# Patient Record
Sex: MALE | Race: WHITE | NOT HISPANIC OR LATINO | Employment: FULL TIME | URBAN - METROPOLITAN AREA
[De-identification: names, ages, dates, MRNs, and addresses within clinical notes are randomized per-mention and may not be internally consistent; named-entity substitution may affect disease eponyms.]

---

## 2017-01-06 ENCOUNTER — TRANSCRIBE ORDERS (OUTPATIENT)
Dept: ADMINISTRATIVE | Facility: HOSPITAL | Age: 57
End: 2017-01-06

## 2017-01-06 DIAGNOSIS — B18.2 CHRONIC HEPATITIS C WITH HEPATIC COMA (HCC): Primary | ICD-10-CM

## 2017-01-06 DIAGNOSIS — K74.69 FLORID CIRRHOSIS (HCC): ICD-10-CM

## 2017-01-25 ENCOUNTER — ALLSCRIPTS OFFICE VISIT (OUTPATIENT)
Dept: OTHER | Facility: OTHER | Age: 57
End: 2017-01-25

## 2017-01-25 ENCOUNTER — GENERIC CONVERSION - ENCOUNTER (OUTPATIENT)
Dept: OTHER | Facility: OTHER | Age: 57
End: 2017-01-25

## 2017-04-25 DIAGNOSIS — Z12.5 ENCOUNTER FOR SCREENING FOR MALIGNANT NEOPLASM OF PROSTATE: ICD-10-CM

## 2017-04-27 LAB — PROSTATE SPECIFIC ANTIGEN TOTAL (HISTORICAL): 3.6 NG/ML

## 2017-06-02 ENCOUNTER — HOSPITAL ENCOUNTER (OUTPATIENT)
Dept: RADIOLOGY | Facility: MEDICAL CENTER | Age: 57
Discharge: HOME | End: 2017-06-02
Payer: COMMERCIAL

## 2017-06-02 DIAGNOSIS — K74.69 FLORID CIRRHOSIS (HCC): ICD-10-CM

## 2017-06-02 DIAGNOSIS — B18.2 CHRONIC HEPATITIS C WITH HEPATIC COMA (HCC): ICD-10-CM

## 2017-06-02 PROCEDURE — 76705 ECHO EXAM OF ABDOMEN: CPT

## 2017-06-09 ENCOUNTER — GENERIC CONVERSION - ENCOUNTER (OUTPATIENT)
Dept: OTHER | Facility: OTHER | Age: 57
End: 2017-06-09

## 2017-07-03 ENCOUNTER — OFFICE VISIT (OUTPATIENT)
Dept: URGENT CARE | Facility: MEDICAL CENTER | Age: 57
End: 2017-07-03
Payer: COMMERCIAL

## 2017-07-03 DIAGNOSIS — R30.0 DYSURIA: ICD-10-CM

## 2017-07-03 DIAGNOSIS — R31.9 HEMATURIA: ICD-10-CM

## 2017-07-03 PROCEDURE — S9083 URGENT CARE CENTER GLOBAL: HCPCS

## 2017-07-03 PROCEDURE — G0382 LEV 3 HOSP TYPE B ED VISIT: HCPCS

## 2017-07-04 ENCOUNTER — APPOINTMENT (OUTPATIENT)
Dept: LAB | Facility: HOSPITAL | Age: 57
End: 2017-07-04
Payer: COMMERCIAL

## 2017-07-04 DIAGNOSIS — R30.0 DYSURIA: ICD-10-CM

## 2017-07-04 LAB
BACTERIA UR QL AUTO: ABNORMAL /HPF
BILIRUB UR QL STRIP: NEGATIVE
CLARITY UR: CLEAR
COLOR UR: YELLOW
GLUCOSE UR STRIP-MCNC: ABNORMAL MG/DL
HGB UR QL STRIP.AUTO: ABNORMAL
KETONES UR STRIP-MCNC: NEGATIVE MG/DL
LEUKOCYTE ESTERASE UR QL STRIP: NEGATIVE
NITRITE UR QL STRIP: NEGATIVE
NON-SQ EPI CELLS URNS QL MICRO: ABNORMAL /HPF
PH UR STRIP.AUTO: 5.5 [PH] (ref 4.5–8)
PROT UR STRIP-MCNC: NEGATIVE MG/DL
RBC #/AREA URNS AUTO: ABNORMAL /HPF
SP GR UR STRIP.AUTO: 1.04 (ref 1–1.03)
UROBILINOGEN UR QL STRIP.AUTO: 0.2 E.U./DL
WBC #/AREA URNS AUTO: ABNORMAL /HPF

## 2017-07-04 PROCEDURE — 81001 URINALYSIS AUTO W/SCOPE: CPT

## 2017-07-06 ENCOUNTER — ALLSCRIPTS OFFICE VISIT (OUTPATIENT)
Dept: OTHER | Facility: OTHER | Age: 57
End: 2017-07-06

## 2017-07-06 ENCOUNTER — APPOINTMENT (OUTPATIENT)
Dept: LAB | Facility: HOSPITAL | Age: 57
End: 2017-07-06
Payer: COMMERCIAL

## 2017-07-06 ENCOUNTER — TRANSCRIBE ORDERS (OUTPATIENT)
Dept: ADMINISTRATIVE | Facility: HOSPITAL | Age: 57
End: 2017-07-06

## 2017-07-06 DIAGNOSIS — R31.9 HEMATURIA: ICD-10-CM

## 2017-07-06 DIAGNOSIS — R31.9 HEMATURIA: Primary | ICD-10-CM

## 2017-07-06 DIAGNOSIS — R30.0 DYSURIA: ICD-10-CM

## 2017-07-06 LAB
CLARITY UR: NORMAL
COLOR UR: YELLOW
GLUCOSE (HISTORICAL): 2000
HGB UR QL STRIP.AUTO: NORMAL
KETONES UR STRIP-MCNC: NORMAL MG/DL
LEUKOCYTE ESTERASE UR QL STRIP: NORMAL
NITRITE UR QL STRIP: NORMAL
PH UR STRIP.AUTO: 5 [PH]
PROT UR STRIP-MCNC: NORMAL MG/DL
SP GR UR STRIP.AUTO: 1.01

## 2017-07-06 PROCEDURE — 87086 URINE CULTURE/COLONY COUNT: CPT

## 2017-07-07 LAB — BACTERIA UR CULT: NORMAL

## 2017-07-13 LAB
BUN SERPL-MCNC: 17 MG/DL (ref 7–25)
BUN/CREA RATIO (HISTORICAL): ABNORMAL (CALC) (ref 6–22)
CALCIUM SERPL-MCNC: 9.3 MG/DL (ref 8.6–10.3)
CHLORIDE SERPL-SCNC: 104 MMOL/L (ref 98–110)
CO2 SERPL-SCNC: 27 MMOL/L (ref 20–31)
CREAT SERPL-MCNC: 1.08 MG/DL (ref 0.7–1.33)
EGFR AFRICAN AMERICAN (HISTORICAL): 87 ML/MIN/1.73M2
EGFR-AMERICAN CALC (HISTORICAL): 75 ML/MIN/1.73M2
GLUCOSE (HISTORICAL): 233 MG/DL (ref 65–99)
POTASSIUM SERPL-SCNC: 4.4 MMOL/L (ref 3.5–5.3)
SODIUM SERPL-SCNC: 139 MMOL/L (ref 135–146)

## 2017-07-15 ENCOUNTER — HOSPITAL ENCOUNTER (OUTPATIENT)
Dept: CT IMAGING | Facility: HOSPITAL | Age: 57
Discharge: HOME | End: 2017-07-15
Payer: COMMERCIAL

## 2017-07-15 DIAGNOSIS — R31.9 HEMATURIA: ICD-10-CM

## 2017-07-15 PROCEDURE — 74178 CT ABD&PLV WO CNTR FLWD CNTR: CPT

## 2017-07-15 RX ADMIN — IOHEXOL 100 ML: 350 INJECTION, SOLUTION INTRAVENOUS at 11:34

## 2017-08-07 ENCOUNTER — ALLSCRIPTS OFFICE VISIT (OUTPATIENT)
Dept: OTHER | Facility: OTHER | Age: 57
End: 2017-08-07

## 2017-08-07 LAB
CLARITY UR: NORMAL
COLOR UR: YELLOW
GLUCOSE (HISTORICAL): 2
HGB UR QL STRIP.AUTO: NORMAL
KETONES UR STRIP-MCNC: NORMAL MG/DL
LEUKOCYTE ESTERASE UR QL STRIP: NORMAL
NITRITE UR QL STRIP: NORMAL
PH UR STRIP.AUTO: 5 [PH]
PROT UR STRIP-MCNC: NORMAL MG/DL
SP GR UR STRIP.AUTO: 1.02

## 2017-08-21 ENCOUNTER — GENERIC CONVERSION - ENCOUNTER (OUTPATIENT)
Dept: OTHER | Facility: OTHER | Age: 57
End: 2017-08-21

## 2017-08-21 LAB
LEFT EYE DIABETIC RETINOPATHY: NORMAL
RIGHT EYE DIABETIC RETINOPATHY: NORMAL

## 2017-09-02 ENCOUNTER — OFFICE VISIT (OUTPATIENT)
Dept: LAB | Facility: CLINIC | Age: 57
End: 2017-09-02
Payer: COMMERCIAL

## 2017-09-02 ENCOUNTER — TRANSCRIBE ORDERS (OUTPATIENT)
Dept: LAB | Facility: CLINIC | Age: 57
End: 2017-09-02

## 2017-09-02 ENCOUNTER — APPOINTMENT (OUTPATIENT)
Dept: LAB | Facility: CLINIC | Age: 57
End: 2017-09-02
Payer: COMMERCIAL

## 2017-09-02 DIAGNOSIS — N13.8 ENLARGED PROSTATE WITH URINARY OBSTRUCTION: Primary | ICD-10-CM

## 2017-09-02 DIAGNOSIS — N40.1 ENLARGED PROSTATE WITH URINARY OBSTRUCTION: ICD-10-CM

## 2017-09-02 DIAGNOSIS — N13.8 ENLARGED PROSTATE WITH URINARY OBSTRUCTION: ICD-10-CM

## 2017-09-02 DIAGNOSIS — N40.1 ENLARGED PROSTATE WITH URINARY OBSTRUCTION: Primary | ICD-10-CM

## 2017-09-02 LAB
ABO GROUP BLD: NORMAL
ANION GAP SERPL CALCULATED.3IONS-SCNC: 8 MMOL/L (ref 4–13)
APTT PPP: 28 SECONDS (ref 23–35)
BASOPHILS # BLD AUTO: 0.03 THOUSANDS/ΜL (ref 0–0.1)
BASOPHILS NFR BLD AUTO: 1 % (ref 0–1)
BLD GP AB SCN SERPL QL: NEGATIVE
BUN SERPL-MCNC: 16 MG/DL (ref 5–25)
CALCIUM SERPL-MCNC: 8.9 MG/DL (ref 8.3–10.1)
CHLORIDE SERPL-SCNC: 103 MMOL/L (ref 100–108)
CO2 SERPL-SCNC: 28 MMOL/L (ref 21–32)
CREAT SERPL-MCNC: 1.01 MG/DL (ref 0.6–1.3)
EOSINOPHIL # BLD AUTO: 0.09 THOUSAND/ΜL (ref 0–0.61)
EOSINOPHIL NFR BLD AUTO: 2 % (ref 0–6)
ERYTHROCYTE [DISTWIDTH] IN BLOOD BY AUTOMATED COUNT: 12.9 % (ref 11.6–15.1)
GFR SERPL CREATININE-BSD FRML MDRD: 82 ML/MIN/1.73SQ M
GLUCOSE P FAST SERPL-MCNC: 226 MG/DL (ref 65–99)
HCT VFR BLD AUTO: 48.2 % (ref 36.5–49.3)
HGB BLD-MCNC: 16.4 G/DL (ref 12–17)
INR PPP: 0.93 (ref 0.86–1.16)
LYMPHOCYTES # BLD AUTO: 1.33 THOUSANDS/ΜL (ref 0.6–4.47)
LYMPHOCYTES NFR BLD AUTO: 29 % (ref 14–44)
MCH RBC QN AUTO: 31.1 PG (ref 26.8–34.3)
MCHC RBC AUTO-ENTMCNC: 34 G/DL (ref 31.4–37.4)
MCV RBC AUTO: 92 FL (ref 82–98)
MONOCYTES # BLD AUTO: 0.34 THOUSAND/ΜL (ref 0.17–1.22)
MONOCYTES NFR BLD AUTO: 7 % (ref 4–12)
NEUTROPHILS # BLD AUTO: 2.87 THOUSANDS/ΜL (ref 1.85–7.62)
NEUTS SEG NFR BLD AUTO: 61 % (ref 43–75)
PLATELET # BLD AUTO: 187 THOUSANDS/UL (ref 149–390)
PMV BLD AUTO: 9.8 FL (ref 8.9–12.7)
POTASSIUM SERPL-SCNC: 4.5 MMOL/L (ref 3.5–5.3)
PROTHROMBIN TIME: 12.7 SECONDS (ref 12.1–14.4)
RBC # BLD AUTO: 5.27 MILLION/UL (ref 3.88–5.62)
RH BLD: POSITIVE
SODIUM SERPL-SCNC: 139 MMOL/L (ref 136–145)
SPECIMEN EXPIRATION DATE: NORMAL
WBC # BLD AUTO: 4.66 THOUSAND/UL (ref 4.31–10.16)

## 2017-09-02 PROCEDURE — 80048 BASIC METABOLIC PNL TOTAL CA: CPT

## 2017-09-02 PROCEDURE — 85610 PROTHROMBIN TIME: CPT

## 2017-09-02 PROCEDURE — 85025 COMPLETE CBC W/AUTO DIFF WBC: CPT

## 2017-09-02 PROCEDURE — 86850 RBC ANTIBODY SCREEN: CPT

## 2017-09-02 PROCEDURE — 36415 COLL VENOUS BLD VENIPUNCTURE: CPT

## 2017-09-02 PROCEDURE — 86901 BLOOD TYPING SEROLOGIC RH(D): CPT

## 2017-09-02 PROCEDURE — 86900 BLOOD TYPING SEROLOGIC ABO: CPT

## 2017-09-02 PROCEDURE — 85730 THROMBOPLASTIN TIME PARTIAL: CPT

## 2017-09-02 PROCEDURE — 93005 ELECTROCARDIOGRAM TRACING: CPT

## 2017-09-03 LAB
ATRIAL RATE: 72 BPM
P AXIS: 57 DEGREES
PR INTERVAL: 168 MS
QRS AXIS: 33 DEGREES
QRSD INTERVAL: 82 MS
QT INTERVAL: 406 MS
QTC INTERVAL: 444 MS
T WAVE AXIS: 54 DEGREES
VENTRICULAR RATE: 72 BPM

## 2017-09-07 ENCOUNTER — ALLSCRIPTS OFFICE VISIT (OUTPATIENT)
Dept: OTHER | Facility: OTHER | Age: 57
End: 2017-09-07

## 2017-09-13 ENCOUNTER — ANESTHESIA EVENT (OUTPATIENT)
Dept: PERIOP | Facility: HOSPITAL | Age: 57
End: 2017-09-13
Payer: COMMERCIAL

## 2017-09-13 ENCOUNTER — ANESTHESIA (OUTPATIENT)
Dept: PERIOP | Facility: HOSPITAL | Age: 57
End: 2017-09-13
Payer: COMMERCIAL

## 2017-09-13 ENCOUNTER — HOSPITAL ENCOUNTER (OUTPATIENT)
Facility: HOSPITAL | Age: 57
Setting detail: OUTPATIENT SURGERY
Discharge: HOME | End: 2017-09-14
Attending: UROLOGY | Admitting: UROLOGY
Payer: COMMERCIAL

## 2017-09-13 DIAGNOSIS — N40.1 ENLARGED PROSTATE WITH LOWER URINARY TRACT SYMPTOMS (LUTS): ICD-10-CM

## 2017-09-13 LAB
ANION GAP SERPL CALCULATED.3IONS-SCNC: 9 MMOL/L (ref 4–13)
BUN SERPL-MCNC: 17 MG/DL (ref 5–25)
CALCIUM SERPL-MCNC: 8.5 MG/DL (ref 8.3–10.1)
CHLORIDE SERPL-SCNC: 106 MMOL/L (ref 100–108)
CO2 SERPL-SCNC: 24 MMOL/L (ref 21–32)
CREAT SERPL-MCNC: 0.94 MG/DL (ref 0.6–1.3)
ERYTHROCYTE [DISTWIDTH] IN BLOOD BY AUTOMATED COUNT: 12.9 % (ref 11.6–15.1)
GFR SERPL CREATININE-BSD FRML MDRD: 89 ML/MIN/1.73SQ M
GLUCOSE P FAST SERPL-MCNC: 213 MG/DL (ref 65–99)
GLUCOSE SERPL-MCNC: 200 MG/DL (ref 65–140)
GLUCOSE SERPL-MCNC: 203 MG/DL (ref 65–140)
GLUCOSE SERPL-MCNC: 213 MG/DL (ref 65–140)
GLUCOSE SERPL-MCNC: 367 MG/DL (ref 65–140)
HCT VFR BLD AUTO: 47.1 % (ref 36.5–49.3)
HGB BLD-MCNC: 15.8 G/DL (ref 12–17)
MCH RBC QN AUTO: 30.9 PG (ref 26.8–34.3)
MCHC RBC AUTO-ENTMCNC: 33.5 G/DL (ref 31.4–37.4)
MCV RBC AUTO: 92 FL (ref 82–98)
PLATELET # BLD AUTO: 154 THOUSANDS/UL (ref 149–390)
PMV BLD AUTO: 9.6 FL (ref 8.9–12.7)
POTASSIUM SERPL-SCNC: 4.6 MMOL/L (ref 3.5–5.3)
RBC # BLD AUTO: 5.12 MILLION/UL (ref 3.88–5.62)
SODIUM SERPL-SCNC: 139 MMOL/L (ref 136–145)
WBC # BLD AUTO: 5.62 THOUSAND/UL (ref 4.31–10.16)

## 2017-09-13 PROCEDURE — 88344 IMHCHEM/IMCYTCHM EA MLT ANTB: CPT | Performed by: UROLOGY

## 2017-09-13 PROCEDURE — 88305 TISSUE EXAM BY PATHOLOGIST: CPT | Performed by: UROLOGY

## 2017-09-13 PROCEDURE — 85027 COMPLETE CBC AUTOMATED: CPT | Performed by: UROLOGY

## 2017-09-13 PROCEDURE — 86920 COMPATIBILITY TEST SPIN: CPT

## 2017-09-13 PROCEDURE — 82948 REAGENT STRIP/BLOOD GLUCOSE: CPT

## 2017-09-13 PROCEDURE — 80048 BASIC METABOLIC PNL TOTAL CA: CPT | Performed by: UROLOGY

## 2017-09-13 RX ORDER — SODIUM CHLORIDE, SODIUM LACTATE, POTASSIUM CHLORIDE, CALCIUM CHLORIDE 600; 310; 30; 20 MG/100ML; MG/100ML; MG/100ML; MG/100ML
100 INJECTION, SOLUTION INTRAVENOUS CONTINUOUS
Status: DISCONTINUED | OUTPATIENT
Start: 2017-09-13 | End: 2017-09-14 | Stop reason: HOSPADM

## 2017-09-13 RX ORDER — LIDOCAINE HYDROCHLORIDE 10 MG/ML
INJECTION, SOLUTION INFILTRATION; PERINEURAL AS NEEDED
Status: DISCONTINUED | OUTPATIENT
Start: 2017-09-13 | End: 2017-09-13 | Stop reason: SURG

## 2017-09-13 RX ORDER — ONDANSETRON 2 MG/ML
INJECTION INTRAMUSCULAR; INTRAVENOUS AS NEEDED
Status: DISCONTINUED | OUTPATIENT
Start: 2017-09-13 | End: 2017-09-13 | Stop reason: SURG

## 2017-09-13 RX ORDER — HYDROCODONE BITARTRATE AND ACETAMINOPHEN 5; 325 MG/1; MG/1
1 TABLET ORAL EVERY 6 HOURS PRN
Status: DISCONTINUED | OUTPATIENT
Start: 2017-09-13 | End: 2017-09-14 | Stop reason: HOSPADM

## 2017-09-13 RX ORDER — TAMSULOSIN HYDROCHLORIDE 0.4 MG/1
0.4 CAPSULE ORAL
Status: DISCONTINUED | OUTPATIENT
Start: 2017-09-14 | End: 2017-09-14 | Stop reason: HOSPADM

## 2017-09-13 RX ORDER — MORPHINE SULFATE 2 MG/ML
2 INJECTION, SOLUTION INTRAMUSCULAR; INTRAVENOUS
Status: DISCONTINUED | OUTPATIENT
Start: 2017-09-13 | End: 2017-09-14 | Stop reason: HOSPADM

## 2017-09-13 RX ORDER — ONDANSETRON 2 MG/ML
4 INJECTION INTRAMUSCULAR; INTRAVENOUS EVERY 6 HOURS PRN
Status: DISCONTINUED | OUTPATIENT
Start: 2017-09-13 | End: 2017-09-14 | Stop reason: HOSPADM

## 2017-09-13 RX ORDER — DOCUSATE SODIUM 100 MG/1
100 CAPSULE, LIQUID FILLED ORAL 2 TIMES DAILY
Qty: 60 CAPSULE | Refills: 0 | Status: SHIPPED | OUTPATIENT
Start: 2017-09-13 | End: 2018-05-07 | Stop reason: ALTCHOICE

## 2017-09-13 RX ORDER — FINASTERIDE 5 MG/1
5 TABLET, FILM COATED ORAL DAILY
Status: DISCONTINUED | OUTPATIENT
Start: 2017-09-13 | End: 2017-09-14 | Stop reason: HOSPADM

## 2017-09-13 RX ORDER — HYDROCODONE BITARTRATE AND ACETAMINOPHEN 5; 325 MG/1; MG/1
1 TABLET ORAL EVERY 4 HOURS PRN
Qty: 10 TABLET | Refills: 0 | Status: SHIPPED | OUTPATIENT
Start: 2017-09-13 | End: 2017-09-18

## 2017-09-13 RX ORDER — FENTANYL CITRATE 50 UG/ML
INJECTION, SOLUTION INTRAMUSCULAR; INTRAVENOUS AS NEEDED
Status: DISCONTINUED | OUTPATIENT
Start: 2017-09-13 | End: 2017-09-13 | Stop reason: SURG

## 2017-09-13 RX ORDER — FENTANYL CITRATE/PF 50 MCG/ML
25 SYRINGE (ML) INJECTION AS NEEDED
Status: CANCELLED | OUTPATIENT
Start: 2017-09-13

## 2017-09-13 RX ORDER — ATROPA BELLADONNA AND OPIUM 16.2; 6 MG/1; MG/1
1 SUPPOSITORY RECTAL EVERY 6 HOURS PRN
Status: DISCONTINUED | OUTPATIENT
Start: 2017-09-13 | End: 2017-09-14 | Stop reason: HOSPADM

## 2017-09-13 RX ORDER — ATROPA BELLADONNA AND OPIUM 16.2; 6 MG/1; MG/1
SUPPOSITORY RECTAL AS NEEDED
Status: DISCONTINUED | OUTPATIENT
Start: 2017-09-13 | End: 2017-09-13 | Stop reason: HOSPADM

## 2017-09-13 RX ORDER — MIDAZOLAM HYDROCHLORIDE 1 MG/ML
INJECTION INTRAMUSCULAR; INTRAVENOUS AS NEEDED
Status: DISCONTINUED | OUTPATIENT
Start: 2017-09-13 | End: 2017-09-13 | Stop reason: SURG

## 2017-09-13 RX ORDER — ONDANSETRON 2 MG/ML
4 INJECTION INTRAMUSCULAR; INTRAVENOUS ONCE AS NEEDED
Status: DISCONTINUED | OUTPATIENT
Start: 2017-09-13 | End: 2017-09-13 | Stop reason: HOSPADM

## 2017-09-13 RX ORDER — PANTOPRAZOLE SODIUM 40 MG/1
40 TABLET, DELAYED RELEASE ORAL
Status: DISCONTINUED | OUTPATIENT
Start: 2017-09-14 | End: 2017-09-14 | Stop reason: HOSPADM

## 2017-09-13 RX ORDER — ONDANSETRON 2 MG/ML
4 INJECTION INTRAMUSCULAR; INTRAVENOUS ONCE AS NEEDED
Status: CANCELLED | OUTPATIENT
Start: 2017-09-13

## 2017-09-13 RX ORDER — DOCUSATE SODIUM 100 MG/1
100 CAPSULE, LIQUID FILLED ORAL 2 TIMES DAILY
Status: DISCONTINUED | OUTPATIENT
Start: 2017-09-13 | End: 2017-09-14 | Stop reason: HOSPADM

## 2017-09-13 RX ORDER — PROPOFOL 10 MG/ML
INJECTION, EMULSION INTRAVENOUS AS NEEDED
Status: DISCONTINUED | OUTPATIENT
Start: 2017-09-13 | End: 2017-09-13 | Stop reason: SURG

## 2017-09-13 RX ORDER — CALCIUM CARBONATE 200(500)MG
1000 TABLET,CHEWABLE ORAL DAILY PRN
Status: DISCONTINUED | OUTPATIENT
Start: 2017-09-13 | End: 2017-09-14 | Stop reason: HOSPADM

## 2017-09-13 RX ORDER — SODIUM CHLORIDE, SODIUM LACTATE, POTASSIUM CHLORIDE, CALCIUM CHLORIDE 600; 310; 30; 20 MG/100ML; MG/100ML; MG/100ML; MG/100ML
INJECTION, SOLUTION INTRAVENOUS CONTINUOUS PRN
Status: DISCONTINUED | OUTPATIENT
Start: 2017-09-13 | End: 2017-09-13 | Stop reason: SURG

## 2017-09-13 RX ORDER — GLYCINE 1.5 G/100ML
SOLUTION IRRIGATION AS NEEDED
Status: DISCONTINUED | OUTPATIENT
Start: 2017-09-13 | End: 2017-09-13 | Stop reason: HOSPADM

## 2017-09-13 RX ORDER — FENTANYL CITRATE/PF 50 MCG/ML
50 SYRINGE (ML) INJECTION
Status: DISCONTINUED | OUTPATIENT
Start: 2017-09-13 | End: 2017-09-13 | Stop reason: HOSPADM

## 2017-09-13 RX ORDER — BACITRACIN, NEOMYCIN, POLYMYXIN B 400; 3.5; 5 [USP'U]/G; MG/G; [USP'U]/G
1 OINTMENT TOPICAL 2 TIMES DAILY
Status: DISCONTINUED | OUTPATIENT
Start: 2017-09-13 | End: 2017-09-14 | Stop reason: HOSPADM

## 2017-09-13 RX ORDER — METOCLOPRAMIDE HYDROCHLORIDE 5 MG/ML
10 INJECTION INTRAMUSCULAR; INTRAVENOUS ONCE AS NEEDED
Status: DISCONTINUED | OUTPATIENT
Start: 2017-09-13 | End: 2017-09-13 | Stop reason: HOSPADM

## 2017-09-13 RX ADMIN — FENTANYL CITRATE 50 MCG: 50 INJECTION INTRAMUSCULAR; INTRAVENOUS at 10:14

## 2017-09-13 RX ADMIN — HYDROCODONE BITARTRATE AND ACETAMINOPHEN 1 TABLET: 5; 325 TABLET ORAL at 13:13

## 2017-09-13 RX ADMIN — DOCUSATE SODIUM 100 MG: 100 CAPSULE, LIQUID FILLED ORAL at 13:14

## 2017-09-13 RX ADMIN — FENTANYL CITRATE 25 MCG: 50 INJECTION INTRAMUSCULAR; INTRAVENOUS at 11:37

## 2017-09-13 RX ADMIN — FENTANYL CITRATE 50 MCG: 50 INJECTION INTRAMUSCULAR; INTRAVENOUS at 12:09

## 2017-09-13 RX ADMIN — SODIUM CHLORIDE, SODIUM LACTATE, POTASSIUM CHLORIDE, AND CALCIUM CHLORIDE 100 ML/HR: .6; .31; .03; .02 INJECTION, SOLUTION INTRAVENOUS at 22:13

## 2017-09-13 RX ADMIN — FINASTERIDE 5 MG: 5 TABLET, FILM COATED ORAL at 13:14

## 2017-09-13 RX ADMIN — HYDROCODONE BITARTRATE AND ACETAMINOPHEN 1 TABLET: 5; 325 TABLET ORAL at 20:02

## 2017-09-13 RX ADMIN — SODIUM CHLORIDE, SODIUM LACTATE, POTASSIUM CHLORIDE, AND CALCIUM CHLORIDE: .6; .31; .03; .02 INJECTION, SOLUTION INTRAVENOUS at 10:04

## 2017-09-13 RX ADMIN — CEFAZOLIN SODIUM 2000 MG: 2 SOLUTION INTRAVENOUS at 10:04

## 2017-09-13 RX ADMIN — MIDAZOLAM HYDROCHLORIDE 2 MG: 1 INJECTION, SOLUTION INTRAMUSCULAR; INTRAVENOUS at 10:05

## 2017-09-13 RX ADMIN — SODIUM CHLORIDE, SODIUM LACTATE, POTASSIUM CHLORIDE, AND CALCIUM CHLORIDE: .6; .31; .03; .02 INJECTION, SOLUTION INTRAVENOUS at 11:26

## 2017-09-13 RX ADMIN — SODIUM CHLORIDE, SODIUM LACTATE, POTASSIUM CHLORIDE, AND CALCIUM CHLORIDE 100 ML/HR: .6; .31; .03; .02 INJECTION, SOLUTION INTRAVENOUS at 13:19

## 2017-09-13 RX ADMIN — PROPOFOL 200 MG: 10 INJECTION, EMULSION INTRAVENOUS at 10:10

## 2017-09-13 RX ADMIN — ONDANSETRON 4 MG: 2 INJECTION INTRAMUSCULAR; INTRAVENOUS at 10:14

## 2017-09-13 RX ADMIN — DEXAMETHASONE SODIUM PHOSPHATE 10 MG: 10 INJECTION INTRAMUSCULAR; INTRAVENOUS at 10:14

## 2017-09-13 RX ADMIN — LIDOCAINE HYDROCHLORIDE 50 MG: 10 INJECTION, SOLUTION INFILTRATION; PERINEURAL at 10:10

## 2017-09-13 RX ADMIN — BACITRACIN, NEOMYCIN, POLYMYXIN B 1 SMALL APPLICATION: 400; 3.5; 5 OINTMENT TOPICAL at 17:02

## 2017-09-13 RX ADMIN — FENTANYL CITRATE 25 MCG: 50 INJECTION INTRAMUSCULAR; INTRAVENOUS at 11:11

## 2017-09-13 RX ADMIN — BACITRACIN, NEOMYCIN, POLYMYXIN B 1 SMALL APPLICATION: 400; 3.5; 5 OINTMENT TOPICAL at 13:14

## 2017-09-14 VITALS
WEIGHT: 259 LBS | SYSTOLIC BLOOD PRESSURE: 125 MMHG | TEMPERATURE: 97.7 F | HEIGHT: 75 IN | HEART RATE: 79 BPM | RESPIRATION RATE: 20 BRPM | OXYGEN SATURATION: 96 % | DIASTOLIC BLOOD PRESSURE: 72 MMHG | BODY MASS INDEX: 32.2 KG/M2

## 2017-09-14 LAB
ANION GAP SERPL CALCULATED.3IONS-SCNC: 8 MMOL/L (ref 4–13)
BUN SERPL-MCNC: 18 MG/DL (ref 5–25)
CALCIUM SERPL-MCNC: 8.9 MG/DL (ref 8.3–10.1)
CHLORIDE SERPL-SCNC: 104 MMOL/L (ref 100–108)
CO2 SERPL-SCNC: 27 MMOL/L (ref 21–32)
CREAT SERPL-MCNC: 1.05 MG/DL (ref 0.6–1.3)
ERYTHROCYTE [DISTWIDTH] IN BLOOD BY AUTOMATED COUNT: 12.6 % (ref 11.6–15.1)
GFR SERPL CREATININE-BSD FRML MDRD: 78 ML/MIN/1.73SQ M
GLUCOSE SERPL-MCNC: 213 MG/DL (ref 65–140)
GLUCOSE SERPL-MCNC: 325 MG/DL (ref 65–140)
GLUCOSE SERPL-MCNC: 340 MG/DL (ref 65–140)
GLUCOSE SERPL-MCNC: 352 MG/DL (ref 65–140)
GLUCOSE SERPL-MCNC: 390 MG/DL (ref 65–140)
HCT VFR BLD AUTO: 43.3 % (ref 36.5–49.3)
HGB BLD-MCNC: 14.5 G/DL (ref 12–17)
MCH RBC QN AUTO: 30.8 PG (ref 26.8–34.3)
MCHC RBC AUTO-ENTMCNC: 33.5 G/DL (ref 31.4–37.4)
MCV RBC AUTO: 92 FL (ref 82–98)
PLATELET # BLD AUTO: 160 THOUSANDS/UL (ref 149–390)
PMV BLD AUTO: 9.9 FL (ref 8.9–12.7)
POTASSIUM SERPL-SCNC: 4.2 MMOL/L (ref 3.5–5.3)
RBC # BLD AUTO: 4.71 MILLION/UL (ref 3.88–5.62)
SODIUM SERPL-SCNC: 139 MMOL/L (ref 136–145)
WBC # BLD AUTO: 8.71 THOUSAND/UL (ref 4.31–10.16)

## 2017-09-14 PROCEDURE — 80048 BASIC METABOLIC PNL TOTAL CA: CPT | Performed by: UROLOGY

## 2017-09-14 PROCEDURE — 82948 REAGENT STRIP/BLOOD GLUCOSE: CPT

## 2017-09-14 PROCEDURE — 85027 COMPLETE CBC AUTOMATED: CPT | Performed by: UROLOGY

## 2017-09-14 RX ADMIN — INSULIN LISPRO 8 UNITS: 100 INJECTION, SOLUTION INTRAVENOUS; SUBCUTANEOUS at 06:43

## 2017-09-14 RX ADMIN — INSULIN LISPRO 10 UNITS: 100 INJECTION, SOLUTION INTRAVENOUS; SUBCUTANEOUS at 00:41

## 2017-09-14 RX ADMIN — SODIUM CHLORIDE, SODIUM LACTATE, POTASSIUM CHLORIDE, AND CALCIUM CHLORIDE 100 ML/HR: .6; .31; .03; .02 INJECTION, SOLUTION INTRAVENOUS at 09:04

## 2017-09-14 RX ADMIN — FINASTERIDE 5 MG: 5 TABLET, FILM COATED ORAL at 08:32

## 2017-09-14 RX ADMIN — ATROPA BELLADONNA AND OPIUM 1 SUPPOSITORY: 16.2; 6 SUPPOSITORY RECTAL at 12:48

## 2017-09-14 RX ADMIN — TAMSULOSIN HYDROCHLORIDE 0.4 MG: 0.4 CAPSULE ORAL at 05:38

## 2017-09-14 RX ADMIN — DOCUSATE SODIUM 100 MG: 100 CAPSULE, LIQUID FILLED ORAL at 08:32

## 2017-09-14 RX ADMIN — HYDROCODONE BITARTRATE AND ACETAMINOPHEN 1 TABLET: 5; 325 TABLET ORAL at 08:32

## 2017-09-14 RX ADMIN — PANTOPRAZOLE SODIUM 40 MG: 40 TABLET, DELAYED RELEASE ORAL at 05:38

## 2017-09-14 RX ADMIN — HYDROCODONE BITARTRATE AND ACETAMINOPHEN 1 TABLET: 5; 325 TABLET ORAL at 02:21

## 2017-09-15 LAB
ABO GROUP BLD BPU: NORMAL
ABO GROUP BLD BPU: NORMAL
BPU ID: NORMAL
BPU ID: NORMAL
CROSSMATCH: NORMAL
CROSSMATCH: NORMAL
UNIT DISPENSE STATUS: NORMAL
UNIT DISPENSE STATUS: NORMAL
UNIT PRODUCT CODE: NORMAL
UNIT PRODUCT CODE: NORMAL
UNIT RH: NORMAL
UNIT RH: NORMAL

## 2017-09-18 ENCOUNTER — ALLSCRIPTS OFFICE VISIT (OUTPATIENT)
Dept: OTHER | Facility: OTHER | Age: 57
End: 2017-09-18

## 2017-09-22 DIAGNOSIS — D69.6 THROMBOCYTOPENIA (HCC): ICD-10-CM

## 2017-09-22 DIAGNOSIS — E11.9 TYPE 2 DIABETES MELLITUS WITHOUT COMPLICATIONS (HCC): ICD-10-CM

## 2017-09-22 DIAGNOSIS — E78.5 HYPERLIPIDEMIA: ICD-10-CM

## 2017-10-18 ENCOUNTER — ALLSCRIPTS OFFICE VISIT (OUTPATIENT)
Dept: OTHER | Facility: OTHER | Age: 57
End: 2017-10-18

## 2017-10-18 LAB
BILIRUB UR QL STRIP: NORMAL
CLARITY UR: NORMAL
COLOR UR: NORMAL
GLUCOSE (HISTORICAL): NORMAL
HGB UR QL STRIP.AUTO: NORMAL
KETONES UR STRIP-MCNC: NORMAL MG/DL
LEUKOCYTE ESTERASE UR QL STRIP: NORMAL
NITRITE UR QL STRIP: NORMAL
PH UR STRIP.AUTO: 5 [PH]
PROT UR STRIP-MCNC: NORMAL MG/DL
SP GR UR STRIP.AUTO: 1.02
UROBILINOGEN UR QL STRIP.AUTO: NORMAL

## 2017-10-19 NOTE — PROGRESS NOTES
Assessment  1  Benign prostatic hyperplasia (BPH) with straining on urination (600 01,788 65)   (N40 1,R39 16)   2  Hematuria (599 70) (R31 9)   3  Dysuria (788 1) (R30 0)    Plan   Benign prostatic hyperplasia (BPH) with straining on urination    · Measure Post Void Residual - POC; Status:Temporary Deferral;    Perform: In Office; Due:26Apr2018; Last Updated By:Bee Fitzpatrick; 10/18/2017 10:58:53 AM;Ordered; For:Benign prostatic hyperplasia (BPH) with straining on urination; Ordered By:Gracie Zarate;  Benign prostatic hyperplasia (BPH) with straining on urination, Dysuria, Hematuria    · Urine Dip Non-Automated- POC; Status:Complete - Retrospective By Protocol  Authorization;   Done: 26AVE1738 10:33AM   Performed: In Office; Due:18Oct2018; Last Updated Holly Davila ([address] [place] [address]); 10/18/2017 10:34:15 AM;Ordered; For:Benign prostatic hyperplasia (BPH) with straining on urination, Dysuria, Hematuria; Ordered By:Sami Zarate; Follow-up visit in 6 months Evaluation and Treatment  Follow-up  Status: Hold For - Scheduling  Requested for: 95FMH3125  Ordered; For: Benign prostatic hyperplasia (BPH) with straining on urination;  Ordered By: Delta Sanchez  Performed:   Due: 02BRU2392  Measure Post Void Residual - POC; Status:Active - Perform Order; Requested GBS:18KDC2759;   Perform: In Office; Due:18Oct2018; Ordered; For:Benign prostatic hyperplasia (BPH) with straining on urination; Ordered By:Sami Zarate; Discussion/Summary  Discussion Summary:   BPH, resolved hematuria s/p TURP (9/13/2017)  is a 60 y/o male being managed by Dr Yesi King  Overall he is doing well with no major urinary complaints  He was encouraged to avoid bladder irritants and increase his water consumption  We also discussed that his urine has a large amount of glucose in it from the use of Jardiance and elevated serum glucose levels  He was encouraged to have better glucose control  A bladder ultrasound was obtained and his PVR was 12ml   There is no evidence of infection on his urine dip  He will return in 6 months with a PVR  Instructed to call with any worsening symptoms  We also discussed that when symptoms improve he can discontinue the use of Flomax  All questions answered  Chief Complaint  Chief Complaint Free Text Note Form: pt here for uroflow/ PVR; BPH; hematuria; dysuria      History of Present Illness  HPI: 62 y/o male with BPH and gross hematuria s/p TURP (9/13/2017) presents today for follow up  His hematuria resolved about a week ago  He denies any dysuria  He states his urine has been cloudy  He has had two episode of severe urgency incontinence  He has nocturia 1 to 4 times a night  He has a fair stream  He feels overall his urinary pattern has improved from surgery  He denies any other complaints  Review of Systems  Complete-Male Urology:   Constitutional: No fever or chills, feels well, no tiredness, no recent weight gain or weight loss  Respiratory: No complaints of shortness of breath, no wheezing, no cough, no SOB on exertion, no orthopnea or PND  Cardiovascular: No complaints of slow heart rate, no fast heart rate, no chest pain, no palpitations, no leg claudication, no lower extremity  Gastrointestinal: No complaints of abdominal pain, no constipation, no nausea or vomiting, no diarrhea or bloody stools  Genitourinary: Empty sensation,-- feelings of urinary urgency-- and-- stream quality fair, but-- no dysuria,-- no urinary hesitancy,-- no hematuria-- and-- no incontinence--    The patient presents with complaints of nocturia (1-4 times )  Musculoskeletal: No complaints of arthralgia, no myalgias, no joint swelling or stiffness, no limb pain or swelling  Integumentary: No complaints of skin rash or skin lesions, no itching, no skin wound, no dry skin  Hematologic/Lymphatic: No complaints of swollen glands, no swollen glands in the neck, does not bleed easily, no easy bruising     Neurological: No compliants of headache, no confusion, no convulsions, no numbness or tingling, no dizziness or fainting, no limb weakness, no difficulty walking  ROS Reviewed:   ROS reviewed  Active Problems  1  Acute frontal sinusitis (461 1) (J01 10)   2  Ankle injury (959 7) (S99 919A)   3  Benign prostatic hyperplasia (BPH) with straining on urination (600 01,788 65)   (N40 1,R39 16)   4  Cough (786 2) (R05)   5  Dysuria (788 1) (R30 0)   6  Essential hypertriglyceridemia (272 1) (E78 1)   7  Fatigue (780 79) (R53 83)   8  Hematuria (599 70) (R31 9)   9  Hepatitis-C (070 70) (B19 20)   10  Hyperlipidemia (272 4) (E78 5)   11  Immunization due (V05 9) (Z23)   12  Lymphadenopathy (785 6) (R59 1)   13  Mediastinal adenopathy (785 6) (R59 0)   14  Mediastinal adenopathy (785 6) (R59 0)   15  Nonspecific abnormal finding (796 9) (R68 89)   16  Prostate cancer screening (V76 44) (Z12 5)   17  Screening for colorectal cancer (V76 51) (Z12 11,Z12 12)   18  Thrombocytopenia (287 5) (D69 6)   19  Type 2 diabetes mellitus (250 00) (E11 9)    Past Medical History  1  Acute upper respiratory infection (465 9) (J06 9)   2  History of erectile dysfunction (V13 89) (Z87 438)   3  History of hematuria (V13 09) (Z98 827)  Active Problems And Past Medical History Reviewed: The active problems and past medical history were reviewed and updated today  Surgical History  1  History of Biopsy Of Liver   2  History of Colonoscopy (Fiberoptic) Screening   3  History of Dental Surgery   4  History of Diagnostic Cystoscopy   5  History of Shoulder Surgery  Surgical History Reviewed: The surgical history was reviewed and updated today  Family History  Father    1  Family history of Stroke Syndrome (V17 1)  Maternal Grandmother    2  Family history of Cancer  Family History Reviewed: The family history was reviewed and updated today         Social History   · Denied: History of Alcohol Use (History)   · Caffeine Use   · History of Current Every Day Smoker (305 1)   · Daily Coffee Consumption (___ Cups/Day)   · Denied: History of Daily Cola Consumption (___ Cans/Day)   · Denied: History of Daily Tea Consumption (___ Cups/Day)   · Denied: History of Drug Use   · Former smoker (I34 92) (K55 962)   ·    · Non-smoker (V49 89) (Z78 9)  Social History Reviewed: The social history was reviewed and updated today  The social history was reviewed and is unchanged  Current Meds   1  Famotidine TABS; Therapy: (Recorded:99Pvc4339) to Recorded   2  Jardiance 10 MG Oral Tablet; take 1 tablet by mouth every day; Therapy: 24MOD6221 to (CQJKBJDW:80LDR5346)  Requested for: 75Ant2092; Last   Rx:35Okv4595 Ordered   3  Ketorolac Tromethamine 60 MG/2ML Injection Solution; Give 60 mg IM now; To Be Done:   57ZKD6976; Status: HOLD FOR - Administration Ordered   4  Tamsulosin HCl - 0 4 MG Oral Capsule; take 1 capsule daily; Therapy: 18HMZ8680 to (Kevin Almeida)  Requested for: 09QKO6510; Last   Rx:37Ktb3041 Ordered  Medication List Reviewed: The medication list was reviewed and updated today  Allergies  1  No Known Drug Allergies    Vitals  Vital Signs    Recorded: 30DOV2565 10:12AM   Heart Rate 91   Systolic 570   Diastolic 78   Height 6 ft 3 in   Weight 261 lb    BMI Calculated 32 62   BSA Calculated 2 46     Physical Exam    Constitutional   General appearance: No acute distress, well appearing and well nourished  Pulmonary   Respiratory effort: No increased work of breathing or signs of respiratory distress  Cardiovascular   Palpation of heart: Normal PMI, no thrills  Examination of extremities for edema and/or varicosities: Normal     Abdomen   Abdomen: Non-tender, no masses  Musculoskeletal   Gait and station: Normal     Skin   Skin and subcutaneous tissue: Normal without rashes or lesions         Results/Data  Urine Dip Non-Automated- POC 08POU8042 10:33AM Verpia Ax     Test Name Result Flag Reference   [employer]     Clarity Transparent     Leukocytes -     Nitrite -     Blood moderate     Bilirubin -     Urobilinogen -     Protein -     Ph 5 0     Specific Gravity 1 020     Ketone -     Glucose 2,000       (1) TISSUE EXAM 92Uvr0390 10:01AM Kay Solomon     Test Name Result Flag Reference   LAB AP CASE REPORT (Report)     Surgical Pathology Report             Case: [id number]                   Authorizing Provider: Erika Osbron MD    Collected:      09/13/2017 1001        Ordering Location:   [facility]    Received:      09/13/2017 [address] [place] [address] Operating Room                            Pathologist:      Krystal Campa MD                             Specimen:  Prostate, Prostate Chips   LAB AP FINAL DIAGNOSIS (Report)     A  Prostate, chips, transurethral resection:  - Benign prostate tissue with features of benign hyperplasia (BPH),   negative for malignancy  - Multiplex immunohistochemical stain performed with appropriate controls   on selected tissue blocks highlights intact basal layer cells staining for   p63 and high molecular weight keratin with no significant luminal racemase   expression, supporting the diagnosis  Electronically signed by Krystal Campa MD on 9/19/2017 at 9:23 AM   LAB AP SURGICAL ADDITIONAL INFORMATION (Report)     All controls performed with the immunohistochemical stains reported above   reacted appropriately  These tests were developed and their performance   characteristics determined by [facility] or   [address] [place] [address]  They may not be cleared or approved by the [place]  Food and Drug Administration  The FDA has determined that such clearance   or approval is not necessary  These tests are used for clinical purposes  They should not be regarded as investigational or for research  This   laboratory has been approved by [place] , designated as a high-complexity   laboratory and is qualified to perform these tests    Interpretation performed at [place] [address]   [place] [address]   LAB AP GROSS DESCRIPTION (Report)     A  The specimen is received in formalin, labeled with the patient's name   and hospital number, and is designated prostate chips  The specimen   consists of multiple tan to tan-pink, irregular shaped, rubbery tissue   fragments which measure in aggregate 9 9 x 8 9 x 2 3 cm, and collectively   weigh 31 g  Note: The estimated total formalin fixation time based upon information   provided by the submitting clinician and the standard processing schedule   is under 72 hours  MAS     Procedure    Procedure: Bladder Ultrasound Post Void Residual    Test indication: dysuria-- and-- BPH  The procedure's were discussed with the patient  Equipment And Procedure: The patient voided 268 ml    U/S Findings: US PVR 12 ml  Future Appointments    Date/Time Provider Specialty Site   04/19/2018 09:30 AM Lisbet Aguilar [facility] Urology [facility] FOR UROLOGY [facility]   01/16/2018 03:30 PM MONA Diallo   [address] [place] [address]     Signatures   Electronically signed by : Lisbet Duque ; Oct 18 2017 12:08PM EST                       (Author)    Electronically signed by : MONA Bailon ; Oct 18 2017  4:51PM EST

## 2017-12-08 ENCOUNTER — HOSPITAL ENCOUNTER (OUTPATIENT)
Dept: RADIOLOGY | Facility: MEDICAL CENTER | Age: 57
Discharge: HOME | End: 2017-12-08
Payer: COMMERCIAL

## 2017-12-08 ENCOUNTER — GENERIC CONVERSION - ENCOUNTER (OUTPATIENT)
Dept: OTHER | Facility: OTHER | Age: 57
End: 2017-12-08

## 2017-12-08 DIAGNOSIS — K74.69 FLORID CIRRHOSIS (HCC): ICD-10-CM

## 2017-12-08 DIAGNOSIS — B18.2 CHRONIC HEPATITIS C WITH HEPATIC COMA (HCC): ICD-10-CM

## 2017-12-08 PROCEDURE — 76705 ECHO EXAM OF ABDOMEN: CPT

## 2017-12-11 ENCOUNTER — GENERIC CONVERSION - ENCOUNTER (OUTPATIENT)
Dept: FAMILY MEDICINE CLINIC | Facility: CLINIC | Age: 57
End: 2017-12-11

## 2018-01-10 NOTE — PROGRESS NOTES
History of Present Illness  Care Coordination Encounter Information:   Type of Encounter: Telephonic   Contact: Initial Contact    Spoke to Patient   Spoke to patient he knows it's been awhile since he has seen pcp  He feels he is doing fine  Had broken his ankle last year and has been doctoring for that  Did not pen to write down new phone number for pcp  He will call to make an appointment he said for January 2018  had a blood test done recently blood sugar was 170  He has an eye appointment coming up  Active Problems    1  Acute frontal sinusitis (461 1) (J01 10)   2  Ankle injury (959 7) (S99 919A)   3  Benign prostatic hyperplasia (BPH) with straining on urination (600 01,788 65)   (N40 1,R39 16)   4  Cough (786 2) (R05)   5  Essential hypertriglyceridemia (272 1) (E78 1)   6  Fatigue (780 79) (R53 83)   7  Hematuria (599 70) (R31 9)   8  Hepatitis-C (070 70) (B19 20)   9  Hyperlipidemia (272 4) (E78 5)   10  Immunization due (V05 9) (Z23)   11  Lymphadenopathy (785 6) (R59 1)   12  Mediastinal adenopathy (785 6) (R59 0)   13  Mediastinal adenopathy (785 6) (R59 0)   14  Nonspecific abnormal finding (796 9) (R68 89)   15  Prostate cancer screening (V76 44) (Z12 5)   16  Screening for colorectal cancer (V76 51) (Z12 11,Z12 12)   17  Thrombocytopenia (287 5) (D69 6)   18  Type 2 diabetes mellitus (250 00) (E11 9)    Past Medical History    1  Acute upper respiratory infection (465 9) (J06 9)   2  History of erectile dysfunction (V13 89) (I33 065)    Surgical History    1  History of Biopsy Of Liver   2  History of Colonoscopy (Fiberoptic) Screening   3  History of Dental Surgery   4  History of Diagnostic Cystoscopy   5  History of Shoulder Surgery    Family History  Father    1  Family history of Stroke Syndrome (V17 1)  Maternal Grandmother    2   Family history of Cancer    Social History    · Denied: History of Alcohol Use (History)   · Caffeine Use   · History of Current Every Day Smoker (305 1)   · Daily Coffee Consumption (___ Cups/Day)   · Denied: History of Daily Cola Consumption (___ Cans/Day)   · Denied: History of Daily Tea Consumption (___ Cups/Day)   · Denied: History of Drug Use   · Former smoker (C11 58) (I90 639)   ·    · Non-smoker (V49 89) (Z78 9)    Current Meds    1  Ketorolac Tromethamine 60 MG/2ML Injection Solution; Give 60 mg IM now; To Be Done:   55YOF9323; Status: HOLD FOR - Administration Ordered    2  Jardiance 10 MG Oral Tablet; take 1 tablet by mouth every day; Therapy: 80ZBN7321 to ()  Requested for: 85PZR9300; Last   Rx:04Jun2017 Ordered    Allergies    1  No Known Drug Allergies    Health Management   COLONOSCOPY; every 8 years; Last 13HYV3687; Next Due: 89TZD1748; Active    End of Encounter Meds    1  Ketorolac Tromethamine 60 MG/2ML Injection Solution; Give 60 mg IM now; To Be Done:   94ELG9526; Status: HOLD FOR - Administration Ordered    2  Jardiance 10 MG Oral Tablet; take 1 tablet by mouth every day; Therapy: 15GPX4186 to ()  Requested for: 24OYK2495; Last   Rx:04Jun2017 Ordered    Future Appointments    Date/Time Provider Specialty Site   11/06/2017 10:30 AM MONA Saldana  Urology [address]     Patient Care Team    Care Team Member Role Specialty Office Number   Evonne Morel MD Specialist Gastroenterology Adult [phone]   Niyah Ventura MD Specialist Orthopedic Surgery [phone]   Ruddy Ramos MD Specialist Hematology Oncology [phone]   Malena Holman MD Specialist Ophthalmology [phone]   Margarita Hicks MD Specialist Urology [phone]   Venkatesh WITT    Family Medicine [phone]     Signatures   Electronically signed by : Ken Bryant RN; Jun 9 2017  1:21PM EST                       (Author)

## 2018-01-11 NOTE — PROGRESS NOTES
Chief Complaint  Patient presents for espinosa removal S/P TURP 9/13/17      Active Problems    1  Acute frontal sinusitis (461 1) (J01 10)   2  Ankle injury (959 7) (S99 919A)   3  Benign prostatic hyperplasia (BPH) with straining on urination (600 01,788 65)   (N40 1,R39 16)   4  Cough (786 2) (R05)   5  Dysuria (788 1) (R30 0)   6  Essential hypertriglyceridemia (272 1) (E78 1)   7  Fatigue (780 79) (R53 83)   8  Hematuria (599 70) (R31 9)   9  Hepatitis-C (070 70) (B19 20)   10  Hyperlipidemia (272 4) (E78 5)   11  Immunization due (V05 9) (Z23)   12  Lymphadenopathy (785 6) (R59 1)   13  Mediastinal adenopathy (785 6) (R59 0)   14  Mediastinal adenopathy (785 6) (R59 0)   15  Nonspecific abnormal finding (796 9) (R68 89)   16  Prostate cancer screening (V76 44) (Z12 5)   17  Screening for colorectal cancer (V76 51) (Z12 11,Z12 12)   18  Thrombocytopenia (287 5) (D69 6)   19  Type 2 diabetes mellitus (250 00) (E11 9)    Current Meds   1  Famotidine TABS; Therapy: (Recorded:78Yyw5660) to Recorded   2  Jardiance 10 MG Oral Tablet; take 1 tablet by mouth every day; Therapy: 09ZUP9158 to (Campbell Day)  Requested for: 52UQL6309; Last   Rx:04Jun2017 Ordered   3  Ketorolac Tromethamine 60 MG/2ML Injection Solution; Give 60 mg IM now; To Be Done:   22JGR1874; Status: HOLD FOR - Administration Ordered   4  Tamsulosin HCl - 0 4 MG Oral Capsule (Flomax); take 1 capsule daily; Therapy: 08XXK6734 to (Complete:01Jul2018)  Requested for: 28XUA3839; Last   Rx:06Jul2017 Ordered    Allergies    1  No Known Drug Allergies    Vitals  Signs    Heart Rate: 78  Systolic: 457  Diastolic: 64  Height: 6 ft 3 in  Weight: 259 lb   BMI Calculated: 32 37  BSA Calculated: 2 45    Procedure    Procedure: Espinosa Cath Removal   Patient presents with clear yellow urine in espinosa bag  24 Fr espinosa catheter removed, intact  Patient tolerated procedure well  Post Op TURP instructions given to patient   Patient verbalized understanding of instructions  Assessment    1  Benign prostatic hyperplasia (BPH) with straining on urination (600 01,788 65)   (N40 1,R39 16)    Plan  Benign prostatic hyperplasia (BPH) with straining on urination    · Follow-up visit in 1 month Evaluation and Treatment  Follow-up Uroflow/PVR  Status:  Hold For - Scheduling,Retrospective By Protocol Authorization  Requested for:  28XGT5705   Ordered; For: Benign prostatic hyperplasia (BPH) with straining on urination; Ordered ByIsaac Romero Performed:  Due: 29PHI6083    Discussion/Summary    Patient states he is out of Vicodin, would like new script  Discussed with ROSA MARIA Vila, who states patient should hydrate well and avoid bladder irritants  Patient verbalized understanding  Patient knows to call office with any problems  Future Appointments    Date/Time Provider Specialty Site   11/06/2017 10:30 AM MONA Page   Urology [facility] FOR UROLOGY [facility]     Signatures   Electronically signed by : Rondal Kanner, RN; Sep 18 2017  8:31AM EST                       (Author)    Electronically signed by : MONA Stuart ; Sep 18 2017  1:41PM EST

## 2018-01-12 VITALS
WEIGHT: 259.25 LBS | HEART RATE: 66 BPM | BODY MASS INDEX: 32.23 KG/M2 | SYSTOLIC BLOOD PRESSURE: 130 MMHG | HEIGHT: 75 IN | DIASTOLIC BLOOD PRESSURE: 76 MMHG

## 2018-01-12 NOTE — RESULT NOTES
Verified Results  CT CHEST W CONTRAST 31Qzs7217 10:56AM Rosanna Campa Order Number: [id number]    - Patient Instructions: To schedule this appointment, please contact Central Scheduling at [phone]  Test Name Result Flag Reference   CT CHEST W CONTRAST (Report)     CT CHEST WITH IV CONTRAST     INDICATION: Mediastinal/hilar lymphadenopathy  COMPARISON: CT chest dated January 1, 2016  TECHNIQUE: CT examination of the chest was performed  85 mL of Omnipaque 350 was injected intravenously  Axial, sagittal and coronal reformatted images were submitted for interpretation  Coronal thick section MIP (maximal intensity projection) images    were also created  This examination, like all CT scans performed in the [facility], was performed utilizing techniques to minimize radiation dose exposure, including the use of iterative reconstruction and automated exposure control  FINDINGS:     LUNGS: There is a stable 3 mm pleural-based right middle lobe lung nodule  (series 603, image 29)  PLEURA: Unremarkable  HEART/GREAT VESSELS: Unremarkable for patient's age  MEDIASTINUM AND KRIS: Again noted are multiple mildly enlarged mediastinal lymph nodes, including: A subcarinal carinal lymph node measuring 11 mm in short axis  A 2nd right subcarinal lymph node measuring approximately 12 mm in short axis and 27 mm in   long axis which appears mildly decreased in size from the prior exam  The previously described right hilar lymph node on the prior study appears mildly decreased in size measuring 1 3 cm  Scattered nonenlarged precarinal and aortopulmonary window and    paratracheal lymph nodes are stable  There is a stable right anterior diaphragmatic lymph node measuring 12 mm  A previously described anterior right diaphragmatic lymph node just anterior to the right ventricle has decreased in size and now measures 8   mm   A few scattered nonenlarged para-aortic lymph nodes are stable  CHEST WALL AND LOWER NECK: Unremarkable  VISUALIZED STRUCTURES IN THE UPPER ABDOMEN: There is a small sliding hiatal hernia  Spleen is mildly enlarged measuring 14 1 cm in length  OSSEOUS STRUCTURES: No acute fracture  No destructive osseous lesion  IMPRESSION:     Mild mediastinal and right hilar lymphadenopathy as described above  These multiple lymph nodes have either mildly decreased in size or are stable when compared to a CT scan of the chest dated January 1, 2016  Again, the findings are nonspecific with    differential considerations including sarcoidosis, reactive lymphadenopathy, lymphoproliferative disease, among other etiologies  A repeat CT scan of the chest in 6 months is recommended for further evaluation  Stable mild splenomegaly         Workstation performed: [id number]     Signed by:   Sulema Brian MD   8/29/16

## 2018-01-13 VITALS
SYSTOLIC BLOOD PRESSURE: 142 MMHG | HEIGHT: 75 IN | DIASTOLIC BLOOD PRESSURE: 64 MMHG | HEART RATE: 62 BPM | BODY MASS INDEX: 32.2 KG/M2 | WEIGHT: 259 LBS

## 2018-01-13 NOTE — RESULT NOTES
Verified Results  (1) LIPID PANEL, FASTING 21Jan2016 07:08AM BrandtEllie     Test Name Result Flag Reference   CHOLESTEROL, TOTAL 225 mg/dL H 125-200   HDL CHOLESTEROL 40 mg/dL  > OR = 40   TRIGLICERIDES 289 mg/dL  <150   LDL-CHOLESTEROL 156 mg/dL (calc) H <130   Desirable range <100 mg/dL for patients with CHD or  diabetes and <70 mg/dL for diabetic patients with  known heart disease  CHOL/HDLC RATIO 5 6 (calc) H < OR = 5 0   NON HDL CHOLESTEROL 185 mg/dL (calc) H    Target for non-HDL cholesterol is 30 mg/dL higher than   LDL cholesterol target  (Q) MICROALBUMIN, RANDOM URINE (W/CREATININE) [id number] 07:08AM Brandt Ellie Eliot     Test Name Result Flag Reference   CREATININE, RANDOM URINE 137 mg/dL     MICROALBUMIN 2 2 mg/dL     Reference Range  Not established   MICROALBUMIN/CREATININE$RATIO, RANDOM URINE 16 mcg/mg creat  <30   The ADA defines abnormalities in albumin  excretion as follows:     Category         Result (mcg/mg creatinine)     Normal                    <30  Microalbuminuria            Clinical albuminuria   > OR = 300     The ADA recommends that at least two of three  specimens collected within a 3-6 month period be  abnormal before considering a patient to be  within a diagnostic category  (1) COMPREHENSIVE METABOLIC PANEL [id number] 21:19XD Brandt Ellie Mccabe     Test Name Result Flag Reference   GLUCOSE 226 mg/dL H 65-99   Fasting reference interval   UREA NITROGEN (BUN) 19 mg/dL  7-25   CREATININE 1 02 mg/dL  0 70-1 33   For patients >52years of age, the reference limit  for Creatinine is approximately 13% higher for people  identified as -American  eGFR NON-AFR   AMERICAN 82 mL/min/1 73m2  > OR = 60   eGFR AFRICAN AMERICAN 95 mL/min/1 73m2  > OR = 60   BUN/CREATININE RATIO   8-50   NOT APPLICABLE (calc)   SODIUM 140 mmol/L  135-146   POTASSIUM 4 0 mmol/L  3 5-5 3   CHLORIDE 104 mmol/L     CARBON DIOXIDE 24 mmol/L  19-30   CALCIUM 9 4 mg/dL  8 6-10 3   PROTEIN, TOTAL 7 8 g/dL  6 1-8 1   ALBUMIN 4 6 g/dL  3 6-5 1   GLOBULIN 3 2 g/dL (calc)  1 9-3 7   ALBUMIN/GLOBULIN RATIO 1 4 (calc)  1 0-2 5   BILIRUBIN, TOTAL 0 7 mg/dL  0 2-1 2   ALKALINE PHOSPHATASE 74 U/L     AST 15 U/L  10-35   ALT 17 U/L  9-46     (1) CBC/PLT/DIFF [id number] 07:08AM Júnior Carlton     Test Name Result Flag Reference   WHITE BLOOD CELL COUNT 5 1 Thousand/uL  3 8-10 8   RED BLOOD CELL COUNT 5 42 Million/uL  4 20-5 80   HEMOGLOBIN 16 8 g/dL  13 2-17 1   HEMATOCRIT 50 4 % H 38 5-50 0   MCV 93 1 fL  80 0-100 0   MCH 31 0 pg  27 0-33 0   MCHC 33 4 g/dL  32 0-36 0   RDW 13 6 %  11 0-15 0   PLATELET COUNT 742 Thousand/uL  140-400   MPV 8 4 fL  7 5-11 5   ABSOLUTE NEUTROPHILS 2887 cells/uL  9640-6831   ABSOLUTE LYMPHOCYTES 1617 cells/uL  850-3900   ABSOLUTE MONOCYTES 372 cells/uL  200-950   ABSOLUTE EOSINOPHILS 204 cells/uL     ABSOLUTE BASOPHILS 20 cells/uL  0-200   NEUTROPHILS 56 6 %     LYMPHOCYTES 31 7 %     MONOCYTES 7 3 %     EOSINOPHILS 4 0 %     BASOPHILS 0 4 %       (Q) TSH, 3RD GENERATION 21Jan2016 07:08AM Brandt Arabella Skaggs     Test Name Result Flag Reference   TSH 6 15 mIU/L H 0 40-4 50     (Q) HEMOGLOBIN A1c 21Jan2016 07:08AM Júnior Carlton     Test Name Result Flag Reference   HEMOGLOBIN A1c 7 4 % of total Hgb H <5 7   According to ADA guidelines, hemoglobin A1c <7 0%  represents optimal control in non-pregnant diabetic  patients  Different metrics may apply to specific  patient populations  Standards of Medical Care in  [phone]  Diabetes Care  2013;36:s11-s66     For the purpose of screening for the presence of  diabetes  <5 7%       Consistent with the absence of diabetes  5 7-6 4%    Consistent with increased risk for diabetes              (prediabetes)  >or=6 5%    Consistent with diabetes     This assay result is consistent with diabetes  mellitus       Currently, no consensus exists for use of hemoglobin  A1c for diagnosis of diabetes for children

## 2018-01-13 NOTE — RESULT NOTES
Verified Results  * CT CHEST WO CONTRAST [id number] 04:04PM Ami Carlton     Test Name Result Flag Reference   CT CHEST WO CONTRAST (Report)     CT CHEST WITHOUT IV CONTRAST     INDICATION: Follow-up lymphadenopathy     COMPARISON: None  TECHNIQUE: CT examination of the chest was performed without intravenous contrast  Axial, sagittal and coronal reformatted images were submitted for interpretation  Coronal thick section MIP (maximal intensity projection) images were also created  This examination, like all CT scans performed in the [facility], was performed utilizing techniques to minimize radiation dose exposure, including the use of iterative reconstruction and automated exposure control  FINDINGS:     LUNGS: There is a 4 mm pleural-based nodule in the right middle lobe on series 3, image 41, previously 4 mm  There is a stable 3 mm left upper lobe nodule on series 601, image 102  PLEURA: Unremarkable  HEART/GREAT VESSELS: Unremarkable for patient's age  MEDIASTINUM AND KRIS: Mediastinal lymphadenopathy has improved  A right paratracheal lymph node measures 1 1 x 1 3 cm, previously 1 4 x 1 8 cm  An adjacent right paratracheal lymph node measures 1 1 x 1 2 cm, previously 0 9 x 1 2 cm  More superiorly    a right paratracheal lymph node measures 1 1 x 1 4 cm, previously 1 0 x 1 3 cm  A subcarinal lymph node measures 1 5 x 2 9 cm, previously 1 7 x 2 8 cm  There is an epicardial lymph node anteriorly measuring 0 9 x 1 0 cm, previously 0 9 x 1 2 cm  A    right-sided paraesophageal lymph node measures 0 9 x 1 2 cm, previously 0 7 x 1 1 cm  A left-sided paraesophageal lymph node measures 0 7 x 1 0 cm, previously 0 9 x 1 1 cm  CHEST WALL AND LOWER NECK: Unremarkable  VISUALIZED STRUCTURES IN THE UPPER ABDOMEN: The spleen measures 14 3 cm, previously 14 8 cm  There is a small hiatal hernia  OSSEOUS STRUCTURES: No acute fracture   No destructive osseous lesion  IMPRESSION:     1  Extensive mediastinal lymphadenopathy again seen  Most lymph nodes are mildly decreased in size though several remain essentially unchanged compared to the prior study  Additional follow-up CT in 6-12 months is recommended  2  Stable mild splenomegaly  3  Stable small lung nodules as described above  These can also be reassessed on follow-up       ##[id number]##[id number]       Workstation performed: [id number]     Signed by:   Akosua Red MD   12/16/16

## 2018-01-14 VITALS
HEART RATE: 91 BPM | HEIGHT: 75 IN | WEIGHT: 261 LBS | DIASTOLIC BLOOD PRESSURE: 78 MMHG | BODY MASS INDEX: 32.45 KG/M2 | SYSTOLIC BLOOD PRESSURE: 124 MMHG

## 2018-01-14 VITALS
HEIGHT: 75 IN | BODY MASS INDEX: 32.2 KG/M2 | SYSTOLIC BLOOD PRESSURE: 132 MMHG | DIASTOLIC BLOOD PRESSURE: 64 MMHG | WEIGHT: 259 LBS | HEART RATE: 78 BPM

## 2018-01-14 VITALS
HEIGHT: 75 IN | WEIGHT: 261 LBS | DIASTOLIC BLOOD PRESSURE: 78 MMHG | BODY MASS INDEX: 32.45 KG/M2 | SYSTOLIC BLOOD PRESSURE: 116 MMHG | HEART RATE: 80 BPM

## 2018-01-22 VITALS
SYSTOLIC BLOOD PRESSURE: 128 MMHG | HEART RATE: 84 BPM | BODY MASS INDEX: 33.07 KG/M2 | WEIGHT: 266 LBS | HEIGHT: 75 IN | DIASTOLIC BLOOD PRESSURE: 80 MMHG

## 2018-01-24 NOTE — PROGRESS NOTES
Assessment    1  Benign prostatic hyperplasia (BPH) with straining on urination (600 01,788 65)   (N40 1,R39 16)   2  Prostate cancer screening (V76 44) (Z12 5)    Plan  Benign prostatic hyperplasia (BPH) with straining on urination    · Cystourethroscopy - POC; Status:Active - Perform Order; Requested [id number];    Perform: In Office; [phone]; Ordered; For:Benign prostatic hyperplasia (BPH) with straining on urination; Ordered By:Nichol Zarate;   · Measure Post Void Residual - POC; Status:Active - Perform Order; Requested  [id number];    Perform: In Office; [phone]; Ordered; For:Benign prostatic hyperplasia (BPH) with straining on urination; Ordered By:Nichol Zarate;   · Follow-up visit in 3 months Evaluation and Treatment  Follow-up  Status: Temporary  Deferral   Ordered; For: Benign prostatic hyperplasia (BPH) with straining on urination; Ordered By: Keenan Steven Performed:  Due: 71STQ9351; Last Updated By: Dejuan Sandoval; 1/25/2017 1:28:09 PM  Prostate cancer screening    · (1) PSA (SCREEN) (Dx V76 44 Screen for Prostate Cancer); Status:Active; Requested  for:35Lej1674;    Perform:[facility]; Due:34Knb0044; Ordered; For:Prostate cancer screening; Ordered By:Nichol Zarate; Discussion/Summary  Discussion Summary:   BPH with obstruction, prostate cancer screening    Vale Almanzar is a 60 y/o male being managed by Dr Pete Carrasco  The patient continues to remain symptomatic  A bladder ultrasound was obtained today and his PVR was 182ml  A cystoscopy confirmed enlarged prostate with kissing lobes 5/2016  The patient has not seen any improvement of symptoms with Flomax or Finasteride  He wishes to discontinue the use of Finasteride as he does not like the side effects of retrograde ejaculation  We discussed surgical management versus observation  He wishes to return in 3 months to further discuss  We also reviewed that he may be a candidate for a urolift   He will have a repeat cystoscopy and PVR at his next visit as it will be almost a year since his last cystoscopy  All questions answered  Chief Complaint  Chief Complaint Free Text Note Form: BPH       History of Present Illness  HPI: 62 y/o male with BPH presents today for 6 month follow up  He was previously evaluated and prescribed Finasteride as he did not find Flomax effective  He underwent cystoscopy in May 2016 and was found to have a large prostate with kissing lobes  TURP was recommended but he wished to further consider  He continues to have nocturia up to 7 times a night, fair stream, and hesitancy  He denies any other complaints  He denies any improvement with the use of Finasteride  He is otherwise doing well with no other changes in his overall health  Review of Systems  Complete-Male Urology:   Constitutional: No fever or chills, feels well, no tiredness, no recent weight gain or weight loss  Respiratory: No complaints of shortness of breath, no wheezing, no cough, no SOB on exertion, no orthopnea or PND  Cardiovascular: No complaints of slow heart rate, no fast heart rate, no chest pain, no palpitations, no leg claudication, no lower extremity  Gastrointestinal: No complaints of abdominal pain, no constipation, no nausea or vomiting, no diarrhea or bloody stools  Genitourinary: urinary hesitancy, stream quality poor, urinary stream starts and stops, but no dysuria, no hematuria, no empty sensation, no incontinence and no feelings of urinary urgency    The patient presents with complaints of nocturia (every hour)  Musculoskeletal: No complaints of arthralgia, no myalgias, no joint swelling or stiffness, no limb pain or swelling  Integumentary: No complaints of skin rash or skin lesions, no itching, no skin wound, no dry skin  Hematologic/Lymphatic: No complaints of swollen glands, no swollen glands in the neck, does not bleed easily, no easy bruising     Neurological: No compliants of headache, no confusion, no convulsions, no numbness or tingling, no dizziness or fainting, no limb weakness, no difficulty walking  ROS Reviewed:   ROS reviewed  Active Problems    1  Acute frontal sinusitis (461 1) (J01 10)   2  Ankle injury (959 7) (S99 919A)   3  Benign prostatic hyperplasia (BPH) with straining on urination (600 01,788 65)   (N40 1,R39 16)   4  Cough (786 2) (R05)   5  Essential hypertriglyceridemia (272 1) (E78 1)   6  Fatigue (780 79) (R53 83)   7  Hematuria (599 70) (R31 9)   8  Hepatitis-C (070 70) (B19 20)   9  Hyperlipidemia (272 4) (E78 5)   10  Immunization due (V05 9) (Z23)   11  Lymphadenopathy (785 6) (R59 1)   12  Mediastinal adenopathy (785 6) (R59 0)   13  Mediastinal adenopathy (785 6) (R59 0)   14  Nonspecific abnormal finding (796 9) (R68 89)   15  Screening for colorectal cancer (V76 51) (Z12 11,Z12 12)   16  Thrombocytopenia (287 5) (D69 6)   17  Type 2 diabetes mellitus (250 00) (E11 9)    Past Medical History    1  Acute upper respiratory infection (465 9) (J06 9)   2  History of erectile dysfunction (V13 89) (C75 272)  Active Problems And Past Medical History Reviewed: The active problems and past medical history were reviewed and updated today  Surgical History    1  History of Biopsy Of Liver   2  History of Colonoscopy (Fiberoptic) Screening   3  History of Dental Surgery   4  History of Diagnostic Cystoscopy   5  History of Shoulder Surgery  Surgical History Reviewed: The surgical history was reviewed and updated today  Family History  Father    1  Family history of Stroke Syndrome (V17 1)  Maternal Grandmother    2  Family history of Cancer  Family History Reviewed: The family history was reviewed and updated today         Social History    · Denied: History of Alcohol Use (History)   · Caffeine Use   · History of Current Every Day Smoker (305 1)   · Daily Coffee Consumption (___ Cups/Day)   · Denied: History of Daily Cola Consumption (___ Cans/Day)   · Denied: History of Daily Tea Consumption (___ Cups/Day)   · Denied: History of Drug Use   · Former smoker (J89 41) (H13 352)   ·    · Non-smoker (V49 89) (Z78 9)  Social History Reviewed: The social history was reviewed and updated today  The social history was reviewed and is unchanged  Current Meds   1  Jardiance 10 MG Oral Tablet; take 1 tablet by mouth every day; Therapy: 89JWQ8964 to (018 9253)  Requested for: 65UDF8322; Last   Rx:[id number] Ordered   2  Ketorolac Tromethamine 60 MG/2ML Injection Solution; Give 60 mg IM now; To Be Done:   20PBM8367; Status: HOLD FOR - Administration Ordered  Medication List Reviewed: The medication list was reviewed and updated today  Allergies    1  No Known Drug Allergies    Vitals  Vital Signs    Recorded: 29EQZ2950 01:05PM   Heart Rate 84   Systolic 634   Diastolic 80   Height 6 ft 3 in   Weight 266 lb    BMI Calculated 33 25   BSA Calculated 2 48     Physical Exam    Constitutional   General appearance: No acute distress, well appearing and well nourished  Pulmonary   Respiratory effort: No increased work of breathing or signs of respiratory distress  Cardiovascular   Palpation of heart: Normal PMI, no thrills  Examination of extremities for edema and/or varicosities: Normal     Abdomen   Abdomen: Non-tender, no masses  Musculoskeletal   Gait and station: Normal     Skin   Skin and subcutaneous tissue: Normal without rashes or lesions  Procedure    Procedure: Bladder Ultrasound Post Void Residual    Test indication: Benign Prostatic Hyperplasia  Equipment And Procedure: The patient voided  U/S Findings: pvr= 182ml  Future Appointments    Date/Time Provider Specialty Site   04/26/2017 11:30 AM MONA Garcia   Urology [address]     Signatures   Electronically signed by : Con Go O[place]ass, [address] [place] [address]; Jan 25 2017  4:21PM EST                       (Author)    Electronically signed by : MONA Mann ; Jan 26 2017  5:35AM EST

## 2018-01-25 ENCOUNTER — OFFICE VISIT (OUTPATIENT)
Dept: FAMILY MEDICINE CLINIC | Facility: CLINIC | Age: 58
End: 2018-01-25
Payer: COMMERCIAL

## 2018-01-25 VITALS
WEIGHT: 266.8 LBS | SYSTOLIC BLOOD PRESSURE: 110 MMHG | DIASTOLIC BLOOD PRESSURE: 78 MMHG | BODY MASS INDEX: 33.17 KG/M2 | TEMPERATURE: 97.4 F | HEART RATE: 70 BPM | HEIGHT: 75 IN

## 2018-01-25 DIAGNOSIS — B18.2 CHRONIC HEPATITIS C WITHOUT HEPATIC COMA (HCC): ICD-10-CM

## 2018-01-25 DIAGNOSIS — N40.1 BENIGN PROSTATIC HYPERPLASIA WITH URINARY FREQUENCY: ICD-10-CM

## 2018-01-25 DIAGNOSIS — R35.0 BENIGN PROSTATIC HYPERPLASIA WITH URINARY FREQUENCY: ICD-10-CM

## 2018-01-25 DIAGNOSIS — E11.9 TYPE 2 DIABETES MELLITUS WITHOUT COMPLICATION, WITHOUT LONG-TERM CURRENT USE OF INSULIN (HCC): Primary | ICD-10-CM

## 2018-01-25 DIAGNOSIS — G89.29 CHRONIC PAIN OF LEFT KNEE: ICD-10-CM

## 2018-01-25 DIAGNOSIS — M25.562 CHRONIC PAIN OF LEFT KNEE: ICD-10-CM

## 2018-01-25 PROCEDURE — 99214 OFFICE O/P EST MOD 30 MIN: CPT | Performed by: FAMILY MEDICINE

## 2018-01-25 RX ORDER — FAMOTIDINE 40 MG/1
40 TABLET, FILM COATED ORAL DAILY
COMMUNITY
Start: 2018-01-23 | End: 2019-11-11 | Stop reason: SDUPTHER

## 2018-01-25 NOTE — ASSESSMENT & PLAN NOTE
Overdue for labs  Urged dietary compliance was compliance with follow-up  Also urged increased exercise  Check labs as above    Recheck 6 months-earlier if needed

## 2018-01-25 NOTE — PROGRESS NOTES
Assessment/Plan:    Chronic hepatitis C without hepatic coma (HCC)  Resolved/viral load undetected  Continue follow-up with GI    Type 2 diabetes mellitus without complication, without long-term current use of insulin ([address] )  Overdue for labs  Urged dietary compliance was compliance with follow-up  Also urged increased exercise  Check labs as above  Recheck 6 months-earlier if needed    Benign prostatic hyperplasia with urinary frequency  Continue follow-up with Urology       Diagnoses and all orders for this visit:    Type 2 diabetes mellitus without complication, without long-term current use of insulin (HCC)  -     Comprehensive metabolic panel; Future  -     Diabetic foot exam  -     Hemoglobin A1c; Future  -     Lipid panel; Future  -     Microalbumin / creatinine urine ratio; Future  -     TSH, 3rd generation; Future  -     Comprehensive metabolic panel  -     Hemoglobin A1c  -     Lipid panel  -     TSH, 3rd generation    Chronic hepatitis C without hepatic coma (HCC)    Benign prostatic hyperplasia with urinary frequency    Chronic pain of left knee    Other orders  -     famotidine (PEPCID) 40 MG tablet;   -     Empagliflozin (JARDIANCE) 10 MG TABS; Take by mouth      follow-up as above  Pt to f/u 4-5 days if not improving, earlier if worsening  Pt to call for problems or concerns in the interim    Subjective:      Patient ID: Steph Brandt is a 61 y o  male  60 yo male here for f/u multiple med issues  - pt recently broke his   - pt has been seen by Uro for BPH  Pt underwent TURP in Sept after an episode of hematuria  Pt states that "I can't hold it anymore"  - Bgs have been high at home  Typically 180s nonfasting  Not exercising as hard as he had been in the past  Cheats in diet "sometimes"  Pt is overdue for labs  Up to date with ophth  - up to date with GI re: Hep C  Pt was told recently that he "was cured" - finished Harvoni treatment  - pt notes decreased libido since his TURP   Pt denies depressed mood or anhedonia  - no EtOH  Smokes a cigar at least once a day (not inhaling)  The following portions of the patient's history were reviewed and updated as appropriate: allergies, current medications, past family history, past medical history, past social history, past surgical history and problem list     Review of Systems   Constitutional: Negative  HENT: Negative  Eyes: Negative  Respiratory: Negative  Cardiovascular: Negative  Gastrointestinal: Negative  Endocrine: Negative  Genitourinary: Positive for difficulty urinating, enuresis, hematuria and urgency  Negative for dysuria and flank pain  Musculoskeletal: Negative for arthralgias, back pain, gait problem, joint swelling, neck pain and neck stiffness  Skin: Negative  Negative for color change and pallor  Allergic/Immunologic: Negative  Negative for environmental allergies, food allergies and immunocompromised state  Neurological: Negative  Negative for dizziness, seizures, weakness, numbness and headaches  Hematological: Negative  Psychiatric/Behavioral: Negative  Negative for dysphoric mood  Objective:     Physical Exam   Constitutional: He appears well-developed and well-nourished  HENT:   Head: Normocephalic and atraumatic  Right Ear: External ear normal    Left Ear: External ear normal    Nose: Nose normal    Mouth/Throat: Oropharynx is clear and moist    Eyes: Conjunctivae and EOM are normal  Pupils are equal, round, and reactive to light  Neck: Normal range of motion  Neck supple  No JVD present  No tracheal deviation present  No thyromegaly present  Cardiovascular: Normal rate, regular rhythm, normal heart sounds and intact distal pulses  Pulses are no weak pulses  No murmur heard  Pulses:       Dorsalis pedis pulses are 2+ on the right side, and 2+ on the left side  Pulmonary/Chest: Effort normal and breath sounds normal    Abdominal: Soft   Bowel sounds are normal    Musculoskeletal:        Left knee: He exhibits normal range of motion, no swelling, no effusion, no laceration and no MCL laxity  Tenderness found  Medial joint line tenderness noted  No lateral joint line tenderness noted  Feet:   Right Foot:   Skin Integrity: Negative for ulcer, skin breakdown, erythema, warmth, callus or dry skin  Left Foot:   Skin Integrity: Negative for ulcer, skin breakdown, erythema, warmth, callus or dry skin  Neurological: He is alert  Skin: Skin is warm  Patient's shoes and socks removed  Right Foot/Ankle   Right Foot Inspection  Skin Exam: skin normal and skin intact no dry skin, no warmth, no callus, no erythema, no maceration, no abnormal color, no pre-ulcer, no ulcer and no callus                          Toe Exam: ROM and strength within normal limits  Sensory     Proprioception: intact   Monofilament testing: intact  Vascular  Capillary refills: < 3 seconds  The right DP pulse is 2+  Left Foot/Ankle  Left Foot Inspection  Skin Exam: skin normal and skin intactno dry skin, no warmth, no erythema, no maceration, normal color, no pre-ulcer, no ulcer and no callus                         Toe Exam: ROM and strength within normal limits                   Sensory     Proprioception: intact  Monofilament: intact  Vascular  Capillary refills: < 3 seconds  The left DP pulse is 2+  Assign Risk Category:  No deformity present; Loss of protective sensation;  No weak pulses       Risk: 0

## 2018-02-20 DIAGNOSIS — E11.9 TYPE 2 DIABETES MELLITUS WITHOUT COMPLICATION, WITHOUT LONG-TERM CURRENT USE OF INSULIN (HCC): Primary | ICD-10-CM

## 2018-04-10 ENCOUNTER — TELEPHONE (OUTPATIENT)
Dept: UROLOGY | Facility: AMBULATORY SURGERY CENTER | Age: 58
End: 2018-04-10

## 2018-04-10 ENCOUNTER — LAB (OUTPATIENT)
Dept: LAB | Facility: CLINIC | Age: 58
End: 2018-04-10
Payer: COMMERCIAL

## 2018-04-10 DIAGNOSIS — E11.8 TYPE 2 DIABETES MELLITUS WITH COMPLICATION, UNSPECIFIED WHETHER LONG TERM INSULIN USE: Primary | ICD-10-CM

## 2018-04-10 DIAGNOSIS — N40.0 BENIGN PROSTATIC HYPERPLASIA, UNSPECIFIED WHETHER LOWER URINARY TRACT SYMPTOMS PRESENT: Primary | ICD-10-CM

## 2018-04-10 LAB
ALBUMIN SERPL BCP-MCNC: 4.1 G/DL (ref 3.5–5)
ALP SERPL-CCNC: 90 U/L (ref 46–116)
ALT SERPL W P-5'-P-CCNC: 23 U/L (ref 12–78)
ANION GAP SERPL CALCULATED.3IONS-SCNC: 8 MMOL/L (ref 4–13)
AST SERPL W P-5'-P-CCNC: 10 U/L (ref 5–45)
BILIRUB SERPL-MCNC: 0.74 MG/DL (ref 0.2–1)
BUN SERPL-MCNC: 20 MG/DL (ref 5–25)
CALCIUM SERPL-MCNC: 8.4 MG/DL
CHLORIDE SERPL-SCNC: 103 MMOL/L (ref 100–108)
CHOLEST SERPL-MCNC: 196 MG/DL (ref 50–200)
CO2 SERPL-SCNC: 24 MMOL/L (ref 21–32)
CREAT SERPL-MCNC: 1.04 MG/DL (ref 0.6–1.3)
EST. AVERAGE GLUCOSE BLD GHB EST-MCNC: 220 MG/DL
GFR SERPL CREATININE-BSD FRML MDRD: 78 ML/MIN/1.73SQ M
GLUCOSE P FAST SERPL-MCNC: 283 MG/DL (ref 65–99)
HBA1C MFR BLD: 9.3 % (ref 4.2–6.3)
HDLC SERPL-MCNC: 28 MG/DL (ref 40–60)
NONHDLC SERPL-MCNC: 168 MG/DL
POTASSIUM SERPL-SCNC: 4.1 MMOL/L (ref 3.5–5.3)
PROT SERPL-MCNC: 7.9 G/DL (ref 6.4–8.2)
SODIUM SERPL-SCNC: 135 MMOL/L (ref 136–145)
TRIGL SERPL-MCNC: 463 MG/DL
TSH SERPL DL<=0.05 MIU/L-ACNC: 10.3 UIU/ML (ref 0.36–3.74)

## 2018-04-10 PROCEDURE — 80061 LIPID PANEL: CPT

## 2018-04-10 PROCEDURE — 84443 ASSAY THYROID STIM HORMONE: CPT

## 2018-04-10 PROCEDURE — 80053 COMPREHEN METABOLIC PANEL: CPT

## 2018-04-10 PROCEDURE — 83036 HEMOGLOBIN GLYCOSYLATED A1C: CPT

## 2018-04-10 PROCEDURE — 36415 COLL VENOUS BLD VENIPUNCTURE: CPT

## 2018-04-10 NOTE — TELEPHONE ENCOUNTER
PSA order placed in chart  Spoke with patient, he will go to [place] [id number] to have PSA done  Patient rescheduled for next week 4/18/18 at 9:15 with Clay Palacios in [facility] office  Patient knows to have PSA done prior to appointment

## 2018-04-10 NOTE — TELEPHONE ENCOUNTER
PT CALLED AND STATED THAT HE WENT TO A [facility] LAB THIS MORNING AND THERE WAS NOTHING IN THERE FOR HIM TO HAVE A  PSA  HE WILL RESCHEDULE HIS APPT WHEN HE GETS AN ORDER FOR HIS PSA  PLEASE ADVISE PT

## 2018-04-11 ENCOUNTER — APPOINTMENT (OUTPATIENT)
Dept: LAB | Facility: CLINIC | Age: 58
End: 2018-04-11
Payer: COMMERCIAL

## 2018-04-11 DIAGNOSIS — E03.9 HYPOTHYROIDISM, UNSPECIFIED TYPE: Primary | ICD-10-CM

## 2018-04-11 DIAGNOSIS — N40.0 BENIGN PROSTATIC HYPERPLASIA, UNSPECIFIED WHETHER LOWER URINARY TRACT SYMPTOMS PRESENT: ICD-10-CM

## 2018-04-11 DIAGNOSIS — E11.9 TYPE 2 DIABETES MELLITUS WITHOUT COMPLICATION, WITHOUT LONG-TERM CURRENT USE OF INSULIN (HCC): ICD-10-CM

## 2018-04-11 LAB — PSA SERPL-MCNC: 1.3 NG/ML (ref 0–4)

## 2018-04-11 PROCEDURE — 84153 ASSAY OF PSA TOTAL: CPT

## 2018-04-11 RX ORDER — LEVOTHYROXINE SODIUM 0.05 MG/1
50 TABLET ORAL DAILY
Qty: 30 TABLET | Refills: 5 | Status: SHIPPED | OUTPATIENT
Start: 2018-04-11 | End: 2018-09-24 | Stop reason: SDUPTHER

## 2018-04-17 NOTE — PROGRESS NOTES
4/18/2018    Compa Able  1960  [id number]        Assessment  BPH, resolved hematuria s/p TURP (9/13/2017)  ED  Prostate cancer screening    Discussion  Caroline Chandler is a 60 y o  male being managed by Dr Poe   PSA is 1 3, previously 3 6  He is comfortable with current urinary pattern  We discussed ED treatment and he was prescribed Sildenafil 20mg  Use and side effects reviewed  He will return in 1 year for follow up with a PSA and PVR  He was instructed to call with any issues  All questions answered  History of Present Illness  60 y o  male with a history of BPH, resolved hematuria s/p TURP (9/13/2017) presents today for 6 month follow up  He feels that his stream is stronger  He continues to have urinary urgency  He denies any incontinence  He also has nocturia 1 to 5 times a night  He contributes his nocturia and urinary urgency secondary to use of Jardiance  He notes his PCP is considering trying an alternative diabetes medication  He is also being evaluated for hypothyroidism  He is currently sick with a cold  Review of Systems  Review of Systems   Constitutional: Negative  HENT: Negative  Respiratory: Negative  Cardiovascular: Negative  Gastrointestinal: Negative  Genitourinary:        As per HPI   Musculoskeletal: Negative  Skin: Negative  Neurological: Negative  Hematological: Negative  Urinary Incontinence Screening    Flowsheet Row Most Recent Value   Urinary Incontinence   Urinary Incontinence? No   Incomplete emptying? No   Urinary frequency? No   Urinary urgency? Yes   Urinary hesitancy? No   Dysuria (painful difficult urination)? No   Nocturia (waking up to use the bathroom)? Yes [6 X's]   Straining (having to push to go)? No   Weak stream?  No   Intermittent stream?  No   Post void dribbling?   Yes          Past Medical History  Past Medical History:   Diagnosis Date    BPH (benign prostatic hyperplasia)     Diabetes mellitus ([facility] [address] )     Erectile dysfunction     GERD (gastroesophageal reflux disease)     Hematuria     Liver disease     hepatitis C       Past Surgical History  Past Surgical History:   Procedure Laterality Date    COLONOSCOPY      last assessed: 08/28/2012; fiberoptic screening     CYSTOSCOPY      onset: 05/06/2016; Diagnostic     DENTAL SURGERY      LIVER BIOPSY      MD TRANSURETHRAL ELEC-SURG PROSTATECTOM N/A 9/13/2017    Procedure: CYSTOSCOPY; TUR PROSTATE;  Surgeon: Gerson Flowers MD;  [facility];  Service: Urology    SHOULDER ARTHROSCOPY Right     bicep tendon repair, rotator cuff repair and acromuim shave         Past Family History  Family History   Problem Relation Age of Onset    Stroke Father     Cancer Maternal Grandmother        Past Social history  Social History     Social History    Marital status: /Civil Union     Spouse name: N/A    Number of children: N/A    Years of education: N/A     Occupational History    manager      Social History Main Topics    Smoking status: Current Some Day Smoker     Packs/day: 0 00    Smokeless tobacco: Never Used      Comment: cigars occ      Alcohol use No      Comment: quit 12 years ago    Drug use: No    Sexual activity: Not on file     Other Topics Concern    Not on file     Social History Narrative    Caffeine use    Daily coffee consumption     Denied daily cola consumption     Denied daily tea consumption     Former smoker (as per [employer])     Current everyday smoker (as per [employer])     Non-smoker (as per [employer])            Current Medications  Current Outpatient Prescriptions   Medication Sig Dispense Refill    Empagliflozin (JARDIANCE) 10 MG TABS Take 1 tablet (10 mg total) by mouth daily in the early morning 30 tablet 3    famotidine (PEPCID) 40 MG tablet       levothyroxine 50 mcg tablet Take 1 tablet (50 mcg total) by mouth daily 30 tablet 5    docusate sodium (COLACE) 100 mg capsule Take 1 capsule by mouth 2 (two) times a day for 30 days 60 capsule 0    omeprazole (PriLOSEC) 20 mg delayed release capsule Take 20 mg by mouth daily in the early morning        sildenafil (REVATIO) 20 mg tablet Take 1 to 5 tablets as needed 90 tablet 3     No current facility-administered medications for this visit  Allergies  No Known Allergies    Past Medical History, Social History, Family History, medications and allergies were reviewed  Vitals  Vitals:    04/18/18 0927   BP: 130/90   Pulse: 80   Weight: 119 kg (262 lb)   Height: 6' 3" (1 905 m)       Physical Exam  Skin: warm, dry, intact  Pulmonary: Non-labored breathing  Abdomen: Soft, non-tender, non-distended  Musculoskeletal: AROM with no joint deformity or tenderness    Neurology: Alert and oriented      Results  Lab Results   Component Value Date    PSA 1 3 04/11/2018     Lab Results   Component Value Date    GLUCOSE 340 (H) 09/14/2017    CALCIUM 8 4 04/10/2018     (L) 04/10/2018    K 4 1 04/10/2018    CO2 24 04/10/2018     04/10/2018    BUN 20 04/10/2018    CREATININE 1 04 04/10/2018     Lab Results   Component Value Date    WBC 8 71 09/14/2017    HGB 14 5 09/14/2017    HCT 43 3 09/14/2017    MCV 92 09/14/2017     09/14/2017

## 2018-04-18 ENCOUNTER — OFFICE VISIT (OUTPATIENT)
Dept: UROLOGY | Facility: AMBULATORY SURGERY CENTER | Age: 58
End: 2018-04-18
Payer: COMMERCIAL

## 2018-04-18 VITALS
DIASTOLIC BLOOD PRESSURE: 90 MMHG | BODY MASS INDEX: 32.58 KG/M2 | SYSTOLIC BLOOD PRESSURE: 130 MMHG | HEART RATE: 80 BPM | WEIGHT: 262 LBS | HEIGHT: 75 IN

## 2018-04-18 DIAGNOSIS — Z12.5 SCREENING FOR PROSTATE CANCER: ICD-10-CM

## 2018-04-18 DIAGNOSIS — N52.9 ERECTILE DYSFUNCTION, UNSPECIFIED ERECTILE DYSFUNCTION TYPE: Primary | ICD-10-CM

## 2018-04-18 DIAGNOSIS — N40.1 BENIGN PROSTATIC HYPERPLASIA WITH URINARY FREQUENCY: ICD-10-CM

## 2018-04-18 DIAGNOSIS — R35.0 BENIGN PROSTATIC HYPERPLASIA WITH URINARY FREQUENCY: ICD-10-CM

## 2018-04-18 PROCEDURE — 99213 OFFICE O/P EST LOW 20 MIN: CPT | Performed by: NURSE PRACTITIONER

## 2018-04-18 RX ORDER — SILDENAFIL CITRATE 20 MG/1
TABLET ORAL
Qty: 90 TABLET | Refills: 3 | Status: SHIPPED | OUTPATIENT
Start: 2018-04-18 | End: 2018-08-06 | Stop reason: ALTCHOICE

## 2018-05-07 ENCOUNTER — OFFICE VISIT (OUTPATIENT)
Dept: FAMILY MEDICINE CLINIC | Facility: CLINIC | Age: 58
End: 2018-05-07
Payer: COMMERCIAL

## 2018-05-07 VITALS
HEIGHT: 75 IN | WEIGHT: 265.4 LBS | SYSTOLIC BLOOD PRESSURE: 132 MMHG | TEMPERATURE: 96.9 F | BODY MASS INDEX: 33 KG/M2 | DIASTOLIC BLOOD PRESSURE: 74 MMHG | HEART RATE: 96 BPM

## 2018-05-07 DIAGNOSIS — F41.9 ANXIETY: ICD-10-CM

## 2018-05-07 DIAGNOSIS — M25.562 ACUTE PAIN OF LEFT KNEE: Primary | ICD-10-CM

## 2018-05-07 PROCEDURE — 99213 OFFICE O/P EST LOW 20 MIN: CPT | Performed by: NURSE PRACTITIONER

## 2018-05-07 RX ORDER — METHOCARBAMOL 500 MG/1
500 TABLET, FILM COATED ORAL 4 TIMES DAILY
Qty: 20 TABLET | Refills: 0 | Status: SHIPPED | OUTPATIENT
Start: 2018-05-07 | End: 2018-08-06 | Stop reason: ALTCHOICE

## 2018-05-07 RX ORDER — ALPRAZOLAM 0.25 MG/1
0.25 TABLET ORAL
Qty: 2 TABLET | Refills: 0 | Status: SHIPPED | OUTPATIENT
Start: 2018-05-07 | End: 2018-08-06 | Stop reason: ALTCHOICE

## 2018-05-07 RX ORDER — PREDNISONE 20 MG/1
TABLET ORAL
Qty: 26 TABLET | Refills: 0 | Status: SHIPPED | OUTPATIENT
Start: 2018-05-07 | End: 2018-08-06 | Stop reason: ALTCHOICE

## 2018-05-07 NOTE — PROGRESS NOTES
Patient ID: Lulu Aponte is a 60 y o  male  HPI: 60 y o male presenting with left medial knee pain that as been intermittent for the last few months  Patient reports that the pain as been interfering with him during squats with weights and/or kettle bells  Patient denies hearing a pop or click but his leg as buckled once  Patient as not done anything to treat symptoms except for normal ibuprofen dosing  SUBJECTIVE    Family History   Problem Relation Age of Onset    Stroke Father     Cancer Maternal Grandmother      Social History     Social History    Marital status: /Civil Union     Spouse name: N/A    Number of children: N/A    Years of education: N/A     Occupational History    manager      Social History Main Topics    Smoking status: Current Some Day Smoker     Packs/day: 0 00    Smokeless tobacco: Never Used      Comment: cigars occ      Alcohol use No      Comment: quit 12 years ago    Drug use: No    Sexual activity: Not on file     Other Topics Concern    Not on file     Social History Narrative    Caffeine use    Daily coffee consumption     Denied daily cola consumption     Denied daily tea consumption     Former smoker (as per [employer])     Current everyday smoker (as per [employer])     Non-smoker (as per [employer])          Past Medical History:   Diagnosis Date    BPH (benign prostatic hyperplasia)     [facility] mellitus ([facility] [address] [facility]     Erectile dysfunction     GERD (gastroesophageal reflux disease)     Hematuria     Liver disease     hepatitis C     Past Surgical History:   Procedure Laterality Date    COLONOSCOPY      last assessed: 08/28/2012; fiberoptic screening     CYSTOSCOPY      onset: 05/06/2016; Diagnostic     DENTAL SURGERY      LIVER BIOPSY      NE TRANSURETHRAL ELEC-SURG PROSTATECTOM N/A 9/13/2017    Procedure: CYSTOSCOPY; TUR PROSTATE;  Surgeon: Danette Edmonds MD;  [facility];  Service: Urology    SHOULDER ARTHROSCOPY Right     bicep tendon repair, rotator cuff repair and acromuim shave      No Known Allergies    Current Outpatient Prescriptions:     Empagliflozin (JARDIANCE) 10 MG TABS, Take 1 tablet (10 mg total) by mouth daily in the early morning, Disp: 30 tablet, Rfl: 3    famotidine (PEPCID) 40 MG tablet, , Disp: , Rfl:     levothyroxine 50 mcg tablet, Take 1 tablet (50 mcg total) by mouth daily, Disp: 30 tablet, Rfl: 5    sildenafil (REVATIO) 20 mg tablet, Take 1 to 5 tablets as needed, Disp: 90 tablet, Rfl: 3    Review of Systems    Consitutional:  Denies chills, fatigue, fever, malaise and sleep disturbance   Pulmonary:  Denies cough, shortness of breath, dyspnea on exertion    Cardiovascular:  Denies chest pain/pressure    Denies swelling of lower extermities  Musculoskeletal:  Positive gait disturbance and left knee joint pain  Denies swelling or stiffness    Integumentary:  Denies ecchymosis, petechiae ,rash or lesions   Neurological:  Denies headaches, dizziness, confusion, loss of consciousness or behavioral changes  Psychological:  Denies anxiety, depression or sleep disturbances      OBJECTIVE    /74   Pulse 96   Temp (!) 96 9 °F (36 1 °C)   Ht 6' 3" (1 905 m)   Wt 120 kg (265 lb 6 4 oz)   BMI 33 17 kg/m²     Constitutional:  Well appearing and in no acute distress  Pulmonary:  clear to auscultation bilaterally and no crackles, no wheezes, chest expansion normal  Cardiovascular:  S1S2, regular rate and rhythm  Gastrointestinal:  abdomen is soft without significant tenderness  Lymphatic:  no lymphadenopathy   Musculoskeletal:  Negative anterior and posterior drawer test; no effusion palpated; Negative abduction and adduction Stress test; and negative Javon and Lachman tests;    Tenderness elicited with palpation along the medial collateral ligament   Skin:  skin color, texture and turgor are normal; no bruising, rashes or lesions noted  Neurologic:  Alert and oriented x 4 and Affect and mood normal      Assessment/Plan:  Diagnoses and all orders for this visit:    Acute pain of left knee  -     predniSONE 20 mg tablet; 3 tabs twice a day x2 days, 2 tabs twice a day x2 days, 1 tab twice a day x2 days 1 tab daily x 2days  -     methocarbamol (ROBAXIN) 500 mg tablet; Take 1 tablet (500 mg total) by mouth 4 (four) times a day for 5 days    Anxiety  -     ALPRAZolam (XANAX) 0 25 mg tablet; Take 1 tablet (0 25 mg total) by mouth daily at bedtime as needed for anxiety      #1 Acute pain of left knee  Discussed with patient the physical exam did not produce any signs of laxity in the joint  Patient does not recall having an xray of his knees to assess for osteoarthritis, so steroid injection potentially unnecessary at this time  Reviewed with patient plan to treat with a 8 day course of oral prednisone 10 mg and methocarbamol as needed for increase muscle spasms  Discussed with patient use of conservative measures: rest, ice and elevation  Patient instructed not to use NSAIDs during steroid course and to keep watch on blood glucose levels due to steroids may increase  #2 Anxiety  Discussed with patient is upcoming air travel to [facility] in the middle of June, 2018 and his flight anxiety  Reviewed with patient plan to prescribe an anti-anxiety medication to take before each flight segment    Patient instructed to call if not feeling better in 72 hours or if symptoms worsen              Patient instructed to call in 72 hours if not feeling better or if symptoms worsen

## 2018-06-05 ENCOUNTER — TRANSCRIBE ORDERS (OUTPATIENT)
Dept: ADMINISTRATIVE | Facility: HOSPITAL | Age: 58
End: 2018-06-05

## 2018-06-05 DIAGNOSIS — K74.60 CIRRHOSIS OF LIVER WITHOUT ASCITES, UNSPECIFIED HEPATIC CIRRHOSIS TYPE (HCC): ICD-10-CM

## 2018-06-05 DIAGNOSIS — K74.69 FLORID CIRRHOSIS (HCC): Primary | ICD-10-CM

## 2018-06-05 DIAGNOSIS — B19.20 HEPATITIS C VIRUS INFECTION WITHOUT HEPATIC COMA, UNSPECIFIED CHRONICITY: ICD-10-CM

## 2018-06-14 ENCOUNTER — HOSPITAL ENCOUNTER (OUTPATIENT)
Dept: RADIOLOGY | Age: 58
Discharge: HOME | End: 2018-06-14
Payer: COMMERCIAL

## 2018-06-14 ENCOUNTER — APPOINTMENT (OUTPATIENT)
Dept: LAB | Facility: CLINIC | Age: 58
End: 2018-06-14
Payer: COMMERCIAL

## 2018-06-14 DIAGNOSIS — E11.9 TYPE 2 DIABETES MELLITUS WITHOUT COMPLICATION, WITHOUT LONG-TERM CURRENT USE OF INSULIN (HCC): ICD-10-CM

## 2018-06-14 DIAGNOSIS — K74.69 FLORID CIRRHOSIS (HCC): ICD-10-CM

## 2018-06-14 DIAGNOSIS — K74.60 CIRRHOSIS OF LIVER WITHOUT ASCITES, UNSPECIFIED HEPATIC CIRRHOSIS TYPE (HCC): ICD-10-CM

## 2018-06-14 DIAGNOSIS — B19.20 HEPATITIS C VIRUS INFECTION WITHOUT HEPATIC COMA, UNSPECIFIED CHRONICITY: ICD-10-CM

## 2018-06-14 LAB
AFP-TM SERPL-MCNC: 3.9 NG/ML (ref 0.5–8)
ALBUMIN SERPL BCP-MCNC: 4 G/DL (ref 3.5–5)
ALP SERPL-CCNC: 91 U/L (ref 46–116)
ALT SERPL W P-5'-P-CCNC: 34 U/L (ref 12–78)
AMMONIA PLAS-SCNC: <10 UMOL/L (ref 11–35)
ANION GAP SERPL CALCULATED.3IONS-SCNC: 10 MMOL/L (ref 4–13)
AST SERPL W P-5'-P-CCNC: 13 U/L (ref 5–45)
BASOPHILS # BLD AUTO: 0.04 THOUSANDS/ΜL (ref 0–0.1)
BASOPHILS NFR BLD AUTO: 1 % (ref 0–1)
BILIRUB SERPL-MCNC: 0.6 MG/DL (ref 0.2–1)
BUN SERPL-MCNC: 22 MG/DL (ref 5–25)
CALCIUM ALBUM COR SERPL-MCNC: 10.4 MG/DL (ref 8.3–10.1)
CALCIUM SERPL-MCNC: 10.4 MG/DL (ref 8.3–10.1)
CHLORIDE SERPL-SCNC: 102 MMOL/L (ref 100–108)
CO2 SERPL-SCNC: 26 MMOL/L (ref 21–32)
CREAT SERPL-MCNC: 0.93 MG/DL (ref 0.6–1.3)
EOSINOPHIL # BLD AUTO: 0.1 THOUSAND/ΜL (ref 0–0.61)
EOSINOPHIL NFR BLD AUTO: 2 % (ref 0–6)
ERYTHROCYTE [DISTWIDTH] IN BLOOD BY AUTOMATED COUNT: 13 % (ref 11.6–15.1)
GFR SERPL CREATININE-BSD FRML MDRD: 90 ML/MIN/1.73SQ M
GLUCOSE P FAST SERPL-MCNC: 306 MG/DL (ref 65–99)
HCT VFR BLD AUTO: 48.6 % (ref 36.5–49.3)
HGB BLD-MCNC: 16.6 G/DL (ref 12–17)
LYMPHOCYTES # BLD AUTO: 1.53 THOUSANDS/ΜL (ref 0.6–4.47)
LYMPHOCYTES NFR BLD AUTO: 27 % (ref 14–44)
MCH RBC QN AUTO: 31.2 PG (ref 26.8–34.3)
MCHC RBC AUTO-ENTMCNC: 34.2 G/DL (ref 31.4–37.4)
MCV RBC AUTO: 91 FL (ref 82–98)
MONOCYTES # BLD AUTO: 0.42 THOUSAND/ΜL (ref 0.17–1.22)
MONOCYTES NFR BLD AUTO: 8 % (ref 4–12)
NEUTROPHILS # BLD AUTO: 3.49 THOUSANDS/ΜL (ref 1.85–7.62)
NEUTS SEG NFR BLD AUTO: 63 % (ref 43–75)
PLATELET # BLD AUTO: 182 THOUSANDS/UL (ref 149–390)
PMV BLD AUTO: 9.5 FL (ref 8.9–12.7)
POTASSIUM SERPL-SCNC: 4.1 MMOL/L (ref 3.5–5.3)
PROT SERPL-MCNC: 7.7 G/DL (ref 6.4–8.2)
RBC # BLD AUTO: 5.32 MILLION/UL (ref 3.88–5.62)
SODIUM SERPL-SCNC: 138 MMOL/L (ref 136–145)
WBC # BLD AUTO: 5.58 THOUSAND/UL (ref 4.31–10.16)

## 2018-06-14 PROCEDURE — 82105 ALPHA-FETOPROTEIN SERUM: CPT

## 2018-06-14 PROCEDURE — 80053 COMPREHEN METABOLIC PANEL: CPT

## 2018-06-14 PROCEDURE — 36415 COLL VENOUS BLD VENIPUNCTURE: CPT

## 2018-06-14 PROCEDURE — 76705 ECHO EXAM OF ABDOMEN: CPT

## 2018-06-14 PROCEDURE — 82140 ASSAY OF AMMONIA: CPT

## 2018-06-14 PROCEDURE — 85025 COMPLETE CBC W/AUTO DIFF WBC: CPT

## 2018-06-14 RX ORDER — EMPAGLIFLOZIN 10 MG/1
10 TABLET, FILM COATED ORAL
Qty: 30 TABLET | Refills: 3 | Status: SHIPPED | OUTPATIENT
Start: 2018-06-14 | End: 2018-08-23 | Stop reason: ALTCHOICE

## 2018-08-06 ENCOUNTER — OFFICE VISIT (OUTPATIENT)
Dept: FAMILY MEDICINE CLINIC | Facility: CLINIC | Age: 58
End: 2018-08-06
Payer: COMMERCIAL

## 2018-08-06 VITALS
BODY MASS INDEX: 31.88 KG/M2 | DIASTOLIC BLOOD PRESSURE: 82 MMHG | HEART RATE: 88 BPM | HEIGHT: 75 IN | TEMPERATURE: 95.2 F | SYSTOLIC BLOOD PRESSURE: 102 MMHG | WEIGHT: 256.4 LBS

## 2018-08-06 DIAGNOSIS — E11.9 TYPE 2 DIABETES MELLITUS WITHOUT COMPLICATION, WITHOUT LONG-TERM CURRENT USE OF INSULIN (HCC): Primary | ICD-10-CM

## 2018-08-06 DIAGNOSIS — G89.29 CHRONIC PAIN OF LEFT KNEE: ICD-10-CM

## 2018-08-06 DIAGNOSIS — B18.2 CHRONIC HEPATITIS C WITHOUT HEPATIC COMA (HCC): ICD-10-CM

## 2018-08-06 DIAGNOSIS — E03.9 HYPOTHYROIDISM, UNSPECIFIED TYPE: ICD-10-CM

## 2018-08-06 DIAGNOSIS — M25.562 CHRONIC PAIN OF LEFT KNEE: ICD-10-CM

## 2018-08-06 PROCEDURE — 99214 OFFICE O/P EST MOD 30 MIN: CPT | Performed by: FAMILY MEDICINE

## 2018-08-06 NOTE — PROGRESS NOTES
Assessment/Plan:    Chronic hepatitis C without hepatic coma (HCC)  No evidence of persistent viral load after treatment with Harvoni  Cont f/u with GI  Cont to monitor LFTs, AFP and U/S  Recheck 6m    Hypothyroid  Recheck TSH    Type 2 diabetes mellitus without complication, without long-term current use of insulin (HCC)  Lab Results   Component Value Date    HGBA1C 9 3 (H) 04/10/2018       Poor control due to poor compliance  Recheck labs  Adjust treatment if still elevated  Recheck 3m       Diagnoses and all orders for this visit:    Type 2 diabetes mellitus without complication, without long-term current use of insulin (HCC)  -     Basic metabolic panel; Future  -     HEMOGLOBIN A1C W/ EAG ESTIMATION; Future    Chronic hepatitis C without hepatic coma (HCC)    Hypothyroidism, unspecified type  -     TSH, 3rd generation; Future    Chronic pain of left knee  Comments:  Check Xray  further recommendations based on results  Consider trial of glucosamine in the interim  Orders:  -     XR knee 3 vw left non injury; Future          Subjective:      Patient ID: Shaun Jerez is a 60 y o  male  f/u multiple med issues  - pt states that he is trying to work in diet and has been trying to avoid cheating  Does not check home BGs  Last A1C in April was 9 3  No change in vision - due for check up in 3m    - due for repeat colonoscopy and EGD in 3 weeks  Up to date with GI  No evidence of recurrent Hep C    - has some recurrent L knee pain  Worse with overuse, better if he rests  Occ swelling  No hx of trauma  - no other concerns        The following portions of the patient's history were reviewed and updated as appropriate:   He  has a past medical history of BPH (benign prostatic hyperplasia); Diabetes mellitus ([address] ); Erectile dysfunction; GERD (gastroesophageal reflux disease); Hematuria; and Liver disease    He   Patient Active Problem List    Diagnosis Date Noted    Hypothyroid 08/06/2018    Erectile dysfunction 04/18/2018    Screening for prostate cancer 04/18/2018    Type 2 diabetes mellitus without complication, without long-term current use of insulin ([facility] ) 01/25/2018    [facility] 01/25/2018    Benign prostatic hyperplasia with urinary frequency 01/25/2018     He  has a past surgical history that includes Shoulder arthroscopy (Right); pr transurethral elec-surg prostatectom (N/A, 9/13/2017); Liver biopsy; Colonoscopy; Dental surgery; and Cystoscopy  He  reports that he has been smoking  He has been smoking about 0 00 packs per day  He has never used smokeless tobacco  He reports that he does not drink alcohol or use drugs  Current Outpatient Prescriptions   Medication Sig Dispense Refill    famotidine (PEPCID) 40 MG tablet       JARDIANCE 10 MG TABS TAKE 1 TABLET (10 MG TOTAL) BY MOUTH DAILY IN THE EARLY MORNING 30 tablet 3    levothyroxine 50 mcg tablet Take 1 tablet (50 mcg total) by mouth daily 30 tablet 5     No current facility-administered medications for this visit  He is allergic to no active allergies       Review of Systems   Constitutional: Negative  HENT: Negative  Eyes: Negative  Respiratory: Negative  Cardiovascular: Negative  Gastrointestinal: Negative  Endocrine: Negative  Genitourinary: Negative  Musculoskeletal: Positive for arthralgias  Negative for back pain, gait problem and joint swelling  Skin: Negative  Allergic/Immunologic: Negative  Neurological: Negative  Hematological: Negative  Psychiatric/Behavioral: Negative  Objective:      /82   Pulse 88   Temp (!) 95 2 °F (35 1 °C)   Ht 6' 3" (1 905 m)   Wt 116 kg (256 lb 6 4 oz)   BMI 32 05 kg/m²          Physical Exam   Constitutional: He is oriented to person, place, and time  He appears well-developed and well-nourished  HENT:   Head: Normocephalic and atraumatic     Right Ear: External ear normal    Left Ear: External ear normal    Nose: Nose normal    Mouth/Throat: Oropharynx is clear and moist    Eyes: Conjunctivae and EOM are normal  Pupils are equal, round, and reactive to light  Neck: Normal range of motion  Neck supple  No thyromegaly present  Cardiovascular: Normal rate, regular rhythm, normal heart sounds and intact distal pulses  No murmur heard  Pulmonary/Chest: Effort normal and breath sounds normal    Abdominal: Soft  Bowel sounds are normal  He exhibits no distension and no mass  There is no tenderness  Musculoskeletal: Normal range of motion  He exhibits no edema or tenderness  Left knee: He exhibits normal range of motion, no swelling, no effusion, no LCL laxity and no MCL laxity  No medial joint line, no lateral joint line, no MCL, no LCL and no patellar tendon tenderness noted  Lymphadenopathy:     He has no cervical adenopathy  Neurological: He is alert and oriented to person, place, and time  No cranial nerve deficit  Coordination normal    Skin: Skin is warm and dry  Psychiatric: He has a normal mood and affect   His behavior is normal  Judgment and thought content normal

## 2018-08-07 ENCOUNTER — APPOINTMENT (OUTPATIENT)
Dept: RADIOLOGY | Age: 58
End: 2018-08-07
Payer: COMMERCIAL

## 2018-08-07 DIAGNOSIS — G89.29 CHRONIC PAIN OF LEFT KNEE: ICD-10-CM

## 2018-08-07 DIAGNOSIS — M25.562 CHRONIC PAIN OF LEFT KNEE: ICD-10-CM

## 2018-08-07 PROCEDURE — 73562 X-RAY EXAM OF KNEE 3: CPT

## 2018-08-07 NOTE — ASSESSMENT & PLAN NOTE
Lab Results   Component Value Date    HGBA1C 9 3 (H) 04/10/2018       Poor control due to poor compliance  Recheck labs  Adjust treatment if still elevated   Recheck 3m

## 2018-08-07 NOTE — ASSESSMENT & PLAN NOTE
No evidence of persistent viral load after treatment with Harvoni  Cont f/u with GI  Cont to monitor LFTs, AFP and U/S   Recheck 6m

## 2018-08-16 ENCOUNTER — APPOINTMENT (OUTPATIENT)
Dept: LAB | Facility: CLINIC | Age: 58
End: 2018-08-16
Payer: COMMERCIAL

## 2018-08-16 DIAGNOSIS — E11.9 TYPE 2 DIABETES MELLITUS WITHOUT COMPLICATION, WITHOUT LONG-TERM CURRENT USE OF INSULIN (HCC): ICD-10-CM

## 2018-08-16 DIAGNOSIS — E03.9 HYPOTHYROIDISM, UNSPECIFIED TYPE: ICD-10-CM

## 2018-08-16 LAB
ANION GAP SERPL CALCULATED.3IONS-SCNC: 9 MMOL/L (ref 4–13)
BUN SERPL-MCNC: 17 MG/DL (ref 5–25)
CALCIUM SERPL-MCNC: 9.3 MG/DL (ref 8.3–10.1)
CHLORIDE SERPL-SCNC: 103 MMOL/L (ref 100–108)
CO2 SERPL-SCNC: 23 MMOL/L (ref 21–32)
CREAT SERPL-MCNC: 0.99 MG/DL (ref 0.6–1.3)
EST. AVERAGE GLUCOSE BLD GHB EST-MCNC: 223 MG/DL
GFR SERPL CREATININE-BSD FRML MDRD: 83 ML/MIN/1.73SQ M
GLUCOSE P FAST SERPL-MCNC: 195 MG/DL (ref 65–99)
HBA1C MFR BLD: 9.4 % (ref 4.2–6.3)
POTASSIUM SERPL-SCNC: 4.2 MMOL/L (ref 3.5–5.3)
SODIUM SERPL-SCNC: 135 MMOL/L (ref 136–145)
TSH SERPL DL<=0.05 MIU/L-ACNC: 3.2 UIU/ML (ref 0.36–3.74)

## 2018-08-16 PROCEDURE — 80048 BASIC METABOLIC PNL TOTAL CA: CPT

## 2018-08-16 PROCEDURE — 36415 COLL VENOUS BLD VENIPUNCTURE: CPT

## 2018-08-16 PROCEDURE — 83036 HEMOGLOBIN GLYCOSYLATED A1C: CPT

## 2018-08-16 PROCEDURE — 84443 ASSAY THYROID STIM HORMONE: CPT

## 2018-08-23 ENCOUNTER — OFFICE VISIT (OUTPATIENT)
Dept: FAMILY MEDICINE CLINIC | Facility: CLINIC | Age: 58
End: 2018-08-23
Payer: COMMERCIAL

## 2018-08-23 VITALS
BODY MASS INDEX: 32 KG/M2 | HEIGHT: 75 IN | HEART RATE: 100 BPM | WEIGHT: 257.4 LBS | SYSTOLIC BLOOD PRESSURE: 112 MMHG | TEMPERATURE: 97.8 F | DIASTOLIC BLOOD PRESSURE: 78 MMHG

## 2018-08-23 DIAGNOSIS — E66.9 OBESITY (BMI 30-39.9): ICD-10-CM

## 2018-08-23 DIAGNOSIS — E11.9 TYPE 2 DIABETES MELLITUS WITHOUT COMPLICATION, WITHOUT LONG-TERM CURRENT USE OF INSULIN (HCC): Primary | ICD-10-CM

## 2018-08-23 PROCEDURE — 99213 OFFICE O/P EST LOW 20 MIN: CPT | Performed by: FAMILY MEDICINE

## 2018-08-23 RX ORDER — BLOOD SUGAR DIAGNOSTIC
1 STRIP MISCELLANEOUS DAILY
Qty: 50 EACH | Refills: 5 | Status: SHIPPED | OUTPATIENT
Start: 2018-08-23 | End: 2019-02-11 | Stop reason: SDUPTHER

## 2018-08-23 NOTE — PROGRESS NOTES
Assessment/Plan:    No problem-specific Assessment & Plan notes found for this encounter  Diagnoses and all orders for this visit:    Type 2 diabetes mellitus without complication, without long-term current use of insulin (HCC)  -     liraglutide (VICTOZA) injection; 0 6mg sc qd x 1 week then 1 2mg qd  -     Insulin Pen Needle (PEN NEEDLES) 32G X 5 MM MISC; 1 Units/day by Does not apply route daily One needle / day  -     metFORMIN (GLUCOPHAGE) 500 mg tablet; Take 1 tablet (500 mg total) by mouth 2 (two) times a day with meals          Subjective:      Patient ID: Giovana Boss is a 60 y o  male  Diabetes   He has type 2 diabetes mellitus  No MedicAlert identification noted  The initial diagnosis of diabetes was made 10 years ago  Hypoglycemia symptoms include headaches and hunger  Pertinent negatives for hypoglycemia include no confusion, dizziness, mood changes, nervousness/anxiousness, pallor, seizures, sleepiness, speech difficulty, sweats or tremors  Associated symptoms include fatigue, polydipsia, polyphagia and polyuria  Pertinent negatives for diabetes include no blurred vision, no chest pain, no foot paresthesias, no foot ulcerations, no visual change, no weakness and no weight loss  Pertinent negatives for hypoglycemia complications include no blackouts, no hospitalization, no nocturnal hypoglycemia, no required assistance and no required glucagon injection  Symptoms are worsening  Pertinent negatives for diabetic complications include no CVA, heart disease, impotence, nephropathy, peripheral neuropathy, PVD or retinopathy  Risk factors for coronary artery disease include obesity, stress and tobacco exposure  When asked about current treatments, none were reported  He is compliant with treatment some of the time  His weight is stable  He is following a generally healthy diet  Meal planning includes carbohydrate counting  He has not had a previous visit with a dietitian   He participates in exercise daily  There is no compliance with monitoring of blood glucose  His home blood glucose trend is increasing steadily  His breakfast blood glucose range is generally 180-200 mg/dl  His lunch blood glucose is taken between 10-11 am  His lunch blood glucose range is generally 180-200 mg/dl  His dinner blood glucose is taken between 7-8 pm  His dinner blood glucose range is generally 180-200 mg/dl  His highest blood glucose is >200 mg/dl  His overall blood glucose range is 180-200 mg/dl  He does not see a podiatrist Eye exam is current  The following portions of the patient's history were reviewed and updated as appropriate:   He  has a past medical history of BPH (benign prostatic hyperplasia); Diabetes mellitus ([facility] [address] ); Erectile dysfunction; GERD (gastroesophageal reflux disease); Hematuria; and Liver disease  He   Patient Active Problem List    Diagnosis Date Noted    Hypothyroid 08/06/2018    Screening for prostate cancer 04/18/2018    Type 2 diabetes mellitus without complication, without long-term [facility] use of [facility] 01/25/2018    Benign prostatic hyperplasia with urinary frequency 01/25/2018     He  has a past surgical history that includes Shoulder arthroscopy (Right); pr transurethral elec-surg prostatectom (N/A, 9/13/2017); Liver biopsy; Colonoscopy; Dental surgery; and Cystoscopy  He  reports that he has been smoking  He has been smoking about 0 00 packs per day  He has never used smokeless tobacco  He reports that he does not drink alcohol or use drugs    Current Outpatient Prescriptions   Medication Sig Dispense Refill    famotidine (PEPCID) 40 MG tablet       levothyroxine 50 mcg tablet Take 1 tablet (50 mcg total) by mouth daily 30 tablet 5    Insulin Pen Needle (PEN NEEDLES) 32G X 5 MM MISC 1 Units/day by Does not apply route daily One needle / day 50 each 05    liraglutide (VICTOZA) injection 0 6mg sc qd x 1 week then 1 2mg qd 2 pen 5    metFORMIN (GLUCOPHAGE) 500 mg tablet Take 1 tablet (500 mg total) by mouth 2 (two) times a day with meals 60 tablet 5     No current facility-administered medications for this visit  He is allergic to no active allergies       Review of Systems   Constitutional: Positive for fatigue  Negative for weight loss  Eyes: Negative for blurred vision  Cardiovascular: Negative for chest pain  Endocrine: Positive for polydipsia, polyphagia and polyuria  Genitourinary: Negative for impotence  Skin: Negative for pallor  Neurological: Positive for headaches  Negative for dizziness, tremors, seizures, speech difficulty and weakness  Psychiatric/Behavioral: Negative for confusion  The patient is not nervous/anxious  Objective:      /78   Pulse 100   Temp 97 8 °F (36 6 °C)   Ht 6' 3" (1 905 m)   Wt 117 kg (257 lb 6 4 oz)   BMI 32 17 kg/m²          Physical Exam   Constitutional: He is oriented to person, place, and time  He appears well-developed and well-nourished  HENT:   Head: Normocephalic and atraumatic  Mouth/Throat: Oropharynx is clear and moist    Eyes: Conjunctivae and EOM are normal  Pupils are equal, round, and reactive to light  Neurological: He is alert and oriented to person, place, and time  Psychiatric: He has a normal mood and affect

## 2018-08-24 NOTE — ASSESSMENT & PLAN NOTE
Lab Results   Component Value Date    HGBA1C 9 4 (H) 08/16/2018     Poor control  I reviewed thoroughly with pt  Has only been on Jardiance, which he does not like due to increased urination  After discussing treatment options, pt will stop jardiance and start metformin 500mg bid and Victoza 0 6mg sc qd x 1 week then increase to 1 2mg qd  Pt would like to go to weight loss management - referred  Pt will rec; that pt check BGs at home   Recheck 1m

## 2018-08-28 ENCOUNTER — TELEPHONE (OUTPATIENT)
Dept: FAMILY MEDICINE CLINIC | Facility: CLINIC | Age: 58
End: 2018-08-28

## 2018-08-28 DIAGNOSIS — E11.9 TYPE 2 DIABETES MELLITUS WITHOUT COMPLICATION, WITHOUT LONG-TERM CURRENT USE OF INSULIN (HCC): Primary | ICD-10-CM

## 2018-08-28 RX ORDER — LANCETS 21 GAUGE
EACH MISCELLANEOUS 3 TIMES DAILY
Qty: 100 EACH | Refills: 5 | Status: SHIPPED | OUTPATIENT
Start: 2018-08-28 | End: 2019-05-29

## 2018-08-28 RX ORDER — BLOOD-GLUCOSE METER
EACH MISCELLANEOUS 3 TIMES DAILY
Qty: 1 KIT | Refills: 0 | Status: SHIPPED | OUTPATIENT
Start: 2018-08-28 | End: 2019-05-29

## 2018-08-28 NOTE — TELEPHONE ENCOUNTER
Which monitor should he get     one touch ultra 2  One touch mini  One touch verio    Which ever you want him to get he will also need the needles and strips,  Pl adv

## 2018-08-28 NOTE — TELEPHONE ENCOUNTER
I don't have a favorite - any of them are OK   Let me know which one he wants and we will call in the kit, strips and lancets

## 2018-09-24 DIAGNOSIS — E03.9 HYPOTHYROIDISM, UNSPECIFIED TYPE: ICD-10-CM

## 2018-09-24 RX ORDER — LEVOTHYROXINE SODIUM 0.05 MG/1
50 TABLET ORAL DAILY
Qty: 30 TABLET | Refills: 5 | Status: SHIPPED | OUTPATIENT
Start: 2018-09-24 | End: 2018-11-14 | Stop reason: SDUPTHER

## 2018-09-26 ENCOUNTER — OFFICE VISIT (OUTPATIENT)
Dept: FAMILY MEDICINE CLINIC | Facility: CLINIC | Age: 58
End: 2018-09-26
Payer: COMMERCIAL

## 2018-09-26 VITALS
TEMPERATURE: 96.6 F | HEIGHT: 75 IN | WEIGHT: 246.6 LBS | BODY MASS INDEX: 30.66 KG/M2 | SYSTOLIC BLOOD PRESSURE: 110 MMHG | DIASTOLIC BLOOD PRESSURE: 80 MMHG | HEART RATE: 90 BPM

## 2018-09-26 DIAGNOSIS — E11.9 TYPE 2 DIABETES MELLITUS WITHOUT COMPLICATION, WITHOUT LONG-TERM CURRENT USE OF INSULIN (HCC): Primary | ICD-10-CM

## 2018-09-26 PROCEDURE — 3008F BODY MASS INDEX DOCD: CPT | Performed by: FAMILY MEDICINE

## 2018-09-26 PROCEDURE — 99213 OFFICE O/P EST LOW 20 MIN: CPT | Performed by: FAMILY MEDICINE

## 2018-09-26 NOTE — PROGRESS NOTES
Assessment/Plan:    Type 2 diabetes mellitus without complication, without long-term current use of insulin (HCC)  Lab Results   Component Value Date    HGBA1C 9 4 (H) 08/16/2018       Blood sugars in weight much better since starting Victoza  Continue present treatment  Recheck labs in November  Follow-up in December  Diagnoses and all orders for this visit:    Type 2 diabetes mellitus without complication, without long-term current use of insulin (HCC)  -     Comprehensive metabolic panel; Future  -     Hemoglobin A1C; Future          Subjective:      Patient ID: Lulu Aponte is a 60 y o  male  F/u DMII  - pt doing well on Victoza  Appetite has decreased and wt down 9-10lbs  Home BGs around 120-140 since starting med  No abd pain or other side effects noted  - pt still with some shoulder issues  No other problems or concerns        The following portions of the patient's history were reviewed and updated as appropriate:   He  has a past medical history of BPH (benign prostatic hyperplasia); Diabetes mellitus ([address] ); Erectile dysfunction; GERD (gastroesophageal reflux disease); Hematuria; and Liver disease  He   Patient Active Problem List    Diagnosis Date Noted    Hypothyroid 08/06/2018    Screening for prostate cancer 04/18/2018    Type 2 diabetes mellitus without complication, without long-term current use of insulin ([address] ) 01/25/2018    Benign prostatic hyperplasia with urinary frequency 01/25/2018     He  has a past surgical history that includes Shoulder arthroscopy (Right); pr transurethral elec-surg prostatectom (N/A, 9/13/2017); Liver biopsy; Colonoscopy; Dental surgery; and Cystoscopy  He  reports that he has been smoking  He has been smoking about 0 00 packs per day  He has never used smokeless tobacco  He reports that he does not drink alcohol or use drugs    Current Outpatient Prescriptions   Medication Sig Dispense Refill    Blood Glucose Monitoring Suppl (Wellington Munoz) w/Device KIT by Does not apply route 3 (three) times a day 1 kit 0    famotidine (PEPCID) 40 MG tablet       glucose blood (ONETOUCH VERIO) test strip Use as instructed 100 each 5    Insulin Pen Needle (PEN NEEDLES) 32G X 5 MM MISC 1 Units/day by Does not apply route daily One needle / day 50 each 05    Lancets Misc  (ONE TOUCH SURESOFT) MISC by Does not apply route 3 (three) times a day 100 each 5    levothyroxine 50 mcg tablet TAKE 1 TABLET (50 MCG TOTAL) BY MOUTH DAILY 30 tablet 5    liraglutide (VICTOZA) injection 0 6mg sc qd x 1 week then 1 2mg qd 2 pen 5    metFORMIN (GLUCOPHAGE) 500 mg tablet Take 1 tablet (500 mg total) by mouth 2 (two) times a day with meals 60 tablet 5    ONE TOUCH LANCETS MISC by Does not apply route 3 (three) times a day 100 each 5     No current facility-administered medications for this visit  He is allergic to no active allergies       Review of Systems   Constitutional: Negative  HENT: Negative  Eyes: Negative  Respiratory: Negative  Cardiovascular: Negative  Gastrointestinal: Negative  Genitourinary: Negative  Musculoskeletal: Negative  Neurological: Negative  Objective:      /80   Pulse 90   Temp (!) 96 6 °F (35 9 °C)   Ht 6' 3" (1 905 m)   Wt 112 kg (246 lb 9 6 oz)   BMI 30 82 kg/m²          Physical Exam   Constitutional: He is oriented to person, place, and time  He appears well-developed and well-nourished  HENT:   Head: Normocephalic and atraumatic  Right Ear: External ear normal    Left Ear: External ear normal    Nose: Nose normal    Mouth/Throat: Oropharynx is clear and moist    Eyes: Pupils are equal, round, and reactive to light  Conjunctivae and EOM are normal    Neck: Normal range of motion  Neck supple  Cardiovascular: Normal rate, regular rhythm and intact distal pulses  Pulmonary/Chest: Effort normal and breath sounds normal    Abdominal: Bowel sounds are normal  He exhibits no distension and no mass  There is no tenderness  Musculoskeletal: Normal range of motion  He exhibits no edema  Lymphadenopathy:     He has no cervical adenopathy  Neurological: He is alert and oriented to person, place, and time  No cranial nerve deficit

## 2018-09-28 RX ORDER — EMPAGLIFLOZIN 10 MG/1
TABLET, FILM COATED ORAL
COMMUNITY
Start: 2018-09-09 | End: 2019-05-29

## 2018-09-29 NOTE — ASSESSMENT & PLAN NOTE
Lab Results   Component Value Date    HGBA1C 9 4 (H) 08/16/2018       Blood sugars in weight much better since starting Victoza  Continue present treatment  Recheck labs in November  Follow-up in December

## 2018-11-14 DIAGNOSIS — E03.9 HYPOTHYROIDISM, UNSPECIFIED TYPE: ICD-10-CM

## 2018-11-14 RX ORDER — LEVOTHYROXINE SODIUM 0.05 MG/1
50 TABLET ORAL DAILY
Qty: 90 TABLET | Refills: 1 | Status: SHIPPED | OUTPATIENT
Start: 2018-11-14 | End: 2019-04-29 | Stop reason: SDUPTHER

## 2018-11-21 ENCOUNTER — APPOINTMENT (OUTPATIENT)
Dept: LAB | Facility: CLINIC | Age: 58
End: 2018-11-21
Payer: COMMERCIAL

## 2018-11-21 ENCOUNTER — TRANSCRIBE ORDERS (OUTPATIENT)
Dept: LAB | Facility: CLINIC | Age: 58
End: 2018-11-21

## 2018-11-21 DIAGNOSIS — K74.69 FLORID CIRRHOSIS (HCC): ICD-10-CM

## 2018-11-21 DIAGNOSIS — B18.2 CHRONIC HEPATITIS C WITH HEPATIC COMA (HCC): ICD-10-CM

## 2018-11-21 DIAGNOSIS — K74.60 HEPATIC CIRRHOSIS, UNSPECIFIED HEPATIC CIRRHOSIS TYPE, UNSPECIFIED WHETHER ASCITES PRESENT (HCC): Primary | ICD-10-CM

## 2018-11-21 DIAGNOSIS — K74.60 HEPATIC CIRRHOSIS, UNSPECIFIED HEPATIC CIRRHOSIS TYPE, UNSPECIFIED WHETHER ASCITES PRESENT (HCC): ICD-10-CM

## 2018-11-21 DIAGNOSIS — E11.9 TYPE 2 DIABETES MELLITUS WITHOUT COMPLICATION, WITHOUT LONG-TERM CURRENT USE OF INSULIN (HCC): ICD-10-CM

## 2018-11-21 LAB
AFP-TM SERPL-MCNC: 3 NG/ML (ref 0.5–8)
ALBUMIN SERPL BCP-MCNC: 4 G/DL (ref 3.5–5)
ALP SERPL-CCNC: 69 U/L (ref 46–116)
ALT SERPL W P-5'-P-CCNC: 32 U/L (ref 12–78)
AMMONIA PLAS-SCNC: <10 UMOL/L (ref 11–35)
ANION GAP SERPL CALCULATED.3IONS-SCNC: 10 MMOL/L (ref 4–13)
AST SERPL W P-5'-P-CCNC: 18 U/L (ref 5–45)
BASOPHILS # BLD AUTO: 0.04 THOUSANDS/ΜL (ref 0–0.1)
BASOPHILS NFR BLD AUTO: 1 % (ref 0–1)
BILIRUB SERPL-MCNC: 0.5 MG/DL (ref 0.2–1)
BUN SERPL-MCNC: 27 MG/DL (ref 5–25)
CALCIUM SERPL-MCNC: 9.2 MG/DL (ref 8.3–10.1)
CHLORIDE SERPL-SCNC: 104 MMOL/L (ref 100–108)
CO2 SERPL-SCNC: 27 MMOL/L (ref 21–32)
CREAT SERPL-MCNC: 1.06 MG/DL (ref 0.6–1.3)
EOSINOPHIL # BLD AUTO: 0.13 THOUSAND/ΜL (ref 0–0.61)
EOSINOPHIL NFR BLD AUTO: 2 % (ref 0–6)
ERYTHROCYTE [DISTWIDTH] IN BLOOD BY AUTOMATED COUNT: 12.7 % (ref 11.6–15.1)
EST. AVERAGE GLUCOSE BLD GHB EST-MCNC: 163 MG/DL
GFR SERPL CREATININE-BSD FRML MDRD: 76 ML/MIN/1.73SQ M
GLUCOSE P FAST SERPL-MCNC: 168 MG/DL (ref 65–99)
HBA1C MFR BLD: 7.3 % (ref 4.2–6.3)
HCT VFR BLD AUTO: 46.9 % (ref 36.5–49.3)
HGB BLD-MCNC: 15.9 G/DL (ref 12–17)
IMM GRANULOCYTES # BLD AUTO: 0.02 THOUSAND/UL (ref 0–0.2)
IMM GRANULOCYTES NFR BLD AUTO: 0 % (ref 0–2)
LYMPHOCYTES # BLD AUTO: 1.75 THOUSANDS/ΜL (ref 0.6–4.47)
LYMPHOCYTES NFR BLD AUTO: 32 % (ref 14–44)
MCH RBC QN AUTO: 31.3 PG (ref 26.8–34.3)
MCHC RBC AUTO-ENTMCNC: 33.9 G/DL (ref 31.4–37.4)
MCV RBC AUTO: 92 FL (ref 82–98)
MONOCYTES # BLD AUTO: 0.42 THOUSAND/ΜL (ref 0.17–1.22)
MONOCYTES NFR BLD AUTO: 8 % (ref 4–12)
NEUTROPHILS # BLD AUTO: 3.05 THOUSANDS/ΜL (ref 1.85–7.62)
NEUTS SEG NFR BLD AUTO: 57 % (ref 43–75)
NRBC BLD AUTO-RTO: 0 /100 WBCS
PLATELET # BLD AUTO: 180 THOUSANDS/UL (ref 149–390)
PMV BLD AUTO: 9.2 FL (ref 8.9–12.7)
POTASSIUM SERPL-SCNC: 4.5 MMOL/L (ref 3.5–5.3)
PROT SERPL-MCNC: 7.6 G/DL (ref 6.4–8.2)
RBC # BLD AUTO: 5.08 MILLION/UL (ref 3.88–5.62)
SODIUM SERPL-SCNC: 141 MMOL/L (ref 136–145)
WBC # BLD AUTO: 5.41 THOUSAND/UL (ref 4.31–10.16)

## 2018-11-21 PROCEDURE — 80053 COMPREHEN METABOLIC PANEL: CPT

## 2018-11-21 PROCEDURE — 85025 COMPLETE CBC W/AUTO DIFF WBC: CPT

## 2018-11-21 PROCEDURE — 82140 ASSAY OF AMMONIA: CPT

## 2018-11-21 PROCEDURE — 36415 COLL VENOUS BLD VENIPUNCTURE: CPT

## 2018-11-21 PROCEDURE — 82105 ALPHA-FETOPROTEIN SERUM: CPT

## 2018-11-21 PROCEDURE — 83036 HEMOGLOBIN GLYCOSYLATED A1C: CPT

## 2018-11-23 ENCOUNTER — TRANSCRIBE ORDERS (OUTPATIENT)
Dept: ADMINISTRATIVE | Facility: HOSPITAL | Age: 58
End: 2018-11-23

## 2018-11-23 DIAGNOSIS — K74.69 FLORID CIRRHOSIS (HCC): Primary | ICD-10-CM

## 2018-12-03 ENCOUNTER — HOSPITAL ENCOUNTER (OUTPATIENT)
Dept: RADIOLOGY | Age: 58
Discharge: HOME | End: 2018-12-03
Payer: COMMERCIAL

## 2018-12-03 DIAGNOSIS — K74.69 FLORID CIRRHOSIS (HCC): ICD-10-CM

## 2018-12-03 PROCEDURE — 76705 ECHO EXAM OF ABDOMEN: CPT

## 2018-12-04 DIAGNOSIS — E11.9 TYPE 2 DIABETES MELLITUS WITHOUT COMPLICATION, WITHOUT LONG-TERM CURRENT USE OF INSULIN (HCC): ICD-10-CM

## 2018-12-12 DIAGNOSIS — E11.9 TYPE 2 DIABETES MELLITUS WITHOUT COMPLICATION, WITHOUT LONG-TERM CURRENT USE OF INSULIN (HCC): ICD-10-CM

## 2018-12-13 PROBLEM — K74.60 UNSPECIFIED CIRRHOSIS OF LIVER (HCC): Status: ACTIVE | Noted: 2018-12-13

## 2018-12-13 PROBLEM — B18.2 CHRONIC HEPATITIS C WITHOUT HEPATIC COMA (HCC): Status: ACTIVE | Noted: 2018-12-13

## 2018-12-17 ENCOUNTER — OFFICE VISIT (OUTPATIENT)
Dept: FAMILY MEDICINE CLINIC | Facility: CLINIC | Age: 58
End: 2018-12-17
Payer: COMMERCIAL

## 2018-12-17 VITALS
WEIGHT: 259 LBS | TEMPERATURE: 97.5 F | DIASTOLIC BLOOD PRESSURE: 84 MMHG | HEART RATE: 92 BPM | BODY MASS INDEX: 32.2 KG/M2 | SYSTOLIC BLOOD PRESSURE: 110 MMHG | HEIGHT: 75 IN

## 2018-12-17 DIAGNOSIS — M25.562 LEFT MEDIAL KNEE PAIN: ICD-10-CM

## 2018-12-17 DIAGNOSIS — E03.9 HYPOTHYROIDISM, UNSPECIFIED TYPE: ICD-10-CM

## 2018-12-17 DIAGNOSIS — E11.9 TYPE 2 DIABETES MELLITUS WITHOUT COMPLICATION, WITHOUT LONG-TERM CURRENT USE OF INSULIN (HCC): Primary | ICD-10-CM

## 2018-12-17 DIAGNOSIS — R51.9 CHRONIC NONINTRACTABLE HEADACHE, UNSPECIFIED HEADACHE TYPE: ICD-10-CM

## 2018-12-17 DIAGNOSIS — G89.29 CHRONIC NONINTRACTABLE HEADACHE, UNSPECIFIED HEADACHE TYPE: ICD-10-CM

## 2018-12-17 PROCEDURE — 3008F BODY MASS INDEX DOCD: CPT | Performed by: FAMILY MEDICINE

## 2018-12-17 PROCEDURE — 99214 OFFICE O/P EST MOD 30 MIN: CPT | Performed by: FAMILY MEDICINE

## 2018-12-17 NOTE — PROGRESS NOTES
Assessment/Plan:    Hypothyroid  Appears to be stable clinically  Check TSH  Adjust meds if TSH is not at goal  Recheck 6m      Type 2 diabetes mellitus without complication, without long-term current use of insulin (HCC)  Lab Results   Component Value Date    HGBA1C 7 3 (H) 11/21/2018   Improved but still not at goal  Cont present meds  Urged diet compliance  Reheck labs after 2/21  F/u 3m      Chronic nonintractable headache  Exam unremarkable  I discussed with pt  He does not wish to pursue any work up at present  Pt understands that new onset of HA at his age can be significant and that we cannot r/o significant underlying disease  Pt to f/u 3m - earlier if worse  Diagnoses and all orders for this visit:    Type 2 diabetes mellitus without complication, without long-term current use of insulin (HCC)  -     Comprehensive metabolic panel; Future  -     Lipid panel; Future  -     CBC and differential; Future  -     Microalbumin / creatinine urine ratio; Future  -     Hemoglobin A1C; Future    Hypothyroidism, unspecified type    Chronic nonintractable headache, unspecified headache type    Left medial knee pain  Comments:  Discussed with pt  Xray neg - cannot r/o meniscal injury  pt refuses further w/u for now (e g  MRI)  pt will watch for now  Recheck 3m - earlier if worse          Subjective:      Patient ID: Charleen Ramos is a 60 y o  male  f/u multiple med issues  - pt doing well on Victoza  Had lost approx 10 lbs but has regained some  BGs averaging 160-165  No abd pain or other side effects noted  - no CP, palp, lightheadedness or other CV symptoms with or without exertion  - no GI issues noted  Up to date with GI re; f/u of Hep C  - pt has been experiencing frequent R side HA (frontal sinus/retro-orbital radiating around head)  4x/week  Pain tends to be 5-6/10 and is throbbing  Can improve with coffee and ASA, but not always  Dinks 3 "big cups" iced coffee a day  - still with L knee pain  Intermittent  Worse with getting out of chair, going up/down stairs  No swelling        The following portions of the patient's history were reviewed and updated as appropriate:   He  has a past medical history of BPH (benign prostatic hyperplasia); Diabetes mellitus ([facility] ); Erectile dysfunction; GERD (gastroesophageal reflux disease); Hematuria; and Liver disease  He   Patient Active Problem List    Diagnosis Date Noted    Chronic nonintractable headache 12/17/2018    Chronic hepatitis C without hepatic coma ([place] [address] ) 12/13/2018    Unspecified cirrhosis of liver ([place] [address] ) 12/13/2018    Hypothyroid 08/06/2018    Screening for prostate cancer 04/18/2018    Type 2 diabetes mellitus without complication, without long-term current use of insulin ([place] [address] ) 01/25/2018    Benign prostatic hyperplasia with urinary frequency 01/25/2018     He  has a past surgical history that includes Shoulder arthroscopy (Right); pr transurethral elec-surg prostatectom (N/A, 9/13/2017); Liver biopsy; Colonoscopy; Dental surgery; and Cystoscopy  He  reports that he has been smoking  He has been smoking about 0 00 packs per day  He has never used smokeless tobacco  He reports that he does not drink alcohol or use drugs    Current Outpatient Prescriptions   Medication Sig Dispense Refill    Blood Glucose Monitoring Suppl (Richie Horta) w/Device KIT by Does not apply route 3 (three) times a day 1 kit 0    famotidine (PEPCID) 40 MG tablet       glucose blood (ONETOUCH VERIO) test strip Test three times daily 100 each 6    Insulin Pen Needle (PEN NEEDLES) 32G X 5 MM MISC 1 Units/day by Does not apply route daily One needle / day 50 each 05    Lancets Misc  (ONE TOUCH SURESOFT) MISC by Does not apply route 3 (three) times a day 100 each 5    levothyroxine 50 mcg tablet Take 1 tablet (50 mcg total) by mouth daily 90 tablet 1    liraglutide (VICTOZA) injection 0 6mg sc qd x 1 week then 1 2mg qd 2 pen 5    metFORMIN (GLUCOPHAGE) 500 mg tablet Take 1 tablet (500 mg total) by mouth 2 (two) times a day with meals 180 tablet 1    ONE TOUCH LANCETS MISC by Does not apply route 3 (three) times a day 100 each 5    JARDIANCE 10 MG TABS        No current facility-administered medications for this visit  He is allergic to no active allergies       Review of Systems   Constitutional: Negative  HENT: Negative  Eyes: Negative  Respiratory: Negative  Cardiovascular: Negative  Gastrointestinal: Negative  Endocrine: Negative  Genitourinary: Negative  Musculoskeletal: Positive for arthralgias (L knee)  Negative for back pain and gait problem  Skin: Negative  Allergic/Immunologic: Negative  Neurological: Positive for headaches  Hematological: Negative  Psychiatric/Behavioral: Negative  Objective:      /84   Pulse 92   Temp 97 5 °F (36 4 °C)   Ht 6' 3" (1 905 m)   Wt 117 kg (259 lb)   BMI 32 37 kg/m²          Physical Exam   Constitutional: He is oriented to person, place, and time  He appears well-developed and well-nourished  HENT:   Head: Normocephalic and atraumatic  Right Ear: External ear normal    Left Ear: External ear normal    Nose: Nose normal    Mouth/Throat: Oropharynx is clear and moist    Eyes: Pupils are equal, round, and reactive to light  Conjunctivae and EOM are normal    Neck: Normal range of motion  Neck supple  Cardiovascular: Normal rate, regular rhythm, normal heart sounds and intact distal pulses  Pulmonary/Chest: Effort normal and breath sounds normal    Abdominal: Bowel sounds are normal  He exhibits no distension and no mass  There is no tenderness  Musculoskeletal: Normal range of motion  He exhibits tenderness (along the medial tibial plateau/joint line  No effusion  Colateral and cruciate ligaments intact  )  He exhibits no edema  Lymphadenopathy:     He has no cervical adenopathy  Neurological: He is alert and oriented to person, place, and time   No cranial nerve deficit  He exhibits normal muscle tone  Coordination normal    Skin: Skin is warm  Psychiatric: He has a normal mood and affect

## 2018-12-18 NOTE — ASSESSMENT & PLAN NOTE
Lab Results   Component Value Date    HGBA1C 7 3 (H) 11/21/2018   Improved but still not at goal  Cont present meds  Urged diet compliance  Reheck labs after 2/21   F/u 3m

## 2018-12-18 NOTE — ASSESSMENT & PLAN NOTE
Exam unremarkable  I discussed with pt  He does not wish to pursue any work up at present  Pt understands that new onset of HA at his age can be significant and that we cannot r/o significant underlying disease  Pt to f/u 3m - earlier if worse

## 2019-01-28 ENCOUNTER — OFFICE VISIT (OUTPATIENT)
Dept: FAMILY MEDICINE CLINIC | Facility: CLINIC | Age: 59
End: 2019-01-28
Payer: COMMERCIAL

## 2019-01-28 VITALS
DIASTOLIC BLOOD PRESSURE: 74 MMHG | HEART RATE: 80 BPM | SYSTOLIC BLOOD PRESSURE: 112 MMHG | TEMPERATURE: 96.4 F | WEIGHT: 261.2 LBS | BODY MASS INDEX: 32.48 KG/M2 | HEIGHT: 75 IN

## 2019-01-28 DIAGNOSIS — G89.29 CHRONIC NONINTRACTABLE HEADACHE, UNSPECIFIED HEADACHE TYPE: Primary | ICD-10-CM

## 2019-01-28 DIAGNOSIS — M54.12 CERVICAL NEURALGIA: ICD-10-CM

## 2019-01-28 DIAGNOSIS — R51.9 CHRONIC NONINTRACTABLE HEADACHE, UNSPECIFIED HEADACHE TYPE: Primary | ICD-10-CM

## 2019-01-28 PROCEDURE — 3008F BODY MASS INDEX DOCD: CPT | Performed by: FAMILY MEDICINE

## 2019-01-28 PROCEDURE — 99214 OFFICE O/P EST MOD 30 MIN: CPT | Performed by: FAMILY MEDICINE

## 2019-01-28 RX ORDER — GABAPENTIN 300 MG/1
300 CAPSULE ORAL 3 TIMES DAILY
Qty: 90 CAPSULE | Refills: 1 | Status: SHIPPED | OUTPATIENT
Start: 2019-01-28 | End: 2019-02-25 | Stop reason: SDUPTHER

## 2019-01-28 NOTE — PROGRESS NOTES
Assessment/Plan:    Chronic nonintractable headache  Now with cervical neuralgic pain? Headaches persist  Will check MRI with contrast  Start gabapentin as directed  Recheck 2 weeks    Cervical neuralgia  R C4 dermatome  No rash or sign of HZV  REC: check MRI of brain as above  Start gabapentin 300mg qHS  Increase to bid if tolerating after 4-5 days, then tid if tolerating  Recheck 2 weeks - earlier if worse       Diagnoses and all orders for this visit:    Chronic nonintractable headache, unspecified headache type  -     MRI brain w wo contrast; Future    Cervical neuralgia  -     gabapentin (NEURONTIN) 300 mg capsule; Take 1 capsule (300 mg total) by mouth 3 (three) times a day          Subjective:      Patient ID: Evy Candelaria is a 60 y o  male  Here for several issues  - 61 yo male with 5-6m hx of R retro-orbital HA that radiates to R occiput  HA could occur at any time and could awake him from sleep  Pain was sharp and 6-7/10 in severity  Improved a little with Advil  Nothing seemed to make them worse  It was not associated with neck pain, rash, change in vision/speech, or asymmetric weakness/numbness  - Two weeks ago, the pain became more of a discomfort  Pt then developed a persistent severe burning sensation in the right neck radiaiting around neck and upper shoulderm, and across  to the right upper chest (?  C4/C5 dermatome)  No rash noted  Patient describes it as a severe sunburn  Then 1 week ago, patient developed right trapezius area pain, "as if somebody hip knee with a bat"  No swelling or bruising noted  Patient is reluctant to move right shoulder but does not note worsening pain with movement of the shoulder  No radiation to the arm or hand  No shoulder arm or hand weakness  Pt denies CP, SOB or other Cardio-Resp symptoms  No other neurologic symptoms      Headache    Associated symptoms include neck pain  Pertinent negatives include no back pain, dizziness, numbness or weakness  The following portions of the patient's history were reviewed and updated as appropriate:   He  has a past medical history of BPH (benign prostatic hyperplasia); Diabetes mellitus ([facility] ); Erectile dysfunction; GERD (gastroesophageal reflux disease); Hematuria; and Liver disease  He   Patient Active Problem List    Diagnosis Date Noted    Cervical neuralgia 01/28/2019    Chronic nonintractable headache 12/17/2018    Chronic hepatitis C without hepatic coma ([facility] ) 12/13/2018    Unspecified cirrhosis of liver ([place] [address] ) 12/13/2018    Hypothyroid 08/06/2018    Screening for prostate cancer 04/18/2018    Type 2 diabetes mellitus without complication, without long-term current use of insulin ([place] [address] ) 01/25/2018    Benign prostatic hyperplasia with urinary frequency 01/25/2018     He  has a past surgical history that includes Shoulder arthroscopy (Right); pr transurethral elec-surg prostatectom (N/A, 9/13/2017); Liver biopsy; Colonoscopy; Dental surgery; and Cystoscopy  He  reports that he has been smoking  He has been smoking about 0 00 packs per day  He has never used smokeless tobacco  He reports that he does not drink alcohol or use drugs    Current Outpatient Prescriptions   Medication Sig Dispense Refill    Blood Glucose Monitoring Suppl (Narinder Tellose) w/Device KIT by Does not apply route 3 (three) times a day 1 kit 0    famotidine (PEPCID) 40 MG tablet       glucose blood (ONETOUCH VERIO) test strip Test three times daily 100 each 6    Insulin Pen Needle (PEN NEEDLES) 32G X 5 MM MISC 1 Units/day by Does not apply route daily One needle / day 50 each 05    Lancets Misc  (ONE TOUCH SURESOFT) MISC by Does not apply route 3 (three) times a day 100 each 5    levothyroxine 50 mcg tablet Take 1 tablet (50 mcg total) by mouth daily 90 tablet 1    liraglutide (VICTOZA) injection 0 6mg sc qd x 1 week then 1 2mg qd 2 pen 5    metFORMIN (GLUCOPHAGE) 500 mg tablet Take 1 tablet (500 mg total) by mouth 2 (two) times a day with meals 180 tablet 1    ONE TOUCH LANCETS MISC by Does not apply route 3 (three) times a day 100 each 5    gabapentin (NEURONTIN) 300 mg capsule Take 1 capsule (300 mg total) by mouth 3 (three) times a day 90 capsule 1    JARDIANCE 10 MG TABS        No current facility-administered medications for this visit  He is allergic to no active allergies       Review of Systems   Constitutional: Negative  HENT: Negative  Eyes: Negative  Respiratory: Negative  Cardiovascular: Negative  Gastrointestinal: Negative  Genitourinary: Negative  Musculoskeletal: Positive for arthralgias and neck pain  Negative for back pain, joint swelling, myalgias and neck stiffness  Skin: Negative  Neurological: Positive for headaches  Negative for dizziness, facial asymmetry, weakness, light-headedness and numbness  R neck and upper chest neuralgia   Hematological: Negative  Objective:      /74   Pulse 80   Temp (!) 96 4 °F (35 8 °C)   Ht 6' 3" (1 905 m)   Wt 118 kg (261 lb 3 2 oz)   BMI 32 65 kg/m²          Physical Exam   Constitutional: He is oriented to person, place, and time  He appears well-developed and well-nourished  Mildly uncomfortable appearing male in NAD   HENT:   Head: Normocephalic and atraumatic  Right Ear: External ear normal    Left Ear: External ear normal    Nose: Nose normal    Eyes: Pupils are equal, round, and reactive to light  Conjunctivae and EOM are normal    No papilledema noted   Neck: Normal range of motion  Discomfort on rotation of neck due to skin sensitivity   Cardiovascular: Normal rate, regular rhythm, normal heart sounds and intact distal pulses  Pulmonary/Chest: Effort normal and breath sounds normal    Musculoskeletal: Normal range of motion  He exhibits tenderness (tenderness to light touch across R neck, trapezius and upper chest (C4 dermatome?)  )  He exhibits no edema     Lymphadenopathy:     He has no cervical adenopathy  Neurological: He is alert and oriented to person, place, and time  He has normal reflexes  No cranial nerve deficit  He exhibits normal muscle tone  Coordination normal    Skin: Skin is warm  Vitals reviewed

## 2019-01-29 NOTE — ASSESSMENT & PLAN NOTE
R C4 dermatome  No rash or sign of HZV  REC: check MRI of brain as above  Start gabapentin 300mg qHS  Increase to bid if tolerating after 4-5 days, then tid if tolerating   Recheck 2 weeks - earlier if worse

## 2019-01-29 NOTE — ASSESSMENT & PLAN NOTE
Now with cervical neuralgic pain? Headaches persist  Will check MRI with contrast  Start gabapentin as directed   Recheck 2 weeks

## 2019-02-09 ENCOUNTER — TRANSCRIBE ORDERS (OUTPATIENT)
Dept: LAB | Facility: CLINIC | Age: 59
End: 2019-02-09

## 2019-02-09 ENCOUNTER — APPOINTMENT (OUTPATIENT)
Dept: LAB | Facility: CLINIC | Age: 59
End: 2019-02-09
Payer: COMMERCIAL

## 2019-02-09 DIAGNOSIS — E11.9 TYPE 2 DIABETES MELLITUS WITHOUT COMPLICATION, WITHOUT LONG-TERM CURRENT USE OF INSULIN (HCC): ICD-10-CM

## 2019-02-09 LAB
ALBUMIN SERPL BCP-MCNC: 3.8 G/DL (ref 3.5–5)
ALP SERPL-CCNC: 84 U/L (ref 46–116)
ALT SERPL W P-5'-P-CCNC: 48 U/L (ref 12–78)
ANION GAP SERPL CALCULATED.3IONS-SCNC: 10 MMOL/L (ref 4–13)
AST SERPL W P-5'-P-CCNC: 20 U/L (ref 5–45)
BASOPHILS # BLD AUTO: 0.04 THOUSANDS/ΜL (ref 0–0.1)
BASOPHILS NFR BLD AUTO: 1 % (ref 0–1)
BILIRUB SERPL-MCNC: 0.4 MG/DL (ref 0.2–1)
BUN SERPL-MCNC: 22 MG/DL (ref 5–25)
CALCIUM SERPL-MCNC: 8.7 MG/DL (ref 8.3–10.1)
CHLORIDE SERPL-SCNC: 103 MMOL/L (ref 100–108)
CHOLEST SERPL-MCNC: 176 MG/DL (ref 50–200)
CO2 SERPL-SCNC: 28 MMOL/L (ref 21–32)
CREAT SERPL-MCNC: 1 MG/DL (ref 0.6–1.3)
CREAT UR-MCNC: 237 MG/DL
EOSINOPHIL # BLD AUTO: 0.12 THOUSAND/ΜL (ref 0–0.61)
EOSINOPHIL NFR BLD AUTO: 3 % (ref 0–6)
ERYTHROCYTE [DISTWIDTH] IN BLOOD BY AUTOMATED COUNT: 12.3 % (ref 11.6–15.1)
EST. AVERAGE GLUCOSE BLD GHB EST-MCNC: 154 MG/DL
GFR SERPL CREATININE-BSD FRML MDRD: 82 ML/MIN/1.73SQ M
GLUCOSE P FAST SERPL-MCNC: 192 MG/DL (ref 65–99)
HBA1C MFR BLD: 7 % (ref 4.2–6.3)
HCT VFR BLD AUTO: 46.4 % (ref 36.5–49.3)
HDLC SERPL-MCNC: 35 MG/DL (ref 40–60)
HGB BLD-MCNC: 15.6 G/DL (ref 12–17)
IMM GRANULOCYTES # BLD AUTO: 0.02 THOUSAND/UL (ref 0–0.2)
IMM GRANULOCYTES NFR BLD AUTO: 0 % (ref 0–2)
LDLC SERPL CALC-MCNC: 108 MG/DL (ref 0–100)
LYMPHOCYTES # BLD AUTO: 1.61 THOUSANDS/ΜL (ref 0.6–4.47)
LYMPHOCYTES NFR BLD AUTO: 35 % (ref 14–44)
MCH RBC QN AUTO: 31.7 PG (ref 26.8–34.3)
MCHC RBC AUTO-ENTMCNC: 33.6 G/DL (ref 31.4–37.4)
MCV RBC AUTO: 94 FL (ref 82–98)
MICROALBUMIN UR-MCNC: 26.2 MG/L (ref 0–20)
MICROALBUMIN/CREAT 24H UR: 11 MG/G CREATININE (ref 0–30)
MONOCYTES # BLD AUTO: 0.42 THOUSAND/ΜL (ref 0.17–1.22)
MONOCYTES NFR BLD AUTO: 9 % (ref 4–12)
NEUTROPHILS # BLD AUTO: 2.36 THOUSANDS/ΜL (ref 1.85–7.62)
NEUTS SEG NFR BLD AUTO: 52 % (ref 43–75)
NONHDLC SERPL-MCNC: 141 MG/DL
NRBC BLD AUTO-RTO: 0 /100 WBCS
PLATELET # BLD AUTO: 184 THOUSANDS/UL (ref 149–390)
PMV BLD AUTO: 9.4 FL (ref 8.9–12.7)
POTASSIUM SERPL-SCNC: 4.1 MMOL/L (ref 3.5–5.3)
PROT SERPL-MCNC: 7.5 G/DL (ref 6.4–8.2)
RBC # BLD AUTO: 4.92 MILLION/UL (ref 3.88–5.62)
SODIUM SERPL-SCNC: 141 MMOL/L (ref 136–145)
TRIGL SERPL-MCNC: 167 MG/DL
WBC # BLD AUTO: 4.57 THOUSAND/UL (ref 4.31–10.16)

## 2019-02-09 PROCEDURE — 85025 COMPLETE CBC W/AUTO DIFF WBC: CPT

## 2019-02-09 PROCEDURE — 3061F NEG MICROALBUMINURIA REV: CPT | Performed by: FAMILY MEDICINE

## 2019-02-09 PROCEDURE — 80061 LIPID PANEL: CPT

## 2019-02-09 PROCEDURE — 82043 UR ALBUMIN QUANTITATIVE: CPT

## 2019-02-09 PROCEDURE — 82570 ASSAY OF URINE CREATININE: CPT

## 2019-02-09 PROCEDURE — 36415 COLL VENOUS BLD VENIPUNCTURE: CPT

## 2019-02-09 PROCEDURE — 80053 COMPREHEN METABOLIC PANEL: CPT

## 2019-02-09 PROCEDURE — 83036 HEMOGLOBIN GLYCOSYLATED A1C: CPT

## 2019-02-10 DIAGNOSIS — E11.9 TYPE 2 DIABETES MELLITUS WITHOUT COMPLICATION, WITHOUT LONG-TERM CURRENT USE OF INSULIN (HCC): ICD-10-CM

## 2019-02-11 DIAGNOSIS — E11.9 TYPE 2 DIABETES MELLITUS WITHOUT COMPLICATION, WITHOUT LONG-TERM CURRENT USE OF INSULIN (HCC): Primary | ICD-10-CM

## 2019-02-11 RX ORDER — BLOOD SUGAR DIAGNOSTIC
1 STRIP MISCELLANEOUS DAILY
Qty: 50 EACH | Refills: 5 | Status: SHIPPED | OUTPATIENT
Start: 2019-02-11 | End: 2019-05-29

## 2019-02-14 ENCOUNTER — HOSPITAL ENCOUNTER (OUTPATIENT)
Dept: RADIOLOGY | Facility: HOSPITAL | Age: 59
Discharge: HOME | End: 2019-02-14
Payer: COMMERCIAL

## 2019-02-14 ENCOUNTER — OFFICE VISIT (OUTPATIENT)
Dept: URGENT CARE | Age: 59
End: 2019-02-14
Payer: COMMERCIAL

## 2019-02-14 VITALS
OXYGEN SATURATION: 96 % | SYSTOLIC BLOOD PRESSURE: 128 MMHG | TEMPERATURE: 99.4 F | HEART RATE: 117 BPM | WEIGHT: 250 LBS | DIASTOLIC BLOOD PRESSURE: 71 MMHG | RESPIRATION RATE: 18 BRPM | BODY MASS INDEX: 31.08 KG/M2 | HEIGHT: 75 IN

## 2019-02-14 DIAGNOSIS — R68.89 FLU-LIKE SYMPTOMS: Primary | ICD-10-CM

## 2019-02-14 DIAGNOSIS — J02.9 SORE THROAT: ICD-10-CM

## 2019-02-14 DIAGNOSIS — R51.9 CHRONIC NONINTRACTABLE HEADACHE, UNSPECIFIED HEADACHE TYPE: ICD-10-CM

## 2019-02-14 DIAGNOSIS — G89.29 CHRONIC NONINTRACTABLE HEADACHE, UNSPECIFIED HEADACHE TYPE: ICD-10-CM

## 2019-02-14 LAB
FLUAV AG SPEC QL: NOT DETECTED
FLUBV AG SPEC QL: NOT DETECTED
RSV B RNA SPEC QL NAA+PROBE: NOT DETECTED
S PYO AG THROAT QL: NEGATIVE

## 2019-02-14 PROCEDURE — G0382 LEV 3 HOSP TYPE B ED VISIT: HCPCS | Performed by: FAMILY MEDICINE

## 2019-02-14 PROCEDURE — 70553 MRI BRAIN STEM W/O & W/DYE: CPT

## 2019-02-14 PROCEDURE — 87631 RESP VIRUS 3-5 TARGETS: CPT | Performed by: PHYSICIAN ASSISTANT

## 2019-02-14 PROCEDURE — 87430 STREP A AG IA: CPT | Performed by: FAMILY MEDICINE

## 2019-02-14 PROCEDURE — A9585 GADOBUTROL INJECTION: HCPCS | Performed by: FAMILY MEDICINE

## 2019-02-14 PROCEDURE — S9083 URGENT CARE CENTER GLOBAL: HCPCS | Performed by: FAMILY MEDICINE

## 2019-02-14 PROCEDURE — 87070 CULTURE OTHR SPECIMN AEROBIC: CPT | Performed by: PHYSICIAN ASSISTANT

## 2019-02-14 RX ORDER — OSELTAMIVIR PHOSPHATE 75 MG/1
75 CAPSULE ORAL EVERY 12 HOURS SCHEDULED
Qty: 10 CAPSULE | Refills: 0 | Status: SHIPPED | OUTPATIENT
Start: 2019-02-14 | End: 2019-02-19

## 2019-02-14 RX ADMIN — GADOBUTROL 12 ML: 604.72 INJECTION INTRAVENOUS at 10:42

## 2019-02-14 NOTE — PROGRESS NOTES
[address]        NAME: Tracy Christianson is a 60 y o  male  : 1960    MRN: [id number]  DATE: 2019  TIME: 11:54 AM    Assessment and Plan   Flu-like symptoms [R68 89]  1  Flu-like symptoms  Influenza A/B and RSV by PCR    oseltamivir (TAMIFLU) 75 mg capsule   2  Sore throat  POCT rapid strepA    Throat culture     Rapid strep negative, culture pending  Discussed testing for the flu and/or starting Tamiflu at length with the patient  Answered all questions  Patient  requested flu testing and Tamiflu at this time  Patient will do Tamiflu and conservative treatment with Tylenol if needed, lots of fluids  Patient verbalized good understanding to follow up with family doctor or go to the emergency department if any new or worsening symptoms    Patient Instructions     Rapid strep is negative  Discussed influenza testing and treatment with Tamiflu at length, discussed side effects  Answered all questions  Please call tomorrow for influenza testing results  Discontinue Tamiflu if negative  Take antivirals as prescribed  Stay hydrated with lots of water/fluids  Alternate Tylenol and Motrin if needed for fever/pain  Follow-up with PCP in the next few days for reexamination and to ensure resolution of symptoms  Go to the ED if any intractable fevers, unable to stay hydrated, abdominal pain, chest pain, shortness of breath, fatigue/weakness, new or worsening symptoms or other concerning symptoms  Chief Complaint     Chief Complaint   Patient presents with    Cold Like Symptoms     fever,dry cough,  headache, chest and head congestion, sore throat x yesterday  bodyaches          History of Present Illness       62-year-old male presents with flu-like symptoms x 1 day  Patient states he was having generalized body aches/myalgias, fever last night got as high as 103, sore throat, mild intermittent dry cough, mild generalized headache, and feeling tired    States he took some Motrin last night  States this is not the worse headache he has ever had  Denies any vision changes, numbness, tingling, weakness  He did get his flu vaccine this year  He has been eating and drinking normally/staying hydrated  He denies any chest pain, shortness of breath, chest tightness, GI/ symptoms, severe headaches, neck pain or other complaints  Review of Systems   Review of Systems   Constitutional: Positive for fever  Negative for chills and fatigue  HENT: Positive for congestion, rhinorrhea and sore throat  Negative for ear pain and trouble swallowing  Eyes: Negative for photophobia, itching and visual disturbance  Respiratory: Positive for cough  Negative for chest tightness, shortness of breath and wheezing  Cardiovascular: Negative for chest pain and leg swelling  Gastrointestinal: Negative for abdominal pain, diarrhea, nausea and vomiting  Musculoskeletal: Positive for myalgias  Negative for back pain, joint swelling, neck pain and neck stiffness  Skin: Negative for rash  Neurological: Negative for dizziness, seizures, weakness, numbness and headaches  All other systems reviewed and are negative          Current Medications       Current Outpatient Medications:     Blood Glucose Monitoring Suppl (Deion Yañez) w/Device KIT, by Does not apply route 3 (three) times a day, Disp: 1 kit, Rfl: 0    famotidine (PEPCID) 40 MG tablet, , Disp: , Rfl:     gabapentin (NEURONTIN) 300 mg capsule, Take 1 capsule (300 mg total) by mouth 3 (three) times a day, Disp: 90 capsule, Rfl: 1    glucose blood (ONETOUCH VERIO) test strip, Test three times daily, Disp: 100 each, Rfl: 6    Insulin Pen Needle (PEN NEEDLES) 32G X 5 MM MISC, 1 Units/day by Does not apply route daily One needle / day, Disp: 50 each, Rfl: 05    JARDIANCE 10 MG TABS, , Disp: , Rfl:     Lancets Misc  (ONE TOUCH SURESOFT) MISC, by Does not apply route 3 (three) times a day, Disp: 100 each, Rfl: 5   levothyroxine 50 mcg tablet, Take 1 tablet (50 mcg total) by mouth daily, Disp: 90 tablet, Rfl: 1    liraglutide (VICTOZA) injection, 1 2MG DAILY, Disp: 6 mL, Rfl: 5    liraglutide (VICTOZA) injection, Inject 0 2 mL (1 2 mg total) under the skin daily, Disp: 2 pen, Rfl: 4    metFORMIN (GLUCOPHAGE) 500 mg tablet, Take 1 tablet (500 mg total) by mouth 2 (two) times a day with meals, Disp: 180 tablet, Rfl: 1    ONE TOUCH LANCETS MISC, by Does not apply route 3 (three) times a day, Disp: 100 each, Rfl: 5    oseltamivir (TAMIFLU) 75 mg capsule, Take 1 capsule (75 mg total) by mouth every 12 (twelve) hours for 5 days, Disp: 10 capsule, Rfl: 0  No current facility-administered medications for this visit       Current Allergies     Allergies as of 02/14/2019 - Reviewed 02/14/2019   Allergen Reaction Noted    No active allergies              The following portions of the patient's history were reviewed and updated as appropriate: allergies, current medications, past family history, past medical history, past social history, past surgical history and problem list      Past Medical History:   Diagnosis Date    BPH (benign prostatic hyperplasia)     Diabetes mellitus ([facility] [address] )     Erectile dysfunction     GERD (gastroesophageal reflux disease)     Hematuria     Liver disease     hepatitis C       Past Surgical History:   Procedure Laterality Date    COLONOSCOPY      last assessed: 08/28/2012; fiberoptic screening     CYSTOSCOPY      onset: 05/06/2016; Diagnostic     DENTAL SURGERY      LIVER BIOPSY      NC TRANSURETHRAL ELEC-SURG PROSTATECTOM N/A 9/13/2017    Procedure: Juluis Door; TUR PROSTATE;  Surgeon: Dari Carmen MD;  [facility];  Service: Urology    SHOULDER ARTHROSCOPY Right     bicep tendon repair, rotator cuff repair and acromuim shave        Family History   Problem Relation Age of Onset    Stroke Father     Cancer Maternal Grandmother          Medications have been verified  Objective   /71   Pulse (!) 117   Temp 99 4 °F (37 4 °C) (Temporal)   Resp 18   Ht 6' 3" (1 905 m)   Wt 113 kg (250 lb)   SpO2 96%   BMI 31 25 kg/m²        Physical Exam     Physical Exam   Constitutional: He is oriented to person, place, and time  He appears well-developed and well-nourished  No distress  Talkative, nontoxic-appearing, appears slightly tired   HENT:   Head: Normocephalic and atraumatic  Right Ear: Tympanic membrane normal    Left Ear: Tympanic membrane normal    Mouth/Throat: Uvula is midline and mucous membranes are normal  No uvula swelling  No posterior oropharyngeal edema or tonsillar abscesses  Clear nasal dc  Mild PND present with mildly erythematous posterior pharynx  Airway patent, handling secretions  Eyes: Pupils are equal, round, and reactive to light  Conjunctivae and EOM are normal    Neck: Normal range of motion  Neck supple  No meningeal signs   Cardiovascular: Normal rate, regular rhythm and normal heart sounds  Pulmonary/Chest: Effort normal and breath sounds normal  No respiratory distress  He has no wheezes  He exhibits no tenderness  Neurological: He is alert and oriented to person, place, and time  Skin: Skin is warm and dry  No rash noted  Psychiatric: He has a normal mood and affect  His behavior is normal    Nursing note and vitals reviewed

## 2019-02-14 NOTE — LETTER
February 14, 2019     Patient: Silas Dueñas   YOB: 1960   Date of Visit: 2/14/2019       To Whom It May Concern: It is my medical opinion that Silas Dueñas may return to work on 2/19/19 or fever free for 24 hours  If you have any questions or concerns, please don't hesitate to call           Sincerely,        Mike Hernandez PA-C    CC: No Recipients

## 2019-02-14 NOTE — PATIENT INSTRUCTIONS
Rapid strep is negative  Discussed influenza testing and treatment with Tamiflu at length, discussed side effects  Answered all questions  Please call tomorrow for influenza testing results  Discontinue Tamiflu if negative  Take antivirals as prescribed  Stay hydrated with lots of water/fluids  Alternate Tylenol and Motrin if needed for fever/pain  Follow-up with PCP in the next few days for reexamination and to ensure resolution of symptoms  Go to the ED if any intractable fevers, unable to stay hydrated, abdominal pain, chest pain, shortness of breath, fatigue/weakness, new or worsening symptoms or other concerning symptoms

## 2019-02-15 ENCOUNTER — TELEPHONE (OUTPATIENT)
Dept: URGENT CARE | Age: 59
End: 2019-02-15

## 2019-02-15 DIAGNOSIS — R51.9 CHRONIC NONINTRACTABLE HEADACHE, UNSPECIFIED HEADACHE TYPE: Primary | ICD-10-CM

## 2019-02-15 DIAGNOSIS — G89.29 CHRONIC NONINTRACTABLE HEADACHE, UNSPECIFIED HEADACHE TYPE: Primary | ICD-10-CM

## 2019-02-16 LAB — BACTERIA THROAT CULT: NORMAL

## 2019-02-18 ENCOUNTER — TELEPHONE (OUTPATIENT)
Dept: NEUROLOGY | Facility: CLINIC | Age: 59
End: 2019-02-18

## 2019-02-19 ENCOUNTER — TELEPHONE (OUTPATIENT)
Dept: FAMILY MEDICINE CLINIC | Facility: CLINIC | Age: 59
End: 2019-02-19

## 2019-02-19 NOTE — TELEPHONE ENCOUNTER
You started him on gabapentin a month ago     He doesn't want to take it anymore makes him feel funny     Can he just stop taking this?    Please advise

## 2019-02-19 NOTE — TELEPHONE ENCOUNTER
How much is he taking at this point?   If taking 3 a day - decrease to 2 a day for 4-5 d then 1 a day for 4-5 d then stop

## 2019-02-23 ENCOUNTER — APPOINTMENT (EMERGENCY)
Dept: ULTRASOUND IMAGING | Facility: HOSPITAL | Age: 59
End: 2019-02-23
Payer: COMMERCIAL

## 2019-02-23 ENCOUNTER — TELEPHONE (OUTPATIENT)
Dept: OTHER | Facility: OTHER | Age: 59
End: 2019-02-23

## 2019-02-23 ENCOUNTER — HOSPITAL ENCOUNTER (EMERGENCY)
Facility: HOSPITAL | Age: 59
Discharge: HOME | End: 2019-02-23
Attending: EMERGENCY MEDICINE | Admitting: EMERGENCY MEDICINE
Payer: COMMERCIAL

## 2019-02-23 VITALS
SYSTOLIC BLOOD PRESSURE: 122 MMHG | BODY MASS INDEX: 33.84 KG/M2 | WEIGHT: 270.73 LBS | HEART RATE: 79 BPM | OXYGEN SATURATION: 95 % | DIASTOLIC BLOOD PRESSURE: 77 MMHG | TEMPERATURE: 98.5 F | RESPIRATION RATE: 18 BRPM

## 2019-02-23 DIAGNOSIS — M79.604 RIGHT LEG PAIN: Primary | ICD-10-CM

## 2019-02-23 DIAGNOSIS — R20.2 PARESTHESIAS: ICD-10-CM

## 2019-02-23 PROCEDURE — 93971 EXTREMITY STUDY: CPT

## 2019-02-23 PROCEDURE — 99284 EMERGENCY DEPT VISIT MOD MDM: CPT

## 2019-02-23 NOTE — ED PROVIDER NOTES
History  Chief Complaint   Patient presents with    Leg Pain     right leg pain since last pm, sent by family doctor for ultrasound, pain radiates behind right knee, feels hot with burning sensation, had similar episode right ant  chest with radiation to right side of head in december , which has since resolved      HPI   62-year-old male with history of diabetes who presents for evaluation of burning right thigh pain that radiates to his right popliteal fossa  Patient was sent in by his PCP to rule out DVT  Over the last year or so the patient has been experiencing some right-sided headaches and a burning sensation to the skin of the right side of the chest   He has not had any lesions that would be consistent with shingles  He had an MRI of his brain with and without contrast on  that showed small scattered white matter hyperintensities primarily within the frontal lobes that may represent precocious chronic microangiopathic changes or sequela of chronic migraine headaches  Patient has a follow-up appointment with Neurology  He states that his headaches have significantly improved and he is no longer having the burning pain to his chest, but that now he developed the new burning pain in the right inner thigh that started yesterday  He called his PCP who told to come here for further evaluation  Patient had labs a couple of weeks ago which I reviewed that showed no leukocytosis, normal hemoglobin, normal electrolytes  No aggravating or alleviating factors for the symptoms  Pain feels like the pain is "within my skin " Denies pain in the joints, no joint swelling, no difficulty moving the joints  Prior to Admission Medications   Prescriptions Last Dose Informant Patient Reported? Taking?    Blood Glucose Monitoring Suppl (ONETOUCH VERIO) w/Device KIT  Self No No   Sig: by Does not apply route 3 (three) times a day   Insulin Pen Needle (PEN NEEDLES) 32G X 5 MM MISC  Self No No   Si Units/day by Does not apply route daily One needle / day   JARDIANCE 10 MG TABS  Self Yes No   Lancets Misc  (ONE TOUCH SURESOFT) MISC  Self No No   Sig: by Does not apply route 3 (three) times a day   ONE TOUCH LANCETS MISC  Self No No   Sig: by Does not apply route 3 (three) times a day   famotidine (PEPCID) 40 MG tablet  Self Yes No   gabapentin (NEURONTIN) 300 mg capsule  Self No No   Sig: Take 1 capsule (300 mg total) by mouth 3 (three) times a day   glucose blood (ONETOUCH VERIO) test strip  Self No No   Sig: Test three times daily   levothyroxine 50 mcg tablet  Self No No   Sig: Take 1 tablet (50 mcg total) by mouth daily   liraglutide (VICTOZA) injection  Self No No   Si 2MG DAILY   liraglutide (VICTOZA) injection  Self No No   Sig: Inject 0 2 mL (1 2 mg total) under the skin daily   metFORMIN (GLUCOPHAGE) 500 mg tablet  Self No No   Sig: Take 1 tablet (500 mg total) by mouth 2 (two) times a day with meals      Facility-Administered Medications: None       Past Medical History:   Diagnosis Date    BPH (benign prostatic hyperplasia)     Diabetes mellitus ([facility])     Erectile dysfunction     GERD (gastroesophageal reflux disease)     Hematuria     Liver disease     hepatitis C       Past Surgical History:   Procedure Laterality Date    COLONOSCOPY      last assessed: 2012; fiberoptic screening     CYSTOSCOPY      onset: 2016; Diagnostic     DENTAL SURGERY      LIVER BIOPSY      MI TRANSURETHRAL ELEC-SURG PROSTATECTOM N/A 2017    Procedure: CYSTOSCOPY; TUR PROSTATE;  Surgeon: Win Del Rio MD;  [facility];  Service: Urology    SHOULDER ARTHROSCOPY Right     bicep tendon repair, rotator cuff repair and acromuim shave        Family History   Problem Relation Age of Onset    Stroke Father     Cancer Maternal Grandmother      I have reviewed and agree with the history as documented      Social History     Tobacco Use    Smoking status: Former Smoker     Packs/day: 0 00  Smokeless tobacco: Never Used   Substance Use Topics    Alcohol use: No     Comment: quit 12 years ago    Drug use: No        Review of Systems   Constitutional: Negative for chills and fever  HENT: Negative for congestion  Eyes: Negative for visual disturbance  Respiratory: Negative for cough and shortness of breath  Cardiovascular: Negative for chest pain and leg swelling  Gastrointestinal: Negative for abdominal pain, diarrhea, nausea and vomiting  Genitourinary: Negative for dysuria and frequency  Musculoskeletal: Negative for arthralgias, back pain, joint swelling, myalgias, neck pain and neck stiffness  Skin: Negative for rash  Neurological: Negative for dizziness, facial asymmetry, speech difficulty, weakness, numbness and headaches  Burning pain to the R medial thigh   Psychiatric/Behavioral: Negative for agitation, behavioral problems and confusion  Physical Exam  Physical Exam   Constitutional: He is oriented to person, place, and time  He appears well-developed and well-nourished  No distress  HENT:   Head: Normocephalic and atraumatic  Right Ear: External ear normal    Left Ear: External ear normal    Nose: Nose normal    Mouth/Throat: Oropharynx is clear and moist    Eyes: Pupils are equal, round, and reactive to light  Conjunctivae and EOM are normal    Neck: Normal range of motion  Neck supple  Cardiovascular: Normal rate, regular rhythm, normal heart sounds and intact distal pulses  Exam reveals no gallop and no friction rub  No murmur heard  Pulmonary/Chest: Effort normal and breath sounds normal  No respiratory distress  He has no wheezes  He has no rales  Abdominal: Soft  Bowel sounds are normal  He exhibits no distension  There is no tenderness  There is no guarding  Musculoskeletal: Normal range of motion  He exhibits no edema or deformity     Paresthesias to the R medial thigh reproduced with palpation of the R medial thigh and R popliteal fossae, no swelling, erythema, skin changes, or warmth  Compartments soft  No swelling  Intact sensation light touch in the peripheral nerve distributions of the RLE  Neurological: He is alert and oriented to person, place, and time  He exhibits normal muscle tone  Face symmetric, tongue midline, 5/5 strength in the upper and lower extremities with intact sensation to light touch  Normal gait  Skin: Skin is warm and dry  He is not diaphoretic  Vital Signs  ED Triage Vitals   Temperature Pulse Respirations Blood Pressure SpO2   02/23/19 0938 02/23/19 0936 02/23/19 0936 02/23/19 0936 02/23/19 0936   98 5 °F (36 9 °C) 85 18 126/79 95 %      Temp src Heart Rate Source Patient Position - Orthostatic VS BP Location FiO2 (%)   -- -- -- -- --             Pain Score       02/23/19 0936       5           Vitals:    02/23/19 0936 02/23/19 1100   BP: 126/79 122/77   Pulse: 85 79       Visual Acuity  Visual Acuity      Most Recent Value   L Pupil Size (mm)  3   R Pupil Size (mm)  3          ED Medications  Medications - No data to display    Diagnostic Studies  Results Reviewed     None                 VAS lower limb venous duplex study, unilateral/limited    (Results Pending)              Procedures  Procedures       Phone Contacts  ED Phone Contact    ED Course                               MDM  Number of Diagnoses or Management Options  Paresthesias: new and requires workup  Right leg pain: new and requires workup  Diagnosis management comments: Generally well appearing  Afebrile and hemodynamically stable  Patient's neurological exam is unremarkable as above with the exception of an area of paresthesias to the right medial thigh  No actual sensation loss  I reviewed the patient's MRI from 2/14 that showed small scattered white matter hyperdensities primarily in the frontal lobes    I have encouraged the patient and his wife to follow up with Neurology as although this finding more likely represents chronic microangiopathic changes, could also be seen with a demyelinating condition such as MS  I do not see any overlying skin changes to suggest zoster as the cause of the patient's symptoms  Lower extremity Doppler obtained without evidence of DVT  Compartments are soft, no additional neurologic changes  No history of tick bites to suggest lyme disease  Patient had recent labs from 2 weeks ago that were reviewed that showed normal electrolytes  Differential diagnosis at this point includes demyelinating condition, peripheral neuropathy, vitamin deficiency, or other peripheral nerve irritation  Doubt acute life-threatening pathology  Recommend follow up with Neurology and his PCP  Return precautions discussed for additional neurologic deficits  Patient discharged in good condition  Amount and/or Complexity of Data Reviewed  Tests in the radiology section of CPT®: ordered and reviewed  Review and summarize past medical records: yes    Patient Progress  Patient progress: stable         Disposition  Final diagnoses:   Right leg pain   Paresthesias     Time reflects when diagnosis was documented in both MDM as applicable and the Disposition within this note     Time User Action Codes Description Comment    2/23/2019 11:00 AM Richardson Meneses Add [L67 419] Right leg pain     2/23/2019 11:00 AM Richardson Meneses Add [R20 2] Paresthesias       ED Disposition     ED Disposition Condition Date/Time Comment    Discharge Stable Sat Feb 23, 2019 11:00 AM Coral Human discharge to home/self care  Follow-up Information     Follow up With Specialties Details Why Contact Info Additional [address] Emergency Department Emergency Medicine  For any vision changes, weakness, chest pain, or new or concerning symptoms   [address] [address]  [phone] [facility], [address], [place], [place], [address]          Discharge Medication List as of 2/23/2019 11:01 AM      CONTINUE these medications which have NOT CHANGED    Details   Blood Glucose Monitoring Suppl (Everett Duarte) w/Device KIT by Does not apply route 3 (three) times a day, Starting Tue 8/28/2018, Normal      famotidine (PEPCID) 40 MG tablet Starting Tue 1/23/2018, Historical Med      gabapentin (NEURONTIN) 300 mg capsule Take 1 capsule (300 mg total) by mouth 3 (three) times a day, Starting Mon 1/28/2019, Normal      glucose blood (ONETOUCH VERIO) test strip Test three times daily, Normal      Insulin Pen Needle (PEN NEEDLES) 32G X 5 MM MISC 1 Units/day by Does not apply route daily One needle / day, Starting Mon 2/11/2019, Normal      JARDIANCE 10 MG TABS Starting Sun 9/9/2018, Historical Med      Lancets Misc  (ONE TOUCH SURESOFT) MISC by Does not apply route 3 (three) times a day, Starting Tue 8/28/2018, Normal      levothyroxine 50 mcg tablet Take 1 tablet (50 mcg total) by mouth daily, Starting Wed 11/14/2018, Normal      !! liraglutide (VICTOZA) injection 1 2MG DAILY, Normal      !! liraglutide (VICTOZA) injection Inject 0 2 mL (1 2 mg total) under the skin daily, Starting Mon 2/11/2019, Normal      metFORMIN (GLUCOPHAGE) 500 mg tablet Take 1 tablet (500 mg total) by mouth 2 (two) times a day with meals, Starting Wed 12/12/2018, Normal      ONE TOUCH LANCETS MISC by Does not apply route 3 (three) times a day, Starting Tue 8/28/2018, Normal       !! - Potential duplicate medications found  Please discuss with provider  No discharge procedures on file      ED Provider  Electronically Signed by           Jose Mooney MD  02/23/19 2008

## 2019-02-23 NOTE — TELEPHONE ENCOUNTER
Luis A Coy 1960  CONFIDENTIALTY NOTICE: This fax transmission is intended only for the addressee  It contains information that is legally privileged,  confidential or otherwise protected from use or disclosure  If you are not the intended recipient, you are strictly prohibited from reviewing,  disclosing, copying using or disseminating any of this information or taking any action in reliance on or regarding this information  If you have  received this fax in error, please notify us immediately by telephone so that we can arrange for its return to us  Page: 1  2  Call Id: [id number]  Health Call  Standard Call Report  Health Call  Patient Name: Luis A Coy  Gender: Male  : 1960  Age: 60 Y 8 M 34 D  Return Phone  Number: [phone] (Home)  Address: [address] [place] [address]  City/[facility]/Zip: [address]  Practice Name: [address] [place] [address],  [address] Charged:  Physician:  [address] [place] [address] Name:  Relationship To  Patient: Self  Return Phone Number: [phone] (Home)  Presenting Problem: "My  has a really bad burning  sensation on his right inner thigh, but  no rash "  Service Type: Triage  Charged Service 1: Kitty Oro [address]  38  Name and  Number:  Nurse Assessment  Nurse: Albina Michaud RN, Trista Crenshaw Date/Time: 2019 8:06:27 AM  Type of assessment required:  ---General (Adult or Child)  Duration of Current S/S  ---Last night  Location/Radiation  ---Back of right thigh  Temperature (F) and route:  ---Denies  Symptom Specific Meds (Dose/Time):  ---None  Other S/S  ---Constant burning pain on the back of his thigh pain kept him up all night  Pain Scale on scale of 1-10, 10 being the worst:  ---5/10 per wife he has a high tolerance for pain  Symptom progression:  ---worse  Intake and Output  ---Amado Rodríguezen Medal  Last Exam/Treatment:  Luis A Coy 1960  CONFIDENTIALTY NOTICE: This fax transmission is intended only for the addressee   It contains information that is legally privileged,  confidential or otherwise protected from use or disclosure  If you are not the intended recipient, you are strictly prohibited from reviewing,  disclosing, copying using or disseminating any of this information or taking any action in reliance on or regarding this information  If you have  received this fax in error, please notify us immediately by telephone so that we can arrange for its return to us  Page: 2 of 2  Call Id: [id number]  Nurse Assessment  ---02/14/2019 Flu symptoms  Protocols  Protocol Title Nurse Date/Time  Leg Pain GEMA Mcgee Armen Shake 2/23/2019 8:11:07 AM  Question Caller Affirmed  Disp  Time Disposition Final User  2/23/2019 8:12:30 AM See Physician within 4 Hours (or PCP  triage)  GEMA Mcgee Armen Shake  2/23/2019 8:12:37 AM RN Triaged Yes GEMA Mcgee, [facility] Advice Given Per Protocol  SEE PHYSICIAN WITHIN 4 HOURS (or PCP triage): * IF OFFICE WILL BE CLOSED AND NO PCP TRIAGE: You need to be seen  within the next 3 or 4 hours  A nearby Urgent Care Center is often a good source of care  Another choice is to go to the ER  Go sooner if  you become worse  CARE ADVICE given per Leg Pain (Adult) guideline  Will go to Urgent Care    Caller Understands: Yes  Caller Disagree/Comply: Comply  PreDisposition: Unsure

## 2019-02-24 PROCEDURE — 93971 EXTREMITY STUDY: CPT | Performed by: SURGERY

## 2019-02-25 DIAGNOSIS — M54.12 CERVICAL NEURALGIA: ICD-10-CM

## 2019-02-25 RX ORDER — GABAPENTIN 300 MG/1
300 CAPSULE ORAL 3 TIMES DAILY
Qty: 90 CAPSULE | Refills: 1 | Status: SHIPPED | OUTPATIENT
Start: 2019-02-25 | End: 2019-03-22 | Stop reason: SDUPTHER

## 2019-02-25 NOTE — TELEPHONE ENCOUNTER
Pt said he thinks he made a mistake going off the Gabapentin ,he is having burning in his neck and legs ,so he wants  to stay on it and would like it called to [facility] on [address]

## 2019-03-14 ENCOUNTER — OFFICE VISIT (OUTPATIENT)
Dept: FAMILY MEDICINE CLINIC | Facility: CLINIC | Age: 59
End: 2019-03-14
Payer: COMMERCIAL

## 2019-03-14 VITALS
DIASTOLIC BLOOD PRESSURE: 68 MMHG | WEIGHT: 268.8 LBS | TEMPERATURE: 97.9 F | HEART RATE: 80 BPM | HEIGHT: 75 IN | BODY MASS INDEX: 33.42 KG/M2 | SYSTOLIC BLOOD PRESSURE: 120 MMHG

## 2019-03-14 DIAGNOSIS — G89.29 CHRONIC NONINTRACTABLE HEADACHE, UNSPECIFIED HEADACHE TYPE: Primary | ICD-10-CM

## 2019-03-14 DIAGNOSIS — M79.2 NEURALGIA AND NEURITIS, UNSPECIFIED: ICD-10-CM

## 2019-03-14 DIAGNOSIS — R51.9 CHRONIC NONINTRACTABLE HEADACHE, UNSPECIFIED HEADACHE TYPE: Primary | ICD-10-CM

## 2019-03-14 PROCEDURE — 99214 OFFICE O/P EST MOD 30 MIN: CPT | Performed by: FAMILY MEDICINE

## 2019-03-14 RX ORDER — LIDOCAINE 50 MG/G
1 PATCH TOPICAL DAILY
Qty: 30 PATCH | Refills: 0 | Status: SHIPPED | OUTPATIENT
Start: 2019-03-14 | End: 2019-05-29

## 2019-03-14 NOTE — PROGRESS NOTES
Assessment/Plan:    Chronic nonintractable headache  Discussed with pt  Headaches persist  MRI with nonspecific changes  Cont f/u with Neuo  Cont present care  Recheck 3m    Neuralgia and neuritis, unspecified  ?migrating (cervical neuralgia resolved just before leg pain developed)  Check labs  Lidoderm as directed  Recheck 1m - earlier if worse       Diagnoses and all orders for this visit:    Chronic nonintractable headache, unspecified headache type  -     Lyme Antibody Profile with reflex to WB; Future  -     RPR; Future    Neuralgia and neuritis, unspecified  -     Lyme Antibody Profile with reflex to WB; Future  -     RPR; Future  -     lidocaine (LIDODERM) 5 %; Apply 1 patch topically daily Remove & Discard patch within 12 hours or as directed by MD          Subjective:      Patient ID: Gaston Jaramillo is a 60 y o  male  Here for several issues  - pt continues to have R sided HA  Mri showed some nonspecific white matter changes but no other findings  HA still can occur any time and can awake him from sleep  Pain is still sharp and 6-7/10 in severity  Nothing makes them worse  It was not associated with neck pain, rash, change in vision/speech, or asymmetric weakness/numbness  - neck burning sensation resolved but pt developed Uri symptoms around 2/14  One week later, the R leg developed burning pain that awoke pt from sleep  Pain is around the medial R knee and can feel like something is gripping his knee  Hurts worse with walking (while wearing pants secondary to skin sensitivity)  No rashes  Headache    Associated symptoms include neck pain  Pertinent negatives include no back pain, dizziness, numbness or weakness         The following portions of the patient's history were reviewed and updated as appropriate:   He  has a past medical history of BPH (benign prostatic hyperplasia), Diabetes mellitus ([address] ), Erectile dysfunction, GERD (gastroesophageal reflux disease), Hematuria, and Liver disease  He   Patient Active Problem List    Diagnosis Date Noted    Neuralgia and neuritis, unspecified 03/17/2019    Chronic nonintractable headache 12/17/2018    Chronic hepatitis C without hepatic coma ([facility] ) 12/13/2018    Unspecified cirrhosis of liver ([place] [address] ) 12/13/2018    Hypothyroid 08/06/2018    Screening for prostate cancer 04/18/2018    Type 2 diabetes mellitus without complication, without long-term current use of insulin ([place] [address] ) 01/25/2018    Benign prostatic hyperplasia with urinary frequency 01/25/2018     He  has a past surgical history that includes Shoulder arthroscopy (Right); pr transurethral elec-surg prostatectom (N/A, 9/13/2017); Liver biopsy; Colonoscopy; Dental surgery; and Cystoscopy  He  reports that he has quit smoking  He smoked 0 00 packs per day  He has never used smokeless tobacco  He reports that he does not drink alcohol or use drugs    Current Outpatient Medications   Medication Sig Dispense Refill    Blood Glucose Monitoring Suppl ([place]) w/Device KIT by Does not apply route 3 (three) times a day 1 kit 0    famotidine (PEPCID) 40 MG tablet       gabapentin (NEURONTIN) 300 mg capsule Take 1 capsule (300 mg total) by mouth 3 (three) times a day 90 capsule 1    glucose blood (ONETOUCH VERIO) test strip Test three times daily 100 each 6    Insulin Pen Needle (PEN NEEDLES) 32G X 5 MM MISC 1 Units/day by Does not apply route daily One needle / day 50 each 05    JARDIANCE 10 MG TABS       Lancets Misc  (ONE TOUCH SURESOFT) MISC by Does not apply route 3 (three) times a day 100 each 5    levothyroxine 50 mcg tablet Take 1 tablet (50 mcg total) by mouth daily 90 tablet 1    lidocaine (LIDODERM) 5 % Apply 1 patch topically daily Remove & Discard patch within 12 hours or as directed by MD 30 patch 0    liraglutide (VICTOZA) injection 1 2MG DAILY 6 mL 5    liraglutide (VICTOZA) injection Inject 0 2 mL (1 2 mg total) under the skin daily 2 pen 4    metFORMIN (GLUCOPHAGE) 500 mg tablet Take 1 tablet (500 mg total) by mouth 2 (two) times a day with meals 180 tablet 1    ONE TOUCH LANCETS MISC by Does not apply route 3 (three) times a day 100 each 5     No current facility-administered medications for this visit  He is allergic to no active allergies       Review of Systems   Constitutional: Negative  HENT: Negative  Eyes: Negative  Respiratory: Negative  Cardiovascular: Negative  Gastrointestinal: Negative  Endocrine: Negative  Genitourinary: Negative  Musculoskeletal: Positive for arthralgias (L knee) and neck pain  Negative for back pain and gait problem  Skin: Negative  Allergic/Immunologic: Negative  Neurological: Positive for headaches  Negative for dizziness, weakness and numbness  Hematological: Negative  Psychiatric/Behavioral: Negative  Objective:      /68 (BP Location: Left arm, Patient Position: Sitting, Cuff Size: Standard)   Pulse 80   Temp 97 9 °F (36 6 °C)   Ht 6' 3" (1 905 m)   Wt 122 kg (268 lb 12 8 oz)   BMI 33 60 kg/m²          Physical Exam   Constitutional: He is oriented to person, place, and time  He appears well-developed and well-nourished  Mildly uncomfortable appearing male in NAD   HENT:   Head: Normocephalic and atraumatic  Right Ear: External ear normal    Left Ear: External ear normal    Nose: Nose normal    Eyes: Pupils are equal, round, and reactive to light  Conjunctivae and EOM are normal    No papilledema noted   Neck: Normal range of motion  Discomfort on rotation of neck due to skin sensitivity   Cardiovascular: Normal rate, regular rhythm, normal heart sounds and intact distal pulses  Pulmonary/Chest: Effort normal and breath sounds normal    Musculoskeletal: Normal range of motion  He exhibits tenderness (TTP along focal area just superior to the medial knee  No effusion, rash of other findings)  He exhibits no edema     Lymphadenopathy:     He has no cervical adenopathy  Neurological: He is alert and oriented to person, place, and time  He has normal reflexes  No cranial nerve deficit  He exhibits normal muscle tone  Coordination normal    Skin: Skin is warm  Vitals reviewed

## 2019-03-15 ENCOUNTER — APPOINTMENT (OUTPATIENT)
Dept: LAB | Facility: CLINIC | Age: 59
End: 2019-03-15
Payer: COMMERCIAL

## 2019-03-15 DIAGNOSIS — M79.2 NEURALGIA AND NEURITIS, UNSPECIFIED: ICD-10-CM

## 2019-03-15 DIAGNOSIS — Z12.5 SCREENING FOR PROSTATE CANCER: ICD-10-CM

## 2019-03-15 DIAGNOSIS — R51.9 CHRONIC NONINTRACTABLE HEADACHE, UNSPECIFIED HEADACHE TYPE: ICD-10-CM

## 2019-03-15 DIAGNOSIS — G89.29 CHRONIC NONINTRACTABLE HEADACHE, UNSPECIFIED HEADACHE TYPE: ICD-10-CM

## 2019-03-15 LAB — PSA SERPL-MCNC: 1.2 NG/ML (ref 0–4)

## 2019-03-15 PROCEDURE — G0103 PSA SCREENING: HCPCS

## 2019-03-15 PROCEDURE — 36415 COLL VENOUS BLD VENIPUNCTURE: CPT

## 2019-03-15 PROCEDURE — 86618 LYME DISEASE ANTIBODY: CPT

## 2019-03-15 PROCEDURE — 86592 SYPHILIS TEST NON-TREP QUAL: CPT

## 2019-03-16 LAB — RPR SER QL: NORMAL

## 2019-03-17 PROBLEM — M79.2 NEURALGIA AND NEURITIS, UNSPECIFIED: Status: ACTIVE | Noted: 2019-03-17

## 2019-03-17 PROBLEM — M54.12 CERVICAL NEURALGIA: Status: RESOLVED | Noted: 2019-01-28 | Resolved: 2019-03-17

## 2019-03-17 LAB
B BURGDOR IGG SER IA-ACNC: 0.08
B BURGDOR IGM SER IA-ACNC: 0.57

## 2019-03-17 NOTE — ASSESSMENT & PLAN NOTE
?migrating (cervical neuralgia resolved just before leg pain developed)  Check labs  Lidoderm as directed   Recheck 1m - earlier if worse

## 2019-03-17 NOTE — ASSESSMENT & PLAN NOTE
Discussed with pt  Headaches persist  MRI with nonspecific changes  Cont f/u with Neuo  Cont present care   Recheck 3m

## 2019-03-18 ENCOUNTER — TELEPHONE (OUTPATIENT)
Dept: FAMILY MEDICINE CLINIC | Facility: CLINIC | Age: 59
End: 2019-03-18

## 2019-03-18 NOTE — TELEPHONE ENCOUNTER
----- Message from Daniel Hubbard MD sent at 3/18/2019  7:57 AM EDT -----  All tests neg - let pt know

## 2019-03-22 DIAGNOSIS — M54.12 CERVICAL NEURALGIA: ICD-10-CM

## 2019-03-22 RX ORDER — GABAPENTIN 300 MG/1
300 CAPSULE ORAL 3 TIMES DAILY
Qty: 270 CAPSULE | Refills: 1 | Status: SHIPPED | OUTPATIENT
Start: 2019-03-22 | End: 2019-04-12

## 2019-03-26 ENCOUNTER — TELEPHONE (OUTPATIENT)
Dept: FAMILY MEDICINE CLINIC | Facility: OTHER | Age: 59
End: 2019-03-26

## 2019-03-26 DIAGNOSIS — F41.9 ANXIETY: Primary | ICD-10-CM

## 2019-03-26 RX ORDER — ALPRAZOLAM 0.5 MG/1
TABLET ORAL
Qty: 6 TABLET | Refills: 0 | Status: SHIPPED | OUTPATIENT
Start: 2019-03-26 | End: 2019-04-12

## 2019-03-26 NOTE — TELEPHONE ENCOUNTER
[place] on [place],   Can you call in xanax for him like in the past  jose elizondo  Will ck with pharm later or pedro if no cb

## 2019-04-12 ENCOUNTER — CONSULT (OUTPATIENT)
Dept: NEUROLOGY | Facility: CLINIC | Age: 59
End: 2019-04-12
Payer: COMMERCIAL

## 2019-04-12 VITALS
DIASTOLIC BLOOD PRESSURE: 68 MMHG | WEIGHT: 273 LBS | HEIGHT: 75 IN | SYSTOLIC BLOOD PRESSURE: 124 MMHG | BODY MASS INDEX: 33.94 KG/M2 | HEART RATE: 80 BPM

## 2019-04-12 DIAGNOSIS — E55.9 VITAMIN D DEFICIENCY: ICD-10-CM

## 2019-04-12 DIAGNOSIS — R06.83 SNORING: ICD-10-CM

## 2019-04-12 DIAGNOSIS — G47.00 INSOMNIA, UNSPECIFIED TYPE: ICD-10-CM

## 2019-04-12 DIAGNOSIS — R90.82 WHITE MATTER ABNORMALITY ON MRI OF BRAIN: ICD-10-CM

## 2019-04-12 DIAGNOSIS — G44.099 TACS (TRIGEMINAL AUTONOMIC CEPHALGIAS): Primary | ICD-10-CM

## 2019-04-12 DIAGNOSIS — R26.81 GAIT INSTABILITY: ICD-10-CM

## 2019-04-12 DIAGNOSIS — R51.9 CHRONIC DAILY HEADACHE: ICD-10-CM

## 2019-04-12 DIAGNOSIS — H02.401 PTOSIS OF RIGHT EYELID: ICD-10-CM

## 2019-04-12 DIAGNOSIS — M79.2 NEUROPATHIC PAIN: ICD-10-CM

## 2019-04-12 PROCEDURE — 99245 OFF/OP CONSLTJ NEW/EST HI 55: CPT | Performed by: PSYCHIATRY & NEUROLOGY

## 2019-04-12 RX ORDER — CARBAMAZEPINE 100 MG/1
TABLET, EXTENDED RELEASE ORAL
Qty: 120 TABLET | Refills: 1 | Status: SHIPPED | OUTPATIENT
Start: 2019-04-12 | End: 2019-05-29

## 2019-04-12 RX ORDER — INDOMETHACIN 50 MG/1
50 CAPSULE ORAL
Qty: 18 CAPSULE | Refills: 0 | Status: SHIPPED | OUTPATIENT
Start: 2019-04-12 | End: 2019-05-29

## 2019-04-12 RX ORDER — SUCRALFATE 1 G/1
TABLET ORAL
Qty: 18 TABLET | Refills: 0 | Status: SHIPPED | OUTPATIENT
Start: 2019-04-12 | End: 2019-05-29

## 2019-04-17 ENCOUNTER — OFFICE VISIT (OUTPATIENT)
Dept: UROLOGY | Facility: AMBULATORY SURGERY CENTER | Age: 59
End: 2019-04-17
Payer: COMMERCIAL

## 2019-04-17 VITALS
BODY MASS INDEX: 33.69 KG/M2 | HEIGHT: 75 IN | SYSTOLIC BLOOD PRESSURE: 142 MMHG | HEART RATE: 68 BPM | DIASTOLIC BLOOD PRESSURE: 80 MMHG | WEIGHT: 271 LBS

## 2019-04-17 DIAGNOSIS — Z12.5 PROSTATE CANCER SCREENING: ICD-10-CM

## 2019-04-17 DIAGNOSIS — N13.8 BPH WITH OBSTRUCTION/LOWER URINARY TRACT SYMPTOMS: Primary | ICD-10-CM

## 2019-04-17 DIAGNOSIS — N40.1 BPH WITH OBSTRUCTION/LOWER URINARY TRACT SYMPTOMS: Primary | ICD-10-CM

## 2019-04-17 LAB — POST-VOID RESIDUAL VOLUME, ML POC: 0 ML

## 2019-04-17 PROCEDURE — 51798 US URINE CAPACITY MEASURE: CPT | Performed by: NURSE PRACTITIONER

## 2019-04-17 PROCEDURE — 99213 OFFICE O/P EST LOW 20 MIN: CPT | Performed by: NURSE PRACTITIONER

## 2019-04-20 ENCOUNTER — TRANSCRIBE ORDERS (OUTPATIENT)
Dept: LAB | Facility: CLINIC | Age: 59
End: 2019-04-20

## 2019-04-20 ENCOUNTER — APPOINTMENT (OUTPATIENT)
Dept: LAB | Facility: CLINIC | Age: 59
End: 2019-04-20
Payer: COMMERCIAL

## 2019-04-20 DIAGNOSIS — E55.9 VITAMIN D DEFICIENCY: ICD-10-CM

## 2019-04-20 DIAGNOSIS — R51.9 CHRONIC DAILY HEADACHE: ICD-10-CM

## 2019-04-20 LAB
25(OH)D3 SERPL-MCNC: 14.8 NG/ML (ref 30–100)
ALBUMIN SERPL BCP-MCNC: 4.3 G/DL (ref 3.5–5)
ALP SERPL-CCNC: 80 U/L (ref 46–116)
ALT SERPL W P-5'-P-CCNC: 39 U/L (ref 12–78)
ANION GAP SERPL CALCULATED.3IONS-SCNC: 7 MMOL/L (ref 4–13)
AST SERPL W P-5'-P-CCNC: 20 U/L (ref 5–45)
BILIRUB SERPL-MCNC: 1 MG/DL (ref 0.2–1)
BUN SERPL-MCNC: 16 MG/DL (ref 5–25)
CALCIUM SERPL-MCNC: 9.5 MG/DL (ref 8.3–10.1)
CHLORIDE SERPL-SCNC: 101 MMOL/L (ref 100–108)
CO2 SERPL-SCNC: 28 MMOL/L (ref 21–32)
CREAT SERPL-MCNC: 1.13 MG/DL (ref 0.6–1.3)
GFR SERPL CREATININE-BSD FRML MDRD: 70 ML/MIN/1.73SQ M
GLUCOSE P FAST SERPL-MCNC: 172 MG/DL (ref 65–99)
POTASSIUM SERPL-SCNC: 5.2 MMOL/L (ref 3.5–5.3)
PROT SERPL-MCNC: 8.5 G/DL (ref 6.4–8.2)
SODIUM SERPL-SCNC: 136 MMOL/L (ref 136–145)
VIT B12 SERPL-MCNC: 734 PG/ML (ref 100–900)

## 2019-04-20 PROCEDURE — 36415 COLL VENOUS BLD VENIPUNCTURE: CPT

## 2019-04-20 PROCEDURE — 82306 VITAMIN D 25 HYDROXY: CPT

## 2019-04-20 PROCEDURE — 80053 COMPREHEN METABOLIC PANEL: CPT

## 2019-04-20 PROCEDURE — 82607 VITAMIN B-12: CPT

## 2019-04-22 DIAGNOSIS — E55.9 VITAMIN D DEFICIENCY: Primary | ICD-10-CM

## 2019-04-22 RX ORDER — ERGOCALCIFEROL 1.25 MG/1
50000 CAPSULE ORAL WEEKLY
Qty: 6 CAPSULE | Refills: 0 | Status: SHIPPED | OUTPATIENT
Start: 2019-04-22 | End: 2019-07-02

## 2019-04-27 ENCOUNTER — HOSPITAL ENCOUNTER (OUTPATIENT)
Dept: CT IMAGING | Facility: HOSPITAL | Age: 59
Discharge: HOME | End: 2019-04-27
Attending: PSYCHIATRY & NEUROLOGY
Payer: COMMERCIAL

## 2019-04-27 DIAGNOSIS — G44.099 TACS (TRIGEMINAL AUTONOMIC CEPHALGIAS): ICD-10-CM

## 2019-04-27 DIAGNOSIS — R90.82 WHITE MATTER ABNORMALITY ON MRI OF BRAIN: ICD-10-CM

## 2019-04-27 DIAGNOSIS — H02.401 PTOSIS OF RIGHT EYELID: ICD-10-CM

## 2019-04-27 DIAGNOSIS — R51.9 CHRONIC DAILY HEADACHE: ICD-10-CM

## 2019-04-27 PROCEDURE — 70498 CT ANGIOGRAPHY NECK: CPT

## 2019-04-27 PROCEDURE — 70496 CT ANGIOGRAPHY HEAD: CPT

## 2019-04-27 RX ADMIN — IOHEXOL 85 ML: 350 INJECTION, SOLUTION INTRAVENOUS at 16:06

## 2019-04-29 DIAGNOSIS — E03.9 HYPOTHYROIDISM, UNSPECIFIED TYPE: ICD-10-CM

## 2019-04-29 RX ORDER — LEVOTHYROXINE SODIUM 0.05 MG/1
TABLET ORAL
Qty: 90 TABLET | Refills: 1 | Status: SHIPPED | OUTPATIENT
Start: 2019-04-29 | End: 2019-08-18 | Stop reason: SDUPTHER

## 2019-05-03 ENCOUNTER — TELEPHONE (OUTPATIENT)
Dept: NEUROLOGY | Facility: CLINIC | Age: 59
End: 2019-05-03

## 2019-05-07 DIAGNOSIS — G44.099 TACS (TRIGEMINAL AUTONOMIC CEPHALGIAS): ICD-10-CM

## 2019-05-07 DIAGNOSIS — R51.9 CHRONIC DAILY HEADACHE: ICD-10-CM

## 2019-05-07 DIAGNOSIS — M79.2 NEUROPATHIC PAIN: ICD-10-CM

## 2019-05-07 RX ORDER — CARBAMAZEPINE 100 MG/1
TABLET, EXTENDED RELEASE ORAL
Qty: 120 TABLET | Refills: 0 | OUTPATIENT
Start: 2019-05-07

## 2019-05-08 DIAGNOSIS — R59.0 MEDIASTINAL LYMPHADENOPATHY: Primary | ICD-10-CM

## 2019-05-08 DIAGNOSIS — R59.0 MEDIASTINAL ADENOPATHY: Primary | ICD-10-CM

## 2019-05-09 ENCOUNTER — OFFICE VISIT (OUTPATIENT)
Dept: NEUROLOGY | Facility: CLINIC | Age: 59
End: 2019-05-09
Payer: COMMERCIAL

## 2019-05-09 VITALS
DIASTOLIC BLOOD PRESSURE: 70 MMHG | HEIGHT: 75 IN | BODY MASS INDEX: 33.45 KG/M2 | WEIGHT: 269 LBS | HEART RATE: 70 BPM | SYSTOLIC BLOOD PRESSURE: 122 MMHG

## 2019-05-09 DIAGNOSIS — R20.2 NUMBNESS AND TINGLING OF BOTH LEGS: Primary | ICD-10-CM

## 2019-05-09 DIAGNOSIS — M54.5 RIGHT LOW BACK PAIN, UNSPECIFIED CHRONICITY, WITH SCIATICA PRESENCE UNSPECIFIED: ICD-10-CM

## 2019-05-09 DIAGNOSIS — R20.0 NUMBNESS AND TINGLING OF BOTH LEGS: Primary | ICD-10-CM

## 2019-05-09 PROBLEM — M54.50 RIGHT LOW BACK PAIN: Status: ACTIVE | Noted: 2019-05-09

## 2019-05-09 PROCEDURE — 99215 OFFICE O/P EST HI 40 MIN: CPT | Performed by: PSYCHIATRY & NEUROLOGY

## 2019-05-10 ENCOUNTER — APPOINTMENT (OUTPATIENT)
Dept: RADIOLOGY | Age: 59
End: 2019-05-10
Payer: COMMERCIAL

## 2019-05-10 ENCOUNTER — APPOINTMENT (OUTPATIENT)
Dept: LAB | Facility: CLINIC | Age: 59
End: 2019-05-10
Payer: COMMERCIAL

## 2019-05-10 DIAGNOSIS — R20.2 NUMBNESS AND TINGLING OF BOTH LEGS: ICD-10-CM

## 2019-05-10 DIAGNOSIS — M54.5 RIGHT LOW BACK PAIN, UNSPECIFIED CHRONICITY, WITH SCIATICA PRESENCE UNSPECIFIED: ICD-10-CM

## 2019-05-10 DIAGNOSIS — R20.0 NUMBNESS AND TINGLING OF BOTH LEGS: ICD-10-CM

## 2019-05-10 DIAGNOSIS — R20.0 TACTILE ANESTHESIA: Primary | ICD-10-CM

## 2019-05-10 DIAGNOSIS — R20.2 PARESTHESIA: ICD-10-CM

## 2019-05-10 LAB — CK SERPL-CCNC: 45 U/L (ref 39–308)

## 2019-05-10 PROCEDURE — 86255 FLUORESCENT ANTIBODY SCREEN: CPT

## 2019-05-10 PROCEDURE — 72110 X-RAY EXAM L-2 SPINE 4/>VWS: CPT

## 2019-05-10 PROCEDURE — 84165 PROTEIN E-PHORESIS SERUM: CPT

## 2019-05-10 PROCEDURE — 36415 COLL VENOUS BLD VENIPUNCTURE: CPT

## 2019-05-10 PROCEDURE — 82550 ASSAY OF CK (CPK): CPT

## 2019-05-10 PROCEDURE — 84165 PROTEIN E-PHORESIS SERUM: CPT | Performed by: PATHOLOGY

## 2019-05-13 ENCOUNTER — HOSPITAL ENCOUNTER (OUTPATIENT)
Dept: NEUROLOGY | Facility: CLINIC | Age: 59
Discharge: HOME | End: 2019-05-13
Payer: COMMERCIAL

## 2019-05-13 DIAGNOSIS — R20.2 NUMBNESS AND TINGLING OF BOTH LEGS: ICD-10-CM

## 2019-05-13 DIAGNOSIS — M54.5 RIGHT LOW BACK PAIN, UNSPECIFIED CHRONICITY, WITH SCIATICA PRESENCE UNSPECIFIED: ICD-10-CM

## 2019-05-13 DIAGNOSIS — R20.0 NUMBNESS AND TINGLING OF BOTH LEGS: ICD-10-CM

## 2019-05-13 LAB
ALBUMIN SERPL ELPH-MCNC: 4.91 G/DL (ref 3.5–5)
ALBUMIN SERPL ELPH-MCNC: 59.1 % (ref 52–65)
ALPHA1 GLOB SERPL ELPH-MCNC: 0.31 G/DL (ref 0.1–0.4)
ALPHA1 GLOB SERPL ELPH-MCNC: 3.7 % (ref 2.5–5)
ALPHA2 GLOB SERPL ELPH-MCNC: 1.13 G/DL (ref 0.4–1.2)
ALPHA2 GLOB SERPL ELPH-MCNC: 13.6 % (ref 7–13)
BETA GLOB ABNORMAL SERPL ELPH-MCNC: 0.46 G/DL (ref 0.4–0.8)
BETA1 GLOB SERPL ELPH-MCNC: 5.5 % (ref 5–13)
BETA2 GLOB SERPL ELPH-MCNC: 4.5 % (ref 2–8)
BETA2+GAMMA GLOB SERPL ELPH-MCNC: 0.37 G/DL (ref 0.2–0.5)
GAMMA GLOB ABNORMAL SERPL ELPH-MCNC: 1.13 G/DL (ref 0.5–1.6)
GAMMA GLOB SERPL ELPH-MCNC: 13.6 % (ref 12–22)
IGG/ALB SER: 1.44 {RATIO} (ref 1.1–1.8)
PROT PATTERN SERPL ELPH-IMP: ABNORMAL
PROT SERPL-MCNC: 8.3 G/DL (ref 6.4–8.2)

## 2019-05-13 PROCEDURE — 95886 MUSC TEST DONE W/N TEST COMP: CPT | Performed by: PHYSICAL MEDICINE & REHABILITATION

## 2019-05-13 PROCEDURE — 95913 NRV CNDJ TEST 13/> STUDIES: CPT | Performed by: PHYSICAL MEDICINE & REHABILITATION

## 2019-05-15 ENCOUNTER — HOSPITAL ENCOUNTER (OUTPATIENT)
Dept: RADIOLOGY | Facility: MEDICAL CENTER | Age: 59
Discharge: HOME | End: 2019-05-15
Payer: COMMERCIAL

## 2019-05-15 DIAGNOSIS — R59.0 MEDIASTINAL ADENOPATHY: ICD-10-CM

## 2019-05-15 LAB
HU1 AB TITR SER: NORMAL TITER
HU2 AB TITR SER IF: NORMAL TITER

## 2019-05-15 PROCEDURE — 71250 CT THORAX DX C-: CPT

## 2019-05-21 ENCOUNTER — OFFICE VISIT (OUTPATIENT)
Dept: CARDIAC SURGERY | Facility: CLINIC | Age: 59
End: 2019-05-21
Payer: COMMERCIAL

## 2019-05-21 VITALS
WEIGHT: 270 LBS | SYSTOLIC BLOOD PRESSURE: 137 MMHG | BODY MASS INDEX: 33.57 KG/M2 | TEMPERATURE: 97.4 F | HEIGHT: 75 IN | OXYGEN SATURATION: 97 % | HEART RATE: 87 BPM | DIASTOLIC BLOOD PRESSURE: 72 MMHG

## 2019-05-21 DIAGNOSIS — R59.0 MEDIASTINAL LYMPHADENOPATHY: ICD-10-CM

## 2019-05-21 PROCEDURE — 99245 OFF/OP CONSLTJ NEW/EST HI 55: CPT | Performed by: THORACIC SURGERY (CARDIOTHORACIC VASCULAR SURGERY)

## 2019-05-22 ENCOUNTER — TELEPHONE (OUTPATIENT)
Dept: NEUROLOGY | Facility: CLINIC | Age: 59
End: 2019-05-22

## 2019-05-23 ENCOUNTER — ANESTHESIA EVENT (OUTPATIENT)
Dept: PERIOP | Facility: HOSPITAL | Age: 59
End: 2019-05-23
Payer: COMMERCIAL

## 2019-05-25 ENCOUNTER — TRANSCRIBE ORDERS (OUTPATIENT)
Dept: LAB | Facility: CLINIC | Age: 59
End: 2019-05-25

## 2019-05-25 ENCOUNTER — LAB (OUTPATIENT)
Dept: LAB | Facility: CLINIC | Age: 59
End: 2019-05-25
Payer: COMMERCIAL

## 2019-05-25 DIAGNOSIS — R59.0 BILATERAL HILAR ADENOPATHY SYNDROME: ICD-10-CM

## 2019-05-25 DIAGNOSIS — R59.0 MEDIASTINAL LYMPHADENOPATHY: ICD-10-CM

## 2019-05-25 DIAGNOSIS — R59.0 BILATERAL HILAR ADENOPATHY SYNDROME: Primary | ICD-10-CM

## 2019-05-25 LAB
ABO GROUP BLD: NORMAL
ANION GAP SERPL CALCULATED.3IONS-SCNC: 8 MMOL/L (ref 4–13)
APTT PPP: 29 SECONDS (ref 26–38)
BLD GP AB SCN SERPL QL: NEGATIVE
BUN SERPL-MCNC: 14 MG/DL (ref 5–25)
CALCIUM SERPL-MCNC: 9.3 MG/DL (ref 8.3–10.1)
CHLORIDE SERPL-SCNC: 100 MMOL/L (ref 100–108)
CO2 SERPL-SCNC: 27 MMOL/L (ref 21–32)
CREAT SERPL-MCNC: 0.98 MG/DL (ref 0.6–1.3)
ERYTHROCYTE [DISTWIDTH] IN BLOOD BY AUTOMATED COUNT: 12.3 % (ref 11.6–15.1)
GFR SERPL CREATININE-BSD FRML MDRD: 83 ML/MIN/1.73SQ M
GLUCOSE P FAST SERPL-MCNC: 223 MG/DL (ref 65–99)
HCT VFR BLD AUTO: 45.9 % (ref 36.5–49.3)
HGB BLD-MCNC: 16 G/DL (ref 12–17)
INR PPP: 1.04 (ref 0.86–1.17)
MCH RBC QN AUTO: 31.5 PG (ref 26.8–34.3)
MCHC RBC AUTO-ENTMCNC: 34.9 G/DL (ref 31.4–37.4)
MCV RBC AUTO: 90 FL (ref 82–98)
PLATELET # BLD AUTO: 178 THOUSANDS/UL (ref 149–390)
PMV BLD AUTO: 9.2 FL (ref 8.9–12.7)
POTASSIUM SERPL-SCNC: 4.3 MMOL/L (ref 3.5–5.3)
PROTHROMBIN TIME: 13.3 SECONDS (ref 11.8–14.2)
RBC # BLD AUTO: 5.08 MILLION/UL (ref 3.88–5.62)
RH BLD: POSITIVE
SODIUM SERPL-SCNC: 135 MMOL/L (ref 136–145)
SPECIMEN EXPIRATION DATE: NORMAL
WBC # BLD AUTO: 4.65 THOUSAND/UL (ref 4.31–10.16)

## 2019-05-25 PROCEDURE — 86901 BLOOD TYPING SEROLOGIC RH(D): CPT

## 2019-05-25 PROCEDURE — 86850 RBC ANTIBODY SCREEN: CPT

## 2019-05-25 PROCEDURE — 93005 ELECTROCARDIOGRAM TRACING: CPT

## 2019-05-25 PROCEDURE — 85027 COMPLETE CBC AUTOMATED: CPT

## 2019-05-25 PROCEDURE — 80048 BASIC METABOLIC PNL TOTAL CA: CPT

## 2019-05-25 PROCEDURE — 86900 BLOOD TYPING SEROLOGIC ABO: CPT

## 2019-05-25 PROCEDURE — 85610 PROTHROMBIN TIME: CPT

## 2019-05-25 PROCEDURE — 36415 COLL VENOUS BLD VENIPUNCTURE: CPT

## 2019-05-25 PROCEDURE — 85730 THROMBOPLASTIN TIME PARTIAL: CPT

## 2019-05-29 LAB
ATRIAL RATE: 85 BPM
P AXIS: 34 DEGREES
PR INTERVAL: 168 MS
QRS AXIS: 47 DEGREES
QRSD INTERVAL: 72 MS
QT INTERVAL: 364 MS
QTC INTERVAL: 433 MS
T WAVE AXIS: 56 DEGREES
VENTRICULAR RATE: 85 BPM

## 2019-05-29 PROCEDURE — 93010 ELECTROCARDIOGRAM REPORT: CPT | Performed by: INTERNAL MEDICINE

## 2019-05-30 DIAGNOSIS — E11.9 TYPE 2 DIABETES MELLITUS WITHOUT COMPLICATION, WITHOUT LONG-TERM CURRENT USE OF INSULIN (HCC): ICD-10-CM

## 2019-06-06 ENCOUNTER — ANESTHESIA (OUTPATIENT)
Dept: PERIOP | Facility: HOSPITAL | Age: 59
End: 2019-06-06
Payer: COMMERCIAL

## 2019-06-06 ENCOUNTER — HOSPITAL ENCOUNTER (OUTPATIENT)
Facility: HOSPITAL | Age: 59
Setting detail: OUTPATIENT SURGERY
Discharge: HOME | End: 2019-06-06
Attending: THORACIC SURGERY (CARDIOTHORACIC VASCULAR SURGERY) | Admitting: THORACIC SURGERY (CARDIOTHORACIC VASCULAR SURGERY)
Payer: COMMERCIAL

## 2019-06-06 VITALS
BODY MASS INDEX: 32.95 KG/M2 | HEART RATE: 83 BPM | HEIGHT: 75 IN | RESPIRATION RATE: 16 BRPM | OXYGEN SATURATION: 92 % | TEMPERATURE: 98 F | WEIGHT: 265 LBS | DIASTOLIC BLOOD PRESSURE: 76 MMHG | SYSTOLIC BLOOD PRESSURE: 125 MMHG

## 2019-06-06 DIAGNOSIS — R59.0 MEDIASTINAL LYMPHADENOPATHY: ICD-10-CM

## 2019-06-06 LAB
GLUCOSE SERPL-MCNC: 197 MG/DL (ref 65–140)
GLUCOSE SERPL-MCNC: 253 MG/DL (ref 65–140)

## 2019-06-06 PROCEDURE — 88173 CYTOPATH EVAL FNA REPORT: CPT | Performed by: PATHOLOGY

## 2019-06-06 PROCEDURE — 88305 TISSUE EXAM BY PATHOLOGIST: CPT | Performed by: PATHOLOGY

## 2019-06-06 PROCEDURE — 88172 CYTP DX EVAL FNA 1ST EA SITE: CPT | Performed by: PATHOLOGY

## 2019-06-06 PROCEDURE — 31653 BRONCH EBUS SAMPLNG 3/> NODE: CPT | Performed by: THORACIC SURGERY (CARDIOTHORACIC VASCULAR SURGERY)

## 2019-06-06 PROCEDURE — 82948 REAGENT STRIP/BLOOD GLUCOSE: CPT

## 2019-06-06 RX ORDER — ONDANSETRON 2 MG/ML
INJECTION INTRAMUSCULAR; INTRAVENOUS AS NEEDED
Status: DISCONTINUED | OUTPATIENT
Start: 2019-06-06 | End: 2019-06-06 | Stop reason: SURG

## 2019-06-06 RX ORDER — SODIUM CHLORIDE, SODIUM LACTATE, POTASSIUM CHLORIDE, CALCIUM CHLORIDE 600; 310; 30; 20 MG/100ML; MG/100ML; MG/100ML; MG/100ML
100 INJECTION, SOLUTION INTRAVENOUS CONTINUOUS
Status: DISCONTINUED | OUTPATIENT
Start: 2019-06-06 | End: 2019-06-06 | Stop reason: HOSPADM

## 2019-06-06 RX ORDER — DEXAMETHASONE SODIUM PHOSPHATE 4 MG/ML
INJECTION, SOLUTION INTRA-ARTICULAR; INTRALESIONAL; INTRAMUSCULAR; INTRAVENOUS; SOFT TISSUE AS NEEDED
Status: DISCONTINUED | OUTPATIENT
Start: 2019-06-06 | End: 2019-06-06 | Stop reason: SURG

## 2019-06-06 RX ORDER — LIDOCAINE HYDROCHLORIDE 20 MG/ML
INJECTION, SOLUTION INFILTRATION; PERINEURAL AS NEEDED
Status: DISCONTINUED | OUTPATIENT
Start: 2019-06-06 | End: 2019-06-06 | Stop reason: HOSPADM

## 2019-06-06 RX ORDER — CEFAZOLIN SODIUM 2 G/50ML
2000 SOLUTION INTRAVENOUS ONCE
Status: DISCONTINUED | OUTPATIENT
Start: 2019-06-06 | End: 2019-06-06 | Stop reason: HOSPADM

## 2019-06-06 RX ORDER — PROPOFOL 10 MG/ML
INJECTION, EMULSION INTRAVENOUS AS NEEDED
Status: DISCONTINUED | OUTPATIENT
Start: 2019-06-06 | End: 2019-06-06 | Stop reason: SURG

## 2019-06-06 RX ORDER — FENTANYL CITRATE/PF 50 MCG/ML
25 SYRINGE (ML) INJECTION
Status: DISCONTINUED | OUTPATIENT
Start: 2019-06-06 | End: 2019-06-06 | Stop reason: HOSPADM

## 2019-06-06 RX ORDER — PROPOFOL 10 MG/ML
INJECTION, EMULSION INTRAVENOUS CONTINUOUS PRN
Status: DISCONTINUED | OUTPATIENT
Start: 2019-06-06 | End: 2019-06-06 | Stop reason: SURG

## 2019-06-06 RX ORDER — FENTANYL CITRATE 50 UG/ML
INJECTION, SOLUTION INTRAMUSCULAR; INTRAVENOUS AS NEEDED
Status: DISCONTINUED | OUTPATIENT
Start: 2019-06-06 | End: 2019-06-06 | Stop reason: SURG

## 2019-06-06 RX ORDER — SODIUM CHLORIDE, SODIUM LACTATE, POTASSIUM CHLORIDE, CALCIUM CHLORIDE 600; 310; 30; 20 MG/100ML; MG/100ML; MG/100ML; MG/100ML
125 INJECTION, SOLUTION INTRAVENOUS CONTINUOUS
Status: DISCONTINUED | OUTPATIENT
Start: 2019-06-06 | End: 2019-06-06 | Stop reason: HOSPADM

## 2019-06-06 RX ORDER — MIDAZOLAM HYDROCHLORIDE 1 MG/ML
INJECTION INTRAMUSCULAR; INTRAVENOUS AS NEEDED
Status: DISCONTINUED | OUTPATIENT
Start: 2019-06-06 | End: 2019-06-06 | Stop reason: SURG

## 2019-06-06 RX ORDER — CEFAZOLIN SODIUM 1 G/50ML
1000 SOLUTION INTRAVENOUS ONCE
Status: DISCONTINUED | OUTPATIENT
Start: 2019-06-06 | End: 2019-06-06 | Stop reason: HOSPADM

## 2019-06-06 RX ADMIN — ONDANSETRON 4 MG: 2 INJECTION INTRAMUSCULAR; INTRAVENOUS at 07:58

## 2019-06-06 RX ADMIN — MIDAZOLAM 2 MG: 1 INJECTION INTRAMUSCULAR; INTRAVENOUS at 07:52

## 2019-06-06 RX ADMIN — FENTANYL CITRATE 50 MCG: 50 INJECTION, SOLUTION INTRAMUSCULAR; INTRAVENOUS at 07:59

## 2019-06-06 RX ADMIN — PROPOFOL 200 MG: 10 INJECTION, EMULSION INTRAVENOUS at 07:59

## 2019-06-06 RX ADMIN — INSULIN HUMAN 5 UNITS: 100 INJECTION, SOLUTION PARENTERAL at 08:08

## 2019-06-06 RX ADMIN — PROPOFOL 120 MCG/KG/MIN: 10 INJECTION, EMULSION INTRAVENOUS at 08:00

## 2019-06-06 RX ADMIN — LIDOCAINE HYDROCHLORIDE 50 MG: 20 INJECTION, SOLUTION INTRAVENOUS at 07:59

## 2019-06-06 RX ADMIN — DEXAMETHASONE SODIUM PHOSPHATE 5 MG: 4 INJECTION, SOLUTION INTRAMUSCULAR; INTRAVENOUS at 07:59

## 2019-06-06 RX ADMIN — FENTANYL CITRATE 50 MCG: 50 INJECTION, SOLUTION INTRAMUSCULAR; INTRAVENOUS at 08:06

## 2019-06-06 RX ADMIN — SODIUM CHLORIDE, SODIUM LACTATE, POTASSIUM CHLORIDE, AND CALCIUM CHLORIDE: .6; .31; .03; .02 INJECTION, SOLUTION INTRAVENOUS at 07:16

## 2019-06-06 RX ADMIN — PROPOFOL 50 MG: 10 INJECTION, EMULSION INTRAVENOUS at 08:11

## 2019-06-12 ENCOUNTER — TELEPHONE (OUTPATIENT)
Dept: CARDIAC SURGERY | Facility: CLINIC | Age: 59
End: 2019-06-12

## 2019-06-18 ENCOUNTER — OFFICE VISIT (OUTPATIENT)
Dept: CARDIAC SURGERY | Facility: CLINIC | Age: 59
End: 2019-06-18
Payer: COMMERCIAL

## 2019-06-18 VITALS
OXYGEN SATURATION: 95 % | SYSTOLIC BLOOD PRESSURE: 137 MMHG | HEIGHT: 75 IN | HEART RATE: 93 BPM | DIASTOLIC BLOOD PRESSURE: 66 MMHG | BODY MASS INDEX: 32.55 KG/M2 | TEMPERATURE: 96.6 F | WEIGHT: 261.8 LBS

## 2019-06-18 DIAGNOSIS — D86.1 SARCOIDOSIS OF LYMPH NODES: Primary | ICD-10-CM

## 2019-06-18 PROCEDURE — 99213 OFFICE O/P EST LOW 20 MIN: CPT | Performed by: THORACIC SURGERY (CARDIOTHORACIC VASCULAR SURGERY)

## 2019-07-02 ENCOUNTER — OFFICE VISIT (OUTPATIENT)
Dept: NEUROLOGY | Facility: CLINIC | Age: 59
End: 2019-07-02
Payer: COMMERCIAL

## 2019-07-02 VITALS
HEART RATE: 105 BPM | WEIGHT: 272 LBS | SYSTOLIC BLOOD PRESSURE: 140 MMHG | BODY MASS INDEX: 33.82 KG/M2 | HEIGHT: 75 IN | DIASTOLIC BLOOD PRESSURE: 70 MMHG

## 2019-07-02 DIAGNOSIS — G60.8 POLYNEUROPATHY, PERIPHERAL SENSORIMOTOR AXONAL: Primary | ICD-10-CM

## 2019-07-02 DIAGNOSIS — R59.9 GRANULOMATOUS ADENOPATHY: ICD-10-CM

## 2019-07-02 PROCEDURE — 99215 OFFICE O/P EST HI 40 MIN: CPT | Performed by: PSYCHIATRY & NEUROLOGY

## 2019-07-02 RX ORDER — MAG HYDROX/ALUMINUM HYD/SIMETH 400-400-40
1 SUSPENSION, ORAL (FINAL DOSE FORM) ORAL DAILY
Status: ON HOLD | COMMUNITY
End: 2020-02-06 | Stop reason: CLARIF

## 2019-07-02 NOTE — PROGRESS NOTES
[facility]  PATIENT:  Panda Kidd  MRN:  [id number]  :  1960  DATE OF SERVICE:  2019    Assessment/Plan:     Problem List Items Addressed This Visit        Nervous and Auditory    Polyneuropathy, peripheral sensorimotor axonal - Primary    Relevant Orders    Quantiferon TB Gold Plus      Other Visit Diagnoses     Granulomatous adenopathy        Relevant Orders    Quantiferon TB Gold Plus    Ambulatory referral to Rheumatology           Mr Reinier Harrington has presented for follow up on abnormal brain MRI findings and sensory dysfunction in his lower extremities  Patient had improving headaches at this point  Patient had completed CT chest with mild hilar adenopathy progression was noted with biopsy was consistent with granulomatous disease, as patient had numerous histiocytes with multinucleated and epithelioid polymorphous lymphocytes  We discussed multiple infectious and non-infectious /neoplastic medical conditions may present as granulomatous lesions in the lungs, including TB and quantiferon gold test was offered  Patient was advised to follow with Rheumatology team and pulmonologist for further evaluation and completing work up, if clinically indicated  Patient may require ID team involvement if infectious causes of granulomatous process of concern  I offered muscle and nerve biopsy for sarcoidosis, as patient has large fiber neuropathy with axonal and demyelinating processes noted, which potentially can be biopsied  We personally reviewed brain Imaging one more time - no concern for granulomatous process in his brain parenchyma  Patient is to follow on as needed bases  Subjective: sensory dysfunction in lower extremities and joint pain  HPI History of Present Illness    Mr Reinier Harrington is a 59 yo male who was referred to [place] [id number] for evaluation of abnormal brain MRI   Patient has a history of Trigeminal autonomic cephalgia, cervicalgia, chronic daily headaches, hypothyroidism, BPH, type 2 diabetes, erectile dysfunction, florid cirrhosis with chronic hepatitis C, bilateral hilar adenopathy syndrome/mediastinal adenopathy, thrombocytopenia  Prior evaluation was consistent with unremarkable  brain MRI, with no signs of acute or remote ischemic or hemorrhagic changes, no demyelination or neoplasm  CTA head and neck was also unremarkable  EMG/NCS was consistent with large fiber sensory-motor polyneuropathy with mixed axonal and demyelinating features  X-ray L-spine : Degenerative spondylosis L4-5 and L5-S1  Grade 1 anterolisthesis L4-5  CT chest was noted again - progression of mediastinal lymphadenopathy has been noted, with concern for sarcoidosis vs lymphoma  Patient had lymph node biopsy on 6/6/2019 - based of pathology slide, granulomatous disease is not excluded  Blood work - LYNN is normal, negative paraneoplastic     The following portions of the patient's history were reviewed and updated as appropriate:   He  has a past medical history of BPH (benign prostatic hyperplasia), Diabetes mellitus ([address] ), Erectile dysfunction, GERD (gastroesophageal reflux disease), Hematuria, Liver disease, Mediastinal adenopathy, and Vitamin D deficiency    He   Patient Active Problem List    Diagnosis Date Noted    Sarcoidosis of lymph nodes 06/18/2019    Mediastinal lymphadenopathy 05/21/2019    Polyneuropathy, peripheral sensorimotor axonal     Numbness and tingling of both legs 05/09/2019    Right low back pain 05/09/2019    Neuralgia and neuritis, unspecified 03/17/2019    Chronic nonintractable headache 12/17/2018    Chronic hepatitis C without hepatic coma ([address] ) 12/13/2018    Unspecified cirrhosis of liver ([address] ) 12/13/2018    Hypothyroid 08/06/2018    Screening for prostate cancer 04/18/2018    Type 2 diabetes mellitus without complication, without long-term current use of insulin ([address] ) 01/25/2018    Benign prostatic hyperplasia with urinary frequency 01/25/2018     He  has a past surgical history that includes Shoulder arthroscopy (Right); pr transurethral elec-surg prostatectom (N/A, 9/13/2017); Liver biopsy; Colonoscopy; Dental surgery; Cystoscopy; pr bronchoscopy needle bx trachea main stem&/bron (N/A, 6/6/2019); and pr bronchoscopy,diagnostic (N/A, 6/6/2019)  His family history includes Cancer in his maternal grandmother; Stroke in his father; Valvular heart disease in his mother  He  reports that he quit smoking about 9 years ago  His smoking use included cigarettes  He has a 1 00 pack-year smoking history  He has never used smokeless tobacco  He reports that he does not drink alcohol or use drugs  Current Outpatient Medications   Medication Sig Dispense Refill    Cholecalciferol (VITAMIN D3) 5000 units CAPS Take by mouth      famotidine (PEPCID) 40 MG tablet Take 40 mg by mouth daily       levothyroxine 50 mcg tablet TAKE 1 TABLET BY MOUTH EVERY DAY 90 tablet 1    liraglutide (VICTOZA) injection Inject 0 2 mL (1 2 mg total) under the skin daily 2 pen 4    metFORMIN (GLUCOPHAGE) 500 mg tablet TAKE 1 TABLET BY MOUTH TWICE A DAY WITH MEALS 180 tablet 1    NOVOTWIST 32G X 5 MM MISC        No current facility-administered medications for this visit        Current Outpatient Medications on File Prior to Visit   Medication Sig    Cholecalciferol (VITAMIN D3) 5000 units CAPS Take by mouth    famotidine (PEPCID) 40 MG tablet Take 40 mg by mouth daily     levothyroxine 50 mcg tablet TAKE 1 TABLET BY MOUTH EVERY DAY    liraglutide (VICTOZA) injection Inject 0 2 mL (1 2 mg total) under the skin daily    metFORMIN (GLUCOPHAGE) 500 mg tablet TAKE 1 TABLET BY MOUTH TWICE A DAY WITH MEALS    NOVOTWIST 32G X 5 MM MISC     [DISCONTINUED] ergocalciferol (VITAMIN D2) 50,000 units Take 1 capsule (50,000 Units total) by mouth once a week For 6 weeks (Patient not taking: Reported on 6/18/2019)     No current facility-administered medications on file prior to visit  He is allergic to no active allergies            Objective:    Blood pressure 140/70, pulse 105, height 6' 3" (1 905 m), weight 123 kg (272 lb)  Physical Exam/Neurological Exam  CONSTITUTIONAL: NAD, pleasant  NECK: supple, no lymphadenopathy, no thyromegaly, no JVD  CARDIOVASCULAR: RRR, normal S1S2, no murmurs, no rubs  RESP: clear to auscultation bilaterally, no wheezes/rhonchi/rales  ABDOMEN: soft, non tender, non distended  SKIN: no rash or skin lesions  EXTREMITIES: no edema, pulses 2+bilaterally  PSYCH: appropriate mood and affect  NEUROLOGIC COMPREHENSIVE EXAM: Patient is oriented to person, place and time, NAD; appropriate affect  CN II, III, IV, V, VI, VII,VIII,IX,X,XI-XII intact with EOMI, PERRLA, OKN intact, VF grossly intact, fundi poorly visualized secondary to pupillary constriction; symmetric face noted  Motor: 5/5 UE/LE bilateral symmetric; Sensory: intact to light touch and pinprick bilaterally; decreased vibration sensation feet bilaterally; Coordination within normal limits on FTN and BERE testing; DTR: 1/4 through, no Babinski, no clonus  Tandem gait is abnormal  Romberg: negative  ROS:  12 points of review of system was reviewed with the patient and was unremarkable with exception: see HPI  Review of Systems   Constitutional: Negative  HENT: Negative  Eyes: Negative  Respiratory: Negative  Cardiovascular: Negative  Gastrointestinal: Negative  Endocrine: Negative  Genitourinary: Negative  Musculoskeletal: Negative  Skin: Negative  Allergic/Immunologic: Negative  Neurological: Positive for numbness  Hematological: Negative  Psychiatric/Behavioral: Negative

## 2019-07-05 ENCOUNTER — TELEPHONE (OUTPATIENT)
Dept: FAMILY MEDICINE CLINIC | Facility: CLINIC | Age: 59
End: 2019-07-05

## 2019-07-05 ENCOUNTER — APPOINTMENT (OUTPATIENT)
Dept: LAB | Facility: CLINIC | Age: 59
End: 2019-07-05
Payer: COMMERCIAL

## 2019-07-05 DIAGNOSIS — R59.9 GRANULOMATOUS ADENOPATHY: ICD-10-CM

## 2019-07-05 DIAGNOSIS — G60.8 POLYNEUROPATHY, PERIPHERAL SENSORIMOTOR AXONAL: ICD-10-CM

## 2019-07-05 PROCEDURE — 36415 COLL VENOUS BLD VENIPUNCTURE: CPT

## 2019-07-05 PROCEDURE — 86480 TB TEST CELL IMMUN MEASURE: CPT

## 2019-07-08 LAB
GAMMA INTERFERON BACKGROUND BLD IA-ACNC: 0.04 IU/ML
M TB IFN-G BLD-IMP: NEGATIVE
M TB IFN-G CD4+ BCKGRND COR BLD-ACNC: 0 IU/ML
M TB IFN-G CD4+ BCKGRND COR BLD-ACNC: 0 IU/ML
MITOGEN IGNF BCKGRD COR BLD-ACNC: >10 IU/ML

## 2019-07-10 ENCOUNTER — TRANSCRIBE ORDERS (OUTPATIENT)
Dept: LAB | Facility: CLINIC | Age: 59
End: 2019-07-10

## 2019-07-10 ENCOUNTER — OFFICE VISIT (OUTPATIENT)
Dept: RHEUMATOLOGY | Facility: CLINIC | Age: 59
End: 2019-07-10
Payer: COMMERCIAL

## 2019-07-10 ENCOUNTER — APPOINTMENT (OUTPATIENT)
Dept: LAB | Facility: CLINIC | Age: 59
End: 2019-07-10
Payer: COMMERCIAL

## 2019-07-10 VITALS
SYSTOLIC BLOOD PRESSURE: 128 MMHG | DIASTOLIC BLOOD PRESSURE: 83 MMHG | WEIGHT: 263 LBS | HEIGHT: 75 IN | HEART RATE: 89 BPM | BODY MASS INDEX: 32.7 KG/M2

## 2019-07-10 DIAGNOSIS — R06.00 DYSPNEA ON EXERTION: ICD-10-CM

## 2019-07-10 DIAGNOSIS — G60.8 POLYNEUROPATHY, PERIPHERAL SENSORIMOTOR AXONAL: Primary | ICD-10-CM

## 2019-07-10 DIAGNOSIS — Z79.52 CURRENT CHRONIC USE OF SYSTEMIC STEROIDS: ICD-10-CM

## 2019-07-10 DIAGNOSIS — D86.9 SARCOIDOSIS: ICD-10-CM

## 2019-07-10 DIAGNOSIS — G60.8 POLYNEUROPATHY, PERIPHERAL SENSORIMOTOR AXONAL: ICD-10-CM

## 2019-07-10 DIAGNOSIS — D86.1 SARCOIDOSIS OF LYMPH NODES: ICD-10-CM

## 2019-07-10 DIAGNOSIS — G50.8 TRIGEMINAL NEURITIS: ICD-10-CM

## 2019-07-10 DIAGNOSIS — R05.3 PERSISTENT DRY COUGH: ICD-10-CM

## 2019-07-10 DIAGNOSIS — E11.9 TYPE 2 DIABETES MELLITUS WITHOUT COMPLICATION, WITHOUT LONG-TERM CURRENT USE OF INSULIN (HCC): ICD-10-CM

## 2019-07-10 DIAGNOSIS — K74.69 OTHER CIRRHOSIS OF LIVER (HCC): ICD-10-CM

## 2019-07-10 DIAGNOSIS — Z86.19 HISTORY OF HEPATITIS C VIRUS INFECTION: ICD-10-CM

## 2019-07-10 LAB
25(OH)D3 SERPL-MCNC: 28.4 NG/ML (ref 30–100)
ALBUMIN SERPL BCP-MCNC: 4.1 G/DL (ref 3.5–5)
ALP SERPL-CCNC: 85 U/L (ref 46–116)
ALT SERPL W P-5'-P-CCNC: 30 U/L (ref 12–78)
ANION GAP SERPL CALCULATED.3IONS-SCNC: 11 MMOL/L (ref 4–13)
AST SERPL W P-5'-P-CCNC: 17 U/L (ref 5–45)
BACTERIA UR QL AUTO: ABNORMAL /HPF
BASOPHILS # BLD AUTO: 0.07 THOUSANDS/ΜL (ref 0–0.1)
BASOPHILS NFR BLD AUTO: 1 % (ref 0–1)
BILIRUB DIRECT SERPL-MCNC: 0.19 MG/DL (ref 0–0.2)
BILIRUB SERPL-MCNC: 0.7 MG/DL (ref 0.2–1)
BILIRUB UR QL STRIP: NEGATIVE
BUN SERPL-MCNC: 15 MG/DL (ref 5–25)
CA-I BLD-SCNC: 1.11 MMOL/L (ref 1.12–1.32)
CALCIUM SERPL-MCNC: 8.9 MG/DL (ref 8.3–10.1)
CHLORIDE SERPL-SCNC: 98 MMOL/L (ref 100–108)
CK SERPL-CCNC: 113 U/L (ref 39–308)
CLARITY UR: CLEAR
CO2 SERPL-SCNC: 25 MMOL/L (ref 21–32)
COLOR UR: YELLOW
CREAT SERPL-MCNC: 1.23 MG/DL (ref 0.6–1.3)
CREAT UR-MCNC: 99.8 MG/DL
CRP SERPL QL: <3 MG/L
EOSINOPHIL # BLD AUTO: 0.13 THOUSAND/ΜL (ref 0–0.61)
EOSINOPHIL NFR BLD AUTO: 2 % (ref 0–6)
ERYTHROCYTE [DISTWIDTH] IN BLOOD BY AUTOMATED COUNT: 12.4 % (ref 11.6–15.1)
ERYTHROCYTE [SEDIMENTATION RATE] IN BLOOD: 14 MM/HOUR (ref 0–10)
GFR SERPL CREATININE-BSD FRML MDRD: 63 ML/MIN/1.73SQ M
GLUCOSE SERPL-MCNC: 362 MG/DL (ref 65–140)
GLUCOSE UR STRIP-MCNC: ABNORMAL MG/DL
HCT VFR BLD AUTO: 47.6 % (ref 36.5–49.3)
HGB BLD-MCNC: 16.1 G/DL (ref 12–17)
HGB UR QL STRIP.AUTO: NEGATIVE
HIV 1+2 AB+HIV1 P24 AG SERPL QL IA: NORMAL
HIV1 P24 AG SER QL: NORMAL
IMM GRANULOCYTES # BLD AUTO: 0.08 THOUSAND/UL (ref 0–0.2)
IMM GRANULOCYTES NFR BLD AUTO: 1 % (ref 0–2)
KETONES UR STRIP-MCNC: NEGATIVE MG/DL
LEUKOCYTE ESTERASE UR QL STRIP: NEGATIVE
LYMPHOCYTES # BLD AUTO: 2.08 THOUSANDS/ΜL (ref 0.6–4.47)
LYMPHOCYTES NFR BLD AUTO: 32 % (ref 14–44)
MCH RBC QN AUTO: 31.1 PG (ref 26.8–34.3)
MCHC RBC AUTO-ENTMCNC: 33.8 G/DL (ref 31.4–37.4)
MCV RBC AUTO: 92 FL (ref 82–98)
MONOCYTES # BLD AUTO: 0.57 THOUSAND/ΜL (ref 0.17–1.22)
MONOCYTES NFR BLD AUTO: 9 % (ref 4–12)
NEUTROPHILS # BLD AUTO: 3.49 THOUSANDS/ΜL (ref 1.85–7.62)
NEUTS SEG NFR BLD AUTO: 55 % (ref 43–75)
NITRITE UR QL STRIP: NEGATIVE
NON-SQ EPI CELLS URNS QL MICRO: ABNORMAL /HPF
NRBC BLD AUTO-RTO: 0 /100 WBCS
PH UR STRIP.AUTO: 5.5 [PH]
PLATELET # BLD AUTO: 235 THOUSANDS/UL (ref 149–390)
PMV BLD AUTO: 9.6 FL (ref 8.9–12.7)
POTASSIUM SERPL-SCNC: 3.8 MMOL/L (ref 3.5–5.3)
PROT SERPL-MCNC: 8.2 G/DL (ref 6.4–8.2)
PROT UR STRIP-MCNC: NEGATIVE MG/DL
PROT UR-MCNC: <6 MG/DL
PROT/CREAT UR: <0.06 MG/G{CREAT} (ref 0–0.1)
RBC # BLD AUTO: 5.17 MILLION/UL (ref 3.88–5.62)
RBC #/AREA URNS AUTO: ABNORMAL /HPF
SODIUM SERPL-SCNC: 134 MMOL/L (ref 136–145)
SP GR UR STRIP.AUTO: 1.01 (ref 1–1.03)
UROBILINOGEN UR QL STRIP.AUTO: 0.2 E.U./DL
WBC # BLD AUTO: 6.42 THOUSAND/UL (ref 4.31–10.16)
WBC #/AREA URNS AUTO: ABNORMAL /HPF

## 2019-07-10 PROCEDURE — 36415 COLL VENOUS BLD VENIPUNCTURE: CPT | Performed by: INTERNAL MEDICINE

## 2019-07-10 PROCEDURE — 82306 VITAMIN D 25 HYDROXY: CPT | Performed by: INTERNAL MEDICINE

## 2019-07-10 PROCEDURE — 85025 COMPLETE CBC W/AUTO DIFF WBC: CPT | Performed by: INTERNAL MEDICINE

## 2019-07-10 PROCEDURE — 99205 OFFICE O/P NEW HI 60 MIN: CPT | Performed by: INTERNAL MEDICINE

## 2019-07-10 PROCEDURE — 82550 ASSAY OF CK (CPK): CPT | Performed by: INTERNAL MEDICINE

## 2019-07-10 PROCEDURE — 86430 RHEUMATOID FACTOR TEST QUAL: CPT

## 2019-07-10 PROCEDURE — 86704 HEP B CORE ANTIBODY TOTAL: CPT | Performed by: INTERNAL MEDICINE

## 2019-07-10 PROCEDURE — 80076 HEPATIC FUNCTION PANEL: CPT | Performed by: INTERNAL MEDICINE

## 2019-07-10 PROCEDURE — 84156 ASSAY OF PROTEIN URINE: CPT | Performed by: INTERNAL MEDICINE

## 2019-07-10 PROCEDURE — 80048 BASIC METABOLIC PNL TOTAL CA: CPT

## 2019-07-10 PROCEDURE — 85652 RBC SED RATE AUTOMATED: CPT | Performed by: INTERNAL MEDICINE

## 2019-07-10 PROCEDURE — 87340 HEPATITIS B SURFACE AG IA: CPT | Performed by: INTERNAL MEDICINE

## 2019-07-10 PROCEDURE — 87806 HIV AG W/HIV1&2 ANTB W/OPTIC: CPT

## 2019-07-10 PROCEDURE — 87522 HEPATITIS C REVRS TRNSCRPJ: CPT

## 2019-07-10 PROCEDURE — 86706 HEP B SURFACE ANTIBODY: CPT | Performed by: INTERNAL MEDICINE

## 2019-07-10 PROCEDURE — 81001 URINALYSIS AUTO W/SCOPE: CPT | Performed by: INTERNAL MEDICINE

## 2019-07-10 PROCEDURE — 86255 FLUORESCENT ANTIBODY SCREEN: CPT

## 2019-07-10 PROCEDURE — 82570 ASSAY OF URINE CREATININE: CPT | Performed by: INTERNAL MEDICINE

## 2019-07-10 PROCEDURE — 82164 ANGIOTENSIN I ENZYME TEST: CPT

## 2019-07-10 PROCEDURE — 86140 C-REACTIVE PROTEIN: CPT | Performed by: INTERNAL MEDICINE

## 2019-07-10 PROCEDURE — 82330 ASSAY OF CALCIUM: CPT | Performed by: INTERNAL MEDICINE

## 2019-07-10 RX ORDER — PREDNISONE 20 MG/1
60 TABLET ORAL DAILY
Qty: 90 TABLET | Refills: 1 | Status: SHIPPED | OUTPATIENT
Start: 2019-07-10 | End: 2019-08-05 | Stop reason: SDUPTHER

## 2019-07-10 NOTE — LETTER
July 10, 2019     Dillon Marte MD  [address]    Patient: Guido Cervantes   YOB: 1960   Date of Visit: 7/10/2019       Dear Dr Zena Hoyos:    Thank you for referring Guido Cervantes to me for evaluation  Below are my notes for this consultation  If you have questions, please do not hesitate to call me  I look forward to following your patient along with you  Sincerely,    Jacobo Moody DO        CC: MD Jacobo Jalloh DO  7/10/2019 10:34 AM  Sign at close encounter  Assessment and Plan:   Patient is a pleasant 50-year-old male with non insulin-dependent type 2 diabetes, hypothyroidism, history of liver biopsy with evidence of cirrhosis due to hepatitis C status post cure with [facility] Vogel, who presents for rheumatology evaluation due to concern for sarcoidosis  Extensive history, labs, imaging, and other testing was reviewed today and with the patient in the office  He has evidence of mediastinal and hilar adenopathy on CT dating back to 2012, which appears to have been stable over time with stability noted in 2016, and only mild interval progression on most recent CT in May of this year  This ultimately led to lymph node biopsy which pathology noted that granulomatous lesions could not be ruled out but did not definitively note granulomas  There were multiple inflammatory cells and nonspecific findings but notably the specimens were negative for malignancy and so granulomatous adenopathy was the most probable diagnosis  Based off the typical imaging features as well this is presumably sarcoidosis  He did have negative testing for TB and does appear to have obvious risk factors for other infectious causes of granulomatous inflammation such as coccidioidomycosis, plus has had such stability over 7+ years on imaging which makes infection very unlikely   He does appear to be having respiratory symptoms at this time as he has progressive dyspnea on exertion with a dry cough over the past few months and generally feels systemic fatigue  In addition he is having severe right sided headaches in a trigeminal distribution that have been intermittent and are associated with right-sided eye redness, eyelid and facial drooping  He also has evidence of a 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 noted on EMG of his bilateral lower extremities, and is also symptomatic from this  Another etiology for this neuropathy has not been identified and does raise concern for sarcoidosis involving the peripheral nervous system  I also wonder if his right-sided headaches could be related to a trigeminal type neuralgia related to sarcoidosis  We discussed briefly that 1 option would be to biopsy 1 of the peripheral nerves to confirm suspicion of PNS involvement, although I do not think it would necessarily  at this time since he is symptomatic from a pulmonary standpoint that requires treatment and so we would alternatively just observe for improvement in his neurologic symptoms  He was agreeable with this approach  In terms of possible other systemic involvement we discussed that we do need to screen for a few other things  I asked him to get an updated ophthalmological exam as sarcoidosis can cause asymptomatic posterior uveitis which is typically only appreciated on eye exam   He is also having the right sided redness in his eyes with the associated headaches  He was agreeable to get an updated exam within the next 1-2 months  He does have history of benign PVCs and has palpitations along with the progressive dyspnea on exertion and a low voltage EKG noted  We will do an echocardiogram to evaluate for any evidence of a cardiomyopathy that would possibly suggest cardiac sarcoidosis    If he has an abnormal echocardiogram he likely will need a cardiology referral   I did provide him with the pulmonology referral as their assistance would be appreciated with management and disease monitoring recommendations particularly of his lymph node and any lung involvement  I did order a baseline complete pulmonary function study as he has never had one  Since he is symptomatic with respiratory complaints with the progressive dyspnea and dry cough, and also concern of suspicion for at least peripheral nervous system involvement, possible cranial nerve involvement with the headaches and facial drooping, along with investigations for more systemic involvement in his eyes and heart, he was agreeable to start on steroid therapy at this time which I do think is appropriate based off the respiratory symptoms alone  We will start with about 0 5 mg/kg which for him would be 60 mg daily of prednisone  I did discuss with him the many side effects from systemic steroids, particularly for him the hyperglycemia that he will experience and I asked him to follow-up with his family doctor regarding this as he may require insulin at least temporarily while on the high-dose steroids  He was agreeable with this  He will stay on this dose for the next 4 weeks and I will see him back at that time to see how he is doing  We will determine whether not to taper from there depending on his symptoms and additional workup  We will not initiate any DMARD therapy at this time and see how he does with the steroids  If he does require a DMARD for steroid sparing it will be challenging given his liver cirrhosis which precludes the use of both methotrexate and Imuran  In that case we would be considering other options such as CellCept or Remicade  We will discuss that in the future if needed    At that point I will likely need to address other issues related to the chronic systemic steroid use including the possible need for Pneumocystis prophylaxis since he will be on a moderate to high dose steroids over the next few months, along with possible need of osteoporosis prophylaxis  We will address these at his future visit  I also gave him some additional lab work to have done before he sees me again  Plan:  Diagnoses and all orders for this visit:    Polyneuropathy, peripheral sensorimotor axonal    Trigeminal neuritis    Other cirrhosis of liver (HCC)    Sarcoidosis of lymph nodes  -     Angiotensin converting enzyme; Future  -     CBC and differential  -     CK  -     Hepatic function panel  -     Basic metabolic panel; Future  -     Urinalysis with microscopic  -     Protein / creatinine ratio, urine  -     Sedimentation rate, automated  -     C-reactive protein  -     RF Screen w/ Reflex to Titer; Future  -     Hepatitis C RNA, quantitative, PCR; Future  -     Hepatitis B core antibody, total  -     Hepatitis B surface antigen  -     Hepatitis B surface antibody  -     Calcium, ionized  -     Vitamin D 25 hydroxy  -     Complete pulmonary function test; Future  -     Echo complete with contrast if indicated; Future  -     Ambulatory referral to Pulmonology; Future  -     HIV 1/2 Antigen/Antibody,Fourth Generation W/Rfl (Historical)  -     predniSONE 20 mg tablet; Take 3 tablets (60 mg total) by mouth daily    Sarcoidosis  -     Angiotensin converting enzyme; Future  -     CBC and differential  -     CK  -     Hepatic function panel  -     Basic metabolic panel; Future  -     Urinalysis with microscopic  -     Protein / creatinine ratio, urine  -     Sedimentation rate, automated  -     C-reactive protein  -     RF Screen w/ Reflex to Titer; Future  -     Hepatitis C RNA, quantitative, PCR; Future  -     Hepatitis B core antibody, total  -     Hepatitis B surface antigen  -     Hepatitis B surface antibody  -     Calcium, ionized  -     Vitamin D 25 hydroxy  -     Complete pulmonary function test; Future  -     Echo complete with contrast if indicated; Future  -     Ambulatory referral to Pulmonology;  Future  -     HIV 1/2 Antigen/Antibody,Fourth Generation W/Rfl (Historical)  -     predniSONE 20 mg tablet; Take 3 tablets (60 mg total) by mouth daily    Dyspnea on exertion  -     Angiotensin converting enzyme; Future  -     CBC and differential  -     CK  -     Hepatic function panel  -     Basic metabolic panel; Future  -     Urinalysis with microscopic  -     Protein / creatinine ratio, urine  -     Sedimentation rate, automated  -     C-reactive protein  -     RF Screen w/ Reflex to Titer; Future  -     Hepatitis C RNA, quantitative, PCR; Future  -     Hepatitis B core antibody, total  -     Hepatitis B surface antigen  -     Hepatitis B surface antibody  -     Calcium, ionized  -     Vitamin D 25 hydroxy  -     Complete pulmonary function test; Future  -     Echo complete with contrast if indicated; Future  -     Ambulatory referral to Pulmonology; Future  -     HIV 1/2 Antigen/Antibody,Fourth Generation W/Rfl (Historical)  -     predniSONE 20 mg tablet; Take 3 tablets (60 mg total) by mouth daily    Persistent dry cough  -     Angiotensin converting enzyme; Future  -     CBC and differential  -     CK  -     Hepatic function panel  -     Basic metabolic panel; Future  -     Urinalysis with microscopic  -     Protein / creatinine ratio, urine  -     Sedimentation rate, automated  -     C-reactive protein  -     RF Screen w/ Reflex to Titer; Future  -     Hepatitis C RNA, quantitative, PCR; Future  -     Hepatitis B core antibody, total  -     Hepatitis B surface antigen  -     Hepatitis B surface antibody  -     Calcium, ionized  -     Vitamin D 25 hydroxy  -     Complete pulmonary function test; Future  -     Echo complete with contrast if indicated; Future  -     Ambulatory referral to Pulmonology; Future  -     HIV 1/2 Antigen/Antibody,Fourth Generation W/Rfl (Historical)  -     predniSONE 20 mg tablet;  Take 3 tablets (60 mg total) by mouth daily    Type 2 diabetes mellitus without complication, without long-term current use of insulin ([facility] [address] )    History of hepatitis C virus infection    Current chronic use of systemic steroids        Follow-up plan: 4 weeks       HPI  Patricia Doherty is a 60 y o   male with hypothyroidism, type 2 diabetes, BPH, history of hepatitis-C status post cure with Harvoni (2015), history of liver biopsy, liver cirrhosis, vitamin-D deficiency, GERD, history of erosive gastritis and duodenal erosion (2018), history of trigeminal autonomic cephalgia, cervicalgia and chronic headaches who presents for rheumatology consult by request of Dr Haile Rubio for sarcoidosis  Chart review shows that patient has evidence of lymphadenopathy in the chest and abdomen dating back to CT scan reports in 2012  CT of the chest in 2016 showed stability of lymphadenopathy in the chest   Updated CT of the chest in May 2019 showed a mild interval increase in mediastinal adenopathy and biopsy was subsequently done  He is status post endobronchial ultrasound with lymph node biopsy on 06/06/2019  Biopsy of 3 lymph nodes appeared satisfactory for evaluation and showed numerous histiocytes, some multi nucleated and epitheliod, in the background of polymorphous lymphocytes and benign bronchial cells, cannot exclude granulomatous lesion  There was no evidence of malignancy in the pathology  Based off pathology and without evidence of malignancy, thoracic surgery felt this was most likely consistent with sarcoidosis and suggested pulmonology referral   Patient states he was aware of the swollen lymph nodes in the past but it was never recommended that he get additional evaluation or biopsy  He also was following recently with Neurology who initially evaluated him for an abnormal brain MRI  They also noted history of trigeminal autonomic cephalgia, cervicalgia and chronic daily headaches    MRI of his brain from February 2019 showed small scattered white matter hyperintensities on T2 and FLAIR imaging primarily within the frontal lobes, which may represent chronic microangiopathic change  Other considerations included chronic migraine headaches as a cause of these findings  CTA of the head showed no acute intracranial abnormality  It noted the nonspecific chronic mediastinal lymphadenopathy with stability in lymph nodes at that time  The headaches started in August 2018 and patient also had developed right upper shoulder burning and subsequent burning in the forearms, right inner thigh and anterior part of the right thigh  Neurology noted that he had pain and paresthesia in a T5-T6 distribution  They did not feel that he had evidence of a demyelinating process in the brain  Subsequent EMG of both legs was pursued which revealed large fiber neuropathy with axonal and demyelinating process noted and they discussed potential of a nerve biopsy  They did not feel there was a concern for granulomatous process in his brain parenchyma  Patient reports he has had headaches for 1 year and subsequently over several months developed the sensory symptoms of burning across right posterior neck/shoulder, right inner thigh, top of thigh and also across mid abdomen  Currently the sensory issues have seem to subsided and were coming and going randomly, but he continues to have very severe right-sided headaches  Again he was following with Neurology for this and he had tried gabapentin which caused weight gain and did not help his headaches and so he stopped this  He believes the other drug retry was Tegretol which she does feel helped him but ultimately he came off of this for unclear reasons  The headaches overall have reduced in frequency but when they come on they are very severe and are typically right sided, and he gets associated symptoms of drooping of the right eyelid and underneath his thigh with associated eye redness    He also feels that he has notice when he gets the headaches he will get an increase in the abnormal sensory issues with burning in a bandlike fashion around his abdomen along with the burning on his thighs  He had one very severe headache most recently on 4th of July also with the associated abdominal burning  His wife reports concern she has noticed with intermittent Balance and walking issues that happen randomly and are very intermittent  Increased dry coughing since 7/4/19, which she notices is provoked by trying to take in a normal breath and he feels that he takes more shallow breaths at this time because he does not want to provoke a cough  Increased dyspnea on exertion for past few months, winded with shopping and has to rest, very fatigued and winded, which is very unusual for him as he normally consistently exercises  He has not had PFTs, and has not yet been evaluated by pulmonology  He denies hemoptysis and has not noted any lower extremity edema  He reports that he does get palpitations and previously wore a Holter monitor over 10 years ago for this and recalls being told he had benign PVCs  He recently had an EKG about 6 weeks ago in preparation for his biopsy which showed a low voltage EKG but otherwise was normal   He has not had an echocardiogram   Stiff in the hips and legs in the morning which seems to improve with ambulation in the morning  He also reports persistent left knee pain but x-ray of the left knee was normal   He has not had any obvious joint swelling and does not have any prolonged morning stiffness in his joints  Having chronic lower back pain with pain down his left leg, progressive over years  He did have a lumbar x-ray which showed spondylosis and degenerative disc disease  He does have an ophthalmologist who he last saw about 9 months ago and she said he had early signs of glaucoma, but he denies knowledge of a history of uveitis or iritis  He does have issues with general decline in his vision but no acute vision loss    He does get the eye redness with drooping around his right eye when he gets the severe right-sided migraine-type headaches, but no significant episodes of severe eye pain with photosensitivity  He has not had any recent skin rashes and overall does not have any skin issues  He recalls about 30 years ago he had some sort of red patchy rash breakout on his lower extremities that was biopsied by Dermatology at that time and then ultimately resolved  This has not recurred  He also notably has a history of hepatitis C which she believes he got when he was in the [employer] due to needle sharing between patients  He did have shortness hepatitis C in 2015 with treatment but unfortunately does have evidence of liver cirrhosis which was appreciated on biopsy  He does not recall his GI doctor reporting any signs of sarcoid on his liver biopsy and we do not have the report of the biopsy  His GI doctor is retired and he is going to be establishing with a new doctor for monitoring off his cirrhosis  His liver function overall appears normal over the years  He denies any history of bone marrow or kidney issues  He was newly diagnosed with hypothyroidism about a year ago, around the time when he started having these headaches  He was diagnosed with diabetes in 2007  His recent A1c was 7% and he is not on any insulin at this time  He has not lost any weight recently, he chronically has night sweats but states it is been like this for his entire life  He feels very fatigued and tired all the time which is unusual for him as he is it very active normally  He has felt this way over the past several months and is taking 2 hour naps at home after work and then going to bed around 830 and still feeling exhausted  Denies photosensitivity, rashes, psoriasis, sicca symptoms, oral or nasal ulcers, alopecia, Raynaud's, h/o pericarditis or pleurisy, h/o blood clots  Review of Systems  Review of Systems   Constitutional: Positive for diaphoresis and fatigue   Negative for chills, fever and unexpected weight change  HENT: Negative for mouth sores and trouble swallowing  Eyes: Negative for pain and visual disturbance  Respiratory: Positive for cough and shortness of breath  Negative for chest tightness  Cardiovascular: Positive for palpitations  Negative for chest pain and leg swelling  Gastrointestinal: Negative for abdominal pain, blood in stool, constipation, diarrhea and nausea  Genitourinary: Negative for hematuria  Musculoskeletal: Positive for arthralgias and back pain  Negative for joint swelling and myalgias  Skin: Negative for color change and rash  Neurological: Negative for weakness and numbness  Hematological: Negative for adenopathy  Psychiatric/Behavioral: Negative for sleep disturbance         Allergies  Allergies   Allergen Reactions    No Active Allergies        Home Medications    Current Outpatient Medications:     Cholecalciferol (VITAMIN D3) 5000 units CAPS, Take by mouth, Disp: , Rfl:     famotidine (PEPCID) 40 MG tablet, Take 40 mg by mouth daily , Disp: , Rfl:     levothyroxine 50 mcg tablet, TAKE 1 TABLET BY MOUTH EVERY DAY, Disp: 90 tablet, Rfl: 1    liraglutide (VICTOZA) injection, Inject 0 2 mL (1 2 mg total) under the skin daily, Disp: 2 pen, Rfl: 4    metFORMIN (GLUCOPHAGE) 500 mg tablet, TAKE 1 TABLET BY MOUTH TWICE A DAY WITH MEALS, Disp: 180 tablet, Rfl: 1    NOVOTWIST 32G X 5 MM MISC, , Disp: , Rfl:     predniSONE 20 mg tablet, Take 3 tablets (60 mg total) by mouth daily, Disp: 90 tablet, Rfl: 1    Past Medical History  Past Medical History:   Diagnosis Date    BPH (benign prostatic hyperplasia)     Diabetes mellitus (HCC)     Erectile dysfunction     GERD (gastroesophageal reflux disease)     Hematuria     Liver disease     hepatitis C    Mediastinal adenopathy     Vitamin D deficiency        Past Surgical History   Past Surgical History:   Procedure Laterality Date    COLONOSCOPY      last assessed: 08/28/2012; fiberoptic screening     CYSTOSCOPY      onset: 2016; Diagnostic     DENTAL SURGERY      LIVER BIOPSY      [facility] BRONCHOSCOPY NEEDLE BX TRACHEA MAIN STEM&/BRON N/A 2019    Procedure: ENDOBRONCHIAL ULTRASOUND (EBUS); Surgeon: Russell Julien MD;  [facility];  Service: Thoracic    [facility] [address] N/A 2019    Procedure: Marcus Phillips;  Surgeon: Russell Julien MD;  [facility];  Service: Thoracic    [facility] TRANSURETHRAL ELEC-SURG PROSTATECTOM N/A 2017    Procedure: Sary Canseco; TUR PROSTATE;  Surgeon: David Lora MD;  [facility];  Service: Urology    SHOULDER ARTHROSCOPY Right     bicep tendon repair, rotator cuff repair and acromuim shave        Family History  No known family history of autoimmune or inflammatory diseases  Family History   Problem Relation Age of Onset    Stroke Father     Cancer Maternal Grandmother     Valvular heart disease Mother        Social History  Occupation[place]   Social History     Substance and Sexual Activity   Alcohol Use No    Comment: quit 12 years ago     Social History     Substance and Sexual Activity   Drug Use No     Social History     Tobacco Use   Smoking Status Former Smoker    Packs/day: 1 00    Years: 1 00    Pack years: 1 00    Types: Cigarettes    Last attempt to quit: 2010    Years since quittin 5   Smokeless Tobacco Never Used       Objective:    Vitals:    07/10/19 0753   BP: 128/83   BP Location: Left arm   Patient Position: Sitting   Cuff Size: Adult   Pulse: 89   Weight: 119 kg (263 lb)   Height: 6' 3" (1 905 m)       Physical Exam   Constitutional: He appears well-developed and well-nourished  No distress  HENT:   Head: Normocephalic and atraumatic  Mouth/Throat: Oropharynx is clear and moist and mucous membranes are normal    Eyes: Conjunctivae and EOM are normal  No scleral icterus  Neck: Neck supple   No spinous process tenderness and no muscular tenderness present  No thyromegaly present  Cardiovascular: Normal rate, regular rhythm, S1 normal and S2 normal  Exam reveals no friction rub  No murmur heard  Pulmonary/Chest: Effort normal and breath sounds normal  No respiratory distress  He has no wheezes  He has no rhonchi  He has no rales  Abdominal: Soft  He exhibits no distension  There is no hepatosplenomegaly  There is no tenderness  Musculoskeletal:   No reproducible joint or soft tissue tenderness  No joint swelling or synovitis anywhere  Lymphadenopathy:     He has no cervical adenopathy  Neurological: He is alert  He has normal strength  No sensory deficit  Skin: Skin is warm and dry  No rash noted  Nails show no clubbing  Psychiatric: He has a normal mood and affect  Pathology 6/6/19:  Final Diagnosis   A,B & F  Lymph node, level 7 (Thin prep, smear and cell block preparations):  Negative for malignancy  Numerous histiocytes, some multinucleated & epithelioid in a background of polymorphous lymphocytes and benign bronchial epithelial cells, can not exclude a granulomatous lesion      Satisfactory for evaluation      C,D & G  Lymph node, level 4R: (Thin prep, smear and cell block preparations)  Negative for malignancy  Numerous histiocytes, some multinucleated & epithelioid in a background of polymorphous lymphocytes and benign bronchial epithelial cells, can not exclude a granulomatous lesion  Satisfactory for evaluation      E  Lymph Node, level 2R:  Negative for malignancy  Lymphoid cells and few macrophages accompanied by benign bronchial epithelial cells  Satisfactory for evaluation  Imaging:   EKG 5/29/19 is normal aside from low voltage   CT chest CT chest 5/15/19:  FINDINGS:  LUNGS:  A pleural-based nodule in the right middle lobe measuring 5 mm has been stable since 2016  There is no tracheal or endobronchial lesion  PLEURA:  Unremarkable  HEART/GREAT VESSELS:  Unremarkable for patient's age    MEDIASTINUM AND KRIS: Again seen are multiple prominent lymph nodes in the mediastinum  A right paratracheal lymph node measures 1 1 x 1 4 cm, previously 0 9 x 1 2 cm  An adjacent right paratracheal lymph node measures 1 2 x 1 7 cm, previously 1 2 x 1 2   cm  A more superior right paratracheal lymph node measures 1 0 x 1 4 cm, previously 1 0 x 1 3 cm  An AP window lymph node measures 1 1 x 1 3 cm, previously 0 8 x 1 2 cm  A subcarinal lymph node measures 0 9 x 3 4 cm, previously 0 9 x 2 9 cm  There are   lymph nodes in the right epicardial fat, the largest measuring 1 0 x 1 4 cm, previously 0 9 x 1 0 cm  There is a small hiatal hernia  CHEST WALL AND LOWER NECK:   Unremarkable  VISUALIZED STRUCTURES IN THE UPPER ABDOMEN:  Unremarkable  OSSEOUS STRUCTURES:  No acute fracture or destructive osseous lesion  IMPRESSION:  Mild interval increase in size in mediastinal/epicardial lymphadenopathy compared to the prior study from 2016  Possibilities include a low-grade lymphoma as well as sarcoidosis  Pathology correlation recommended if not already performed  EMG 5/13/19 bilateral legs:  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  There is no evidence of right or left peroneal neuropathy, tibial and trap min or neuropathy, lumbosacral radiculopathy or plexopathy  XR lumbar 5/10/19: IMPRESSION:  Degenerative spondylosis L4-5 and L5-S1  Grade 1 anterolisthesis L4-5     MRI brain 2/14/19: IMPRESSION:  Small scattered white matter hyperintensities on T2 and FLAIR imaging primarily within the frontal lobes may represent precocious chronic microangiopathic change  Small white matter lesions have been described within the frontal lobes in patients with   chronic migraine headaches  No mass, hemorrhage or ischemia  CTA head/neck 4/27/19: IMPRESSION:  No acute intracranial abnormality    Unremarkable CT of the head and neck  aside from anatomic variations as described above  Nonspecific chronic mediastinal lymphadenopathy with stable enlarged short axis mediastinal lymph nodes measurement of upto 1 4 cm  (comparison with CT chest study of 2016)  Abdominal US 12/3/18: IMPRESSION:  1  Redemonstrated heterogeneous appearance to the borderline sized liver which can be seen in patients with cirrhosis  2   Nonobstructing 3 mm calculus within the lower pole of the right kidney  XR right knee 8/7/18: IMPRESSION:  No acute osseous abnormality  Labs:   Component      Latest Ref Rng & Units 7/5/2019   QFT Nil      0 - 8 0 IU/ml 0 04   QFT TB1-NIL      IU/ml 0 00   QFT TB2-NIL      IU/ml 0 00   QFT Mitogen-NIL      IU/ml >10 00   QFT Final Interpretation      Negative Negative     Component      Latest Ref Rng & Units 3/15/2019   LYME AB IGG      0 00 - 0 79 0 08   LYME AB IGM      0 00 - 0 79 0 57     Component      Latest Ref Rng & Units 5/10/2019   Albumin/Globulin Ratio      1 10 - 1 80 1 44   ALBUMIN ELP      52 0 - 65 0 % 59 1   ALBUMIN CONC ELP      3 50 - 5 00 g/dl 4 91   ALPHA 1      2 5 - 5 0 % 3 7   ALPHA 1 CONC ELP      0 10 - 0 40 g/dL 0 31   ALPHA 2      7 0 - 13 0 % 13 6 (H)   ALPHA 2 CONC ELP      0 40 - 1 20 g/dL 1 13   BETA-1      5 0 - 13 0 % 5 5   BETA 1 CONC ELP      0 40 - 0 80 g/dL 0 46   BETA-2      2 0 - 8 0 % 4 5   BETA 2 CONC ELP      0 20 - 0 50 g/dL 0 37   GAMMA GLOBULIN      12 0 - 22 0 % 13 6   GAMMA CONC ELP      0 50 - 1 60 g/dL 1 13   Total Protein      6 4 - 8 2 g/dL 8 3 (H)   SPEP INTERPRETATION       No monoclonal bands noted   Reviewed by: Lissa Drake MD **Electronic Signature**   ANTI-HU ABS      titer <1:10   ANTI-RI ABS      titer <1:10   Total CK      39 - 308 U/L 45     Component      Latest Ref Rng & Units 5/25/2019   Sodium      136 - 145 mmol/L 135 (L)   Potassium      3 5 - 5 3 mmol/L 4 3   Chloride      100 - 108 mmol/L 100   CO2      21 - 32 mmol/L 27   Anion Gap      4 - 13 mmol/L 8   BUN      5 - 25 mg/dL 14 Creatinine      0 60 - 1 30 mg/dL 0 98   GLUCOSE FASTING      65 - 99 mg/dL 223 (H)   Calcium      8 3 - 10 1 mg/dL 9 3   eGFR      ml/min/1 73sq m 83   WBC      4 31 - 10 16 Thousand/uL 4 65   Red Blood Cell Count      3 88 - 5 62 Million/uL 5 08   Hemoglobin      12 0 - 17 0 g/dL 16 0   HCT      36 5 - 49 3 % 45 9   MCV      82 - 98 fL 90   MCH      26 8 - 34 3 pg 31 5   MCHC      31 4 - 37 4 g/dL 34 9   RDW      11 6 - 15 1 % 12 3   Platelet Count      034 - 390 Thousands/uL 178   MPV      8 9 - 12 7 fL 9 2   Protime      11 8 - 14 2 seconds 13 3   INR      0 86 - 1 17 1 04   PTT      26 - 38 seconds 29     Component      Latest Ref Rng & Units 4/20/2019   Sodium      136 - 145 mmol/L 136   Potassium      3 5 - 5 3 mmol/L 5 2   Chloride      100 - 108 mmol/L 101   CO2      21 - 32 mmol/L 28   Anion Gap      4 - 13 mmol/L 7   BUN      5 - 25 mg/dL 16   Creatinine      0 60 - 1 30 mg/dL 1 13   GLUCOSE FASTING      65 - 99 mg/dL 172 (H)   Calcium      8 3 - 10 1 mg/dL 9 5   AST      5 - 45 U/L 20   ALT      12 - 78 U/L 39   Alkaline Phosphatase      46 - 116 U/L 80   Total Protein      6 4 - 8 2 g/dL 8 5 (H)   Albumin      3 5 - 5 0 g/dL 4 3   TOTAL BILIRUBIN      0 20 - 1 00 mg/dL 1 00   eGFR      ml/min/1 73sq m 70   Vitamin B-12      100 - 900 pg/mL 734   Vit D, 25-Hydroxy      30 0 - 100 0 ng/mL 14 8 (L)     Component      Latest Ref Rng & Units 3/15/2019   RPR SCREEN      Non-Reactive Non-Reactive     Component      Latest Ref Rng & Units 2/9/2019   WBC      4 31 - 10 16 Thousand/uL 4 57   Red Blood Cell Count      3 88 - 5 62 Million/uL 4 92   Hemoglobin      12 0 - 17 0 g/dL 15 6   HCT      36 5 - 49 3 % 46 4   MCV      82 - 98 fL 94   MCH      26 8 - 34 3 pg 31 7   MCHC      31 4 - 37 4 g/dL 33 6   RDW      11 6 - 15 1 % 12 3   MPV      8 9 - 12 7 fL 9 4   Platelet Count      790 - 390 Thousands/uL 184   nRBC      /100 WBCs 0   Neutrophils %      43 - 75 % 52   Immat GRANS %      0 - 2 % 0   Lymphocytes Relative      14 - 44 % 35   Monocytes Relative      4 - 12 % 9   Eosinophils      0 - 6 % 3   Basophils Relative      0 - 1 % 1   Absolute Neutrophils      1 85 - 7 62 Thousands/µL 2 36   Immature Grans Absolute      0 00 - 0 20 Thousand/uL 0 02   Lymphocytes Absolute      0 60 - 4 47 Thousands/µL 1 61   Absolute Monocytes      0 17 - 1 22 Thousand/µL 0 42   Absolute Eosinophils      0 00 - 0 61 Thousand/µL 0 12   Basophils Absolute      0 00 - 0 10 Thousands/µL 0 04   Sodium      136 - 145 mmol/L 141   Potassium      3 5 - 5 3 mmol/L 4 1   Chloride      100 - 108 mmol/L 103   CO2      21 - 32 mmol/L 28   Anion Gap      4 - 13 mmol/L 10   BUN      5 - 25 mg/dL 22   Creatinine      0 60 - 1 30 mg/dL 1 00   GLUCOSE FASTING      65 - 99 mg/dL 192 (H)   Calcium      8 3 - 10 1 mg/dL 8 7   AST      5 - 45 U/L 20   ALT      12 - 78 U/L 48   Alkaline Phosphatase      46 - 116 U/L 84   Total Protein      6 4 - 8 2 g/dL 7 5   Albumin      3 5 - 5 0 g/dL 3 8   TOTAL BILIRUBIN      0 20 - 1 00 mg/dL 0 40   eGFR      ml/min/1 73sq m 82   EXT Creatinine Urine      mg/dL 237 0   MICROALBUM ,U,RANDOM      0 0 - 20 0 mg/L 26 2 (H)   MICROALBUMIN/CREATININE RATIO      0 - 30 mg/g creatinine 11   Hemoglobin A1C      4 2 - 6 3 % 7 0 (H)   EAG      mg/dl 154     Component      Latest Ref Rng & Units 8/16/2018   TSH 3RD GENERATON      0 358 - 3 740 uIU/mL 3 200

## 2019-07-10 NOTE — PROGRESS NOTES
Assessment and Plan:   Patient is a pleasant 19-year-old male with non insulin-dependent type 2 diabetes, hypothyroidism, history of liver biopsy with evidence of cirrhosis due to hepatitis C status post cure with Vipin Harris, who presents for rheumatology evaluation due to concern for sarcoidosis  Extensive history, labs, imaging, and other testing was reviewed today and with the patient in the office  He has evidence of mediastinal and hilar adenopathy on CT dating back to 2012, which appears to have been stable over time with stability noted in 2016, and only mild interval progression on most recent CT in May of this year  This ultimately led to lymph node biopsy which pathology noted that granulomatous lesions could not be ruled out but did not definitively note granulomas  There were multiple inflammatory cells and nonspecific findings but notably the specimens were negative for malignancy and so granulomatous adenopathy was the most probable diagnosis  Based off the typical imaging features as well this is presumably sarcoidosis  He did have negative testing for TB and does appear to have obvious risk factors for other infectious causes of granulomatous inflammation such as coccidioidomycosis, plus has had such stability over 7+ years on imaging which makes infection very unlikely  He does appear to be having respiratory symptoms at this time as he has progressive dyspnea on exertion with a dry cough over the past few months and generally feels systemic fatigue  In addition he is having severe right sided headaches in a trigeminal distribution that have been intermittent and are associated with right-sided eye redness, eyelid and facial drooping  He also has evidence of a 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 noted on EMG of his bilateral lower extremities, and is also symptomatic from this    Another etiology for this neuropathy has not been identified and does raise concern for sarcoidosis involving the peripheral nervous system  I also wonder if his right-sided headaches could be related to a trigeminal type neuralgia related to sarcoidosis  We discussed briefly that 1 option would be to biopsy 1 of the peripheral nerves to confirm suspicion of PNS involvement, although I do not think it would necessarily  at this time since he is symptomatic from a pulmonary standpoint that requires treatment and so we would alternatively just observe for improvement in his neurologic symptoms  He was agreeable with this approach  In terms of possible other systemic involvement we discussed that we do need to screen for a few other things  I asked him to get an updated ophthalmological exam as sarcoidosis can cause asymptomatic posterior uveitis which is typically only appreciated on eye exam   He is also having the right sided redness in his eyes with the associated headaches  He was agreeable to get an updated exam within the next 1-2 months  He does have history of benign PVCs and has palpitations along with the progressive dyspnea on exertion and a low voltage EKG noted  We will do an echocardiogram to evaluate for any evidence of a cardiomyopathy that would possibly suggest cardiac sarcoidosis  If he has an abnormal echocardiogram he likely will need a cardiology referral   I did provide him with the pulmonology referral as their assistance would be appreciated with management and disease monitoring recommendations particularly of his lymph node and any lung involvement  I did order a baseline complete pulmonary function study as he has never had one       Since he is symptomatic with respiratory complaints with the progressive dyspnea and dry cough, and also concern of suspicion for at least peripheral nervous system involvement, possible cranial nerve involvement with the headaches and facial drooping, along with investigations for more systemic involvement in his eyes and heart, he was agreeable to start on steroid therapy at this time which I do think is appropriate based off the respiratory symptoms alone  We will start with about 0 5 mg/kg which for him would be 60 mg daily of prednisone  I did discuss with him the many side effects from systemic steroids, particularly for him the hyperglycemia that he will experience and I asked him to follow-up with his family doctor regarding this as he may require insulin at least temporarily while on the high-dose steroids  He was agreeable with this  He will stay on this dose for the next 4 weeks and I will see him back at that time to see how he is doing  We will determine whether not to taper from there depending on his symptoms and additional workup  We will not initiate any DMARD therapy at this time and see how he does with the steroids  If he does require a DMARD for steroid sparing it will be challenging given his liver cirrhosis which precludes the use of both methotrexate and Imuran  In that case we would be considering other options such as CellCept or Remicade  We will discuss that in the future if needed  At that point I will likely need to address other issues related to the chronic systemic steroid use including the possible need for Pneumocystis prophylaxis since he will be on a moderate to high dose steroids over the next few months, along with possible need of osteoporosis prophylaxis  We will address these at his future visit  I also gave him some additional lab work to have done before he sees me again  Plan:  Diagnoses and all orders for this visit:    Polyneuropathy, peripheral sensorimotor axonal    Trigeminal neuritis    Other cirrhosis of liver (HCC)    Sarcoidosis of lymph nodes  -     Angiotensin converting enzyme; Future  -     CBC and differential  -     CK  -     Hepatic function panel  -     Basic metabolic panel;  Future  -     Urinalysis with microscopic  - Protein / creatinine ratio, urine  -     Sedimentation rate, automated  -     C-reactive protein  -     RF Screen w/ Reflex to Titer; Future  -     Hepatitis C RNA, quantitative, PCR; Future  -     Hepatitis B core antibody, total  -     Hepatitis B surface antigen  -     Hepatitis B surface antibody  -     Calcium, ionized  -     Vitamin D 25 hydroxy  -     Complete pulmonary function test; Future  -     Echo complete with contrast if indicated; Future  -     Ambulatory referral to Pulmonology; Future  -     HIV 1/2 Antigen/Antibody,Fourth Generation W/Rfl (Historical)  -     predniSONE 20 mg tablet; Take 3 tablets (60 mg total) by mouth daily    Sarcoidosis  -     Angiotensin converting enzyme; Future  -     CBC and differential  -     CK  -     Hepatic function panel  -     Basic metabolic panel; Future  -     Urinalysis with microscopic  -     Protein / creatinine ratio, urine  -     Sedimentation rate, automated  -     C-reactive protein  -     RF Screen w/ Reflex to Titer; Future  -     Hepatitis C RNA, quantitative, PCR; Future  -     Hepatitis B core antibody, total  -     Hepatitis B surface antigen  -     Hepatitis B surface antibody  -     Calcium, ionized  -     Vitamin D 25 hydroxy  -     Complete pulmonary function test; Future  -     Echo complete with contrast if indicated; Future  -     Ambulatory referral to Pulmonology; Future  -     HIV 1/2 Antigen/Antibody,Fourth Generation W/Rfl (Historical)  -     predniSONE 20 mg tablet; Take 3 tablets (60 mg total) by mouth daily    Dyspnea on exertion  -     Angiotensin converting enzyme; Future  -     CBC and differential  -     CK  -     Hepatic function panel  -     Basic metabolic panel; Future  -     Urinalysis with microscopic  -     Protein / creatinine ratio, urine  -     Sedimentation rate, automated  -     C-reactive protein  -     RF Screen w/ Reflex to Titer;  Future  -     Hepatitis C RNA, quantitative, PCR; Future  -     Hepatitis B core antibody, total  -     Hepatitis B surface antigen  -     Hepatitis B surface antibody  -     Calcium, ionized  -     Vitamin D 25 hydroxy  -     Complete pulmonary function test; Future  -     Echo complete with contrast if indicated; Future  -     Ambulatory referral to Pulmonology; Future  -     HIV 1/2 Antigen/Antibody,Fourth Generation W/Rfl (Historical)  -     predniSONE 20 mg tablet; Take 3 tablets (60 mg total) by mouth daily    Persistent dry cough  -     Angiotensin converting enzyme; Future  -     CBC and differential  -     CK  -     Hepatic function panel  -     Basic metabolic panel; Future  -     Urinalysis with microscopic  -     Protein / creatinine ratio, urine  -     Sedimentation rate, automated  -     C-reactive protein  -     RF Screen w/ Reflex to Titer; Future  -     Hepatitis C RNA, quantitative, PCR; Future  -     Hepatitis B core antibody, total  -     Hepatitis B surface antigen  -     Hepatitis B surface antibody  -     Calcium, ionized  -     Vitamin D 25 hydroxy  -     Complete pulmonary function test; Future  -     Echo complete with contrast if indicated; Future  -     Ambulatory referral to Pulmonology; Future  -     HIV 1/2 Antigen/Antibody,Fourth Generation W/Rfl (Historical)  -     predniSONE 20 mg tablet;  Take 3 tablets (60 mg total) by mouth daily    Type 2 diabetes mellitus without complication, without long-term current use of insulin (HCC)    History of hepatitis C virus infection    Current chronic use of systemic steroids        Follow-up plan: 4 weeks       HPI  Russel Escobedo is a 60 y o   male with hypothyroidism, type 2 diabetes, BPH, history of hepatitis-C status post cure with Harvoni (2015), history of liver biopsy, liver cirrhosis, vitamin-D deficiency, GERD, history of erosive gastritis and duodenal erosion (2018), history of trigeminal autonomic cephalgia, cervicalgia and chronic headaches who presents for rheumatology consult by request of Dr Natalie Lopez for sarcoidosis  Chart review shows that patient has evidence of lymphadenopathy in the chest and abdomen dating back to CT scan reports in 2012  CT of the chest in 2016 showed stability of lymphadenopathy in the chest   Updated CT of the chest in May 2019 showed a mild interval increase in mediastinal adenopathy and biopsy was subsequently done  He is status post endobronchial ultrasound with lymph node biopsy on 06/06/2019  Biopsy of 3 lymph nodes appeared satisfactory for evaluation and showed numerous histiocytes, some multi nucleated and epitheliod, in the background of polymorphous lymphocytes and benign bronchial cells, cannot exclude granulomatous lesion  There was no evidence of malignancy in the pathology  Based off pathology and without evidence of malignancy, thoracic surgery felt this was most likely consistent with sarcoidosis and suggested pulmonology referral   Patient states he was aware of the swollen lymph nodes in the past but it was never recommended that he get additional evaluation or biopsy  He also was following recently with Neurology who initially evaluated him for an abnormal brain MRI  They also noted history of trigeminal autonomic cephalgia, cervicalgia and chronic daily headaches  MRI of his brain from February 2019 showed small scattered white matter hyperintensities on T2 and FLAIR imaging primarily within the frontal lobes, which may represent chronic microangiopathic change  Other considerations included chronic migraine headaches as a cause of these findings  CTA of the head showed no acute intracranial abnormality  It noted the nonspecific chronic mediastinal lymphadenopathy with stability in lymph nodes at that time  The headaches started in August 2018 and patient also had developed right upper shoulder burning and subsequent burning in the forearms, right inner thigh and anterior part of the right thigh    Neurology noted that he had pain and paresthesia in a T5-T6 distribution  They did not feel that he had evidence of a demyelinating process in the brain  Subsequent EMG of both legs was pursued which revealed large fiber neuropathy with axonal and demyelinating process noted and they discussed potential of a nerve biopsy  They did not feel there was a concern for granulomatous process in his brain parenchyma  Patient reports he has had headaches for 1 year and subsequently over several months developed the sensory symptoms of burning across right posterior neck/shoulder, right inner thigh, top of thigh and also across mid abdomen  Currently the sensory issues have seem to subsided and were coming and going randomly, but he continues to have very severe right-sided headaches  Again he was following with Neurology for this and he had tried gabapentin which caused weight gain and did not help his headaches and so he stopped this  He believes the other drug retry was Tegretol which she does feel helped him but ultimately he came off of this for unclear reasons  The headaches overall have reduced in frequency but when they come on they are very severe and are typically right sided, and he gets associated symptoms of drooping of the right eyelid and underneath his thigh with associated eye redness  He also feels that he has notice when he gets the headaches he will get an increase in the abnormal sensory issues with burning in a bandlike fashion around his abdomen along with the burning on his thighs  He had one very severe headache most recently on 4th of July also with the associated abdominal burning  His wife reports concern she has noticed with intermittent Balance and walking issues that happen randomly and are very intermittent  Increased dry coughing since 7/4/19, which she notices is provoked by trying to take in a normal breath and he feels that he takes more shallow breaths at this time because he does not want to provoke a cough    Increased dyspnea on exertion for past few months, winded with shopping and has to rest, very fatigued and winded, which is very unusual for him as he normally consistently exercises  He has not had PFTs, and has not yet been evaluated by pulmonology  He denies hemoptysis and has not noted any lower extremity edema  He reports that he does get palpitations and previously wore a Holter monitor over 10 years ago for this and recalls being told he had benign PVCs  He recently had an EKG about 6 weeks ago in preparation for his biopsy which showed a low voltage EKG but otherwise was normal   He has not had an echocardiogram   Stiff in the hips and legs in the morning which seems to improve with ambulation in the morning  He also reports persistent left knee pain but x-ray of the left knee was normal   He has not had any obvious joint swelling and does not have any prolonged morning stiffness in his joints  Having chronic lower back pain with pain down his left leg, progressive over years  He did have a lumbar x-ray which showed spondylosis and degenerative disc disease  He does have an ophthalmologist who he last saw about 9 months ago and she said he had early signs of glaucoma, but he denies knowledge of a history of uveitis or iritis  He does have issues with general decline in his vision but no acute vision loss  He does get the eye redness with drooping around his right eye when he gets the severe right-sided migraine-type headaches, but no significant episodes of severe eye pain with photosensitivity  He has not had any recent skin rashes and overall does not have any skin issues  He recalls about 30 years ago he had some sort of red patchy rash breakout on his lower extremities that was biopsied by Dermatology at that time and then ultimately resolved  This has not recurred  He also notably has a history of hepatitis C which she believes he got when he was in the [employer] due to needle sharing between patients    He did have shortness hepatitis C in 2015 with treatment but unfortunately does have evidence of liver cirrhosis which was appreciated on biopsy  He does not recall his GI doctor reporting any signs of sarcoid on his liver biopsy and we do not have the report of the biopsy  His GI doctor is retired and he is going to be establishing with a new doctor for monitoring off his cirrhosis  His liver function overall appears normal over the years  He denies any history of bone marrow or kidney issues  He was newly diagnosed with hypothyroidism about a year ago, around the time when he started having these headaches  He was diagnosed with diabetes in 2007  His recent A1c was 7% and he is not on any insulin at this time  He has not lost any weight recently, he chronically has night sweats but states it is been like this for his entire life  He feels very fatigued and tired all the time which is unusual for him as he is it very active normally  He has felt this way over the past several months and is taking 2 hour naps at home after work and then going to bed around 830 and still feeling exhausted  Denies photosensitivity, rashes, psoriasis, sicca symptoms, oral or nasal ulcers, alopecia, Raynaud's, h/o pericarditis or pleurisy, h/o blood clots  Review of Systems  Review of Systems   Constitutional: Positive for diaphoresis and fatigue  Negative for chills, fever and unexpected weight change  HENT: Negative for mouth sores and trouble swallowing  Eyes: Negative for pain and visual disturbance  Respiratory: Positive for cough and shortness of breath  Negative for chest tightness  Cardiovascular: Positive for palpitations  Negative for chest pain and leg swelling  Gastrointestinal: Negative for abdominal pain, blood in stool, constipation, diarrhea and nausea  Genitourinary: Negative for hematuria  Musculoskeletal: Positive for arthralgias and back pain  Negative for joint swelling and myalgias  Skin: Negative for color change and rash  Neurological: Negative for weakness and numbness  Hematological: Negative for adenopathy  Psychiatric/Behavioral: Negative for sleep disturbance  Allergies  Allergies   Allergen Reactions    No Active Allergies        Home Medications    Current Outpatient Medications:     Cholecalciferol (VITAMIN D3) 5000 units CAPS, Take by mouth, Disp: , Rfl:     famotidine (PEPCID) 40 MG tablet, Take 40 mg by mouth daily , Disp: , Rfl:     levothyroxine 50 mcg tablet, TAKE 1 TABLET BY MOUTH EVERY DAY, Disp: 90 tablet, Rfl: 1    liraglutide (VICTOZA) injection, Inject 0 2 mL (1 2 mg total) under the skin daily, Disp: 2 pen, Rfl: 4    metFORMIN (GLUCOPHAGE) 500 mg tablet, TAKE 1 TABLET BY MOUTH TWICE A DAY WITH MEALS, Disp: 180 tablet, Rfl: 1    NOVOTWIST 32G X 5 MM MISC, , Disp: , Rfl:     predniSONE 20 mg tablet, Take 3 tablets (60 mg total) by mouth daily, Disp: 90 tablet, Rfl: 1    Past Medical History  Past Medical History:   Diagnosis Date    BPH (benign prostatic hyperplasia)     Diabetes mellitus (HCC)     Erectile dysfunction     GERD (gastroesophageal reflux disease)     Hematuria     Liver disease     hepatitis C    Mediastinal adenopathy     Vitamin D deficiency        Past Surgical History   Past Surgical History:   Procedure Laterality Date    COLONOSCOPY      last assessed: 08/28/2012; fiberoptic screening     CYSTOSCOPY      onset: 05/06/2016; Diagnostic     DENTAL SURGERY      LIVER BIOPSY      RI BRONCHOSCOPY NEEDLE BX TRACHEA MAIN STEM&/BRON N/A 6/6/2019    Procedure: ENDOBRONCHIAL ULTRASOUND (EBUS);   Surgeon: Adriana Cain MD;  [facility];  Service: Thoracic    RI [address] N/A 6/6/2019    Procedure: Son Bolaños;  Surgeon: Adriana Cain MD;  [facility];  Service: Thoracic    RI TRANSURETHRAL ELEC-SURG PROSTATECTOM N/A 9/13/2017    Procedure: Brionna Hahn; TUR PROSTATE;  Surgeon: Feliz Treviño MD;  [facility];  Service: Urology    SHOULDER ARTHROSCOPY Right     bicep tendon repair, rotator cuff repair and acromuim shave        Family History  No known family history of autoimmune or inflammatory diseases  Family History   Problem Relation Age of Onset    Stroke Father     Cancer Maternal Grandmother     Valvular heart disease Mother        Social History  Occupation: works at the [employer]   Social History     Substance and Sexual Activity   Alcohol Use No    Comment: quit 12 years ago     Social History     Substance and Sexual Activity   Drug Use No     Social History     Tobacco Use   Smoking Status Former Smoker    Packs/day: 1     Years: 1     Pack years: 1 00    Types: Cigarettes    Last attempt to quit:     Years since quittin 5   Smokeless Tobacco Never Used       Objective:    Vitals:    07/10/19 0753   BP: 128/83   BP Location: Left arm   Patient Position: Sitting   Cuff Size: Adult   Pulse: 89   Weight: 119 kg (263 lb)   Height: 6' 3" (1 905 m)       Physical Exam   Constitutional: He appears well-developed and well-nourished  No distress  HENT:   Head: Normocephalic and atraumatic  Mouth/Throat: Oropharynx is clear and moist and mucous membranes are normal    Eyes: Conjunctivae and EOM are normal  No scleral icterus  Neck: Neck supple  No spinous process tenderness and no muscular tenderness present  No thyromegaly present  Cardiovascular: Normal rate, regular rhythm, S1 normal and S2 normal  Exam reveals no friction rub  No murmur heard  Pulmonary/Chest: Effort normal and breath sounds normal  No respiratory distress  He has no wheezes  He has no rhonchi  He has no rales  Abdominal: Soft  He exhibits no distension  There is no hepatosplenomegaly  There is no tenderness  Musculoskeletal:   No reproducible joint or soft tissue tenderness  No joint swelling or synovitis anywhere      Lymphadenopathy:     He has no cervical adenopathy  Neurological: He is alert  He has normal strength  No sensory deficit  Skin: Skin is warm and dry  No rash noted  Nails show no clubbing  Psychiatric: He has a normal mood and affect  Pathology 6/6/19:  Final Diagnosis   A,B & F  Lymph node, level 7 (Thin prep, smear and cell block preparations):  Negative for malignancy  Numerous histiocytes, some multinucleated & epithelioid in a background of polymorphous lymphocytes and benign bronchial epithelial cells, can not exclude a granulomatous lesion      Satisfactory for evaluation      C,D & G  Lymph node, level 4R: (Thin prep, smear and cell block preparations)  Negative for malignancy  Numerous histiocytes, some multinucleated & epithelioid in a background of polymorphous lymphocytes and benign bronchial epithelial cells, can not exclude a granulomatous lesion  Satisfactory for evaluation      E  Lymph Node, level 2R:  Negative for malignancy  Lymphoid cells and few macrophages accompanied by benign bronchial epithelial cells  Satisfactory for evaluation  Imaging:   EKG 5/29/19 is normal aside from low voltage   CT chest CT chest 5/15/19:  FINDINGS:  LUNGS:  A pleural-based nodule in the right middle lobe measuring 5 mm has been stable since 2016  There is no tracheal or endobronchial lesion  PLEURA:  Unremarkable  HEART/GREAT VESSELS:  Unremarkable for patient's age  MEDIASTINUM AND KRIS:  Again seen are multiple prominent lymph nodes in the mediastinum  A right paratracheal lymph node measures 1 1 x 1 4 cm, previously 0 9 x 1 2 cm  An adjacent right paratracheal lymph node measures 1 2 x 1 7 cm, previously 1 2 x 1 2   cm  A more superior right paratracheal lymph node measures 1 0 x 1 4 cm, previously 1 0 x 1 3 cm  An AP window lymph node measures 1 1 x 1 3 cm, previously 0 8 x 1 2 cm  A subcarinal lymph node measures 0 9 x 3 4 cm, previously 0 9 x 2 9 cm   There are   lymph nodes in the right epicardial fat, the largest measuring 1 0 x 1 4 cm, previously 0 9 x 1 0 cm  There is a small hiatal hernia  CHEST WALL AND LOWER NECK:   Unremarkable  VISUALIZED STRUCTURES IN THE UPPER ABDOMEN:  Unremarkable  OSSEOUS STRUCTURES:  No acute fracture or destructive osseous lesion  IMPRESSION:  Mild interval increase in size in mediastinal/epicardial lymphadenopathy compared to the prior study from 2016  Possibilities include a low-grade lymphoma as well as sarcoidosis  Pathology correlation recommended if not already performed  EMG 5/13/19 bilateral legs:  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  There is no evidence of right or left peroneal neuropathy, tibial and trap min or neuropathy, lumbosacral radiculopathy or plexopathy  XR lumbar 5/10/19: IMPRESSION:  Degenerative spondylosis L4-5 and L5-S1  Grade 1 anterolisthesis L4-5     MRI brain 2/14/19: IMPRESSION:  Small scattered white matter hyperintensities on T2 and FLAIR imaging primarily within the frontal lobes may represent precocious chronic microangiopathic change  Small white matter lesions have been described within the frontal lobes in patients with   chronic migraine headaches  No mass, hemorrhage or ischemia  CTA head/neck 4/27/19: IMPRESSION:  No acute intracranial abnormality  Unremarkable CT of the head and neck  aside from anatomic variations as described above  Nonspecific chronic mediastinal lymphadenopathy with stable enlarged short axis mediastinal lymph nodes measurement of upto 1 4 cm  (comparison with CT chest study of 2016)  Abdominal US 12/3/18: IMPRESSION:  1  Redemonstrated heterogeneous appearance to the borderline sized liver which can be seen in patients with cirrhosis  2   Nonobstructing 3 mm calculus within the lower pole of the right kidney  XR right knee 8/7/18: IMPRESSION:  No acute osseous abnormality      Labs:   Component      Latest Ref Rng & Units 7/5/2019   QFT Nil      0 - 8 0 IU/ml 0 04   QFT TB1-NIL      IU/ml 0 00   QFT TB2-NIL      IU/ml 0 00   QFT Mitogen-NIL      IU/ml >10 00   QFT Final Interpretation      Negative Negative     Component      Latest Ref Rng & Units 3/15/2019   LYME AB IGG      0 00 - 0 79 0 08   LYME AB IGM      0 00 - 0 79 0 57     Component      Latest Ref Rng & Units 5/10/2019   Albumin/Globulin Ratio      1 10 - 1 80 1 44   ALBUMIN ELP      52 0 - 65 0 % 59 1   ALBUMIN CONC ELP      3 50 - 5 00 g/dl 4 91   ALPHA 1      2 5 - 5 0 % 3 7   ALPHA 1 CONC ELP      0 10 - 0 40 g/dL 0 31   ALPHA 2      7 0 - 13 0 % 13 6 (H)   ALPHA 2 CONC ELP      0 40 - 1 20 g/dL 1 13   BETA-1      5 0 - 13 0 % 5 5   BETA 1 CONC ELP      0 40 - 0 80 g/dL 0 46   BETA-2      2 0 - 8 0 % 4 5   BETA 2 CONC ELP      0 20 - 0 50 g/dL 0 37   GAMMA GLOBULIN      12 0 - 22 0 % 13 6   GAMMA CONC ELP      0 50 - 1 60 g/dL 1 13   Total Protein      6 4 - 8 2 g/dL 8 3 (H)   SPEP INTERPRETATION       No monoclonal bands noted   Reviewed by: Shoshana Goodwin MD **Electronic Signature**   ANTI-HU ABS      titer <1:10   ANTI-RI ABS      titer <1:10   Total CK      39 - 308 U/L 45     Component      Latest Ref Rng & Units 5/25/2019   Sodium      136 - 145 mmol/L 135 (L)   Potassium      3 5 - 5 3 mmol/L 4 3   Chloride      100 - 108 mmol/L 100   CO2      21 - 32 mmol/L 27   Anion Gap      4 - 13 mmol/L 8   BUN      5 - 25 mg/dL 14   Creatinine      0 60 - 1 30 mg/dL 0 98   GLUCOSE FASTING      65 - 99 mg/dL 223 (H)   Calcium      8 3 - 10 1 mg/dL 9 3   eGFR      ml/min/1 73sq m 83   WBC      4 31 - 10 16 Thousand/uL 4 65   Red Blood Cell Count      3 88 - 5 62 Million/uL 5 08   Hemoglobin      12 0 - 17 0 g/dL 16 0   HCT      36 5 - 49 3 % 45 9   MCV      82 - 98 fL 90   MCH      26 8 - 34 3 pg 31 5   MCHC      31 4 - 37 4 g/dL 34 9   RDW      11 6 - 15 1 % 12 3   Platelet Count      017 - 390 Thousands/uL 178   MPV      8 9 - 12 7 fL 9 2   Protime      11 8 - 14 2 seconds 13 3   INR      0 86 - 1 17 1 04   PTT      26 - 38 seconds 29     Component      Latest Ref Rng & Units 4/20/2019   Sodium      136 - 145 mmol/L 136   Potassium      3 5 - 5 3 mmol/L 5 2   Chloride      100 - 108 mmol/L 101   CO2      21 - 32 mmol/L 28   Anion Gap      4 - 13 mmol/L 7   BUN      5 - 25 mg/dL 16   Creatinine      0 60 - 1 30 mg/dL 1 13   GLUCOSE FASTING      65 - 99 mg/dL 172 (H)   Calcium      8 3 - 10 1 mg/dL 9 5   AST      5 - 45 U/L 20   ALT      12 - 78 U/L 39   Alkaline Phosphatase      46 - 116 U/L 80   Total Protein      6 4 - 8 2 g/dL 8 5 (H)   Albumin      3 5 - 5 0 g/dL 4 3   TOTAL BILIRUBIN      0 20 - 1 00 mg/dL 1 00   eGFR      ml/min/1 73sq m 70   Vitamin B-12      100 - 900 pg/mL 734   Vit D, 25-Hydroxy      30 0 - 100 0 ng/mL 14 8 (L)     Component      Latest Ref Rng & Units 3/15/2019   RPR SCREEN      Non-Reactive Non-Reactive     Component      Latest Ref Rng & Units 2/9/2019   WBC      4 31 - 10 16 Thousand/uL 4 57   Red Blood Cell Count      3 88 - 5 62 Million/uL 4 92   Hemoglobin      12 0 - 17 0 g/dL 15 6   HCT      36 5 - 49 3 % 46 4   MCV      82 - 98 fL 94   MCH      26 8 - 34 3 pg 31 7   MCHC      31 4 - 37 4 g/dL 33 6   RDW      11 6 - 15 1 % 12 3   MPV      8 9 - 12 7 fL 9 4   Platelet Count      123 - 390 Thousands/uL 184   nRBC      /100 WBCs 0   Neutrophils %      43 - 75 % 52   Immat GRANS %      0 - 2 % 0   Lymphocytes Relative      14 - 44 % 35   Monocytes Relative      4 - 12 % 9   Eosinophils      0 - 6 % 3   Basophils Relative      0 - 1 % 1   Absolute Neutrophils      1 85 - 7 62 Thousands/µL 2 36   Immature Grans Absolute      0 00 - 0 20 Thousand/uL 0 02   Lymphocytes Absolute      0 60 - 4 47 Thousands/µL 1 61   Absolute Monocytes      0 17 - 1 22 Thousand/µL 0 42   Absolute Eosinophils      0 00 - 0 61 Thousand/µL 0 12   Basophils Absolute      0 00 - 0 10 Thousands/µL 0 04   Sodium      136 - 145 mmol/L 141   Potassium      3 5 - 5 3 mmol/L 4 1 Chloride      100 - 108 mmol/L 103   CO2      21 - 32 mmol/L 28   Anion Gap      4 - 13 mmol/L 10   BUN      5 - 25 mg/dL 22   Creatinine      0 60 - 1 30 mg/dL 1 00   GLUCOSE FASTING      65 - 99 mg/dL 192 (H)   Calcium      8 3 - 10 1 mg/dL 8 7   AST      5 - 45 U/L 20   ALT      12 - 78 U/L 48   Alkaline Phosphatase      46 - 116 U/L 84   Total Protein      6 4 - 8 2 g/dL 7 5   Albumin      3 5 - 5 0 g/dL 3 8   TOTAL BILIRUBIN      0 20 - 1 00 mg/dL 0 40   eGFR      ml/min/1 73sq m 82   EXT Creatinine Urine      mg/dL 237 0   MICROALBUM ,U,RANDOM      0 0 - 20 0 mg/L 26 2 (H)   MICROALBUMIN/CREATININE RATIO      0 - 30 mg/g creatinine 11   Hemoglobin A1C      4 2 - 6 3 % 7 0 (H)   EAG      mg/dl 154     Component      Latest Ref Rng & Units 8/16/2018   TSH 3RD GENERATON      0 358 - 3 740 uIU/mL 3 200

## 2019-07-11 LAB
ACE SERPL-CCNC: 64 U/L (ref 14–82)
HBV CORE AB SER QL: NORMAL
HBV SURFACE AB SER-ACNC: <3.1 MIU/ML
HBV SURFACE AG SER QL: NORMAL
RHEUMATOID FACT SER QL LA: NEGATIVE

## 2019-07-14 LAB
HCV RNA SERPL NAA+PROBE-ACNC: NORMAL IU/ML
TEST INFORMATION: NORMAL

## 2019-07-15 LAB
C-ANCA TITR SER IF: NORMAL TITER
MYELOPEROXIDASE AB SER IA-ACNC: <9 U/ML (ref 0–9)
P-ANCA ATYPICAL TITR SER IF: NORMAL TITER
P-ANCA TITR SER IF: NORMAL TITER
PROTEINASE3 AB SER IA-ACNC: <3.5 U/ML (ref 0–3.5)

## 2019-07-16 ENCOUNTER — HOSPITAL ENCOUNTER (OUTPATIENT)
Dept: PULMONOLOGY | Facility: HOSPITAL | Age: 59
Discharge: HOME | End: 2019-07-16
Payer: COMMERCIAL

## 2019-07-16 DIAGNOSIS — R05.3 PERSISTENT DRY COUGH: ICD-10-CM

## 2019-07-16 DIAGNOSIS — D86.9 SARCOIDOSIS: ICD-10-CM

## 2019-07-16 DIAGNOSIS — D86.1 SARCOIDOSIS OF LYMPH NODES: ICD-10-CM

## 2019-07-16 DIAGNOSIS — R06.00 DYSPNEA ON EXERTION: ICD-10-CM

## 2019-07-16 PROCEDURE — 94729 DIFFUSING CAPACITY: CPT

## 2019-07-16 PROCEDURE — 94060 EVALUATION OF WHEEZING: CPT | Performed by: INTERNAL MEDICINE

## 2019-07-16 PROCEDURE — 94760 N-INVAS EAR/PLS OXIMETRY 1: CPT

## 2019-07-16 PROCEDURE — 94729 DIFFUSING CAPACITY: CPT | Performed by: INTERNAL MEDICINE

## 2019-07-16 PROCEDURE — 94060 EVALUATION OF WHEEZING: CPT

## 2019-07-16 PROCEDURE — 94726 PLETHYSMOGRAPHY LUNG VOLUMES: CPT | Performed by: INTERNAL MEDICINE

## 2019-07-16 PROCEDURE — 94726 PLETHYSMOGRAPHY LUNG VOLUMES: CPT

## 2019-07-16 RX ORDER — ALBUTEROL SULFATE 2.5 MG/3ML
2.5 SOLUTION RESPIRATORY (INHALATION) ONCE
Status: COMPLETED | OUTPATIENT
Start: 2019-07-16 | End: 2019-07-16

## 2019-07-16 RX ADMIN — ALBUTEROL SULFATE 2.5 MG: 2.5 SOLUTION RESPIRATORY (INHALATION) at 08:35

## 2019-07-17 ENCOUNTER — OFFICE VISIT (OUTPATIENT)
Dept: PULMONOLOGY | Facility: CLINIC | Age: 59
End: 2019-07-17
Payer: COMMERCIAL

## 2019-07-17 VITALS
WEIGHT: 265 LBS | HEIGHT: 75 IN | TEMPERATURE: 96.8 F | RESPIRATION RATE: 14 BRPM | SYSTOLIC BLOOD PRESSURE: 120 MMHG | OXYGEN SATURATION: 94 % | BODY MASS INDEX: 32.95 KG/M2 | HEART RATE: 110 BPM | DIASTOLIC BLOOD PRESSURE: 70 MMHG

## 2019-07-17 DIAGNOSIS — R59.0 MEDIASTINAL LYMPHADENOPATHY: Primary | ICD-10-CM

## 2019-07-17 PROCEDURE — 99243 OFF/OP CNSLTJ NEW/EST LOW 30: CPT | Performed by: INTERNAL MEDICINE

## 2019-07-17 NOTE — PROGRESS NOTES
Pulmonary Consultation   Tegan Powers 60 y o  male MRN: [id number]  7/17/2019      Assessment:    1  Mediastinal lymphadenopathy-most likely consistent with sarcoidosis given results of lymph node biopsy showing histiocytes/multi nucleated cells along with clinical symptoms of joint pain/myalgia/neuropathy/possible right eye uveitis and small papular skin lesions of the left calf  No evidence of parotitis to suggest Heerfordt syndrome which would provide a clinical diagnosis of sarcoid, although he does complain of RT eye pain and redness suggestive of anterior uveitis and RT facial droop  He does have mild restriction on PFTs which can be seen with sarcoid but there does not appear to be much parenchymal lung involvement  Additional differential would include infectious adenopathy, cryoglobulinemia given his history of Hep C (although he has been treated)    2  Shortness of breath with exertion- likely related to underlying systemic disease which is presumed to be most likely sarcoidosis at this time  No obvious fibrosis on CT of the chest but there is mild restriction on PFTs    3  Right middle lobe subcentimeter nodule- measuring 5 mm  Likely granulomatous in nature given concern for sarcoid versus a neoplastic process  Patient has a remote history of smoking for 5 years but quit many years ago  Plan:  - Agree with steroid initiation  If patient continues to improve, would taper in hopes of discontinuation as long as he is symptom free  If his symptoms persist, would transition him to immuno modulators instead  - Follow up with Ophthalmology which the patient reports he has a scheduled appointment in September and complete Echo which will be completed in August for evaluation of cardiac involvement  - Follow up in 3 months, sooner if there is a change in clinical status  - No need for any additional pulmonary testing/imaging at this time     - can repeat imaging with CT chest in 6-12 months for subcentimeter nodule      History of Present Illness   HPI:  Viola Campa is a 60 y o  male with a past medical history hepatitis C treated with Tabatha Rogers, cirrhosis, type 2 diabetes who presents today with complaints of shortness of breath that he recalls began back in 2016  The patient has seen several physicians in undergone extensive testing including multiple imaging studies and laboratory tests in order to delineate the cause of his symptoms  In addition to his shortness of breath the patient also complains of severe right-sided headaches, occasional facial droops, right eye pain associated with redness tingling and numbness in the bilateral lower extremities  He reports a few months ago the headaches became more severe and so he presented to his physician for further workup and underwent an MRI of the brain and CT of the head which did not reveal any acute pathology but did incidentally capture mediastinal lymphadenopathy which led to a CT scan on 05/15/2019 showing multiple enlarged lymph nodes throughout the mediastinum  He then underwent a endobronchial ultrasound with lymph node biopsy which revealed macrophages/histiocytes/multi nucleated giant cells  He was recently seen by Rheumatology last week and was started on steroids for treatment of presumed sarcoidosis  He was then referred to pulmonology for further evaluation and management of his lymphadenopathy/shortness of breath  The patient has already underwent pulmonary workup including a CT of the chest/recent PFT/and EBUS  He does not use any inhalers/no history of asthma  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 and prior to that was staying at home and going to school  He lives at home with his wife/2 kids/and parents along and has 2 dogs and 2 cats but does not complain of any allergies  Review of Systems   Constitutional: Positive for fatigue   Negative for appetite change, chills, diaphoresis, fever and unexpected weight change  HENT: Negative for congestion, ear pain, mouth sores, nosebleeds, postnasal drip, rhinorrhea, sinus pressure, sinus pain, sneezing, sore throat, tinnitus, trouble swallowing and voice change  Eyes: Positive for pain and redness  Negative for photophobia, itching and visual disturbance  RT eye   Respiratory: Positive for cough and shortness of breath  Negative for apnea, choking, chest tightness and wheezing  SOB w/ exertion and a dry cough   Cardiovascular: Negative for chest pain, palpitations and leg swelling  Gastrointestinal: Negative for abdominal distention, abdominal pain, blood in stool, constipation, diarrhea, nausea and vomiting  Endocrine: Negative for cold intolerance, heat intolerance, polyphagia and polyuria  Genitourinary: Negative for difficulty urinating, flank pain and hematuria  Musculoskeletal: Positive for arthralgias and myalgias  Negative for back pain, gait problem, joint swelling, neck pain and neck stiffness  Skin: Negative for color change, pallor, rash and wound  Allergic/Immunologic: Negative for environmental allergies, food allergies and immunocompromised state  Neurological: Positive for headaches  Negative for dizziness, tremors, seizures, syncope, speech difficulty, weakness, light-headedness and numbness  Occasional RT facial droop   Hematological: Negative for adenopathy  Does not bruise/bleed easily  Psychiatric/Behavioral: Negative          Historical Information   Past Medical History:   Diagnosis Date    BPH (benign prostatic hyperplasia)     Diabetes mellitus ([facility] [address] )     Erectile dysfunction     GERD (gastroesophageal reflux disease)     Hematuria     Liver disease     hepatitis C    Mediastinal adenopathy     Vitamin D deficiency      Past Surgical History:   Procedure Laterality Date    COLONOSCOPY      last assessed: 08/28/2012; fiberoptic screening     CYSTOSCOPY      onset: 05/06/2016; Diagnostic     DENTAL SURGERY      LIVER BIOPSY      IA BRONCHOSCOPY NEEDLE BX TRACHEA MAIN STEM&/BRON N/A 6/6/2019    Procedure: ENDOBRONCHIAL ULTRASOUND (EBUS); Surgeon: Karina Leslie MD;  [facility];  Service: Thoracic    IA [address] N/A 6/6/2019    Procedure: Laura Pew;  Surgeon: Karina Leslie MD;  [facility];  Service: Thoracic    IA TRANSURETHRAL ELEC-SURG PROSTATECTOM N/A 9/13/2017    Procedure: Leyla Kaur; TUR PROSTATE;  Surgeon: Franki Meza MD;  [facility];  Service: Urology    SHOULDER ARTHROSCOPY Right     bicep tendon repair, rotator cuff repair and acromuim shave      Family History   Problem Relation Age of Onset    Stroke Father     Cancer Maternal Grandmother     Valvular heart disease Mother        Occupational History:  Patient was in the [employer] in the 1980s, he was staying at home with his wife and going to school in the 2000s, who began working at [employer] after graduating from college in 2012 and is working in an administrative position  Social History:  He reports drinking 6-8 beers per weekend for several years while he was in the [employer] but has not consumed any alcohol since 1998, he has 1 type 2 in the right arm, denies any IV drug use but does report being exposed to blood specimens while in the [employer]  He smoked 1 pack per day while in his 25s off and on for 5 years and quit and then began smoking again in 2008 for a couple months      Meds/Allergies     Current Outpatient Medications:     Cholecalciferol (VITAMIN D3) 5000 units CAPS, Take by mouth, Disp: , Rfl:     famotidine (PEPCID) 40 MG tablet, Take 40 mg by mouth daily , Disp: , Rfl:     levothyroxine 50 mcg tablet, TAKE 1 TABLET BY MOUTH EVERY DAY, Disp: 90 tablet, Rfl: 1    liraglutide (VICTOZA) injection, Inject 0 2 mL (1 2 mg total) under the skin daily, Disp: 2 pen, Rfl: 4    metFORMIN (GLUCOPHAGE) 500 mg tablet, TAKE 1 TABLET BY MOUTH TWICE A DAY WITH MEALS, Disp: 180 tablet, Rfl: 1    NOVOTWIST 32G X 5 MM MISC, , Disp: , Rfl:     predniSONE 20 mg tablet, Take 3 tablets (60 mg total) by mouth daily, Disp: 90 tablet, Rfl: 1  Allergies   Allergen Reactions    No Active Allergies        Vitals: Blood pressure 120/70, pulse (!) 110, temperature (!) 96 8 °F (36 °C), resp  rate 14, height 6' 3" (1 905 m), weight 120 kg (265 lb), SpO2 94 %  , Body mass index is 33 12 kg/m²  Oxygen Therapy  SpO2: 94 %    Physical Exam  Physical Exam   Constitutional: He is oriented to person, place, and time  He appears well-developed  No distress  HENT:   Head: Normocephalic and atraumatic  Nose: Nose normal    Mouth/Throat: Oropharynx is clear and moist    Eyes: Pupils are equal, round, and reactive to light  EOM are normal    Right scleral erythema with periocular erythema   Neck: Normal range of motion  No JVD present  No tracheal deviation present  No thyromegaly present  Cardiovascular: Regular rhythm  Exam reveals no gallop and no friction rub  No murmur heard  Tachycardia   Pulmonary/Chest: Effort normal and breath sounds normal  No stridor  No respiratory distress  He has no wheezes  He has no rales  Abdominal: Soft  Bowel sounds are normal  He exhibits no distension and no mass  There is no tenderness  There is no guarding  Musculoskeletal: Normal range of motion  He exhibits no edema, tenderness or deformity  Lymphadenopathy:     He has no cervical adenopathy  Neurological: He is alert and oriented to person, place, and time  He displays normal reflexes  No cranial nerve deficit  He exhibits normal muscle tone  Coordination normal    Skin: Skin is warm  He is not diaphoretic  Two small papular erythematous lesions on the left medial calf that are nontender to palpation   Psychiatric: He has a normal mood and affect         Labs: ABG: No results found for: PHART, DRI3JMH, PO2ART, XUT7WPY, Z3OIJGZG, BEART, SOURCE, BNP: No results found for: BNP, CBC: No results found for: WBC, HGB, HCT, MCV, PLT, ADJUSTEDWBC, MCH, MCHC, RDW, MPV, NRBC, CMP: No results found for: SODIUM, K, CL, CO2, ANIONGAP, BUN, CREATININE, GLUCOSE, CALCIUM, AST, ALT, ALKPHOS, PROT, BILITOT, EGFR, Troponin: No results found for: TROPONINI  Lab Results   Component Value Date    WBC 6 42 07/10/2019    HGB 16 1 07/10/2019    HCT 47 6 07/10/2019    MCV 92 07/10/2019     07/10/2019     Lab Results   Component Value Date    GLUCOSE 145 (H) 01/01/2016    CALCIUM 8 9 07/10/2019     07/12/2017    K 3 8 07/10/2019    CO2 25 07/10/2019    CL 98 (L) 07/10/2019    BUN 15 07/10/2019    CREATININE 1 23 07/10/2019     No results found for: IGE  Lab Results   Component Value Date    ALT 30 07/10/2019    AST 17 07/10/2019    ALKPHOS 85 07/10/2019    BILITOT 0 7 01/21/2016           Imaging and other studies:   CT chest performed on 05/15/2019-  Lymphadenopathy is evident along the 4R/4 L/7 station lymph nodes  A 5 mm right middle lobe nodule is present without calcification  There are no fibrotic or nodular changes grossly present within the upper lung parenchyma  Pulmonary function testing:   Pulmonary function test performed on 07/16/2019-FEV1/FVC 80%, TLC 70%, RV 72%, DLCO 65% which is consistent with mild restrictive lung disease but no evidence of obstruction  EKG, Pathology, and Other Studies: I have personally reviewed pertinent reports          Treasure Nolasco MD  Pulmonary and Critical Care Fellow- PGY 4  [facility]  Answers for HPI/ROS submitted by the patient on 7/17/2019   Primary symptoms  Do you have difficulty breathing?: Yes  Chronicity: chronic  When did you first notice your symptoms?: more than 1 year ago  How often do your symptoms occur?: intermittently  Since you first noticed this problem, how has it changed?: waxing and waning  Do you have shortness of breath that occurs with effort or exertion?: Yes  Do you have ear congestion?: No  Do you have heartburn?: Yes  Do you have fatigue?: Yes  Do you have nasal congestion?: No  Do you have shortness of breath when lying flat?: No  Do you have shortness of breath when you wake up?: Yes  Do you have sweats?: Yes  Have you experienced weight loss?: No  Which of the following makes your symptoms worse?: change in weather, climbing stairs, exercise, strenuous activity  Which of the following makes your symptoms better?: rest

## 2019-08-01 ENCOUNTER — TELEPHONE (OUTPATIENT)
Dept: FAMILY MEDICINE CLINIC | Facility: CLINIC | Age: 59
End: 2019-08-01

## 2019-08-01 ENCOUNTER — HOSPITAL ENCOUNTER (INPATIENT)
Facility: HOSPITAL | Age: 59
LOS: 3 days | Discharge: HOME | DRG: 639 | End: 2019-08-04
Attending: EMERGENCY MEDICINE | Admitting: HOSPITALIST
Payer: COMMERCIAL

## 2019-08-01 ENCOUNTER — APPOINTMENT (EMERGENCY)
Dept: RADIOLOGY | Facility: HOSPITAL | Age: 59
DRG: 639 | End: 2019-08-01
Payer: COMMERCIAL

## 2019-08-01 ENCOUNTER — OFFICE VISIT (OUTPATIENT)
Dept: FAMILY MEDICINE CLINIC | Facility: CLINIC | Age: 59
End: 2019-08-01
Payer: COMMERCIAL

## 2019-08-01 ENCOUNTER — APPOINTMENT (OUTPATIENT)
Dept: LAB | Facility: CLINIC | Age: 59
End: 2019-08-01
Payer: COMMERCIAL

## 2019-08-01 ENCOUNTER — TELEPHONE (OUTPATIENT)
Dept: OTHER | Facility: OTHER | Age: 59
End: 2019-08-01

## 2019-08-01 VITALS
WEIGHT: 253.4 LBS | HEIGHT: 75 IN | BODY MASS INDEX: 31.51 KG/M2 | DIASTOLIC BLOOD PRESSURE: 80 MMHG | TEMPERATURE: 98.4 F | SYSTOLIC BLOOD PRESSURE: 126 MMHG | HEART RATE: 86 BPM

## 2019-08-01 DIAGNOSIS — E11.10 DIABETIC KETOACIDOSIS WITHOUT COMA ASSOCIATED WITH TYPE 2 DIABETES MELLITUS (HCC): ICD-10-CM

## 2019-08-01 DIAGNOSIS — E03.9 HYPOTHYROIDISM, UNSPECIFIED TYPE: ICD-10-CM

## 2019-08-01 DIAGNOSIS — D86.9 SARCOIDOSIS: Primary | ICD-10-CM

## 2019-08-01 DIAGNOSIS — E11.9 TYPE 2 DIABETES MELLITUS WITHOUT COMPLICATION, WITHOUT LONG-TERM CURRENT USE OF INSULIN (HCC): ICD-10-CM

## 2019-08-01 DIAGNOSIS — G89.29 CHRONIC NONINTRACTABLE HEADACHE, UNSPECIFIED HEADACHE TYPE: ICD-10-CM

## 2019-08-01 DIAGNOSIS — R73.9 HYPERGLYCEMIA: Primary | ICD-10-CM

## 2019-08-01 DIAGNOSIS — R51.9 CHRONIC NONINTRACTABLE HEADACHE, UNSPECIFIED HEADACHE TYPE: ICD-10-CM

## 2019-08-01 DIAGNOSIS — R00.0 TACHYCARDIA: ICD-10-CM

## 2019-08-01 DIAGNOSIS — G60.8 POLYNEUROPATHY, PERIPHERAL SENSORIMOTOR AXONAL: ICD-10-CM

## 2019-08-01 PROBLEM — E87.2 HIGH ANION GAP METABOLIC ACIDOSIS: Status: ACTIVE | Noted: 2019-08-01

## 2019-08-01 PROBLEM — E87.1 HYPONATREMIA: Status: ACTIVE | Noted: 2019-08-01

## 2019-08-01 PROBLEM — E87.29 HIGH ANION GAP METABOLIC ACIDOSIS: Status: ACTIVE | Noted: 2019-08-01

## 2019-08-01 LAB
ALBUMIN SERPL BCP-MCNC: 3.5 G/DL (ref 3.5–5)
ALBUMIN SERPL BCP-MCNC: 3.8 G/DL (ref 3.5–5)
ALP SERPL-CCNC: 110 U/L (ref 46–116)
ALP SERPL-CCNC: 80 U/L (ref 46–116)
ALT SERPL W P-5'-P-CCNC: 23 U/L (ref 12–78)
ANION GAP SERPL CALCULATED.3IONS-SCNC: 15 MMOL/L (ref 4–13)
ANION GAP SERPL CALCULATED.3IONS-SCNC: 18 MMOL/L (ref 4–13)
AST SERPL W P-5'-P-CCNC: 45 U/L (ref 5–45)
AST SERPL W P-5'-P-CCNC: 9 U/L (ref 5–45)
BACTERIA UR QL AUTO: ABNORMAL /HPF
BASE EX.OXY STD BLDV CALC-SCNC: 90.8 % (ref 60–80)
BASE EXCESS BLDV CALC-SCNC: -4.3 MMOL/L
BASOPHILS # BLD AUTO: 0.06 THOUSANDS/ΜL (ref 0–0.1)
BASOPHILS NFR BLD AUTO: 1 % (ref 0–1)
BETA-HYDROXYBUTYRATE: 1.5 MMOL/L
BILIRUB SERPL-MCNC: 0.9 MG/DL (ref 0.2–1)
BILIRUB SERPL-MCNC: 1.11 MG/DL (ref 0.2–1)
BILIRUB UR QL STRIP: NEGATIVE
BUN SERPL-MCNC: 31 MG/DL (ref 5–25)
BUN SERPL-MCNC: 36 MG/DL (ref 5–25)
CALCIUM SERPL-MCNC: 7.9 MG/DL (ref 8.3–10.1)
CALCIUM SERPL-MCNC: 8.7 MG/DL (ref 8.3–10.1)
CHLORIDE SERPL-SCNC: 94 MMOL/L (ref 100–108)
CHLORIDE SERPL-SCNC: 94 MMOL/L (ref 100–108)
CLARITY UR: CLEAR
CO2 SERPL-SCNC: 16 MMOL/L (ref 21–32)
CO2 SERPL-SCNC: 18 MMOL/L (ref 21–32)
COLOR UR: YELLOW
CREAT SERPL-MCNC: 1.18 MG/DL (ref 0.6–1.3)
CREAT SERPL-MCNC: 1.27 MG/DL (ref 0.6–1.3)
EOSINOPHIL # BLD AUTO: 0.06 THOUSAND/ΜL (ref 0–0.61)
EOSINOPHIL NFR BLD AUTO: 1 % (ref 0–6)
ERYTHROCYTE [DISTWIDTH] IN BLOOD BY AUTOMATED COUNT: 12.1 % (ref 11.6–15.1)
GFR SERPL CREATININE-BSD FRML MDRD: 61 ML/MIN/1.73SQ M
GFR SERPL CREATININE-BSD FRML MDRD: 67 ML/MIN/1.73SQ M
GLUCOSE SERPL-MCNC: 335 MG/DL (ref 65–140)
GLUCOSE SERPL-MCNC: 617 MG/DL (ref 65–140)
GLUCOSE SERPL-MCNC: 627 MG/DL (ref 65–140)
GLUCOSE SERPL-MCNC: >500 MG/DL (ref 65–140)
GLUCOSE UR STRIP-MCNC: ABNORMAL MG/DL
HCO3 BLDV-SCNC: 18.6 MMOL/L (ref 24–30)
HCT VFR BLD AUTO: 49.4 % (ref 36.5–49.3)
HGB BLD-MCNC: 17.7 G/DL (ref 12–17)
HGB UR QL STRIP.AUTO: ABNORMAL
IMM GRANULOCYTES # BLD AUTO: 0.24 THOUSAND/UL (ref 0–0.2)
IMM GRANULOCYTES NFR BLD AUTO: 2 % (ref 0–2)
KETONES UR STRIP-MCNC: ABNORMAL MG/DL
LACTATE SERPL-SCNC: 0.5 MMOL/L (ref 0.5–2)
LEUKOCYTE ESTERASE UR QL STRIP: NEGATIVE
LYMPHOCYTES # BLD AUTO: 2.13 THOUSANDS/ΜL (ref 0.6–4.47)
LYMPHOCYTES NFR BLD AUTO: 16 % (ref 14–44)
MCH RBC QN AUTO: 32.4 PG (ref 26.8–34.3)
MCHC RBC AUTO-ENTMCNC: 35.8 G/DL (ref 31.4–37.4)
MCV RBC AUTO: 91 FL (ref 82–98)
MONOCYTES # BLD AUTO: 0.89 THOUSAND/ΜL (ref 0.17–1.22)
MONOCYTES NFR BLD AUTO: 7 % (ref 4–12)
NEUTROPHILS # BLD AUTO: 9.65 THOUSANDS/ΜL (ref 1.85–7.62)
NEUTS SEG NFR BLD AUTO: 73 % (ref 43–75)
NITRITE UR QL STRIP: NEGATIVE
NON-SQ EPI CELLS URNS QL MICRO: ABNORMAL /HPF
NRBC BLD AUTO-RTO: 0 /100 WBCS
O2 CT BLDV-SCNC: 22.3 ML/DL
PCO2 BLDV: 29.5 MM HG (ref 42–50)
PH BLDV: 7.42 [PH] (ref 7.3–7.4)
PH UR STRIP.AUTO: 5 [PH] (ref 4.5–8)
PLATELET # BLD AUTO: 269 THOUSANDS/UL (ref 149–390)
PMV BLD AUTO: 9.7 FL (ref 8.9–12.7)
PO2 BLDV: 64.6 MM HG (ref 35–45)
POTASSIUM SERPL-SCNC: 4.4 MMOL/L (ref 3.5–5.3)
POTASSIUM SERPL-SCNC: 4.7 MMOL/L (ref 3.5–5.3)
PROT SERPL-MCNC: 7.4 G/DL (ref 6.4–8.2)
PROT SERPL-MCNC: 7.9 G/DL (ref 6.4–8.2)
PROT UR STRIP-MCNC: NEGATIVE MG/DL
RBC # BLD AUTO: 5.46 MILLION/UL (ref 3.88–5.62)
RBC #/AREA URNS AUTO: ABNORMAL /HPF
SL AMB POCT GLUCOSE BLD: ABNORMAL
SL AMB POCT HEMOGLOBIN AIC: 12.1 (ref ?–6.5)
SODIUM SERPL-SCNC: 127 MMOL/L (ref 136–145)
SODIUM SERPL-SCNC: 128 MMOL/L (ref 136–145)
SP GR UR STRIP.AUTO: 1.01 (ref 1–1.03)
TROPONIN I SERPL-MCNC: <0.02 NG/ML
TSH SERPL DL<=0.05 MIU/L-ACNC: 2.11 UIU/ML (ref 0.36–3.74)
TSH SERPL DL<=0.05 MIU/L-ACNC: 2.33 UIU/ML (ref 0.36–3.74)
UROBILINOGEN UR QL STRIP.AUTO: 0.2 E.U./DL
WBC # BLD AUTO: 13.03 THOUSAND/UL (ref 4.31–10.16)
WBC #/AREA URNS AUTO: ABNORMAL /HPF

## 2019-08-01 PROCEDURE — 85025 COMPLETE CBC W/AUTO DIFF WBC: CPT | Performed by: EMERGENCY MEDICINE

## 2019-08-01 PROCEDURE — 71045 X-RAY EXAM CHEST 1 VIEW: CPT

## 2019-08-01 PROCEDURE — 80048 BASIC METABOLIC PNL TOTAL CA: CPT | Performed by: PHYSICIAN ASSISTANT

## 2019-08-01 PROCEDURE — 82948 REAGENT STRIP/BLOOD GLUCOSE: CPT | Performed by: FAMILY MEDICINE

## 2019-08-01 PROCEDURE — 83735 ASSAY OF MAGNESIUM: CPT | Performed by: PHYSICIAN ASSISTANT

## 2019-08-01 PROCEDURE — 93005 ELECTROCARDIOGRAM TRACING: CPT

## 2019-08-01 PROCEDURE — 81001 URINALYSIS AUTO W/SCOPE: CPT

## 2019-08-01 PROCEDURE — 82805 BLOOD GASES W/O2 SATURATION: CPT | Performed by: EMERGENCY MEDICINE

## 2019-08-01 PROCEDURE — 96375 TX/PRO/DX INJ NEW DRUG ADDON: CPT

## 2019-08-01 PROCEDURE — 84484 ASSAY OF TROPONIN QUANT: CPT | Performed by: EMERGENCY MEDICINE

## 2019-08-01 PROCEDURE — 84443 ASSAY THYROID STIM HORMONE: CPT | Performed by: EMERGENCY MEDICINE

## 2019-08-01 PROCEDURE — 96374 THER/PROPH/DIAG INJ IV PUSH: CPT

## 2019-08-01 PROCEDURE — 82948 REAGENT STRIP/BLOOD GLUCOSE: CPT

## 2019-08-01 PROCEDURE — 82010 KETONE BODYS QUAN: CPT | Performed by: EMERGENCY MEDICINE

## 2019-08-01 PROCEDURE — 84443 ASSAY THYROID STIM HORMONE: CPT

## 2019-08-01 PROCEDURE — 80053 COMPREHEN METABOLIC PANEL: CPT | Performed by: EMERGENCY MEDICINE

## 2019-08-01 PROCEDURE — 83605 ASSAY OF LACTIC ACID: CPT | Performed by: EMERGENCY MEDICINE

## 2019-08-01 PROCEDURE — 96361 HYDRATE IV INFUSION ADD-ON: CPT

## 2019-08-01 PROCEDURE — 99214 OFFICE O/P EST MOD 30 MIN: CPT | Performed by: FAMILY MEDICINE

## 2019-08-01 PROCEDURE — 83036 HEMOGLOBIN GLYCOSYLATED A1C: CPT | Performed by: FAMILY MEDICINE

## 2019-08-01 PROCEDURE — 99285 EMERGENCY DEPT VISIT HI MDM: CPT

## 2019-08-01 PROCEDURE — 1111F DSCHRG MED/CURRENT MED MERGE: CPT | Performed by: FAMILY MEDICINE

## 2019-08-01 PROCEDURE — 99285 EMERGENCY DEPT VISIT HI MDM: CPT | Performed by: EMERGENCY MEDICINE

## 2019-08-01 PROCEDURE — 84100 ASSAY OF PHOSPHORUS: CPT | Performed by: PHYSICIAN ASSISTANT

## 2019-08-01 PROCEDURE — 96376 TX/PRO/DX INJ SAME DRUG ADON: CPT

## 2019-08-01 PROCEDURE — 80053 COMPREHEN METABOLIC PANEL: CPT

## 2019-08-01 PROCEDURE — 36415 COLL VENOUS BLD VENIPUNCTURE: CPT

## 2019-08-01 RX ORDER — PEN NEEDLE, DIABETIC 31 G X1/4"
NEEDLE, DISPOSABLE MISCELLANEOUS 4 TIMES DAILY
Qty: 150 EACH | Refills: 4 | Status: SHIPPED | OUTPATIENT
Start: 2019-08-01 | End: 2020-03-12 | Stop reason: SDUPTHER

## 2019-08-01 RX ORDER — DEXTROSE AND SODIUM CHLORIDE 5; .9 G/100ML; G/100ML
250 INJECTION, SOLUTION INTRAVENOUS CONTINUOUS
Status: DISCONTINUED | OUTPATIENT
Start: 2019-08-01 | End: 2019-08-02

## 2019-08-01 RX ORDER — SODIUM CHLORIDE 9 MG/ML
250 INJECTION, SOLUTION INTRAVENOUS CONTINUOUS
Status: DISCONTINUED | OUTPATIENT
Start: 2019-08-02 | End: 2019-08-02

## 2019-08-01 RX ORDER — DILTIAZEM HYDROCHLORIDE 5 MG/ML
20 INJECTION INTRAVENOUS ONCE
Status: COMPLETED | OUTPATIENT
Start: 2019-08-01 | End: 2019-08-01

## 2019-08-01 RX ORDER — SODIUM CHLORIDE 9 MG/ML
1000 INJECTION, SOLUTION INTRAVENOUS CONTINUOUS
Status: DISCONTINUED | OUTPATIENT
Start: 2019-08-01 | End: 2019-08-02

## 2019-08-01 RX ORDER — SODIUM CHLORIDE 9 MG/ML
500 INJECTION, SOLUTION INTRAVENOUS CONTINUOUS
Status: DISCONTINUED | OUTPATIENT
Start: 2019-08-02 | End: 2019-08-02

## 2019-08-01 RX ORDER — DILTIAZEM HYDROCHLORIDE 5 MG/ML
15 INJECTION INTRAVENOUS ONCE
Status: COMPLETED | OUTPATIENT
Start: 2019-08-01 | End: 2019-08-01

## 2019-08-01 RX ADMIN — SODIUM CHLORIDE 500 ML/HR: 0.9 INJECTION, SOLUTION INTRAVENOUS at 23:00

## 2019-08-01 RX ADMIN — DILTIAZEM HYDROCHLORIDE 15 MG: 5 INJECTION INTRAVENOUS at 22:00

## 2019-08-01 RX ADMIN — INSULIN HUMAN 10 UNITS: 100 INJECTION, SOLUTION PARENTERAL at 21:35

## 2019-08-01 RX ADMIN — Medication 11.5 UNITS/HR: at 23:46

## 2019-08-01 RX ADMIN — SODIUM CHLORIDE 250 ML/HR: 0.9 INJECTION, SOLUTION INTRAVENOUS at 23:15

## 2019-08-01 RX ADMIN — SODIUM CHLORIDE 1000 ML: 0.9 INJECTION, SOLUTION INTRAVENOUS at 23:14

## 2019-08-01 RX ADMIN — SODIUM CHLORIDE 1000 ML/HR: 0.9 INJECTION, SOLUTION INTRAVENOUS at 23:14

## 2019-08-01 RX ADMIN — Medication 11.5 UNITS/HR: at 23:37

## 2019-08-01 RX ADMIN — DILTIAZEM HYDROCHLORIDE 20 MG: 5 INJECTION INTRAVENOUS at 23:14

## 2019-08-01 RX ADMIN — SODIUM CHLORIDE 1000 ML: 0.9 INJECTION, SOLUTION INTRAVENOUS at 21:35

## 2019-08-01 NOTE — LETTER
[place]  [address] [facility] [address]  Dept: [phone]    August 4, 2019     Patient: Berna De La O   YOB: 1960   Date of Visit: 8/1/2019       To Whom it May Concern:    Berna De La O is under my professional care  He was seen in the hospital from 8/1/2019   to 08/04/19  He may return to work on Tuesday 8/6/19 without limitations  If you have any questions or concerns, please don't hesitate to call           Sincerely,          Promise Fitch PA-C

## 2019-08-01 NOTE — TELEPHONE ENCOUNTER
Pt called and he has been on steroids since 7/10/19 from Dr Chelsea Krause , his BS have been running in the 500's ,he is having visual disturbance and palpations ,he is currently taking Metformin 500 mg Bid and does not feel it is working ,he wants an appoint to come in and discuss , as per DR Harrison Espinoza he will see Pt at 11:00 and Pt is aware

## 2019-08-01 NOTE — ASSESSMENT & PLAN NOTE
Lab Results   Component Value Date    HGBA1C 12 1 (A) 08/01/2019     A1c increased from 7 to 12 1 since starting prednisone  Patient suffering from polydipsia and polyuria and has lost 10 lb though I did not appreciate ketones on his breath and patient does not show signs of overwhelming dehydration  Because of his hyperglycemia, risk for increased triglycerides, and probable episodes of dehydration, I will have him stop his Victoza and start days ago are 0 2 milligrams/kilogram (approximately 24 units) q h s  With 5 units of NovoLog with meals  Blood sugars are too high to register now so we will have him take 10 units of NovoLog with his next meal   Continue to hydrate  Check labs as above  Patient will call tomorrow with blood sugar results    Recheck 4 weeks

## 2019-08-01 NOTE — ASSESSMENT & PLAN NOTE
Secondary to sarcoidosis  Greatly improved with prednisone    Continue follow-up with Neurology and Rheumatology

## 2019-08-01 NOTE — PROGRESS NOTES
Assessment/Plan:    Sarcoidosis  Patient with improvement in his neuropathic symptoms as well as his headache since starting prednisone  He is still feeling short of breath with exertion despite having only mild restriction on his pulmonary function testing  This raises concern for cardiac involvement though no gallop was appreciated on today's exam   Weight loss most likely related to poorly controlled blood sugars due to prednisone  Recommend:  Continue prednisone  Recheck CMP a given other symptoms  Treat blood sugar by restarting insulin  Hold Victoza  Continue follow-up with Rheumatology  We moved his echocardiogram up to Monday due to his shortness of breath-await these results  Patient will call tomorrow with blood sugar results and follow up in 4 weeks    Hypothyroid  Recheck TSH    Type 2 diabetes mellitus without complication, without long-term current use of insulin ([facility])  Lab Results   Component Value Date    HGBA1C 12 1 (A) 08/01/2019     A1c increased from 7 to 12 1 since starting prednisone  Patient suffering from polydipsia and polyuria and has lost 10 lb though I did not appreciate ketones on his breath and patient does not show signs of overwhelming dehydration  Because of his hyperglycemia, risk for increased triglycerides, and probable episodes of dehydration, I will have him stop his Victoza and start days ago are 0 2 milligrams/kilogram (approximately 24 units) q h s  With 5 units of NovoLog with meals  Blood sugars are too high to register now so we will have him take 10 units of NovoLog with his next meal   Continue to hydrate  Check labs as above  Patient will call tomorrow with blood sugar results  Recheck 4 weeks    Polyneuropathy, peripheral sensorimotor axonal  Secondary to sarcoidosis  Greatly improved with prednisone  Continue follow-up with Neurology and Rheumatology    Chronic nonintractable headache  Greatly improved on prednisone    Continue follow-up with Neurology  Diagnoses and all orders for this visit:    Sarcoidosis    Type 2 diabetes mellitus without complication, without long-term current use of insulin ([facility])  -     POCT hemoglobin A1c  -     POCT blood glucose  -     insulin glargine (BASAGLAR KWIKPEN) 100 units/mL injection pen; Inject 24 Units under the skin daily  -     insulin aspart (NOVOLOG FLEXPEN) 100 Units/mL injection pen; Inject 5 Units under the skin 3 (three) times a day with meals  -     Comprehensive metabolic panel; Future  -     Insulin Pen Needle (PEN NEEDLES) 31G X 6 MM MISC; by Does not apply route 4 (four) times a day    Polyneuropathy, peripheral sensorimotor axonal    Chronic nonintractable headache, unspecified headache type    Hypothyroidism, unspecified type  -     TSH, 3rd generation; Future          Subjective:      Patient ID: Alexa Rubin is a 60 y o  male  60-year-old male with a history of diabetes type 2 and recent diagnosis of sarcoidosis presents with change in vision, increased fatigue, increased urination and increased thirst over last 1-2 weeks  Patient is seen neurology due to possible peripheral neuropathy related to his sarcoid, pulmonology as well as Rheumatology for the same processes  Rheumatology started patient on 60 mg of prednisone a day due to his symptoms 3 weeks ago  Patient is not been checking blood sugars regularly and has been compliant with his regular diet and medications  He notes that since he started the prednisone, burning sensation in his legs has greatly improved and is now only intermittent  He also notes that his headaches have almost completely resolved  He still notes intermittent shortness of breath however  Pulmonary function testing showed mild restriction but no other significant findings  He is scheduled for an echocardiogram and 3 weeks though his EKG was normal except for slightly low QRS    Patient feels that his heart is skipping beats every once in a while but he denies this being associated with chest pain or lightheadedness  He has lost 10 lb since starting the prednisone  - I reviewed recent studies, recent lab results, and specialist notes with patient      The following portions of the patient's history were reviewed and updated as appropriate: He  has a past medical history of BPH (benign prostatic hyperplasia), Diabetes mellitus ([facility] [place] [facility] ), Erectile dysfunction, GERD (gastroesophageal reflux disease), Hematuria, Liver disease, Mediastinal adenopathy, and Vitamin D deficiency  He   Patient Active Problem List    Diagnosis Date Noted    History of hepatitis C virus infection 07/10/2019    Current chronic use of systemic steroids 07/10/2019    Sarcoidosis 06/18/2019    Mediastinal lymphadenopathy 05/21/2019    Polyneuropathy, peripheral sensorimotor axonal     Numbness and tingling of both legs 05/09/2019    Right low back pain 05/09/2019    Neuralgia and neuritis, unspecified 03/17/2019    Chronic nonintractable headache 12/17/2018    Chronic hepatitis C without hepatic coma ([place] [address] ) 12/13/2018    Unspecified cirrhosis of liver ([place] [address] ) 12/13/2018    Hypothyroid 08/06/2018    Screening for prostate cancer 04/18/2018    Type 2 diabetes mellitus without complication, without long-term current use of insulin ([place] [address] ) 01/25/2018    Benign prostatic hyperplasia with urinary frequency 01/25/2018     He  has a past surgical history that includes Shoulder arthroscopy (Right); pr transurethral elec-surg prostatectom (N/A, 9/13/2017); Liver biopsy; Colonoscopy; Dental surgery; Cystoscopy; pr bronchoscopy needle bx trachea main stem&/bron (N/A, 6/6/2019); and pr bronchoscopy,diagnostic (N/A, 6/6/2019)  He  reports that he quit smoking about 9 years ago  His smoking use included cigarettes  He has a 2 00 pack-year smoking history  He has never used smokeless tobacco  He reports that he drank alcohol  He reports that he does not use drugs    Current Outpatient Medications Medication Sig Dispense Refill    Cholecalciferol (VITAMIN D3) 5000 units CAPS Take by mouth      famotidine (PEPCID) 40 MG tablet Take 40 mg by mouth daily       insulin aspart (NOVOLOG FLEXPEN) 100 Units/mL injection pen Inject 5 Units under the skin 3 (three) times a day with meals 5 pen 4    insulin glargine (BASAGLAR KWIKPEN) 100 units/mL injection pen Inject 24 Units under the skin daily 5 pen 4    Insulin Pen Needle (PEN NEEDLES) 31G X 6 MM MISC by Does not apply route 4 (four) times a day 150 each 4    levothyroxine 50 mcg tablet TAKE 1 TABLET BY MOUTH EVERY DAY 90 tablet 1    liraglutide (VICTOZA) injection Inject 0 2 mL (1 2 mg total) under the skin daily 2 pen 4    metFORMIN (GLUCOPHAGE) 500 mg tablet TAKE 1 TABLET BY MOUTH TWICE A DAY WITH MEALS 180 tablet 1    NOVOTWIST 32G X 5 MM MISC       predniSONE 20 mg tablet Take 3 tablets (60 mg total) by mouth daily 90 tablet 1     No current facility-administered medications for this visit  He is allergic to no active allergies       Review of Systems   Constitutional: Positive for fatigue and unexpected weight change  HENT: Negative  Eyes: Negative  Respiratory: Positive for shortness of breath  Negative for cough, wheezing and stridor  Cardiovascular: Negative for chest pain, palpitations and leg swelling  Gastrointestinal: Negative  Endocrine: Positive for polydipsia, polyphagia and polyuria  Genitourinary: Positive for frequency  Negative for difficulty urinating, dysuria and flank pain  Musculoskeletal: Positive for arthralgias (improved) and myalgias (improved)  Skin: Negative  Neurological: Positive for headaches (improved)  Negative for dizziness and numbness  Hematological: Negative for adenopathy  Does not bruise/bleed easily  Psychiatric/Behavioral: Negative            Objective:      /80 (BP Location: Right arm, Patient Position: Sitting, Cuff Size: Standard)   Pulse 86   Temp 98 4 °F (36 9 °C)  6' 3" (1 905 m)   Wt 115 kg (253 lb 6 4 oz)   BMI 31 67 kg/m²          Physical Exam   Constitutional: He is oriented to person, place, and time  He appears well-developed and well-nourished  HENT:   Head: Normocephalic and atraumatic  Right Ear: External ear normal    Left Ear: External ear normal    Nose: Nose normal    Oropharynx ?sl dry? Eyes: Pupils are equal, round, and reactive to light  Conjunctivae and EOM are normal    Neck: Normal range of motion  Neck supple  Cardiovascular: Normal rate, regular rhythm, normal heart sounds and intact distal pulses  Pulmonary/Chest: Effort normal and breath sounds normal  He has no wheezes  He has no rales  Abdominal: Soft  Bowel sounds are normal  He exhibits no distension  There is no tenderness  Musculoskeletal: He exhibits no edema  Lymphadenopathy:     He has no cervical adenopathy  Neurological: He is alert and oriented to person, place, and time  Skin: Skin is warm  Capillary refill takes less than 2 seconds

## 2019-08-01 NOTE — PROGRESS NOTES
BMI Counseling: Body mass index is 31 67 kg/m²  Discussed the patient's BMI with him  The BMI is above average  No BMI follow-up plan is appropriate  Patient is in an urgent or emergent medical situation

## 2019-08-01 NOTE — ASSESSMENT & PLAN NOTE
Patient with improvement in his neuropathic symptoms as well as his headache since starting prednisone  He is still feeling short of breath with exertion despite having only mild restriction on his pulmonary function testing  This raises concern for cardiac involvement though no gallop was appreciated on today's exam   Weight loss most likely related to poorly controlled blood sugars due to prednisone  Recommend:  Continue prednisone  Recheck CMP a given other symptoms  Treat blood sugar by restarting insulin  Hold Victoza  Continue follow-up with Rheumatology  We moved his echocardiogram up to Monday due to his shortness of breath-await these results    Patient will call tomorrow with blood sugar results and follow up in 4 weeks

## 2019-08-02 ENCOUNTER — TELEPHONE (OUTPATIENT)
Dept: FAMILY MEDICINE CLINIC | Facility: CLINIC | Age: 59
End: 2019-08-02

## 2019-08-02 LAB
ANION GAP SERPL CALCULATED.3IONS-SCNC: 11 MMOL/L (ref 4–13)
ANION GAP SERPL CALCULATED.3IONS-SCNC: 18 MMOL/L (ref 4–13)
ATRIAL RATE: 148 BPM
BASOPHILS # BLD AUTO: 0.05 THOUSANDS/ΜL (ref 0–0.1)
BASOPHILS NFR BLD AUTO: 1 % (ref 0–1)
BUN SERPL-MCNC: 26 MG/DL (ref 5–25)
BUN SERPL-MCNC: 32 MG/DL (ref 5–25)
CALCIUM SERPL-MCNC: 6.7 MG/DL (ref 8.3–10.1)
CALCIUM SERPL-MCNC: 7.5 MG/DL (ref 8.3–10.1)
CHLORIDE SERPL-SCNC: 101 MMOL/L (ref 100–108)
CHLORIDE SERPL-SCNC: 110 MMOL/L (ref 100–108)
CO2 SERPL-SCNC: 16 MMOL/L (ref 21–32)
CO2 SERPL-SCNC: 20 MMOL/L (ref 21–32)
CREAT SERPL-MCNC: 0.83 MG/DL (ref 0.6–1.3)
CREAT SERPL-MCNC: 1.05 MG/DL (ref 0.6–1.3)
EOSINOPHIL # BLD AUTO: 0.13 THOUSAND/ΜL (ref 0–0.61)
EOSINOPHIL NFR BLD AUTO: 1 % (ref 0–6)
ERYTHROCYTE [DISTWIDTH] IN BLOOD BY AUTOMATED COUNT: 12.3 % (ref 11.6–15.1)
GFR SERPL CREATININE-BSD FRML MDRD: 77 ML/MIN/1.73SQ M
GFR SERPL CREATININE-BSD FRML MDRD: 96 ML/MIN/1.73SQ M
GLUCOSE SERPL-MCNC: 149 MG/DL (ref 65–140)
GLUCOSE SERPL-MCNC: 166 MG/DL (ref 65–140)
GLUCOSE SERPL-MCNC: 172 MG/DL (ref 65–140)
GLUCOSE SERPL-MCNC: 178 MG/DL (ref 65–140)
GLUCOSE SERPL-MCNC: 206 MG/DL (ref 65–140)
GLUCOSE SERPL-MCNC: 224 MG/DL (ref 65–140)
GLUCOSE SERPL-MCNC: 252 MG/DL (ref 65–140)
GLUCOSE SERPL-MCNC: 302 MG/DL (ref 65–140)
GLUCOSE SERPL-MCNC: 383 MG/DL (ref 65–140)
GLUCOSE SERPL-MCNC: 390 MG/DL (ref 65–140)
GLUCOSE SERPL-MCNC: 396 MG/DL (ref 65–140)
GLUCOSE SERPL-MCNC: 460 MG/DL (ref 65–140)
GLUCOSE SERPL-MCNC: 492 MG/DL (ref 65–140)
GLUCOSE SERPL-MCNC: 51 MG/DL (ref 65–140)
GLUCOSE SERPL-MCNC: 98 MG/DL (ref 65–140)
HCT VFR BLD AUTO: 40.7 % (ref 36.5–49.3)
HGB BLD-MCNC: 13.8 G/DL (ref 12–17)
IMM GRANULOCYTES # BLD AUTO: 0.21 THOUSAND/UL (ref 0–0.2)
IMM GRANULOCYTES NFR BLD AUTO: 2 % (ref 0–2)
LYMPHOCYTES # BLD AUTO: 1.77 THOUSANDS/ΜL (ref 0.6–4.47)
LYMPHOCYTES NFR BLD AUTO: 18 % (ref 14–44)
MAGNESIUM SERPL-MCNC: 2 MG/DL (ref 1.6–2.6)
MAGNESIUM SERPL-MCNC: 2.2 MG/DL (ref 1.6–2.6)
MCH RBC QN AUTO: 31.6 PG (ref 26.8–34.3)
MCHC RBC AUTO-ENTMCNC: 33.9 G/DL (ref 31.4–37.4)
MCV RBC AUTO: 93 FL (ref 82–98)
MONOCYTES # BLD AUTO: 0.62 THOUSAND/ΜL (ref 0.17–1.22)
MONOCYTES NFR BLD AUTO: 6 % (ref 4–12)
NEUTROPHILS # BLD AUTO: 6.87 THOUSANDS/ΜL (ref 1.85–7.62)
NEUTS SEG NFR BLD AUTO: 72 % (ref 43–75)
NRBC BLD AUTO-RTO: 0 /100 WBCS
P AXIS: 261 DEGREES
PHOSPHATE SERPL-MCNC: 2.5 MG/DL (ref 2.3–4.1)
PHOSPHATE SERPL-MCNC: 2.7 MG/DL (ref 2.3–4.1)
PLATELET # BLD AUTO: 185 THOUSANDS/UL (ref 149–390)
PMV BLD AUTO: 9.4 FL (ref 8.9–12.7)
POTASSIUM SERPL-SCNC: 3.7 MMOL/L (ref 3.5–5.3)
POTASSIUM SERPL-SCNC: 4.1 MMOL/L (ref 3.5–5.3)
PR INTERVAL: 116 MS
QRS AXIS: 35 DEGREES
QRSD INTERVAL: 70 MS
QT INTERVAL: 270 MS
QTC INTERVAL: 423 MS
RBC # BLD AUTO: 4.37 MILLION/UL (ref 3.88–5.62)
SODIUM SERPL-SCNC: 135 MMOL/L (ref 136–145)
SODIUM SERPL-SCNC: 141 MMOL/L (ref 136–145)
T WAVE AXIS: 95 DEGREES
T4 FREE SERPL-MCNC: 1.18 NG/DL (ref 0.76–1.46)
VENTRICULAR RATE: 148 BPM
WBC # BLD AUTO: 9.65 THOUSAND/UL (ref 4.31–10.16)

## 2019-08-02 PROCEDURE — 97163 PT EVAL HIGH COMPLEX 45 MIN: CPT

## 2019-08-02 PROCEDURE — 82948 REAGENT STRIP/BLOOD GLUCOSE: CPT

## 2019-08-02 PROCEDURE — 84100 ASSAY OF PHOSPHORUS: CPT | Performed by: PHYSICIAN ASSISTANT

## 2019-08-02 PROCEDURE — G8978 MOBILITY CURRENT STATUS: HCPCS

## 2019-08-02 PROCEDURE — 99223 1ST HOSP IP/OBS HIGH 75: CPT | Performed by: FAMILY MEDICINE

## 2019-08-02 PROCEDURE — 85025 COMPLETE CBC W/AUTO DIFF WBC: CPT | Performed by: PHYSICIAN ASSISTANT

## 2019-08-02 PROCEDURE — 84439 ASSAY OF FREE THYROXINE: CPT | Performed by: INTERNAL MEDICINE

## 2019-08-02 PROCEDURE — 83735 ASSAY OF MAGNESIUM: CPT | Performed by: PHYSICIAN ASSISTANT

## 2019-08-02 PROCEDURE — 80048 BASIC METABOLIC PNL TOTAL CA: CPT | Performed by: PHYSICIAN ASSISTANT

## 2019-08-02 PROCEDURE — G8980 MOBILITY D/C STATUS: HCPCS

## 2019-08-02 PROCEDURE — 93010 ELECTROCARDIOGRAM REPORT: CPT | Performed by: INTERNAL MEDICINE

## 2019-08-02 PROCEDURE — 99255 IP/OBS CONSLTJ NEW/EST HI 80: CPT | Performed by: INTERNAL MEDICINE

## 2019-08-02 RX ORDER — SODIUM CHLORIDE 9 MG/ML
2000 INJECTION, SOLUTION INTRAVENOUS CONTINUOUS
Status: DISCONTINUED | OUTPATIENT
Start: 2019-08-02 | End: 2019-08-02

## 2019-08-02 RX ORDER — POTASSIUM CHLORIDE 20 MEQ/1
20 TABLET, EXTENDED RELEASE ORAL ONCE
Status: COMPLETED | OUTPATIENT
Start: 2019-08-02 | End: 2019-08-02

## 2019-08-02 RX ORDER — PREDNISONE 20 MG/1
60 TABLET ORAL DAILY
Status: DISCONTINUED | OUTPATIENT
Start: 2019-08-02 | End: 2019-08-04 | Stop reason: HOSPADM

## 2019-08-02 RX ORDER — ONDANSETRON 2 MG/ML
4 INJECTION INTRAMUSCULAR; INTRAVENOUS EVERY 6 HOURS PRN
Status: DISCONTINUED | OUTPATIENT
Start: 2019-08-02 | End: 2019-08-04 | Stop reason: HOSPADM

## 2019-08-02 RX ORDER — FAMOTIDINE 20 MG/1
40 TABLET, FILM COATED ORAL DAILY
Status: DISCONTINUED | OUTPATIENT
Start: 2019-08-02 | End: 2019-08-04 | Stop reason: HOSPADM

## 2019-08-02 RX ORDER — SODIUM CHLORIDE 9 MG/ML
250 INJECTION, SOLUTION INTRAVENOUS CONTINUOUS
Status: DISCONTINUED | OUTPATIENT
Start: 2019-08-02 | End: 2019-08-02

## 2019-08-02 RX ORDER — INSULIN GLARGINE 100 [IU]/ML
24 INJECTION, SOLUTION SUBCUTANEOUS EVERY MORNING
Status: DISCONTINUED | OUTPATIENT
Start: 2019-08-02 | End: 2019-08-03

## 2019-08-02 RX ORDER — SODIUM CHLORIDE 9 MG/ML
500 INJECTION, SOLUTION INTRAVENOUS CONTINUOUS
Status: DISCONTINUED | OUTPATIENT
Start: 2019-08-02 | End: 2019-08-02

## 2019-08-02 RX ORDER — DEXTROSE AND SODIUM CHLORIDE 5; .9 G/100ML; G/100ML
250 INJECTION, SOLUTION INTRAVENOUS CONTINUOUS
Status: DISCONTINUED | OUTPATIENT
Start: 2019-08-02 | End: 2019-08-02

## 2019-08-02 RX ORDER — POTASSIUM CHLORIDE 14.9 MG/ML
20 INJECTION INTRAVENOUS ONCE
Status: COMPLETED | OUTPATIENT
Start: 2019-08-02 | End: 2019-08-02

## 2019-08-02 RX ORDER — LEVOTHYROXINE SODIUM 0.05 MG/1
50 TABLET ORAL
Status: DISCONTINUED | OUTPATIENT
Start: 2019-08-02 | End: 2019-08-04 | Stop reason: HOSPADM

## 2019-08-02 RX ADMIN — INSULIN LISPRO 5 UNITS: 100 INJECTION, SOLUTION INTRAVENOUS; SUBCUTANEOUS at 13:02

## 2019-08-02 RX ADMIN — INSULIN LISPRO 5 UNITS: 100 INJECTION, SOLUTION INTRAVENOUS; SUBCUTANEOUS at 16:48

## 2019-08-02 RX ADMIN — INSULIN LISPRO 10 UNITS: 100 INJECTION, SOLUTION INTRAVENOUS; SUBCUTANEOUS at 16:47

## 2019-08-02 RX ADMIN — PREDNISONE 60 MG: 20 TABLET ORAL at 08:13

## 2019-08-02 RX ADMIN — LEVOTHYROXINE SODIUM 50 MCG: 50 TABLET ORAL at 05:23

## 2019-08-02 RX ADMIN — DEXTROSE AND SODIUM CHLORIDE 250 ML/HR: 5; .9 INJECTION, SOLUTION INTRAVENOUS at 08:11

## 2019-08-02 RX ADMIN — INSULIN LISPRO 4 UNITS: 100 INJECTION, SOLUTION INTRAVENOUS; SUBCUTANEOUS at 13:02

## 2019-08-02 RX ADMIN — INSULIN LISPRO 5 UNITS: 100 INJECTION, SOLUTION INTRAVENOUS; SUBCUTANEOUS at 09:37

## 2019-08-02 RX ADMIN — DEXTROSE AND SODIUM CHLORIDE 250 ML/HR: 5; .9 INJECTION, SOLUTION INTRAVENOUS at 03:28

## 2019-08-02 RX ADMIN — INSULIN LISPRO 8 UNITS: 100 INJECTION, SOLUTION INTRAVENOUS; SUBCUTANEOUS at 09:37

## 2019-08-02 RX ADMIN — FAMOTIDINE 40 MG: 20 TABLET ORAL at 08:13

## 2019-08-02 RX ADMIN — SODIUM CHLORIDE 500 ML/HR: 0.9 INJECTION, SOLUTION INTRAVENOUS at 02:07

## 2019-08-02 RX ADMIN — POTASSIUM CHLORIDE 20 MEQ: 1500 TABLET, EXTENDED RELEASE ORAL at 00:32

## 2019-08-02 RX ADMIN — INSULIN LISPRO 5 UNITS: 100 INJECTION, SOLUTION INTRAVENOUS; SUBCUTANEOUS at 21:33

## 2019-08-02 RX ADMIN — INSULIN GLARGINE 24 UNITS: 100 INJECTION, SOLUTION SUBCUTANEOUS at 09:37

## 2019-08-02 RX ADMIN — INSULIN LISPRO 10 UNITS: 100 INJECTION, SOLUTION INTRAVENOUS; SUBCUTANEOUS at 23:15

## 2019-08-02 NOTE — ASSESSMENT & PLAN NOTE
Lab Results   Component Value Date    HGBA1C 12 1 (A) 08/01/2019       Recent Labs     08/01/19 2118 08/01/19  2342   POCGLU >500* 335*       Blood Sugar Average: Last 72 hrs:  (P) 167 5

## 2019-08-02 NOTE — ASSESSMENT & PLAN NOTE
-Likely 2/2 to dehydration, acidosis  -Obtain EKG to determine rhythm  -Give 20mg Cardizem in ED without effect, will attempt metoprolol 5mg IV   -Hydrate  -Telemetry

## 2019-08-02 NOTE — TELEPHONE ENCOUNTER
Call from lab re: critical glucose of 627  Reviewed note from [address]  On prednisone  readings >500 in office  Started on insulin as OP  Called pt  He states he feels unwell--poor vision, shortness of breath, fatigue  Didn't get insulin as there was an insurance issue  Advised ER d/t concern for HHNK  Pt asked if he could wait until morning; I advised that he could not  He and his wife stated they would go    Called to [place] [facility] to let them know of his pending arrival   FYI to PCP

## 2019-08-02 NOTE — CONSULTS
Consultation - Nam Servin 60 y o  male MRN: [id number]    Unit/Bed#: -01 Encounter: [id number]      Assessment/Plan     Assessment: This is a 60y o -year-old male with type 2 diabetes mellitus, hypothyroidism, chronic hepatitis-C, sarcoidosis, steroid-induced hyperglycemia who presented with DKA    Plan:  1  Type 2 diabetes mellitus not on long-term insulin therapy with steroid-induced hyperglycemia who presented in DKA  Poorly controlled with last A1c 12%  DKA has resolved and patient has been transitioned to basal bolus subcutaneous insulin  - continue Lantus 24 units subcutaneously daily  -continue Humalog 5 units subcutaneously with meals 3 times a day  -continue sliding scale insulin  - check bedtime, to a m  Blood sugars and cover per low-dose sliding scale    Will assess patient's insulin requirements tomorrow and make changes as needed  Discussed with the patient that he would need insulin on discharge  He has used insulin the past and is comfortable using it  Discussed that when the dose of glucocorticoids/Prednisone is reduce, insulin requirements will also change and he will need to follow up with his primary care physician/Endocrinology as an outpatient to help make appropriate adjustments  - may resume metformin on discharge; Victoza may also be restarted however preferably done as an outpatient so that appropriate adjustments can be made to the insulin    2  Hypothyroidism  TSH within normal limits  - add on free T4  Continue levothyroxine 50 mcg orally once a day    3  ROSE I-resolved  4  Sarcoidosis-continue glucocorticoids      CC: Diabetes Consult    History of Present Illness     HPI: Nam Servin is a 60y o  year old male with type 2 diabetes  Also has a past medical history of hypothyroidism, chronic hepatitis-C, recent diagnosis of sarcoidosis, neuropathy  Patient presented to the ED with outpatient labs suggestive of DKA       Patient was diagnosed with diabetes mellitus in 2007  Has been following up with his primary care physician for the same  He is on oral agents and injectables at home  Takes metformin 1000 mg orally twice a day, Victoza 1 2 mg subcutaneously daily  Says that he was on insulin briefly in 2007 but has not needed it recently    Recently diagnosed with sarcoidosis, has been on prednisone 60 mg orally once a day   Admits to not checking blood sugars often however he did recently it was greater than 500 mg per dL  Previously 170-180, A1c in January 7%    Had complaints of polyuria, polydipsia,and blurry vision  He denies nephropathy, retinopathy, heart attack and stroke but does admit to neuropathy  He denies any hypoglycemia at home; had 1 episode in the hospital 51 mg/dL while on the insulin drip-did not have any symptoms  Patient was started on insulin drip, IV fluids  Blood sugars improved, anion gap closed and the patient has been transitioned to basal bolus subcutaneous insulin    This time states that he feels better  Is fatigued  Palpitations, polydipsia, polyuria have improved     For hypothyroidism-takes levothyroxine 50 mcg orally once a day    All other systems were reviewed and are negative         Inpatient consult to Endocrinology  Consult performed by: Peace Gonzalez MD  Consult ordered by: Ilda Justice PA-C        Review of Systems    Historical Information   Past Medical History:   Diagnosis Date    BPH (benign prostatic hyperplasia)     Diabetes mellitus ([address]     Erectile dysfunction     GERD (gastroesophageal reflux disease)     Hematuria     Liver disease     hepatitis C    Mediastinal adenopathy     Vitamin D deficiency      Past Surgical History:   Procedure Laterality Date    COLONOSCOPY      last assessed: 08/28/2012; fiberoptic screening     CYSTOSCOPY      onset: 05/06/2016; Diagnostic     DENTAL SURGERY      LIVER BIOPSY      IL BRONCHOSCOPY NEEDLE BX TRACHEA MAIN STEM&/BRON N/A 6/6/2019    Procedure: ENDOBRONCHIAL ULTRASOUND (EBUS);   Surgeon: Emiliana Velazquez MD;  [facility];  Service: Thoracic    [address] N/A 2019    Procedure: Justen Landon;  Surgeon: Emiliana Velazquez MD;  [facility];  Service: Thoracic    NE TRANSURETHRAL ELEC-SURG PROSTATECTOM N/A 2017    Procedure: Greg Atkins; TUR PROSTATE;  Surgeon: Sandhya Jeffers MD;  [facility];  Service: Urology    SHOULDER ARTHROSCOPY Right     bicep tendon repair, rotator cuff repair and acromuim shave      Social History   Social History     Substance and Sexual Activity   Alcohol Use Not Currently    Comment: quit 12 years ago     Social History     Substance and Sexual Activity   Drug Use No     Social History     Tobacco Use   Smoking Status Former Smoker    Packs/day: 1 00    Years: 2 00    Pack years: 2 00    Types: Cigarettes    Last attempt to quit:     Years since quittin 5   Smokeless Tobacco Never Used     Family History:   Family History   Problem Relation Age of Onset    Stroke Father     Cancer Maternal Grandmother     Valvular heart disease Mother        Meds/Allergies   Current Facility-Administered Medications   Medication Dose Route Frequency Provider Last Rate Last Dose    dextrose 5 % and sodium chloride 0 9 % infusion  250 mL/hr Intravenous Continuous Mando Kelley PA-C   Stopped at 19 8681    famotidine (PEPCID) tablet 40 mg  40 mg Oral Daily Mando Kelley PA-C   40 mg at 19 0813    insulin glargine (LANTUS) subcutaneous injection 24 Units 0 24 mL  24 Units Subcutaneous QAM Carmelo Headley, DO   24 Units at 19 9198    insulin lispro (HumaLOG) 100 units/mL subcutaneous injection 2-12 Units  2-12 Units Subcutaneous TID AC Carmelo Rodartehoor, DO   4 Units at 19 1302    insulin lispro (HumaLOG) 100 units/mL subcutaneous injection 5 Units  5 Units Subcutaneous TID With Meals Jaye Headley, DO   5 Units at 19 1302    levothyroxine tablet 50 mcg  50 mcg Oral Early Morning Kena Marsh PA-C   50 mcg at 08/02/19 0523    ondansetron [facility] injection 4 mg  4 mg Intravenous Q6H PRN Kena Marsh PA-C        predniSONE tablet 60 mg  60 mg Oral Daily Kena Marsh PA-C   60 mg at 08/02/19 3230     Allergies   Allergen Reactions    No Active Allergies        Objective   Vitals: Blood pressure 117/67, pulse 89, temperature 98 6 °F (37 °C), temperature source Oral, resp  rate 18, height 6' 3" (1 905 m), weight 119 kg (262 lb 12 8 oz), SpO2 95 %  Intake/Output Summary (Last 24 hours) at 8/2/2019 1531  Last data filed at 8/2/2019 0776  Gross per 24 hour   Intake 5244 17 ml   Output    Net 5244 17 ml     Invasive Devices     Peripheral Intravenous Line            Peripheral IV 08/01/19 Left Arm less than 1 day    Peripheral IV 08/01/19 Right Antecubital less than 1 day                Physical Exam   Constitutional: He is oriented to person, place, and time  He appears well-developed and well-nourished  No distress  HENT:   Head: Normocephalic and atraumatic  Eyes: Pupils are equal, round, and reactive to light  Conjunctivae are normal    Neck: Normal range of motion  Neck supple  No thyromegaly present  Cardiovascular: Normal rate, regular rhythm and normal heart sounds  No murmur heard  Pulmonary/Chest: Effort normal and breath sounds normal  No respiratory distress  He has no wheezes  Abdominal: Soft  He exhibits no distension  There is no tenderness  There is no guarding  Musculoskeletal: He exhibits no edema  Lymphadenopathy:     He has no cervical adenopathy  Neurological: He is alert and oriented to person, place, and time  Skin: Skin is warm and dry  No rash noted  He is not diaphoretic  No erythema  Psychiatric: He has a normal mood and affect  His behavior is normal  Thought content normal    Vitals reviewed  The history was obtained from the review of the chart, patient      Lab Results:   Results from last 7 days   Lab Units 08/01/19  1133   HEMOGLOBIN A1C  12 1*     Lab Results   Component Value Date    WBC 9 65 08/02/2019    HGB 13 8 08/02/2019    HCT 40 7 08/02/2019    MCV 93 08/02/2019     08/02/2019     Lab Results   Component Value Date/Time    BUN 26 (H) 08/02/2019 04:27 AM    BUN 17 07/12/2017 09:08 AM     07/12/2017 09:08 AM    K 3 7 08/02/2019 04:27 AM    K 4 4 07/12/2017 09:08 AM     (H) 08/02/2019 04:27 AM     07/12/2017 09:08 AM    CO2 20 (L) 08/02/2019 04:27 AM    CO2 27 07/12/2017 09:08 AM    CREATININE 0 83 08/02/2019 04:27 AM    CREATININE 1 08 07/12/2017 09:08 AM    AST 45 08/01/2019 09:36 PM    AST 15 01/21/2016 07:08 AM    ALT  08/01/2019 09:36 PM      Comment:      Specimen grossly lipemic  Unable to get result  Spoke to Ashley roberts and Evan mederos  Doctor was ok with not receiving ALT result  Specimen collection should occur prior to Sulfasalazine administration due to the potential for falsely depressed results  ALT 17 01/21/2016 07:08 AM    ALB 3 5 08/01/2019 09:36 PM    ALB 4 6 01/21/2016 07:08 AM     No results for input(s): CHOL, HDL, LDL, TRIG, VLDL in the last 72 hours  No results found for: Lee Ann Lopez  POC Glucose (mg/dl)   Date Value   08/02/2019 224 (H)   08/02/2019 302 (H)   08/02/2019 51 (L)   08/02/2019 98   08/02/2019 149 (H)   08/02/2019 178 (H)   08/02/2019 166 (H)   08/02/2019 206 (H)   08/02/2019 396 (H)   08/02/2019 252 (H)       Imaging Studies: I have personally reviewed pertinent reports  Portions of the record may have been created with voice recognition software  Occasional wrong word or "sound a like" substitutions may have occurred due to the inherent limitations of voice recognition software  Read the chart carefully and recognize, using context, where substitutions have occurred

## 2019-08-02 NOTE — H&P
Progress Note - Mati Mills 1960, 60 y o  male MRN: [id number]  Unit/Bed#: -01 Encounter: [id number]  Primary Care Provider: Alfred Erickson MD   Date and time admitted to hospital: 8/1/2019  9:12 PM    * Diabetic ketoacidosis associated with type 2 diabetes mellitus ([facility] [address] )  Assessment & Plan  -Pt reports starting Prednisone recently for diagnosis of Sarcoidosis  -Start insulin infusion now, accuchecks q2 hours  -Once glucose <250, transition to D5 NSS infusion until anion gap closes  -Once anion gap closes x2, transition to subq insulin   -Consider diabetes education         Type 2 diabetes mellitus without complication, without long-term current use of insulin [facility])  Assessment & Plan  Lab Results   Component Value Date    HGBA1C 12 1 (A) 08/01/2019       Recent Labs     08/01/19  2118 08/01/19  2342   POCGLU >500* 335*       Blood Sugar Average: Last 72 hrs:  (P) 167 5    High anion gap metabolic acidosis  Assessment & Plan  -2/2 to DKA, hydrate and trend labs q4 hours    Tachycardia  Assessment & Plan  -Likely 2/2 to dehydration, acidosis  -Obtain EKG to determine rhythm  -Give 20mg Cardizem in ED without effect, will attempt metoprolol 5mg IV   -Hydrate  -Telemetry    Sarcoidosis  Assessment & Plan  -Continue PTA prednisone   -Supplemental oxygen PRN    Current chronic use of systemic steroids  Assessment & Plan  -2/2 to above    Chronic hepatitis C without hepatic coma (HCC)  Assessment & Plan  -LFT's WNL    Hypothyroid  Assessment & Plan  -Continue PTA levothyroxine    Benign prostatic hyperplasia with urinary frequency  Assessment & Plan  -Stable  -Monitor urine output  -Urinary retention protocol       Chief Complaint: Abnormal labs    History of Present Illness     Mati Mills is a 60 y o  male with a PMH significant for DM2, GERD, BPH, hypothyroidism, chronic hep C, and recent diagnosis of sarcoidosis who presented to the ED today after going to his PCP for a check up, getting labs done, and finding that his glucose was 627 with an anion gap of 18 and a bicarb of 18  He reports that he was placed on high dose prednisone approximately 3 weeks ago for his sarcoidosis  He has been taking his oral diabetes medications at home, but reports that he has been off injectable insulin for quite some time  Denies other complaints aside from a mild headache  History obtained from the patient   -------------------------------------------------------------------------------------------------------------  Disposition: Admit to SD2     Code Status: Level 1 - Full Code  --------------------------------------------------------------------------------------------------------------    Historical Information   Past Medical History:   Diagnosis Date    BPH (benign prostatic hyperplasia)     Diabetes mellitus ([facility] [place] [address] )     Erectile dysfunction     GERD (gastroesophageal reflux disease)     Hematuria     Liver disease     hepatitis C    Mediastinal adenopathy     Vitamin D deficiency      Past Surgical History:   Procedure Laterality Date    COLONOSCOPY      last assessed: 08/28/2012; fiberoptic screening     CYSTOSCOPY      onset: 05/06/2016; Diagnostic     DENTAL SURGERY      LIVER BIOPSY      NJ BRONCHOSCOPY NEEDLE BX TRACHEA MAIN STEM&/BRON N/A 6/6/2019    Procedure: ENDOBRONCHIAL ULTRASOUND (EBUS);   Surgeon: Jose Mahan MD;  [facility];  Service: Thoracic    [place] [address] N/A 6/6/2019    Procedure: Rick Godinez;  Surgeon: Jose Mahan MD;  [facility];  Service: Thoracic    NJ TRANSURETHRAL ELEC-SURG PROSTATECTOM N/A 9/13/2017    Procedure: Marcus Harris; TUR PROSTATE;  Surgeon: Shola Mosley MD;  [facility];  Service: Urology    SHOULDER ARTHROSCOPY Right     bicep tendon repair, rotator cuff repair and acromuim shave      Social History   Social History     Substance and Sexual Activity   Alcohol Use Not Currently    Comment: quit 12 years ago     Social History     Substance and Sexual Activity   Drug Use No     Social History     Tobacco Use   Smoking Status Former Smoker    Packs/day: 1 00    Years: 2 00    Pack years: 2 00    Types: Cigarettes    Last attempt to quit: 2010    Years since quittin 5   Smokeless Tobacco Never Used     Exercise History: Independent with ADLs    Family History:   Family History   Problem Relation Age of Onset    Stroke Father     Cancer Maternal Grandmother     Valvular heart disease Mother        Vitals:   Vitals:    19 2245 19 2300 19 2315 19 0023   BP: 127/73 126/69 120/69 132/74   BP Location: Left arm Left arm Left arm Right arm   Pulse: (!) 140 (!) 142 (!) 140 (!) 137   Resp: 20 20 18 18   Temp:    97 6 °F (36 4 °C)   TempSrc:    Oral   SpO2: 95% 94% 97% 95%   Weight:    115 kg (253 lb)   Height:    6' 3" (1 905 m)     Temp  Min: 97 6 °F (36 4 °C)  Max: 98 8 °F (37 1 °C)  IBW: 84 5 kg  Height: 6' 3" (190 5 cm)  Body mass index is 31 62 kg/m²  Physical Exam   Constitutional: He is oriented to person, place, and time  He appears well-developed and well-nourished  HENT:   Head: Normocephalic and atraumatic  Eyes: Pupils are equal, round, and reactive to light  Conjunctivae are normal    Neck: Neck supple  Cardiovascular: Normal rate, regular rhythm, normal heart sounds and intact distal pulses  Exam reveals no gallop and no friction rub  No murmur heard  Pulmonary/Chest: Effort normal and breath sounds normal  He has no wheezes  He has no rales  Abdominal: Soft  Bowel sounds are normal  He exhibits no distension  There is no tenderness  Musculoskeletal: He exhibits no edema  Neurological: He is alert and oriented to person, place, and time  Skin: Skin is warm and dry       Medications:  Current Facility-Administered Medications   Medication Dose Route Frequency    dextrose 5 % and sodium chloride 0 9 % infusion  250 mL/hr Intravenous Continuous    famotidine (PEPCID) tablet 40 mg  40 mg Oral Daily    insulin regular (HumuLIN R,NovoLIN R) 1 Units/mL in sodium chloride 0 9 % 100 mL infusion  0 1-30 Units/hr Intravenous Continuous    levothyroxine tablet 50 mcg  50 mcg Oral Early Morning    ondansetron (ZOFRAN) injection 4 mg  4 mg Intravenous Q6H PRN    predniSONE tablet 60 mg  60 mg Oral Daily    sodium chloride 0 9 % infusion  500 mL/hr Intravenous Continuous    Followed by   [facility] sodium chloride 0 9 % infusion  250 mL/hr Intravenous Continuous     Home medications:  Prior to Admission Medications   Prescriptions Last Dose Informant Patient Reported? Taking? Cholecalciferol (VITAMIN D3) 5000 units CAPS   Yes Yes   Sig: Take by mouth   Insulin Pen Needle (PEN NEEDLES) 31G X 6 MM MISC   No Yes   Sig: by Does not apply route 4 (four) times a day   NOVOTWIST 32G X 5 MM MISC   Yes Yes   famotidine (PEPCID) 40 MG tablet  Self Yes Yes   Sig: Take 40 mg by mouth daily    insulin aspart (NOVOLOG FLEXPEN) 100 Units/mL injection pen   No Yes   Sig: Inject 5 Units under the skin 3 (three) times a day with meals   insulin glargine (BASAGLAR KWIKPEN) 100 units/mL injection pen   No Yes   Sig: Inject 24 Units under the skin daily   levothyroxine 50 mcg tablet   No Yes   Sig: TAKE 1 TABLET BY MOUTH EVERY DAY   liraglutide (VICTOZA) injection 8/1/2019 at Unknown time Self No Yes   Sig: Inject 0 2 mL (1 2 mg total) under the skin daily   metFORMIN (GLUCOPHAGE) 500 mg tablet   No Yes   Sig: TAKE 1 TABLET BY MOUTH TWICE A DAY WITH MEALS   predniSONE 20 mg tablet 8/1/2019 at Unknown time  No Yes   Sig: Take 3 tablets (60 mg total) by mouth daily      Facility-Administered Medications: None     Allergies: Allergies   Allergen Reactions    No Active Allergies        Review of Systems   Constitutional: Negative for fever  HENT: Negative for nosebleeds  Respiratory: Negative for shortness of breath  Cardiovascular: Negative for leg swelling     Gastrointestinal: Negative for abdominal pain  Endocrine: Negative for cold intolerance  Genitourinary: Negative for hematuria  Musculoskeletal: Negative for neck pain  Skin: Negative for pallor  Neurological: Negative for seizures  Laboratory and Diagnostics:  Results from last 7 days   Lab Units 08/01/19 2136   WBC Thousand/uL 13 03*   HEMOGLOBIN g/dL 17 7*   HEMATOCRIT % 49 4*   PLATELETS Thousands/uL 269   NEUTROS PCT % 73   MONOS PCT % 7     Results from last 7 days   Lab Units 08/01/19  2356 08/01/19  2136 08/01/19  1153   SODIUM mmol/L 135* 128* 127*   POTASSIUM mmol/L 4 1 4 4 4 7   CHLORIDE mmol/L 101 94* 94*   CO2 mmol/L 16* 16* 18*   ANION GAP mmol/L 18* 18* 15*   BUN mg/dL 32* 36* 31*   CREATININE mg/dL 1 05 1 18 1 27   CALCIUM mg/dL 7 5* 7 9* 8 7   ALT U/L  --   --  23   AST U/L  --  45 9   ALK PHOS U/L  --  110 80   ALBUMIN g/dL  --  3 5 3 8   TOTAL BILIRUBIN mg/dL  --  0 90 1 11*     Results from last 7 days   Lab Units 08/01/19  2356   MAGNESIUM mg/dL 2 2   PHOSPHORUS mg/dL 2 7          Results from last 7 days   Lab Units 08/01/19  2136   TROPONIN I ng/mL <0 02     Results from last 7 days   Lab Units 08/01/19  2136   LACTIC ACID mmol/L 0 5     VBG:  Results from last 7 days   Lab Units 08/01/19  2136   PH ARINA  7 417*   PCO2 ARINA mm Hg 29 5*   PO2 ARINA mm Hg 64 6*   HCO3 ARINA mmol/L 18 6*   BASE EXC ARINA mmol/L -4 3     EKG: Telemetry reviewed  Imaging: I have personally reviewed pertinent reports  and I have personally reviewed pertinent films in PACS    ------------------------------------------------------------------------------------------------------------  Anticipated Length of Stay is > 2 midnights    Counseling / Coordination of Care  Total time spent today 45 minutes  Greater than 50% of total time was spent with the patient and / or family counseling and / or coordination of care  Americo Arias PA-C    Portions of the record may have been created with voice recognition software    Occasional wrong word or "sound a like" substitutions may have occurred due to the inherent limitations of voice recognition software    Read the chart carefully and recognize, using context, where substitutions have occurred

## 2019-08-02 NOTE — ASSESSMENT & PLAN NOTE
Discussed with endocrinology  Patient had not been on insulin since 2007  However, he recently started prednisone for sarcoidosis, and since then his blood glucose has been uncontrolled  His hemoglobin A1c was 12 1  He was just prescribed insulin by his PCP yesterday  However, he states his insurance still in the process of approving it  Patient was transitioned from insulin drip to subcutaneous insulin  He is currently on mealtime Novolin, Lantus and sliding scale insulin  Will continue to monitor blood glucose

## 2019-08-02 NOTE — TELEPHONE ENCOUNTER
please Call [facility] for stat results on pt Shakila Solraisa [birth year]  [phone]  TT  Dr Jair Prajapati

## 2019-08-02 NOTE — PHYSICAL THERAPY NOTE
PHYSICAL THERAPY Evaluation NOTE    Patient Name: Neal Oh  YBWPE'A Date: 8/2/2019     AGE:   60 y o  Mrn:   [id number]  ADMIT DX:  Tachycardia [R00 0]  High blood sugar [R73 9]  Hyperglycemia [R73 9]  Diabetic ketoacidosis without coma associated with type 2 diabetes mellitus ([facility] [address] ) [E11 10]    Past Medical History:   Diagnosis Date    BPH (benign prostatic hyperplasia)     Diabetes mellitus (HCC)     Erectile dysfunction     GERD (gastroesophageal reflux disease)     Hematuria     Liver disease     hepatitis C    Mediastinal adenopathy     Vitamin D deficiency      Length Of Stay: 1  PHYSICAL THERAPY EVALUATION :    08/02/19 1024   Note Type   Note type Eval only   Pain Assessment   Pain Assessment No/denies pain   Pain Score No Pain   Home Living   Type of Home House  (2 SH, 0 HEATHER, full flight to primary bed and bath)   Home Layout Two level;Bed/bath upstairs   Bathroom Shower/Tub Tub/shower unit   Bathroom Toilet Standard   Bathroom Accessibility Accessible   Home Equipment   (has cane and uses PRN)   Additional Comments Pt  lives with spouse, children and in-laws in a 2 sH, 0 Heather full flight to primary bed and bath   Prior Function   Level of [place] Independent with ADLs and functional mobility   Lives With [place] Help From Family   ADL Assistance Independent   IADLs Independent   Falls in the last 6 months 0   Vocational Full time employment   Comments PTA, Pt  reports INDEP with ADLs, iADLs and functional mobility with cane PRN   Restrictions/Precautions   Weight Bearing Precautions Per Order No   Other Precautions Fall Risk   General   Additional Pertinent History Pt  is a 61 yo M who presents following PCP visit with finding of  and anion gap 18, bicarb 18   Dx: Diabetic Ketoacidosis, Comorbidities include DM2, BPH, Hypothyroid, Chronic Hepatitis C, Sacroidosis, High anion gap metabolic Acidosis, tachycardia, neuralgia, R LBP polyneuropathy, Hyponatremia  Family/Caregiver Present No   Cognition   Overall Cognitive Status WFL   Arousal/Participation Cooperative   Orientation Level Oriented X4   Memory Within functional limits   Following Commands Follows all commands and directions without difficulty   Comments Pt  was identified with full name and birthdat   RUE Assessment   RUE Assessment WFL   LUE Assessment   LUE Assessment WFL   RLE Assessment   RLE Assessment WFL   LLE Assessment   LLE Assessment WFL   Coordination   Movements are Fluid and Coordinated 1   Sensation WFL   Light Touch   RLE Light Touch Grossly intact   LLE Light Touch Grossly intact   Bed Mobility   Supine to Sit 6  Modified independent   Additional items HOB elevated   Sit to Supine 6  Modified independent   Additional items HOB elevated   Transfers   Sit to Stand 7  Independent   Stand to Sit 7  Independent   Ambulation/Elevation   Gait pattern Wide ANDREIA; Forward Flexion;Decreased foot clearance; Inconsistent marshall   Gait Assistance 6  Modified independent   Assistive Device None   Distance 350'x1   Balance   Static Sitting Good   Dynamic Sitting Good   Static Standing Good   Dynamic Standing Good   Ambulatory Good   Endurance Deficit   Endurance Deficit No   Activity Tolerance   Activity Tolerance Patient tolerated treatment well   Medical Staff Made Aware Spoke to CM   Nurse Made Aware Spoke to RN   Assessment   Prognosis Good   Problem List Decreased mobility   Assessment Pt  is a 59 yo M who presents following PCP visit with finding of  and anion gap 18, bicarb 18  Dx: Diabetic Ketoacidosis, order placed for PT eval and tx, w/ activity order of up and OOB as tolerated   pt presents w/ comorbidities of DM2, BPH, Hypothyroid, Chronic Hepatitis C, Sacroidosis, High anion gap metabolic Acidosis, tachycardia, neuralgia, R LBP polyneuropathy, Hyponatremia and personal factors of living in 2 story house, mobilizing w/ assistive device, stair(s) to enter home, inability to perform current job functions and unable to perform physical activity  pt presents w/ weakness, decreased endurance, impaired balance, gait deviations and fall risk  these impairments are evident in findings from physical examination (gait deviations), mobility assessment (need for Mod I assist w/ all phases of mobility when usually mobilizing independently, tolerance to only 350 feet of ambulation and need for cueing for mobility technique), and Barthel Index: 95/100  pt needed input for task focus and mobility technique  pt is at risk for falls due to physical and safety awareness deficits  pt's clinical presentation is unstable/unpredictable (evident in poor blood sugar control, tachycardia, tolerance to only 350 feet of ambulation, need for input for mobility technique, need for input for task focus and mobility technique and need for input for mobility technique/safety)  D/C IP PT discharge recommendation is for home w/ family support to reduce fall risk and maximize level of functional independence  Goals   Patient Goals to get home   Recommendation   Recommendation D/C PT;Home with family support   PT - OK to Discharge Yes   Additional Comments when medically appropriate   Barthel Index   Feeding 10   Bathing 5   Grooming Score 5   Dressing Score 10   Bladder Score 10   Bowels Score 10   Toilet Use Score 10   Transfers (Bed/Chair) Score 15   Mobility (Level Surface) Score 15   Stairs Score 5   Barthel Index Score 95     D/C IP PT   Home with family support    Sara Gomez, PT, DPT 8/2/2019

## 2019-08-02 NOTE — UTILIZATION REVIEW
Initial Clinical Review    Admission: Date/Time/Statement: 8/1/19 @ 2318     Orders Placed This Encounter   Procedures    Inpatient Admission     Standing Status:   Standing     Number of Occurrences:   1     Order Specific Question:   Admitting Physician     Answer:   Miranda Lindsay [[id number]]     Order Specific Question:   Level of Care     Answer:   Level 2 Stepdown / HOT [14]     Order Specific Question:   Bed request comments     Answer:   please put in icu     Order Specific Question:   Estimated length of stay     Answer:   More than 2 Midnights     Order Specific Question:   Certification     Answer:   I certify that inpatient services are medically necessary for this patient for a duration of greater than two midnights  See H&P and MD Progress Notes for additional information about the patient's course of treatment  ED Arrival Information     Expected Arrival Acuity Means of Arrival Escorted By Service Admission Type    - 8/1/2019 21:07 Emergent Walk-In Spouse General Medicine Emergency    Arrival Complaint    high blood sugar        Chief Complaint   Patient presents with    Hyperglycemia - Symptomatic     Presents for eval of hyperglycemia  Recently dx w/ sarchodosis and started on Prednisode prescription  BG assessed at home slowly increasing x3-4days  Lab work completed at PCP today BG= 647mg/dl  Patient c/o increasing SOB, chest tightness  Assessment/Plan:  this is a 60year old male from home to ED per PCP  admitted as inpatient due to DKA  Patient with history of Diabetes, recently diagnosed with sarcoid and placed on prednisone @ 3 weeks ago  Since then, blood sugars increasing and A1c increased from 7 to 12  1   and now with shortness of breath with chest tightness and palpations  Has not taken injectable insulin (victoza)due to insurance issues and has taken oral medication (metformin)  Outpatient glucose was 617 with anion gap of 18 and CO2 of 16  Has positive beta hydroxybuterate  Insulin gtt, IVF and serial glucose in progress        8/2 with improvement of anion gap and glucose, has transitioned to sq insulin  ED Triage Vitals   Temperature Pulse Respirations Blood Pressure SpO2   08/01/19 2130 08/01/19 2115 08/01/19 2115 08/01/19 2115 08/01/19 2115   98 8 °F (37 1 °C) (!) 139 22 138/93 96 %      Temp Source Heart Rate Source Patient Position - Orthostatic VS BP Location FiO2 (%)   08/01/19 2130 08/01/19 2115 08/01/19 2115 08/01/19 2115 --   Oral Monitor Sitting Right arm       Pain Score       08/01/19 2115       No Pain        Wt Readings from Last 1 Encounters:   08/02/19 119 kg (262 lb 12 8 oz)     Additional Vital Signs:   08/02/19 0700  98 2 °F (36 8 °C)  62  18  127/59  85  96 %  None (Room air)  Lying   08/02/19 0421  97 8 °F (36 6 °C)  82  16  109/65  81  95 %  None (Room air)  Lying   08/02/19 0210    94  16             08/02/19 0023  97 6 °F (36 4 °C)  137Abnormal   18  132/74  96  95 %  None (Room air)  Lying   08/01/19 2230    138Abnormal   20  134/69    94 %  None (Room air)  Sitting   08/01/19 2130  98 8 °F (37 1 °C)  146Abnormal   22  148/81    95 %  None (Room air)         Pertinent Labs/Diagnostic Test Results:   8/2/2109  CxR- No acute cardiopulmonary disease      8/1/2019 EKG - Supraventricular tachycardia , probably atrial flutter with RVR   Ref Range & Units 8/1/19 2136   BETA-HYDROXYBUTYRATE <0 6 mmol/L 1 5       Results from last 7 days   Lab Units 08/02/19 0427 08/01/19  2136   WBC Thousand/uL 9 65 13 03*   HEMOGLOBIN g/dL 13 8 17 7*   HEMATOCRIT % 40 7 49 4*   PLATELETS Thousands/uL 185 269   NEUTROS ABS Thousands/µL 6 87 9 65*     Results from last 7 days   Lab Units 08/02/19  0427 08/01/19  2356 08/01/19 2136 08/01/19  1153   SODIUM mmol/L 141 135* 128* 127*   POTASSIUM mmol/L 3 7 4 1 4 4 4 7   CHLORIDE mmol/L 110* 101 94* 94*   CO2 mmol/L 20* 16* 16* 18*   ANION GAP mmol/L 11 18* 18* 15*   BUN mg/dL 26* 32* 36* 31*   CREATININE mg/dL 0 83 1 05 1  18 1 27   EGFR ml/min/1 73sq m 96 77 67 61   CALCIUM mg/dL 6 7* 7 5* 7 9* 8 7   MAGNESIUM mg/dL 2 0 2 2  --   --    PHOSPHORUS mg/dL 2 5 2 7  --   --      Results from last 7 days   Lab Units 08/01/19  2136 08/01/19  1153   AST U/L 45 9   ALT U/L  --  23   ALK PHOS U/L 110 80   TOTAL PROTEIN g/dL 7 4 7 9   ALBUMIN g/dL 3 5 3 8   TOTAL BILIRUBIN mg/dL 0 90 1 11*     Results from last 7 days   Lab Units 08/02/19  0917 08/02/19  0804 08/02/19  0713 08/02/19  0604 08/02/19  0523 08/02/19  0409 08/02/19  0306 08/02/19  0212 08/02/19  0115 08/01/19  2342   POC GLUCOSE mg/dl 302* 51* 98 149* 178* 166* 206* 396* 252* 335*     Results from last 7 days   Lab Units 08/02/19  0427 08/01/19  2356 08/01/19  2136 08/01/19  1153 08/01/19  1132   GLUCOSE RANDOM mg/dL 172* 383* 617* 627* high     Results from last 7 days   Lab Units 08/01/19  1133   HEMOGLOBIN A1C  12 1*     Results from last 7 days   Lab Units 08/01/19  2136   PH ARINA  7 417*   PCO2 ARINA mm Hg 29 5*   PO2 ARINA mm Hg 64 6*   HCO3 ARINA mmol/L 18 6*   BASE EXC ARINA mmol/L -4 3   O2 CONTENT ARINA ml/dL 22 3   O2 HGB, VENOUS % 90 8*     Results from last 7 days   Lab Units 08/01/19  2136   TROPONIN I ng/mL <0 02     Results from last 7 days   Lab Units 08/01/19  2136 08/01/19  1153   TSH 3RD GENERATON uIU/mL 2 326 2 110     Results from last 7 days   Lab Units 08/01/19  2136   LACTIC ACID mmol/L 0 5     Results from last 7 days   Lab Units 08/01/19  2144   CLARITY UA  Clear   COLOR UA  Yellow   SPEC GRAV UA  1 010   PH UA  5 0   GLUCOSE UA mg/dl 500 (1/2%)*   KETONES UA mg/dl 15 (1+)*   BLOOD UA  Trace*   PROTEIN UA mg/dl Negative   NITRITE UA  Negative   BILIRUBIN UA  Negative   UROBILINOGEN UA E U /dl 0 2   LEUKOCYTES UA  Negative   WBC UA /hpf None Seen   RBC UA /hpf 0-1*   BACTERIA UA /hpf Occasional   EPITHELIAL CELLS WET PREP /hpf Occasional     ED Treatment:   Medication Administration from 08/01/2019 2107 to 08/02/2019 0001       Date/Time Order Dose Route Action Comments     08/01/2019 2135 sodium chloride 0 9 % bolus 1,000 mL 1,000 mL Intravenous New Bag      08/01/2019 2135 insulin regular (HumuLIN R,NovoLIN R) injection 10 Units 10 Units Intravenous Given      08/01/2019 2200 diltiazem (CARDIZEM) injection 15 mg 15 mg Intravenous Given      08/01/2019 2314 sodium chloride 0 9 % bolus 1,000 mL 1,000 mL Intravenous New Bag      08/01/2019 2314 diltiazem (CARDIZEM) injection 20 mg 20 mg Intravenous Given      08/01/2019 2314 sodium chloride 0 9 % infusion 1,000 mL/hr Intravenous New Bag      08/01/2019 2300 sodium chloride 0 9 % infusion 500 mL/hr Intravenous New Bag      08/01/2019 2315 sodium chloride 0 9 % infusion 250 mL/hr Intravenous New Bag      08/01/2019 2346 insulin regular (HumuLIN R,NovoLIN R) 1 Units/mL in sodium chloride 0 9 % 100 mL infusion 11 5 Units/hr Intravenous New Bag      08/01/2019 2337 insulin regular (HumuLIN R,NovoLIN R) 1 Units/mL in sodium chloride 0 9 % 100 mL infusion 11 5 Units/hr Intravenous New Bag BG = 335mg/dl        Past Medical History:   Diagnosis Date    BPH (benign prostatic hyperplasia)     Diabetes mellitus ([facility] [address] )     Erectile dysfunction     GERD (gastroesophageal reflux disease)     Hematuria     Liver disease     hepatitis C    Mediastinal adenopathy     Vitamin D deficiency      Present on Admission:   Chronic hepatitis C without hepatic coma ([facility] [address] )   Hypothyroid   Benign prostatic hyperplasia with urinary frequency   Type 2 diabetes mellitus without complication, without long-term current use of insulin ([facility] [address] )   Diabetic ketoacidosis associated with type 2 diabetes mellitus (HCC)   High anion gap metabolic acidosis   Tachycardia   Sarcoidosis      Admitting Diagnosis: Tachycardia [R00 0]  High blood sugar [R73 9]  Hyperglycemia [R73 9]  Diabetic ketoacidosis without coma associated with type 2 diabetes mellitus ([facility] [address] ) [E11 10]  Age/Sex: 60 y o  male  Admission Orders:    Current Facility-Administered Medications:  dextrose 5 % and sodium chloride 0 9 % 250 mL/hr Intravenous Continuous Last Rate: Stopped (08/02/19 0939)   famotidine 40 mg Oral Daily    insulin glargine 24 Units Subcutaneous QAM    insulin lispro 2-12 Units Subcutaneous TID AC    insulin lispro 5 Units Subcutaneous TID With Meals    levothyroxine 50 mcg Oral Early Morning    ondansetron 4 mg Intravenous Q6H PRN    predniSONE 60 mg Oral Daily      insulin regular (HumuLIN R,NovoLIN R) 1 Units/mL in sodium chloride 0 9 % 100 mL infusion   Rate: 0 1-30 mL/hr Dose: 0 1-30 Units/hr  Freq: Continuous Route: IV  Last Dose: Stopped (08/02/19 0811)  Start: 08/02/19 0015 End: 08/02/19 0810    IP CONSULT TO ENDOCRINOLOGY    Network Utilization Review Department  Phone: [phone]; Fax [phone]  [email or web address]  ATTENTION: Please call with any questions or concerns to [phone]  and carefully listen to the prompts so that you are directed to the right person  Send all requests for admission clinical reviews, approved or denied determinations and any other requests to fax [phone]   All voicemails are confidential

## 2019-08-02 NOTE — PROGRESS NOTES
Progress Note - Compa Able 1960, 60 y o  male MRN: [id number]    Unit/Bed#: -[place] Encounter: [id number]    Primary Care Provider: Jes Carey MD   Date and time admitted to hospital: 8/1/2019  9:12 PM        Tachycardia  Assessment & Plan  Likely due to DKA  Resolved  High anion gap metabolic acidosis  Assessment & Plan  Secondary to DKA  Resolved  Current chronic use of systemic steroids  Assessment & Plan  -2/2 to above    Sarcoidosis  Assessment & Plan  -Continue PTA prednisone   -Supplemental oxygen PRN    Chronic hepatitis C without hepatic coma (HCC)  Assessment & Plan  -LFT's WNL    Hypothyroid  Assessment & Plan  -Continue PTA levothyroxine    Benign prostatic hyperplasia with urinary frequency  Assessment & Plan  -Stable  -Monitor urine output  -Urinary retention protocol     Type 2 diabetes mellitus without complication, without long-term current use of insulin (HCC)  Assessment & Plan  Discussed with endocrinology  Patient had not been on insulin since 2007  However, he recently started prednisone for sarcoidosis, and since then his blood glucose has been uncontrolled  His hemoglobin A1c was 12 1  He was just prescribed insulin by his PCP yesterday  However, he states his insurance still in the process of approving it  Patient was transitioned from insulin drip to subcutaneous insulin  He is currently on mealtime Novolin, Lantus and sliding scale insulin  Will continue to monitor blood glucose  * Diabetic ketoacidosis associated with type 2 diabetes mellitus ([facility] [address] )  Assessment & Plan  Resolved  Continue subcutaneous insulin  Subjective/Objective     Subjective:   Patient seen and examined this afternoon  He is feeling tired but is otherwise doing well  He denies any other issues  Objective:  Vitals: Blood pressure 117/67, pulse 89, temperature 98 6 °F (37 °C), temperature source Oral, resp   rate 18, height 6' 3" (1 905 m), weight 119 kg (262 lb 12 8 oz), SpO2 95 %  ,Body mass index is 32 85 kg/m²  Intake/Output Summary (Last 24 hours) at 8/2/2019 1622  Last data filed at 8/2/2019 0811  Gross per 24 hour   Intake 5244 17 ml   Output    Net 5244 17 ml       Invasive Devices     Peripheral Intravenous Line            Peripheral IV 08/01/19 Left Arm less than 1 day    Peripheral IV 08/01/19 Right Antecubital less than 1 day                Physical Exam: /67 (BP Location: Right arm)   Pulse 89   Temp 98 6 °F (37 °C) (Oral)   Resp 18   Ht 6' 3" (1 905 m)   Wt 119 kg (262 lb 12 8 oz)   SpO2 95%   BMI 32 85 kg/m²   General appearance: alert and oriented, in no acute distress  Head: Normocephalic, without obvious abnormality, atraumatic  Eyes: No scleral icterus  Chest wall: no tenderness  Heart: regular rate and rhythm, S1, S2 normal, no murmur, click, rub or gallop  Extremities: extremities normal, warm and well-perfused; no cyanosis, clubbing, or edema  Neurologic: Grossly normal    Lab, Imaging and other studies: I have personally reviewed pertinent reports      VTE Pharmacologic Prophylaxis: Sequential compression device (Venodyne)   VTE Mechanical Prophylaxis: sequential compression device

## 2019-08-02 NOTE — TELEPHONE ENCOUNTER
He was admitted yesterday with 750 blood sugar readings, [facility]  Just letting you know, because he was to call you back today with his readings

## 2019-08-02 NOTE — ED PROVIDER NOTES
History  Chief Complaint   Patient presents with    Hyperglycemia - Symptomatic     Presents for eval of hyperglycemia  Recently dx w/ sarchodosis and started on Prednisode prescription  BG assessed at home slowly increasing x3-4days  Lab work completed at PCP today BG= 647mg/dl  Patient c/o increasing SOB, chest tightness  60-year-old gentleman brought in for hyperglycemia  Patient has a history of diabetes was recently diagnosed with sarcoid and placed on prednisone  Since then his blood sugars have been increasing  Patient is also unable to get his insulin due to an insurance policy  Was feeling increasingly short of breath with some chest tightness and palpitations was seen by his PCP today and sent for outpatient blood work blood sugar was found to be in the 600 was so was sent in for further evaluation  On arrival to the emergency department patient expresses his did stain for having to come to the emergency department or be admitted  I did express to him that it was very important for him to stay to get his blood sugar under control  His wife is concerned about his heart rate and palpitations    I did state that I would check an EKG and a troponin what that this was most likely secondary to his level of dehydration      History provided by:  Patient and significant other   used: No    Hyperglycemia - Symptomatic   Blood sugar level PTA:  647  Severity:  Severe  Onset quality:  Sudden  Duration:  12 hours  Timing:  Constant  Progression:  Worsening  Chronicity:  Recurrent  Diabetes status:  Controlled with insulin  Context: change in medication and recent illness    Ineffective treatments:  Diet and oral agents  Associated symptoms: dehydration and malaise    Associated symptoms: no abdominal pain, no altered mental status, no chest pain, no confusion, no dizziness, no fever and no increased thirst    Risk factors: recent steroid use        Prior to Admission Medications Prescriptions Last Dose Informant Patient Reported? Taking? Cholecalciferol (VITAMIN D3) 5000 units CAPS   Yes Yes   Sig: Take by mouth   Insulin Pen Needle (PEN NEEDLES) 31G X 6 MM MISC   No Yes   Sig: by Does not apply route 4 (four) times a day   NOVOTWIST 32G X 5 MM MISC   Yes Yes   famotidine (PEPCID) 40 MG tablet  Self Yes Yes   Sig: Take 40 mg by mouth daily    insulin aspart (NOVOLOG FLEXPEN) 100 Units/mL injection pen   No Yes   Sig: Inject 5 Units under the skin 3 (three) times a day with meals   insulin glargine (BASAGLAR KWIKPEN) 100 units/mL injection pen   No Yes   Sig: Inject 24 Units under the skin daily   levothyroxine 50 mcg tablet   No Yes   Sig: TAKE 1 TABLET BY MOUTH EVERY DAY   liraglutide (VICTOZA) injection 8/1/2019 at Unknown time Self No Yes   Sig: Inject 0 2 mL (1 2 mg total) under the skin daily   metFORMIN (GLUCOPHAGE) 500 mg tablet   No Yes   Sig: TAKE 1 TABLET BY MOUTH TWICE A DAY WITH MEALS   predniSONE 20 mg tablet 8/1/2019 at Unknown time  No Yes   Sig: Take 3 tablets (60 mg total) by mouth daily      Facility-Administered Medications: None       Past Medical History:   Diagnosis Date    BPH (benign prostatic hyperplasia)     Diabetes mellitus (HCC)     Erectile dysfunction     GERD (gastroesophageal reflux disease)     Hematuria     Liver disease     hepatitis C    Mediastinal adenopathy     Vitamin D deficiency        Past Surgical History:   Procedure Laterality Date    COLONOSCOPY      last assessed: 08/28/2012; fiberoptic screening     CYSTOSCOPY      onset: 05/06/2016; Diagnostic     DENTAL SURGERY      LIVER BIOPSY      ID BRONCHOSCOPY NEEDLE BX TRACHEA MAIN STEM&/BRON N/A 6/6/2019    Procedure: ENDOBRONCHIAL ULTRASOUND (EBUS);   Surgeon: Ivonne Wiggins MD;  [facility];  Service: Thoracic    ID [address] N/A 6/6/2019    Procedure: Juma Aldridge;  Surgeon: Ivonne Wiggins MD;  [facility];  Service: Thoracic    ID TRANSURETHRAL ELEC-SURG PROSTATECTOM N/A 2017    Procedure: Raissa Herrera; TUR PROSTATE;  Surgeon: Erika Osborn MD;  [facility];  Service: Urology    SHOULDER ARTHROSCOPY Right     bicep tendon repair, rotator cuff repair and acromuim shave        Family History   Problem Relation Age of Onset    Stroke Father     Cancer Maternal Grandmother     Valvular heart disease Mother      I have reviewed and agree with the history as documented  Social History     Tobacco Use    Smoking status: Former Smoker     Packs/day: 1 00     Years: 2 00     Pack years: 2 00     Types: Cigarettes     Last attempt to quit:      Years since quittin 5    Smokeless tobacco: Never Used   Substance Use Topics    Alcohol use: Not Currently     Comment: quit 12 years ago    Drug use: No        Review of Systems   Constitutional: Negative for activity change, appetite change and fever  HENT: Negative for congestion and facial swelling  Eyes: Negative for discharge and redness  Respiratory: Negative for cough and wheezing  Cardiovascular: Negative for chest pain and leg swelling  Gastrointestinal: Negative for abdominal distention, abdominal pain and blood in stool  Endocrine: Negative for cold intolerance and polydipsia  Genitourinary: Negative for difficulty urinating and hematuria  Musculoskeletal: Negative for arthralgias and gait problem  Skin: Negative for color change and rash  Allergic/Immunologic: Negative for food allergies and immunocompromised state  Neurological: Negative for dizziness, seizures and headaches  Hematological: Negative for adenopathy  Does not bruise/bleed easily  Psychiatric/Behavioral: Negative for agitation, confusion and decreased concentration  All other systems reviewed and are negative  Physical Exam  Physical Exam   Constitutional: He is oriented to person, place, and time  He appears well-developed and well-nourished    Non-toxic appearance  HENT:   Head: Normocephalic and atraumatic  Right Ear: Tympanic membrane normal    Left Ear: Tympanic membrane normal    Nose: Nose normal    Mouth/Throat: Oropharynx is clear and moist    Eyes: Pupils are equal, round, and reactive to light  Conjunctivae, EOM and lids are normal    Neck: Trachea normal and normal range of motion  Neck supple  No JVD present  Carotid bruit is not present  Cardiovascular: Regular rhythm, normal heart sounds and intact distal pulses  No extrasystoles are present  Tachycardia present  Pulmonary/Chest: Effort normal  He has no decreased breath sounds  He has no wheezes  He has no rhonchi  He has no rales  He exhibits no tenderness and no deformity  Abdominal: Soft  Bowel sounds are normal  There is no tenderness  There is no rebound, no guarding and no CVA tenderness  Musculoskeletal:        Right shoulder: He exhibits normal range of motion, no tenderness, no swelling and no deformity  Cervical back: Normal  He exhibits normal range of motion, no tenderness, no bony tenderness and no deformity  Lymphadenopathy:     He has no cervical adenopathy  He has no axillary adenopathy  Neurological: He is alert and oriented to person, place, and time  He has normal strength and normal reflexes  No cranial nerve deficit or sensory deficit  He displays a negative Romberg sign  Skin: Skin is warm and dry  Psychiatric: He has a normal mood and affect  His speech is normal and behavior is normal  Judgment and thought content normal  Cognition and memory are normal    Nursing note and vitals reviewed        Vital Signs  ED Triage Vitals   Temperature Pulse Respirations Blood Pressure SpO2   08/01/19 2130 08/01/19 2115 08/01/19 2115 08/01/19 2115 08/01/19 2115   98 8 °F (37 1 °C) (!) 139 22 138/93 96 %      Temp Source Heart Rate Source Patient Position - Orthostatic VS BP Location FiO2 (%)   08/01/19 2130 08/01/19 2115 08/01/19 2115 08/01/19 2115 --   Oral Monitor Sitting Right arm       Pain Score       08/01/19 2115       No Pain           Vitals:    08/01/19 2245 08/01/19 2300 08/01/19 2315 08/02/19 0023   BP: 127/73 126/69 120/69 132/74   Pulse: (!) 140 (!) 142 (!) 140 (!) 137   Patient Position - Orthostatic VS: Sitting Sitting Sitting Lying         Visual Acuity  Visual Acuity      Most Recent Value   L Pupil Size (mm)  3   R Pupil Size (mm)  3          ED Medications  Medications   sodium chloride 0 9 % infusion (0 mL/hr Intravenous Stopped 8/2/19 0110)     Followed by   sodium chloride 0 9 % infusion (500 mL/hr Intravenous New Bag 8/1/19 2300)     Followed by   sodium chloride 0 9 % infusion (0 mL/hr Intravenous Stopped 8/1/19 2354)   famotidine (PEPCID) tablet 40 mg (has no administration in time range)   levothyroxine tablet 50 mcg (has no administration in time range)   predniSONE tablet 60 mg (has no administration in time range)   dextrose 5 % and sodium chloride 0 9 % infusion (0 mL/hr Intravenous Hold 8/2/19 0118)   insulin regular (HumuLIN R,NovoLIN R) 1 Units/mL in sodium chloride 0 9 % 100 mL infusion (5 75 Units/hr Intravenous Rate/Dose Change 8/2/19 0124)   ondansetron (ZOFRAN) injection 4 mg (has no administration in time range)   sodium chloride 0 9 % bolus 1,000 mL (0 mL Intravenous Stopped 8/1/19 2245)   insulin regular (HumuLIN R,NovoLIN R) injection 10 Units (10 Units Intravenous Given 8/1/19 2135)   diltiazem (CARDIZEM) injection 15 mg (15 mg Intravenous Given 8/1/19 2200)   sodium chloride 0 9 % bolus 1,000 mL (0 mL Intravenous Stopped 8/2/19 0100)   diltiazem (CARDIZEM) injection 20 mg (20 mg Intravenous Given 8/1/19 2314)   potassium chloride (K-DUR,KLOR-CON) CR tablet 20 mEq (20 mEq Oral Given 8/2/19 0032)     Or   potassium chloride 20 mEq IVPB (premix) ( Intravenous See Alternative 8/2/19 0032)       Diagnostic Studies  Results Reviewed     Procedure Component Value Units Date/Time    Basic metabolic panel [[id number]]  (Abnormal) Collected:  08/01/19 2356    Lab Status:  Final result Specimen:  Blood from Arm, Right Updated:  08/02/19 0018     Sodium 135 mmol/L      Potassium 4 1 mmol/L      Chloride 101 mmol/L      CO2 16 mmol/L      ANION GAP 18 mmol/L      BUN 32 mg/dL      Creatinine 1 05 mg/dL      Glucose 383 mg/dL      Calcium 7 5 mg/dL      eGFR 77 ml/min/1 73sq m     Narrative:       [place] guidelines for Chronic Kidney Disease (CKD):     Stage 1 with normal or high GFR (GFR > 90 mL/min/1 73 square meters)    Stage 2 Mild CKD (GFR = 60-89 mL/min/1 73 square meters)    Stage 3A Moderate CKD (GFR = 45-59 mL/min/1 73 square meters)    Stage 3B Moderate CKD (GFR = 30-44 mL/min/1 73 square meters)    Stage 4 Severe CKD (GFR = 15-29 mL/min/1 73 square meters)    Stage 5 End Stage CKD (GFR <15 mL/min/1 73 square meters)  Note: GFR calculation is accurate only with a steady state creatinine    Magnesium [[id number]]  (Normal) Collected:  08/01/19 2356    Lab Status:  Final result Specimen:  Blood from Arm, Right Updated:  08/02/19 0017     Magnesium 2 2 mg/dL     Phosphorus [[id number]]  (Normal) Collected:  08/01/19 2356    Lab Status:  Final result Specimen:  Blood from Arm, Right Updated:  08/02/19 0017     Phosphorus 2 7 mg/dL     Basic metabolic panel [[id number]]     Lab Status:  No result Specimen:  Blood     Magnesium [[id number]]     Lab Status:  No result Specimen:  Blood     Phosphorus [[id number]]     Lab Status:  No result Specimen:  Blood     TSH, 3rd generation [[id number]]  (Normal) Collected:  08/01/19 2136    Lab Status:  Final result Specimen:  Blood from Arm, Right Updated:  08/01/19 2359     TSH 3RD GENERATON 2 326 uIU/mL     Narrative:       Patients undergoing fluorescein dye angiography may retain small amounts of fluorescein in the body for 48-72 hours post procedure  Samples containing fluorescein can produce falsely depressed TSH values   If the patient had this procedure,a specimen should be resubmitted post fluorescein clearance  Fingerstick Glucose (POCT) [[id number]]  (Abnormal) Collected:  08/01/19 2342    Lab Status:  Final result Updated:  08/01/19 2343     POC Glucose 335 mg/dl     Comprehensive metabolic panel [[id number]]  (Abnormal) Collected:  08/01/19 2136    Lab Status:  Final result Specimen:  Blood from Arm, Right Updated:  08/01/19 2315     Sodium 128 mmol/L      Potassium 4 4 mmol/L      Chloride 94 mmol/L      CO2 16 mmol/L      ANION GAP 18 mmol/L      BUN 36 mg/dL      Creatinine 1 18 mg/dL      Glucose 617 mg/dL      Calcium 7 9 mg/dL      AST 45 U/L      ALT --     Alkaline Phosphatase 110 U/L      Total Protein 7 4 g/dL      Albumin 3 5 g/dL      Total Bilirubin 0 90 mg/dL      eGFR 67 ml/min/1 73sq m     Narrative:       [place] guidelines for Chronic Kidney Disease (CKD):     Stage 1 with normal or high GFR (GFR > 90 mL/min/1 73 square meters)    Stage 2 Mild CKD (GFR = 60-89 mL/min/1 73 square meters)    Stage 3A Moderate CKD (GFR = 45-59 mL/min/1 73 square meters)    Stage 3B Moderate CKD (GFR = 30-44 mL/min/1 73 square meters)    Stage 4 Severe CKD (GFR = 15-29 mL/min/1 73 square meters)    Stage 5 End Stage CKD (GFR <15 mL/min/1 73 square meters)  Note: GFR calculation is accurate only with a steady state creatinine    Lactic acid, plasma [[id number]]  (Normal) Collected:  08/01/19 2136    Lab Status:  Final result Specimen:  Blood from Arm, Right Updated:  08/01/19 2246     LACTIC ACID 0 5 mmol/L     Narrative:       Result may be elevated if tourniquet was used during collection      Troponin I [[id number]]  (Normal) Collected:  08/01/19 2136    Lab Status:  Final result Specimen:  Blood from Arm, Right Updated:  08/01/19 2216     Troponin I <0 02 ng/mL     Beta Hydroxybutyrate [[id number]]  (Abnormal) Collected:  08/01/19 2136    Lab Status:  Final result Specimen:  Blood from Arm, Right Updated:  08/01/19 2150 BETA-HYDROXYBUTYRATE 1 5 mmol/L     Blood gas, venous [[id number]]  (Abnormal) Collected:  08/01/19 2136    Lab Status:  Final result Specimen:  Blood from Arm, Right Updated:  08/01/19 2146     pH, Harish 7 417     pCO2, Harish 29 5 mm Hg      pO2, Harish 64 6 mm Hg      HCO3, Harish 18 6 mmol/L      Base Excess, Harish -4 3 mmol/L      O2 Content, Harish 22 3 ml/dL      O2 HGB, VENOUS 90 8 %     Urine Microscopic [[id number]]  (Abnormal) Collected:  08/01/19 2144    Lab Status:  Final result Specimen:  Urine, Clean Catch Updated:  08/01/19 2146     RBC, UA 0-1 /hpf      WBC, UA None Seen /hpf      Epithelial Cells Occasional /hpf      Bacteria, UA Occasional /hpf     CBC and differential [[id number]]  (Abnormal) Collected:  08/01/19 2136    Lab Status:  Final result Specimen:  Blood from Arm, Right Updated:  08/01/19 2146     WBC 13 03 Thousand/uL      RBC 5 46 Million/uL      Hemoglobin 17 7 g/dL      Hematocrit 49 4 %      MCV 91 fL      MCH 32 4 pg      MCHC 35 8 g/dL      RDW 12 1 %      MPV 9 7 fL      Platelets 335 Thousands/uL      nRBC 0 /100 WBCs      Neutrophils Relative 73 %      Immat GRANS % 2 %      Lymphocytes Relative 16 %      Monocytes Relative 7 %      Eosinophils Relative 1 %      Basophils Relative 1 %      Neutrophils Absolute 9 65 Thousands/µL      Immature Grans Absolute 0 24 Thousand/uL      Lymphocytes Absolute 2 13 Thousands/µL      Monocytes Absolute 0 89 Thousand/µL      Eosinophils Absolute 0 06 Thousand/µL      Basophils Absolute 0 06 Thousands/µL     ED Urine Macroscopic [[id number]]  (Abnormal) Collected:  08/01/19 2144    Lab Status:  Final result Specimen:  Urine Updated:  08/01/19 2133     Color, UA Yellow     Clarity, UA Clear     pH, UA 5 0     Leukocytes, UA Negative     Nitrite, UA Negative     Protein, UA Negative mg/dl      Glucose,  (1/2%) mg/dl      Ketones, UA 15 (1+) mg/dl      Urobilinogen, UA 0 2 E U /dl      Bilirubin, UA Negative     Blood, UA Trace     Specific [place], UA 1 010 Narrative:       CLINITEK RESULT    Fingerstick Glucose (POCT) [[id number]]  (Abnormal) Collected:  08/01/19 2118    Lab Status:  Final result Updated:  08/01/19 2119     POC Glucose >500 mg/dl                  XR chest 1 view portable    (Results Pending)              Procedures  ECG 12 Lead Documentation Only  Date/Time: 8/1/2019 9:27 PM  Performed by: Arielle Montoya DO  Authorized by: Arielle Montoya DO     Patient location:  ED  Rate:     ECG rate:  148  Comments:      Sinus tachycardia versus possible flutter it does appear to be regular however might be slightly irregular and there may be flutter waves started toe    We are going to give patient fluids and try some Cardizem           ED Course                               MDM  Number of Diagnoses or Management Options  Hyperglycemia: new and requires workup  Tachycardia: new and requires workup     Amount and/or Complexity of Data Reviewed  Clinical lab tests: ordered and reviewed  Tests in the radiology section of CPT®: ordered and reviewed  Tests in the medicine section of CPT®: ordered and reviewed  Review and summarize past medical records: yes  Discuss the patient with other providers: yes  Independent visualization of images, tracings, or specimens: yes    Patient Progress  Patient progress: stable      Disposition  Final diagnoses:   Hyperglycemia   Tachycardia     Time reflects when diagnosis was documented in both MDM as applicable and the Disposition within this note     Time User Action Codes Description Comment    8/1/2019 11:17 PM Gabby PRAJAPATI Add [R73 9] Hyperglycemia     8/1/2019 11:17 PM Diego Townsend Si Add [R00 0] Tachycardia     8/1/2019 11:44 PM Dolphus Nithin Add [E11 10] Diabetic ketoacidosis without coma associated with type 2 diabetes mellitus [facility])       ED Disposition     ED Disposition Condition Date/Time Comment    Admit Stable Thu Aug 1, 2019 11:17 PM Case was discussed with mitesh and the patient's admission status was agreed to be Admission Status: inpatient status to the service of Dr Suzi Rodriguez step down 2   Follow-up Information    None         Current Discharge Medication List      CONTINUE these medications which have NOT CHANGED    Details   Cholecalciferol (VITAMIN D3) 5000 units CAPS Take by mouth      famotidine (PEPCID) 40 MG tablet Take 40 mg by mouth daily       insulin aspart (NOVOLOG FLEXPEN) 100 Units/mL injection pen Inject 5 Units under the skin 3 (three) times a day with meals  Qty: 5 pen, Refills: 4    Associated Diagnoses: Type 2 diabetes mellitus without complication, without long-term current use of insulin ([facility])      insulin glargine (BASAGLAR KWIKPEN) 100 units/mL injection pen Inject 24 Units under the skin daily  Qty: 5 pen, Refills: 4    Associated Diagnoses: Type 2 diabetes mellitus without complication, without long-term current use of insulin ([address] )      ! ! Insulin Pen Needle (PEN NEEDLES) 31G X 6 MM MISC by Does not apply route 4 (four) times a day  Qty: 150 each, Refills: 4    Associated Diagnoses: Type 2 diabetes mellitus without complication, without long-term current use of insulin ([facility])      levothyroxine 50 mcg tablet TAKE 1 TABLET BY MOUTH EVERY DAY  Qty: 90 tablet, Refills: 1    Associated Diagnoses: Hypothyroidism, unspecified type      liraglutide (VICTOZA) injection Inject 0 2 mL (1 2 mg total) under the skin daily  Qty: 2 pen, Refills: 4    Associated Diagnoses: Type 2 diabetes mellitus without complication, without long-term current use of insulin (HCC)      metFORMIN (GLUCOPHAGE) 500 mg tablet TAKE 1 TABLET BY MOUTH TWICE A DAY WITH MEALS  Qty: 180 tablet, Refills: 1    Associated Diagnoses: Type 2 diabetes mellitus without complication, without long-term current use of insulin (HCC)      ! ! NOVOTWIST 32G X 5 MM MISC       predniSONE 20 mg tablet Take 3 tablets (60 mg total) by mouth daily  Qty: 90 tablet, Refills: 1    Associated Diagnoses: Sarcoidosis of lymph nodes; Sarcoidosis; Dyspnea on exertion; Persistent dry cough       !! - Potential duplicate medications found  Please discuss with provider  No discharge procedures on file      ED Provider  Electronically Signed by           Chelsie Mercado DO  08/02/19 6617

## 2019-08-02 NOTE — OCCUPATIONAL THERAPY NOTE
Occupational Therapy Screen        Patient Name: Lakisha Shah  [facility]UMNH'I Date: 8/2/2019    OT orders received and chart review completed  Daniel w/ PT, Brittni Laguerre  Pt completing ADL and functional mobility at / near baseline level of I  No OT needs at this time  D/C OT   Please re - consult if needs arise    Jayy Membreno, OTR/L

## 2019-08-02 NOTE — ASSESSMENT & PLAN NOTE
-Pt reports starting Prednisone recently for diagnosis of Sarcoidosis  -Start insulin infusion now, accuchecks q2 hours  -Once glucose <250, transition to D5 NSS infusion until anion gap closes  -Once anion gap closes x2, transition to subq insulin   -Consider diabetes education

## 2019-08-02 NOTE — PLAN OF CARE
Problem: PHYSICAL THERAPY ADULT  Goal: Performs mobility at highest level of function for planned discharge setting  See evaluation for individualized goals  Description      D/C  IP PT  See flowsheet documentation for full assessment, interventions and recommendations  Outcome: Completed  Note:   Prognosis: Good  Problem List: Decreased mobility  Assessment: Pt  is a 59 yo M who presents following PCP visit with finding of  and anion gap 18, bicarb 18  Dx: Diabetic Ketoacidosis, order placed for PT eval and tx, w/ activity order of up and OOB as tolerated  pt presents w/ comorbidities of DM2, BPH, Hypothyroid, Chronic Hepatitis C, Sacroidosis, High anion gap metabolic Acidosis, tachycardia, neuralgia, R LBP polyneuropathy, Hyponatremia and personal factors of living in 2 story house, mobilizing w/ assistive device, stair(s) to enter home, inability to perform current job functions and unable to perform physical activity  pt presents w/ weakness, decreased endurance, impaired balance, gait deviations and fall risk  these impairments are evident in findings from physical examination (gait deviations), mobility assessment (need for Mod I assist w/ all phases of mobility when usually mobilizing independently, tolerance to only 350 feet of ambulation and need for cueing for mobility technique), and Barthel Index: 95/100  pt needed input for task focus and mobility technique  pt is at risk for falls due to physical and safety awareness deficits  pt's clinical presentation is unstable/unpredictable (evident in poor blood sugar control, tachycardia, tolerance to only 350 feet of ambulation, need for input for mobility technique, need for input for task focus and mobility technique and need for input for mobility technique/safety)  D/C IP PT discharge recommendation is for home w/ family support to reduce fall risk and maximize level of functional independence           Recommendation: D/C PT, Home with family support     PT - OK to Discharge: Yes    See flowsheet documentation for full assessment

## 2019-08-03 PROBLEM — E11.10 DIABETIC KETOACIDOSIS ASSOCIATED WITH TYPE 2 DIABETES MELLITUS (HCC): Status: RESOLVED | Noted: 2019-08-01 | Resolved: 2019-08-03

## 2019-08-03 PROBLEM — E87.29 HIGH ANION GAP METABOLIC ACIDOSIS: Status: RESOLVED | Noted: 2019-08-01 | Resolved: 2019-08-03

## 2019-08-03 PROBLEM — E87.2 HIGH ANION GAP METABOLIC ACIDOSIS: Status: RESOLVED | Noted: 2019-08-01 | Resolved: 2019-08-03

## 2019-08-03 PROBLEM — R00.0 TACHYCARDIA: Status: RESOLVED | Noted: 2019-08-01 | Resolved: 2019-08-03

## 2019-08-03 LAB
ANION GAP SERPL CALCULATED.3IONS-SCNC: 10 MMOL/L (ref 4–13)
BUN SERPL-MCNC: 17 MG/DL (ref 5–25)
CALCIUM SERPL-MCNC: 7.5 MG/DL (ref 8.3–10.1)
CHLORIDE SERPL-SCNC: 108 MMOL/L (ref 100–108)
CO2 SERPL-SCNC: 22 MMOL/L (ref 21–32)
CREAT SERPL-MCNC: 0.8 MG/DL (ref 0.6–1.3)
GFR SERPL CREATININE-BSD FRML MDRD: 97 ML/MIN/1.73SQ M
GLUCOSE SERPL-MCNC: 172 MG/DL (ref 65–140)
GLUCOSE SERPL-MCNC: 193 MG/DL (ref 65–140)
GLUCOSE SERPL-MCNC: 205 MG/DL (ref 65–140)
GLUCOSE SERPL-MCNC: 272 MG/DL (ref 65–140)
GLUCOSE SERPL-MCNC: 300 MG/DL (ref 65–140)
GLUCOSE SERPL-MCNC: 319 MG/DL (ref 65–140)
GLUCOSE SERPL-MCNC: 387 MG/DL (ref 65–140)
POTASSIUM SERPL-SCNC: 3.3 MMOL/L (ref 3.5–5.3)
SODIUM SERPL-SCNC: 140 MMOL/L (ref 136–145)

## 2019-08-03 PROCEDURE — 99232 SBSQ HOSP IP/OBS MODERATE 35: CPT | Performed by: PHYSICIAN ASSISTANT

## 2019-08-03 PROCEDURE — 99233 SBSQ HOSP IP/OBS HIGH 50: CPT | Performed by: INTERNAL MEDICINE

## 2019-08-03 PROCEDURE — 82948 REAGENT STRIP/BLOOD GLUCOSE: CPT

## 2019-08-03 PROCEDURE — 80048 BASIC METABOLIC PNL TOTAL CA: CPT | Performed by: NURSE PRACTITIONER

## 2019-08-03 RX ORDER — INSULIN GLARGINE 100 [IU]/ML
30 INJECTION, SOLUTION SUBCUTANEOUS EVERY MORNING
Status: DISCONTINUED | OUTPATIENT
Start: 2019-08-03 | End: 2019-08-04

## 2019-08-03 RX ORDER — POTASSIUM CHLORIDE 20 MEQ/1
20 TABLET, EXTENDED RELEASE ORAL ONCE
Status: COMPLETED | OUTPATIENT
Start: 2019-08-03 | End: 2019-08-03

## 2019-08-03 RX ADMIN — FAMOTIDINE 40 MG: 20 TABLET ORAL at 09:43

## 2019-08-03 RX ADMIN — LEVOTHYROXINE SODIUM 50 MCG: 50 TABLET ORAL at 06:26

## 2019-08-03 RX ADMIN — INSULIN LISPRO 4 UNITS: 100 INJECTION, SOLUTION INTRAVENOUS; SUBCUTANEOUS at 21:47

## 2019-08-03 RX ADMIN — INSULIN LISPRO 10 UNITS: 100 INJECTION, SOLUTION INTRAVENOUS; SUBCUTANEOUS at 09:47

## 2019-08-03 RX ADMIN — INSULIN LISPRO 4 UNITS: 100 INJECTION, SOLUTION INTRAVENOUS; SUBCUTANEOUS at 09:43

## 2019-08-03 RX ADMIN — INSULIN LISPRO 10 UNITS: 100 INJECTION, SOLUTION INTRAVENOUS; SUBCUTANEOUS at 12:34

## 2019-08-03 RX ADMIN — INSULIN LISPRO 6 UNITS: 100 INJECTION, SOLUTION INTRAVENOUS; SUBCUTANEOUS at 12:34

## 2019-08-03 RX ADMIN — INSULIN GLARGINE 30 UNITS: 100 INJECTION, SOLUTION SUBCUTANEOUS at 09:43

## 2019-08-03 RX ADMIN — INSULIN LISPRO 1 UNITS: 100 INJECTION, SOLUTION INTRAVENOUS; SUBCUTANEOUS at 03:35

## 2019-08-03 RX ADMIN — POTASSIUM CHLORIDE 20 MEQ: 1500 TABLET, EXTENDED RELEASE ORAL at 10:55

## 2019-08-03 RX ADMIN — PREDNISONE 60 MG: 20 TABLET ORAL at 09:42

## 2019-08-03 NOTE — PROGRESS NOTES
Endocrine Ongoing Consultation           *Date*: 8/3/2019     *Time*: 2:31 PM        Medical Decision Making:      Impression  1  Sarcoidosis on chronic prednisone  2  DM2 A1C 12 1% from 7% in Feb 2019      Recommendations:  ?  BGs improved today however still having postprandial elevations  Remains on 60mg prednisone for sarcoid until sees rheumatology as outpatient in a few weeks  Continue lantus 30 units qAM and increase lispro to 12 units TID AC  In addition on discharge explained use of SSI in addition to standing prandial insulin:  150-200 2 units lispro   200-250 4 units lispro  250-300 6 units lispro  >300 8 units lispro    Resume MFM and Victoza on discharge in addition to basal/bolus therapy  He requires insulin teaching tonight and will try with dinner  Follow up information placed in d/c paperwork for Justin/Daniel WITT             Reason for Endocrine Consult/Chief Complaint: DM management     History of Present Illness:  Mr Elidia Carolina is a 59yr old male who presented to the hospital with DKA in the setting of steroid use for sarcoid  ? Overnight Events:   NAEO, AM   Eating well  Has no learned how to give insulin injections yet          Review of Systems:     Review of Systems   Constitutional: Negative  HENT: Negative  Eyes: Negative  Respiratory: Negative  Cardiovascular: Negative  Gastrointestinal: Negative  Endocrine: Negative  Genitourinary: Negative  Musculoskeletal: Negative  Skin: Negative  Allergic/Immunologic: Negative  Neurological: Negative  Hematological: Negative  Psychiatric/Behavioral: Negative  ?      Patient History:      Past Medical History:   Diagnosis Date    BPH (benign prostatic hyperplasia)     Diabetes mellitus ([address] )     Erectile dysfunction     GERD (gastroesophageal reflux disease)     Hematuria     Liver disease     hepatitis C    Mediastinal adenopathy     Vitamin D deficiency      Past Surgical History:   Procedure Laterality Date    COLONOSCOPY      last assessed: 2012; fiberoptic screening     CYSTOSCOPY      onset: 2016; Diagnostic     DENTAL SURGERY      LIVER BIOPSY      FL BRONCHOSCOPY NEEDLE BX TRACHEA MAIN STEM&/BRON N/A 2019    Procedure: ENDOBRONCHIAL ULTRASOUND (EBUS);   Surgeon: Ladi Yoon MD;  [facility];  Service: Thoracic    [place] [address] N/A 2019    Procedure: Jamin Mora;  Surgeon: Ladi Yoon MD;  [facility];  Service: Thoracic    FL TRANSURETHRAL ELEC-SURG PROSTATECTOM N/A 2017    Procedure: [place]; TUR PROSTATE;  Surgeon: Tim Mcginnis MD;  [facility];  Service: Urology    SHOULDER ARTHROSCOPY Right     bicep tendon repair, rotator cuff repair and acromuim shave      Social History     Socioeconomic History    Marital status: /Civil Union     Spouse name: Not on file    Number of children: Not on file    Years of education: Not on file    Highest education level: Not on file   Occupational History    Occupation: manager   Social Needs    Financial resource strain: Not on file    Food insecurity:     Worry: Not on file     Inability: Not on file    Transportation needs:     Medical: Not on file     Non-medical: Not on file   Tobacco Use    Smoking status: Former Smoker     Packs/day: 1 00     Years: 2 00     Pack years: 2 00     Types: Cigarettes     Last attempt to quit: 2010     Years since quittin 5    Smokeless tobacco: Never Used   Substance and Sexual Activity    Alcohol use: Not Currently     Comment: quit 12 years ago    Drug use: No    Sexual activity: Not on file   Lifestyle    Physical activity:     Days per week: Not on file     Minutes per session: Not on file    Stress: Not on file   Relationships    Social connections:     Talks on phone: Not on file     Gets together: Not on file     Attends [employer] service: Not on file     Active member of club or organization: Not on file     Attends meetings of clubs or organizations: Not on file     Relationship status: Not on file    Intimate partner violence:     Fear of current or ex partner: Not on file     Emotionally abused: Not on file     Physically abused: Not on file     Forced sexual activity: Not on file   Other Topics Concern    Not on file   Social History Narrative    Caffeine use    Daily coffee consumption     Denied daily cola consumption     Denied daily tea consumption     Former smoker (as per [employer])     Current everyday smoker (as per [employer])     Non-smoker (as per [employer])      Family History   Problem Relation Age of Onset    Stroke Father     Cancer Maternal Grandmother     Valvular heart disease Mother          Current Medications: At the time this note was written these were the medications the patient was on       Current Facility-Administered Medications   Medication Dose Route Frequency Provider Last Rate Last Dose    famotidine (PEPCID) tablet 40 mg  40 mg Oral Daily Nannette Tinoco PA-C   40 mg at 08/03/19 0943    insulin glargine (LANTUS) subcutaneous injection 30 Units 0 3 mL  30 Units Subcutaneous QAM Rah Trotter MD   30 Units at 08/03/19 0943    insulin lispro (HumaLOG) 100 units/mL subcutaneous injection 1-5 Units  1-5 Units Subcutaneous HS Rah Trotter MD   5 Units at 08/02/19 2133    insulin lispro (HumaLOG) 100 units/mL subcutaneous injection 1-5 Units  1-5 Units Subcutaneous 0200 Rah Trotter MD   1 Units at 08/03/19 0335    insulin lispro (HumaLOG) 100 units/mL subcutaneous injection 10 Units  10 Units Subcutaneous TID With Meals Rah Trotter MD   10 Units at 08/03/19 1234    insulin lispro (HumaLOG) 100 units/mL subcutaneous injection 2-12 Units  2-12 Units Subcutaneous TID AC Carmelo Headley DO   6 Units at 08/03/19 1234    levothyroxine tablet 50 mcg  50 mcg Oral Early Morning Nannette Tinoco PA-C 50 mcg at 08/03/19 0626    ondansetron (ZOFRAN) injection 4 mg  4 mg Intravenous Q6H PRN Ciara Duong PA-C        predniSONE tablet 60 mg  60 mg Oral Daily Ciara Duong PA-C   60 mg at 08/03/19 7973        Allergies: No active allergies        Physical Exam:      Vital Signs:   /59 (BP Location: Left arm)   Pulse 84   Temp 98 3 °F (36 8 °C) (Oral)   Resp 18   Ht 6' 3" (1 905 m)   Wt 118 kg (260 lb 3 2 oz)   SpO2 96%   BMI 32 52 kg/m²     Physical Exam   Constitutional: He is oriented to person, place, and time  He appears well-developed and well-nourished  HENT:   Head: Normocephalic and atraumatic  Nose: Nose normal    Mouth/Throat: Oropharynx is clear and moist    Eyes: Pupils are equal, round, and reactive to light  Conjunctivae and EOM are normal    Neck: Normal range of motion  Neck supple  Cardiovascular: Normal rate, regular rhythm and normal heart sounds  Exam reveals no gallop and no friction rub  No murmur heard  Pulmonary/Chest: Effort normal and breath sounds normal  No stridor  No respiratory distress  He has no wheezes  He has no rales  He exhibits no tenderness  Abdominal: Soft  Bowel sounds are normal  He exhibits no distension and no mass  There is no tenderness  There is no rebound and no guarding  Musculoskeletal: Normal range of motion  He exhibits no edema  Neurological: He is alert and oriented to person, place, and time  Skin: Skin is warm and dry  No erythema  No pallor  Psychiatric: He has a normal mood and affect   His behavior is normal  Judgment and thought content normal          Labs and Imaging:          Recent Results (from the past 24 hour(s))   Fingerstick Glucose (POCT)    Collection Time: 08/02/19  4:33 PM   Result Value Ref Range    POC Glucose 390 (H) 65 - 140 mg/dl   Fingerstick Glucose (POCT)    Collection Time: 08/02/19  9:10 PM   Result Value Ref Range    POC Glucose 460 (H) 65 - 140 mg/dl   Fingerstick Glucose (POCT)    Collection Time: 08/02/19 10:09 PM   Result Value Ref Range    POC Glucose 492 (H) 65 - 140 mg/dl   Fingerstick Glucose (POCT)    Collection Time: 08/03/19 12:19 AM   Result Value Ref Range    POC Glucose 300 (H) 65 - 140 mg/dl   Fingerstick Glucose (POCT)    Collection Time: 08/03/19  2:27 AM   Result Value Ref Range    POC Glucose 172 (H) 65 - 140 mg/dl   Basic metabolic panel    Collection Time: 08/03/19  6:37 AM   Result Value Ref Range    Sodium 140 136 - 145 mmol/L    Potassium 3 3 (L) 3 5 - 5 3 mmol/L    Chloride 108 100 - 108 mmol/L    CO2 22 21 - 32 mmol/L    ANION GAP 10 4 - 13 mmol/L    BUN 17 5 - 25 mg/dL    Creatinine 0 80 0 60 - 1 30 mg/dL    Glucose 193 (H) 65 - 140 mg/dL    Calcium 7 5 (L) 8 3 - 10 1 mg/dL    eGFR 97 ml/min/1 73sq m   Fingerstick Glucose (POCT)    Collection Time: 08/03/19  7:55 AM   Result Value Ref Range    POC Glucose 205 (H) 65 - 140 mg/dl   Fingerstick Glucose (POCT)    Collection Time: 08/03/19 12:16 PM   Result Value Ref Range    POC Glucose 272 (H) 65 - 140 mg/dl                ?

## 2019-08-03 NOTE — ASSESSMENT & PLAN NOTE
· Discussed with endocrinology  Patient had not been on insulin since 2007  However, he recently started prednisone for sarcoidosis, and since then his blood glucose has been uncontrolled  His hemoglobin A1c was 12 1  He was just prescribed insulin by his PCP yesterday  However, he states his insurance still in the process of approving it  · Patient was transitioned from insulin drip to subcutaneous insulin  He is currently on mealtime Novolin, Lantus and sliding scale insulin  Will continue to monitor blood glucose  · Home dose recommendations: 30u Lantus, 12u Novolog TID with meals  · Nutrition consult for diabetic education

## 2019-08-03 NOTE — SOCIAL WORK
CM informed by SLIM that patient is medically clear for discharge today and he wants to confirm that patient's PCP has called in Rxs to patient's home pharmacy for his insulin needs  CM called [facility] on [address]; patient's Basaglar pen needles and Novolog are ready for ; copay is $85 00  SLIM made aware via [employer]

## 2019-08-03 NOTE — PROGRESS NOTES
[facility]  Progress Note - Viola Campa 1960, 60 y o  male MRN: [id number]  Unit/Bed#: -01 Encounter: [id number]  Primary Care Provider: Celestino Kincaid MD   Date and time admitted to hospital: 8/1/2019  9:12 PM    * Type 2 diabetes mellitus without complication, without long-term current use of insulin ([facility] [address] )  Assessment & Plan  · Discussed with endocrinology  Patient had not been on insulin since 2007  However, he recently started prednisone for sarcoidosis, and since then his blood glucose has been uncontrolled  His hemoglobin A1c was 12 1  He was just prescribed insulin by his PCP yesterday  However, he states his insurance still in the process of approving it  · Patient was transitioned from insulin drip to subcutaneous insulin  He is currently on mealtime Novolin, Lantus and sliding scale insulin  Will continue to monitor blood glucose  · Home dose recommendations: 30u Lantus, 12u Novolog TID with meals  · Nutrition consult for diabetic education  Current chronic use of systemic steroids  Assessment & Plan  -2/2 to above    Sarcoidosis  Assessment & Plan  -Continue PTA prednisone   -Supplemental oxygen PRN    Chronic hepatitis C without hepatic coma (HCC)  Assessment & Plan  -LFT's WNL    Hypothyroid  Assessment & Plan  -Continue PTA levothyroxine    Benign prostatic hyperplasia with urinary frequency  Assessment & Plan  -Stable  -Monitor urine output  -Urinary retention protocol     Tachycardiaresolved as of 8/3/2019  Assessment & Plan  · Likely due to DKA  Resolved  High anion gap metabolic acidosisresolved as of 8/3/2019  Assessment & Plan  · Secondary to DKA  Resolved  Diabetic ketoacidosis associated with type 2 diabetes mellitus (HCC)resolved as of 8/3/2019  Assessment & Plan  Resolved  Continue subcutaneous insulin        VTE Pharmacologic Prophylaxis:   Mechanical VTE Prophylaxis in Place: Yes    Patient Centered Rounds: I have performed bedside rounds with nursing staff today  Discussions with Specialists or Other Care Team Provider: Discussed with attending, Dr Alexa Red    Education and Discussions with Family / Patient: Discussed care plan with patient at bedside as well as with patient's wife over the phone  Answered all questions to the best of my ability  Time Spent for Care: 30 minutes  More than 50% of total time spent on counseling and coordination of care as described above  Current Length of Stay: 2 day(s)    Current Patient Status: Inpatient   Certification Statement: The patient will continue to require additional inpatient hospital stay due to Initiation of home insulin regimen, education, acquisition of supplies    Discharge Plan:  Patient comes from home, plan to return home once he has been appropriately educated in feels comfortable administering his own insulin  Anticipate discharge tomorrow  Code Status: Level 1 - Full Code      Subjective:   Patient reports he feels well, denies abdominal discomfort or GI distress  He does feel puffy after receiving all the IV fluids for DKA  He also was dizzy upon 1st sitting up in the morning  (of note blood sugar is lowest it has been, 200s)  He is anxious about administering his own insulin, requests his wife to be present for education or new medications as he claims he is forgetful  Objective:     Vitals:   Temp (24hrs), Av °F (36 7 °C), Min:97 5 °F (36 4 °C), Max:98 7 °F (37 1 °C)    Temp:  [97 5 °F (36 4 °C)-98 7 °F (37 1 °C)] 98 7 °F (37 1 °C)  HR:  [76-97] 90  Resp:  [18] 18  BP: (117-138)/(57-79) 138/68  SpO2:  [94 %-96 %] 94 %  Body mass index is 32 52 kg/m²  Input and Output Summary (last 24 hours): Intake/Output Summary (Last 24 hours) at 8/3/2019 1511  Last data filed at 8/3/2019 1001  Gross per 24 hour   Intake 640 ml   Output    Net 640 ml       Physical Exam:     Physical Exam   Constitutional: He appears well-developed and well-nourished   No distress  HENT:   Head: Normocephalic and atraumatic  Eyes: Conjunctivae are normal  No scleral icterus  Cardiovascular: Normal rate and regular rhythm  No murmur heard  Pulmonary/Chest: Effort normal and breath sounds normal  No respiratory distress  He has no wheezes  He has no rales  Abdominal: Soft  He exhibits no distension  There is no tenderness  Musculoskeletal: He exhibits no edema  Neurological: He is alert  Skin: Skin is warm and dry  No erythema  Psychiatric: He has a normal mood and affect  Nursing note and vitals reviewed  Additional Data:     Labs:    Results from last 7 days   Lab Units 08/02/19  0427   WBC Thousand/uL 9 65   HEMOGLOBIN g/dL 13 8   HEMATOCRIT % 40 7   PLATELETS Thousands/uL 185   NEUTROS PCT % 72   LYMPHS PCT % 18   MONOS PCT % 6   EOS PCT % 1     Results from last 7 days   Lab Units 08/03/19  0637  08/01/19  2136 08/01/19  1153   SODIUM mmol/L 140   < > 128* 127*   POTASSIUM mmol/L 3 3*   < > 4 4 4 7   CHLORIDE mmol/L 108   < > 94* 94*   CO2 mmol/L 22   < > 16* 18*   BUN mg/dL 17   < > 36* 31*   CREATININE mg/dL 0 80   < > 1 18 1 27   ANION GAP mmol/L 10   < > 18* 15*   CALCIUM mg/dL 7 5*   < > 7 9* 8 7   ALBUMIN g/dL  --   --  3 5 3 8   TOTAL BILIRUBIN mg/dL  --   --  0 90 1 11*   ALK PHOS U/L  --   --  110 80   ALT U/L  --   --   --  23   AST U/L  --   --  45 9   GLUCOSE RANDOM mg/dL 193*   < > 617* 627*    < > = values in this interval not displayed           Results from last 7 days   Lab Units 08/03/19  1216 08/03/19  0755 08/03/19  0227 08/03/19  0019 08/02/19  2209 08/02/19  2110 08/02/19  1633 08/02/19  1258 08/02/19  2860 08/02/19  0804 08/02/19  0713 08/02/19  0604   POC GLUCOSE mg/dl 272* 205* 172* 300* 492* 460* 390* 224* 302* 51* 98 149*     Results from last 7 days   Lab Units 08/01/19  1133   HEMOGLOBIN A1C  12 1*     Results from last 7 days   Lab Units 08/01/19  2136   LACTIC ACID mmol/L 0 5           * I Have Reviewed All Lab Data Listed Above   * Additional Pertinent Lab Tests Reviewed: All Labs Within Last 24 Hours Reviewed    Imaging:    Imaging Reports Reviewed Today Include:   · CXR 8/1:  No acute cardiopulmonary disease  Imaging Personally Reviewed by Myself Includes:    · None    Recent Cultures (last 7 days):           Last 24 Hours Medication List:     Current Facility-Administered Medications:  famotidine 40 mg Oral Daily Aime Graves PA-C   insulin glargine 30 Units Subcutaneous QAM Caprice Duane, MD   insulin lispro 1-5 Units Subcutaneous HS Caprice Duane, MD   insulin lispro 12 Units Subcutaneous TID With Meals Ernie Rizo MD   insulin lispro 2-12 Units Subcutaneous TID AC Ernie Rizo MD   levothyroxine 50 mcg Oral Early Morning Aime Graves PA-C   ondansetron 4 mg Intravenous Q6H PRN Aime Graves PA-C   predniSONE 60 mg Oral Daily Aime Graves PA-C        Today, Patient Was Seen By: Jun Frances PA-C    ** Please Note: Dictation voice to text software may have been used in the creation of this document   **

## 2019-08-04 VITALS
WEIGHT: 258.4 LBS | TEMPERATURE: 98.2 F | SYSTOLIC BLOOD PRESSURE: 130 MMHG | DIASTOLIC BLOOD PRESSURE: 73 MMHG | RESPIRATION RATE: 18 BRPM | HEART RATE: 79 BPM | BODY MASS INDEX: 32.13 KG/M2 | OXYGEN SATURATION: 95 % | HEIGHT: 75 IN

## 2019-08-04 LAB
ANION GAP SERPL CALCULATED.3IONS-SCNC: 11 MMOL/L (ref 4–13)
BASOPHILS # BLD AUTO: 0.03 THOUSANDS/ΜL (ref 0–0.1)
BASOPHILS NFR BLD AUTO: 0 % (ref 0–1)
BUN SERPL-MCNC: 21 MG/DL (ref 5–25)
CALCIUM SERPL-MCNC: 7.6 MG/DL (ref 8.3–10.1)
CHLORIDE SERPL-SCNC: 106 MMOL/L (ref 100–108)
CO2 SERPL-SCNC: 22 MMOL/L (ref 21–32)
CREAT SERPL-MCNC: 0.78 MG/DL (ref 0.6–1.3)
EOSINOPHIL # BLD AUTO: 0.08 THOUSAND/ΜL (ref 0–0.61)
EOSINOPHIL NFR BLD AUTO: 1 % (ref 0–6)
ERYTHROCYTE [DISTWIDTH] IN BLOOD BY AUTOMATED COUNT: 12.3 % (ref 11.6–15.1)
GFR SERPL CREATININE-BSD FRML MDRD: 98 ML/MIN/1.73SQ M
GLUCOSE SERPL-MCNC: 169 MG/DL (ref 65–140)
GLUCOSE SERPL-MCNC: 250 MG/DL (ref 65–140)
GLUCOSE SERPL-MCNC: 258 MG/DL (ref 65–140)
GLUCOSE SERPL-MCNC: 283 MG/DL (ref 65–140)
HCT VFR BLD AUTO: 41.9 % (ref 36.5–49.3)
HGB BLD-MCNC: 14 G/DL (ref 12–17)
IMM GRANULOCYTES # BLD AUTO: 0.13 THOUSAND/UL (ref 0–0.2)
IMM GRANULOCYTES NFR BLD AUTO: 2 % (ref 0–2)
LYMPHOCYTES # BLD AUTO: 1.75 THOUSANDS/ΜL (ref 0.6–4.47)
LYMPHOCYTES NFR BLD AUTO: 25 % (ref 14–44)
MCH RBC QN AUTO: 31.3 PG (ref 26.8–34.3)
MCHC RBC AUTO-ENTMCNC: 33.4 G/DL (ref 31.4–37.4)
MCV RBC AUTO: 94 FL (ref 82–98)
MONOCYTES # BLD AUTO: 0.52 THOUSAND/ΜL (ref 0.17–1.22)
MONOCYTES NFR BLD AUTO: 8 % (ref 4–12)
NEUTROPHILS # BLD AUTO: 4.46 THOUSANDS/ΜL (ref 1.85–7.62)
NEUTS SEG NFR BLD AUTO: 64 % (ref 43–75)
NRBC BLD AUTO-RTO: 0 /100 WBCS
PLATELET # BLD AUTO: 138 THOUSANDS/UL (ref 149–390)
PMV BLD AUTO: 9.4 FL (ref 8.9–12.7)
POTASSIUM SERPL-SCNC: 3.7 MMOL/L (ref 3.5–5.3)
RBC # BLD AUTO: 4.48 MILLION/UL (ref 3.88–5.62)
SODIUM SERPL-SCNC: 139 MMOL/L (ref 136–145)
WBC # BLD AUTO: 6.97 THOUSAND/UL (ref 4.31–10.16)

## 2019-08-04 PROCEDURE — 80048 BASIC METABOLIC PNL TOTAL CA: CPT | Performed by: PHYSICIAN ASSISTANT

## 2019-08-04 PROCEDURE — 85025 COMPLETE CBC W/AUTO DIFF WBC: CPT | Performed by: PHYSICIAN ASSISTANT

## 2019-08-04 PROCEDURE — 99233 SBSQ HOSP IP/OBS HIGH 50: CPT | Performed by: INTERNAL MEDICINE

## 2019-08-04 PROCEDURE — 82948 REAGENT STRIP/BLOOD GLUCOSE: CPT

## 2019-08-04 PROCEDURE — 99239 HOSP IP/OBS DSCHRG MGMT >30: CPT | Performed by: PHYSICIAN ASSISTANT

## 2019-08-04 RX ORDER — INSULIN GLARGINE 100 [IU]/ML
35 INJECTION, SOLUTION SUBCUTANEOUS EVERY MORNING
Status: DISCONTINUED | OUTPATIENT
Start: 2019-08-05 | End: 2019-08-04 | Stop reason: HOSPADM

## 2019-08-04 RX ADMIN — FAMOTIDINE 40 MG: 20 TABLET ORAL at 08:14

## 2019-08-04 RX ADMIN — INSULIN GLARGINE 30 UNITS: 100 INJECTION, SOLUTION SUBCUTANEOUS at 08:16

## 2019-08-04 RX ADMIN — LEVOTHYROXINE SODIUM 50 MCG: 50 TABLET ORAL at 05:37

## 2019-08-04 RX ADMIN — PREDNISONE 60 MG: 20 TABLET ORAL at 08:14

## 2019-08-04 NOTE — DISCHARGE SUMMARY
[address] [facility]  Discharge- Nam Servin 1960, 60 y o  male MRN: [id number]  Unit/Bed#: -01 Encounter: [id number]  Primary Care Provider: Tati Gann MD   Date and time admitted to hospital: 8/1/2019  9:12 PM    * Type 2 diabetes mellitus without complication, without long-term current use of insulin ([facility] )  Assessment & Plan  · Discussed with endocrinology  Patient had not been on insulin since 2007  However, he recently started prednisone for sarcoidosis, and since then his blood glucose has been uncontrolled  His hemoglobin A1c was 12 1  He was just prescribed insulin by his PCP yesterday  However, he states his insurance still in the process of approving it  · Patient was transitioned from insulin drip to subcutaneous insulin  He is currently on mealtime Novolin, Lantus and sliding scale insulin  Will continue to monitor blood glucose    · Home dose recommendations: 35u Lantus, 18u Novolog TID with meals resume metformin and Victoza, include SSI:  · 150-200 2 units lispro   · 200-250 4 units lispro  · 250-300 6 units lispro  · >300 8 units lispro      Current chronic use of systemic steroids  Assessment & Plan  -2/2 to above    Sarcoidosis  Assessment & Plan  -Continue PTA prednisone   -Supplemental oxygen PRN    Chronic hepatitis C without hepatic coma (HCC)  Assessment & Plan  -LFT's WNL    Hypothyroid  Assessment & Plan  -Continue PTA levothyroxine    Benign prostatic hyperplasia with urinary frequency  Assessment & Plan  -Stable  -Monitor urine output  -Urinary retention protocol     Discharging Physician / Practitioner: Jama Balderas PA-C  PCP: Tati Gann MD  Admission Date:   Admission Orders (From admission, onward)     Ordered        08/01/19 2319  Inpatient Admission  Once                   Discharge Date: 08/04/19    Resolved Problems  Date Reviewed: 8/4/2019          Resolved    Diabetic ketoacidosis associated with type 2 diabetes mellitus ([facility] [address] ) 8/3/2019     Resolved by  Paulino Bella PA-C    High anion gap metabolic acidosis 8/0/2250     Resolved by  Paulino Bella PA-C    Tachycardia 8/3/2019     Resolved by  Paulino Bella PA-C          Consultations During Hospital Stay:  · Endocrinology    Procedures Performed:   · None    Significant Findings / Test Results:   · Hb A1c: 12 1    Incidental Findings:   · CXR 8/1:  No acute cardiopulmonary disease    Test Results Pending at Discharge (will require follow up): · None     Outpatient Tests Requested:  · Continued glucose monitoring    Complications:  Patient initially admitted to ICU on insulin drip  Reason for Admission: DKA    Hospital Course:     Sheeba Mosqueda is a 60 y o  male patient with past medical history of DM 2, GERD, BPH, hypothyroidism, chronic hep C, recent diagnosis of sarcoidosis on steroids who originally presented to the hospital on 8/1/2019 due to elevated blood glucose of 627 with anion gap of 18, bicarb of 18  He reports high-dose prednisone 3 weeks prior to admission for sarcoidosis  He has been compliant with oral diabetes medications but he has not taken injectable insulin since 2007  Patient was admitted to the ICU for DKA, placed on insulin drip, this resolved and patient was able to be be started on injectable insulin  Endocrinology was consulted and insulin dosage was titrated  Patient is to resume the metformin, Victoza as well as take 35 units basal insulin and 18 units t i d  With meals as well as sliding scale insulin  Patient has follow-up with Rheumatology in 3 weeks, possible change in medication  Patient is aware that he should follow up with primary care provider to alter insulin dosage while possibly tapering steroids  Please see above list of diagnoses and related plan for additional information       Condition at Discharge: stable     Discharge Day Visit / Exam:     Subjective:  Patient reports he feels well today, denies nausea, vomiting, diarrhea  Believes he understands how to inject himself with insulin  Wife is at bedside  Vitals: Blood Pressure: 130/73 (08/04/19 0700)  Pulse: 79 (08/04/19 0700)  Temperature: 98 2 °F (36 8 °C) (08/04/19 0700)  Temp Source: Oral (08/04/19 0700)  Respirations: 18 (08/04/19 0700)  Height: 6' 3" (190 5 cm) (08/02/19 0023)  Weight - Scale: 117 kg (258 lb 6 4 oz) (08/04/19 0600)  SpO2: 95 % (08/04/19 0700)  Exam:   Physical Exam   Constitutional: He appears well-developed and well-nourished  No distress  HENT:   Head: Normocephalic and atraumatic  Eyes: Conjunctivae are normal  No scleral icterus  Cardiovascular: Normal rate and regular rhythm  No murmur heard  Pulmonary/Chest: Effort normal and breath sounds normal  No respiratory distress  He has no wheezes  He has no rales  Abdominal: Soft  He exhibits no distension  There is no tenderness  Musculoskeletal: He exhibits no edema  Neurological: He is alert  Skin: Skin is warm and dry  No erythema  Psychiatric: He has a normal mood and affect  Nursing note and vitals reviewed  Discussion with Family:  Discussed discharge plan with patient and patient's wife at bedside  Both are agreeable to continue to check blood glucose at home, administer insulin  They will follow up with rheumatologist in 3 weeks, and understand that if steroid dosing changes, insulin requirements will also likely change  Discharge instructions/Information to patient and family:   See after visit summary for information provided to patient and family  Provisions for Follow-Up Care:  See after visit summary for information related to follow-up care and any pertinent home health orders  Disposition:     Home    For Discharges to [facility] SNF:   · Not Applicable to this Patient - Not Applicable to this Patient    Planned Readmission: None     Discharge Statement:  I spent 60 minutes discharging the patient   This time was spent on the day of discharge  I had direct contact with the patient on the day of discharge  Greater than 50% of the total time was spent examining patient, answering all patient questions, arranging and discussing plan of care with patient as well as directly providing post-discharge instructions  Additional time then spent on discharge activities  Discharge Medications:  See after visit summary for reconciled discharge medications provided to patient and family        ** Please Note: This note has been constructed using a voice recognition system **

## 2019-08-04 NOTE — ASSESSMENT & PLAN NOTE
· Discussed with endocrinology  Patient had not been on insulin since 2007  However, he recently started prednisone for sarcoidosis, and since then his blood glucose has been uncontrolled  His hemoglobin A1c was 12 1  He was just prescribed insulin by his PCP yesterday  However, he states his insurance still in the process of approving it  · Patient was transitioned from insulin drip to subcutaneous insulin  He is currently on mealtime Novolin, Lantus and sliding scale insulin  Will continue to monitor blood glucose    · Home dose recommendations: 30u Lantus, 12u Novolog TID with meals resume metformin and Victoza, include SSI:  · 150-200 2 units lispro   · 200-250 4 units lispro  · 250-300 6 units lispro  · >300 8 units lispro

## 2019-08-04 NOTE — PROGRESS NOTES
Endocrine Ongoing Consultation           *Date*: 8/4/2019     *Time*: 10:47 AM        Medical Decision Making:      Impression  1  Sarcoidosis on chronic prednisone  2  DM2 A1C 12 1% from 7% in Feb 2019      Recommendations:    Still having significant BG elevations after meals and fasting,  Increase lantus to 35 units qAM and lispro to 18 units TID AC  In addition on discharge explained use of SSI in addition to standing prandial insulin:  150-200 2 units novolog   200-250 4 units novolog   250-300 6 units novolog   >300 8 units novolog     Can discharge today on these doses  He will use his basaglar pen at home for basal insulin and novolog pen for prandial insulin  Resume MFM and Victoza on discharge in addition to basal/bolus therapy  He will follow up with me in the office in 1-2 weeks  Perry WITT             Reason for Endocrine Consult/Chief Complaint: DM management     History of Present Illness:   Mr Claude Oneill is a 59yr old male who presented to the hospital with DKA in the setting of steroid use for sarcoid  ? Overnight Events:   NAEO, still having post prandial BG elevations and AM BG high at 258         Review of Systems:     Review of Systems   Constitutional: Negative  HENT: Negative  Eyes: Negative  Respiratory: Negative  Cardiovascular: Negative  Gastrointestinal: Negative  Endocrine: Negative  Genitourinary: Negative  Musculoskeletal: Negative  Skin: Negative  Allergic/Immunologic: Negative  Neurological: Negative  Hematological: Negative  Psychiatric/Behavioral: Negative  ?      Patient History:      Past Medical History:   Diagnosis Date    BPH (benign prostatic hyperplasia)     Diabetes mellitus ([address] )     Erectile dysfunction     GERD (gastroesophageal reflux disease)     Hematuria     Liver disease     hepatitis C    Mediastinal adenopathy     Vitamin D deficiency      Past Surgical History:   Procedure Laterality Date    COLONOSCOPY      last assessed: 2012; fiberoptic screening     CYSTOSCOPY      onset: 2016; Diagnostic     DENTAL SURGERY      LIVER BIOPSY      WA BRONCHOSCOPY NEEDLE BX TRACHEA MAIN STEM&/BRON N/A 2019    Procedure: ENDOBRONCHIAL ULTRASOUND (EBUS);   Surgeon: Russell Julien MD;  [facility];  Service: Thoracic    [place] [address] N/A 2019    Procedure: Marcus Phillips;  Surgeon: Russell Julien MD;  [facility];  Service: Thoracic    WA TRANSURETHRAL ELEC-SURG PROSTATECTOM N/A 2017    Procedure: Sary Canseco; TUR PROSTATE;  Surgeon: David Lora MD;  [facility];  Service: Urology    SHOULDER ARTHROSCOPY Right     bicep tendon repair, rotator cuff repair and acromuim shave      Social History     Socioeconomic History    Marital status: /Civil Union     Spouse name: Not on file    Number of children: Not on file    Years of education: Not on file    Highest education level: Not on file   Occupational History    Occupation: manager   Social Needs    Financial resource strain: Not on file    Food insecurity:     Worry: Not on file     Inability: Not on file    Transportation needs:     Medical: Not on file     Non-medical: Not on file   Tobacco Use    Smoking status: Former Smoker     Packs/day: 1 00     Years: 2 00     Pack years: 2 00     Types: Cigarettes     Last attempt to quit: 2010     Years since quittin 5    Smokeless tobacco: Never Used   Substance and Sexual Activity    Alcohol use: Not Currently     Comment: quit 12 years ago    Drug use: No    Sexual activity: Not on file   Lifestyle    Physical activity:     Days per week: Not on file     Minutes per session: Not on file    Stress: Not on file   Relationships    Social connections:     Talks on phone: Not on file     Gets together: Not on file     Attends [employer] service: Not on file     Active member of club or organization: Not on file Attends meetings of clubs or organizations: Not on file     Relationship status: Not on file    Intimate partner violence:     Fear of current or ex partner: Not on file     Emotionally abused: Not on file     Physically abused: Not on file     Forced sexual activity: Not on file   Other Topics Concern    Not on file   Social History Narrative    Caffeine use    Daily coffee consumption     Denied daily cola consumption     Denied daily tea consumption     Former smoker (as per [employer])     Current everyday smoker (as per [employer])     Non-smoker (as per [employer])      Family History   Problem Relation Age of Onset    Stroke Father     Cancer Maternal Grandmother     Valvular heart disease Mother          Current Medications: At the time this note was written these were the medications the patient was on       Current Facility-Administered Medications   Medication Dose Route Frequency Provider Last Rate Last Dose    famotidine (PEPCID) tablet 40 mg  40 mg Oral Daily Herberth Llanes PA-C   40 mg at 08/04/19 1485    insulin glargine (LANTUS) subcutaneous injection 30 Units 0 3 mL  30 Units Subcutaneous QAM Elizabeth Jewell MD   30 Units at 08/04/19 0816    insulin lispro (HumaLOG) 100 units/mL subcutaneous injection 1-5 Units  1-5 Units Subcutaneous HS Elizabeth Jewell MD   4 Units at 08/03/19 2147    insulin lispro (HumaLOG) 100 units/mL subcutaneous injection 12 Units  12 Units Subcutaneous TID With Meals Ivan Perez MD   12 Units at 08/04/19 0816    insulin lispro (HumaLOG) 100 units/mL subcutaneous injection 2-12 Units  2-12 Units Subcutaneous TID [facility] Ivan Perez MD   6 Units at 08/04/19 0818    levothyroxine tablet 50 mcg  50 mcg Oral Early Morning Herberth Llanes PA-C   50 mcg at 08/04/19 0537    ondansetron (ZOFRAN) injection 4 mg  4 mg Intravenous Q6H PRN Herberth Llanes PA-C        predniSONE tablet 60 mg  60 mg Oral Daily Herberth Llanes PA-C   60 mg at 08/04/19 4998 Allergies: No active allergies        Physical Exam:      Vital Signs:   /73 (BP Location: Right arm)   Pulse 79   Temp 98 2 °F (36 8 °C) (Oral)   Resp 18   Ht 6' 3" (1 905 m)   Wt 117 kg (258 lb 6 4 oz)   SpO2 95%   BMI 32 30 kg/m²     Physical Exam   Constitutional: He is oriented to person, place, and time  He appears well-developed and well-nourished  HENT:   Head: Normocephalic and atraumatic  Nose: Nose normal    Mouth/Throat: Oropharynx is clear and moist    Eyes: Pupils are equal, round, and reactive to light  Conjunctivae and EOM are normal    Neck: Normal range of motion  Neck supple  Cardiovascular: Normal rate, regular rhythm and normal heart sounds  Exam reveals no gallop and no friction rub  No murmur heard  Pulmonary/Chest: Effort normal and breath sounds normal  No stridor  No respiratory distress  He has no wheezes  He has no rales  He exhibits no tenderness  Abdominal: Soft  Bowel sounds are normal  He exhibits no distension and no mass  There is no tenderness  There is no rebound and no guarding  Musculoskeletal: Normal range of motion  He exhibits no edema  Neurological: He is alert and oriented to person, place, and time  Skin: Skin is warm and dry  No rash noted  No erythema  Psychiatric: He has a normal mood and affect   His behavior is normal  Judgment and thought content normal          Labs and Imaging:          Recent Results (from the past 24 hour(s))   Fingerstick Glucose (POCT)    Collection Time: 08/03/19 12:16 PM   Result Value Ref Range    POC Glucose 272 (H) 65 - 140 mg/dl   Fingerstick Glucose (POCT)    Collection Time: 08/03/19  3:07 PM   Result Value Ref Range    POC Glucose 319 (H) 65 - 140 mg/dl   Fingerstick Glucose (POCT)    Collection Time: 08/03/19  9:12 PM   Result Value Ref Range    POC Glucose 387 (H) 65 - 140 mg/dl   Basic metabolic panel    Collection Time: 08/04/19  5:27 AM   Result Value Ref Range    Sodium 139 136 - 145 mmol/L Potassium 3 7 3 5 - 5 3 mmol/L    Chloride 106 100 - 108 mmol/L    CO2 22 21 - 32 mmol/L    ANION GAP 11 4 - 13 mmol/L    BUN 21 5 - 25 mg/dL    Creatinine 0 78 0 60 - 1 30 mg/dL    Glucose 283 (H) 65 - 140 mg/dL    Calcium 7 6 (L) 8 3 - 10 1 mg/dL    eGFR 98 ml/min/1 73sq m   CBC and differential    Collection Time: 08/04/19  5:27 AM   Result Value Ref Range    WBC 6 97 4 31 - 10 16 Thousand/uL    RBC 4 48 3 88 - 5 62 Million/uL    Hemoglobin 14 0 12 0 - 17 0 g/dL    Hematocrit 41 9 36 5 - 49 3 %    MCV 94 82 - 98 fL    MCH 31 3 26 8 - 34 3 pg    MCHC 33 4 31 4 - 37 4 g/dL    RDW 12 3 11 6 - 15 1 %    MPV 9 4 8 9 - 12 7 fL    Platelets 115 (L) 822 - 390 Thousands/uL    nRBC 0 /100 WBCs    Neutrophils Relative 64 43 - 75 %    Immat GRANS % 2 0 - 2 %    Lymphocytes Relative 25 14 - 44 %    Monocytes Relative 8 4 - 12 %    Eosinophils Relative 1 0 - 6 %    Basophils Relative 0 0 - 1 %    Neutrophils Absolute 4 46 1 85 - 7 62 Thousands/µL    Immature Grans Absolute 0 13 0 00 - 0 20 Thousand/uL    Lymphocytes Absolute 1 75 0 60 - 4 47 Thousands/µL    Monocytes Absolute 0 52 0 17 - 1 22 Thousand/µL    Eosinophils Absolute 0 08 0 00 - 0 61 Thousand/µL    Basophils Absolute 0 03 0 00 - 0 10 Thousands/µL   Fingerstick Glucose (POCT)    Collection Time: 08/04/19  5:30 AM   Result Value Ref Range    POC Glucose 250 (H) 65 - 140 mg/dl   Fingerstick Glucose (POCT)    Collection Time: 08/04/19  7:52 AM   Result Value Ref Range    POC Glucose 258 (H) 65 - 140 mg/dl                ?

## 2019-08-04 NOTE — DISCHARGE INSTR - AVS FIRST PAGE
Discharge Instructions    · Follow-up with primary care provider within 1 week      · Follow-up with Endocrinology, Dr Zina Mcintosh, for continued insulin management especially with tapering of steroids    · Discussed alternate medication with Rheumatology    · Take 35 units days ago are daily, 18 units Humalog with each meal, as well as sliding scale coverage as follows:  150-200 2 units lispro   200-250 4 units lispro  250-300 6 units lispro  >300 8 units lispro

## 2019-08-04 NOTE — DISCHARGE INSTRUCTIONS
Diabetic Ketoacidosis   WHAT YOU NEED TO KNOW:   Diabetic ketoacidosis (DKA) is a life-threatening condition caused by dangerously high blood sugar levels  Your blood sugar levels become high because your body does not have enough insulin  Insulin helps move sugar out of the blood so it can be used for energy  The lack of insulin forces your body to use fat instead of sugar for energy  As fats are broken down, they leave chemicals called ketones that build up in your blood  Ketones are dangerous at high levels  DISCHARGE INSTRUCTIONS:   Call 911 for any of the following:   · You have a seizure  · You begin to breathe fast, or are short of breath  · You become weak and confused  Seek care immediately if:   · You are more drowsy than usual      Contact your healthcare provider if:   · You have fruity, sweet breath  · You have severe, new stomach pain and are vomiting  · Your blood sugar level is lower or higher than your healthcare provider says it should be  · You have ketones in your blood or urine  · You have a fever or chills  · You are more thirsty than usual      · You are urinating more often than usual      · You have questions or concerns about your condition or care  Medicines:   · Insulin and diabetes medicine  decreases the amount of sugar in your blood  · Take your medicine as directed  Contact your healthcare provider if you think your medicine is not helping or if you have side effects  Tell him or her if you are allergic to any medicine  Keep a list of the medicines, vitamins, and herbs you take  Include the amounts, and when and why you take them  Bring the list or the pill bottles to follow-up visits  Carry your medicine list with you in case of an emergency  Help prevent DKA: The best way to prevent DKA is to control your diabetes  Ask your healthcare provider for more information on how to manage your diabetes   The following may help decrease your risk for DKA:  · Monitor your blood sugar levels closely  if you have an infection, are stressed, sick, or experience trauma  Check your blood sugar levels often  You may need to check at least 3 times each day  If your blood sugar level is too high, give yourself insulin as directed by your healthcare provider  · Manage your sick days  When you are sick, you may not eat as much as you normally would  You may need to change the amount of insulin you give yourself  You may need to check your blood sugar level more often than usual  Make a plan with your healthcare provider about how to manage your diabetes when you are sick  · Check your ketones as directed  Follow your healthcare provider's instructions about when you should check your blood or urine for ketones  Your healthcare provider may give you a machine to check your blood ketones  Urine ketones can be checked with sticks you dip in your urine  Do not exercise if you have ketones in your urine or blood  · Know how to treat DKA  If you have signs of DKA, drink more liquids that do not contain sugar, such as water  Take your insulin as directed by your healthcare provider and go to the nearest emergency room  Follow up with your healthcare provider as directed: You may need to return often for blood tests  Write down your questions so you remember to ask them during your visits  © 2017 [facility] Information is for End User's use only and may not be sold, redistributed or otherwise used for commercial purposes  All illustrations and images included in CareNotes® are the copyrighted property of [employer] D [employer] M [employer]  or Sawyer Ocampo  The above information is an  only  It is not intended as medical advice for individual conditions or treatments  Talk to your doctor, nurse or pharmacist before following any medical regimen to see if it is safe and effective for you  54

## 2019-08-05 ENCOUNTER — HOSPITAL ENCOUNTER (OUTPATIENT)
Dept: NON INVASIVE DIAGNOSTICS | Facility: CLINIC | Age: 59
Discharge: HOME | End: 2019-08-05
Payer: COMMERCIAL

## 2019-08-05 DIAGNOSIS — R06.00 DYSPNEA ON EXERTION: ICD-10-CM

## 2019-08-05 DIAGNOSIS — D86.1 SARCOIDOSIS OF LYMPH NODES: ICD-10-CM

## 2019-08-05 DIAGNOSIS — R05.3 PERSISTENT DRY COUGH: ICD-10-CM

## 2019-08-05 DIAGNOSIS — D86.9 SARCOIDOSIS: ICD-10-CM

## 2019-08-05 PROCEDURE — 93306 TTE W/DOPPLER COMPLETE: CPT | Performed by: INTERNAL MEDICINE

## 2019-08-05 PROCEDURE — 93306 TTE W/DOPPLER COMPLETE: CPT

## 2019-08-05 RX ORDER — PREDNISONE 20 MG/1
60 TABLET ORAL DAILY
Qty: 90 TABLET | Refills: 0 | Status: SHIPPED | OUTPATIENT
Start: 2019-08-05 | End: 2019-10-23

## 2019-08-06 ENCOUNTER — TRANSITIONAL CARE MANAGEMENT (OUTPATIENT)
Dept: FAMILY MEDICINE CLINIC | Facility: CLINIC | Age: 59
End: 2019-08-06

## 2019-08-07 DIAGNOSIS — E11.9 TYPE 2 DIABETES MELLITUS WITHOUT COMPLICATION, WITHOUT LONG-TERM CURRENT USE OF INSULIN (HCC): ICD-10-CM

## 2019-08-07 NOTE — TELEPHONE ENCOUNTER
Pt called and advised that his Novolog pen is not working and he will another one  He saw Dr Rayray Zamudio in the hospital and has an appt om 8/14 with him  Wanted to know if he can send another prescription to pharmacy and he will just pay out off pocket because he knows insurance will not cover another one      I advised him to call [facility] (provided number) and advise he has a defective pen and they may send him an replacement

## 2019-08-14 ENCOUNTER — OFFICE VISIT (OUTPATIENT)
Dept: RHEUMATOLOGY | Facility: CLINIC | Age: 59
End: 2019-08-14
Payer: COMMERCIAL

## 2019-08-14 ENCOUNTER — OFFICE VISIT (OUTPATIENT)
Dept: ENDOCRINOLOGY | Facility: CLINIC | Age: 59
End: 2019-08-14
Payer: COMMERCIAL

## 2019-08-14 ENCOUNTER — TELEPHONE (OUTPATIENT)
Dept: RHEUMATOLOGY | Facility: CLINIC | Age: 59
End: 2019-08-14

## 2019-08-14 VITALS
HEART RATE: 103 BPM | BODY MASS INDEX: 33.57 KG/M2 | SYSTOLIC BLOOD PRESSURE: 130 MMHG | HEIGHT: 75 IN | WEIGHT: 270 LBS | DIASTOLIC BLOOD PRESSURE: 80 MMHG

## 2019-08-14 VITALS
HEART RATE: 82 BPM | SYSTOLIC BLOOD PRESSURE: 132 MMHG | BODY MASS INDEX: 33.57 KG/M2 | HEIGHT: 75 IN | DIASTOLIC BLOOD PRESSURE: 76 MMHG | WEIGHT: 270 LBS

## 2019-08-14 DIAGNOSIS — E55.9 VITAMIN D DEFICIENCY: ICD-10-CM

## 2019-08-14 DIAGNOSIS — E83.51 HYPOCALCEMIA: ICD-10-CM

## 2019-08-14 DIAGNOSIS — E11.9 TYPE 2 DIABETES MELLITUS WITHOUT COMPLICATION, WITHOUT LONG-TERM CURRENT USE OF INSULIN (HCC): ICD-10-CM

## 2019-08-14 DIAGNOSIS — D86.9 SARCOIDOSIS: Primary | ICD-10-CM

## 2019-08-14 DIAGNOSIS — E11.8 TYPE 2 DIABETES MELLITUS WITH COMPLICATION, WITH LONG-TERM CURRENT USE OF INSULIN (HCC): Primary | ICD-10-CM

## 2019-08-14 DIAGNOSIS — Z79.52 CURRENT CHRONIC USE OF SYSTEMIC STEROIDS: ICD-10-CM

## 2019-08-14 DIAGNOSIS — M79.2 NEURALGIA AND NEURITIS, UNSPECIFIED: ICD-10-CM

## 2019-08-14 DIAGNOSIS — E78.2 MIXED HYPERLIPIDEMIA: ICD-10-CM

## 2019-08-14 DIAGNOSIS — Z86.19 HISTORY OF HEPATITIS C VIRUS INFECTION: ICD-10-CM

## 2019-08-14 DIAGNOSIS — Z79.4 TYPE 2 DIABETES MELLITUS WITH COMPLICATION, WITH LONG-TERM CURRENT USE OF INSULIN (HCC): Primary | ICD-10-CM

## 2019-08-14 DIAGNOSIS — E03.9 ACQUIRED HYPOTHYROIDISM: ICD-10-CM

## 2019-08-14 PROBLEM — D86.0 PULMONARY SARCOIDOSIS (HCC): Status: ACTIVE | Noted: 2019-06-18

## 2019-08-14 PROBLEM — D86.89: Status: ACTIVE | Noted: 2019-08-14

## 2019-08-14 PROCEDURE — 99214 OFFICE O/P EST MOD 30 MIN: CPT | Performed by: INTERNAL MEDICINE

## 2019-08-14 NOTE — LETTER
August 14, 2019     Silvana Vaughn MD  [address]  [address] [facility], operated by [facility]    Patient: Kaya Cisneros   YOB: 1960   Date of Visit: 8/14/2019       Dear Dr Anjum Somers:    Thank you for referring Berna De La O to me for evaluation  Below are my notes for this consultation  If you have questions, please do not hesitate to call me  I look forward to following your patient along with you  Sincerely,        Grey Posadas MD        CC: No Recipients  Grey Posadas MD  8/14/2019  3:45 PM  Signed  ENDOCRINOLOGY  FOLLOW UP VISIT      Reason for Endocrine Consult/Chief Complaint: DM2 management     ? Medical Decision Making:     Impression  1  Systemic sarcoidosis now on steroid taper-managed by rheumatology  2  DM2 uncontrolled on steroids   3  Acquired hypothyroidism- on levothyroxine  4  Vitamin D deficiency- on D3 replacemet  5  Hypocalcemia in the hospital - needs work up  6  Mixed HLD- off statin therapy   7  Hx HCV s/p harvoni c/b liver cirrhosis     Recommendations:  ?  His BGs have been fairly stable since discharge from the hospital  His AM BGs are adequate, he is having a few BGs in the 200s/300s range but also having mid 100s BGs pre-meals which is at goal  Likely variable due to being on high dose steroids  He will start to taper down his prednisone from 60mg to 50mg this Saturday and will decrease by 10mg every week  For now I will continue his basaglar at 35 units qAM and novolog continue at 18 units TID [facility] along with the std SSI  If his BGs pre-meals are decreasing to the 70s-90s I instructed him to let me know and decrease his prandial insulin to novolog 15 units TID AC  Glucose tablets prescribed, instructed on use  He will need weekly insulin dose adjustments as the steroid doses decreases  Instructed to send me BG log weekly for review and adjustment      My goal for him is to get him off insulin after the steroids are discontinued completely  Steroid taper per rheumatology  Acquired hypothyroidism- continue levothyroxine, TSH at goal    Hypocalcemia- in the hospital his calcium levels were in the mid 7s, I want to repeat his calcium level, albumin, and check his PTH and repeat Vit D as well as check Mg/Ph-- rarely sarcoid can go to the parathyroid glands and result in hypoparathyroidism--I instructed him to let me know if he develops any numbness/tingling/muscle cramps     Vitamin D level - continue 5000 IU D3 replacement    Mixed hyperlipidemia- not on any therapy- will discuss at next appointment     RTC 4 weeks     Abraham WITT  History of Present Illness:    Mr Darien Goodman is a 59yr old male who presents for DM2 follow up after hospital stay for DKA  He has a hx of liver cirrhosis on prior biopsy and had pulmonary sarcoidosis with extra thoracic involvement including trigeminal neuralgia type symptoms with right sided headaches and drooping of the right face and peripheral paresthesias  He also has possible eye involvement of his sarcoid  He was started on prednisone 60mg qday for the systemic sarcoid and this resulted in his most recent hospitalization he was found to be in DKA  ? PMH-HCV s/p harvoni c/b liver cirrhosis, DM2, hypothyroidism, systemic sarcoidosis, BPH, vitamin D deficiency, mixed HLD  PSH-shoulder surgery, liver biopsy, prostate surgery  FHx-no diabetes in family  SHx-neg x 2, works at [employer]      Type of DM: 2  Age of onset: 2007 diagnosed   Most recent A1C: 12 1% August 2019  Microvascular complications: neuropathy?   Macrovascular complications: none known   Exercise: started going to gym     Events since last visit:    seen in the hospital with new onset DKA and discharged on basal bolus therapy  On lantus 35 units qAM and lispro 18 units TID AC +SSI  Remains on metformin 500mg BID AC and victoza 1 2mg qday     BGs in AM in 100s and post meals in high 100s, some 200s/300s    Saw rheumatology this morning, tapering steroids will start along with remicade initiation   Had low calcium in hospital 7 6 uncorrected, albumin 3 8, vitamin D level 28 (takes 5000 IU daily D3)    [place] going [place] vacation on Saturday   ? Review of Systems:     Review of Systems   Constitutional: Negative  HENT: Negative  Eyes: Positive for visual disturbance  Respiratory: Positive for shortness of breath  Cardiovascular: Negative  Gastrointestinal: Negative  Endocrine: Negative  Genitourinary: Negative  Musculoskeletal: Negative  Skin: Negative  Allergic/Immunologic: Negative  Neurological: Negative  Hematological: Negative  Psychiatric/Behavioral: Negative  ? Patient History:     Past Medical History:   Diagnosis Date    BPH (benign prostatic hyperplasia)     Diabetes mellitus ([facility] [address] )     Erectile dysfunction     GERD (gastroesophageal reflux disease)     Hematuria     Liver disease     hepatitis C    Mediastinal adenopathy     Vitamin D deficiency      Past Surgical History:   Procedure Laterality Date    COLONOSCOPY      last assessed: 08/28/2012; fiberoptic screening     CYSTOSCOPY      onset: 05/06/2016; Diagnostic     DENTAL SURGERY      LIVER BIOPSY      [place] BRONCHOSCOPY NEEDLE BX TRACHEA MAIN STEM&/BRON N/A 6/6/2019    Procedure: ENDOBRONCHIAL ULTRASOUND (EBUS);   Surgeon: Giles Lawrence MD;  [facility];  Service: Thoracic    [place] [address] N/A 6/6/2019    Procedure: Leland Lanelich;  Surgeon: Giles Lawrence MD;  [facility];  Service: Thoracic    FL TRANSURETHRAL ELEC-SURG PROSTATECTOM N/A 9/13/2017    Procedure: Christian Duffy; TUR PROSTATE;  Surgeon: Noemy Batista MD;  [facility];  Service: Urology    SHOULDER ARTHROSCOPY Right     bicep tendon repair, rotator cuff repair and acromuim shave      Social History     Socioeconomic History    Marital status: /Civil Union     Spouse name: Not on file    Number of children: Not on file    Years of education: Not on file    Highest education level: Not on file   Occupational History    Occupation: manager   Social Needs    Financial resource strain: Not on file    Food insecurity:     Worry: Not on file     Inability: Not on file    Transportation needs:     Medical: Not on file     Non-medical: Not on file   Tobacco Use    Smoking status: Former Smoker     Packs/day: 1 00     Years: 2 00     Pack years: 2 00     Types: Cigarettes     Last attempt to quit: 2010     Years since quittin 6    Smokeless tobacco: Never Used   Substance and Sexual Activity    Alcohol use: Not Currently     Comment: quit 12 years ago    Drug use: No    Sexual activity: Not on file   Lifestyle    Physical activity:     Days per week: Not on file     Minutes per session: Not on file    Stress: Not on file   Relationships    Social connections:     Talks on phone: Not on file     Gets together: Not on file     Attends Restoration service: Not on file     Active member of club or organization: Not on file     Attends meetings of clubs or organizations: Not on file     Relationship status: Not on file    Intimate partner violence:     Fear of current or ex partner: Not on file     Emotionally abused: Not on file     Physically abused: Not on file     Forced sexual activity: Not on file   Other Topics Concern    Not on file   Social History Narrative    Caffeine use    Daily coffee consumption     Denied daily cola consumption     Denied daily tea consumption     Former smoker (as per [employer])     Current everyday smoker (as per [employer]scripts)     Non-smoker (as per [employer]scripts)      Family History   Problem Relation Age of Onset    Stroke Father     Cancer Maternal Grandmother     Valvular heart disease Mother        Current Medications: At the time this note was written these were the medications the patient was on    Current Outpatient Medications   Medication Sig Dispense Refill  Cholecalciferol (VITAMIN D3) 5000 units CAPS Take by mouth      famotidine (PEPCID) 40 MG tablet Take 40 mg by mouth daily       insulin aspart (NOVOLOG FLEXPEN) 100 Units/mL injection pen Inject 18 Units under the skin 3 (three) times a day with meals As well as sliding scale coverage as follows:  150-200 2 units  200-250 4 units  250-300 6 units  >300 8 units 5 pen 3    insulin glargine (BASAGLAR KWIKPEN) 100 units/mL injection pen Inject 35 Units under the skin daily 32 14 mL 0    Insulin Pen Needle (PEN NEEDLES) 31G X 6 MM MISC by Does not apply route 4 (four) times a day 150 each 4    levothyroxine 50 mcg tablet TAKE 1 TABLET BY MOUTH EVERY DAY 90 tablet 1    liraglutide (VICTOZA) injection Inject 0 2 mL (1 2 mg total) under the skin daily 2 pen 4    metFORMIN (GLUCOPHAGE) 500 mg tablet TAKE 1 TABLET BY MOUTH TWICE A DAY WITH MEALS 180 tablet 1    NOVOTWIST 32G X 5 MM MISC       predniSONE 20 mg tablet Take 3 tablets (60 mg total) by mouth daily 90 tablet 0     No current facility-administered medications for this visit  Allergies: No active allergies    Physical Exam:   Vital Signs:   /80   Pulse 103   Ht 6' 3" (1 905 m)   Wt 122 kg (270 lb)   BMI 33 75 kg/m²      Physical Exam   Constitutional: He is oriented to person, place, and time  He appears well-developed and well-nourished  HENT:   Head: Normocephalic and atraumatic  Eyes: Pupils are equal, round, and reactive to light  Conjunctivae and EOM are normal    Neck: Normal range of motion  Neck supple  Cardiovascular: Regular rhythm and normal heart sounds  Exam reveals no gallop and no friction rub  No murmur heard   +tachycardic   Pulmonary/Chest: Effort normal and breath sounds normal  No stridor  No respiratory distress  He has no wheezes  He has no rales  Abdominal: Soft  Bowel sounds are normal  He exhibits no distension and no mass  There is no tenderness  There is no rebound and no guarding     Musculoskeletal: Normal range of motion  He exhibits no edema  Neurological: He is alert and oriented to person, place, and time  Skin: Skin is warm and dry  No rash noted  No erythema  Psychiatric: He has a normal mood and affect   His behavior is normal  Judgment and thought content normal         Labs and Imaging:      Component      Latest Ref Rng & Units 8/4/2019   Sodium      136 - 145 mmol/L 139   Potassium      3 5 - 5 3 mmol/L 3 7   Chloride      100 - 108 mmol/L 106   CO2      21 - 32 mmol/L 22   Anion Gap      4 - 13 mmol/L 11   BUN      5 - 25 mg/dL 21   Creatinine      0 60 - 1 30 mg/dL 0 78   Glucose, Random      65 - 140 mg/dL 283 (H)   Calcium      8 3 - 10 1 mg/dL 7 6 (L)   eGFR      ml/min/1 73sq m 98

## 2019-08-14 NOTE — PATIENT INSTRUCTIONS
Decrease prednisone to 50 mg starting tomorrow for 7 days, then 40 mg daily for 7 days, then 30 mg daily for 7 days, then 25 mg daily for 14 days, then 20 mg daily and stay on that dose until seen in rheumatology follow-up  You will hear from our office once Remicade is approved and infusions will be arranged  Get labs done 1 week before next appointment

## 2019-08-14 NOTE — PROGRESS NOTES
ENDOCRINOLOGY  FOLLOW UP VISIT      Reason for Endocrine Consult/Chief Complaint: DM2 management     ? Medical Decision Making:     Impression  1  Systemic sarcoidosis now on steroid taper-managed by rheumatology  2  DM2 uncontrolled on steroids   3  Acquired hypothyroidism- on levothyroxine  4  Vitamin D deficiency- on D3 replacemet  5  Hypocalcemia in the hospital - needs work up  6  Mixed HLD- off statin therapy   7  Hx HCV s/p harvoni c/b liver cirrhosis     Recommendations:  ?  His BGs have been fairly stable since discharge from the hospital  His AM BGs are adequate, he is having a few BGs in the 200s/300s range but also having mid 100s BGs pre-meals which is at goal  Likely variable due to being on high dose steroids  He will start to taper down his prednisone from 60mg to 50mg this Saturday and will decrease by 10mg every week  For now I will continue his basaglar at 35 units qAM and novolog continue at 18 units TID [facility] along with the std SSI  If his BGs pre-meals are decreasing to the 70s-90s I instructed him to let me know and decrease his prandial insulin to novolog 15 units TID AC  Glucose tablets prescribed, instructed on use  He will need weekly insulin dose adjustments as the steroid doses decreases  Instructed to send me BG log weekly for review and adjustment  My goal for him is to get him off insulin after the steroids are discontinued completely  Steroid taper per rheumatology       Acquired hypothyroidism- continue levothyroxine, TSH at goal    Hypocalcemia- in the hospital his calcium levels were in the mid 7s, I want to repeat his calcium level, albumin, and check his PTH and repeat Vit D as well as check Mg/Ph-- rarely sarcoid can go to the parathyroid glands and result in hypoparathyroidism--I instructed him to let me know if he develops any numbness/tingling/muscle cramps     Vitamin D level - continue 5000 IU D3 replacement    Mixed hyperlipidemia- not on any therapy- will discuss at next appointment     RTC 4 weeks     Lynda WITT  History of Present Illness:   Mr Elidia Carolina is a 59yr old male who presents for DM2 follow up after hospital stay for DKA  He has a hx of liver cirrhosis on prior biopsy and had pulmonary sarcoidosis with extra thoracic involvement including trigeminal neuralgia type symptoms with right sided headaches and drooping of the right face and peripheral paresthesias  He also has possible eye involvement of his sarcoid  He was started on prednisone 60mg qday for the systemic sarcoid and this resulted in his most recent hospitalization he was found to be in DKA  ? PMH-HCV s/p harvoni c/b liver cirrhosis, DM2, hypothyroidism, systemic sarcoidosis, BPH, vitamin D deficiency, mixed HLD  PSH-shoulder surgery, liver biopsy, prostate surgery  FHx-no diabetes in family  SHx-neg x 2, works at [employer]      Type of DM: 2  Age of onset: 2007 diagnosed   Most recent A1C: 12 1% August 2019  Microvascular complications: neuropathy? Macrovascular complications: none known   Exercise: started going to gym     Events since last visit:    seen in the hospital with new onset DKA and discharged on basal bolus therapy  On lantus 35 units qAM and lispro 18 units TID AC +SSI  Remains on metformin 500mg BID AC and victoza 1 2mg qday     BGs in AM in 100s and post meals in high 100s, some 200s/300s    Saw rheumatology this morning, tapering steroids will start along with remicade initiation   Had low calcium in hospital 7 6 uncorrected, albumin 3 8, vitamin D level 28 (takes 5000 IU daily D3)    [place] [employer] going on vacation on Saturday   ? Review of Systems:     Review of Systems   Constitutional: Negative  HENT: Negative  Eyes: Positive for visual disturbance  Respiratory: Positive for shortness of breath  Cardiovascular: Negative  Gastrointestinal: Negative  Endocrine: Negative  Genitourinary: Negative  Musculoskeletal: Negative  Skin: Negative  Allergic/Immunologic: Negative  Neurological: Negative  Hematological: Negative  Psychiatric/Behavioral: Negative  ? Patient History:     Past Medical History:   Diagnosis Date    BPH (benign prostatic hyperplasia)     Diabetes mellitus ([address] )     Erectile dysfunction     GERD (gastroesophageal reflux disease)     Hematuria     Liver disease     hepatitis C    Mediastinal adenopathy     Vitamin D deficiency      Past Surgical History:   Procedure Laterality Date    COLONOSCOPY      last assessed: 2012; fiberoptic screening     CYSTOSCOPY      onset: 2016; Diagnostic     DENTAL SURGERY      LIVER BIOPSY      [place] BRONCHOSCOPY NEEDLE BX TRACHEA MAIN STEM&/BRON N/A 2019    Procedure: ENDOBRONCHIAL ULTRASOUND (EBUS);   Surgeon: Tiana Mendiola MD;  [facility];  Service: Thoracic    [place] [address] N/A 2019    Procedure: Jesus Collins;  Surgeon: Tiana Mendiola MD;  [facility];  Service: Thoracic    PA TRANSURETHRAL ELEC-SURG PROSTATECTOM N/A 2017    Procedure: Raissa Herrera; TUR PROSTATE;  Surgeon: Erika Osborn MD;  [facility];  Service: Urology    SHOULDER ARTHROSCOPY Right     bicep tendon repair, rotator cuff repair and acromuim shave      Social History     Socioeconomic History    Marital status: /Civil Union     Spouse name: Not on file    Number of children: Not on file    Years of education: Not on file    Highest education level: Not on file   Occupational History    Occupation: manager   Social Needs    Financial resource strain: Not on file    Food insecurity:     Worry: Not on file     Inability: Not on file    Transportation needs:     Medical: Not on file     Non-medical: Not on file   Tobacco Use    Smoking status: Former Smoker     Packs/day: 1 00     Years: 2 00     Pack years: 2 00     Types: Cigarettes     Last attempt to quit:      Years since quittin 6    Smokeless tobacco: Never Used   Substance and Sexual Activity    Alcohol use: Not Currently     Comment: quit 12 years ago    Drug use: No    Sexual activity: Not on file   Lifestyle    Physical activity:     Days per week: Not on file     Minutes per session: Not on file    Stress: Not on file   Relationships    Social connections:     Talks on phone: Not on file     Gets together: Not on file     Attends Alevism service: Not on file     Active member of club or organization: Not on file     Attends meetings of clubs or organizations: Not on file     Relationship status: Not on file    Intimate partner violence:     Fear of current or ex partner: Not on file     Emotionally abused: Not on file     Physically abused: Not on file     Forced sexual activity: Not on file   Other Topics Concern    Not on file   Social History Narrative    Caffeine use    Daily coffee consumption     Denied daily cola consumption     Denied daily tea consumption     Former smoker (as per [employer])     Current everyday smoker (as per [employer])     Non-smoker (as per [employer])      Family History   Problem Relation Age of Onset    Stroke Father     Cancer Maternal Grandmother     Valvular heart disease Mother        Current Medications: At the time this note was written these were the medications the patient was on    Current Outpatient Medications   Medication Sig Dispense Refill    Cholecalciferol (VITAMIN D3) 5000 units CAPS Take by mouth      famotidine (PEPCID) 40 MG tablet Take 40 mg by mouth daily       insulin aspart (NOVOLOG FLEXPEN) 100 Units/mL injection pen Inject 18 Units under the skin 3 (three) times a day with meals As well as sliding scale coverage as follows:  150-200 2 units  200-250 4 units  250-300 6 units  >300 8 units 5 pen 3    insulin glargine (BASAGLAR KWIKPEN) 100 units/mL injection pen Inject 35 Units under the skin daily 32 14 mL 0    Insulin Pen Needle (PEN NEEDLES) 31G X 6 MM MISC by Does not apply route 4 (four) times a day 150 each 4    levothyroxine 50 mcg tablet TAKE 1 TABLET BY MOUTH EVERY DAY 90 tablet 1    liraglutide (VICTOZA) injection Inject 0 2 mL (1 2 mg total) under the skin daily 2 pen 4    metFORMIN (GLUCOPHAGE) 500 mg tablet TAKE 1 TABLET BY MOUTH TWICE A DAY WITH MEALS 180 tablet 1    NOVOTWIST 32G X 5 MM MISC       predniSONE 20 mg tablet Take 3 tablets (60 mg total) by mouth daily 90 tablet 0     No current facility-administered medications for this visit  Allergies: No active allergies    Physical Exam:   Vital Signs:   /80   Pulse 103   Ht 6' 3" (1 905 m)   Wt 122 kg (270 lb)   BMI 33 75 kg/m²     Physical Exam   Constitutional: He is oriented to person, place, and time  He appears well-developed and well-nourished  HENT:   Head: Normocephalic and atraumatic  Eyes: Pupils are equal, round, and reactive to light  Conjunctivae and EOM are normal    Neck: Normal range of motion  Neck supple  Cardiovascular: Regular rhythm and normal heart sounds  Exam reveals no gallop and no friction rub  No murmur heard   +tachycardic   Pulmonary/Chest: Effort normal and breath sounds normal  No stridor  No respiratory distress  He has no wheezes  He has no rales  Abdominal: Soft  Bowel sounds are normal  He exhibits no distension and no mass  There is no tenderness  There is no rebound and no guarding  Musculoskeletal: Normal range of motion  He exhibits no edema  Neurological: He is alert and oriented to person, place, and time  Skin: Skin is warm and dry  No rash noted  No erythema  Psychiatric: He has a normal mood and affect   His behavior is normal  Judgment and thought content normal         Labs and Imaging:      Component      Latest Ref Rng & Units 8/4/2019   Sodium      136 - 145 mmol/L 139   Potassium      3 5 - 5 3 mmol/L 3 7   Chloride      100 - 108 mmol/L 106   CO2      21 - 32 mmol/L 22   Anion Gap      4 - 13 mmol/L 11   BUN      5 - 25 mg/dL 21 Creatinine      0 60 - 1 30 mg/dL 0 78   Glucose, Random      65 - 140 mg/dL 283 (H)   Calcium      8 3 - 10 1 mg/dL 7 6 (L)   eGFR      ml/min/1 73sq m 98

## 2019-08-14 NOTE — TELEPHONE ENCOUNTER
Please send prior auth for remicade 5mg/kg, loading doses at 0, 2, and 6 weeks, and then every 8 weeks thereafter  Dx is sarcoidosis, use codes D86 0 and D86 9  Methotrexate and imuran are contraindicated due to liver cirrhosis

## 2019-08-14 NOTE — PATIENT INSTRUCTIONS
-have your CMP (calcium level) checked now along with Vit D level and PTH (hormone that controls calcium) checked now    I will call you with the results    Send me blood sugar log every week as your steroid dose is decreasing so I can adjust your insulin doses     If your blood sugars are running on the low side before meals (70s-90s) then reduce the novolog to 15 units with each meal     Follow up in 1 month

## 2019-08-15 ENCOUNTER — APPOINTMENT (OUTPATIENT)
Dept: LAB | Facility: CLINIC | Age: 59
End: 2019-08-15
Payer: COMMERCIAL

## 2019-08-15 DIAGNOSIS — Z79.4 TYPE 2 DIABETES MELLITUS WITH COMPLICATION, WITH LONG-TERM CURRENT USE OF INSULIN (HCC): ICD-10-CM

## 2019-08-15 DIAGNOSIS — E83.51 HYPOCALCEMIA: ICD-10-CM

## 2019-08-15 DIAGNOSIS — E55.9 VITAMIN D DEFICIENCY: ICD-10-CM

## 2019-08-15 DIAGNOSIS — E78.2 MIXED HYPERLIPIDEMIA: ICD-10-CM

## 2019-08-15 DIAGNOSIS — E11.9 TYPE 2 DIABETES MELLITUS WITHOUT COMPLICATION, WITHOUT LONG-TERM CURRENT USE OF INSULIN (HCC): ICD-10-CM

## 2019-08-15 DIAGNOSIS — E03.9 ACQUIRED HYPOTHYROIDISM: ICD-10-CM

## 2019-08-15 DIAGNOSIS — E11.8 TYPE 2 DIABETES MELLITUS WITH COMPLICATION, WITH LONG-TERM CURRENT USE OF INSULIN (HCC): ICD-10-CM

## 2019-08-15 LAB
25(OH)D3 SERPL-MCNC: 21.4 NG/ML (ref 30–100)
ALBUMIN SERPL BCP-MCNC: 3.6 G/DL (ref 3.5–5)
ALP SERPL-CCNC: 63 U/L (ref 46–116)
ALT SERPL W P-5'-P-CCNC: 49 U/L (ref 12–78)
ANION GAP SERPL CALCULATED.3IONS-SCNC: 10 MMOL/L (ref 4–13)
AST SERPL W P-5'-P-CCNC: 14 U/L (ref 5–45)
BILIRUB SERPL-MCNC: 0.4 MG/DL (ref 0.2–1)
BUN SERPL-MCNC: 28 MG/DL (ref 5–25)
CALCIUM SERPL-MCNC: 8.7 MG/DL (ref 8.3–10.1)
CHLORIDE SERPL-SCNC: 103 MMOL/L (ref 100–108)
CO2 SERPL-SCNC: 26 MMOL/L (ref 21–32)
CREAT SERPL-MCNC: 1.04 MG/DL (ref 0.6–1.3)
GFR SERPL CREATININE-BSD FRML MDRD: 78 ML/MIN/1.73SQ M
GLUCOSE SERPL-MCNC: 182 MG/DL (ref 65–140)
MAGNESIUM SERPL-MCNC: 2.2 MG/DL (ref 1.6–2.6)
PHOSPHATE SERPL-MCNC: 3.4 MG/DL (ref 2.3–4.1)
POTASSIUM SERPL-SCNC: 4.3 MMOL/L (ref 3.5–5.3)
PROT SERPL-MCNC: 7.2 G/DL (ref 6.4–8.2)
PTH-INTACT SERPL-MCNC: 64 PG/ML (ref 18.4–80.1)
SODIUM SERPL-SCNC: 139 MMOL/L (ref 136–145)

## 2019-08-15 PROCEDURE — 84100 ASSAY OF PHOSPHORUS: CPT

## 2019-08-15 PROCEDURE — 83970 ASSAY OF PARATHORMONE: CPT

## 2019-08-15 PROCEDURE — 82306 VITAMIN D 25 HYDROXY: CPT

## 2019-08-15 PROCEDURE — 83735 ASSAY OF MAGNESIUM: CPT

## 2019-08-15 PROCEDURE — 80053 COMPREHEN METABOLIC PANEL: CPT

## 2019-08-15 PROCEDURE — 36415 COLL VENOUS BLD VENIPUNCTURE: CPT

## 2019-08-16 ENCOUNTER — TELEPHONE (OUTPATIENT)
Dept: ENDOCRINOLOGY | Facility: CLINIC | Age: 59
End: 2019-08-16

## 2019-08-16 NOTE — TELEPHONE ENCOUNTER
Called patient and reviewed most recent labs    -vitamin D 21 but on 5000 IU D3 daily, continue same dose, emphasized compliance  -calcium in normal range    Send BG log in 1 week for review    Stas WITT

## 2019-08-18 DIAGNOSIS — E03.9 HYPOTHYROIDISM, UNSPECIFIED TYPE: ICD-10-CM

## 2019-08-18 RX ORDER — LEVOTHYROXINE SODIUM 0.05 MG/1
TABLET ORAL
Qty: 30 TABLET | Refills: 5 | Status: SHIPPED | OUTPATIENT
Start: 2019-08-18 | End: 2020-03-09 | Stop reason: SDUPTHER

## 2019-08-20 ENCOUNTER — TELEPHONE (OUTPATIENT)
Dept: NEUROLOGY | Facility: CLINIC | Age: 59
End: 2019-08-20

## 2019-08-20 NOTE — TELEPHONE ENCOUNTER
Email received regarding claim denial for dos [id number] lab- ordering providee Dr Roma Olivera please see document attached to encounter for more information

## 2019-08-21 PROCEDURE — 3072F LOW RISK FOR RETINOPATHY: CPT | Performed by: FAMILY MEDICINE

## 2019-08-21 NOTE — TELEPHONE ENCOUNTER
Spoke with a 3rd party person who gathered additional information regarding the auth for remicade  He will pass along to insurance and stated we can call insurance  later today for an update

## 2019-08-23 ENCOUNTER — OFFICE VISIT (OUTPATIENT)
Dept: FAMILY MEDICINE CLINIC | Facility: CLINIC | Age: 59
End: 2019-08-23
Payer: COMMERCIAL

## 2019-08-23 ENCOUNTER — TELEPHONE (OUTPATIENT)
Dept: FAMILY MEDICINE CLINIC | Facility: CLINIC | Age: 59
End: 2019-08-23

## 2019-08-23 VITALS
SYSTOLIC BLOOD PRESSURE: 118 MMHG | HEIGHT: 74 IN | OXYGEN SATURATION: 96 % | RESPIRATION RATE: 18 BRPM | HEART RATE: 88 BPM | TEMPERATURE: 98.1 F | BODY MASS INDEX: 34.91 KG/M2 | DIASTOLIC BLOOD PRESSURE: 68 MMHG | WEIGHT: 272 LBS

## 2019-08-23 DIAGNOSIS — E03.9 HYPOTHYROIDISM, UNSPECIFIED TYPE: ICD-10-CM

## 2019-08-23 DIAGNOSIS — H53.9 VISUAL CHANGES: ICD-10-CM

## 2019-08-23 DIAGNOSIS — R00.2 PALPITATIONS: ICD-10-CM

## 2019-08-23 DIAGNOSIS — D86.0 PULMONARY SARCOIDOSIS (HCC): ICD-10-CM

## 2019-08-23 DIAGNOSIS — E11.9 TYPE 2 DIABETES MELLITUS WITHOUT COMPLICATION, WITHOUT LONG-TERM CURRENT USE OF INSULIN (HCC): Primary | ICD-10-CM

## 2019-08-23 DIAGNOSIS — Z23 NEED FOR TETANUS, DIPHTHERIA, AND ACELLULAR PERTUSSIS (TDAP) VACCINE: ICD-10-CM

## 2019-08-23 DIAGNOSIS — D86.9 SARCOIDOSIS: ICD-10-CM

## 2019-08-23 PROBLEM — E87.1 HYPONATREMIA: Status: RESOLVED | Noted: 2019-08-01 | Resolved: 2019-08-23

## 2019-08-23 PROBLEM — B18.2 CHRONIC HEPATITIS C WITHOUT HEPATIC COMA (HCC): Status: RESOLVED | Noted: 2018-12-13 | Resolved: 2019-08-23

## 2019-08-23 PROCEDURE — 90471 IMMUNIZATION ADMIN: CPT

## 2019-08-23 PROCEDURE — 99214 OFFICE O/P EST MOD 30 MIN: CPT | Performed by: FAMILY MEDICINE

## 2019-08-23 PROCEDURE — 1036F TOBACCO NON-USER: CPT | Performed by: FAMILY MEDICINE

## 2019-08-23 PROCEDURE — 3008F BODY MASS INDEX DOCD: CPT | Performed by: FAMILY MEDICINE

## 2019-08-23 PROCEDURE — 90715 TDAP VACCINE 7 YRS/> IM: CPT

## 2019-08-23 RX ORDER — BLOOD SUGAR DIAGNOSTIC
STRIP MISCELLANEOUS
COMMUNITY
Start: 2019-08-22 | End: 2019-10-09 | Stop reason: SDUPTHER

## 2019-08-23 NOTE — PATIENT INSTRUCTIONS
Labs in 6 months  Refer to cardiology  Continue with pulm, endo, and rheumatology  Make sooner appt with ophtho

## 2019-08-23 NOTE — PROGRESS NOTES
Patient's shoes and socks removed  Right Foot/Ankle   Right Foot Inspection  Skin Exam: skin normal and skin intact no dry skin, no warmth, no callus, no erythema, no maceration, no abnormal color, no pre-ulcer, no ulcer and no callus                            Sensory       Monofilament testing: intact  Vascular    The right DP pulse is 2+  The right PT pulse is 2+  Left Foot/Ankle  Left Foot Inspection  Skin Exam: skin normal and skin intactno dry skin, no warmth, no erythema, no maceration, normal color, no pre-ulcer, no ulcer and no callus                                         Sensory       Monofilament: intact  Vascular    The left DP pulse is 2+  The left PT pulse is 2+     Assign Risk Category:  ; ;        Risk: 0

## 2019-08-23 NOTE — TELEPHONE ENCOUNTER
Patient was seen in the office today, 8/23/19  Per Dr Phoenix Albright patient needs to see his ophthalmology doctor sooner than Sept 9 2019 as he has visual changes  Placed call to Dr Miles Carmen office, the office was closed  We will call Monday morning for a sooner appointment         Dr Yuni Ward [address] [place] [address], [phone]

## 2019-08-26 ENCOUNTER — TELEPHONE (OUTPATIENT)
Dept: FAMILY MEDICINE CLINIC | Facility: CLINIC | Age: 59
End: 2019-08-26

## 2019-08-26 LAB
LEFT EYE DIABETIC RETINOPATHY: NORMAL
RIGHT EYE DIABETIC RETINOPATHY: NORMAL

## 2019-08-26 NOTE — TELEPHONE ENCOUNTER
Placed call to Dr Katie Jaramillo office, spoke to Edwin - reviewed vision changes  Patient is scheduled for an appointment at 245 today  They will keep his yearly appointment on 9/9/19  Placed call to patient, made aware of appointment  Patient is able to make the appointment

## 2019-08-26 NOTE — TELEPHONE ENCOUNTER
Please let patient know, and let him know I suggest going with the other option we had briefly discussed, mycophenolate mofetil (cellcept), which is also a treatment option for sarcoid in order to decrease his steroid dose over time  Ultimately we would titrate his dose to 2 tablets twice per day  Main side effects are similar to the remicade, most commonly increased risk of infections  With [facility] we monitor blood work more frequently as well to make sure there are no adverse side effects on the bone marrow

## 2019-08-26 NOTE — TELEPHONE ENCOUNTER
Mendy Kong has a eye appointment with Dr Lois Morin ([phone]) on 9/9/19  Per his conversation with Dr Hector Darling, she wanted that appointment moved up and would have her staff handle that  He would like to know if Dr Shira Ameqzuita still wants that appointment moved up? He is also willing to see a eye doctor with in [place] [id number] if Dr Shira Amezquita prefers  He wants to give Dr Gio Coulter office enough time to cancel

## 2019-08-27 ENCOUNTER — TELEPHONE (OUTPATIENT)
Dept: ENDOCRINOLOGY | Facility: CLINIC | Age: 59
End: 2019-08-27

## 2019-08-27 NOTE — TELEPHONE ENCOUNTER
Called patient and reviewed BGs    -BGs postprandially are trending down, over weekend had low 81 prelunch which was symptomatic     AM BGs are adequate in low mid 100s    -instructed to continue basaglar 35 units qAM and reduce novolog to 13 units TID AC    Instructed to call our office any time of day if he experiences further low BGs for more immediate insulin dose changes    Instructed send BG log Friday for review    Samir WITT

## 2019-08-27 NOTE — TELEPHONE ENCOUNTER
Submit prior auth for mycophenolate mofetil dose of 1000mg BID  Dx is sarcoidosis, use codes D86 0 and D86 9  Methotrexate and imuran are contraindicated due to liver cirrhosis

## 2019-08-28 ENCOUNTER — TELEPHONE (OUTPATIENT)
Dept: RHEUMATOLOGY | Facility: CLINIC | Age: 59
End: 2019-08-28

## 2019-08-28 NOTE — TELEPHONE ENCOUNTER
Spoke with patient  Discussed risk/benefits of cellcept vs imuran as another option, although is cleared hepatically so would not be an ideal choice since he has cirrhosis on past biopsy  He will discuss with his wife and call us back tomorrow

## 2019-08-28 NOTE — TELEPHONE ENCOUNTER
Dr Rodríguez   #: [phone]         Patient is calling in requesting to speak to [facility] about his medication  He stated he spoke to her yesterday   Please advise, thank you

## 2019-08-28 NOTE — TELEPHONE ENCOUNTER
Called and spoke with patient  He stated he researched Cellcept and he has reservations about starting this medication  He would like to discuss with you  He stated he did have Hep C before and it looks like this medication can cause that  He also has Cirrhosis and it looks like it can effect that as well

## 2019-08-29 ENCOUNTER — TELEPHONE (OUTPATIENT)
Dept: CARDIOLOGY CLINIC | Facility: MEDICAL CENTER | Age: 59
End: 2019-08-29

## 2019-09-03 ENCOUNTER — TELEPHONE (OUTPATIENT)
Dept: OBGYN CLINIC | Facility: HOSPITAL | Age: 59
End: 2019-09-03

## 2019-09-03 ENCOUNTER — TELEPHONE (OUTPATIENT)
Dept: ENDOCRINOLOGY | Facility: CLINIC | Age: 59
End: 2019-09-03

## 2019-09-03 NOTE — TELEPHONE ENCOUNTER
Spoke with patient and reviewed BGs from past week    -AM BG in low to mid 100s and rest of day     Instructed to continue basaglar 35 units qHS and novolog 13 units + SSI for now    His steroid dose reduces to 25mg on Friday and then stays there for 2 weeks then reduces to 20mg  Instructed to send BG log on Friday for further review  Jessie WITT    Endocrinology

## 2019-09-03 NOTE — TELEPHONE ENCOUNTER
Patient is calling stating that he has a lot of blood work to send over to the doctor  He says it is the history of his bloodwork from years ago  He would like someone to give him a call back so he can get the information to us

## 2019-09-04 DIAGNOSIS — Z79.899 LONG TERM USE OF DRUG: Primary | ICD-10-CM

## 2019-09-04 DIAGNOSIS — D86.9 SARCOIDOSIS: ICD-10-CM

## 2019-09-04 RX ORDER — MYCOPHENOLATE MOFETIL 500 MG/1
TABLET ORAL
Qty: 120 TABLET | Refills: 1 | Status: ON HOLD | OUTPATIENT
Start: 2019-09-04 | End: 2019-09-12 | Stop reason: SDUPTHER

## 2019-09-06 ENCOUNTER — TELEPHONE (OUTPATIENT)
Dept: ENDOCRINOLOGY | Facility: CLINIC | Age: 59
End: 2019-09-06

## 2019-09-06 NOTE — TELEPHONE ENCOUNTER
Reviewed BG log from this week with patient    -67 prelunch yesterday, today in 90s, starting to eat less due to weight gain    Continue basaglar 35 units qHS and reduce novolog to 10 units TID Anel WITT

## 2019-09-08 DIAGNOSIS — E11.9 TYPE 2 DIABETES MELLITUS WITHOUT COMPLICATION, WITHOUT LONG-TERM CURRENT USE OF INSULIN (HCC): ICD-10-CM

## 2019-09-08 RX ORDER — LANCETS 33 GAUGE
EACH MISCELLANEOUS
Qty: 100 EACH | Refills: 5 | Status: SHIPPED | OUTPATIENT
Start: 2019-09-08 | End: 2020-02-24 | Stop reason: SDUPTHER

## 2019-09-11 ENCOUNTER — HOSPITAL ENCOUNTER (INPATIENT)
Facility: HOSPITAL | Age: 59
LOS: 1 days | Discharge: HOME | DRG: 176 | End: 2019-09-12
Attending: EMERGENCY MEDICINE | Admitting: HOSPITALIST
Payer: COMMERCIAL

## 2019-09-11 ENCOUNTER — APPOINTMENT (EMERGENCY)
Dept: RADIOLOGY | Facility: HOSPITAL | Age: 59
DRG: 176 | End: 2019-09-11
Payer: COMMERCIAL

## 2019-09-11 ENCOUNTER — OFFICE VISIT (OUTPATIENT)
Dept: ENDOCRINOLOGY | Facility: CLINIC | Age: 59
End: 2019-09-11
Payer: COMMERCIAL

## 2019-09-11 ENCOUNTER — APPOINTMENT (EMERGENCY)
Dept: CT IMAGING | Facility: HOSPITAL | Age: 59
DRG: 176 | End: 2019-09-11
Payer: COMMERCIAL

## 2019-09-11 ENCOUNTER — TELEPHONE (OUTPATIENT)
Dept: ENDOCRINOLOGY | Facility: CLINIC | Age: 59
End: 2019-09-11

## 2019-09-11 VITALS
HEIGHT: 74 IN | DIASTOLIC BLOOD PRESSURE: 60 MMHG | HEART RATE: 120 BPM | WEIGHT: 277 LBS | SYSTOLIC BLOOD PRESSURE: 130 MMHG | BODY MASS INDEX: 35.55 KG/M2

## 2019-09-11 DIAGNOSIS — E11.9 TYPE 2 DIABETES MELLITUS WITHOUT COMPLICATION, WITHOUT LONG-TERM CURRENT USE OF INSULIN (HCC): Primary | ICD-10-CM

## 2019-09-11 DIAGNOSIS — E78.2 MIXED HYPERLIPIDEMIA: ICD-10-CM

## 2019-09-11 DIAGNOSIS — E55.9 VITAMIN D DEFICIENCY: ICD-10-CM

## 2019-09-11 DIAGNOSIS — D86.9 SARCOIDOSIS: ICD-10-CM

## 2019-09-11 DIAGNOSIS — E03.9 ACQUIRED HYPOTHYROIDISM: ICD-10-CM

## 2019-09-11 DIAGNOSIS — R00.0 TACHYCARDIA: ICD-10-CM

## 2019-09-11 DIAGNOSIS — I27.82 CHRONIC PULMONARY EMBOLISM (HCC): Primary | ICD-10-CM

## 2019-09-11 PROBLEM — I82.90 THROMBUS: Chronic | Status: ACTIVE | Noted: 2019-09-11

## 2019-09-11 PROBLEM — J06.9 VIRAL URI WITH COUGH: Status: ACTIVE | Noted: 2019-09-11

## 2019-09-11 PROBLEM — Z87.19 HISTORY OF DUODENAL ULCER: Status: ACTIVE | Noted: 2019-09-11

## 2019-09-11 LAB
ALBUMIN SERPL BCP-MCNC: 3.5 G/DL (ref 3.5–5)
ALP SERPL-CCNC: 63 U/L (ref 46–116)
ALT SERPL W P-5'-P-CCNC: 22 U/L (ref 12–78)
ANION GAP SERPL CALCULATED.3IONS-SCNC: 10 MMOL/L (ref 4–13)
APTT PPP: 24 SECONDS (ref 23–37)
AST SERPL W P-5'-P-CCNC: <5 U/L (ref 5–45)
ATRIAL RATE: 106 BPM
BASOPHILS # BLD AUTO: 0.05 THOUSANDS/ΜL (ref 0–0.1)
BASOPHILS NFR BLD AUTO: 1 % (ref 0–1)
BILIRUB SERPL-MCNC: 0.4 MG/DL (ref 0.2–1)
BILIRUB UR QL STRIP: NEGATIVE
BUN SERPL-MCNC: 24 MG/DL (ref 5–25)
CALCIUM SERPL-MCNC: 8.7 MG/DL (ref 8.3–10.1)
CHLORIDE SERPL-SCNC: 101 MMOL/L (ref 100–108)
CLARITY UR: CLEAR
CO2 SERPL-SCNC: 24 MMOL/L (ref 21–32)
COLOR UR: YELLOW
CREAT SERPL-MCNC: 1.17 MG/DL (ref 0.6–1.3)
EOSINOPHIL # BLD AUTO: 0.01 THOUSAND/ΜL (ref 0–0.61)
EOSINOPHIL NFR BLD AUTO: 0 % (ref 0–6)
ERYTHROCYTE [DISTWIDTH] IN BLOOD BY AUTOMATED COUNT: 13.2 % (ref 11.6–15.1)
GFR SERPL CREATININE-BSD FRML MDRD: 67 ML/MIN/1.73SQ M
GLUCOSE SERPL-MCNC: 154 MG/DL (ref 65–140)
GLUCOSE SERPL-MCNC: 338 MG/DL (ref 65–140)
GLUCOSE UR STRIP-MCNC: ABNORMAL MG/DL
HCT VFR BLD AUTO: 44.6 % (ref 36.5–49.3)
HGB BLD-MCNC: 14.9 G/DL (ref 12–17)
HGB UR QL STRIP.AUTO: NEGATIVE
IMM GRANULOCYTES # BLD AUTO: 0.19 THOUSAND/UL (ref 0–0.2)
IMM GRANULOCYTES NFR BLD AUTO: 2 % (ref 0–2)
INR PPP: 1 (ref 0.84–1.19)
KETONES UR STRIP-MCNC: NEGATIVE MG/DL
LACTATE SERPL-SCNC: 2 MMOL/L (ref 0.5–2)
LEUKOCYTE ESTERASE UR QL STRIP: NEGATIVE
LYMPHOCYTES # BLD AUTO: 0.78 THOUSANDS/ΜL (ref 0.6–4.47)
LYMPHOCYTES NFR BLD AUTO: 9 % (ref 14–44)
MCH RBC QN AUTO: 32.1 PG (ref 26.8–34.3)
MCHC RBC AUTO-ENTMCNC: 33.4 G/DL (ref 31.4–37.4)
MCV RBC AUTO: 96 FL (ref 82–98)
MONOCYTES # BLD AUTO: 0.42 THOUSAND/ΜL (ref 0.17–1.22)
MONOCYTES NFR BLD AUTO: 5 % (ref 4–12)
NEUTROPHILS # BLD AUTO: 7.11 THOUSANDS/ΜL (ref 1.85–7.62)
NEUTS SEG NFR BLD AUTO: 83 % (ref 43–75)
NITRITE UR QL STRIP: NEGATIVE
NRBC BLD AUTO-RTO: 0 /100 WBCS
P AXIS: 68 DEGREES
PH UR STRIP.AUTO: 5.5 [PH] (ref 4.5–8)
PLATELET # BLD AUTO: 219 THOUSANDS/UL (ref 149–390)
PMV BLD AUTO: 9.2 FL (ref 8.9–12.7)
POTASSIUM SERPL-SCNC: 4.8 MMOL/L (ref 3.5–5.3)
PR INTERVAL: 144 MS
PROCALCITONIN SERPL-MCNC: <0.05 NG/ML
PROT SERPL-MCNC: 7.3 G/DL (ref 6.4–8.2)
PROT UR STRIP-MCNC: NEGATIVE MG/DL
PROTHROMBIN TIME: 12.6 SECONDS (ref 11.6–14.5)
QRS AXIS: 37 DEGREES
QRSD INTERVAL: 72 MS
QT INTERVAL: 314 MS
QTC INTERVAL: 417 MS
RBC # BLD AUTO: 4.64 MILLION/UL (ref 3.88–5.62)
SODIUM SERPL-SCNC: 135 MMOL/L (ref 136–145)
SP GR UR STRIP.AUTO: 1.02 (ref 1–1.03)
T WAVE AXIS: 73 DEGREES
UROBILINOGEN UR QL STRIP.AUTO: 0.2 E.U./DL
VENTRICULAR RATE: 106 BPM
WBC # BLD AUTO: 8.56 THOUSAND/UL (ref 4.31–10.16)

## 2019-09-11 PROCEDURE — 84145 PROCALCITONIN (PCT): CPT | Performed by: EMERGENCY MEDICINE

## 2019-09-11 PROCEDURE — 99285 EMERGENCY DEPT VISIT HI MDM: CPT | Performed by: EMERGENCY MEDICINE

## 2019-09-11 PROCEDURE — 85025 COMPLETE CBC W/AUTO DIFF WBC: CPT | Performed by: EMERGENCY MEDICINE

## 2019-09-11 PROCEDURE — 99285 EMERGENCY DEPT VISIT HI MDM: CPT

## 2019-09-11 PROCEDURE — 93005 ELECTROCARDIOGRAM TRACING: CPT

## 2019-09-11 PROCEDURE — 83605 ASSAY OF LACTIC ACID: CPT | Performed by: EMERGENCY MEDICINE

## 2019-09-11 PROCEDURE — 93010 ELECTROCARDIOGRAM REPORT: CPT | Performed by: INTERNAL MEDICINE

## 2019-09-11 PROCEDURE — 81003 URINALYSIS AUTO W/O SCOPE: CPT

## 2019-09-11 PROCEDURE — 85730 THROMBOPLASTIN TIME PARTIAL: CPT | Performed by: EMERGENCY MEDICINE

## 2019-09-11 PROCEDURE — 96360 HYDRATION IV INFUSION INIT: CPT

## 2019-09-11 PROCEDURE — 71275 CT ANGIOGRAPHY CHEST: CPT

## 2019-09-11 PROCEDURE — 99214 OFFICE O/P EST MOD 30 MIN: CPT | Performed by: INTERNAL MEDICINE

## 2019-09-11 PROCEDURE — 96361 HYDRATE IV INFUSION ADD-ON: CPT

## 2019-09-11 PROCEDURE — 96372 THER/PROPH/DIAG INJ SC/IM: CPT

## 2019-09-11 PROCEDURE — 36415 COLL VENOUS BLD VENIPUNCTURE: CPT | Performed by: EMERGENCY MEDICINE

## 2019-09-11 PROCEDURE — 87040 BLOOD CULTURE FOR BACTERIA: CPT | Performed by: EMERGENCY MEDICINE

## 2019-09-11 PROCEDURE — 80053 COMPREHEN METABOLIC PANEL: CPT | Performed by: EMERGENCY MEDICINE

## 2019-09-11 PROCEDURE — 71046 X-RAY EXAM CHEST 2 VIEWS: CPT

## 2019-09-11 PROCEDURE — 82948 REAGENT STRIP/BLOOD GLUCOSE: CPT

## 2019-09-11 PROCEDURE — 85610 PROTHROMBIN TIME: CPT | Performed by: EMERGENCY MEDICINE

## 2019-09-11 PROCEDURE — 99223 1ST HOSP IP/OBS HIGH 75: CPT | Performed by: INTERNAL MEDICINE

## 2019-09-11 RX ORDER — FAMOTIDINE 20 MG/1
20 TABLET, FILM COATED ORAL DAILY
Status: DISCONTINUED | OUTPATIENT
Start: 2019-09-12 | End: 2019-09-12 | Stop reason: HOSPADM

## 2019-09-11 RX ORDER — PREDNISONE 20 MG/1
20 TABLET ORAL DAILY
Status: DISCONTINUED | OUTPATIENT
Start: 2019-09-12 | End: 2019-09-12 | Stop reason: HOSPADM

## 2019-09-11 RX ORDER — ENOXAPARIN SODIUM 300 MG/3ML
1.5 INJECTION INTRAVENOUS; SUBCUTANEOUS
Status: DISCONTINUED | OUTPATIENT
Start: 2019-09-12 | End: 2019-09-12

## 2019-09-11 RX ORDER — INSULIN GLARGINE 100 [IU]/ML
35 INJECTION, SOLUTION SUBCUTANEOUS EVERY MORNING
Status: DISCONTINUED | OUTPATIENT
Start: 2019-09-12 | End: 2019-09-12 | Stop reason: HOSPADM

## 2019-09-11 RX ORDER — ACETAMINOPHEN 325 MG/1
650 TABLET ORAL EVERY 6 HOURS PRN
Status: DISCONTINUED | OUTPATIENT
Start: 2019-09-11 | End: 2019-09-12 | Stop reason: HOSPADM

## 2019-09-11 RX ORDER — SODIUM CHLORIDE 9 MG/ML
100 INJECTION, SOLUTION INTRAVENOUS CONTINUOUS
Status: DISCONTINUED | OUTPATIENT
Start: 2019-09-11 | End: 2019-09-12 | Stop reason: HOSPADM

## 2019-09-11 RX ORDER — LEVOTHYROXINE SODIUM 0.05 MG/1
50 TABLET ORAL
Status: DISCONTINUED | OUTPATIENT
Start: 2019-09-12 | End: 2019-09-12 | Stop reason: HOSPADM

## 2019-09-11 RX ORDER — INSULIN GLARGINE 100 [IU]/ML
35 INJECTION, SOLUTION SUBCUTANEOUS
Status: DISCONTINUED | OUTPATIENT
Start: 2019-09-11 | End: 2019-09-11

## 2019-09-11 RX ADMIN — SODIUM CHLORIDE 1000 ML: 0.9 INJECTION, SOLUTION INTRAVENOUS at 15:45

## 2019-09-11 RX ADMIN — SODIUM CHLORIDE 100 ML/HR: 0.9 INJECTION, SOLUTION INTRAVENOUS at 21:48

## 2019-09-11 RX ADMIN — IOHEXOL 85 ML: 350 INJECTION, SOLUTION INTRAVENOUS at 18:46

## 2019-09-11 RX ADMIN — ENOXAPARIN SODIUM 135 MG: 150 INJECTION SUBCUTANEOUS at 19:53

## 2019-09-11 RX ADMIN — SODIUM CHLORIDE 1000 ML: 0.9 INJECTION, SOLUTION INTRAVENOUS at 16:23

## 2019-09-11 NOTE — ED NOTES
Pt ambulated around unit, HR elevated to 115, RR to 23, O2 sat 96%-98%     Jaylan Bragg RN  09/11/19 0336

## 2019-09-11 NOTE — LETTER
[place]  [address] [facility] [address]  Dept: [phone]    September 12, 2019     Patient: Fuad Oconnor   YOB: 1960   Date of Visit: 9/11/2019       To Whom it May Concern:    Viola Katheryn is under my professional care  He was seen in the hospital from 9/11/2019   to 09/12/19  He may return to work on 9/16/19 without limitations  If you have any questions or concerns, please don't hesitate to call           Sincerely,          Esvin Rivas, DO

## 2019-09-11 NOTE — TELEPHONE ENCOUNTER
Just wanted to let you know saw Mr  Julisa Michel in clinic today for diabetes  He had URI symptoms, nausea/vomitnig, SOB, productive cough and tachycardic to 120s  I sent him to [address] [place] [address] for infection eval given on steroids/cellcept  Hilario Leyden M D    Endocrinology

## 2019-09-11 NOTE — PROGRESS NOTES
ENDOCRINOLOGY  FOLLOW UP VISIT      Reason for Endocrine Consult/Chief Complaint: DM management      ? Medical Decision Making:     Impression  1  Systemic sarcoidosis now on steroid taper-managed by rheumatology, started on cellcept last week   2  DM2 uncontrolled on steroids   3  Acquired hypothyroidism- on levothyroxine  4  Vitamin D deficiency- on D3 replacemet  5  Mixed HLD- off statin therapy   7  Hx HCV s/p harvoni c/b liver cirrhosis   8  Tachycardia, URI symptoms, SOB, productive cough, nausea,vomiting- new onset yesterday     Recommendations:  ?  He continues to be on a prednisone taper presently on 25mg qday  His BGs are adequate right no  Will continue basaglar 35 units qHS and novolog 10 units TID AC + SSI  Continue metformin 500mg BID AC and Victoza 1 2mg qday  More concerning is yesterday he began to have nausea/vomiting and URI symptoms along with productive cough  He started on cellcept last week and is also on prednisone  He was quite tachycardic in clinic with HR in 120s  Temperature was okay 98 4 degrees  I instructed him to go to [facility] ER to r/o any infection (chest, urinary, blood) given on immunosuppression, I notified ER over there  He understood and will go  Will also send message to PCP/rheumatologist            Instructed to send me BG log weekly for review and adjustment      My goal for him is to get him off insulin after the steroids are discontinued completely  Steroid taper per rheumatology       Acquired hypothyroidism- continue levothyroxine, TSH at goal      Vitamin D level - continue 5000 IU D3 replacement     Mixed hyperlipidemia- not on any therapy- will discuss at next appointment      RTC 4 weeks   Jose WITT            History of Present Illness:   Mr Brigette Johnson is a 59yr old male who presents for DM2 follow up after hospital stay for DKA     He has a hx of liver cirrhosis on prior biopsy and had pulmonary sarcoidosis with extra thoracic involvement including trigeminal neuralgia type symptoms with right sided headaches and drooping of the right face and peripheral paresthesias  He also has possible eye involvement of his sarcoid  ?  PMH-HCV s/p harvoni c/b liver cirrhosis, DM2, hypothyroidism, systemic sarcoidosis, BPH, vitamin D deficiency, mixed HLD  PSH-shoulder surgery, liver biopsy, prostate surgery  FHx-no diabetes in family  SHx-neg x 2, works at [employer]      Type of DM: 2  Age of onset: 2007 diagnosed   Most recent A1C: 12 1% August 2019  Microvascular complications: neuropathy? Macrovascular complications: none known   Exercise: started going to gym      Events since last visit:     Presently on prednisone 25mg qday for 14 days, then 20mg until seeing rheumatology, started cellcept last week   On lantus 35 units qAM and novolog 10 units TID AC +SSI  Remains on metformin 500mg BID AC and victoza 1 2mg qday   Fasting BG low to mid 100s and post-prandial BGs in high 100s to low 200s, no hypoglycemia     Presently has cold symptoms- sore throat, cough, 99 temperature at home    green/sputum production with cough, slightly more SOB  Had one episode of nausea/vomiting yesterday  No changes in BMs   No new rashes or sick contacts      ? Review of Systems:     Review of Systems   Constitutional: Positive for appetite change, chills, diaphoresis and fever  HENT: Positive for congestion and sore throat  Eyes: Negative  Respiratory: Positive for shortness of breath  Cardiovascular: Positive for palpitations  Gastrointestinal: Positive for nausea and vomiting  Endocrine: Negative  Genitourinary: Negative  Musculoskeletal: Negative  Skin: Negative  Allergic/Immunologic: Negative  Neurological: Negative  Hematological: Negative  Psychiatric/Behavioral: Negative  ?   Patient History:     Past Medical History:   Diagnosis Date    BPH (benign prostatic hyperplasia)     [facility] mellitus ([facility] [address] )     Erectile dysfunction     GERD (gastroesophageal reflux disease)     Headache(784 0) 05/01/2018    Each Day    Hematuria     Infectious viral hepatitis 2007    Cured by Dr Shey Dorsey Nils Suki Forbes Liver disease     hepatitis C    Mediastinal adenopathy     Obesity 2007    Vitamin D deficiency      Past Surgical History:   Procedure Laterality Date    COLONOSCOPY      last assessed: 08/28/2012; fiberoptic screening     CYSTOSCOPY      onset: 05/06/2016; Diagnostic     DENTAL SURGERY      LIVER BIOPSY      LYMPH NODE BIOPSY  6/2019    NM BRONCHOSCOPY NEEDLE BX TRACHEA MAIN STEM&/BRON N/A 6/6/2019    Procedure: ENDOBRONCHIAL ULTRASOUND (EBUS);   Surgeon: Lakia Barker MD;  [facility];  Service: Thoracic    NM [address] N/A 6/6/2019    Procedure: Marie Mooney;  Surgeon: Lakia Barker MD;  [facility];  Service: Thoracic    NM TRANSURETHRAL ELEC-SURG PROSTATECTOM N/A 9/13/2017    Procedure: Walter Ko; TUR PROSTATE;  Surgeon: Lina Becker MD;  [facility];  Service: Urology    PROSTATE SURGERY  2017    SHOULDER ARTHROSCOPY Right     bicep tendon repair, rotator cuff repair and acromuim shave      Social History     Socioeconomic History    Marital status: /Civil Union     Spouse name: Not on file    Number of children: Not on file    Years of education: Not on file    Highest education level: Not on file   Occupational History    Occupation: manager   Social Needs    Financial resource strain: Not on file    Food insecurity:     Worry: Not on file     Inability: Not on file    Transportation needs:     Medical: Not on file     Non-medical: Not on file   Tobacco Use    Smoking status: Former Smoker     Packs/day: 1 00     Years: 1 00     Pack years: 1 00     Types: Cigarettes     Last attempt to quit: 2009     Years since quitting: 10 6    Smokeless tobacco: Never Used    Tobacco comment: Quit   Substance and Sexual Activity    Alcohol use: No     Comment: quit 12 years ago    Drug use: No    Sexual activity: Not Currently     Partners: Female     Birth control/protection: None   Lifestyle    Physical activity:     Days per week: Not on file     Minutes per session: Not on file    Stress: Not on file   Relationships    Social connections:     Talks on phone: Not on file     Gets together: Not on file     Attends Zoroastrianism service: Not on file     Active member of club or organization: Not on file     Attends meetings of clubs or organizations: Not on file     Relationship status: Not on file    Intimate partner violence:     Fear of current or ex partner: Not on file     Emotionally abused: Not on file     Physically abused: Not on file     Forced sexual activity: Not on file   Other Topics Concern    Not on file   Social History Narrative    Caffeine use    Daily coffee consumption     Denied daily cola consumption     Denied daily tea consumption     Former smoker (as per [employer])     Current everyday smoker (as per [employer])     Non-smoker (as per [employer])      Family History   Problem Relation Age of Onset    Stroke Father         Accidental Death at 52    Cancer Maternal Grandmother     Valvular heart disease Mother        Current Medications: At the time this note was written these were the medications the patient was on    Current Outpatient Medications   Medication Sig Dispense Refill    Cholecalciferol (VITAMIN D3) 5000 units CAPS Take 1 capsule by mouth daily       famotidine (PEPCID) 40 MG tablet Take 40 mg by mouth daily       glucose 4 g chewable tablet Chew 4 tablets (16 g total) as needed for low blood sugar 100 tablet 1    insulin aspart (NOVOLOG FLEXPEN) 100 Units/mL injection pen Inject 18 Units under the skin 3 (three) times a day with meals As well as sliding scale coverage as follows:  150-200 2 units  200-250 4 units  250-300 6 units  >300 8 units 5 pen 3    insulin glargine (BASAGLAR KWIKPEN) 100 units/mL injection pen Inject 35 Units under the skin daily 45 mL 3    Insulin Pen Needle (PEN NEEDLES) 31G X 6 MM MISC by Does not apply route 4 (four) times a day 150 each 4    levothyroxine 50 mcg tablet TAKE 1 TABLET BY MOUTH EVERY DAY 30 tablet 5    liraglutide (VICTOZA) injection Inject 0 2 mL (1 2 mg total) under the skin daily 2 pen 4    metFORMIN (GLUCOPHAGE) 500 mg tablet TAKE 1 TABLET BY MOUTH TWICE A DAY WITH MEALS 180 tablet 1    mycophenolate (CELLCEPT) 500 mg tablet 1 tab daily x1 week, then 1 tab BID x1 week, then 2 tabs in AM and 1 tab in PM x1 week, then 2 tabs BID thereafter 120 tablet 1    NOVOTWIST 32G X 5 MM MISC       ONETOUCH DELICA LANCETS 27V MISC USE THREE TIMES DAILY 100 each 5    ONETOUCH VERIO test strip testing QID      predniSONE 20 mg tablet Take 3 tablets (60 mg total) by mouth daily 90 tablet 0     No current facility-administered medications for this visit  Allergies: No active allergies    Physical Exam:   Vital Signs:   /60   Pulse (!) 120   Ht 6' 2" (1 88 m)   Wt 126 kg (277 lb)   BMI 35 56 kg/m²     Physical Exam   Constitutional: He is oriented to person, place, and time  He appears well-developed and well-nourished  HENT:   Head: Normocephalic and atraumatic  Nose: Nose normal    Mouth/Throat: Oropharynx is clear and moist  No oropharyngeal exudate  Eyes: Pupils are equal, round, and reactive to light  Conjunctivae and EOM are normal    Neck: Normal range of motion  Neck supple  No thyromegaly present  Cardiovascular: Regular rhythm  Exam reveals no gallop and no friction rub  No murmur heard   +tachycardic   Pulmonary/Chest: Effort normal and breath sounds normal  No stridor  No respiratory distress  He has no wheezes  He has no rales  Abdominal: Soft  Bowel sounds are normal  He exhibits no distension and no mass  There is no tenderness  There is no rebound and no guarding  Musculoskeletal: Normal range of motion  He exhibits no edema     Lymphadenopathy: He has no cervical adenopathy  Neurological: He is alert and oriented to person, place, and time  Skin: Skin is warm and dry  No erythema  Psychiatric: He has a normal mood and affect   His behavior is normal  Judgment and thought content normal         Labs and Imaging:      Component      Latest Ref Rng & Units 8/15/2019   Sodium      136 - 145 mmol/L 139   Potassium      3 5 - 5 3 mmol/L 4 3   Chloride      100 - 108 mmol/L 103   CO2      21 - 32 mmol/L 26   Anion Gap      4 - 13 mmol/L 10   BUN      5 - 25 mg/dL 28 (H)   Creatinine      0 60 - 1 30 mg/dL 1 04   Glucose, Random      65 - 140 mg/dL 182 (H)   Calcium      8 3 - 10 1 mg/dL 8 7   AST      5 - 45 U/L 14   ALT      12 - 78 U/L 49   Alkaline Phosphatase      46 - 116 U/L 63   Total Protein      6 4 - 8 2 g/dL 7 2   Albumin      3 5 - 5 0 g/dL 3 6   TOTAL BILIRUBIN      0 20 - 1 00 mg/dL 0 40   eGFR      ml/min/1 73sq m 78   PARATHYROID HORMONE      18 4 - 80 1 pg/mL 64 0   Vit D, 25-Hydroxy      30 0 - 100 0 ng/mL 21 4 (L)   Magnesium      1 6 - 2 6 mg/dL 2 2   Phosphorus      2 3 - 4 1 mg/dL 3 4

## 2019-09-11 NOTE — PATIENT INSTRUCTIONS
Go to the [address] [place] [address] emergency room to make sure you don't have an infection     Continue to send me blood sugars every week     Follow up in 4 weeks

## 2019-09-11 NOTE — ED PROVIDER NOTES
History  Chief Complaint   Patient presents with    Rapid Heart Rate     Pt c/o a sore throat and headache with a low grade fever that started yesterday  Pt takes cellcept, had a visit with endocronologist who sent him here for further evaluation  History provided by:  Patient   used: No    Rapid Heart Rate   Associated symptoms: cough    Associated symptoms: no chest pain, no diaphoresis, no dizziness, no nausea, no shortness of breath, no vomiting and no weakness      Patient is a 24-year-old male presenting to emergency department with sore throat congestion and headaches  Started 2 days ago  Two days ago had a temp of a 100°  That resolved  Neck is supple  No neck pain  No rashes  Has a cough with yellow sputum  No chest pain  No abdominal pain  On off nausea  No diarrhea  No pain with urination  Patient recently diagnosed sarcoidosis  Taking steroids and cellecept  Refer to ER by Endocrinology due to tachycardia of 120 in office  MDM differential includes viral syndrome, dehydration, bacteremia, will check chest x-ray, urine, lactic acid, blood cultures, CBC, BMP, fluids, re-evaluate    Spoke with endocrinologist   Peyton Longoria over blood work  Does not want to change diabetes medications  Will follow up as outpatient  Prior to Admission Medications   Prescriptions Last Dose Informant Patient Reported? Taking?    Cholecalciferol (VITAMIN D3) 5000 units CAPS 9/11/2019 at Unknown time  Yes Yes   Sig: Take 1 capsule by mouth daily    Insulin Pen Needle (PEN NEEDLES) 31G X 6 MM MISC 9/11/2019 at Unknown time  No Yes   Sig: by Does not apply route 4 (four) times a day   NOVOTWIST 32G X 5 MM MISC 9/11/2019 at Unknown time  Yes Yes   ONETOUCH DELICA LANCETS 19X MISC 9/11/2019 at Unknown time  No Yes   Sig: USE THREE TIMES DAILY   ONETOUCH VERIO test strip 9/11/2019 at Unknown time  Yes Yes   Sig: testing QID   famotidine (PEPCID) 40 MG tablet 9/11/2019 at Unknown time Self Yes Yes   Sig: Take 40 mg by mouth daily    glucose 4 g chewable tablet Unknown at Unknown time  No No   Sig: Chew 4 tablets (16 g total) as needed for low blood sugar   insulin aspart (NOVOLOG FLEXPEN) 100 Units/mL injection pen 2019 at Unknown time  No Yes   Sig: Inject 18 Units under the skin 3 (three) times a day with meals As well as sliding scale coverage as follows:  150-200 2 units  200-250 4 units  250-300 6 units  >300 8 units   Patient taking differently: Inject 10 Units under the skin 3 (three) times a day with meals As well as sliding scale coverage as follows:  150-200 2 units  200-250 4 units  250-300 6 units  >300 8 units   insulin glargine (BASAGLAR KWIKPEN) 100 units/mL injection pen 2019 at Unknown time  No Yes   Sig: Inject 35 Units under the skin daily   levothyroxine 50 mcg tablet 2019 at Unknown time  No Yes   Sig: TAKE 1 TABLET BY MOUTH EVERY DAY   liraglutide (VICTOZA) injection 2019 at Unknown time Self No Yes   Sig: Inject 0 2 mL (1 2 mg total) under the skin daily   metFORMIN (GLUCOPHAGE) 500 mg tablet 2019 at Unknown time  No Yes   Sig: TAKE 1 TABLET BY MOUTH TWICE A DAY WITH MEALS   mycophenolate (CELLCEPT) 500 mg tablet 2019 at Unknown time  No Yes   Si tab daily x1 week, then 1 tab BID x1 week, then 2 tabs in AM and 1 tab in PM x1 week, then 2 tabs BID thereafter   predniSONE 20 mg tablet 2019 at Unknown time  No Yes   Sig: Take 3 tablets (60 mg total) by mouth daily      Facility-Administered Medications: None       Past Medical History:   Diagnosis Date    BPH (benign prostatic hyperplasia)     Diabetes mellitus ([address] )     Erectile dysfunction     GERD (gastroesophageal reflux disease)     Headache(784 0) 2018    Each Day    Hematuria     Infectious viral hepatitis 2007    Cured by Dr Russ Lawson Nils Talia Shape Liver disease     hepatitis C    Mediastinal adenopathy     Obesity 2007    Vitamin D deficiency        Past Surgical History: Procedure Laterality Date    COLONOSCOPY      last assessed: 08/28/2012; fiberoptic screening     CYSTOSCOPY      onset: 05/06/2016; Diagnostic     DENTAL SURGERY      LIVER BIOPSY      LYMPH NODE BIOPSY  6/2019    NC BRONCHOSCOPY NEEDLE BX TRACHEA MAIN STEM&/BRON N/A 6/6/2019    Procedure: ENDOBRONCHIAL ULTRASOUND (EBUS); Surgeon: Ivonne Wiggins MD;  [facility];  Service: Thoracic    NC [address] N/A 6/6/2019    Procedure: Juma Aldridge;  Surgeon: Ivonne Wiggins MD;  [facility];  Service: Thoracic    NC TRANSURETHRAL ELEC-SURG PROSTATECTOM N/A 9/13/2017    Procedure: Vandana Desai; TUR PROSTATE;  Surgeon: Chiquita Vila MD;  [facility];  Service: Urology    PROSTATE SURGERY  2017    SHOULDER ARTHROSCOPY Right     bicep tendon repair, rotator cuff repair and acromuim shave        Family History   Problem Relation Age of Onset    Stroke Father         Accidental Death at 52    Cancer Maternal Grandmother     Valvular heart disease Mother      I have reviewed and agree with the history as documented  Social History     Tobacco Use    Smoking status: Former Smoker     Packs/day: 1 00     Years: 1 00     Pack years: 1 00     Types: Cigarettes     Last attempt to quit: 2009     Years since quitting: 10 6    Smokeless tobacco: Never Used    Tobacco comment: Quit   Substance Use Topics    Alcohol use: No     Comment: quit 12 years ago    Drug use: No        Review of Systems   Constitutional: Negative for chills, diaphoresis and fever  HENT: Positive for congestion and sore throat  Respiratory: Positive for cough  Negative for shortness of breath, wheezing and stridor  Cardiovascular: Negative for chest pain, palpitations and leg swelling  Gastrointestinal: Negative for abdominal pain, blood in stool, diarrhea, nausea and vomiting  Genitourinary: Negative for dysuria, frequency and urgency     Musculoskeletal: Negative for neck pain and neck stiffness  Skin: Negative for pallor and rash  Neurological: Positive for headaches  Negative for dizziness, syncope, weakness and light-headedness  All other systems reviewed and are negative  Physical Exam  Physical Exam   Constitutional: He is oriented to person, place, and time  He appears well-developed and well-nourished  HENT:   Head: Normocephalic and atraumatic  Pharyngeal erythema, no exudates  No trismus  Tolerating secretions  No neck swelling  Eyes: Pupils are equal, round, and reactive to light  Neck: Neck supple  Cardiovascular: Normal rate, regular rhythm, normal heart sounds and intact distal pulses  Pulmonary/Chest: Effort normal and breath sounds normal  No respiratory distress  Abdominal: Soft  Bowel sounds are normal  There is no tenderness  Musculoskeletal: Normal range of motion  He exhibits no edema, tenderness or deformity  Neurological: He is alert and oriented to person, place, and time  No cranial nerve deficit or sensory deficit  He exhibits normal muscle tone  Coordination normal    Skin: Skin is warm and dry  Capillary refill takes less than 2 seconds  No rash noted  No erythema  No pallor  Vitals reviewed        Vital Signs  ED Triage Vitals   Temperature Pulse Respirations Blood Pressure SpO2   09/11/19 1526 09/11/19 1526 09/11/19 1526 09/11/19 1526 09/11/19 1526   98 7 °F (37 1 °C) (!) 108 17 145/74 95 %      Temp Source Heart Rate Source Patient Position - Orthostatic VS BP Location FiO2 (%)   09/11/19 1526 09/11/19 1526 09/11/19 2101 09/11/19 1526 --   Oral Monitor Lying Right arm       Pain Score       09/11/19 1526       No Pain           Vitals:    09/11/19 1955 09/11/19 2000 09/11/19 2101 09/11/19 2224   BP: 140/89 140/89 139/85 116/59   Pulse: 81 86 87 102   Patient Position - Orthostatic VS:   Lying Lying         Visual Acuity      ED Medications  Medications   famotidine (PEPCID) tablet 20 mg (has no administration in time range)   insulin lispro (HumaLOG) 100 units/mL subcutaneous injection 10 Units (has no administration in time range)   levothyroxine tablet 50 mcg (has no administration in time range)   predniSONE tablet 20 mg (has no administration in time range)   sodium chloride 0 9 % infusion (100 mL/hr Intravenous New Bag 9/11/19 2148)   acetaminophen (TYLENOL) tablet 650 mg (has no administration in time range)   enoxaparin (LOVENOX) injection 190 mg (has no administration in time range)   insulin lispro (HumaLOG) 100 units/mL subcutaneous injection 1-6 Units (has no administration in time range)   insulin lispro (HumaLOG) 100 units/mL subcutaneous injection 1-5 Units (1 Units Subcutaneous Not Given 9/11/19 2202)   insulin glargine (LANTUS) subcutaneous injection 35 Units 0 35 mL (has no administration in time range)   sodium chloride 0 9 % bolus 1,000 mL (0 mL Intravenous Stopped 9/11/19 1820)   sodium chloride 0 9 % bolus 1,000 mL (0 mL Intravenous Stopped 9/11/19 1951)   iohexol (OMNIPAQUE) 350 MG/ML injection (MULTI-DOSE) 85 mL (85 mL Intravenous Given 9/11/19 1846)   enoxaparin (LOVENOX) subcutaneous injection 135 mg (135 mg Subcutaneous Given 9/11/19 1953)       Diagnostic Studies  Results Reviewed     Procedure Component Value Units Date/Time    Procalcitonin [[id number]] Collected:  09/11/19 2017    Lab Status:   In process Specimen:  Blood from Arm, Left Updated:  09/11/19 2021    ED Urine Macroscopic [[id number]]  (Abnormal) Collected:  09/11/19 1631    Lab Status:  Final result Specimen:  Urine Updated:  09/11/19 1618     Color, UA Yellow     Clarity, UA Clear     pH, UA 5 5     Leukocytes, UA Negative     Nitrite, UA Negative     Protein, UA Negative mg/dl      Glucose,  (1/2%) mg/dl      Ketones, UA Negative mg/dl      Urobilinogen, UA 0 2 E U /dl      Bilirubin, UA Negative     Blood, UA Negative     Specific Gravity, UA 1 020    Narrative:       CLINITEK RESULT    Blood culture #1 [[id number]] Collected: 09/11/19 1601    Lab Status: In process Specimen:  Blood from Hand, Right Updated:  09/11/19 1616    Lactic acid, plasma x2 [[id number]]  (Normal) Collected:  09/11/19 1541    Lab Status:  Final result Specimen:  Blood from Arm, Left Updated:  09/11/19 1610     LACTIC ACID 2 0 mmol/L     Narrative:       Result may be elevated if tourniquet was used during collection      Comprehensive metabolic panel [[id number]]  (Abnormal) Collected:  09/11/19 1541    Lab Status:  Final result Specimen:  Blood from Arm, Left Updated:  09/11/19 1606     Sodium 135 mmol/L      Potassium 4 8 mmol/L      Chloride 101 mmol/L      CO2 24 mmol/L      ANION GAP 10 mmol/L      BUN 24 mg/dL      Creatinine 1 17 mg/dL      Glucose 338 mg/dL      Calcium 8 7 mg/dL      AST <5 U/L      ALT 22 U/L      Alkaline Phosphatase 63 U/L      Total Protein 7 3 g/dL      Albumin 3 5 g/dL      Total Bilirubin 0 40 mg/dL      eGFR 67 ml/min/1 73sq m     Narrative:       [place] guidelines for Chronic Kidney Disease (CKD):     Stage 1 with normal or high GFR (GFR > 90 mL/min/1 73 square meters)    Stage 2 Mild CKD (GFR = 60-89 mL/min/1 73 square meters)    Stage 3A Moderate CKD (GFR = 45-59 mL/min/1 73 square meters)    Stage 3B Moderate CKD (GFR = 30-44 mL/min/1 73 square meters)    Stage 4 Severe CKD (GFR = 15-29 mL/min/1 73 square meters)    Stage 5 End Stage CKD (GFR <15 mL/min/1 73 square meters)  Note: GFR calculation is accurate only with a steady state creatinine    Protime-INR [[id number]]  (Normal) Collected:  09/11/19 1541    Lab Status:  Final result Specimen:  Blood from Arm, Left Updated:  09/11/19 1601     Protime 12 6 seconds      INR 1 00    APTT [[id number]]  (Normal) Collected:  09/11/19 1541    Lab Status:  Final result Specimen:  Blood from Arm, Left Updated:  09/11/19 1601     PTT 24 seconds     CBC and differential [[id number]]  (Abnormal) Collected:  09/11/19 1541    Lab Status:  Final result Specimen: Blood from Arm, Left Updated:  09/11/19 1552     WBC 8 56 Thousand/uL      RBC 4 64 Million/uL      Hemoglobin 14 9 g/dL      Hematocrit 44 6 %      MCV 96 fL      MCH 32 1 pg      MCHC 33 4 g/dL      RDW 13 2 %      MPV 9 2 fL      Platelets 814 Thousands/uL      nRBC 0 /100 WBCs      Neutrophils Relative 83 %      Immat GRANS % 2 %      Lymphocytes Relative 9 %      Monocytes Relative 5 %      Eosinophils Relative 0 %      Basophils Relative 1 %      Neutrophils Absolute 7 11 Thousands/µL      Immature Grans Absolute 0 19 Thousand/uL      Lymphocytes Absolute 0 78 Thousands/µL      Monocytes Absolute 0 42 Thousand/µL      Eosinophils Absolute 0 01 Thousand/µL      Basophils Absolute 0 05 Thousands/µL     Blood culture #2 [[id number]] Collected:  09/11/19 1541    Lab Status: In process Specimen:  Blood from Arm, Left Updated:  09/11/19 1549                 CTA ED chest PE study   Final Result by Rosemarie Lewis MD (09/11 1928)      No acute pulmonary embolus is seen  Chronic appearing mural thrombus and a segmental branch point of the left lower lobe as seen on image 3/180  Resolved mediastinal lymphadenopathy  Stable splenomegaly  Stable hepatomegaly with steatosis  Stable hiatal hernia  Workstation performed: [id number]         XR chest 2 views   Final Result by Cherry Espinal MD (09/11 1600)      No acute cardiopulmonary disease  Workstation performed: [id number]                    Procedures  Procedures       ED Course  ED Course as of Sep 11 2251   Wed Sep 11, 2019   1602 ECG shows rate of 106, sinus, normal axis, normal QRS, no significant ST or T-wave changes, normal intervals, independently interpreted by me      1809 Attempted to ambulate patient  Patient became tachypneic and tachycardic  Will go ahead with CT scan        80 Spoke with Dr Nain Smallwood from pulmonology, recommends treating as an acute blood clot with anticoagulation and admission Hermann Ramos' Criteria for PE      Most Recent Value   Lukas' Criteria for PE   Clinical signs and symptoms of DVT  0 Filed at: 09/11/2019 1803   PE is primary diagnosis or equally likely  3 Filed at: 09/11/2019 1803   HR >100  1 5 Filed at: 09/11/2019 1803   Immobilization at least 3 days or Surgery in the previous 4 weeks  --   Previous, objectively diagnosed PE or DVT  0 Filed at: 09/11/2019 1803   Hemoptysis  0 Filed at: 09/11/2019 1803   Malignancy with treatment within 6 months or palliative  0 Filed at: 09/11/2019 1803   Lukas' Criteria Total  4 5 Filed at: 09/11/2019 1803            MDM    Disposition  Final diagnoses:   Chronic pulmonary embolism ([facility] )   Tachycardia     Time reflects when diagnosis was documented in both MDM as applicable and the Disposition within this note     Time User Action Codes Description Comment    9/11/2019  8:12 PM Jennifer Dang [I27 [id number]] Chronic pulmonary embolism ([facility] )     9/11/2019  8:12 PM Jennifer Dang [R00 [id number]] Tachycardia       ED Disposition     ED Disposition Condition Date/Time Comment    Admit Stable Wed Sep 11, 2019  8:12 PM Case was discussed with medicine and the patient's admission status was agreed to be Admission Status: inpatient status to the service of Dr Consuelo Sauceda           Follow-up Information    None         Current Discharge Medication List      CONTINUE these medications which have NOT CHANGED    Details   Cholecalciferol (VITAMIN D3) 5000 units CAPS Take 1 capsule by mouth daily       famotidine (PEPCID) 40 MG tablet Take 40 mg by mouth daily       insulin aspart (NOVOLOG FLEXPEN) 100 Units/mL injection pen Inject 18 Units under the skin 3 (three) times a day with meals As well as sliding scale coverage as follows:  150-200 2 units  200-250 4 units  250-300 6 units  >300 8 units  Qty: 5 pen, Refills: 3    Associated Diagnoses: Type 2 diabetes mellitus without complication, without long-term current use of insulin (HCC) insulin glargine (BASAGLAR KWIKPEN) 100 units/mL injection pen Inject 35 Units under the skin daily  Qty: 45 mL, Refills: 3    Associated Diagnoses: Type 2 diabetes mellitus without complication, without long-term current use of insulin ([facility] )      ! ! Insulin Pen Needle (PEN NEEDLES) 31G X 6 MM MISC by Does not apply route 4 (four) times a day  Qty: 150 each, Refills: 4    Associated Diagnoses: Type 2 diabetes mellitus without complication, without long-term current use of insulin ([facility])      levothyroxine 50 mcg tablet TAKE 1 TABLET BY MOUTH EVERY DAY  Qty: 30 tablet, Refills: 5    Associated Diagnoses: Hypothyroidism, unspecified type      liraglutide (VICTOZA) injection Inject 0 2 mL (1 2 mg total) under the skin daily  Qty: 2 pen, Refills: 4    Associated Diagnoses: Type 2 diabetes mellitus without complication, without long-term current use of insulin ([facility])      metFORMIN (GLUCOPHAGE) 500 mg tablet TAKE 1 TABLET BY MOUTH TWICE A DAY WITH MEALS  Qty: 180 tablet, Refills: 1    Associated Diagnoses: Type 2 diabetes mellitus without complication, without long-term current use of insulin ([facility])      mycophenolate (CELLCEPT) 500 mg tablet 1 tab daily x1 week, then 1 tab BID x1 week, then 2 tabs in AM and 1 tab in PM x1 week, then 2 tabs BID thereafter  Qty: 120 tablet, Refills: 1    Associated Diagnoses: Sarcoidosis      !! NOVOTWIST 32G X 5 MM MISC       ONETOUCH DELICA LANCETS 48I MISC USE THREE TIMES DAILY  Qty: 100 each, Refills: 5    Comments: DX Code Needed  PLEASE SEND NEW RX  Associated Diagnoses: Type 2 diabetes mellitus without complication, without long-term current use of insulin ([facility])      ONETOUCH VERIO test strip testing QID      predniSONE 20 mg tablet Take 3 tablets (60 mg total) by mouth daily  Qty: 90 tablet, Refills: 0    Associated Diagnoses: Sarcoidosis of lymph nodes; Sarcoidosis;  Dyspnea on exertion; Persistent dry cough      glucose 4 g chewable tablet Chew 4 tablets (16 g total) as needed for low blood sugar  Qty: 100 tablet, Refills: 1    Associated Diagnoses: Type 2 diabetes mellitus with complication, with long-term current use of insulin ([facility] ); Vitamin D deficiency; Acquired hypothyroidism; Hypocalcemia; Mixed hyperlipidemia; Type 2 diabetes mellitus without complication, without long-term current use of insulin ([facility] [address] )       ! ! - Potential duplicate medications found  Please discuss with provider  No discharge procedures on file      ED Provider  Electronically Signed by           Irais Rizo MD  09/11/19 2297

## 2019-09-11 NOTE — LETTER
September 11, 2019     DO Julio Evans [address]  [address]  [facility]    Patient: Simran Ying   YOB: 1960   Date of Visit: 9/11/2019       Dear Dr Terence Pastrana: Thank you for referring Shaun Jerez to me for evaluation  Below are my notes for this consultation  If you have questions, please do not hesitate to call me  I look forward to following your patient along with you  Sincerely,        Lee Wilkinson MD        CC: DO Lee León MD  9/11/2019  3:01 PM  Sign at close encounter  [id number]      Reason for Endocrine Consult/Chief Complaint: DM management      ? Medical Decision Making:     Impression  1  Systemic sarcoidosis now on steroid taper-managed by rheumatology, started on cellcept last week   2  DM2 uncontrolled on steroids   3  Acquired hypothyroidism- on levothyroxine  4  Vitamin D deficiency- on D3 replacemet  5  Mixed HLD- off statin therapy   7  Hx HCV s/p harvoni c/b liver cirrhosis   8  Tachycardia, URI symptoms, SOB, productive cough, nausea,vomiting- new onset yesterday     Recommendations:  ?  He continues to be on a prednisone taper presently on 25mg qday  His BGs are adequate right no  Will continue basaglar 35 units qHS and novolog 10 units TID AC + SSI  Continue metformin 500mg BID AC and Victoza 1 2mg qday  More concerning is yesterday he began to have nausea/vomiting and URI symptoms along with productive cough  He started on cellcept last week and is also on prednisone  He was quite tachycardic in clinic with HR in 120s  Temperature was okay 98 4 degrees  I instructed him to go to [address] [place] [address] to r/o any infection (chest, urinary, blood) given on immunosuppression  He understood and will go   Will also send message to PCP/rheumatologist            Instructed to send me BG log weekly for review and adjustment      My goal for him is to get him off insulin after the steroids are discontinued completely  Steroid taper per rheumatology       Acquired hypothyroidism- continue levothyroxine, TSH at goal      Vitamin D level - continue 5000 IU D3 replacement     Mixed hyperlipidemia- not on any therapy- will discuss at next appointment      RTC 4 weeks   Rossana WITT            History of Present Illness:   Mr Jhon Downey is a 59yr old male who presents for DM2 follow up after hospital stay for DKA  He has a hx of liver cirrhosis on prior biopsy and had pulmonary sarcoidosis with extra thoracic involvement including trigeminal neuralgia type symptoms with right sided headaches and drooping of the right face and peripheral paresthesias  He also has possible eye involvement of his sarcoid  ?  PMH-HCV s/p harvoni c/b liver cirrhosis, DM2, hypothyroidism, systemic sarcoidosis, BPH, vitamin D deficiency, mixed HLD  PSH-shoulder surgery, liver biopsy, prostate surgery  FHx-no diabetes in family  SHx-neg x 2, works at [employer]      Type of DM: 2  Age of onset: 2007 diagnosed   Most recent A1C: 12 1% August 2019  Microvascular complications: neuropathy? Macrovascular complications: none known   Exercise: started going to gym      Events since last visit:     Presently on prednisone 25mg qday for 14 days, then 20mg until seeing rheumatology, started cellcept last week   On lantus 35 units qAM and novolog 10 units TID AC +SSI  Remains on metformin 500mg BID AC and victoza 1 2mg qday   Fasting BG low to mid 100s and post-prandial BGs in high 100s to low 200s, no hypoglycemia     Presently has cold symptoms- sore throat, cough, 99 temperature at home    green/sputum production with cough, slightly more SOB  Had one episode of nausea/vomiting yesterday  No changes in BMs   No new rashes or sick contacts      ? Review of Systems:     Review of Systems   Constitutional: Positive for appetite change, chills, diaphoresis and fever  HENT: Positive for congestion and sore throat  Eyes: Negative  Respiratory: Positive for shortness of breath  Cardiovascular: Positive for palpitations  Gastrointestinal: Positive for nausea and vomiting  Endocrine: Negative  Genitourinary: Negative  Musculoskeletal: Negative  Skin: Negative  Allergic/Immunologic: Negative  Neurological: Negative  Hematological: Negative  Psychiatric/Behavioral: Negative  ? Patient History:     Past Medical History:   Diagnosis Date    BPH (benign prostatic hyperplasia)     Diabetes mellitus ([address] )     Erectile dysfunction     GERD (gastroesophageal reflux disease)     Headache(784 0) 05/01/2018    Each Day    Hematuria     Infectious viral hepatitis 2007    Cured by Dr Tobias Wetzel Whit Liver disease     hepatitis C    Mediastinal adenopathy     Obesity 2007    Vitamin D deficiency      Past Surgical History:   Procedure Laterality Date    COLONOSCOPY      last assessed: 08/28/2012; fiberoptic screening     CYSTOSCOPY      onset: 05/06/2016; Diagnostic     DENTAL SURGERY      LIVER BIOPSY      LYMPH NODE BIOPSY  6/2019    OH BRONCHOSCOPY NEEDLE BX TRACHEA MAIN STEM&/BRON N/A 6/6/2019    Procedure: ENDOBRONCHIAL ULTRASOUND (EBUS);   Surgeon: Ivonne Wiggins MD;  [facility];  Service: Thoracic    OH [address] N/A 6/6/2019    Procedure: Juma Aldridge;  Surgeon: Ivonne Wiggins MD;  [facility];  Service: Thoracic    OH TRANSURETHRAL ELEC-SURG PROSTATECTOM N/A 9/13/2017    Procedure: Vandana Desai; TUR PROSTATE;  Surgeon: Chiquita Vila MD;  [facility];  Service: Urology    PROSTATE SURGERY  2017    SHOULDER ARTHROSCOPY Right     bicep tendon repair, rotator cuff repair and acromuim shave      Social History     Socioeconomic History    Marital status: /Civil Union     Spouse name: Not on file    Number of children: Not on file    Years of education: Not on file    Highest education level: Not on file   Occupational History    Occupation: manager   Social Needs    Financial resource strain: Not on file    Food insecurity:     Worry: Not on file     Inability: Not on file    Transportation needs:     Medical: Not on file     Non-medical: Not on file   Tobacco Use    Smoking status: Former Smoker     Packs/day: 1 00     Years: 1 00     Pack years: 1 00     Types: Cigarettes     Last attempt to quit: 2009     Years since quitting: 10 6    Smokeless tobacco: Never Used    Tobacco comment: Quit   Substance and Sexual Activity    Alcohol use: No     Comment: quit 12 years ago    Drug use: No    Sexual activity: Not Currently     Partners: Female     Birth control/protection: None   Lifestyle    Physical activity:     Days per week: Not on file     Minutes per session: Not on file    Stress: Not on file   Relationships    Social connections:     Talks on phone: Not on file     Gets together: Not on file     Attends Gnosticist service: Not on file     Active member of club or organization: Not on file     Attends meetings of clubs or organizations: Not on file     Relationship status: Not on file    Intimate partner violence:     Fear of current or ex partner: Not on file     Emotionally abused: Not on file     Physically abused: Not on file     Forced sexual activity: Not on file   Other Topics Concern    Not on file   Social History Narrative    Caffeine use    Daily coffee consumption     Denied daily cola consumption     Denied daily tea consumption     Former smoker (as per [employer])     Current everyday smoker (as per [employer])     Non-smoker (as per [employer])      Family History   Problem Relation Age of Onset    Stroke Father         Accidental Death at 52    Cancer Maternal Grandmother     Valvular heart disease Mother        Current Medications: At the time this note was written these were the medications the patient was on    Current Outpatient Medications   Medication Sig Dispense Refill    Cholecalciferol (VITAMIN D3) 5000 units CAPS Take 1 capsule by mouth daily       famotidine (PEPCID) 40 MG tablet Take 40 mg by mouth daily       glucose 4 g chewable tablet Chew 4 tablets (16 g total) as needed for low blood sugar 100 tablet 1    insulin aspart (NOVOLOG FLEXPEN) 100 Units/mL injection pen Inject 18 Units under the skin 3 (three) times a day with meals As well as sliding scale coverage as follows:  150-200 2 units  200-250 4 units  250-300 6 units  >300 8 units 5 pen 3    insulin glargine (BASAGLAR KWIKPEN) 100 units/mL injection pen Inject 35 Units under the skin daily 45 mL 3    Insulin Pen Needle (PEN NEEDLES) 31G X 6 MM MISC by Does not apply route 4 (four) times a day 150 each 4    levothyroxine 50 mcg tablet TAKE 1 TABLET BY MOUTH EVERY DAY 30 tablet 5    liraglutide (VICTOZA) injection Inject 0 2 mL (1 2 mg total) under the skin daily 2 pen 4    metFORMIN (GLUCOPHAGE) 500 mg tablet TAKE 1 TABLET BY MOUTH TWICE A DAY WITH MEALS 180 tablet 1    mycophenolate (CELLCEPT) 500 mg tablet 1 tab daily x1 week, then 1 tab BID x1 week, then 2 tabs in AM and 1 tab in PM x1 week, then 2 tabs BID thereafter 120 tablet 1    NOVOTWIST 32G X 5 MM MISC       ONETOUCH DELICA LANCETS 05B MISC USE THREE TIMES DAILY 100 each 5    ONETOUCH VERIO test strip testing QID      predniSONE 20 mg tablet Take 3 tablets (60 mg total) by mouth daily 90 tablet 0     No current facility-administered medications for this visit  Allergies: No active allergies    Physical Exam:   Vital Signs:   /60   Pulse (!) 120   Ht 6' 2" (1 88 m)   Wt 126 kg (277 lb)   BMI 35 56 kg/m²     Physical Exam   Constitutional: He is oriented to person, place, and time  He appears well-developed and well-nourished  HENT:   Head: Normocephalic and atraumatic  Nose: Nose normal    Mouth/Throat: Oropharynx is clear and moist  No oropharyngeal exudate  Eyes: Pupils are equal, round, and reactive to light   Conjunctivae and EOM are normal    Neck: Normal range of motion  Neck supple  No thyromegaly present  Cardiovascular: Regular rhythm  Exam reveals no gallop and no friction rub  No murmur heard   +tachycardic   Pulmonary/Chest: Effort normal and breath sounds normal  No stridor  No respiratory distress  He has no wheezes  He has no rales  Abdominal: Soft  Bowel sounds are normal  He exhibits no distension and no mass  There is no tenderness  There is no rebound and no guarding  Musculoskeletal: Normal range of motion  He exhibits no edema  Lymphadenopathy:     He has no cervical adenopathy  Neurological: He is alert and oriented to person, place, and time  Skin: Skin is warm and dry  No erythema  Psychiatric: He has a normal mood and affect   His behavior is normal  Judgment and thought content normal         Labs and Imaging:      Component      Latest Ref Rng & Units 8/15/2019   Sodium      136 - 145 mmol/L 139   Potassium      3 5 - 5 3 mmol/L 4 3   Chloride      100 - 108 mmol/L 103   CO2      21 - 32 mmol/L 26   Anion Gap      4 - 13 mmol/L 10   BUN      5 - 25 mg/dL 28 (H)   Creatinine      0 60 - 1 30 mg/dL 1 04   Glucose, Random      65 - 140 mg/dL 182 (H)   Calcium      8 3 - 10 1 mg/dL 8 7   AST      5 - 45 U/L 14   ALT      12 - 78 U/L 49   Alkaline Phosphatase      46 - 116 U/L 63   Total Protein      6 4 - 8 2 g/dL 7 2   Albumin      3 5 - 5 0 g/dL 3 6   TOTAL BILIRUBIN      0 20 - 1 00 mg/dL 0 40   eGFR      ml/min/1 73sq m 78   PARATHYROID HORMONE      18 4 - 80 1 pg/mL 64 0   Vit D, 25-Hydroxy      30 0 - 100 0 ng/mL 21 4 (L)   Magnesium      1 6 - 2 6 mg/dL 2 2   Phosphorus      2 3 - 4 1 mg/dL 3 4

## 2019-09-12 ENCOUNTER — APPOINTMENT (INPATIENT)
Dept: NON INVASIVE DIAGNOSTICS | Facility: HOSPITAL | Age: 59
DRG: 176 | End: 2019-09-12
Payer: COMMERCIAL

## 2019-09-12 VITALS
TEMPERATURE: 98.2 F | WEIGHT: 280.78 LBS | HEIGHT: 74 IN | RESPIRATION RATE: 18 BRPM | BODY MASS INDEX: 36.03 KG/M2 | SYSTOLIC BLOOD PRESSURE: 116 MMHG | OXYGEN SATURATION: 96 % | HEART RATE: 92 BPM | DIASTOLIC BLOOD PRESSURE: 60 MMHG

## 2019-09-12 PROBLEM — R00.0 TACHYCARDIA: Status: RESOLVED | Noted: 2019-09-11 | Resolved: 2019-09-12

## 2019-09-12 LAB
ALBUMIN SERPL BCP-MCNC: 2.9 G/DL (ref 3.5–5)
ALP SERPL-CCNC: 50 U/L (ref 46–116)
ALT SERPL W P-5'-P-CCNC: 25 U/L (ref 12–78)
ANION GAP SERPL CALCULATED.3IONS-SCNC: 8 MMOL/L (ref 4–13)
AST SERPL W P-5'-P-CCNC: 10 U/L (ref 5–45)
BILIRUB SERPL-MCNC: 0.5 MG/DL (ref 0.2–1)
BUN SERPL-MCNC: 21 MG/DL (ref 5–25)
CALCIUM SERPL-MCNC: 8.2 MG/DL (ref 8.3–10.1)
CHLORIDE SERPL-SCNC: 108 MMOL/L (ref 100–108)
CO2 SERPL-SCNC: 25 MMOL/L (ref 21–32)
CREAT SERPL-MCNC: 0.94 MG/DL (ref 0.6–1.3)
ERYTHROCYTE [DISTWIDTH] IN BLOOD BY AUTOMATED COUNT: 13.2 % (ref 11.6–15.1)
GFR SERPL CREATININE-BSD FRML MDRD: 88 ML/MIN/1.73SQ M
GLUCOSE SERPL-MCNC: 138 MG/DL (ref 65–140)
GLUCOSE SERPL-MCNC: 144 MG/DL (ref 65–140)
GLUCOSE SERPL-MCNC: 176 MG/DL (ref 65–140)
HCT VFR BLD AUTO: 39.8 % (ref 36.5–49.3)
HGB BLD-MCNC: 12.8 G/DL (ref 12–17)
MCH RBC QN AUTO: 31.1 PG (ref 26.8–34.3)
MCHC RBC AUTO-ENTMCNC: 32.2 G/DL (ref 31.4–37.4)
MCV RBC AUTO: 97 FL (ref 82–98)
PLATELET # BLD AUTO: 178 THOUSANDS/UL (ref 149–390)
PMV BLD AUTO: 8.8 FL (ref 8.9–12.7)
POTASSIUM SERPL-SCNC: 3.8 MMOL/L (ref 3.5–5.3)
PROT SERPL-MCNC: 5.9 G/DL (ref 6.4–8.2)
RBC # BLD AUTO: 4.11 MILLION/UL (ref 3.88–5.62)
SODIUM SERPL-SCNC: 141 MMOL/L (ref 136–145)
WBC # BLD AUTO: 6.88 THOUSAND/UL (ref 4.31–10.16)

## 2019-09-12 PROCEDURE — 80053 COMPREHEN METABOLIC PANEL: CPT | Performed by: HOSPITALIST

## 2019-09-12 PROCEDURE — 99222 1ST HOSP IP/OBS MODERATE 55: CPT | Performed by: INTERNAL MEDICINE

## 2019-09-12 PROCEDURE — 82948 REAGENT STRIP/BLOOD GLUCOSE: CPT

## 2019-09-12 PROCEDURE — 99238 HOSP IP/OBS DSCHRG MGMT 30/<: CPT | Performed by: FAMILY MEDICINE

## 2019-09-12 PROCEDURE — 85027 COMPLETE CBC AUTOMATED: CPT | Performed by: HOSPITALIST

## 2019-09-12 RX ORDER — MYCOPHENOLATE MOFETIL 500 MG/1
TABLET ORAL
Qty: 120 TABLET | Refills: 0
Start: 2019-09-12 | End: 2019-11-07 | Stop reason: SDUPTHER

## 2019-09-12 RX ADMIN — PREDNISONE 20 MG: 20 TABLET ORAL at 08:25

## 2019-09-12 RX ADMIN — INSULIN GLARGINE 35 UNITS: 100 INJECTION, SOLUTION SUBCUTANEOUS at 08:25

## 2019-09-12 RX ADMIN — ENOXAPARIN SODIUM 190 MG: 300 INJECTION INTRAVENOUS; SUBCUTANEOUS at 09:44

## 2019-09-12 RX ADMIN — SODIUM CHLORIDE 100 ML/HR: 0.9 INJECTION, SOLUTION INTRAVENOUS at 06:13

## 2019-09-12 RX ADMIN — INSULIN LISPRO 10 UNITS: 100 INJECTION, SOLUTION INTRAVENOUS; SUBCUTANEOUS at 13:52

## 2019-09-12 RX ADMIN — FAMOTIDINE 20 MG: 20 TABLET ORAL at 08:25

## 2019-09-12 RX ADMIN — LEVOTHYROXINE SODIUM 50 MCG: 50 TABLET ORAL at 06:13

## 2019-09-12 RX ADMIN — INSULIN LISPRO 10 UNITS: 100 INJECTION, SOLUTION INTRAVENOUS; SUBCUTANEOUS at 08:28

## 2019-09-12 RX ADMIN — ACETAMINOPHEN 650 MG: 325 TABLET ORAL at 03:53

## 2019-09-12 NOTE — ASSESSMENT & PLAN NOTE
· Has been complaining of viral URI type symptoms with a productive cough over the past several days  · Afebrile and without leukocytosis  · Procalcitonin negative

## 2019-09-12 NOTE — ASSESSMENT & PLAN NOTE
· Has been complaining of viral URI type symptoms with a productive cough over the past several days  · Afebrile and without leukocytosis  · Check procalcitonin    Follow blood cultures taken in ER  · Observe off antibiotics for now however if patient does spike fever and/or clinically decompensates, will have low threshold to begin antibiotics given CellCept and prednisone use  · Supportive care, consider viral etiology

## 2019-09-12 NOTE — ASSESSMENT & PLAN NOTE
· Being managed by Rheumatology as an outpatient  Continue prednisone  Would recommend speaking with Rheumatology tomorrow regarding CellCept, as patient states he was told to hold his CellCept given these new onset of symptoms    · CT imaging done today shows resolved mediastinal lymphadenopathy

## 2019-09-12 NOTE — ASSESSMENT & PLAN NOTE
Lab Results   Component Value Date    HGBA1C 12 1 (A) 08/01/2019       No results for input(s): POCGLU in the last 72 hours  Blood Sugar Average: Last 72 hrs:  ·  very poorly controlled as evidenced by A1c of 12 1, likely due to chronic prednisone use  Currently being weaned as an outpatient by Rheumatology  · Follows with Endocrinology closely in the outpatient setting  Last saw today  · Continue basically 35 units HS, NovoLog 10 units t i d  With meals  Hold metformin while admitted especially given contrast use earlier today, hold Victoza  · Add sliding scale insulin and Accu-Cheks, diabetic diet, adjust p r n

## 2019-09-12 NOTE — H&P
H&P- Sushila Mendez 1960, 60 y o  male MRN: [id number]    Unit/Bed#: -01 Encounter: [id number]    Primary Care Provider: Ita Castellano DO   Date and time admitted to hospital: 9/11/2019  3:24 PM    * Tachycardia  Assessment & Plan  · Sent in from Endocrinology office with concern for tachycardia, on CellCept and prednisone  · EKG reveals sinus tach  Patient reports chronic palpitations  Will give IV fluids  · CTA done to rule out PE and was negative for acute PE, however there is a chronic appearing mural thrombus in the segmental branch point of the left lower lobe  ER reportedly discussed with Pulmonary who recommended treating this as an acute PE  Has been given therapeutic Lovenox which will continue for now  · Consult Pulmonary for further input  · Check echo  · Monitor oxygen sats closely  · Telemetry     Viral URI with cough  Assessment & Plan  · Has been complaining of viral URI type symptoms with a productive cough over the past several days  · Afebrile and without leukocytosis  · Check procalcitonin    Follow blood cultures taken in ER  · Observe off antibiotics for now however if patient does spike fever and/or clinically decompensates, will have low threshold to begin antibiotics given CellCept and prednisone use  · Supportive care, consider viral etiology    Thrombus  Assessment & Plan  · CTA PE study shows chronic appearing mural thrombus and a segmental branch point of the left lower lobe  · As above, Pulmonary recommended treating this as an acute PE per my discussion with the ER  · Appreciate consult  · Continue therapeutic Lovenox    Current chronic use of systemic steroids  Assessment & Plan  · For pulmonary sarcoidosis, causing hyperglycemia  · Being weaned as outpatinet by Rheumatology    History of hepatitis C virus infection  Assessment & Plan  · Status post harvoni  · Recommend ongoing OP f/u     Pulmonary sarcoidosis ([facility] [address] )  Assessment & Plan  · Being managed by Rheumatology as an outpatient  Continue prednisone  Would recommend speaking with Rheumatology tomorrow regarding CellCept, as patient states he was told to hold his CellCept given these new onset of symptoms  · CT imaging done today shows resolved mediastinal lymphadenopathy    Hypothyroid  Assessment & Plan  · Continue levothyroxine 50 mcg daily    Type 2 diabetes mellitus without complication, without long-term current use of insulin (HCC)  Assessment & Plan  Lab Results   Component Value Date    HGBA1C 12 1 (A) 08/01/2019       No results for input(s): POCGLU in the last 72 hours  Blood Sugar Average: Last 72 hrs:  ·  very poorly controlled as evidenced by A1c of 12 1, likely due to chronic prednisone use  Currently being weaned as an outpatient by Rheumatology  · Follows with Endocrinology closely in the outpatient setting  Last saw today  · Continue basically 35 units HS, NovoLog 10 units t i d  With meals  Hold metformin while admitted especially given contrast use earlier today, hold Victoza  · Add sliding scale insulin and Accu-Cheks, diabetic diet, adjust p r n  VTE Prophylaxis: Enoxaparin (Lovenox)  / sequential compression device   Code Status: FULL CODE  POLST: POLST form is not discussed and not completed at this time  Discussion with family: patient  Anticipated Length of Stay:  Patient will be admitted on an Inpatient basis with an anticipated length of stay of  > 2 midnights  Justification for Hospital Stay: ?PE, monitoring for any infectious source in this immunocompromised patient    Total Time for Visit, including Counseling / Coordination of Care: 20 minutes  Greater than 50% of this total time spent on direct patient counseling and coordination of care      Chief Complaint:   Sent in from endocrinology office for concern for infection    History of Present Illness:    Simran Ying is a 60 y o  male with PMHx of pulmonary sarcoidosis on Cellcept and steroids, uncontrolled DM2, hypothyroidism, hx of HCV s/p harvoni treatment, and HLD who presents to ER at request of his endocrinologist after an appointment earlier today  Patient has been c/o nausea, vomiting, productive cough and URI type symptoms for past few days  Patient recently started on Cellcept last week  Has been on a progressive prednisone taper for his pulmonary sarcoidosis by his rheumatologist   Mr Reji Judge contacted his rheumatologist yesterday to see if this could potentially be a side effect of CellCept, and rheumatologist did not think so however told him to hold off on medication for now  Patient was noted to be tachycardic in endocrinology office today prompting ER admission  Patient denies any recent surgeries, long car trips, history of DVT/PE, family hx of DVT/PE  No GI bleed history  Reportedly, ER physician discussed with Pulmonary regarding CTA findings who suggested to treat this as an acute PE  Patient will be admitted for further workup  Review of Systems:    Review of Systems   Constitutional: Negative for appetite change, chills, fever and unexpected weight change  HENT: Positive for congestion, sinus pressure and sore throat  Respiratory: Positive for cough and shortness of breath  Cardiovascular: Positive for palpitations  Negative for chest pain and leg swelling  Gastrointestinal: Positive for nausea and vomiting  Negative for abdominal pain and diarrhea  Genitourinary: Negative  Neurological: Positive for dizziness and headaches  All other systems reviewed and are negative        Past Medical and Surgical History:     Past Medical History:   Diagnosis Date    BPH (benign prostatic hyperplasia)     Diabetes mellitus ([address] )     Erectile dysfunction     GERD (gastroesophageal reflux disease)     Headache(784 0) 05/01/2018    Each Day    Hematuria     Infectious viral hepatitis 2007    [facility]     hepatitis C    Mediastinal adenopathy     Obesity 2007    Vitamin D deficiency        Past Surgical History:   Procedure Laterality Date    COLONOSCOPY      last assessed: 08/28/2012; fiberoptic screening     CYSTOSCOPY      onset: 05/06/2016; Diagnostic     DENTAL SURGERY      LIVER BIOPSY      LYMPH NODE BIOPSY  6/2019    VT BRONCHOSCOPY NEEDLE BX TRACHEA MAIN STEM&/BRON N/A 6/6/2019    Procedure: ENDOBRONCHIAL ULTRASOUND (EBUS); Surgeon: Catrina Kwon MD;  [facility];  Service: Thoracic    VT [address] N/A 6/6/2019    Procedure: Barbara Chyle;  Surgeon: Catrina Kwon MD;  [facility];  Service: Thoracic    VT TRANSURETHRAL ELEC-SURG PROSTATECTOM N/A 9/13/2017    Procedure: Evone Kemp; TUR PROSTATE;  Surgeon: Leeanne Gibson MD;  [facility];  Service: Urology    PROSTATE SURGERY  2017    SHOULDER ARTHROSCOPY Right     bicep tendon repair, rotator cuff repair and acromuim shave        Meds/Allergies:    Prior to Admission medications    Medication Sig Start Date End Date Taking?  Authorizing Provider   Cholecalciferol (VITAMIN D3) 5000 units CAPS Take 1 capsule by mouth daily    Yes Historical Provider, MD   famotidine (PEPCID) 40 MG tablet Take 40 mg by mouth daily  1/23/18  Yes Historical Provider, MD   insulin aspart (NOVOLOG FLEXPEN) 100 Units/mL injection pen Inject 18 Units under the skin 3 (three) times a day with meals As well as sliding scale coverage as follows:  150-200 2 units  200-250 4 units  250-300 6 units  >300 8 units  Patient taking differently: Inject 10 Units under the skin 3 (three) times a day with meals As well as sliding scale coverage as follows:  150-200 2 units  200-250 4 units  250-300 6 units  >300 8 units 8/7/19  Yes Marques Dickens MD   insulin glargine (BASAGLAR KWIKPEN) 100 units/mL injection pen Inject 35 Units under the skin daily 8/14/19  Yes Marques Dickens MD   Insulin Pen Needle (PEN NEEDLES) 31G X 6 MM MISC by Does not apply route 4 (four) times a day 8/1/19  Yes Janit Gilford, MD   levothyroxine 50 mcg tablet TAKE 1 TABLET BY MOUTH EVERY DAY 8/18/19  Yes Janit Gilford, MD   liraglutide (VICTOZA) injection Inject 0 2 mL (1 2 mg total) under the skin daily 2/11/19  Yes ROSA MARIA Casillas   metFORMIN (GLUCOPHAGE) 500 mg tablet TAKE 1 TABLET BY MOUTH TWICE A DAY WITH MEALS 5/30/19  Yes Janit Gilford, MD   mycophenolate (CELLCEPT) 500 mg tablet 1 tab daily x1 week, then 1 tab BID x1 week, then 2 tabs in AM and 1 tab in PM x1 week, then 2 tabs BID thereafter 9/4/19  Yes Chelsi Lipoma, DO   NOVOTWIST 32G X 5 MM [address] [facility]  6/25/19  Yes Historical Provider, MD Kallie Greene LANCETS 05V [address] [facility] 9/8/19  Yes Janit Gilford, MD   [facility] VERIO test strip testing QID 8/22/19  Yes Historical Provider, MD   predniSONE 20 mg tablet Take 3 tablets (60 mg total) by mouth daily 8/5/19  Yes Chelsi Lipoma, DO   glucose 4 g chewable tablet Chew 4 tablets (16 g total) as needed for low blood sugar 8/14/19   Adams Andrade MD     I have reviewed home medications using allscripts  Allergies:    Allergies   Allergen Reactions    No Active Allergies        Social History:     Marital Status: /Civil Union   Occupation: retired  Patient Pre-hospital Living Situation: at home  Patient Pre-hospital Level of Mobility: independent  Patient Pre-hospital Diet Restrictions: diabetic  Substance Use History:   Social History     Substance and Sexual Activity   Alcohol Use No    Comment: quit 12 years ago     Social History     Tobacco Use   Smoking Status Former Smoker    Packs/day: 1 00    Years: 1 00    Pack years: 1 00    Types: Cigarettes    Last attempt to quit: 2009    Years since quitting: 10 6   Smokeless Tobacco Never Used   Tobacco Comment    Quit     Social History     Substance and Sexual Activity   Drug Use No       Family History:    non-contributory    Physical Exam:     Vitals:   Blood Pressure: 139/85 (09/11/19 2101)  Pulse: 87 (09/11/19 2101)  Temperature: 97 7 °F (36 5 °C) (09/11/19 2101)  Temp Source: Oral (09/11/19 2101)  Respirations: 18 (09/11/19 2101)  Height: 6' 2" (188 cm) (09/11/19 2101)  Weight - Scale: 127 kg (280 lb 12 5 oz) (09/11/19 2101)  SpO2: 96 % (09/11/19 2101)    Physical Exam   Constitutional: He is oriented to person, place, and time  Obese, on room air, no acute distress   HENT:   Mouth/Throat: No oropharyngeal exudate  Increased nasal lacrimation noted   Eyes: Right eye exhibits no discharge  Left eye exhibits no discharge  Cardiovascular: Regular rhythm  Tachycardia present  Pulmonary/Chest: Effort normal and breath sounds normal  No respiratory distress  Abdominal: Soft  Bowel sounds are normal  He exhibits distension  There is no tenderness  Musculoskeletal: He exhibits no edema or tenderness  Lymphadenopathy:     He has no cervical adenopathy  Neurological: He is alert and oriented to person, place, and time  Skin: No pallor  Psychiatric: He has a normal mood and affect  Nursing note and vitals reviewed  Additional Data:     Lab Results: I have personally reviewed pertinent reports  Results from last 7 days   Lab Units 09/11/19  1541   WBC Thousand/uL 8 56   HEMOGLOBIN g/dL 14 9   HEMATOCRIT % 44 6   PLATELETS Thousands/uL 219   NEUTROS PCT % 83*   LYMPHS PCT % 9*   MONOS PCT % 5   EOS PCT % 0     Results from last 7 days   Lab Units 09/11/19  1541   SODIUM mmol/L 135*   POTASSIUM mmol/L 4 8   CHLORIDE mmol/L 101   CO2 mmol/L 24   BUN mg/dL 24   CREATININE mg/dL 1 17   ANION GAP mmol/L 10   CALCIUM mg/dL 8 7   ALBUMIN g/dL 3 5   TOTAL BILIRUBIN mg/dL 0 40   ALK PHOS U/L 63   ALT U/L 22   AST U/L <5*   GLUCOSE RANDOM mg/dL 338*     Results from last 7 days   Lab Units 09/11/19  1541   INR  1 00             Results from last 7 days   Lab Units 09/11/19  1541   LACTIC ACID mmol/L 2 0       Imaging: I have personally reviewed pertinent reports        CTA ED chest PE study   Final Result by Iban Campuzano MD (09/11 1928)      No acute pulmonary embolus is seen  Chronic appearing mural thrombus and a segmental branch point of the left lower lobe as seen on image 3/180  Resolved mediastinal lymphadenopathy  Stable splenomegaly  Stable hepatomegaly with steatosis  Stable hiatal hernia  Workstation performed: [id number]         XR chest 2 views   Final Result by Tay Mata MD (09/11 1600)      No acute cardiopulmonary disease  Workstation performed: [id number]             EKG, Pathology, and Other Studies Reviewed on Admission:   · EKG: Sinus tachycardia    Allscripts / Epic Records Reviewed: Yes     ** Please Note: This note has been constructed using a voice recognition system   **

## 2019-09-12 NOTE — PLAN OF CARE
Problem: PAIN - ADULT  Goal: Verbalizes/displays adequate comfort level or baseline comfort level  Description  Interventions:  - Encourage patient to monitor pain and request assistance  - Assess pain using appropriate pain scale  - Administer analgesics based on type and severity of pain and evaluate response  - Implement non-pharmacological measures as appropriate and evaluate response  - Consider cultural and social influences on pain and pain management  - Notify physician/advanced practitioner if interventions unsuccessful or patient reports new pain  9/12/2019 1702 by Mario Meehan RN  Outcome: Adequate for Discharge  9/12/2019 0804 by Mario Meehan RN  Outcome: Progressing     Problem: INFECTION - ADULT  Goal: Absence or prevention of progression during hospitalization  Description  INTERVENTIONS:  - Assess and monitor for signs and symptoms of infection  - Monitor lab/diagnostic results  - Monitor all insertion sites, i e  indwelling lines, tubes, and drains  - Monitor endotracheal if appropriate and nasal secretions for changes in amount and color  - [place] appropriate cooling/warming therapies per order  - Administer medications as ordered  - Instruct and encourage patient and family to use good hand hygiene technique  - Identify and instruct in appropriate isolation precautions for identified infection/condition  9/12/2019 1702 by Mario Meehan RN  Outcome: Adequate for Discharge  9/12/2019 0804 by Mario Meehan RN  Outcome: Progressing     Problem: DISCHARGE PLANNING  Goal: Discharge to home or other facility with appropriate resources  Description  INTERVENTIONS:  - Identify barriers to discharge w/patient and caregiver  - Arrange for needed discharge resources and transportation as appropriate  - Identify discharge learning needs (meds, wound care, etc )  - Arrange for interpretive services to assist at discharge as needed  - Refer to Case Management Department for coordinating discharge planning if the patient needs post-hospital services based on physician/advanced practitioner order or complex needs related to functional status, cognitive ability, or social support system  9/12/2019 1702 by Marce Membreno RN  Outcome: Adequate for Discharge  9/12/2019 0804 by Marce Membreno RN  Outcome: Progressing     Problem: Knowledge Deficit  Goal: Patient/family/caregiver demonstrates understanding of disease process, treatment plan, medications, and discharge instructions  Description  Complete learning assessment and assess knowledge base    Interventions:  - Provide teaching at level of understanding  - Provide teaching via preferred learning methods  9/12/2019 1702 by Marce Membreno RN  Outcome: Adequate for Discharge  9/12/2019 0804 by Marce Membreno RN  Outcome: Progressing     Problem: CARDIOVASCULAR - ADULT  Goal: Maintains optimal cardiac output and hemodynamic stability  Description  INTERVENTIONS:  - Monitor I/O, vital signs and rhythm  - Monitor for S/S and trends of decreased cardiac output  - Administer and titrate ordered vasoactive medications to optimize hemodynamic stability  - Assess quality of pulses, skin color and temperature  - Assess for signs of decreased coronary artery perfusion  - Instruct patient to report change in severity of symptoms  9/12/2019 1702 by Marce Membreno RN  Outcome: Adequate for Discharge  9/12/2019 0804 by Marce Membreno RN  Outcome: Progressing  Goal: Absence of cardiac dysrhythmias or at baseline rhythm  Description  INTERVENTIONS:  - Continuous cardiac monitoring, vital signs, obtain 12 lead EKG if ordered  - Administer antiarrhythmic and heart rate control medications as ordered  - Monitor electrolytes and administer replacement therapy as ordered  9/12/2019 1702 by Marce Membreno RN  Outcome: Adequate for Discharge  9/12/2019 0804 by Marce Membreno RN  Outcome: Progressing     Problem: RESPIRATORY - ADULT  Goal: Achieves optimal ventilation and oxygenation  Description  INTERVENTIONS:  - Assess for changes in respiratory status  - Assess for changes in mentation and behavior  - Position to facilitate oxygenation and minimize respiratory effort  - Oxygen administered by appropriate delivery if ordered  - Initiate smoking cessation education as indicated  - Encourage broncho-pulmonary hygiene including cough, deep breathe, Incentive Spirometry  - Assess the need for suctioning and aspirate as needed  - Assess and instruct to report SOB or any respiratory difficulty  - Respiratory Therapy support as indicated  9/12/2019 1702 by Tita Luther RN  Outcome: Adequate for Discharge  9/12/2019 0804 by Tita Luther RN  Outcome: Progressing     Problem: METABOLIC, FLUID AND ELECTROLYTES - ADULT  Goal: Electrolytes maintained within normal limits  Description  INTERVENTIONS:  - Monitor labs and assess patient for signs and symptoms of electrolyte imbalances  - Administer electrolyte replacement as ordered  - Monitor response to electrolyte replacements, including repeat lab results as appropriate  - Instruct patient on fluid and nutrition as appropriate  9/12/2019 1702 by Tita Luther RN  Outcome: Adequate for Discharge  9/12/2019 0804 by Tita Luther RN  Outcome: Progressing  Goal: Fluid balance maintained  Description  INTERVENTIONS:  - Monitor labs   - Monitor I/O and WT  - Instruct patient on fluid and nutrition as appropriate  - Assess for signs & symptoms of volume excess or deficit  9/12/2019 1702 by Tita Luther RN  Outcome: Adequate for Discharge  9/12/2019 0804 by Tita Luther RN  Outcome: Progressing  Goal: Glucose maintained within target range  Description  INTERVENTIONS:  - Monitor Blood Glucose as ordered  - Assess for signs and symptoms of hyperglycemia and hypoglycemia  - Administer ordered medications to maintain glucose within target range  - Assess nutritional intake and initiate nutrition service referral as needed  9/12/2019 1702 by Cliff Gonzalez RN  Outcome: Adequate for Discharge  9/12/2019 0804 by Cliff Gonzalez RN  Outcome: Progressing     Problem: HEMATOLOGIC - ADULT  Goal: Maintains hematologic stability  Description  INTERVENTIONS  - Assess for signs and symptoms of bleeding or hemorrhage  - Monitor labs  - Administer supportive blood products/factors as ordered and appropriate  9/12/2019 1702 by Cliff Gonzalez RN  Outcome: Adequate for Discharge  9/12/2019 0804 by Cliff Gonzalez RN  Outcome: Progressing

## 2019-09-12 NOTE — ASSESSMENT & PLAN NOTE
· Being managed by Rheumatology as an outpatient  Continue prednisone  Patient's rheumatologist told him to hold CellCept as it was uncertain whether not CellCept was causing his cold-like symptoms  Patient was instructed to check with his rheumatologist regarding starting back CellCept

## 2019-09-12 NOTE — DISCHARGE INSTRUCTIONS
Apixaban (By mouth)   Apixaban (a-PIX-a-ban)  Treats and prevents blood clots  This medicine is a blood thinner  Brand Name(s): Eliquis   There may be other brand names for this medicine  When This Medicine Should Not Be Used: This medicine is not right for everyone  Do not use it if you had an allergic reaction to apixaban or you have active bleeding  How to Use This Medicine:   Tablet  · Your doctor will tell you how much medicine to use  Do not use more than directed  · If you are not able to swallow the tablets whole, they may be crushed and mixed in water, 5% dextrose in water (D5W), apple juice, or applesauce  The crushed tablets may be mixed with 60 mL of water or D5W dose and given through a nasogastric tube (NGT)  · This medicine should come with a Medication Guide  Ask your pharmacist for a copy if you do not have one  · Missed dose: Take a dose as soon as you remember  If it is almost time for your next dose, wait until then and take a regular dose  Do not take extra medicine to make up for a missed dose  · Store the medicine in a closed container at room temperature, away from heat, moisture, and direct light  Drugs and Foods to Avoid:   Ask your doctor or pharmacist before using any other medicine, including over-the-counter medicines, vitamins, and herbal products  · Some medicines can affect how apixaban works  Tell your doctor if you are using any of the following:   ¨ Carbamazepine, clarithromycin, itraconazole, ketoconazole, phenytoin, rifampin, ritonavir, Asya's wort  ¨ Blood thinner (including clopidogrel, heparin, prasugrel, warfarin)  ¨ Medicine to treat depression  ¨ NSAID pain or arthritis medicine (including aspirin, celecoxib, diclofenac, ibuprofen, naproxen)  Warnings While Using This Medicine:   · Tell your doctor if you are pregnant or breastfeeding, or if you have kidney disease, liver disease, bleeding problems, or an artificial heart valve    · Do not stop using this medicine suddenly without asking your doctor  You might have a higher risk of stroke for a short time after you stop using this medicine  · This medicine increases your risk for bleeding that can become serious if not controlled  You may also bruise easily, and it may take longer than usual for bleeding to stop  · This medicine may increase your risk for blood clots in your spine or back if you undergo an epidural or spinal puncture  This could lead to paralysis  Tell your doctor if you ever had spine problems or back surgery  · Tell any doctor or dentist who treats you that you are using this medicine  With your doctor's supervision, you may need to stop using this medicine several days before you have surgery or medical tests  · Your doctor will do lab tests at regular visits to check on the effects of this medicine  Keep all appointments  · Keep all medicine out of the reach of children  Never share your medicine with anyone  Possible Side Effects While Using This Medicine:   Call your doctor right away if you notice any of these side effects:  · Allergic reaction: Itching or hives, swelling in your face or hands, swelling or tingling in your mouth or throat, chest tightness, trouble breathing  · Change in how much or how often you urinate, red or pink urine  · Chest pain, trouble breathing  · Coughing up blood, vomiting blood or material that looks like coffee grounds  · Numbness, tingling, or muscle weakness in your legs or feet  · Red or black, tarry stools  · Unusual bleeding, bruising, or weakness  If you notice other side effects that you think are caused by this medicine, tell your doctor  Call your doctor for medical advice about side effects  You may report side effects to FDA at 3-277-FDA-4394  © 2017 [employer] Information is for End User's use only and may not be sold, redistributed or otherwise used for commercial purposes  The above information is an  only  It is not intended as medical advice for individual conditions or treatments  Talk to your doctor, nurse or pharmacist before following any medical regimen to see if it is safe and effective for you  Pulmonary Embolism   WHAT YOU NEED TO KNOW:   A pulmonary embolism (PE) is the sudden blockage of a blood vessel in the lungs by an embolus  An embolus is a small piece of blood clot, fat, air, or tumor cells  The embolus cuts off the blood supply to your lungs  A pulmonary embolism can become life-threatening  DISCHARGE INSTRUCTIONS:   Call 911 for any of the following:   · You feel lightheaded, short of breath, and have chest pain  · You cough up blood  · You have a seizure  · You have slurred speech, increased sleepiness, or problems seeing, talking, or thinking  · You have weakness or cannot move your arm or leg on one side of your body  Seek care immediately if:   · You feel faint  · You have a severe headache  · Your heart is beating faster than normal   Contact your healthcare provider if:   · The skin on any part of your legs or hips turns purple  · Your gums or nose bleed  · You see blood in your urine or bowel movements  · Your bowel movements are black or darker than normal     · You have questions or concerns about your condition or care  Medicines:   · Blood thinners  help treat the PE and prevent new clots from forming  Examples of blood thinners include heparin, rivaroxaban, apixiban, and warfarin  The following are general safety guidelines to follow while you are taking a blood thinner:     ¨ Watch for bleeding and bruising  Watch for bleeding from your gums or nose  Watch for blood in your urine and bowel movements  Use a soft washcloth on your skin, and a soft toothbrush to brush your teeth  This can keep your skin and gums from bleeding  If you shave, use an electric shaver  Do not play contact sports       ¨ Tell your dentist and other healthcare providers that you take a blood thinner  Wear a bracelet or necklace that says you take this medicine  ¨ Do not start or stop any medicines unless your healthcare provider tells you to  Many medicines cannot be used with blood thinners  ¨ Tell your healthcare provider right away if you forget to take the blood thinner , or if you take too much  ¨ Warfarin  is a blood thinner that you may need to take  The following are additional things you should be aware of if you take warfarin:    § Foods and medicines can affect the amount of warfarin in your blood  Do not make major changes to your diet  Warfarin works best when you eat about the same amount of vitamin K every day  Vitamin K is found in green leafy vegetables and certain other foods  Ask for more information about what to eat or not to eat  § You will need to see your healthcare provider for follow-up visits  You will need regular blood tests to decide how much warfarin you need  · Take your medicine as directed  Contact your healthcare provider if you think your medicine is not helping or if you have side effects  Tell him or her if you are allergic to any medicine  Keep a list of the medicines, vitamins, and herbs you take  Include the amounts, and when and why you take them  Bring the list or the pill bottles to follow-up visits  Carry your medicine list with you in case of an emergency  Prevent another PE:   · Wear pressure stockings  The stockings are tight and put pressure on your legs  This improves blood flow and helps prevent clots  Wear the stockings during the day  Do not wear them when you sleep  · Exercise regularly  Ask about the best exercise plan for you  When you travel by car or work at a desk, take breaks to stand up and move around as much as possible  Rotate your feet in circles often if you sit for a long period of time  · Maintain a healthy weight  Ask your healthcare provider how much you should weigh   Ask him to help you create a weight loss plan if you are overweight  · Do not smoke  Nicotine and other chemicals in cigarettes and cigars can damage blood vessels and increase your risk for another PE  Ask your healthcare provider for information if you currently smoke and need help to quit  E-cigarettes or smokeless tobacco still contain nicotine  Talk to your healthcare provider before you use these products  Follow up with your healthcare provider as directed:  Write down your questions so you remember to ask them during your visits  © 2017 [employer] Information is for End User's use only and may not be sold, redistributed or otherwise used for commercial purposes  All illustrations and images included in CareNotes® are the copyrighted property of [employer]  or Sawyer Ocampo  The above information is an  only  It is not intended as medical advice for individual conditions or treatments  Talk to your doctor, nurse or pharmacist before following any medical regimen to see if it is safe and effective for you

## 2019-09-12 NOTE — ASSESSMENT & PLAN NOTE
· CTA PE study shows chronic appearing mural thrombus and a segmental branch point of the left lower lobe  · Discussed with pulmonology and appreciate their input  They recommended discharging patient on anticoagulation  This is the 1st episode of venous thromboembolism he knows of  For acute venous thromboembolism patient generally has to be on anticoagulation for at least 3 months if it is their 1st occurrence  I am not certain how long he should be on anticoagulation for chronic pulmonary embolus  For now I would recommend at least 3 months  He will have to follow up with pulmonology as an outpatient

## 2019-09-12 NOTE — ASSESSMENT & PLAN NOTE
· Unclear details, monitor while on anticoagulation  · Hemoglobin stable without any concern for active bleeding

## 2019-09-12 NOTE — PLAN OF CARE
Problem: PAIN - ADULT  Goal: Verbalizes/displays adequate comfort level or baseline comfort level  Description  Interventions:  - Encourage patient to monitor pain and request assistance  - Assess pain using appropriate pain scale  - Administer analgesics based on type and severity of pain and evaluate response  - Implement non-pharmacological measures as appropriate and evaluate response  - Consider cultural and social influences on pain and pain management  - Notify physician/advanced practitioner if interventions unsuccessful or patient reports new pain  Outcome: Progressing     Problem: INFECTION - ADULT  Goal: Absence or prevention of progression during hospitalization  Description  INTERVENTIONS:  - Assess and monitor for signs and symptoms of infection  - Monitor lab/diagnostic results  - Monitor all insertion sites, i e  indwelling lines, tubes, and drains  - Monitor endotracheal if appropriate and nasal secretions for changes in amount and color  - [place] appropriate cooling/warming therapies per order  - Administer medications as ordered  - Instruct and encourage patient and family to use good hand hygiene technique  - Identify and instruct in appropriate isolation precautions for identified infection/condition  Outcome: Progressing     Problem: DISCHARGE PLANNING  Goal: Discharge to home or other facility with appropriate resources  Description  INTERVENTIONS:  - Identify barriers to discharge w/patient and caregiver  - Arrange for needed discharge resources and transportation as appropriate  - Identify discharge learning needs (meds, wound care, etc )  - Arrange for interpretive services to assist at discharge as needed  - Refer to Case Management Department for coordinating discharge planning if the patient needs post-hospital services based on physician/advanced practitioner order or complex needs related to functional status, cognitive ability, or social support system  Outcome: Progressing Problem: Knowledge Deficit  Goal: Patient/family/caregiver demonstrates understanding of disease process, treatment plan, medications, and discharge instructions  Description  Complete learning assessment and assess knowledge base    Interventions:  - Provide teaching at level of understanding  - Provide teaching via preferred learning methods  Outcome: Progressing     Problem: CARDIOVASCULAR - ADULT  Goal: Maintains optimal cardiac output and hemodynamic stability  Description  INTERVENTIONS:  - Monitor I/O, vital signs and rhythm  - Monitor for S/S and trends of decreased cardiac output  - Administer and titrate ordered vasoactive medications to optimize hemodynamic stability  - Assess quality of pulses, skin color and temperature  - Assess for signs of decreased coronary artery perfusion  - Instruct patient to report change in severity of symptoms  Outcome: Progressing  Goal: Absence of cardiac dysrhythmias or at baseline rhythm  Description  INTERVENTIONS:  - Continuous cardiac monitoring, vital signs, obtain 12 lead EKG if ordered  - Administer antiarrhythmic and heart rate control medications as ordered  - Monitor electrolytes and administer replacement therapy as ordered  Outcome: Progressing     Problem: RESPIRATORY - ADULT  Goal: Achieves optimal ventilation and oxygenation  Description  INTERVENTIONS:  - Assess for changes in respiratory status  - Assess for changes in mentation and behavior  - Position to facilitate oxygenation and minimize respiratory effort  - Oxygen administered by appropriate delivery if ordered  - Initiate smoking cessation education as indicated  - Encourage broncho-pulmonary hygiene including cough, deep breathe, Incentive Spirometry  - Assess the need for suctioning and aspirate as needed  - Assess and instruct to report SOB or any respiratory difficulty  - Respiratory Therapy support as indicated  Outcome: Progressing     Problem: METABOLIC, FLUID AND ELECTROLYTES - ADULT  Goal: Electrolytes maintained within normal limits  Description  INTERVENTIONS:  - Monitor labs and assess patient for signs and symptoms of electrolyte imbalances  - Administer electrolyte replacement as ordered  - Monitor response to electrolyte replacements, including repeat lab results as appropriate  - Instruct patient on fluid and nutrition as appropriate  Outcome: Progressing  Goal: Fluid balance maintained  Description  INTERVENTIONS:  - Monitor labs   - Monitor I/O and WT  - Instruct patient on fluid and nutrition as appropriate  - Assess for signs & symptoms of volume excess or deficit  Outcome: Progressing  Goal: Glucose maintained within target range  Description  INTERVENTIONS:  - Monitor Blood Glucose as ordered  - Assess for signs and symptoms of hyperglycemia and hypoglycemia  - Administer ordered medications to maintain glucose within target range  - Assess nutritional intake and initiate nutrition service referral as needed  Outcome: Progressing     Problem: HEMATOLOGIC - ADULT  Goal: Maintains hematologic stability  Description  INTERVENTIONS  - Assess for signs and symptoms of bleeding or hemorrhage  - Monitor labs  - Administer supportive blood products/factors as ordered and appropriate  Outcome: Progressing

## 2019-09-12 NOTE — DISCHARGE INSTR - AVS FIRST PAGE
Take Eliquis as follows:  2 tablets (10 mg) twice a day for 1 week then 1 tablet (5 mg) twice a day thereafter  You will be taking it for least 3 months  However, please follow up with pulmonology in 2-4 weeks  Please ask your rheumatologist about starting back mycophenolate (CellCept)

## 2019-09-12 NOTE — ASSESSMENT & PLAN NOTE
· Sent in from Endocrinology office with concern for tachycardia, on CellCept and prednisone  · EKG reveals sinus tach  Patient reports chronic palpitations  · CTA done to rule out PE and was negative for acute PE, however there is a chronic appearing mural thrombus in the segmental branch point of the left lower lobe  · Appreciate pulmonology input  Will treat with Eliquis for at least 3 months  Tachycardia has resolved

## 2019-09-12 NOTE — NURSING NOTE
Pt educated on discharge instructions and new prescription, his questions were answered and he stated understanding

## 2019-09-12 NOTE — DISCHARGE SUMMARY
Discharge- Ryan Cruz 1960, 60 y o  male MRN: [id number]    Unit/Bed#: -01 Encounter: [id number]    Primary Care Provider: Juanjo Beatty DO   Date and time admitted to hospital: 9/11/2019  3:24 PM      Viral URI with cough  Assessment & Plan  · Has been complaining of viral URI type symptoms with a productive cough over the past several days  · Afebrile and without leukocytosis  · Procalcitonin negative  Thrombus  Assessment & Plan  · CTA PE study shows chronic appearing mural thrombus and a segmental branch point of the left lower lobe  · Discussed with pulmonology and appreciate their input  They recommended discharging patient on anticoagulation  This is the 1st episode of venous thromboembolism he knows of  For acute venous thromboembolism patient generally has to be on anticoagulation for at least 3 months if it is their 1st occurrence  I am not certain how long he should be on anticoagulation for chronic pulmonary embolus  For now I would recommend at least 3 months  He will have to follow up with pulmonology as an outpatient  Current chronic use of systemic steroids  Assessment & Plan  · For pulmonary sarcoidosis, causing hyperglycemia  · Being weaned as outpatient by Rheumatology    History of hepatitis C virus infection  Assessment & Plan  · Status post harvoni  · Recommend ongoing OP f/u     Pulmonary sarcoidosis ([facility] [address] )  Assessment & Plan  · Being managed by Rheumatology as an outpatient  Continue prednisone  Patient's rheumatologist told him to hold CellCept as it was uncertain whether not CellCept was causing his cold-like symptoms  Patient was instructed to check with his rheumatologist regarding starting back CellCept  Hypothyroid  Assessment & Plan  · Continue levothyroxine 50 mcg daily    Type 2 diabetes mellitus without complication, without long-term current use of insulin ([facility])  Assessment & Plan  · Continue home insulin  Outpatient endocrine follow-up      * Tachycardia  Assessment & Plan  · Sent in from Endocrinology office with concern for tachycardia, on CellCept and prednisone  · EKG reveals sinus tach  Patient reports chronic palpitations  · CTA done to rule out PE and was negative for acute PE, however there is a chronic appearing mural thrombus in the segmental branch point of the left lower lobe  · Appreciate pulmonology input  Will treat with Eliquis for at least 3 months  Tachycardia has resolved  Disposition:     Home    Reason for Admission:  Tachycardia    Discharge Diagnoses:     Principal Problem:    Tachycardia  Active Problems:    Type 2 diabetes mellitus without complication, without long-term current use of insulin (HCC)    Hypothyroid    Pulmonary sarcoidosis (HCC)    History of hepatitis C virus infection    Current chronic use of systemic steroids    Thrombus    Viral URI with cough  Resolved Problems:    * No resolved hospital problems  *      Consultations During Hospital Stay:  · Pulmonology    Procedures Performed:     · None    Significant Findings / Test Results:     CHEST-9/11/19     INDICATION:   cough      COMPARISON:  8/1/2019     EXAM PERFORMED/VIEWS:  XR CHEST PA & LATERAL        FINDINGS:     Mild chronic elevation of the right hemidiaphragm      Cardiomediastinal silhouette appears unremarkable      The lungs are clear  No pneumothorax or pleural effusion      Osseous structures appear within normal limits for patient age      IMPRESSION:     No acute cardiopulmonary disease  CTA - CHEST WITH IV CONTRAST - PULMONARY ANGIOGRAM-9/11/19     INDICATION:   sob/tachycardia/cough  Former smoker      COMPARISON: CT chest 5/15/2019      TECHNIQUE: CTA examination of the chest was performed using angiographic technique according to a protocol specifically tailored to evaluate for pulmonary embolism  Axial, sagittal, and coronal 2D reformatted images were created from the source data and   submitted for interpretation    In addition, coronal 3D MIP postprocessing was performed on the acquisition scanner        Radiation dose length product (DLP) for this visit:  812 mGy-cm   This examination, like all CT scans performed in the [facility], was performed utilizing techniques to minimize radiation dose exposure, including the use of iterative   reconstruction and automated exposure control      IV Contrast:  85 mL of iohexol (OMNIPAQUE)     FINDINGS:     PULMONARY ARTERIAL TREE:  No acute pulmonary embolus is seen  Chronic appearing mural thrombus and a segmental branch point of the left lower lobe as seen on image 3/180      LUNGS:  Lungs are clear  There is no tracheal or endobronchial lesion      PLEURA:  Unremarkable      HEART/GREAT VESSELS:  Unremarkable for patient's age      MEDIASTINUM AND KRIS:  Unremarkable  Mediastinal nodes have normalized      CHEST WALL AND LOWER NECK:   Unremarkable      VISUALIZED STRUCTURES IN THE UPPER ABDOMEN:  Stable splenomegaly measuring 15 cm  Stable hepatomegaly with steatosis  Small hiatal hernia unchanged      OSSEOUS STRUCTURES:  No acute fracture or destructive osseous lesion      IMPRESSION:     No acute pulmonary embolus is seen  Chronic appearing mural thrombus and a segmental branch point of the left lower lobe as seen on image 3/180      Resolved mediastinal lymphadenopathy      Stable splenomegaly      Stable hepatomegaly with steatosis      Stable hiatal hernia  Incidental Findings:   · None     Test Results Pending at Discharge (will require follow up): · None     Outpatient Tests Requested:  · None    Complications:  None    Hospital Course:     Patient presented to the emergency room after his endocrinologist found him to be tachycardic  He had started CellCept last week and for the previous few days has had cold-like symptoms  His rheumatologist told him to hold CellCept prior to coming in    A CTA was done and showed a chronic appearing mural thrombus and a segmental branch point of the left lower lobe  Pulmonology was consulted and recommended patient be treated for this pulmonary embolus  He was initially started on Lovenox, but this was changed to Eliquis  His tachycardia resolved  He did fine otherwise  An echocardiogram was ordered but was canceled since he had an echocardiogram last month  He was then discharged home  He will need to be on Eliquis for at least 3 weeks  Condition at Discharge: stable     Discharge Day Visit / Exam:     Subjective:  Patient was seen and examined this afternoon  He is doing well  No issues reported  Vitals: Blood Pressure: 116/60 (09/12/19 1508)  Pulse: 92 (09/12/19 1508)  Temperature: 98 2 °F (36 8 °C) (09/12/19 1508)  Temp Source: Oral (09/12/19 1508)  Respirations: 18 (09/12/19 1508)  Height: 6' 2" (188 cm) (09/11/19 2101)  Weight - Scale: 127 kg (280 lb 12 5 oz) (09/11/19 2101)  SpO2: 96 % (09/12/19 1508)  Exam:   Physical Exam   Constitutional: He is oriented to person, place, and time  No distress  HENT:   Head: Normocephalic and atraumatic  Eyes: No scleral icterus  Pulmonary/Chest: No respiratory distress  Neurological: He is alert and oriented to person, place, and time  Psychiatric: He has a normal mood and affect  His behavior is normal      Discussion with Family:  Patient's wife listened in over the phone  Discharge instructions/Information to patient and family:   See after visit summary for information provided to patient and family  Provisions for Follow-Up Care:  See after visit summary for information related to follow-up care and any pertinent home health orders  Planned Readmission:  None     Discharge Statement:  I spent 30 minutes discharging the patient  This time was spent on the day of discharge  I had direct contact with the patient on the day of discharge   Greater than 50% of the total time was spent examining patient, answering all patient questions, arranging and discussing plan of care with patient as well as directly providing post-discharge instructions  Additional time then spent on discharge activities  Discharge Medications:  See after visit summary for reconciled discharge medications provided to patient and family        ** Please Note: This note has been constructed using a voice recognition system **

## 2019-09-12 NOTE — ASSESSMENT & PLAN NOTE
· CTA PE study shows chronic appearing mural thrombus and a segmental branch point of the left lower lobe  · As above, Pulmonary recommended treating this as an acute PE per my discussion with the ER  · Appreciate consult  · Continue therapeutic Lovenox

## 2019-09-12 NOTE — ASSESSMENT & PLAN NOTE
· Sent in from Endocrinology office with concern for tachycardia, on CellCept and prednisone  · EKG reveals sinus tach  Patient reports chronic palpitations  Will give IV fluids  · CTA done to rule out PE and was negative for acute PE, however there is a chronic appearing mural thrombus in the segmental branch point of the left lower lobe  ER reportedly discussed with Pulmonary who recommended treating this as an acute PE  Has been given therapeutic Lovenox which will continue for now    · Consult Pulmonary for further input  · Check echo  · Monitor oxygen sats closely  · Telemetry

## 2019-09-12 NOTE — CONSULTS
Pulmonary Consultation   Rosa Balderas 60 y o  male MRN: [id number]  Unit/Bed#: -01 Encounter: [id number]      Reason for consultation:  Abnormal chest CT scan    Requesting physician:   TERRY Bell    Impressions/Recommendations:    Chronic appearing mural thrombus and segmental branch point of left lower lobe-is unclear how long the mural thrombus has been present  I reviewed images and discussed with Radiology  Patient did have a CT angiogram in 2016 and thrombus was not present at this time  Patient does not have a previous personal history pulmonary embolism or DVT  He denies family history  Suspect that risk factors would include his underlying sarcoidosis inflammatory state  I discussed the potential of obtaining a V/Q scan with radiology felt that probably would not add any additional information for the assessment (especially in light there is no evidence of CTEPH  I do not believe at this time that the patient's shortness of breath this entirely secondary to the small mural thrombus  However given the finding it would recommend treatment  We discussed this with the patient he agrees  - will treat with xarelto for three month  - recommend repeat CT angiogram in 3 months to assess for resolution of embolus   - He already had an echocardiogram in 8/2019 that showed no evidence of pulm htn   - If mural thrombus persists after 3 months treatment, would recommend continued treatment x3 more months     SOB- likely multifactorial and related to obesity, deconditioning, underlying sarcoid (although PFTS from 7/209 showed only mild restrictive disease)   - treatment for PE  - weight loss and conditioning  Hopefully will be easier once prednisone has been weaned  Sarcoidosis-CT angiogram showed no significant abnormalities of the described above    Previous PFTs showed mild restrictive ventilatory limitation   -continue CellCept  -continue prednisone taper per rheumatology/pulmonology  -follow-up with Pulmonary outpatient October 16, 2019      History of Present Illness      HPI:  Socrates Ray is a 60 y o  male seen in consult for abnormal CT of the chest   He is a 78-year-old male with a history of pulmonary sarcoidosis being treated with CellCept and steroids, uncontrolled diabetes type 2, hypothyroidism, history of HCV status post her bony treatment who presented to the ER yesterday at the request of his endocrinologist   He reported history of nausea, vomiting, productive cough for the past several days  He started CellCept last week  He underwent CT angiogram that showed chronic appearing mural thrombus in the segmental branch point the left lower lobe  Otherwise lungs were clear  We are consulted regarding management of the chronic appearing mural thrombus  This afternoon on assessment Mr Felicia Cheung stated he was feeling better but has had increased shortness of breath over the past several days  States he has gained over 20 lb since being on prednisone  Prednisone is currently being tapered  He is currently taking 25 mg daily  He also endorses a cough productive of green to brownish phlegm that was worse in the mornings  He denies any fevers chills or sweats  He states that he has had colonoscopy that was normal   He denies any history DVT or PE  He denies any family history of clotting disorders  He smoked tobacco off and on in the 1970s and 1980s and was  less than 1 pack per day  Review of systems:  12 point review of systems was completed and was otherwise negative except as listed in HPI      Historical Information   Past Medical History:   Diagnosis Date    BPH (benign prostatic hyperplasia)     Diabetes mellitus ([address] )     Erectile dysfunction     GERD (gastroesophageal reflux disease)     Headache(784 0) 05/01/2018    Each Day    Hematuria     Infectious viral hepatitis 2007    Cured by Dr Maryam Ventura Nils Chary Mole Liver disease hepatitis C    Mediastinal adenopathy     Obesity 2007    Vitamin D deficiency      Past Surgical History:   Procedure Laterality Date    COLONOSCOPY      last assessed: 08/28/2012; fiberoptic screening     CYSTOSCOPY      onset: 05/06/2016; Diagnostic     DENTAL SURGERY      LIVER BIOPSY      LYMPH NODE BIOPSY  6/2019    PA BRONCHOSCOPY NEEDLE BX TRACHEA MAIN STEM&/BRON N/A 6/6/2019    Procedure: ENDOBRONCHIAL ULTRASOUND (EBUS); Surgeon: Ginny Alcantara MD;  [facility];  Service: Thoracic    PA [address] N/A 6/6/2019    Procedure: Niranjan Ashby;  Surgeon: Ginny Alcantara MD;  [facility];  Service: Thoracic    PA TRANSURETHRAL ELEC-SURG PROSTATECTOM N/A 9/13/2017    Procedure: [place]omo; TUR PROSTATE;  Surgeon: Noel Christianson MD;  [facility];  Service: Urology    PROSTATE SURGERY  2017    SHOULDER ARTHROSCOPY Right     bicep tendon repair, rotator cuff repair and acromuim shave      Family History   Problem Relation Age of Onset    Stroke Father         Accidental Death at 52    Cancer Maternal Grandmother     Valvular heart disease Mother        Occupational history:   He works at the [employer]    Tobacco history:  See above    Meds/Allergies   Current Facility-Administered Medications   Medication Dose Route Frequency    acetaminophen (TYLENOL) tablet 650 mg  650 mg Oral Q6H PRN    apixaban (ELIQUIS) tablet 10 mg  10 mg Oral BID    famotidine (PEPCID) tablet 20 mg  20 mg Oral Daily    insulin glargine (LANTUS) subcutaneous injection 35 Units 0 35 mL  35 Units Subcutaneous QAM    insulin lispro (HumaLOG) 100 units/mL subcutaneous injection 1-5 Units  1-5 Units Subcutaneous HS    insulin lispro (HumaLOG) 100 units/mL subcutaneous injection 1-6 Units  1-6 Units Subcutaneous TID AC    insulin lispro (HumaLOG) 100 units/mL subcutaneous injection 10 Units  10 Units Subcutaneous TID With Meals    levothyroxine tablet 50 mcg  50 mcg Oral Early Morning    predniSONE tablet 20 mg  20 mg Oral Daily    sodium chloride 0 9 % infusion  100 mL/hr Intravenous Continuous     Medications Prior to Admission   Medication    Cholecalciferol (VITAMIN D3) 5000 units CAPS    famotidine (PEPCID) 40 MG tablet    insulin aspart (NOVOLOG FLEXPEN) 100 Units/mL injection pen    insulin glargine (BASAGLAR KWIKPEN) 100 units/mL injection pen    Insulin Pen Needle (PEN NEEDLES) 31G X 6 MM MISC    levothyroxine 50 mcg tablet    liraglutide (VICTOZA) injection    metFORMIN (GLUCOPHAGE) 500 mg tablet    mycophenolate (CELLCEPT) 500 mg tablet    NOVOTWIST 32G X 5 MM MISC    ONETOUCH DELICA LANCETS 80N MISC    ONETOUCH VERIO test strip    predniSONE 20 mg tablet    glucose 4 g chewable tablet     Allergies   Allergen Reactions    No Active Allergies        Vitals: Blood pressure 144/89, pulse 89, temperature 97 6 °F (36 4 °C), temperature source Oral, resp  rate 19, height 6' 2" (1 88 m), weight 127 kg (280 lb 12 5 oz), SpO2 96 % , , Body mass index is 36 05 kg/m²        Intake/Output Summary (Last 24 hours) at 9/12/2019 1510  Last data filed at 9/12/2019 1300  Gross per 24 hour   Intake 2680 ml   Output --   Net 2680 ml       Physical exam:    General Appearance:    Alert, cooperative, no conversational dyspnea no accessory     muscle use       Head/eyes:    Normocephalic, without obvious abnormality, atraumatic,         PERRL, extraocular muscles intact, no scleral icterus    Nose:   Nares normal, septum midline, mucosa normal, no drainage      Throat:   Moist mucous membranes, no thrush   Neck:   Supple, trachea midline, no adenopathy;   Lungs:     CTAB   Chest Wall:    No tenderness or deformity    Heart:    Regular rate and rhythm, S1 and S2 normal, no murmur, rub   or gallop       Extremities:   Extremities normal, atraumatic, no cyanosis no edema   Skin:   Warm, dry, turgor normal, no rashes or lesions   Lymph nodes:   Cervical and supraclavicular nodes normal   Neurologic:   CNII-XII intact, normal strength, non-focal         Labs: I have personally reviewed pertinent lab results  Imaging and other studies: I have personally reviewed pertinent reports  Pulmonary function testing:  July, 2019    Results:  FEV1/FVC Ratio: 80 %  Forced Vital Capacity: 3 79 L    71 % predicted  FEV1: 3 05 L     76 % predicted     Lung volumes by body plethysmography:   Total Lung Capacity 70 % predicted   Residual volume 72 % predicted     DLCO corrected for patients hemoglobin level: 65 %     Interpretation:     · No obstructive airflow defect      · No significant response to the administration to bronchodilator per ATS Standards     · Mild restrictive lung disease as indicated by decreased TLC     · Mildly decreased diffusion capacity       EKG, Pathology, and Other Studies: I have personally reviewed pertinent reports          EKG dated September 11, 2019:  Sinus tachycardia    Code Status: Level 1 - Full Code      Louis Gifford MD

## 2019-09-12 NOTE — PLAN OF CARE
Problem: PAIN - ADULT  Goal: Verbalizes/displays adequate comfort level or baseline comfort level  Description  Interventions:  - Encourage patient to monitor pain and request assistance  - Assess pain using appropriate pain scale  - Administer analgesics based on type and severity of pain and evaluate response  - Implement non-pharmacological measures as appropriate and evaluate response  - Consider cultural and social influences on pain and pain management  - Notify physician/advanced practitioner if interventions unsuccessful or patient reports new pain  Outcome: Progressing     Problem: INFECTION - ADULT  Goal: Absence or prevention of progression during hospitalization  Description  INTERVENTIONS:  - Assess and monitor for signs and symptoms of infection  - Monitor lab/diagnostic results  - Monitor all insertion sites, i e  indwelling lines, tubes, and drains  - Monitor endotracheal if appropriate and nasal secretions for changes in amount and color  - [place] appropriate cooling/warming therapies per order  - Administer medications as ordered  - Instruct and encourage patient and family to use good hand hygiene technique  - Identify and instruct in appropriate isolation precautions for identified infection/condition  Outcome: Progressing     Problem: DISCHARGE PLANNING  Goal: Discharge to home or other facility with appropriate resources  Description  INTERVENTIONS:  - Identify barriers to discharge w/patient and caregiver  - Arrange for needed discharge resources and transportation as appropriate  - Identify discharge learning needs (meds, wound care, etc )  - Arrange for interpretive services to assist at discharge as needed  - Refer to Case Management Department for coordinating discharge planning if the patient needs post-hospital services based on physician/advanced practitioner order or complex needs related to functional status, cognitive ability, or social support system  Outcome: Progressing Problem: Knowledge Deficit  Goal: Patient/family/caregiver demonstrates understanding of disease process, treatment plan, medications, and discharge instructions  Description  Complete learning assessment and assess knowledge base    Interventions:  - Provide teaching at level of understanding  - Provide teaching via preferred learning methods  Outcome: Progressing     Problem: CARDIOVASCULAR - ADULT  Goal: Maintains optimal cardiac output and hemodynamic stability  Description  INTERVENTIONS:  - Monitor I/O, vital signs and rhythm  - Monitor for S/S and trends of decreased cardiac output  - Administer and titrate ordered vasoactive medications to optimize hemodynamic stability  - Assess quality of pulses, skin color and temperature  - Assess for signs of decreased coronary artery perfusion  - Instruct patient to report change in severity of symptoms  Outcome: Progressing  Goal: Absence of cardiac dysrhythmias or at baseline rhythm  Description  INTERVENTIONS:  - Continuous cardiac monitoring, vital signs, obtain 12 lead EKG if ordered  - Administer antiarrhythmic and heart rate control medications as ordered  - Monitor electrolytes and administer replacement therapy as ordered  Outcome: Progressing     Problem: RESPIRATORY - ADULT  Goal: Achieves optimal ventilation and oxygenation  Description  INTERVENTIONS:  - Assess for changes in respiratory status  - Assess for changes in mentation and behavior  - Position to facilitate oxygenation and minimize respiratory effort  - Oxygen administered by appropriate delivery if ordered  - Initiate smoking cessation education as indicated  - Encourage broncho-pulmonary hygiene including cough, deep breathe, Incentive Spirometry  - Assess the need for suctioning and aspirate as needed  - Assess and instruct to report SOB or any respiratory difficulty  - Respiratory Therapy support as indicated  Outcome: Progressing     Problem: METABOLIC, FLUID AND ELECTROLYTES - ADULT  Goal: Electrolytes maintained within normal limits  Description  INTERVENTIONS:  - Monitor labs and assess patient for signs and symptoms of electrolyte imbalances  - Administer electrolyte replacement as ordered  - Monitor response to electrolyte replacements, including repeat lab results as appropriate  - Instruct patient on fluid and nutrition as appropriate  Outcome: Progressing  Goal: Fluid balance maintained  Description  INTERVENTIONS:  - Monitor labs   - Monitor I/O and WT  - Instruct patient on fluid and nutrition as appropriate  - Assess for signs & symptoms of volume excess or deficit  Outcome: Progressing  Goal: Glucose maintained within target range  Description  INTERVENTIONS:  - Monitor Blood Glucose as ordered  - Assess for signs and symptoms of hyperglycemia and hypoglycemia  - Administer ordered medications to maintain glucose within target range  - Assess nutritional intake and initiate nutrition service referral as needed  Outcome: Progressing     Problem: HEMATOLOGIC - ADULT  Goal: Maintains hematologic stability  Description  INTERVENTIONS  - Assess for signs and symptoms of bleeding or hemorrhage  - Monitor labs  - Administer supportive blood products/factors as ordered and appropriate  Outcome: Progressing

## 2019-09-12 NOTE — UTILIZATION REVIEW
Initial Clinical Review    Admission: Date/Time/Statement: Inpatient Admission Orders (From admission, onward)     Ordered        09/11/19 2013  Inpatient Admission  Once                   Orders Placed This Encounter   Procedures    Inpatient Admission     Standing Status:   Standing     Number of Occurrences:   1     Order Specific Question:   Admitting Physician     Answer:   Marce Raygoza [[id number]]     Order Specific Question:   Level of Care     Answer:   Med Surg [[id number]]     Order Specific Question:   Estimated length of stay     Answer:   More than 2 Midnights     Order Specific Question:   Certification     Answer:   I certify that inpatient services are medically necessary for this patient for a duration of greater than two midnights  See H&P and MD Progress Notes for additional information about the patient's course of treatment  ED Arrival Information     Expected Arrival Acuity Means of Arrival Escorted By Service Admission Type    - 9/11/2019 15:13 Urgent Walk-In Self Hospitalist Urgent    Arrival Complaint    tachycardia        Chief Complaint   Patient presents with    Rapid Heart Rate     Pt c/o a sore throat and headache with a low grade fever that started yesterday  Pt takes cellcept, had a visit with endocronologist who sent him here for further evaluation  Assessment/Plan: 60 yr old male with hstory of pulmonary sarcoidosis on cellcept and steroids presents to the ed  At the request of his endocrinologist as he has nause, vomiting, productive cough and uri sx for the past few days  He was started on cellcept last week and prednisone taper for his sarcoidosis  He was also tachycardic at the endocrinologist office this am  He is + congestion, sinus pressure and sore throat, cough and sob  + palpitations, dizziness and headaches  Along with nausea and vomiting    His ekg was sinus tach  Will give iv fluids, cta done to r/o pe - no pe but has chronic appearing mural thrombus in the segmental branch point of the lll  Pulmonary suggests treating this as an acute pe  Will place pulmonary consult, check echo, monitor 02 sats, telm and place on therapeutic lovenox  Viral uri plan is check procalcitonin, follow blood cultures and observe off of abx unless he decompensates or spikes a fever  He is being admitted as an inpatient to monitor for any infectious source as he is immunocompromised    ED Triage Vitals   Temperature Pulse Respirations Blood Pressure SpO2   09/11/19 1526 09/11/19 1526 09/11/19 1526 09/11/19 1526 09/11/19 1526   98 7 °F (37 1 °C) (!) 108 17 145/74 95 %      Temp Source Heart Rate Source Patient Position - Orthostatic VS BP Location FiO2 (%)   09/11/19 1526 09/11/19 1526 09/11/19 2101 09/11/19 1526 --   Oral Monitor Lying Right arm       Pain Score       09/11/19 1526       No Pain        Wt Readings from Last 1 Encounters:   09/11/19 127 kg (280 lb 12 5 oz)     Additional Vital Signs:   09/12/19 0804  97 6 °F (36 4 °C)  89  19  144/89  --  96 %  None (Room air)  Lying   09/12/19 0100  --  --  --  --  --  --  None (Room air)  --   09/11/19 2224  98 1 °F (36 7 °C)  102  18  116/59  82  92 %  None (Room air)  Lying   09/11/19 2101  97 7 °F (36 5 °C)  87  18  139/85  106  96 %  None (Room air)  Lying   09/11/19 2012  --  --  --  --  --  --  None (Room air)  --   09/11/19 2000  --  86  --  140/89  --  95 %  --  --       Pertinent Labs/Diagnostic Test Results:   Results from last 7 days   Lab Units 09/12/19  0359 09/11/19  1541   WBC Thousand/uL 6 88 8 56   HEMOGLOBIN g/dL 12 8 14 9   HEMATOCRIT % 39 8 44 6   PLATELETS Thousands/uL 178 219   NEUTROS ABS Thousands/µL  --  7 11         Results from last 7 days   Lab Units 09/12/19  0359 09/11/19  1541   SODIUM mmol/L 141 135*   POTASSIUM mmol/L 3 8 4 8   CHLORIDE mmol/L 108 101   CO2 mmol/L 25 24   ANION GAP mmol/L 8 10   BUN mg/dL 21 24   CREATININE mg/dL 0 94 1 17   EGFR ml/min/1 73sq m 88 67   CALCIUM mg/dL 8 2* 8 7     Results from last 7 days   Lab Units 09/12/19  0359 09/11/19  1541   AST U/L 10 <5*   ALT U/L 25 22   ALK PHOS U/L 50 63   TOTAL PROTEIN g/dL 5 9* 7 3   ALBUMIN g/dL 2 9* 3 5   TOTAL BILIRUBIN mg/dL 0 50 0 40     Results from last 7 days   Lab Units 09/12/19  1227 09/12/19  0826 09/11/19  2147   POC GLUCOSE mg/dl 138 144* 154*     Results from last 7 days   Lab Units 09/12/19  0359 09/11/19  1541   GLUCOSE RANDOM mg/dL 176* 338*     BETA-HYDROXYBUTYRATE   Date Value Ref Range Status   08/01/2019 1 5 (H) <0 6 mmol/L Final            Results from last 7 days   Lab Units 09/11/19  2017   PROCALCITONIN ng/ml <0 05     Results from last 7 days   Lab Units 09/11/19  1541   LACTIC ACID mmol/L 2 0       Results from last 7 days   Lab Units 09/11/19  1631   CLARITY UA  Clear   COLOR UA  Yellow   SPEC GRAV UA  1 020   PH UA  5 5   GLUCOSE UA mg/dl 500 (1/2%)*   KETONES UA mg/dl Negative   BLOOD UA  Negative   PROTEIN UA mg/dl Negative   NITRITE UA  Negative   BILIRUBIN UA  Negative   UROBILINOGEN UA E U /dl 0 2   LEUKOCYTES UA  Negative    cta of chest-No acute pulmonary embolus is seen  Chronic appearing mural thrombus and a segmental branch point of the left lower lobe as seen on image 3/180  Resolved mediastinal lymphadenopathy  Stable splenomegaly  Stable hepatomegaly with steatosis    Stable hiatal hernia    ekg- sinus tach      Treatment:   Medication Administration from 09/11/2019 1513 to 09/11/2019 2041       Date/Time Order Dose Route Action Comments     09/11/2019 1545 sodium chloride 0 9 % bolus 1,000 mL 1,000 mL Intravenous New Bag      09/11/2019 1623 sodium chloride 0 9 % bolus 1,000 mL 1,000 mL Intravenous New Bag      09/11/2019 1846 iohexol (OMNIPAQUE) 350 MG/ML injection (MULTI-DOSE) 85 mL 85 mL Intravenous Given      09/11/2019 1953 enoxaparin (LOVENOX) subcutaneous injection 135 mg 135 mg Subcutaneous Given         Past Medical History:   Diagnosis Date    BPH (benign prostatic hyperplasia)     Diabetes mellitus ([facility] [address] )  Erectile dysfunction     GERD (gastroesophageal reflux disease)     Headache(784 0) 05/01/2018    Each Day    Hematuria     Infectious viral hepatitis 2007    Cured by Dr General Fierro Nils Derral Pruitt Liver disease     hepatitis C    Mediastinal adenopathy     Obesity 2007    Vitamin D deficiency      Present on Admission:   Type 2 diabetes mellitus without complication, without long-term current use of insulin ([facility] )   Hypothyroid   History of hepatitis C virus infection   Pulmonary sarcoidosis ([facility] )      Admitting Diagnosis: Rapid heart rate [R00 0]  Tachycardia [R00 0]  Chronic pulmonary embolism (HCC) [I27 82]  Age/Sex: 60 y o  male  Admission Orders:    Current Facility-Administered Medications:  acetaminophen 650 mg Oral Q6H PRN    enoxaparin 1 5 mg/kg Subcutaneous Q24H [facility]    famotidine 20 mg Oral Daily    insulin glargine 35 Units Subcutaneous QAM    insulin lispro 1-5 Units Subcutaneous HS    insulin lispro 1-6 Units Subcutaneous TID AC    insulin lispro 10 Units Subcutaneous TID With Meals    levothyroxine 50 mcg Oral Early Morning    predniSONE 20 mg Oral Daily    sodium chloride 100 mL/hr Intravenous Continuous Last Rate: 100 mL/hr (09/12/19 0613)   fs glucose 4 x daily with coverage  telm      IP CONSULT TO PULMONOLOGY    Network Utilization Review Department  Phone: [phone]; Fax [phone]  [email or web address]  ATTENTION: Please call with any questions or concerns to [phone]  and carefully listen to the prompts so that you are directed to the right person  Send all requests for admission clinical reviews, approved or denied determinations and any other requests to fax [phone]   All voicemails are confidential

## 2019-09-12 NOTE — SOCIAL WORK
CM was asked to assist with verifying coverage and affordability for Eliquis for patient  Patient has commercial insurance and should be eligible to use the $10 co-pay coupon  This coupon card was printed and provided to patient to take home and active  CM reviewed how this is should be done  CM discussed with patient getting a free 30 day supply and that we could have this filled and delivered to him from [place]  Patient requested to have this done  CM sent script down to [facility] and called pharmacy to notify them that patient was written so that it could be delivered once it was filled  No further CM discharge needs were discussed or identified

## 2019-09-13 ENCOUNTER — TRANSITIONAL CARE MANAGEMENT (OUTPATIENT)
Dept: FAMILY MEDICINE CLINIC | Facility: CLINIC | Age: 59
End: 2019-09-13

## 2019-09-16 ENCOUNTER — TELEPHONE (OUTPATIENT)
Dept: ENDOCRINOLOGY | Facility: CLINIC | Age: 59
End: 2019-09-16

## 2019-09-16 LAB
BACTERIA BLD CULT: NORMAL
BACTERIA BLD CULT: NORMAL

## 2019-09-16 NOTE — TELEPHONE ENCOUNTER
Called patient and reviewed BGs    -having high fasting and post-prandial BGs into 200-300s    Discharged on eliquis due to VTE after hospital stay last week  Unclear why insulin requirements increasing, cellcept use? Remains on prednisone + cellcept    -instructed to increase basaglar to 40 units qHS and novolog 14 units TID AC + std SSI    Send BGs on Friday    Abraham WITT    Endocrinology

## 2019-09-18 ENCOUNTER — OFFICE VISIT (OUTPATIENT)
Dept: FAMILY MEDICINE CLINIC | Facility: CLINIC | Age: 59
End: 2019-09-18
Payer: COMMERCIAL

## 2019-09-18 VITALS
WEIGHT: 275 LBS | HEIGHT: 74 IN | BODY MASS INDEX: 35.29 KG/M2 | DIASTOLIC BLOOD PRESSURE: 70 MMHG | SYSTOLIC BLOOD PRESSURE: 110 MMHG | OXYGEN SATURATION: 96 % | HEART RATE: 88 BPM | RESPIRATION RATE: 12 BRPM

## 2019-09-18 DIAGNOSIS — J06.9 VIRAL UPPER RESPIRATORY TRACT INFECTION: ICD-10-CM

## 2019-09-18 DIAGNOSIS — I82.90 THROMBUS: Primary | Chronic | ICD-10-CM

## 2019-09-18 DIAGNOSIS — E11.9 TYPE 2 DIABETES MELLITUS WITHOUT COMPLICATION, WITHOUT LONG-TERM CURRENT USE OF INSULIN (HCC): ICD-10-CM

## 2019-09-18 DIAGNOSIS — D86.0 PULMONARY SARCOIDOSIS (HCC): ICD-10-CM

## 2019-09-18 PROCEDURE — 99496 TRANSJ CARE MGMT HIGH F2F 7D: CPT | Performed by: FAMILY MEDICINE

## 2019-09-18 NOTE — PROGRESS NOTES
Assessment/Plan:    No problem-specific Assessment & Plan notes found for this encounter  Diagnoses and all orders for this visit:    Thrombus  Comments:  chronic mural thrombus LLL  will d/c length of a/c with pulm, but suspect we'll go on the longer end of 3-6 months  continue eliquis      Pulmonary sarcoidosis ([facility] [address] )  Comments:  continue f/u with rheumatology and cellcept use  CT showed decreased hilar adenopathy s/p steroid use  continue f/u with pulm    Viral upper respiratory tract infection  Comments:  resolved    Type 2 diabetes mellitus without complication, without long-term current use of insulin ([facility])  Comments:  insulin requirement is increasing  continue to follow with endo        Subjective:      Patient ID: Dee Bloch is a 60 y o  male  HPI  TCM Call (since 8/18/2019)     Date and time call was made  9/13/2019  2:37 PM    Hospital care reviewed  Records reviewed    Patient was hospitialized at  [facility]    Date of Admission  09/11/19    Date of discharge  09/12/19    Diagnosis  Tachycardia     Disposition  Home    Were the patients medications reviewed and updated  Yes    Current Symptoms  None      TCM Call (since 8/18/2019)     Post hospital issues  None    Scheduled for follow up? Yes    Patients specialists  [facility]    Did you obtain your prescribed medications  Yes    Do you need help managing your prescriptions or medications  No    Is transportation to your appointment needed  No    I have advised the patient to call PCP with any new or worsening symptoms  Jah Batista MA     Living Arrangements  Spouse or Significiant other; Children; Family members    Support System  Family; Partner;  Children    The type of support provided  Emotional; Physical    Do you have social support  Yes, as much as I need    Are you recieving any outpatient services  No    Are you recieving home care services  No    Are you using any community resources  No    Current waiver services  No    Have you fallen in the last 12 months  No    Interperter language line needed  No        Pt presents after hospitalization for chronic mural thrombus in LLL  He was sent to ED on date of admission from endo office as he had hx of fever, pulse 120, and shortness of breath  Pt on cellcept and immunosuppressed, so sepsis work-up was started  Negative cultures  Negative lactate  CTA showed chronic PE as above  No hx of DVT that he knows of  Has shortness of breath and has had it for months due to sarcoid  Doesn't remember swelling in leg  Did have recent hospitalization for DKA  He was started on eliquis as an inpt and d/c'd home on date above  The following portions of the patient's history were reviewed and updated as appropriate: allergies, current medications, past family history, past medical history, past social history, past surgical history and problem list     Review of Systems   Constitutional: Positive for fatigue  Negative for chills, fever and unexpected weight change  HENT: Negative for congestion, ear pain, hearing loss, postnasal drip, rhinorrhea, sinus pressure, sinus pain, sore throat, trouble swallowing and voice change  Eyes: Negative for pain, redness and visual disturbance  Respiratory: Negative for cough  Cardiovascular: Negative for chest pain, palpitations and leg swelling  Gastrointestinal: Negative for abdominal pain, constipation, diarrhea and nausea  Endocrine: Negative for cold intolerance, heat intolerance, polydipsia and polyuria  Genitourinary: Negative for dysuria, frequency and urgency  Musculoskeletal: Negative for arthralgias, joint swelling and myalgias  Skin: Negative for rash  No suspicious lesions   Neurological: Negative for weakness, numbness and headaches  Hematological: Negative for adenopathy           Objective:      /70 (BP Location: Left arm)   Pulse 88   Resp 12   Ht 6' 2" (1 88 m)   Wt 125 kg (275 lb) SpO2 96%   BMI 35 31 kg/m²          Physical Exam   Constitutional: He is oriented to person, place, and time  He appears well-developed and well-nourished  No distress  HENT:   Head: Normocephalic and atraumatic  Right Ear: Tympanic membrane, external ear and ear canal normal    Left Ear: Tympanic membrane, external ear and ear canal normal    Nose: Nose normal    Mouth/Throat: Oropharynx is clear and moist and mucous membranes are normal  No oropharyngeal exudate  Eyes: Pupils are equal, round, and reactive to light  Conjunctivae and EOM are normal    Neck: No JVD present  Carotid bruit is not present  No thyromegaly present  Cardiovascular: Regular rhythm, S1 normal and S2 normal  Exam reveals no gallop, no S3, no S4 and no friction rub  No murmur heard  Pulmonary/Chest: Effort normal and breath sounds normal  He has no wheezes  He has no rhonchi  He has no rales  Abdominal: Soft  Bowel sounds are normal  He exhibits no distension  There is no tenderness  Lymphadenopathy:     He has no cervical adenopathy  Neurological: He is alert and oriented to person, place, and time  He has normal strength and normal reflexes  No cranial nerve deficit or sensory deficit

## 2019-09-20 ENCOUNTER — TELEPHONE (OUTPATIENT)
Dept: ENDOCRINOLOGY | Facility: CLINIC | Age: 59
End: 2019-09-20

## 2019-09-20 NOTE — TELEPHONE ENCOUNTER
Called pt and reviewed BG log    FBG in high 100s to 200s, premeal and qHS is 98-high 300s variable  Prednisone decreased to 20mg qday today  Instructed to continue basaglar 40 units qHS and novolog 14 units TID AC + SSI  Instructed if FBG still running high >200 over weekend let me know might have to increase basaglar to 45 units next week    Cara WITT    Endocrinology

## 2019-09-27 ENCOUNTER — TELEPHONE (OUTPATIENT)
Dept: ENDOCRINOLOGY | Facility: CLINIC | Age: 59
End: 2019-09-27

## 2019-09-27 NOTE — TELEPHONE ENCOUNTER
Called pt and reviewed BG log    FBG low 100s-low 200s  Premeal/qHS in mid 90s-mid 200s    Only one low 78    Continue basaglar 40 units qHS and novolog 14 units TID AC + SSI  Still on prednisone 20mg qday and Karen Styles

## 2019-10-07 ENCOUNTER — TELEPHONE (OUTPATIENT)
Dept: ENDOCRINOLOGY | Facility: CLINIC | Age: 59
End: 2019-10-07

## 2019-10-07 NOTE — TELEPHONE ENCOUNTER
Called Mr  Jasvir Umanzor and reviewed BGs    -FBG in low 200s, pre-meal and qHS in high 100s and low 200s    Instructed to increase basaglar to 45 units qHS and novolog continue 14 units TID AC + SSI    Still on prednisone 20mg qday and cellcept    Rheumatology appt next week to decide on further steroid use    Has gained significant amount of weight    Still has limited mobility    Arnoldo WITT    Endocrinology

## 2019-10-08 DIAGNOSIS — I27.82 CHRONIC PULMONARY EMBOLISM (HCC): ICD-10-CM

## 2019-10-08 NOTE — TELEPHONE ENCOUNTER
Please call pharmacy  I just refilled this for 30-day supply (didn't see zenon's note)  Can you have them dispense #180 with 1 rf and disregard the #60 I just sent?

## 2019-10-08 NOTE — TELEPHONE ENCOUNTER
Medication had been started for patient at ED visit 9/11/19      Medication: Eliquis 5 mg   Last refilled: 9/11/19  Last Office Visit: 9/18/19  Next Office Visit: 2/26/20  Pharmacy:

## 2019-10-09 ENCOUNTER — CONSULT (OUTPATIENT)
Dept: CARDIOLOGY CLINIC | Facility: CLINIC | Age: 59
End: 2019-10-09
Payer: COMMERCIAL

## 2019-10-09 VITALS
HEIGHT: 74 IN | DIASTOLIC BLOOD PRESSURE: 62 MMHG | SYSTOLIC BLOOD PRESSURE: 118 MMHG | BODY MASS INDEX: 36.32 KG/M2 | HEART RATE: 126 BPM | WEIGHT: 283 LBS | OXYGEN SATURATION: 96 %

## 2019-10-09 DIAGNOSIS — E11.9 TYPE 2 DIABETES MELLITUS WITHOUT COMPLICATION, WITHOUT LONG-TERM CURRENT USE OF INSULIN (HCC): Primary | ICD-10-CM

## 2019-10-09 DIAGNOSIS — R00.2 PALPITATIONS: ICD-10-CM

## 2019-10-09 DIAGNOSIS — R06.02 SOB (SHORTNESS OF BREATH) ON EXERTION: Primary | ICD-10-CM

## 2019-10-09 DIAGNOSIS — E11.65 UNCONTROLLED TYPE 2 DIABETES MELLITUS WITH HYPERGLYCEMIA (HCC): ICD-10-CM

## 2019-10-09 DIAGNOSIS — D86.9 SARCOIDOSIS: ICD-10-CM

## 2019-10-09 PROCEDURE — 93000 ELECTROCARDIOGRAM COMPLETE: CPT | Performed by: INTERNAL MEDICINE

## 2019-10-09 PROCEDURE — 99244 OFF/OP CNSLTJ NEW/EST MOD 40: CPT | Performed by: INTERNAL MEDICINE

## 2019-10-09 RX ORDER — BLOOD SUGAR DIAGNOSTIC
STRIP MISCELLANEOUS
Qty: 100 EACH | Refills: 3 | Status: SHIPPED | OUTPATIENT
Start: 2019-10-09 | End: 2020-03-27 | Stop reason: SDUPTHER

## 2019-10-09 NOTE — PATIENT INSTRUCTIONS
Will get an stress MRI of your heart and 48 hr Holter Monitor  Will call you with the results of the testing  Follow-up in one month

## 2019-10-09 NOTE — TELEPHONE ENCOUNTER
Patient called needing prescriptions for One touch verio strips and one touch strips to be sent to [facility]

## 2019-10-09 NOTE — LETTER
October 9, 2019     DO Julio Bryant [address]  [address]  [facility]    Patient: Chica Blunt   YOB: 1960   Date of Visit: 10/9/2019       Dear Dr Baljinder Dobbs: Thank you for referring Elisa Brock to me for evaluation  Below are my notes for this consultation  If you have questions, please do not hesitate to call me  I look forward to following your patient along with you  Sincerely,        Dany Maldonado MD        CC: No Recipients  Dany Maldonado MD  10/9/2019  4:08 PM  Sign at close encounter    Cardiology Consultation     Chica Blunt  [id number]  1960  [facility]  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- admitted in August 2019 for the same  1  Shortness of breath  - patient reports symptoms of shortness of breath when walking a flight of stairs and with daily activities  - Had PFTs as well in July 2019- FEV1/FVC-80%, FVC-71% predicted, FEV1-76%, TLC-70%, RV-72%, fili DLCO-65%- suggestive of mild restrictive pattern  - his shortness of breath is out of proportion to his mild restrictive pattern on PFTs and a small chronic pulmonary embolism in the left lower lobe  - he does have risk factors for CAD with diabetes, age, family history of premature CAD  Last lipid panel in Feb 2019- LDL-108, HDL-35, Non HDL-141, TG-167- 10 year ASCVD risk of 18 5%- will need statin therapy as well (patient reluctant with new medications, will reevaluate in next visit)    Repeat lipid panel has been ordered and is pending   - will get a stress MRI to evaluate for ischemia and this will also look for cardiac involvement with sarcoidosis  2  Sinus tachycardia/Atrial flutter with 2:1 conduction  - EKG in the office-sinus tachycardia at a rate of 125, there is an EKG in the system as well in August 2019 when he was admitted for DKA which showed atrial flutter with 2:1 conduction at a rate of 150    - current tachycardia predominantly driven by steroids and Cellcept can cause tachycardia in 22% of patients  - given symptoms of palpitations with resting sinus tachycardia, recommended metoprolol succinate 12 5 mg twice daily  However the patient is reluctant to start new medications now and would like to wait until a 48 hour Holter monitor is done  Re-emphasized with the patient that if the 48 hour Holter shows a high resting heart rate that he would be started on the medication   - Normal Thyroid panel in August 2019    3  Pulmonary sarcoidosis with suspected peripheral nervous system involvement  - currently on steroids and mycophenolate    4  Type II Diabetes  - 8/19- A1C-12 1  - On Insulin therapy, followed by endocrinology    5  Hepatitis C treated with Pauly Moots in 2015    6  Hypothyroidism    7  Segmental PE of the left lower lobe   - on anticoagulation with apixaban 5 mg twice daily     Will get a 48 hour Holter monitor and stress MRI done  Follow-up in 1 month  History of Present Illness: Mr Bobby Carcamo is a 60year old male with past medical history of Pulmonary sarcoidosis, Type II Diabetes, Chronic Hepatitis C (Dx in 2007- cured with Pauly Moots in 2015, BPH with TURP in Sep 2017, Hypothyroidism, 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- admitted in August 2019 for the same  He was found to have mediastinal lympadenopathy in May 2019 (although had them since 2012), underwent EBUS and biopsy in June 2019- biopsy confirmed sarcoidosis   Also found to have large sensory motor polyneuropathy with axonal and demyelinating features on EMG of lower extremities- concerning for sarcoidosis involving peripheral nervous system  Due to side affects from steroids, he is also started on Mycophenolate (Methotrexate, Imuran contraindicated due to liver cirrhosis, remicade denied by insurance)  Neurological symptoms improved with Prednisone with suspected peripheral nervous system involvement with sarcoidosis  Presented to ED on  with URI symptoms and associated palpitations from Endocrinology office- sinus tachycardia at 110 with associated shortness of breath, CTA showed small left lower lobe segmental PE, started on Xarelto with plan to repeat CTA in three months  Patient reports symptoms of shortness of breath for the past one year- now has symptoms when walking two blocks or flight of stairs or walking from parking lot, symptoms are intermittent, also has palpitations for the past one year- symptoms occur almost everyday now  In the past reports intermittent readings upto 150 in 2016 on Apple Watch then  No symptoms of chest pain or pressure  No dizziness or lightheadedness  No syncope  No swelling of the lower extremities  Been compliant with medications  Family history- Mother with valve replacement in her 62s, Father  of possible MI in 45s (No autopsy done), Paternal grandmother was  at Age 64 with MI  only child  Social history- Works at the [employer]- [employer] transportation, Quit smoking in , restarted in  for six months and quit after, quit drinking in , before that had a [employer] and had couple of six packs sometimes in a day    EKG in the office-sinus tachycardia at a rate of 130  normal ST T-waves      Patient Active Problem List   Diagnosis    Type 2 diabetes mellitus without complication, without long-term current use of insulin ([facility] [address] )    Benign prostatic hyperplasia with urinary frequency    Screening for prostate cancer    Hypothyroid    Unspecified cirrhosis of liver (HCC)    Neuralgia and neuritis, unspecified    Polyneuropathy, peripheral sensorimotor axonal    Mediastinal lymphadenopathy    Pulmonary sarcoidosis (HCC)    History of hepatitis C virus infection    Current chronic use of systemic steroids    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    Thrombus    Viral URI with cough     Past Medical History:   Diagnosis Date    BPH (benign prostatic hyperplasia)     Diabetes mellitus (HCC)     Erectile dysfunction     GERD (gastroesophageal reflux disease)     Headache(784 0) 05/01/2018    Each Day    Hematuria     Infectious viral hepatitis 2007    Cured by Dr Lazarus Gee Nils Marlee Tigre Liver disease     hepatitis C    Mediastinal adenopathy     Obesity 2007    Vitamin D deficiency      Social History     Socioeconomic History    Marital status: /Civil Union     Spouse name: Not on file    Number of children: Not on file    Years of education: Not on file    Highest education level: Not on file   Occupational History    Occupation: manager   Social Needs    Financial resource strain: Not on file    Food insecurity:     Worry: Not on file     Inability: Not on file    Transportation needs:     Medical: Not on file     Non-medical: Not on file   Tobacco Use    Smoking status: Former Smoker     Packs/day: 1 00     Years: 1 00     Pack years: 1 00     Types: Cigarettes     Last attempt to quit: 2009     Years since quitting: 10 7    Smokeless tobacco: Never Used    Tobacco comment: Quit   Substance and Sexual Activity    Alcohol use: No     Comment: quit 12 years ago    Drug use: No    Sexual activity: Not Currently     Partners: Female     Birth control/protection: None   Lifestyle    Physical activity:     Days per week: Not on file     Minutes per session: Not on file    Stress: Not on file   Relationships    Social connections:     Talks on phone: Not on file     Gets together: Not on file     Attends Jehovah's witness service: Not on file     Active member of club or organization: Not on file     Attends meetings of clubs or organizations: Not on file Relationship status: Not on file    Intimate partner violence:     Fear of current or ex partner: Not on file     Emotionally abused: Not on file     Physically abused: Not on file     Forced sexual activity: Not on file   Other Topics Concern    Not on file   Social History Narrative    Caffeine use    Daily coffee consumption     Denied daily cola consumption     Denied daily tea consumption     Former smoker (as per [employer])     Current everyday smoker (as per [employer])     Non-smoker (as per [employer])       Family History   Problem Relation Age of Onset    Stroke Father         Accidental Death at 52    Cancer Maternal Grandmother     Valvular heart disease Mother      Past Surgical History:   Procedure Laterality Date    COLONOSCOPY      last assessed: 08/28/2012; fiberoptic screening     CYSTOSCOPY      onset: 05/06/2016; Diagnostic     DENTAL SURGERY      LIVER BIOPSY      LYMPH NODE BIOPSY  6/2019    MN BRONCHOSCOPY NEEDLE BX TRACHEA MAIN STEM&/BRON N/A 6/6/2019    Procedure: ENDOBRONCHIAL ULTRASOUND (EBUS);   Surgeon: Deny King MD;  [facility];  Service: Thoracic    MN [address] N/A 6/6/2019    Procedure: Dawna Avendaño;  Surgeon: Deny King MD;  [facility];  Service: Thoracic    MN TRANSURETHRAL ELEC-SURG PROSTATECTOM N/A 9/13/2017    Procedure: Michael Beck; TUR PROSTATE;  Surgeon: Omid Davis MD;  [facility];  Service: Urology    PROSTATE SURGERY  2017    SHOULDER ARTHROSCOPY Right     bicep tendon repair, rotator cuff repair and acromuim shave        Current Outpatient Medications:     apixaban (ELIQUIS) 5 mg, Take 1 tablet (5 mg total) by mouth 2 (two) times a day Take 2 tabs twice a day for one week then one tab twice a day thereafter , Disp: 60 tablet, Rfl: 4    Cholecalciferol (VITAMIN D3) 5000 units CAPS, Take 1 capsule by mouth daily , Disp: , Rfl:     famotidine (PEPCID) 40 MG tablet, Take 40 mg by mouth daily , Disp: , Rfl:     glucose 4 g chewable tablet, Chew 4 tablets (16 g total) as needed for low blood sugar (Patient not taking: Reported on 9/13/2019), Disp: 100 tablet, Rfl: 1    insulin aspart (NOVOLOG FLEXPEN) 100 Units/mL injection pen, Inject 18 Units under the skin 3 (three) times a day with meals As well as sliding scale coverage as follows: 150-200 2 units 200-250 4 units 250-300 6 units >300 8 units (Patient taking differently: Inject 10 Units under the skin 3 (three) times a day with meals As well as sliding scale coverage as follows: 150-200 2 units 200-250 4 units 250-300 6 units >300 8 units), Disp: 5 pen, Rfl: 3    insulin glargine (BASAGLAR KWIKPEN) 100 units/mL injection pen, Inject 35 Units under the skin daily, Disp: 45 mL, Rfl: 3    Insulin Pen Needle (PEN NEEDLES) 31G X 6 MM MISC, by Does not apply route 4 (four) times a day, Disp: 150 each, Rfl: 4    levothyroxine 50 mcg tablet, TAKE 1 TABLET BY MOUTH EVERY DAY, Disp: 30 tablet, Rfl: 5    liraglutide (VICTOZA) injection, Inject 0 2 mL (1 2 mg total) under the skin daily, Disp: 2 pen, Rfl: 4    metFORMIN (GLUCOPHAGE) 500 mg tablet, TAKE 1 TABLET BY MOUTH TWICE A DAY WITH MEALS, Disp: 180 tablet, Rfl: 1    mycophenolate (CELLCEPT) 500 mg tablet, 1 tab daily x1 week, then 1 tab BID x1 week, then 2 tabs in AM and 1 tab in PM x1 week, then 2 tabs BID thereafter Talk with your rheumatologist about resuming , Disp: 120 tablet, Rfl: 0    NOVOTWIST 32G X 5 MM MISC, , Disp: , Rfl:     ONETOUCH DELICA LANCETS 23P MISC, USE THREE TIMES DAILY, Disp: 100 each, Rfl: 5    ONETOUCH VERIO test strip, testing QID, Disp: , Rfl:     predniSONE 20 mg tablet, Take 3 tablets (60 mg total) by mouth daily, Disp: 90 tablet, Rfl: 0  Allergies   Allergen Reactions    No Active Allergies          Labs:  Admission on 09/11/2019, Discharged on 09/12/2019   Component Date Value    WBC 09/11/2019 8 56     RBC 09/11/2019 4 64     Hemoglobin 09/11/2019 14 9     Hematocrit 09/11/2019 44 6     MCV 09/11/2019 96     MCH 09/11/2019 32 1     MCHC 09/11/2019 33 4     RDW 09/11/2019 13 2     MPV 09/11/2019 9 2     Platelets 93/58/9088 219     nRBC 09/11/2019 0     Neutrophils Relative 09/11/2019 83*    Immat GRANS % 09/11/2019 2     Lymphocytes Relative 09/11/2019 9*    Monocytes Relative 09/11/2019 5     Eosinophils Relative 09/11/2019 0     Basophils Relative 09/11/2019 1     Neutrophils Absolute 09/11/2019 7 11     Immature Grans Absolute 09/11/2019 0 19     Lymphocytes Absolute 09/11/2019 0 78     Monocytes Absolute 09/11/2019 0 42     Eosinophils Absolute 09/11/2019 0 01     Basophils Absolute 09/11/2019 0 05     Sodium 09/11/2019 135*    Potassium 09/11/2019 4 8     Chloride 09/11/2019 101     CO2 09/11/2019 24     ANION GAP 09/11/2019 10     BUN 09/11/2019 24     Creatinine 09/11/2019 1 17     Glucose 09/11/2019 338*    Calcium 09/11/2019 8 7     AST 09/11/2019 <5*    ALT 09/11/2019 22     Alkaline Phosphatase 09/11/2019 63     Total Protein 09/11/2019 7 3     Albumin 09/11/2019 3 5     Total Bilirubin 09/11/2019 0 40     eGFR 09/11/2019 67     LACTIC ACID 09/11/2019 2 0     Protime 09/11/2019 12 6     INR 09/11/2019 1 00     PTT 09/11/2019 24     Blood Culture 09/11/2019 No Growth After 5 Days   Blood Culture 09/11/2019 No Growth After 5 Days       Color, UA 09/11/2019 Yellow     Clarity, UA 09/11/2019 Clear     pH, UA 09/11/2019 5 5     Leukocytes, UA 09/11/2019 Negative     Nitrite, UA 09/11/2019 Negative     Protein, UA 09/11/2019 Negative     Glucose, UA 09/11/2019 500 (1/2%)*    Ketones, UA 09/11/2019 Negative     Urobilinogen, UA 09/11/2019 0 2     Bilirubin, UA 09/11/2019 Negative     Blood, UA 09/11/2019 Negative     Specific Gravity, UA 09/11/2019 1 020     Ventricular Rate 09/11/2019 106     Atrial Rate 09/11/2019 106     MI Interval 09/11/2019 144     QRSD Interval 09/11/2019 72     QT Interval 09/11/2019 314     QTC Interval 09/11/2019 417     P Axis 09/11/2019 68     QRS Axis 09/11/2019 37     T Wave Axis 09/11/2019 73     Procalcitonin 09/11/2019 <0 05     POC Glucose 09/11/2019 154*    WBC 09/12/2019 6 88     RBC 09/12/2019 4 11     Hemoglobin 09/12/2019 12 8     Hematocrit 09/12/2019 39 8     MCV 09/12/2019 97     MCH 09/12/2019 31 1     MCHC 09/12/2019 32 2     RDW 09/12/2019 13 2     Platelets 89/41/2883 178     MPV 09/12/2019 8 8*    Sodium 09/12/2019 141     Potassium 09/12/2019 3 8     Chloride 09/12/2019 108     CO2 09/12/2019 25     ANION GAP 09/12/2019 8     BUN 09/12/2019 21     Creatinine 09/12/2019 0 94     Glucose 09/12/2019 176*    Calcium 09/12/2019 8 2*    AST 09/12/2019 10     ALT 09/12/2019 25     Alkaline Phosphatase 09/12/2019 50     Total Protein 09/12/2019 5 9*    Albumin 09/12/2019 2 9*    Total Bilirubin 09/12/2019 0 50     eGFR 09/12/2019 88     POC Glucose 09/12/2019 144*    POC Glucose 09/12/2019 138    Orders Only on 08/26/2019   Component Date Value    Right Eye Diabetic Retin* 08/26/2019 None     Left Eye Diabetic Retino* 08/26/2019 None    Appointment on 08/15/2019   Component Date Value    Sodium 08/15/2019 139     Potassium 08/15/2019 4 3     Chloride 08/15/2019 103     CO2 08/15/2019 26     ANION GAP 08/15/2019 10     BUN 08/15/2019 28*    Creatinine 08/15/2019 1 04     Glucose 08/15/2019 182*    Calcium 08/15/2019 8 7     AST 08/15/2019 14     ALT 08/15/2019 49     Alkaline Phosphatase 08/15/2019 63     Total Protein 08/15/2019 7 2     Albumin 08/15/2019 3 6     Total Bilirubin 08/15/2019 0 40     eGFR 08/15/2019 78     PTH 08/15/2019 64 0     Vit D, 25-Hydroxy 08/15/2019 21 4*    Magnesium 08/15/2019 2 2     Phosphorus 08/15/2019 3 4    Admission on 08/01/2019, Discharged on 08/04/2019   Component Date Value    WBC 08/01/2019 13 03*    RBC 08/01/2019 5 46     Hemoglobin 08/01/2019 17 7*    Hematocrit 08/01/2019 49 4*    MCV 08/01/2019 91     MCH 08/01/2019 32 4     MCHC 08/01/2019 35 8     RDW 08/01/2019 12 1     MPV 08/01/2019 9 7     Platelets 87/63/4972 269     nRBC 08/01/2019 0     Neutrophils Relative 08/01/2019 73     Immat GRANS % 08/01/2019 2     Lymphocytes Relative 08/01/2019 16     Monocytes Relative 08/01/2019 7     Eosinophils Relative 08/01/2019 1     Basophils Relative 08/01/2019 1     Neutrophils Absolute 08/01/2019 9 65*    Immature Grans Absolute 08/01/2019 0 24*    Lymphocytes Absolute 08/01/2019 2 13     Monocytes Absolute 08/01/2019 0 89     Eosinophils Absolute 08/01/2019 0 06     Basophils Absolute 08/01/2019 0 06     Sodium 08/01/2019 128*    Potassium 08/01/2019 4 4     Chloride 08/01/2019 94*    CO2 08/01/2019 16*    ANION GAP 08/01/2019 18*    BUN 08/01/2019 36*    Creatinine 08/01/2019 1 18     Glucose 08/01/2019 617*    Calcium 08/01/2019 7 9*    AST 08/01/2019 45     ALT 08/01/2019      Alkaline Phosphatase 08/01/2019 110     Total Protein 08/01/2019 7 4     Albumin 08/01/2019 3 5     Total Bilirubin 08/01/2019 0 90     eGFR 08/01/2019 67     LACTIC ACID 08/01/2019 0 5     BETA-HYDROXYBUTYRATE 08/01/2019 1 5*    pH, Harish 08/01/2019 7 417*    pCO2, Harish 08/01/2019 29 5*    pO2, Harish 08/01/2019 64 6*    HCO3, Harish 08/01/2019 18 6*    Base Excess, Harish 08/01/2019 -4 3     O2 Content, Harish 08/01/2019 22 3     O2 HGB, VENOUS 08/01/2019 90 8*    POC Glucose 08/01/2019 >500*    Troponin I 08/01/2019 <0 02     Color, UA 08/01/2019 Yellow     Clarity, UA 08/01/2019 Clear     pH, UA 08/01/2019 5 0     Leukocytes, UA 08/01/2019 Negative     Nitrite, UA 08/01/2019 Negative     Protein, UA 08/01/2019 Negative     Glucose, UA 08/01/2019 500 (1/2%)*    Ketones, UA 08/01/2019 15 (1+)*    Urobilinogen, UA 08/01/2019 0 2     Bilirubin, UA 08/01/2019 Negative     Blood, UA 08/01/2019 Trace*    Specific Richards, UA 08/01/2019 1 010     RBC, UA 08/01/2019 0-1*    WBC, UA 08/01/2019 None Seen     Epithelial Cells 08/01/2019 Occasional     Bacteria, UA 08/01/2019 Occasional     TSH 3RD GENERATON 08/01/2019 2 326     POC Glucose 08/01/2019 335*    Sodium 08/01/2019 135*    Potassium 08/01/2019 4 1     Chloride 08/01/2019 101     CO2 08/01/2019 16*    ANION GAP 08/01/2019 18*    BUN 08/01/2019 32*    Creatinine 08/01/2019 1 05     Glucose 08/01/2019 383*    Calcium 08/01/2019 7 5*    eGFR 08/01/2019 77     Magnesium 08/01/2019 2 2     Phosphorus 08/01/2019 2 7     Sodium 08/02/2019 141     Potassium 08/02/2019 3 7     Chloride 08/02/2019 110*    CO2 08/02/2019 20*    ANION GAP 08/02/2019 11     BUN 08/02/2019 26*    Creatinine 08/02/2019 0 83     Glucose 08/02/2019 172*    Calcium 08/02/2019 6 7*    eGFR 08/02/2019 96     Magnesium 08/02/2019 2 0     Phosphorus 08/02/2019 2 5     POC Glucose 08/02/2019 252*    POC Glucose 08/02/2019 396*    POC Glucose 08/02/2019 206*    WBC 08/02/2019 9 65     RBC 08/02/2019 4 37     Hemoglobin 08/02/2019 13 8     Hematocrit 08/02/2019 40 7     MCV 08/02/2019 93     MCH 08/02/2019 31 6     MCHC 08/02/2019 33 9     RDW 08/02/2019 12 3     MPV 08/02/2019 9 4     Platelets 63/93/0727 185     nRBC 08/02/2019 0     Neutrophils Relative 08/02/2019 72     Immat GRANS % 08/02/2019 2     Lymphocytes Relative 08/02/2019 18     Monocytes Relative 08/02/2019 6     Eosinophils Relative 08/02/2019 1     Basophils Relative 08/02/2019 1     Neutrophils Absolute 08/02/2019 6 87     Immature Grans Absolute 08/02/2019 0 21*    Lymphocytes Absolute 08/02/2019 1 77     Monocytes Absolute 08/02/2019 0 62     Eosinophils Absolute 08/02/2019 0 13     Basophils Absolute 08/02/2019 0 05     POC Glucose 08/02/2019 166*    POC Glucose 08/02/2019 178*    POC Glucose 08/02/2019 149*    POC Glucose 08/02/2019 98     Ventricular Rate 08/01/2019 148     Atrial Rate 08/01/2019 148     CA Interval 08/01/2019 116     QRSD Interval 08/01/2019 70     QT Interval 08/01/2019 270     QTC Interval 08/01/2019 423     P Axis 08/01/2019 261     QRS Axis 08/01/2019 35     T Wave Axis 08/01/2019 95     POC Glucose 08/02/2019 51*    POC Glucose 08/02/2019 302*    POC Glucose 08/02/2019 224*    Free T4 08/02/2019 1 18     POC Glucose 08/02/2019 390*    POC Glucose 08/02/2019 460*    POC Glucose 08/02/2019 492*    POC Glucose 08/03/2019 300*    POC Glucose 08/03/2019 172*    Sodium 08/03/2019 140     Potassium 08/03/2019 3 3*    Chloride 08/03/2019 108     CO2 08/03/2019 22     ANION GAP 08/03/2019 10     BUN 08/03/2019 17     Creatinine 08/03/2019 0 80     Glucose 08/03/2019 193*    Calcium 08/03/2019 7 5*    eGFR 08/03/2019 97     POC Glucose 08/03/2019 205*    POC Glucose 08/03/2019 272*    POC Glucose 08/03/2019 319*    POC Glucose 08/03/2019 387*    Sodium 08/04/2019 139     Potassium 08/04/2019 3 7     Chloride 08/04/2019 106     CO2 08/04/2019 22     ANION GAP 08/04/2019 11     BUN 08/04/2019 21     Creatinine 08/04/2019 0 78     Glucose 08/04/2019 283*    Calcium 08/04/2019 7 6*    eGFR 08/04/2019 98     WBC 08/04/2019 6 97     RBC 08/04/2019 4 48     Hemoglobin 08/04/2019 14 0     Hematocrit 08/04/2019 41 9     MCV 08/04/2019 94     MCH 08/04/2019 31 3     MCHC 08/04/2019 33 4     RDW 08/04/2019 12 3     MPV 08/04/2019 9 4     Platelets 50/28/3528 138*    nRBC 08/04/2019 0     Neutrophils Relative 08/04/2019 64     Immat GRANS % 08/04/2019 2     Lymphocytes Relative 08/04/2019 25     Monocytes Relative 08/04/2019 8     Eosinophils Relative 08/04/2019 1     Basophils Relative 08/04/2019 0     Neutrophils Absolute 08/04/2019 4 46     Immature Grans Absolute 08/04/2019 0 13     Lymphocytes Absolute 08/04/2019 1 75     Monocytes Absolute 08/04/2019 0 52     Eosinophils Absolute 08/04/2019 0 08     Basophils Absolute 08/04/2019 0 03     POC Glucose 08/04/2019 250*    POC Glucose 08/04/2019 258*    POC Glucose 08/04/2019 169*   Appointment on 08/01/2019   Component Date Value    Sodium 08/01/2019 127*    Potassium 08/01/2019 4 7     Chloride 08/01/2019 94*    CO2 08/01/2019 18*    ANION GAP 08/01/2019 15*    BUN 08/01/2019 31*    Creatinine 08/01/2019 1 27     Glucose 08/01/2019 627*    Calcium 08/01/2019 8 7     AST 08/01/2019 9     ALT 08/01/2019 23     Alkaline Phosphatase 08/01/2019 80     Total Protein 08/01/2019 7 9     Albumin 08/01/2019 3 8     Total Bilirubin 08/01/2019 1 11*    eGFR 08/01/2019 61     TSH 3RD GENERATON 08/01/2019 2 110    Office Visit on 08/01/2019   Component Date Value    Hemoglobin A1C 08/01/2019 12 1*    GLUCOSE BLD 08/01/2019 high    Appointment on 07/10/2019   Component Date Value    Angio Convert Enzyme 07/10/2019 64     Sodium 07/10/2019 134*    Potassium 07/10/2019 3 8     Chloride 07/10/2019 98*    CO2 07/10/2019 25     ANION GAP 07/10/2019 11     BUN 07/10/2019 15     Creatinine 07/10/2019 1 23     Glucose 07/10/2019 362*    Calcium 07/10/2019 8 9     eGFR 07/10/2019 63     Rheumatoid Factor 07/10/2019 Negative     HCV PCR Quantitative 07/10/2019 HCV Not Detected     Test Information 07/10/2019 Comment     Rapid HIV 1 AND 2 07/10/2019 Non-Reactive     HIV-1 P24 Ag Screen 07/10/2019 Non-Reactive     C-ANCA 07/10/2019 <1:20     Atypical pANCA 07/10/2019 <1:20     MPO AB 07/10/2019 <9 0     MN-3 AB 07/10/2019 <3 5     P-ANCA 07/10/2019 <1:20    Office Visit on 07/10/2019   Component Date Value    WBC 07/10/2019 6 42     RBC 07/10/2019 5 17     Hemoglobin 07/10/2019 16 1     Hematocrit 07/10/2019 47 6     MCV 07/10/2019 92     MCH 07/10/2019 31 1     MCHC 07/10/2019 33 8     RDW 07/10/2019 12 4     MPV 07/10/2019 9 6     Platelets 52/69/6800 235     nRBC 07/10/2019 0     Neutrophils Relative 07/10/2019 55     Immat GRANS % 07/10/2019 1     Lymphocytes Relative 07/10/2019 32  Monocytes Relative 07/10/2019 9     Eosinophils Relative 07/10/2019 2     Basophils Relative 07/10/2019 1     Neutrophils Absolute 07/10/2019 3 49     Immature Grans Absolute 07/10/2019 0 08     Lymphocytes Absolute 07/10/2019 2 08     Monocytes Absolute 07/10/2019 0 57     Eosinophils Absolute 07/10/2019 0 13     Basophils Absolute 07/10/2019 0 07     Total CK 07/10/2019 113     Total Bilirubin 07/10/2019 0 70     Bilirubin, Direct 07/10/2019 0 19     Alkaline Phosphatase 07/10/2019 85     AST 07/10/2019 17     ALT 07/10/2019 30     Total Protein 07/10/2019 8 2     Albumin 07/10/2019 4 1     Clarity, UA 07/10/2019 Clear     Color, UA 07/10/2019 Yellow     Specific Gravity, UA 07/10/2019 1 015     pH, UA 07/10/2019 5 5     Glucose, UA 07/10/2019 250 (1/4%)*    Ketones, UA 07/10/2019 Negative     Blood, UA 07/10/2019 Negative     Protein, UA 07/10/2019 Negative     Nitrite, UA 07/10/2019 Negative     Bilirubin, UA 07/10/2019 Negative     Urobilinogen, UA 07/10/2019 0 2     Leukocytes, UA 07/10/2019 Negative     WBC, UA 07/10/2019 None Seen     RBC, UA 07/10/2019 None Seen     Bacteria, UA 07/10/2019 None Seen     Epithelial Cells 07/10/2019 Occasional     Creatinine, Ur 07/10/2019 99 8     Protein Urine Random 07/10/2019 <6     Prot/Creat Ratio, Ur 07/10/2019 <0 06     Sed Rate 07/10/2019 14*    CRP 07/10/2019 <3 0     Hep B Core Total Ab 07/10/2019 Non-reactive     Hepatitis B Surface Ag 07/10/2019 Non-reactive     Hep B S Ab 07/10/2019 <3 10     Calcium, Ionized 07/10/2019 1 11*    Vit D, 25-Hydroxy 07/10/2019 28 4*   Appointment on 07/05/2019   Component Date Value    QFT Nil 07/05/2019 0 04     QFT TB1-NIL 07/05/2019 0 00     QFT TB2-NIL 07/05/2019 0 00     QFT Mitogen-NIL 07/05/2019 >10 00     QFT Final Interpretation 07/05/2019 Negative    Admission on 06/06/2019, Discharged on 06/06/2019   Component Date Value    ABO Grouping 05/25/2019 B     Rh Factor 05/25/2019 Positive     Antibody Screen 05/25/2019 Negative     Specimen Expiration Date 05/25/2019 [id number]     POC Glucose 06/06/2019 253*    Case Report 06/06/2019                      Value:Non-gynecologic Cytology                          Case: [id number]                                  Authorizing Provider:  Melvin Mahan MD       Collected:           06/06/2019 0819              Ordering Location:     [address] [place] [address]      Received:            06/06/2019 [address]                                                      Pathologist:           Rosa Isela Valentine MD                                                         Specimens:   A) - Lymph Node, level 7                                                                            B) - Lymph Node, level 7                                                                            C) - Lymph Node, level 4R                                                                           D) - Lymph Node, level 4R                                                                           E) - Lymph Node, level 2R                                                                                                     F) - Lymph Node, level 7, cell block                                                                G) - Lymph Node, level 4R, cell block                                                      Final Diagnosis 06/06/2019                      Value: This result contains rich text formatting which cannot be displayed here   Intraoperative Consultat* 06/06/2019                      Value: This result contains rich text formatting which cannot be displayed here  Sara Villatoro Gross Description 06/06/2019                      Value: This result contains rich text formatting which cannot be displayed here   Additional Information 06/06/2019                      Value: This result contains rich text formatting which cannot be displayed here   POC Glucose 06/06/2019 197*   There may be more visits with results that are not included  Imaging: Xr Chest 2 Views    Result Date: 9/11/2019  Narrative: CHEST INDICATION:   cough  COMPARISON:  8/1/2019 EXAM PERFORMED/VIEWS:  XR CHEST PA & LATERAL FINDINGS: Mild chronic elevation of the right hemidiaphragm  Cardiomediastinal silhouette appears unremarkable  The lungs are clear  No pneumothorax or pleural effusion  Osseous structures appear within normal limits for patient age  Impression: No acute cardiopulmonary disease  Workstation performed: [id number]     [place] [facility] Chest Pe Study    Result Date: 9/11/2019  Narrative: CTA - CHEST WITH IV CONTRAST - PULMONARY ANGIOGRAM INDICATION:   sob/tachycardia/cough  Former smoker  COMPARISON: CT chest 5/15/2019  TECHNIQUE: CTA examination of the chest was performed using angiographic technique according to a protocol specifically tailored to evaluate for pulmonary embolism  Axial, sagittal, and coronal 2D reformatted images were created from the source data and  submitted for interpretation  In addition, coronal 3D MIP postprocessing was performed on the acquisition scanner  Radiation dose length product (DLP) for this visit:  812 mGy-cm   This examination, like all CT scans performed in the [facility], was performed utilizing techniques to minimize radiation dose exposure, including the use of iterative reconstruction and automated exposure control  IV Contrast:  85 mL of iohexol (OMNIPAQUE)  FINDINGS: PULMONARY ARTERIAL TREE:  No acute pulmonary embolus is seen  Chronic appearing mural thrombus and a segmental branch point of the left lower lobe as seen on image 3/180  LUNGS:  Lungs are clear  There is no tracheal or endobronchial lesion  PLEURA:  Unremarkable  HEART/GREAT VESSELS:  Unremarkable for patient's age  MEDIASTINUM AND KRIS:  Unremarkable  Mediastinal nodes have normalized   CHEST WALL AND LOWER NECK:   Unremarkable  VISUALIZED STRUCTURES IN THE UPPER ABDOMEN:  Stable splenomegaly measuring 15 cm  Stable hepatomegaly with steatosis  Small hiatal hernia unchanged  OSSEOUS STRUCTURES:  No acute fracture or destructive osseous lesion  Impression: No acute pulmonary embolus is seen  Chronic appearing mural thrombus and a segmental branch point of the left lower lobe as seen on image 3/180  Resolved mediastinal lymphadenopathy  Stable splenomegaly  Stable hepatomegaly with steatosis  Stable hiatal hernia  Workstation performed: [id number]       ECG: sinus tachycardia at a rate of 130  normal ST T-waves  Review of Systems:  Review of Systems   Constitutional: Positive for fatigue  Negative for activity change, appetite change, chills, diaphoresis and fever  HENT: Negative for congestion  Respiratory: Positive for shortness of breath  Negative for cough, chest tightness and wheezing  Cardiovascular: Positive for palpitations  Negative for chest pain and leg swelling  Gastrointestinal: Negative for abdominal pain, diarrhea, nausea and vomiting  Genitourinary: Negative for difficulty urinating and dysuria  Musculoskeletal: Negative for back pain  Skin: Negative for color change and rash  Neurological: Negative for dizziness, syncope, facial asymmetry, weakness, light-headedness, numbness and headaches  Psychiatric/Behavioral: Negative for confusion           Vitals:    10/09/19 1452   BP: 118/62   Pulse: (!) 126   SpO2: 96%       Physical Exam:  General appearance:  Appears stated age, alert, well appearing and in no distress  HEENT:  PERRLA, EOMI, no scleral icterus, no conjunctival pallor  NECK:  Supple, No elevated JVP, no thyromegaly, no carotid bruits  HEART:  Regular rate, tachycardia, normal S1/S2, no S3/S4, no murmur or rub  LUNGS:  Clear to auscultation bilaterally  ABDOMEN:  Soft, non-tender, positive bowel sounds, no rebound or guarding, no organomegaly   EXTREMITIES:  No edema  VASCULAR:  Normal pedal pulses   SKIN: No lesions or rashes on exposed skin  NEURO:  CN II-XII intact, no focal deficits

## 2019-10-09 NOTE — PROGRESS NOTES
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- admitted in August 2019 for the same  1  Shortness of breath  - patient reports symptoms of shortness of breath when walking a flight of stairs and with daily activities  - Had PFTs as well in July 2019- FEV1/FVC-80%, FVC-71% predicted, FEV1-76%, TLC-70%, RV-72%, fili DLCO-65%- suggestive of mild restrictive pattern  - his shortness of breath is out of proportion to his mild restrictive pattern on PFTs and a small chronic pulmonary embolism in the left lower lobe  - he does have risk factors for CAD with diabetes, age, family history of premature CAD  Last lipid panel in Feb 2019- LDL-108, HDL-35, Non HDL-141, TG-167- 10 year ASCVD risk of 18 5%- will need statin therapy as well (patient reluctant with new medications, will reevaluate in next visit)  Repeat lipid panel has been ordered and is pending   - will get a stress MRI to evaluate for ischemia and this will also look for cardiac involvement with sarcoidosis  2  Sinus tachycardia/Atrial flutter with 2:1 conduction  - EKG in the office-sinus tachycardia at a rate of 125, there is an EKG in the system as well in August 2019 when he was admitted for DKA which showed atrial flutter with 2:1 conduction at a rate of 150    - current tachycardia predominantly driven by steroids and Cellcept can cause tachycardia in 22% of patients  - given symptoms of palpitations with resting sinus tachycardia, recommended metoprolol succinate 12 5 mg twice daily    However the patient is reluctant to start new medications now and would like to wait until a 48 hour Holter monitor is done  Re-emphasized with the patient that if the 48 hour Holter shows a high resting heart rate that he would be started on the medication   - Normal Thyroid panel in August 2019    3  Pulmonary sarcoidosis with suspected peripheral nervous system involvement  - currently on steroids and mycophenolate    4  Type II Diabetes  - 8/19- A1C-12 1  - On Insulin therapy, followed by endocrinology    5  Hepatitis C treated with Mercer Creamer in 2015    6  Hypothyroidism    7  Segmental PE of the left lower lobe   - on anticoagulation with apixaban 5 mg twice daily     Will get a 48 hour Holter monitor and stress MRI done  Follow-up in 1 month  History of Present Illness: Mr Otilio German is a 60year old male with past medical history of Pulmonary sarcoidosis, Type II Diabetes, Chronic Hepatitis C (Dx in 2007- cured with Mercer Creamer in 2015, BPH with TURP in Sep 2017, Hypothyroidism, 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- admitted in August 2019 for the same  He was found to have mediastinal lympadenopathy in May 2019 (although had them since 2012), underwent EBUS and biopsy in June 2019- biopsy confirmed sarcoidosis  Also found to have large sensory motor polyneuropathy with axonal and demyelinating features on EMG of lower extremities- concerning for sarcoidosis involving peripheral nervous system  Due to side affects from steroids, he is also started on Mycophenolate (Methotrexate, Imuran contraindicated due to liver cirrhosis, remicade denied by insurance)  Neurological symptoms improved with Prednisone with suspected peripheral nervous system involvement with sarcoidosis    Presented to ED on 9/11 with URI symptoms and associated palpitations from Endocrinology office- sinus tachycardia at 110 with associated shortness of breath, CTA showed small left lower lobe segmental PE, started on Xarelto with plan to repeat CTA in three months  Patient reports symptoms of shortness of breath for the past one year- now has symptoms when walking two blocks or flight of stairs or walking from parking lot, symptoms are intermittent, also has palpitations for the past one year- symptoms occur almost everyday now  In the past reports intermittent readings upto 150 in 2016 on Apple Watch then  No symptoms of chest pain or pressure  No dizziness or lightheadedness  No syncope  No swelling of the lower extremities  Been compliant with medications  Family history- Mother with valve replacement in her 62s, Father  of possible MI in 45s (No autopsy done), Paternal grandmother was  at Age 64 with MI  only child  Social history- Works at the [employer]- [employer] transportation, Quit smoking in , restarted in  for six months and quit after, quit drinking in , before that had a [employer] and had couple of six packs sometimes in a day    EKG in the office-sinus tachycardia at a rate of 130  normal ST T-waves      Patient Active Problem List   Diagnosis    Type 2 diabetes mellitus without complication, without long-term current use of insulin ([facility] [address] )    Benign prostatic hyperplasia with urinary frequency    Screening for prostate cancer    Hypothyroid    Unspecified cirrhosis of liver (HCC)    Neuralgia and neuritis, unspecified    Polyneuropathy, peripheral sensorimotor axonal    Mediastinal lymphadenopathy    Pulmonary sarcoidosis (HCC)    History of hepatitis C virus infection    Current chronic use of systemic steroids    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    Thrombus    Viral URI with cough     Past Medical History:   Diagnosis Date    BPH (benign prostatic hyperplasia)     Diabetes mellitus ([address] )     Erectile dysfunction     GERD (gastroesophageal reflux disease)     Headache(784 0) 2018    Each Day    Hematuria     Infectious viral hepatitis     Cured by Dr Zeeshan Watson Liver disease hepatitis C    Mediastinal adenopathy     Obesity 2007    Vitamin D deficiency      Social History     Socioeconomic History    Marital status: /Civil Union     Spouse name: Not on file    Number of children: Not on file    Years of education: Not on file    Highest education level: Not on file   Occupational History    Occupation: manager   Social Needs    Financial resource strain: Not on file    Food insecurity:     Worry: Not on file     Inability: Not on file    Transportation needs:     Medical: Not on file     Non-medical: Not on file   Tobacco Use    Smoking status: Former Smoker     Packs/day: 1 00     Years: 1 00     Pack years: 1 00     Types: Cigarettes     Last attempt to quit: 2009     Years since quitting: 10 7    Smokeless tobacco: Never Used    Tobacco comment: Quit   Substance and Sexual Activity    Alcohol use: No     Comment: quit 12 years ago    Drug use: No    Sexual activity: Not Currently     Partners: Female     Birth control/protection: None   Lifestyle    Physical activity:     Days per week: Not on file     Minutes per session: Not on file    Stress: Not on file   Relationships    Social connections:     Talks on phone: Not on file     Gets together: Not on file     Attends [employer] service: Not on file     Active member of club or organization: Not on file     Attends meetings of clubs or organizations: Not on file     Relationship status: Not on file    Intimate partner violence:     Fear of current or ex partner: Not on file     Emotionally abused: Not on file     Physically abused: Not on file     Forced sexual activity: Not on file   Other Topics Concern    Not on file   Social History Narrative    Caffeine use    Daily coffee consumption     Denied daily cola consumption     Denied daily tea consumption     Former smoker (as per [employer])     Current everyday smoker (as per [employer])     Non-smoker (as per [employer])       Family History   Problem Relation Age of Onset    Stroke Father         Accidental Death at 52    Cancer Maternal Grandmother     Valvular heart disease Mother      Past Surgical History:   Procedure Laterality Date    COLONOSCOPY      last assessed: 08/28/2012; fiberoptic screening     CYSTOSCOPY      onset: 05/06/2016; Diagnostic     DENTAL SURGERY      LIVER BIOPSY      LYMPH NODE BIOPSY  6/2019    MI BRONCHOSCOPY NEEDLE BX TRACHEA MAIN STEM&/BRON N/A 6/6/2019    Procedure: ENDOBRONCHIAL ULTRASOUND (EBUS);   Surgeon: Sean Ying MD;  [facility];  Service: Thoracic    MI [address] N/A 6/6/2019    Procedure: Herschell Needle;  Surgeon: Sean Ying MD;  [facility];  Service: Thoracic    MI TRANSURETHRAL ELEC-SURG PROSTATECTOM N/A 9/13/2017    Procedure: Tay Olguin; TUR PROSTATE;  Surgeon: Justin Lara MD;  [facility];  Service: Urology    PROSTATE SURGERY  2017    SHOULDER ARTHROSCOPY Right     bicep tendon repair, rotator cuff repair and acromuim shave        Current Outpatient Medications:     apixaban (ELIQUIS) 5 mg, Take 1 tablet (5 mg total) by mouth 2 (two) times a day Take 2 tabs twice a day for one week then one tab twice a day thereafter , Disp: 60 tablet, Rfl: 4    Cholecalciferol (VITAMIN D3) 5000 units CAPS, Take 1 capsule by mouth daily , Disp: , Rfl:     famotidine (PEPCID) 40 MG tablet, Take 40 mg by mouth daily , Disp: , Rfl:     glucose 4 g chewable tablet, Chew 4 tablets (16 g total) as needed for low blood sugar (Patient not taking: Reported on 9/13/2019), Disp: 100 tablet, Rfl: 1    insulin aspart (NOVOLOG FLEXPEN) 100 Units/mL injection pen, Inject 18 Units under the skin 3 (three) times a day with meals As well as sliding scale coverage as follows: 150-200 2 units 200-250 4 units 250-300 6 units >300 8 units (Patient taking differently: Inject 10 Units under the skin 3 (three) times a day with meals As well as sliding scale coverage as follows: 150-200 2 units 200-250 4 units 250-300 6 units >300 8 units), Disp: 5 pen, Rfl: 3    insulin glargine (BASAGLAR KWIKPEN) 100 units/mL injection pen, Inject 35 Units under the skin daily, Disp: 45 mL, Rfl: 3    Insulin Pen Needle (PEN NEEDLES) 31G X 6 MM MISC, by Does not apply route 4 (four) times a day, Disp: 150 each, Rfl: 4    levothyroxine 50 mcg tablet, TAKE 1 TABLET BY MOUTH EVERY DAY, Disp: 30 tablet, Rfl: 5    liraglutide (VICTOZA) injection, Inject 0 2 mL (1 2 mg total) under the skin daily, Disp: 2 pen, Rfl: 4    metFORMIN (GLUCOPHAGE) 500 mg tablet, TAKE 1 TABLET BY MOUTH TWICE A DAY WITH MEALS, Disp: 180 tablet, Rfl: 1    mycophenolate (CELLCEPT) 500 mg tablet, 1 tab daily x1 week, then 1 tab BID x1 week, then 2 tabs in AM and 1 tab in PM x1 week, then 2 tabs BID thereafter Talk with your rheumatologist about resuming , Disp: 120 tablet, Rfl: 0    NOVOTWIST 32G X 5 MM MISC, , Disp: , Rfl:     ONETOUCH DELICA LANCETS 88F MISC, USE THREE TIMES DAILY, Disp: 100 each, Rfl: 5    ONETOUCH VERIO test strip, testing QID, Disp: , Rfl:     predniSONE 20 mg tablet, Take 3 tablets (60 mg total) by mouth daily, Disp: 90 tablet, Rfl: 0  Allergies   Allergen Reactions    No Active Allergies          Labs:  Admission on 09/11/2019, Discharged on 09/12/2019   Component Date Value    WBC 09/11/2019 8 56     RBC 09/11/2019 4 64     Hemoglobin 09/11/2019 14 9     Hematocrit 09/11/2019 44 6     MCV 09/11/2019 96     MCH 09/11/2019 32 1     MCHC 09/11/2019 33 4     RDW 09/11/2019 13 2     MPV 09/11/2019 9 2     Platelets 19/53/5142 219     nRBC 09/11/2019 0     Neutrophils Relative 09/11/2019 83*    Immat GRANS % 09/11/2019 2     Lymphocytes Relative 09/11/2019 9*    Monocytes Relative 09/11/2019 5     Eosinophils Relative 09/11/2019 0     Basophils Relative 09/11/2019 1     Neutrophils Absolute 09/11/2019 7 11     Immature Grans Absolute 09/11/2019 0 19     Lymphocytes Absolute 09/11/2019 0 78     Monocytes Absolute 09/11/2019 0 42     Eosinophils Absolute 09/11/2019 0 01     Basophils Absolute 09/11/2019 0 05     Sodium 09/11/2019 135*    Potassium 09/11/2019 4 8     Chloride 09/11/2019 101     CO2 09/11/2019 24     ANION GAP 09/11/2019 10     BUN 09/11/2019 24     Creatinine 09/11/2019 1 17     Glucose 09/11/2019 338*    Calcium 09/11/2019 8 7     AST 09/11/2019 <5*    ALT 09/11/2019 22     Alkaline Phosphatase 09/11/2019 63     Total Protein 09/11/2019 7 3     Albumin 09/11/2019 3 5     Total Bilirubin 09/11/2019 0 40     eGFR 09/11/2019 67     LACTIC ACID 09/11/2019 2 0     Protime 09/11/2019 12 6     INR 09/11/2019 1 00     PTT 09/11/2019 24     Blood Culture 09/11/2019 No Growth After 5 Days   Blood Culture 09/11/2019 No Growth After 5 Days       Color, UA 09/11/2019 Yellow     Clarity, UA 09/11/2019 Clear     pH, UA 09/11/2019 5 5     Leukocytes, UA 09/11/2019 Negative     Nitrite, UA 09/11/2019 Negative     Protein, UA 09/11/2019 Negative     Glucose, UA 09/11/2019 500 (1/2%)*    Ketones, UA 09/11/2019 Negative     Urobilinogen, UA 09/11/2019 0 2     Bilirubin, UA 09/11/2019 Negative     Blood, UA 09/11/2019 Negative     Specific Gravity, UA 09/11/2019 1 020     Ventricular Rate 09/11/2019 106     Atrial Rate 09/11/2019 106     OH Interval 09/11/2019 144     QRSD Interval 09/11/2019 72     QT Interval 09/11/2019 314     QTC Interval 09/11/2019 417     P Axis 09/11/2019 68     QRS Axis 09/11/2019 37     T Wave Axis 09/11/2019 73     Procalcitonin 09/11/2019 <0 05     POC Glucose 09/11/2019 154*    WBC 09/12/2019 6 88     RBC 09/12/2019 4 11     Hemoglobin 09/12/2019 12 8     Hematocrit 09/12/2019 39 8     MCV 09/12/2019 97     MCH 09/12/2019 31 1     MCHC 09/12/2019 32 2     RDW 09/12/2019 13 2     Platelets 96/86/6703 178     MPV 09/12/2019 8 8*    Sodium 09/12/2019 141     Potassium 09/12/2019 3 8     Chloride 09/12/2019 108     CO2 09/12/2019 25     ANION GAP 09/12/2019 8     BUN 09/12/2019 21     Creatinine 09/12/2019 0 94     Glucose 09/12/2019 176*    Calcium 09/12/2019 8 2*    AST 09/12/2019 10     ALT 09/12/2019 25     Alkaline Phosphatase 09/12/2019 50     Total Protein 09/12/2019 5 9*    Albumin 09/12/2019 2 9*    Total Bilirubin 09/12/2019 0 50     eGFR 09/12/2019 88     POC Glucose 09/12/2019 144*    POC Glucose 09/12/2019 138    Orders Only on 08/26/2019   Component Date Value    Right Eye Diabetic Retin* 08/26/2019 None     Left Eye Diabetic Retino* 08/26/2019 None    Appointment on 08/15/2019   Component Date Value    Sodium 08/15/2019 139     Potassium 08/15/2019 4 3     Chloride 08/15/2019 103     CO2 08/15/2019 26     ANION GAP 08/15/2019 10     BUN 08/15/2019 28*    Creatinine 08/15/2019 1 04     Glucose 08/15/2019 182*    Calcium 08/15/2019 8 7     AST 08/15/2019 14     ALT 08/15/2019 49     Alkaline Phosphatase 08/15/2019 63     Total Protein 08/15/2019 7 2     Albumin 08/15/2019 3 6     Total Bilirubin 08/15/2019 0 40     eGFR 08/15/2019 78     PTH 08/15/2019 64 0     Vit D, 25-Hydroxy 08/15/2019 21 4*    Magnesium 08/15/2019 2 2     Phosphorus 08/15/2019 3 4    Admission on 08/01/2019, Discharged on 08/04/2019   Component Date Value    WBC 08/01/2019 13 03*    RBC 08/01/2019 5 46     Hemoglobin 08/01/2019 17 7*    Hematocrit 08/01/2019 49 4*    MCV 08/01/2019 91     MCH 08/01/2019 32 4     MCHC 08/01/2019 35 8     RDW 08/01/2019 12 1     MPV 08/01/2019 9 7     Platelets 18/53/7696 269     nRBC 08/01/2019 0     Neutrophils Relative 08/01/2019 73     Immat GRANS % 08/01/2019 2     Lymphocytes Relative 08/01/2019 16     Monocytes Relative 08/01/2019 7     Eosinophils Relative 08/01/2019 1     Basophils Relative 08/01/2019 1     Neutrophils Absolute 08/01/2019 9 65*    Immature Grans Absolute 08/01/2019 0 24*    Lymphocytes Absolute 08/01/2019 2 13  Monocytes Absolute 08/01/2019 0 89     Eosinophils Absolute 08/01/2019 0 06     Basophils Absolute 08/01/2019 0 06     Sodium 08/01/2019 128*    Potassium 08/01/2019 4 4     Chloride 08/01/2019 94*    CO2 08/01/2019 16*    ANION GAP 08/01/2019 18*    BUN 08/01/2019 36*    Creatinine 08/01/2019 1 18     Glucose 08/01/2019 617*    Calcium 08/01/2019 7 9*    AST 08/01/2019 45     ALT 08/01/2019      Alkaline Phosphatase 08/01/2019 110     Total Protein 08/01/2019 7 4     Albumin 08/01/2019 3 5     Total Bilirubin 08/01/2019 0 90     eGFR 08/01/2019 67     LACTIC ACID 08/01/2019 0 5     BETA-HYDROXYBUTYRATE 08/01/2019 1 5*    pH, Harish 08/01/2019 7 417*    pCO2, Harish 08/01/2019 29 5*    pO2, Harish 08/01/2019 64 6*    HCO3, Harish 08/01/2019 18 6*    Base Excess, Harish 08/01/2019 -4 3     O2 Content, Harish 08/01/2019 22 3     O2 HGB, VENOUS 08/01/2019 90 8*    POC Glucose 08/01/2019 >500*    Troponin I 08/01/2019 <0 02     Color, UA 08/01/2019 Yellow     Clarity, UA 08/01/2019 Clear     pH, UA 08/01/2019 5 0     Leukocytes, UA 08/01/2019 Negative     Nitrite, UA 08/01/2019 Negative     Protein, UA 08/01/2019 Negative     Glucose, UA 08/01/2019 500 (1/2%)*    Ketones, UA 08/01/2019 15 (1+)*    Urobilinogen, UA 08/01/2019 0 2     Bilirubin, UA 08/01/2019 Negative     Blood, UA 08/01/2019 Trace*    Specific [place], UA 08/01/2019 1 010     RBC, UA 08/01/2019 0-1*    WBC, UA 08/01/2019 None Seen     Epithelial Cells 08/01/2019 Occasional     Bacteria, UA 08/01/2019 Occasional     TSH 3RD GENERATON 08/01/2019 2 326     POC Glucose 08/01/2019 335*    Sodium 08/01/2019 135*    Potassium 08/01/2019 4 1     Chloride 08/01/2019 101     CO2 08/01/2019 16*    ANION GAP 08/01/2019 18*    BUN 08/01/2019 32*    Creatinine 08/01/2019 1 05     Glucose 08/01/2019 383*    Calcium 08/01/2019 7 5*    eGFR 08/01/2019 77     Magnesium 08/01/2019 2 2     Phosphorus 08/01/2019 2 7     Sodium 08/02/2019 141     Potassium 08/02/2019 3 7     Chloride 08/02/2019 110*    CO2 08/02/2019 20*    ANION GAP 08/02/2019 11     BUN 08/02/2019 26*    Creatinine 08/02/2019 0 83     Glucose 08/02/2019 172*    Calcium 08/02/2019 6 7*    eGFR 08/02/2019 96     Magnesium 08/02/2019 2 0     Phosphorus 08/02/2019 2 5     POC Glucose 08/02/2019 252*    POC Glucose 08/02/2019 396*    POC Glucose 08/02/2019 206*    WBC 08/02/2019 9 65     RBC 08/02/2019 4 37     Hemoglobin 08/02/2019 13 8     Hematocrit 08/02/2019 40 7     MCV 08/02/2019 93     MCH 08/02/2019 31 6     MCHC 08/02/2019 33 9     RDW 08/02/2019 12 3     MPV 08/02/2019 9 4     Platelets 49/27/7179 185     nRBC 08/02/2019 0     Neutrophils Relative 08/02/2019 72     Immat GRANS % 08/02/2019 2     Lymphocytes Relative 08/02/2019 18     Monocytes Relative 08/02/2019 6     Eosinophils Relative 08/02/2019 1     Basophils Relative 08/02/2019 1     Neutrophils Absolute 08/02/2019 6 87     Immature Grans Absolute 08/02/2019 0 21*    Lymphocytes Absolute 08/02/2019 1 77     Monocytes Absolute 08/02/2019 0 62     Eosinophils Absolute 08/02/2019 0 13     Basophils Absolute 08/02/2019 0 05     POC Glucose 08/02/2019 166*    POC Glucose 08/02/2019 178*    POC Glucose 08/02/2019 149*    POC Glucose 08/02/2019 98     Ventricular Rate 08/01/2019 148     Atrial Rate 08/01/2019 148     SC Interval 08/01/2019 116     QRSD Interval 08/01/2019 70     QT Interval 08/01/2019 270     QTC Interval 08/01/2019 423     P Axis 08/01/2019 261     QRS Axis 08/01/2019 35     T Wave Axis 08/01/2019 95     POC Glucose 08/02/2019 51*    POC Glucose 08/02/2019 302*    POC Glucose 08/02/2019 224*    Free T4 08/02/2019 1 18     POC Glucose 08/02/2019 390*    POC Glucose 08/02/2019 460*    POC Glucose 08/02/2019 492*    POC Glucose 08/03/2019 300*    POC Glucose 08/03/2019 172*    Sodium 08/03/2019 140     Potassium 08/03/2019 3 3*    Chloride 08/03/2019 108     CO2 08/03/2019 22     ANION GAP 08/03/2019 10     BUN 08/03/2019 17     Creatinine 08/03/2019 0 80     Glucose 08/03/2019 193*    Calcium 08/03/2019 7 5*    eGFR 08/03/2019 97     POC Glucose 08/03/2019 205*    POC Glucose 08/03/2019 272*    POC Glucose 08/03/2019 319*    POC Glucose 08/03/2019 387*    Sodium 08/04/2019 139     Potassium 08/04/2019 3 7     Chloride 08/04/2019 106     CO2 08/04/2019 22     ANION GAP 08/04/2019 11     BUN 08/04/2019 21     Creatinine 08/04/2019 0 78     Glucose 08/04/2019 283*    Calcium 08/04/2019 7 6*    eGFR 08/04/2019 98     WBC 08/04/2019 6 97     RBC 08/04/2019 4 48     Hemoglobin 08/04/2019 14 0     Hematocrit 08/04/2019 41 9     MCV 08/04/2019 94     MCH 08/04/2019 31 3     MCHC 08/04/2019 33 4     RDW 08/04/2019 12 3     MPV 08/04/2019 9 4     Platelets 02/68/4514 138*    nRBC 08/04/2019 0     Neutrophils Relative 08/04/2019 64     Immat GRANS % 08/04/2019 2     Lymphocytes Relative 08/04/2019 25     Monocytes Relative 08/04/2019 8     Eosinophils Relative 08/04/2019 1     Basophils Relative 08/04/2019 0     Neutrophils Absolute 08/04/2019 4 46     Immature Grans Absolute 08/04/2019 0 13     Lymphocytes Absolute 08/04/2019 1 75     Monocytes Absolute 08/04/2019 0 52     Eosinophils Absolute 08/04/2019 0 08     Basophils Absolute 08/04/2019 0 03     POC Glucose 08/04/2019 250*    POC Glucose 08/04/2019 258*    POC Glucose 08/04/2019 169*   Appointment on 08/01/2019   Component Date Value    Sodium 08/01/2019 127*    Potassium 08/01/2019 4 7     Chloride 08/01/2019 94*    CO2 08/01/2019 18*    ANION GAP 08/01/2019 15*    BUN 08/01/2019 31*    Creatinine 08/01/2019 1 27     Glucose 08/01/2019 627*    Calcium 08/01/2019 8 7     AST 08/01/2019 9     ALT 08/01/2019 23     Alkaline Phosphatase 08/01/2019 80     Total Protein 08/01/2019 7 9     Albumin 08/01/2019 3 8     Total Bilirubin 08/01/2019 1 11*    eGFR 08/01/2019 61     TSH 3RD GENERATON 08/01/2019 2 110    Office Visit on 08/01/2019   Component Date Value    Hemoglobin A1C 08/01/2019 12 1*    GLUCOSE BLD 08/01/2019 high    Appointment on 07/10/2019   Component Date Value    Angio Convert Enzyme 07/10/2019 64     Sodium 07/10/2019 134*    Potassium 07/10/2019 3 8     Chloride 07/10/2019 98*    CO2 07/10/2019 25     ANION GAP 07/10/2019 11     BUN 07/10/2019 15     Creatinine 07/10/2019 1 23     Glucose 07/10/2019 362*    Calcium 07/10/2019 8 9     eGFR 07/10/2019 63     Rheumatoid Factor 07/10/2019 Negative     HCV PCR Quantitative 07/10/2019 HCV Not Detected     Test Information 07/10/2019 Comment     Rapid HIV 1 AND 2 07/10/2019 Non-Reactive     HIV-1 P24 Ag Screen 07/10/2019 Non-Reactive     C-ANCA 07/10/2019 <1:20     Atypical pANCA 07/10/2019 <1:20     MPO AB 07/10/2019 <9 0     IN-3 AB 07/10/2019 <3 5     P-ANCA 07/10/2019 <1:20    Office Visit on 07/10/2019   Component Date Value    WBC 07/10/2019 6 42     RBC 07/10/2019 5 17     Hemoglobin 07/10/2019 16 1     Hematocrit 07/10/2019 47 6     MCV 07/10/2019 92     MCH 07/10/2019 31 1     MCHC 07/10/2019 33 8     RDW 07/10/2019 12 4     MPV 07/10/2019 9 6     Platelets 19/40/7052 235     nRBC 07/10/2019 0     Neutrophils Relative 07/10/2019 55     Immat GRANS % 07/10/2019 1     Lymphocytes Relative 07/10/2019 32     Monocytes Relative 07/10/2019 9     Eosinophils Relative 07/10/2019 2     Basophils Relative 07/10/2019 1     Neutrophils Absolute 07/10/2019 3 49     Immature Grans Absolute 07/10/2019 0 08     Lymphocytes Absolute 07/10/2019 2 08     Monocytes Absolute 07/10/2019 0 57     Eosinophils Absolute 07/10/2019 0 13     Basophils Absolute 07/10/2019 0 07     Total CK 07/10/2019 113     Total Bilirubin 07/10/2019 0 70     Bilirubin, Direct 07/10/2019 0 19     Alkaline Phosphatase 07/10/2019 85     AST 07/10/2019 17     ALT 07/10/2019 30     Total Protein 07/10/2019 8 2     Albumin 07/10/2019 4 1     Clarity, UA 07/10/2019 Clear     Color, UA 07/10/2019 Yellow     Specific Gravity, UA 07/10/2019 1 015     pH, UA 07/10/2019 5 5     Glucose, UA 07/10/2019 250 (1/4%)*    Ketones, UA 07/10/2019 Negative     Blood, UA 07/10/2019 Negative     Protein, UA 07/10/2019 Negative     Nitrite, UA 07/10/2019 Negative     Bilirubin, UA 07/10/2019 Negative     Urobilinogen, UA 07/10/2019 0 2     Leukocytes, UA 07/10/2019 Negative     WBC, UA 07/10/2019 None Seen     RBC, UA 07/10/2019 None Seen     Bacteria, UA 07/10/2019 None Seen     Epithelial Cells 07/10/2019 Occasional     Creatinine, Ur 07/10/2019 99 8     Protein Urine Random 07/10/2019 <6     Prot/Creat Ratio, Ur 07/10/2019 <0 06     Sed Rate 07/10/2019 14*    CRP 07/10/2019 <3 0     Hep B Core Total Ab 07/10/2019 Non-reactive     Hepatitis B Surface Ag 07/10/2019 Non-reactive     Hep B S Ab 07/10/2019 <3 10     Calcium, Ionized 07/10/2019 1 11*    Vit D, 25-Hydroxy 07/10/2019 28 4*   Appointment on 07/05/2019   Component Date Value    QFT Nil 07/05/2019 0 04     QFT TB1-NIL 07/05/2019 0 00     QFT TB2-NIL 07/05/2019 0 00     QFT Mitogen-NIL 07/05/2019 >10 00     QFT Final Interpretation 07/05/2019 Negative    Admission on 06/06/2019, Discharged on 06/06/2019   Component Date Value    ABO Grouping 05/25/2019 B     Rh Factor 05/25/2019 Positive     Antibody Screen 05/25/2019 Negative     Specimen Expiration Date 05/25/2019 [id number]     POC Glucose 06/06/2019 253*    Case Report 06/06/2019                      Value:Non-gynecologic Cytology                          Case: [id number]                                  Authorizing Provider:  Papito Lee MD       Collected:           06/06/2019 0819              Ordering Location:     [address] [place] [address] Road      Received:            06/06/2019 [address] [place] [address] Pathologist:           Francisco Carrasco MD                                                         Specimens:   A) - Lymph Node, level 7                                                                            B) - Lymph Node, level 7                                                                            C) - Lymph Node, level 4R                                                                           D) - Lymph Node, level 4R                                                                           E) - Lymph Node, level 2R                                                                                                     F) - Lymph Node, level 7, cell block                                                                G) - Lymph Node, level 4R, cell block                                                      Final Diagnosis 06/06/2019                      Value: This result contains rich text formatting which cannot be displayed here   Intraoperative Consultat* 06/06/2019                      Value: This result contains rich text formatting which cannot be displayed here  [facility] Gross Description 06/06/2019                      Value: This result contains rich text formatting which cannot be displayed here   Additional Information 06/06/2019                      Value: This result contains rich text formatting which cannot be displayed here   POC Glucose 06/06/2019 197*   There may be more visits with results that are not included  Imaging: Xr Chest 2 Views    Result Date: 9/11/2019  Narrative: CHEST INDICATION:   cough  COMPARISON:  8/1/2019 EXAM PERFORMED/VIEWS:  XR CHEST PA & LATERAL FINDINGS: Mild chronic elevation of the right hemidiaphragm  Cardiomediastinal silhouette appears unremarkable  The lungs are clear  No pneumothorax or pleural effusion  Osseous structures appear within normal limits for patient age       Impression: No acute cardiopulmonary disease  Workstation performed: [id number]     Andreia Shetty Chest Pe Study    Result Date: 9/11/2019  Narrative: CTA - CHEST WITH IV CONTRAST - PULMONARY ANGIOGRAM INDICATION:   sob/tachycardia/cough  Former smoker  COMPARISON: CT chest 5/15/2019  TECHNIQUE: CTA examination of the chest was performed using angiographic technique according to a protocol specifically tailored to evaluate for pulmonary embolism  Axial, sagittal, and coronal 2D reformatted images were created from the source data and  submitted for interpretation  In addition, coronal 3D MIP postprocessing was performed on the acquisition scanner  Radiation dose length product (DLP) for this visit:  812 mGy-cm   This examination, like all CT scans performed in the [facility], was performed utilizing techniques to minimize radiation dose exposure, including the use of iterative reconstruction and automated exposure control  IV Contrast:  85 mL of iohexol (OMNIPAQUE)  FINDINGS: PULMONARY ARTERIAL TREE:  No acute pulmonary embolus is seen  Chronic appearing mural thrombus and a segmental branch point of the left lower lobe as seen on image 3/180  LUNGS:  Lungs are clear  There is no tracheal or endobronchial lesion  PLEURA:  Unremarkable  HEART/GREAT VESSELS:  Unremarkable for patient's age  MEDIASTINUM AND KRIS:  Unremarkable  Mediastinal nodes have normalized  CHEST WALL AND LOWER NECK:   Unremarkable  VISUALIZED STRUCTURES IN THE UPPER ABDOMEN:  Stable splenomegaly measuring 15 cm  Stable hepatomegaly with steatosis  Small hiatal hernia unchanged  OSSEOUS STRUCTURES:  No acute fracture or destructive osseous lesion  Impression: No acute pulmonary embolus is seen  Chronic appearing mural thrombus and a segmental branch point of the left lower lobe as seen on image 3/180  Resolved mediastinal lymphadenopathy  Stable splenomegaly  Stable hepatomegaly with steatosis  Stable hiatal hernia   Workstation performed: [id number]       ECG: sinus tachycardia at a rate of 130  normal ST T-waves  Review of Systems:  Review of Systems   Constitutional: Positive for fatigue  Negative for activity change, appetite change, chills, diaphoresis and fever  HENT: Negative for congestion  Respiratory: Positive for shortness of breath  Negative for cough, chest tightness and wheezing  Cardiovascular: Positive for palpitations  Negative for chest pain and leg swelling  Gastrointestinal: Negative for abdominal pain, diarrhea, nausea and vomiting  Genitourinary: Negative for difficulty urinating and dysuria  Musculoskeletal: Negative for back pain  Skin: Negative for color change and rash  Neurological: Negative for dizziness, syncope, facial asymmetry, weakness, light-headedness, numbness and headaches  Psychiatric/Behavioral: Negative for confusion           Vitals:    10/09/19 1452   BP: 118/62   Pulse: (!) 126   SpO2: 96%       Physical Exam:  General appearance:  Appears stated age, alert, well appearing and in no distress  HEENT:  PERRLA, EOMI, no scleral icterus, no conjunctival pallor  NECK:  Supple, No elevated JVP, no thyromegaly, no carotid bruits  HEART:  Regular rate, tachycardia, normal S1/S2, no S3/S4, no murmur or rub  LUNGS:  Clear to auscultation bilaterally  ABDOMEN:  Soft, non-tender, positive bowel sounds, no rebound or guarding, no organomegaly   EXTREMITIES:  No edema  VASCULAR:  Normal pedal pulses   SKIN: No lesions or rashes on exposed skin  NEURO:  CN II-XII intact, no focal deficits

## 2019-10-11 ENCOUNTER — TELEPHONE (OUTPATIENT)
Dept: ENDOCRINOLOGY | Facility: CLINIC | Age: 59
End: 2019-10-11

## 2019-10-11 NOTE — TELEPHONE ENCOUNTER
Called pt with BGs    FBG in mid 100s to high 100s, a few low 200s  premeals and qHS mid 100s to high 100s    No hypoglycemia    Continue basaglar 45 units qHS and novolog 14 units TID AC +SSI    Remains on prednisone 20mg qday and cellcept, rheumatology appt next week     Dominic WITT    Endocrinology

## 2019-10-14 DIAGNOSIS — E11.9 TYPE 2 DIABETES MELLITUS WITHOUT COMPLICATION, WITHOUT LONG-TERM CURRENT USE OF INSULIN (HCC): ICD-10-CM

## 2019-10-15 ENCOUNTER — PROCEDURE VISIT (OUTPATIENT)
Dept: CARDIOLOGY CLINIC | Facility: CLINIC | Age: 59
End: 2019-10-15

## 2019-10-15 ENCOUNTER — APPOINTMENT (OUTPATIENT)
Dept: LAB | Facility: CLINIC | Age: 59
End: 2019-10-15
Payer: COMMERCIAL

## 2019-10-15 DIAGNOSIS — R00.2 PALPITATIONS: Primary | ICD-10-CM

## 2019-10-15 LAB
ALBUMIN SERPL BCP-MCNC: 3.8 G/DL (ref 3.5–5)
ALP SERPL-CCNC: 50 U/L (ref 46–116)
ALT SERPL W P-5'-P-CCNC: 31 U/L (ref 12–78)
ANION GAP SERPL CALCULATED.3IONS-SCNC: 10 MMOL/L (ref 4–13)
AST SERPL W P-5'-P-CCNC: 9 U/L (ref 5–45)
BASOPHILS # BLD AUTO: 0.08 THOUSANDS/ΜL (ref 0–0.1)
BASOPHILS NFR BLD AUTO: 1 % (ref 0–1)
BILIRUB DIRECT SERPL-MCNC: 0.16 MG/DL (ref 0–0.2)
BILIRUB SERPL-MCNC: 0.66 MG/DL (ref 0.2–1)
BUN SERPL-MCNC: 22 MG/DL (ref 5–25)
CALCIUM SERPL-MCNC: 9.1 MG/DL (ref 8.3–10.1)
CHLORIDE SERPL-SCNC: 107 MMOL/L (ref 100–108)
CO2 SERPL-SCNC: 24 MMOL/L (ref 21–32)
CREAT SERPL-MCNC: 1.08 MG/DL (ref 0.6–1.3)
EOSINOPHIL # BLD AUTO: 0.12 THOUSAND/ΜL (ref 0–0.61)
EOSINOPHIL NFR BLD AUTO: 2 % (ref 0–6)
ERYTHROCYTE [DISTWIDTH] IN BLOOD BY AUTOMATED COUNT: 13.5 % (ref 11.6–15.1)
GFR SERPL CREATININE-BSD FRML MDRD: 74 ML/MIN/1.73SQ M
GLUCOSE P FAST SERPL-MCNC: 161 MG/DL (ref 65–99)
HCT VFR BLD AUTO: 46.1 % (ref 36.5–49.3)
HGB BLD-MCNC: 14.7 G/DL (ref 12–17)
IMM GRANULOCYTES # BLD AUTO: 0.13 THOUSAND/UL (ref 0–0.2)
IMM GRANULOCYTES NFR BLD AUTO: 2 % (ref 0–2)
LYMPHOCYTES # BLD AUTO: 3.06 THOUSANDS/ΜL (ref 0.6–4.47)
LYMPHOCYTES NFR BLD AUTO: 39 % (ref 14–44)
MCH RBC QN AUTO: 31.5 PG (ref 26.8–34.3)
MCHC RBC AUTO-ENTMCNC: 31.9 G/DL (ref 31.4–37.4)
MCV RBC AUTO: 99 FL (ref 82–98)
MONOCYTES # BLD AUTO: 0.68 THOUSAND/ΜL (ref 0.17–1.22)
MONOCYTES NFR BLD AUTO: 9 % (ref 4–12)
NEUTROPHILS # BLD AUTO: 3.81 THOUSANDS/ΜL (ref 1.85–7.62)
NEUTS SEG NFR BLD AUTO: 47 % (ref 43–75)
NRBC BLD AUTO-RTO: 0 /100 WBCS
PLATELET # BLD AUTO: 192 THOUSANDS/UL (ref 149–390)
PMV BLD AUTO: 9.5 FL (ref 8.9–12.7)
POTASSIUM SERPL-SCNC: 4 MMOL/L (ref 3.5–5.3)
PROT SERPL-MCNC: 7.2 G/DL (ref 6.4–8.2)
RBC # BLD AUTO: 4.67 MILLION/UL (ref 3.88–5.62)
SODIUM SERPL-SCNC: 141 MMOL/L (ref 136–145)
WBC # BLD AUTO: 7.88 THOUSAND/UL (ref 4.31–10.16)

## 2019-10-15 PROCEDURE — 36415 COLL VENOUS BLD VENIPUNCTURE: CPT | Performed by: INTERNAL MEDICINE

## 2019-10-15 PROCEDURE — 80076 HEPATIC FUNCTION PANEL: CPT | Performed by: INTERNAL MEDICINE

## 2019-10-15 PROCEDURE — 85025 COMPLETE CBC W/AUTO DIFF WBC: CPT | Performed by: INTERNAL MEDICINE

## 2019-10-15 PROCEDURE — RECHECK: Performed by: STUDENT IN AN ORGANIZED HEALTH CARE EDUCATION/TRAINING PROGRAM

## 2019-10-15 PROCEDURE — 80048 BASIC METABOLIC PNL TOTAL CA: CPT | Performed by: INTERNAL MEDICINE

## 2019-10-16 ENCOUNTER — OFFICE VISIT (OUTPATIENT)
Dept: PULMONOLOGY | Facility: CLINIC | Age: 59
End: 2019-10-16
Payer: COMMERCIAL

## 2019-10-16 VITALS
RESPIRATION RATE: 18 BRPM | DIASTOLIC BLOOD PRESSURE: 68 MMHG | WEIGHT: 284 LBS | OXYGEN SATURATION: 96 % | TEMPERATURE: 96.4 F | HEIGHT: 74 IN | HEART RATE: 113 BPM | SYSTOLIC BLOOD PRESSURE: 136 MMHG | BODY MASS INDEX: 36.45 KG/M2

## 2019-10-16 DIAGNOSIS — D86.0 PULMONARY SARCOIDOSIS (HCC): ICD-10-CM

## 2019-10-16 DIAGNOSIS — R59.0 MEDIASTINAL LYMPHADENOPATHY: Primary | ICD-10-CM

## 2019-10-16 DIAGNOSIS — I27.82 OTHER CHRONIC PULMONARY EMBOLISM WITHOUT ACUTE COR PULMONALE (HCC): ICD-10-CM

## 2019-10-16 PROCEDURE — 99215 OFFICE O/P EST HI 40 MIN: CPT | Performed by: INTERNAL MEDICINE

## 2019-10-16 NOTE — ASSESSMENT & PLAN NOTE
- Captured incidentally on a CTA last month and is in the periphery subsegmental  Likely provoked in the setting of chronic active lung disease  The location and size of the thrombus is unlikely to be significantly contributing to his symptoms  Plan:  - Currently on eliquis  Recommend 3 months of anticoagulation   Current evidence also shows that PE of this size and location can also be treated w/o anticoagulation but nevertheless can resume for 3 months total

## 2019-10-16 NOTE — ASSESSMENT & PLAN NOTE
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

## 2019-10-16 NOTE — ASSESSMENT & PLAN NOTE
- Currently down to prednisone 20mg daily and cellcept is at 1000mg BID    - No eye involvement  - Currently being worked up for cardiac involvement with cardiac MRI  Plan:  - Recommend continued weaning of prednisone and increasing cellcept to 1500mg BID if tolerated   Recent CBC is normal

## 2019-10-17 ENCOUNTER — CLINICAL SUPPORT (OUTPATIENT)
Dept: FAMILY MEDICINE CLINIC | Facility: CLINIC | Age: 59
End: 2019-10-17
Payer: COMMERCIAL

## 2019-10-17 ENCOUNTER — OFFICE VISIT (OUTPATIENT)
Dept: RHEUMATOLOGY | Facility: CLINIC | Age: 59
End: 2019-10-17
Payer: COMMERCIAL

## 2019-10-17 VITALS — WEIGHT: 284 LBS | HEART RATE: 100 BPM | HEIGHT: 74 IN | BODY MASS INDEX: 36.45 KG/M2

## 2019-10-17 DIAGNOSIS — Z23 ENCOUNTER FOR IMMUNIZATION: ICD-10-CM

## 2019-10-17 DIAGNOSIS — E11.9 TYPE 2 DIABETES MELLITUS WITHOUT COMPLICATION, WITHOUT LONG-TERM CURRENT USE OF INSULIN (HCC): ICD-10-CM

## 2019-10-17 DIAGNOSIS — Z79.52 CURRENT CHRONIC USE OF SYSTEMIC STEROIDS: ICD-10-CM

## 2019-10-17 DIAGNOSIS — Z79.899 LONG TERM USE OF DRUG: ICD-10-CM

## 2019-10-17 DIAGNOSIS — Z79.4 TYPE 2 DIABETES MELLITUS WITH COMPLICATION, WITH LONG-TERM CURRENT USE OF INSULIN (HCC): Primary | ICD-10-CM

## 2019-10-17 DIAGNOSIS — G60.8 POLYNEUROPATHY, PERIPHERAL SENSORIMOTOR AXONAL: ICD-10-CM

## 2019-10-17 DIAGNOSIS — D84.9 IMMUNOSUPPRESSED STATUS (HCC): ICD-10-CM

## 2019-10-17 DIAGNOSIS — E11.8 TYPE 2 DIABETES MELLITUS WITH COMPLICATION, WITH LONG-TERM CURRENT USE OF INSULIN (HCC): Primary | ICD-10-CM

## 2019-10-17 DIAGNOSIS — D86.9 SARCOIDOSIS: Primary | ICD-10-CM

## 2019-10-17 PROCEDURE — 99214 OFFICE O/P EST MOD 30 MIN: CPT | Performed by: INTERNAL MEDICINE

## 2019-10-17 PROCEDURE — 90471 IMMUNIZATION ADMIN: CPT

## 2019-10-17 PROCEDURE — 90682 RIV4 VACC RECOMBINANT DNA IM: CPT

## 2019-10-17 RX ORDER — PREDNISONE 1 MG/1
TABLET ORAL
Qty: 90 TABLET | Refills: 1 | Status: SHIPPED | OUTPATIENT
Start: 2019-10-17 | End: 2019-12-08 | Stop reason: SDUPTHER

## 2019-10-17 NOTE — PROGRESS NOTES
Assessment and Plan:   Patient is a 50-year-old male with history of hepatitis C s/p cure with [facility]en, liver cirrhosis on prior biopsy, uncontrolled type 2 diabetes now insulin dependent, history of erosive gastritis and duodenal erosion, who presents for rheumatology follow-up for pulmonary sarcoidosis with suspicion for extra thoracic involvement  In particular he developed trigeminal neuralgia type symptoms with right-sided headaches and drooping on the right side of his face, along with peripheral paresthesias suspicious for neurologic involvement from sarcoid  On his EMG he had evidence of a large fiber neuropathy of his legs  We did further investigation of his heart due to his low voltage EKG and his complaints of palpitations, however echo did not reveal any significant findings to suggest cardiac sarcoid and showed only mildly increased LV thickness  At this point he is having additional cardiac workup with an MRI stress test which will identify sarcoidosis if present and also to further evaluate his tachycardia and dyspnea  He saw his pulmonologist yesterday who thought that because of the atypical distribution on CT and biopsy results that possibly beryllium was on the differential and did send him for additional testing for this  Thus far he has responded well to prednisone therapy and had resolution of the lymphadenopathy on his CT chest which is reassuring  He is doing well with the prednisone taper with the introduction of CellCept and so we will continue with prednisone taper  I did advise him to reduce to 15 mg for 2 weeks, 10 mg for 2 weeks, and then stay on 5 mg daily  We did discuss that the ultimate goal is to get off the prednisone and utilize the CellCept  He is having multiple adverse effects from the prednisone including a significant weight gain and so we discussed that this should improve with the gradual taper    I do not see an indication increase CellCept at this time since he is doing well with the prednisone taper at the current dose  If we have trouble tapering prednisone as we go an option is to increase CellCept but for now to reduce toxicities we will continue at the current dose  He will continue getting labs every 4 weeks for now on the CellCept  He did also get a flu shot today in his PCP office  He will let me know of any issues while tapering prednisone and I will follow-up on his outstanding workup in the meantime  I will hold him back in about 3 months  At that time he will also be having a repeat CTA of his chest to assess the chronic PE  Plan:  Diagnoses and all orders for this visit:    Sarcoidosis  -     predniSONE 5 mg tablet; Take 3 tabs (15mg) x2 weeks, then 2 tabs (10mg) x2 weeks, then take 1 tab (5mg) daily and stay on that dose    Current chronic use of systemic steroids    Polyneuropathy, peripheral sensorimotor axonal    Immunosuppressed status ([facility] [address] )    Long term use of drug        Follow-up plan: 3 months        Rheumatic Disease Summary  1   Pulmonary sarcoidosis with probable extrathoracic involvement   -Rheum consult 7/10/19 for sarcoidosis on LN bx with suspicion for systemic disease with PNS and cranial nerve involvement and possible cardiac  -Chest/abdominal LAD noted on CT dating back to 2012 with stability for years, no prior tx; repeat CT chest 5/2019 showed mild increased mediastinal LAD prompting bx  -EBUS with LN bx 6/6/19: numerous histiocytes, some multinucleated and epitheliod, in background of polymorphous lymphocytes and benign bronchial cells, cannot exclude granulomatous lesion  -Also having issues with trigeminal autonomic cephalgia and headaches starting 8/2018; also with burning paresthesias in forearms and right thigh with subsequent EMG of the legs showing large fiber neuropathy with axonal and demyelinating process  -Also with low voltage EKG, echo with mildly increased LV wall thickness  -PFTs 7/16/19: mild restriction, mild reduced DLCO 65%  -Labs 2019: normal ACE, negative TB, hep B, undetectable HCV, negative ANCA, RF, HIV  -Increased dry cough and dyspnea in early 7/2019, started prednisone 60mg 7/10/19 for symptomatic sarcoid and suspicion for extrathoracic disease; advised ophtho eval to screen for uveitis; unable to use MTX or AZA for steroid sparing given underlying cirrhosis  -Visit 8/14/19: neurologic sx resolved after starting pred; taper pred from 60mg by 10mg q7 days until at 30mg, then by 5mg q14 days until at 20mg  -Admitted 9/11/19 with sx of URI, dyspnea and tachy, found to have chronic appearing PE left lower lobe, started on eliquis with plans for 3 months tx; CTA chest also noted resolution of mediastinal LAD  -Cellcept started for steroid sparing mid-sept 2019  -Ophtho eval 9/2019: no uveitis   -Visit 10/17/19: reduce prednisone to 15mg x2 weeks, 10mg x2 weeks, then stay on 5mg daily, cont MMF at 1g BID; berylliosis testing ordered by pulm    2  Large fiber neuropathy of the legs  -With axonal and demyelination on EMG, possibly due to sarcoid  -Gabapentin did not help, felt better with tegretol   3  Abnormal brain MRI, headaches, trigeminal neuralgia  -Nonspecific scattered small white matter hyperintensities on T2 and FLAIR in frontal lobes, not felt to be related to granulomatous disease in brain parenchyma by neuro   -Right sided headaches with associated drooping eyelid and tearing  4  Low voltage EKG, palpitations  -Echo with mildly increased LV wall thickness, grade 1DD, EF 65%, unclear if possibly related to sarcoid   5   Chronic left lower lobe PE, dyspnea, tachycardia  -Dx 9/11/19 during admission for dyspnea and tachycardia, pulm following with plans for eliquis x3-6 months   -Also established with cardio for eval of dyspnea and tachy out of proportion for restrictive lung disease and small chronic PE; planning for MRI stress to eval for ischemia and cardiac sarcoid, also holter monitoring for tachy and may need to start beta blocker  6  Type 2 DM  -Admitted 8/1/19 with DKA with glucose in 600s after insurance did not approve insulin therapy, now stable on insulin  7  Other Comorbidities: hypothyroidism, type 2 diabetes, BPH, history of hepatitis-C status post cure with Harvoni (2015), history of liver biopsy, liver cirrhosis, vitamin-D deficiency, GERD, history of erosive gastritis and duodenal erosion (2018), history of trigeminal autonomic cephalgia, cervicalgia and chronic headaches, lumbar DDD/spondylosis     HPI  Vernal Oddi is a 60 y o   male who presents  for rheumatology follow-up for pulmonary sarcoidosis with suspicion for extra thoracic involvement  Since I last saw him, he was diagnosed with a chronic pulmonary embolus  Admitted 9/11/19 with sx of URI, dyspnea and tachy, found to have chronic appearing PE left lower lobe, started on eliquis with plans for 3-6 months tx  CTA chest also noted resolution of mediastinal LAD from his sarcoidosis  He also established with Cardiology for additional evaluation of his tachycardia and dyspnea and they are pursuing additional workup with Holter monitor and cardiac MRI stress test, which will also show if there is any sarcoid cardiac involvement  Today he reports he is very unhappy on the prednisone he has gained about 35 lb  He feels very hungry all the time  Now down to prednisone 20 mg and thinks he has been on this dose about 4 weeks  He is still on the CellCept 1000 mg b i d  He is tolerating this well without any noticeable side effects including any GI side effects  He saw pulmonology yesterday who sent him for testing for beryllium given the atypical features on his CT of the chest and no mention of definite noncaseating granulomas on his biopsy specimen  He continues to report that the neurologic symptoms he was having before have not returned on the prednisone therapy    His blood sugars are under better control with reducing the prednisone and he is working closely with endocrinology  Was able to see the ophthalmologist who told him there are no signs of sarcoidosis affecting his eyes  The following portions of the patient's history were reviewed and updated as appropriate: allergies, current medications, past family history, past medical history, past social history, past surgical history and problem list     Review of Systems:   Review of Systems   Constitutional: Positive for unexpected weight change (weight gain 35lbs)  Negative for chills, fatigue and fever  HENT: Negative for mouth sores and trouble swallowing  Eyes: Negative for pain and visual disturbance  Respiratory: Positive for shortness of breath  Negative for cough  Cardiovascular: Positive for palpitations  Negative for chest pain and leg swelling  Gastrointestinal: Negative for abdominal pain, blood in stool, constipation, diarrhea and nausea  Genitourinary: Negative for hematuria  Musculoskeletal: Negative for arthralgias, back pain, joint swelling and myalgias  Skin: Negative for rash  Neurological: Negative for weakness and numbness  Hematological: Negative for adenopathy  Psychiatric/Behavioral: Negative for sleep disturbance         Home Medications:    Current Outpatient Medications:     apixaban (ELIQUIS) 5 mg, Take 1 tablet (5 mg total) by mouth 2 (two) times a day Take 2 tabs twice a day for one week then one tab twice a day thereafter , Disp: 60 tablet, Rfl: 4    Cholecalciferol (VITAMIN D3) 5000 units CAPS, Take 1 capsule by mouth daily , Disp: , Rfl:     famotidine (PEPCID) 40 MG tablet, Take 40 mg by mouth daily , Disp: , Rfl:     glucose 4 g chewable tablet, Chew 4 tablets (16 g total) as needed for low blood sugar, Disp: 100 tablet, Rfl: 1    insulin glargine (BASAGLAR KWIKPEN) 100 units/mL injection pen, Inject 35 Units under the skin daily (Patient taking differently: Inject 45 Units under the skin daily ), Disp: 45 mL, Rfl: 3   Insulin Pen Needle (PEN NEEDLES) 31G X 6 MM MISC, by Does not apply route 4 (four) times a day, Disp: 150 each, Rfl: 4    levothyroxine 50 mcg tablet, TAKE 1 TABLET BY MOUTH EVERY DAY, Disp: 30 tablet, Rfl: 5    liraglutide (VICTOZA) injection, Inject 0 2 mL (1 2 mg total) under the skin daily, Disp: 2 pen, Rfl: 4    metFORMIN (GLUCOPHAGE) 500 mg tablet, TAKE 1 TABLET BY MOUTH TWICE A DAY WITH MEALS, Disp: 60 tablet, Rfl: 5    mycophenolate (CELLCEPT) 500 mg tablet, 1 tab daily x1 week, then 1 tab BID x1 week, then 2 tabs in AM and 1 tab in PM x1 week, then 2 tabs BID thereafter Talk with your rheumatologist about resuming  (Patient taking differently: Take 2 tablets twice daily), Disp: 120 tablet, Rfl: 0    NOVOTWIST 32G X 5 MM MISC, , Disp: , Rfl:     ONETOUCH DELICA LANCETS 81Y MISC, USE THREE TIMES DAILY, Disp: 100 each, Rfl: 5    ONETOUCH VERIO test strip, Use to test blood sugar 4 times a day, Disp: 100 each, Rfl: 3    predniSONE 20 mg tablet, Take 3 tablets (60 mg total) by mouth daily (Patient taking differently: Take 20 mg by mouth daily ), Disp: 90 tablet, Rfl: 0    insulin aspart (NOVOLOG FLEXPEN) 100 Units/mL injection pen, Inject 14 Units under the skin 3 (three) times a day before meals, Disp: 5 pen, Rfl: 3    predniSONE 5 mg tablet, Take 3 tabs (15mg) x2 weeks, then 2 tabs (10mg) x2 weeks, then take 1 tab (5mg) daily and stay on that dose, Disp: 90 tablet, Rfl: 1    Objective:    Vitals:    10/17/19 1437   Pulse: 100   Weight: 129 kg (284 lb)   Height: 6' 2" (1 88 m)       Physical Exam   Constitutional: He appears well-developed and well-nourished  He is cooperative  No distress  HENT:   Head: Normocephalic and atraumatic  Mouth/Throat: Oropharynx is clear and moist and mucous membranes are normal    Eyes: Conjunctivae and EOM are normal  No scleral icterus  Neck: Neck supple  No spinous process tenderness and no muscular tenderness present  No thyromegaly present     Cardiovascular: Normal rate, regular rhythm, S1 normal and S2 normal  Exam reveals no friction rub  No murmur heard  Pulmonary/Chest: Breath sounds normal  No respiratory distress  He has no wheezes  He has no rhonchi  He has no rales  Musculoskeletal:   No reproducible soft tissue or joint tenderness  No joint swelling or synovitis anywhere  Mild bilateral distal leg edema    Lymphadenopathy:     He has no cervical adenopathy  Neurological: He is alert  No sensory deficit  Skin: Skin is warm and dry  No rash noted  Nails show no clubbing  Psychiatric: He has a normal mood and affect  Imaging:   CTA chest 9/11/19: IMPRESSION:  No acute pulmonary embolus is seen  Chronic appearing mural thrombus and a segmental branch point of the left lower lobe as seen on image 3/180  Resolved mediastinal lymphadenopathy  Stable splenomegaly  Stable hepatomegaly with steatosis  Stable hiatal hernia      Labs:   Component      Latest Ref Rng & Units 10/15/2019   WBC      4 31 - 10 16 Thousand/uL 7 88   Red Blood Cell Count      3 88 - 5 62 Million/uL 4 67   Hemoglobin      12 0 - 17 0 g/dL 14 7   HCT      36 5 - 49 3 % 46 1   MCV      82 - 98 fL 99 (H)   MCH      26 8 - 34 3 pg 31 5   MCHC      31 4 - 37 4 g/dL 31 9   RDW      11 6 - 15 1 % 13 5   MPV      8 9 - 12 7 fL 9 5   Platelet Count      892 - 390 Thousands/uL 192   nRBC      /100 WBCs 0   Neutrophils %      43 - 75 % 47   Immat GRANS %      0 - 2 % 2   Lymphocytes Relative      14 - 44 % 39   Monocytes Relative      4 - 12 % 9   Eosinophils      0 - 6 % 2   Basophils Relative      0 - 1 % 1   Absolute Neutrophils      1 85 - 7 62 Thousands/µL 3 81   Immature Grans Absolute      0 00 - 0 20 Thousand/uL 0 13   Lymphocytes Absolute      0 60 - 4 47 Thousands/µL 3 06   Absolute Monocytes      0 17 - 1 22 Thousand/µL 0 68   Absolute Eosinophils      0 00 - 0 61 Thousand/µL 0 12   Basophils Absolute      0 00 - 0 10 Thousands/µL 0 08   Sodium      136 - 145 mmol/L 141 Potassium      3 5 - 5 3 mmol/L 4 0   Chloride      100 - 108 mmol/L 107   CO2      21 - 32 mmol/L 24   Anion Gap      4 - 13 mmol/L 10   BUN      5 - 25 mg/dL 22   Creatinine      0 60 - 1 30 mg/dL 1 08   GLUCOSE FASTING      65 - 99 mg/dL 161 (H)   Calcium      8 3 - 10 1 mg/dL 9 1   eGFR      ml/min/1 73sq m 74   TOTAL BILIRUBIN      0 20 - 1 00 mg/dL 0 66   BILIRUBIN DIRECT      0 00 - 0 20 mg/dL 0 16   Alkaline Phosphatase      46 - 116 U/L 50   AST      5 - 45 U/L 9   ALT      12 - 78 U/L 31   Total Protein      6 4 - 8 2 g/dL 7 2   Albumin      3 5 - 5 0 g/dL 3 8

## 2019-10-17 NOTE — PATIENT INSTRUCTIONS
Reduce prednisone to 15mg for 2 weeks, then go to 10mg for 2 weeks, then go to 5mg and stay on that dose   Get labs every 4 weeks for now, standing orders are in your chart

## 2019-10-18 ENCOUNTER — TELEPHONE (OUTPATIENT)
Dept: ENDOCRINOLOGY | Facility: CLINIC | Age: 59
End: 2019-10-18

## 2019-10-18 ENCOUNTER — HOSPITAL ENCOUNTER (OUTPATIENT)
Dept: NON INVASIVE DIAGNOSTICS | Facility: HOSPITAL | Age: 59
Discharge: HOME | End: 2019-10-18
Payer: COMMERCIAL

## 2019-10-18 DIAGNOSIS — E11.9 TYPE 2 DIABETES MELLITUS WITHOUT COMPLICATION, WITHOUT LONG-TERM CURRENT USE OF INSULIN (HCC): ICD-10-CM

## 2019-10-18 DIAGNOSIS — R00.2 PALPITATIONS: ICD-10-CM

## 2019-10-18 DIAGNOSIS — R00.0 SINUS TACHYCARDIA: Primary | ICD-10-CM

## 2019-10-18 LAB
REF LAB TEST NAME: NORMAL
REF LAB TEST RESULTS: NORMAL

## 2019-10-18 PROCEDURE — 93225 XTRNL ECG REC<48 HRS REC: CPT

## 2019-10-18 PROCEDURE — 93227 XTRNL ECG REC<48 HR R&I: CPT | Performed by: INTERNAL MEDICINE

## 2019-10-18 PROCEDURE — 93226 XTRNL ECG REC<48 HR SCAN A/R: CPT

## 2019-10-18 RX ORDER — METOPROLOL SUCCINATE 25 MG/1
12.5 TABLET, EXTENDED RELEASE ORAL 2 TIMES DAILY
Qty: 90 TABLET | Refills: 3 | Status: SHIPPED | OUTPATIENT
Start: 2019-10-18 | End: 2020-02-24 | Stop reason: SDUPTHER

## 2019-10-18 NOTE — PROGRESS NOTES
Discussed results of Holter monitor, patient will be started on Metoprolol succinate 12 5 mg twice daily

## 2019-10-18 NOTE — TELEPHONE ENCOUNTER
Left message with review of blood sugar log and to send new log in 1-2 weeks    Medication list updated

## 2019-10-23 ENCOUNTER — OFFICE VISIT (OUTPATIENT)
Dept: ENDOCRINOLOGY | Facility: CLINIC | Age: 59
End: 2019-10-23
Payer: COMMERCIAL

## 2019-10-23 VITALS
HEIGHT: 74 IN | WEIGHT: 288.4 LBS | SYSTOLIC BLOOD PRESSURE: 130 MMHG | HEART RATE: 92 BPM | BODY MASS INDEX: 37.01 KG/M2 | DIASTOLIC BLOOD PRESSURE: 82 MMHG

## 2019-10-23 DIAGNOSIS — E11.65 TYPE 2 DIABETES MELLITUS WITH HYPERGLYCEMIA, WITH LONG-TERM CURRENT USE OF INSULIN (HCC): Primary | ICD-10-CM

## 2019-10-23 DIAGNOSIS — Z79.4 TYPE 2 DIABETES MELLITUS WITH HYPERGLYCEMIA, WITH LONG-TERM CURRENT USE OF INSULIN (HCC): Primary | ICD-10-CM

## 2019-10-23 DIAGNOSIS — E78.2 MIXED HYPERLIPIDEMIA: ICD-10-CM

## 2019-10-23 PROCEDURE — 99214 OFFICE O/P EST MOD 30 MIN: CPT | Performed by: INTERNAL MEDICINE

## 2019-10-23 NOTE — PATIENT INSTRUCTIONS
Continue basaglar 45 units qHS and reduce novolog to 11 units + sliding scale insulin    Follow up in 6 weeks

## 2019-10-23 NOTE — PROGRESS NOTES
ENDOCRINOLOGY  FOLLOW UP VISIT      Reason for Endocrine Consult/Chief Complaint: DM management     ? Medical Decision Making:     Impression  1  Systemic sarcoidosis now on steroid taper-managed by rheumatology, on cellcept   2  DM2 uncontrolled on steroids   3  Acquired hypothyroidism- on levothyroxine  4  Vitamin D deficiency- on D3 replacemet  5  Mixed HLD- off statin therapy   7  Hx HCV s/p harvoni c/b liver cirrhosis      Recommendations:  ?  BGs downtrending now that prednisone dose decreasing  On 15mg prednisone for 2 weeks then 10mg for 2 weeks then staying on 5mg prednisone daily  Will continue basaglar 45 units qHS and reduce novolog 11 units TID AC + SSI  Continue metformin 500mg BID AC and Victoza 1 2mg qday       Goal is reduce insulin to the point where we can then discontinue prandial insulin and increase Victoza dose      Acquired hypothyroidism- continue levothyroxine, TSH at goal      Vitamin D level - continue 5000 IU D3 replacement     Mixed hyperlipidemia- not on any therapy- will discuss at next appointment and check lipids to assess       RTC 6 weeks   Euna Race M D               History of Present Illness:   Mr Sandie Matias is a 61yr old male who presents for DM2 follow up after hospital stay for DKA  He has a hx of liver cirrhosis on prior biopsy and had pulmonary sarcoidosis with extra thoracic involvement including trigeminal neuralgia type symptoms with right sided headaches and drooping of the right face and peripheral paresthesias  He also has possible eye involvement of his sarcoid  ?  PMH-HCV s/p harvoni c/b liver cirrhosis, DM2, hypothyroidism, systemic sarcoidosis, BPH, vitamin D deficiency, mixed HLD  PSH-shoulder surgery, liver biopsy, prostate surgery  FHx-no diabetes in family  SHx-neg x 2, works at [employer]     Type of DM: 2  Age of onset: 2007 diagnosed   Most recent A1C: 12 1% August 2019  Microvascular complications: neuropathy?   Macrovascular complications: none Huy Quiñones going to gym        Events since last visit:    getting work up for berylliosis   Getting prednisone taper 15mg for 2 weeks, 10mg for 2 weeks then staying on 5mg qday   Had holter monitoring done- placed on BB, having MRI heart soon    On basaglar 45 units qHS, novolog 14 units TID AC + SSI  FBG in low 100s, pre-meal/qHS in mid to low 100s  Had one hypoglycemia 63 after light lunch   ? Review of Systems:   Review of Systems   Constitutional: Negative for appetite change, chills, diaphoresis, fatigue, fever and unexpected weight change  HENT: Negative for congestion, ear pain, hearing loss, rhinorrhea, sinus pressure, sinus pain, sore throat, trouble swallowing and voice change  Eyes: Negative for photophobia, redness and visual disturbance  Respiratory: Negative for apnea, cough, chest tightness, shortness of breath, wheezing and stridor  Cardiovascular: Negative for chest pain, palpitations and leg swelling  Gastrointestinal: Negative for abdominal distention, abdominal pain, constipation, diarrhea, nausea and vomiting  Endocrine: Negative for cold intolerance, heat intolerance, polydipsia, polyphagia and polyuria  Genitourinary: Negative for difficulty urinating, dysuria, flank pain, frequency, hematuria and urgency  Musculoskeletal: Negative for arthralgias, back pain, gait problem, joint swelling and myalgias  Skin: Negative for color change, pallor, rash and wound  Allergic/Immunologic: Negative for immunocompromised state  Neurological: Negative for dizziness, tremors, syncope, weakness, light-headedness and headaches  Hematological: Negative for adenopathy  Does not bruise/bleed easily  Psychiatric/Behavioral: Negative for confusion and sleep disturbance  The patient is not nervous/anxious        Patient History:     Past Medical History:   Diagnosis Date    BPH (benign prostatic hyperplasia)     Diabetes mellitus ([facility] [address] )     Erectile dysfunction     GERD (gastroesophageal reflux disease)     Headache(784 0) 05/01/2018    Each Day    Hematuria     Infectious viral hepatitis 2007    Cured by Dr Joseph Sotelo Liver disease     hepatitis C    Mediastinal adenopathy     Obesity 2007    Vitamin D deficiency      Past Surgical History:   Procedure Laterality Date    COLONOSCOPY      last assessed: 08/28/2012; fiberoptic screening     CYSTOSCOPY      onset: 05/06/2016; Diagnostic     DENTAL SURGERY      LIVER BIOPSY      LYMPH NODE BIOPSY  6/2019    [place] BRONCHOSCOPY NEEDLE BX TRACHEA MAIN STEM&/BRON N/A 6/6/2019    Procedure: ENDOBRONCHIAL ULTRASOUND (EBUS);   Surgeon: Rosalind Kelly MD;  [facility];  Service: Thoracic    [place] [address] N/A 6/6/2019    Procedure: Leora Blade;  Surgeon: Rosalind Kelly MD;  [facility];  Service: Thoracic    [place] TRANSURETHRAL ELEC-SURG PROSTATECTOM N/A 9/13/2017    Procedure: Sherry Raphael; TUR PROSTATE;  Surgeon: Jacob Rodriguez MD;  [facility];  Service: Urology    PROSTATE SURGERY  2017    SHOULDER ARTHROSCOPY Right     bicep tendon repair, rotator cuff repair and acromuim shave      Social History     Socioeconomic History    Marital status: /Civil Union     Spouse name: Not on file    Number of children: Not on file    Years of education: Not on file    Highest education level: Not on file   Occupational History    Occupation: manager   Social Needs    Financial resource strain: Not on file    Food insecurity:     Worry: Not on file     Inability: Not on file    Transportation needs:     Medical: Not on file     Non-medical: Not on file   Tobacco Use    Smoking status: Former Smoker     Packs/day: 1 00     Years: 1 00     Pack years: 1 00     Types: Cigarettes     Last attempt to quit: 2009     Years since quitting: 10 8    Smokeless tobacco: Never Used   Substance and Sexual Activity    Alcohol use: No     Comment: quit 12 years ago   [facility] Drug use: No    Sexual activity: Not Currently     Partners: Female     Birth control/protection: None   Lifestyle    Physical activity:     Days per week: Not on file     Minutes per session: Not on file    Stress: Not on file   Relationships    Social connections:     Talks on phone: Not on file     Gets together: Not on file     Attends Sikhism service: Not on file     Active member of club or organization: Not on file     Attends meetings of clubs or organizations: Not on file     Relationship status: Not on file    Intimate partner violence:     Fear of current or ex partner: Not on file     Emotionally abused: Not on file     Physically abused: Not on file     Forced sexual activity: Not on file   Other Topics Concern    Not on file   Social History Narrative    Caffeine use    Daily coffee consumption     Denied daily cola consumption     Denied daily tea consumption     Former smoker (as per [employer])     Current everyday smoker (as per [employer])     Non-smoker (as per [employer])      Family History   Problem Relation Age of Onset    Stroke Father         Accidental Death at 52    Cancer Maternal Grandmother     Valvular heart disease Mother        Current Medications: At the time this note was written these were the medications the patient was on  Current Outpatient Medications   Medication Sig Dispense Refill    apixaban (ELIQUIS) 5 mg Take 1 tablet (5 mg total) by mouth 2 (two) times a day Take 2 tabs twice a day for one week then one tab twice a day thereafter   60 tablet 4    Cholecalciferol (VITAMIN D3) 5000 units CAPS Take 1 capsule by mouth daily       famotidine (PEPCID) 40 MG tablet Take 40 mg by mouth daily       glucose 4 g chewable tablet Chew 4 tablets (16 g total) as needed for low blood sugar 100 tablet 1    insulin aspart (NOVOLOG FLEXPEN) 100 Units/mL injection pen Inject 14 Units under the skin 3 (three) times a day before meals 5 pen 3    insulin glargine (LANTUS SOLOSTAR) 100 units/mL injection pen Inject 47 Units under the skin daily      Insulin Pen Needle (PEN NEEDLES) 31G X 6 MM MISC by Does not apply route 4 (four) times a day 150 each 4    levothyroxine 50 mcg tablet TAKE 1 TABLET BY MOUTH EVERY DAY 30 tablet 5    liraglutide (VICTOZA) injection Inject 0 2 mL (1 2 mg total) under the skin daily 2 pen 4    metFORMIN (GLUCOPHAGE) 500 mg tablet TAKE 1 TABLET BY MOUTH TWICE A DAY WITH MEALS 60 tablet 5    metoprolol succinate (TOPROL-XL) 25 mg 24 hr tablet Take 0 5 tablets (12 5 mg total) by mouth 2 (two) times a day 90 tablet 3    mycophenolate (CELLCEPT) 500 mg tablet 1 tab daily x1 week, then 1 tab BID x1 week, then 2 tabs in AM and 1 tab in PM x1 week, then 2 tabs BID thereafter  Talk with your rheumatologist about resuming  (Patient taking differently: Take 2 tablets twice daily) 120 tablet 0    NOVOTWIST 32G X 5 MM MISC       ONETOUCH DELICA LANCETS 54G MISC USE THREE TIMES DAILY 100 each 5    ONETOUCH VERIO test strip Use to test blood sugar 4 times a day 100 each 3    predniSONE 20 mg tablet Take 3 tablets (60 mg total) by mouth daily (Patient taking differently: Take 20 mg by mouth daily ) 90 tablet 0    predniSONE 5 mg tablet Take 3 tabs (15mg) x2 weeks, then 2 tabs (10mg) x2 weeks, then take 1 tab (5mg) daily and stay on that dose 90 tablet 1     No current facility-administered medications for this visit  Allergies: No active allergies    Physical Exam:   Vital Signs:   /82   Pulse 92   Ht 6' 2" (1 88 m)   Wt 131 kg (288 lb 6 4 oz)   BMI 37 03 kg/m²     Physical Exam   Constitutional: He is oriented to person, place, and time  He appears well-developed and well-nourished  HENT:   Head: Normocephalic and atraumatic  Nose: Nose normal    Mouth/Throat: Oropharynx is clear and moist  No oropharyngeal exudate  Eyes: Pupils are equal, round, and reactive to light  Conjunctivae and EOM are normal  No scleral icterus     Neck: Normal range of motion  Neck supple  No thyromegaly present  Cardiovascular: Normal rate, regular rhythm and normal heart sounds  Exam reveals no gallop and no friction rub  No murmur heard  Pulmonary/Chest: Breath sounds normal  No stridor  No respiratory distress  He has no wheezes  He has no rales  Abdominal: Soft  Bowel sounds are normal  He exhibits no distension and no mass  There is no tenderness  There is no rebound and no guarding  Musculoskeletal: Normal range of motion  He exhibits no edema  Lymphadenopathy:     He has no cervical adenopathy  Neurological: He is alert and oriented to person, place, and time  Skin: Skin is warm and dry  No rash noted  No erythema  No pallor  Psychiatric: He has a normal mood and affect   His behavior is normal  Judgment and thought content normal         Labs and Imaging:    No recent labs

## 2019-11-01 ENCOUNTER — TELEPHONE (OUTPATIENT)
Dept: ENDOCRINOLOGY | Facility: CLINIC | Age: 59
End: 2019-11-01

## 2019-11-01 NOTE — TELEPHONE ENCOUNTER
Called patient and reviewed blood sugars    Both fasting and postprandial blood sugars at goal no change in insulin regimen now    Continue basaglar 45 units at bedtime and 11 units of NovoLog 3 times a day with meals plus sliding scale insulin  He decreased his prednisone to 10 mg today for another 2 weeks  Instructed him to let me know if his blood sugars are starting to trend downward, then we can reduce his insulin further  Addi WITT    Endocrinology

## 2019-11-07 ENCOUNTER — TELEPHONE (OUTPATIENT)
Dept: PULMONOLOGY | Facility: CLINIC | Age: 59
End: 2019-11-07

## 2019-11-07 ENCOUNTER — TELEPHONE (OUTPATIENT)
Dept: OBGYN CLINIC | Facility: HOSPITAL | Age: 59
End: 2019-11-07

## 2019-11-07 DIAGNOSIS — D86.9 SARCOIDOSIS: ICD-10-CM

## 2019-11-07 RX ORDER — MYCOPHENOLATE MOFETIL 500 MG/1
1000 TABLET ORAL 2 TIMES DAILY
Qty: 120 TABLET | Refills: 2 | Status: ON HOLD | OUTPATIENT
Start: 2019-11-07 | End: 2020-02-06 | Stop reason: CLARIF

## 2019-11-07 NOTE — TELEPHONE ENCOUNTER
Patient calling saying Dr Denis Francisco wanted him to go for berrylium lymphocyte proliferation test but was told [facility] does not do this test  He went to a [facility] and they did take blood from him but the results stated "Test not performed  Unsuitable for analysis due to the age of the specimen " He is not sure what that means and if he needs to get it done again  He also would like to know where to go  He would prefer to go to a [facility] if possible  Please advise

## 2019-11-07 NOTE — TELEPHONE ENCOUNTER
I called the lab to find out if they could draw this particular lab work as it was wrong collected at [facility]  Patient can but only at the [address] [address] in [place] as it is a specialized test and needs to be sent out to the specialized lab, [facility], within 24hrs of collection  [facility] was not aware of this when they took pt's blood and that's why [facility] was not able to process the specimen correctly  I have called the patient back and explained this to him  Patient is fine with going to the [address] in [place] for the lab work  Bee from the lab will call pt to have him come in to have the lab work drawn

## 2019-11-07 NOTE — TELEPHONE ENCOUNTER
Pt contacted Call Center requested refill of their medication  Medication Name:mycophenolate (CELLCEPT) 500 mg tablet      Dosage of Med:      Frequency of Med:2 in morning, 2 at night      Remaining Medication:6 tablets      Pharmacy and Location:[facility] on file        Pt  Preferred Callback Phone Samantha Trotter      Thank you

## 2019-11-11 ENCOUNTER — TELEPHONE (OUTPATIENT)
Dept: ENDOCRINOLOGY | Facility: CLINIC | Age: 59
End: 2019-11-11

## 2019-11-11 ENCOUNTER — TRANSCRIBE ORDERS (OUTPATIENT)
Dept: LAB | Facility: AMBULARY SURGERY CENTER | Age: 59
End: 2019-11-11

## 2019-11-11 ENCOUNTER — OFFICE VISIT (OUTPATIENT)
Dept: GASTROENTEROLOGY | Facility: AMBULARY SURGERY CENTER | Age: 59
End: 2019-11-11
Payer: COMMERCIAL

## 2019-11-11 ENCOUNTER — APPOINTMENT (OUTPATIENT)
Dept: LAB | Facility: AMBULARY SURGERY CENTER | Age: 59
End: 2019-11-11
Payer: COMMERCIAL

## 2019-11-11 VITALS
RESPIRATION RATE: 16 BRPM | BODY MASS INDEX: 37.09 KG/M2 | DIASTOLIC BLOOD PRESSURE: 60 MMHG | HEART RATE: 90 BPM | HEIGHT: 74 IN | WEIGHT: 289 LBS | TEMPERATURE: 98.1 F | SYSTOLIC BLOOD PRESSURE: 124 MMHG

## 2019-11-11 DIAGNOSIS — E78.2 MIXED HYPERLIPIDEMIA: ICD-10-CM

## 2019-11-11 DIAGNOSIS — D86.1 BENIGN LYMPHOGRANULOMATOSIS OF SCHAUMANN: Primary | ICD-10-CM

## 2019-11-11 DIAGNOSIS — D86.9 SARCOIDOSIS: ICD-10-CM

## 2019-11-11 DIAGNOSIS — E11.65 TYPE 2 DIABETES MELLITUS WITH HYPERGLYCEMIA, WITH LONG-TERM CURRENT USE OF INSULIN (HCC): ICD-10-CM

## 2019-11-11 DIAGNOSIS — R59.0 MEDIASTINAL LYMPHADENOPATHY: ICD-10-CM

## 2019-11-11 DIAGNOSIS — K29.70 GASTRITIS WITHOUT BLEEDING, UNSPECIFIED CHRONICITY, UNSPECIFIED GASTRITIS TYPE: ICD-10-CM

## 2019-11-11 DIAGNOSIS — Z79.4 TYPE 2 DIABETES MELLITUS WITH HYPERGLYCEMIA, WITH LONG-TERM CURRENT USE OF INSULIN (HCC): ICD-10-CM

## 2019-11-11 DIAGNOSIS — B18.2 CHRONIC HEPATITIS C WITHOUT HEPATIC COMA (HCC): ICD-10-CM

## 2019-11-11 DIAGNOSIS — D86.1 BENIGN LYMPHOGRANULOMATOSIS OF SCHAUMANN: ICD-10-CM

## 2019-11-11 DIAGNOSIS — B18.2 CHRONIC HEPATITIS C WITHOUT HEPATIC COMA (HCC): Primary | ICD-10-CM

## 2019-11-11 LAB
ALBUMIN SERPL BCP-MCNC: 4.2 G/DL (ref 3.5–5)
ALP SERPL-CCNC: 50 U/L (ref 46–116)
ALT SERPL W P-5'-P-CCNC: 29 U/L (ref 12–78)
ANION GAP SERPL CALCULATED.3IONS-SCNC: 7 MMOL/L (ref 4–13)
AST SERPL W P-5'-P-CCNC: 13 U/L (ref 5–45)
BASOPHILS # BLD AUTO: 0.06 THOUSANDS/ΜL (ref 0–0.1)
BASOPHILS NFR BLD AUTO: 1 % (ref 0–1)
BILIRUB SERPL-MCNC: 0.81 MG/DL (ref 0.2–1)
BUN SERPL-MCNC: 24 MG/DL (ref 5–25)
CALCIUM SERPL-MCNC: 9.3 MG/DL (ref 8.3–10.1)
CHLORIDE SERPL-SCNC: 107 MMOL/L (ref 100–108)
CHOLEST SERPL-MCNC: 240 MG/DL (ref 50–200)
CO2 SERPL-SCNC: 24 MMOL/L (ref 21–32)
CREAT SERPL-MCNC: 1.06 MG/DL (ref 0.6–1.3)
CRP SERPL QL: <3 MG/L
EOSINOPHIL # BLD AUTO: 0.07 THOUSAND/ΜL (ref 0–0.61)
EOSINOPHIL NFR BLD AUTO: 1 % (ref 0–6)
ERYTHROCYTE [DISTWIDTH] IN BLOOD BY AUTOMATED COUNT: 12.8 % (ref 11.6–15.1)
ERYTHROCYTE [SEDIMENTATION RATE] IN BLOOD: 19 MM/HOUR (ref 0–10)
EST. AVERAGE GLUCOSE BLD GHB EST-MCNC: 160 MG/DL
GFR SERPL CREATININE-BSD FRML MDRD: 76 ML/MIN/1.73SQ M
GLUCOSE P FAST SERPL-MCNC: 152 MG/DL (ref 65–99)
HBA1C MFR BLD: 7.2 % (ref 4.2–6.3)
HCT VFR BLD AUTO: 44.5 % (ref 36.5–49.3)
HDLC SERPL-MCNC: 44 MG/DL
HGB BLD-MCNC: 14.6 G/DL (ref 12–17)
IMM GRANULOCYTES # BLD AUTO: 0.06 THOUSAND/UL (ref 0–0.2)
IMM GRANULOCYTES NFR BLD AUTO: 1 % (ref 0–2)
LDLC SERPL CALC-MCNC: 141 MG/DL (ref 0–100)
LYMPHOCYTES # BLD AUTO: 1.54 THOUSANDS/ΜL (ref 0.6–4.47)
LYMPHOCYTES NFR BLD AUTO: 20 % (ref 14–44)
MCH RBC QN AUTO: 31.8 PG (ref 26.8–34.3)
MCHC RBC AUTO-ENTMCNC: 32.8 G/DL (ref 31.4–37.4)
MCV RBC AUTO: 97 FL (ref 82–98)
MONOCYTES # BLD AUTO: 0.62 THOUSAND/ΜL (ref 0.17–1.22)
MONOCYTES NFR BLD AUTO: 8 % (ref 4–12)
NEUTROPHILS # BLD AUTO: 5.22 THOUSANDS/ΜL (ref 1.85–7.62)
NEUTS SEG NFR BLD AUTO: 69 % (ref 43–75)
NONHDLC SERPL-MCNC: 196 MG/DL
NRBC BLD AUTO-RTO: 0 /100 WBCS
PLATELET # BLD AUTO: 208 THOUSANDS/UL (ref 149–390)
PMV BLD AUTO: 9.6 FL (ref 8.9–12.7)
POTASSIUM SERPL-SCNC: 4.3 MMOL/L (ref 3.5–5.3)
PROT SERPL-MCNC: 7.4 G/DL (ref 6.4–8.2)
RBC # BLD AUTO: 4.59 MILLION/UL (ref 3.88–5.62)
SODIUM SERPL-SCNC: 138 MMOL/L (ref 136–145)
TRIGL SERPL-MCNC: 276 MG/DL
WBC # BLD AUTO: 7.57 THOUSAND/UL (ref 4.31–10.16)

## 2019-11-11 PROCEDURE — 83036 HEMOGLOBIN GLYCOSYLATED A1C: CPT

## 2019-11-11 PROCEDURE — 87522 HEPATITIS C REVRS TRNSCRPJ: CPT

## 2019-11-11 PROCEDURE — 85025 COMPLETE CBC W/AUTO DIFF WBC: CPT | Performed by: INTERNAL MEDICINE

## 2019-11-11 PROCEDURE — 86140 C-REACTIVE PROTEIN: CPT | Performed by: INTERNAL MEDICINE

## 2019-11-11 PROCEDURE — 85652 RBC SED RATE AUTOMATED: CPT | Performed by: INTERNAL MEDICINE

## 2019-11-11 PROCEDURE — 80053 COMPREHEN METABOLIC PANEL: CPT | Performed by: INTERNAL MEDICINE

## 2019-11-11 PROCEDURE — 80061 LIPID PANEL: CPT

## 2019-11-11 PROCEDURE — 99244 OFF/OP CNSLTJ NEW/EST MOD 40: CPT | Performed by: INTERNAL MEDICINE

## 2019-11-11 PROCEDURE — 87389 HIV-1 AG W/HIV-1&-2 AB AG IA: CPT

## 2019-11-11 RX ORDER — FAMOTIDINE 40 MG/1
40 TABLET, FILM COATED ORAL DAILY
Qty: 30 TABLET | Refills: 3 | Status: SHIPPED | OUTPATIENT
Start: 2019-11-11 | End: 2020-05-27 | Stop reason: SDUPTHER

## 2019-11-11 NOTE — LETTER
November 11, 2019     DO [address]  [address]  [facility]    Patient: Jeremy Bruno   YOB: 1960   Date of Visit: 11/11/2019       Dear Dr Shala Madera: Thank you for referring Paz Child to me for evaluation  Below are my notes for this consultation  If you have questions, please do not hesitate to call me  I look forward to following your patient along with you  Sincerely,        Makayla Murrell MD        CC: No Recipients  Makayla Murrell MD  11/11/2019 11:47 AM  Sign at close encounter  Consultation - [facility]) [facility]  Jeremy Bruno 60 y o  male MRN: [id number]  Unit/Bed#:  Encounter: [id number]        Consults    ASSESSMENT/PLAN:     1  History of hepatitis-C-genotype 1A, completed treatment with Harvoni in 2017  No recent genotype  His most recent LFTs are normal (AST 9, ALT 31), transaminases are normal   Albumin 3 8  Platelets are 994  INR is normal   -will check viral load  -obtain right upper quadrant ultrasound to assess for [address]  -will plan for EGD after he is off of Eliquis for variceal surveillance  Patient underwent EGD in 2018 which did not show any evidence of esophageal or gastric varices  2  Gastritis/duodenal erosions-currently on Pepcid 40 mg with good control of GI symptoms  Denies any abdominal pain, nausea or vomiting  No loss of appetite or unintentional weight loss  3  Colon cancer screening-up to date, repeat colonoscopy in 5 years  No family history of GI malignancy          ______________________________________________________________________    Reason for Consult / Principal Problem: [unfilled]    HPI: Jeremy Bruno is a 60y o  year old male with history of systemic sarcoidosis, on steroid tea per, currently on prednisone 5 mg and recently started on CellCept, type 2 diabetes, hypothyroidism, hyperlipidemia, HCV cirrhosis genotype 1A, previously incompletely treated with PEG and Brittany, most recently was treated with Jaime Salcedo in 2017 presents to establish care  He has previously been seen by Dr Zeeshan Parada  As per documentation, he did have thrombocytopenia in the past, most recent platelets are normal   He underwent EGD in August of 2018 without any evidence of esophageal or gastric varices  He did have gastritis and duodenal erosion  He has been on Pepcid  He also underwent colonoscopy at that time and was noted to have diverticulosis and internal hemorrhoids  He was recommended a repeat colonoscopy at 10 year interval   Patient is otherwise asymptomatic  He denies loss of appetite, weight loss, abdominal pain or dysphagia  Denies melena or hematochezia  No change in bowel habits  He endorses having gained weight over this past few months since starting steroids  He currently weighs 289 lb  He is on Eliquis for chronic pulmonary embolism, this plan to be on Eliquis until March of 2020  Review of Systems: The remainder of the review of systems was negative except for the pertinent positives noted in HPI  Historical Information   Past Medical History:   Diagnosis Date    BPH (benign prostatic hyperplasia)     Diabetes mellitus ([address] )     Erectile dysfunction     GERD (gastroesophageal reflux disease)     Headache(784 0) 05/01/2018    Each Day    Hematuria     Infectious viral hepatitis 2007    Cured by Dr Zeeshan Watson Liver disease     hepatitis C    Mediastinal adenopathy     Obesity 2007    Vitamin D deficiency      Past Surgical History:   Procedure Laterality Date    COLONOSCOPY      last assessed: 08/28/2012; fiberoptic screening     CYSTOSCOPY      onset: 05/06/2016; Diagnostic     DENTAL SURGERY      LIVER BIOPSY      LYMPH NODE BIOPSY  6/2019    [place] BRONCHOSCOPY NEEDLE BX TRACHEA MAIN STEM&/BRON N/A 6/6/2019    Procedure: ENDOBRONCHIAL ULTRASOUND (EBUS);   Surgeon: Theola Alpers, MD;  [facility];  Service: Thoracic    [place] [address] N/A 6/6/2019    Procedure: Dottie Reveal;  Surgeon: Siri Presley MD;  [facility];  Service: Thoracic    [place] TRANSURETHRAL ELEC-SURG PROSTATECTOM N/A 9/13/2017    Procedure: Yury Crew; TUR PROSTATE;  Surgeon: Rockie Lombard, MD;  [facility];  Service: Urology    PROSTATE SURGERY  2017    SHOULDER ARTHROSCOPY Right     bicep tendon repair, rotator cuff repair and acromuim shave      Social History   Social History     Substance and Sexual Activity   Alcohol Use No    Comment: quit 12 years ago     Social History     Substance and Sexual Activity   Drug Use No     Social History     Tobacco Use   Smoking Status Former Smoker    Packs/day: 1 00    Years: 1 00    Pack years: 1 00    Types: Cigarettes    Last attempt to quit: 2009    Years since quitting: 10 8   Smokeless Tobacco Never Used     Family History   Problem Relation Age of Onset    Stroke Father         Accidental Death at 52    Cancer Maternal Grandmother     Valvular heart disease Mother        Meds/Allergies       (Not in a hospital admission)  No current facility-administered medications for this visit  Allergies   Allergen Reactions    No Active Allergies        Objective     Blood pressure 124/60, pulse 90, temperature 98 1 °F (36 7 °C), temperature source Tympanic, resp  rate 16, height 6' 2 4" (1 89 m), weight 131 kg (289 lb)  [unfilled]    PHYSICAL EXAM     GEN: well nourished, well developed, no acute distress  HEENT: anicteric, MMM, no cervical or supraclavicular lymphadenopathy  CV: RRR, no m/r/g  CHEST: CTA b/l, no WRR  ABD: +BS, soft, NT/ND, no hepatosplenomegaly  EXT: no c/c/e  SKIN: no rashes,  NEURO: aaox3    Lab Results:   No visits with results within 1 Day(s) from this visit     Latest known visit with results is:   Orders Only on 10/17/2019   Component Date Value    Ref Lab Test Name 10/17/2019 Beryllium Lymphocyte Proliferation     Ref Lab Test Results 10/17/2019 [facility]      Imaging Studies: I have personally reviewed pertinent films in PACS

## 2019-11-11 NOTE — PROGRESS NOTES
Consultation - [facility]  Otto Knox 60 y o  male MRN: [id number]  Unit/Bed#:  Encounter: [id number]        Consults    ASSESSMENT/PLAN:     1  History of hepatitis-C-genotype 1A, completed treatment with Harvoni in 2017  No recent viral load  His most recent LFTs are normal (AST 9, ALT 31), transaminases are normal   Albumin 3 8  Platelets are 154  INR is normal   -will check viral load  -obtain right upper quadrant ultrasound to assess for [address]  Check AFP  -will plan for EGD after he is off of Eliquis for variceal surveillance  Patient will be coming off of Eliquis in March of 2020, can proceed with EGD thereafter  Patient underwent EGD in 2018 which did not show any evidence of esophageal or gastric varices  -hepatitis B surface antibody was low, will need series a vaccination by his PCP  2  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-while he has had history of thrombocytopenia, his most recent labs show normal platelets, normal albumin and normal INR  Clinically, he does not have any evidence of cirrhosis  His imaging in the past was notable for nodular appearance of the liver on ultrasound but most recent CT a shows hepatomegaly and hepatic steatosis  -will proceed with EGD for variceal screening   -no evidence of hepatic encephalopathy on exam   -no ascites or peripheral edema  3   Gastritis/duodenal erosions-currently on Pepcid 40 mg with good control of GI symptoms  Denies any abdominal pain, nausea or vomiting  No loss of appetite or unintentional weight loss  -will proceed with EGD for further evaluation   -avoid the use of NSAIDs  4  Colon cancer screening-up to date, repeat colonoscopy in 9 years  No family history of GI malignancy          ______________________________________________________________________    Reason for Consult / Principal Problem: [unfilled]    HPI: Otot Knox is a 60y o  year old male with history of systemic sarcoidosis, on steroid tea per, currently on prednisone 5 mg and recently started on CellCept, type 2 diabetes, hypothyroidism, hyperlipidemia, HCV cirrhosis genotype 1A, previously incompletely treated with PEG and Riba, most recently was treated with Yetta Curlin in 2017 presents to establish care  He has previously been seen by Dr Saurabh Lorenzo  As per documentation, he did have thrombocytopenia in the past, most recent platelets are normal   He underwent EGD in August of 2018 without any evidence of esophageal or gastric varices  He did have gastritis and duodenal erosion  He has been on Pepcid  He also underwent colonoscopy at that time and was noted to have diverticulosis and internal hemorrhoids  He was recommended a repeat colonoscopy at 10 year interval   Patient is otherwise asymptomatic  He denies loss of appetite, weight loss, abdominal pain or dysphagia  Denies melena or hematochezia  No change in bowel habits  He endorses having gained weight over this past few months since starting steroids  He currently weighs 289 lb  He denies jaundice, pruritus, abdominal distention or peripheral edema  He is on Eliquis for chronic pulmonary embolism, this plan to be on Eliquis until March of 2020  Review of Systems: The remainder of the review of systems was negative except for the pertinent positives noted in HPI       Historical Information   Past Medical History:   Diagnosis Date    BPH (benign prostatic hyperplasia)     Diabetes mellitus ([address] )     Erectile dysfunction     GERD (gastroesophageal reflux disease)     Headache(784 0) 05/01/2018    Each Day    Hematuria     Infectious viral hepatitis 2007    Cured by Dr Gloriajean Kill Der Cand[facility] Bending Liver disease     hepatitis C    Mediastinal adenopathy     Obesity 2007    Vitamin D deficiency      Past Surgical History:   Procedure Laterality Date    COLONOSCOPY      last assessed: 08/28/2012; fiberoptic screening     CYSTOSCOPY      onset: 05/06/2016; Diagnostic     DENTAL SURGERY      LIVER BIOPSY      LYMPH NODE BIOPSY  6/2019    [place] BRONCHOSCOPY NEEDLE BX TRACHEA MAIN STEM&/BRON N/A 6/6/2019    Procedure: ENDOBRONCHIAL ULTRASOUND (EBUS); Surgeon: Portia Mendez MD;  [facility];  Service: Thoracic    [address] N/A 6/6/2019    Procedure: Marilyn Gone;  Surgeon: Portia Mendez MD;  [facility];  Service: Thoracic    [place] TRANSURETHRAL ELEC-SURG PROSTATECTOM N/A 9/13/2017    Procedure: Destini Winona; TUR PROSTATE;  Surgeon: Sai Metz MD;  [facility];  Service: Urology    PROSTATE SURGERY  2017    SHOULDER ARTHROSCOPY Right     bicep tendon repair, rotator cuff repair and acromuim shave      Social History   Social History     Substance and Sexual Activity   Alcohol Use No    Comment: quit 12 years ago     Social History     Substance and Sexual Activity   Drug Use No     Social History     Tobacco Use   Smoking Status Former Smoker    Packs/day: 1 00    Years: 1 00    Pack years: 1 00    Types: Cigarettes    Last attempt to quit: 2009    Years since quitting: 10 8   Smokeless Tobacco Never Used     Family History   Problem Relation Age of Onset    Stroke Father         Accidental Death at 52    Cancer Maternal Grandmother     Valvular heart disease Mother        Meds/Allergies       (Not in a hospital admission)  No current facility-administered medications for this visit  Allergies   Allergen Reactions    No Active Allergies        Objective     Blood pressure 124/60, pulse 90, temperature 98 1 °F (36 7 °C), temperature source Tympanic, resp  rate 16, height 6' 2 4" (1 89 m), weight 131 kg (289 lb)      [unfilled]    PHYSICAL EXAM     GEN: well nourished, well developed, no acute distress  HEENT: anicteric, MMM, no cervical or supraclavicular lymphadenopathy  CV: RRR, no m/r/g  CHEST: CTA b/l, no WRR  ABD: +BS, soft, NT/ND, no hepatosplenomegaly  EXT: no c/c/e  SKIN: no rashes,  NEURO: aaox3    Lab Results:   No visits with results within 1 Day(s) from this visit     Latest known visit with results is:   Orders Only on 10/17/2019   Component Date Value    Ref Lab Test Name 10/17/2019 Beryllium Lymphocyte Proliferation     Ref Lab Test Results 10/17/2019 TNP      Imaging Studies: I have personally reviewed pertinent films in PACS

## 2019-11-12 LAB — HIV 1+2 AB+HIV1 P24 AG SERPL QL IA: NORMAL

## 2019-11-12 NOTE — TELEPHONE ENCOUNTER
Keena Pack    Please call pt and let him know his labs    -A1C is great at 7 2% down from 12% August 2019  -LDL bad cholesterol and triglycerides are a little high but likely due to steroid use, will repeat this once off steroids for a few months before considering cholesterol medication    Jemima WITT    Endocrinology    Component      Latest Ref Rng & Units 11/11/2019   Cholesterol      50 - 200 mg/dL 240 (H)   Triglycerides      <=150 mg/dL 276 (H)   HDL      >=40 mg/dL 44   LDL Direct      0 - 100 mg/dL 141 (H)   Non-HDL Cholesterol      mg/dl 196   Hemoglobin A1C      4 2 - 6 3 % 7 2 (H)   EAG      mg/dl 160

## 2019-11-14 ENCOUNTER — TELEPHONE (OUTPATIENT)
Dept: GASTROENTEROLOGY | Facility: CLINIC | Age: 59
End: 2019-11-14

## 2019-11-14 LAB
HCV RNA SERPL NAA+PROBE-ACNC: NORMAL IU/ML
TEST INFORMATION: NORMAL

## 2019-11-14 NOTE — LETTER
November 14, 2019     Nadine Prajapati  [address] [facility] [address]      Dear Mr Shoshana Winston:          Our office has attempted to call you in regards to your results, however, we have been unable to get a hold of you  Please give our office a call so we can review your results and answer any questions you may have in regards to your recent Lab work               Sincerely,        [facility]

## 2019-11-14 NOTE — TELEPHONE ENCOUNTER
----- Message from Kaela Meade MD sent at 11/14/2019 12:13 PM EST -----  PLEASE INFORM THE PATIENT THAT HEPATITIS-C RNA IS UNDETECTABLE

## 2019-11-15 ENCOUNTER — TELEPHONE (OUTPATIENT)
Dept: ENDOCRINOLOGY | Facility: CLINIC | Age: 59
End: 2019-11-15

## 2019-11-16 ENCOUNTER — HOSPITAL ENCOUNTER (OUTPATIENT)
Dept: RADIOLOGY | Facility: MEDICAL CENTER | Age: 59
Discharge: HOME | End: 2019-11-16
Payer: COMMERCIAL

## 2019-11-16 DIAGNOSIS — B18.2 CHRONIC HEPATITIS C WITHOUT HEPATIC COMA (HCC): ICD-10-CM

## 2019-11-16 PROCEDURE — 76705 ECHO EXAM OF ABDOMEN: CPT

## 2019-11-18 ENCOUNTER — TELEPHONE (OUTPATIENT)
Dept: PULMONOLOGY | Facility: CLINIC | Age: 59
End: 2019-11-18

## 2019-11-18 NOTE — TELEPHONE ENCOUNTER
I called patient and informed him that the results are not back yet and that they are still processing but that we would get back to him as soon as we got the results back  Patient was appreciative for the update

## 2019-11-21 DIAGNOSIS — K80.20 CALCULUS OF GALLBLADDER WITHOUT CHOLECYSTITIS WITHOUT OBSTRUCTION: Primary | ICD-10-CM

## 2019-11-21 NOTE — TELEPHONE ENCOUNTER
I called the lab to find out what the status of the test result was and they said it is still processing  The lab will call the outside laboratory and check to see what the approximate turn around time will be for results  I have again called patient and informed him of this and that as soon as we know the results, we will call him

## 2019-11-22 ENCOUNTER — TRANSCRIBE ORDERS (OUTPATIENT)
Dept: LAB | Facility: HOSPITAL | Age: 59
End: 2019-11-22

## 2019-11-22 ENCOUNTER — TELEPHONE (OUTPATIENT)
Dept: ENDOCRINOLOGY | Facility: CLINIC | Age: 59
End: 2019-11-22

## 2019-11-22 ENCOUNTER — LAB (OUTPATIENT)
Dept: LAB | Facility: HOSPITAL | Age: 59
End: 2019-11-22
Payer: COMMERCIAL

## 2019-11-22 ENCOUNTER — TELEPHONE (OUTPATIENT)
Dept: GASTROENTEROLOGY | Facility: AMBULARY SURGERY CENTER | Age: 59
End: 2019-11-22

## 2019-11-22 DIAGNOSIS — R59.0 BILATERAL HILAR ADENOPATHY SYNDROME: Primary | ICD-10-CM

## 2019-11-22 DIAGNOSIS — R59.0 BILATERAL HILAR ADENOPATHY SYNDROME: ICD-10-CM

## 2019-11-22 PROCEDURE — 86353 LYMPHOCYTE TRANSFORMATION: CPT

## 2019-11-22 PROCEDURE — 36415 COLL VENOUS BLD VENIPUNCTURE: CPT

## 2019-11-22 NOTE — TELEPHONE ENCOUNTER
----- Message from Vika Fong MD sent at 11/21/2019  4:19 PM EST -----  Please inform the patient that there is no liver lesion noted on ultrasound but there is evidence of a large gallstone measuring 9 mm within the neck of the gallbladder  No evidence of inflammation of the gallbladder however will refer him to surgery for further evaluation

## 2019-11-22 NOTE — TELEPHONE ENCOUNTER
Called patient and reviewed BG log    Had a low BG 71 before dinner last night    Instructed to reduce basaglar to 42 units qHS and to reduce novolog to 9 units standing TID AC and to stop using SSI    Continue metformin    Instructed to stop Victoza for now given its predisposition to gallstones until he gets his newly discovered gallstone evaluated further    Earnest WITT    Endocrinology

## 2019-11-26 ENCOUNTER — HOSPITAL ENCOUNTER (OUTPATIENT)
Dept: RADIOLOGY | Facility: HOSPITAL | Age: 59
Discharge: HOME | End: 2019-11-26
Payer: COMMERCIAL

## 2019-11-26 VITALS
OXYGEN SATURATION: 100 % | HEART RATE: 62 BPM | DIASTOLIC BLOOD PRESSURE: 67 MMHG | RESPIRATION RATE: 20 BRPM | SYSTOLIC BLOOD PRESSURE: 128 MMHG

## 2019-11-26 DIAGNOSIS — R06.02 SOB (SHORTNESS OF BREATH) ON EXERTION: ICD-10-CM

## 2019-11-26 DIAGNOSIS — D86.9 SARCOIDOSIS: ICD-10-CM

## 2019-11-26 LAB
ATRIAL RATE: 83 BPM
ATRIAL RATE: 85 BPM
P AXIS: 52 DEGREES
P AXIS: 65 DEGREES
PR INTERVAL: 158 MS
PR INTERVAL: 168 MS
QRS AXIS: 53 DEGREES
QRS AXIS: 68 DEGREES
QRSD INTERVAL: 74 MS
QRSD INTERVAL: 76 MS
QT INTERVAL: 380 MS
QT INTERVAL: 386 MS
QTC INTERVAL: 452 MS
QTC INTERVAL: 453 MS
T WAVE AXIS: 59 DEGREES
T WAVE AXIS: 71 DEGREES
VENTRICULAR RATE: 83 BPM
VENTRICULAR RATE: 85 BPM

## 2019-11-26 PROCEDURE — 93005 ELECTROCARDIOGRAM TRACING: CPT

## 2019-11-26 PROCEDURE — A9585 GADOBUTROL INJECTION: HCPCS | Performed by: STUDENT IN AN ORGANIZED HEALTH CARE EDUCATION/TRAINING PROGRAM

## 2019-11-26 PROCEDURE — 93010 ELECTROCARDIOGRAM REPORT: CPT | Performed by: INTERNAL MEDICINE

## 2019-11-26 PROCEDURE — 75563 CARD MRI W/STRESS IMG & DYE: CPT

## 2019-11-26 RX ADMIN — REGADENOSON 0.4 MG: 0.08 INJECTION, SOLUTION INTRAVENOUS at 08:55

## 2019-11-26 RX ADMIN — GADOBUTROL 24 ML: 604.72 INJECTION INTRAVENOUS at 09:05

## 2019-11-26 NOTE — PROGRESS NOTES
Pt stable post stress test in holding area eating breakfast with coffee  Pt denies the needs for anything when asked

## 2019-11-29 ENCOUNTER — CONSULT (OUTPATIENT)
Dept: SURGERY | Facility: CLINIC | Age: 59
End: 2019-11-29
Payer: COMMERCIAL

## 2019-11-29 VITALS
DIASTOLIC BLOOD PRESSURE: 70 MMHG | BODY MASS INDEX: 37.22 KG/M2 | SYSTOLIC BLOOD PRESSURE: 114 MMHG | TEMPERATURE: 95.5 F | HEIGHT: 74 IN | WEIGHT: 290 LBS | HEART RATE: 80 BPM

## 2019-11-29 DIAGNOSIS — K74.60 UNSPECIFIED CIRRHOSIS OF LIVER (HCC): Primary | ICD-10-CM

## 2019-11-29 DIAGNOSIS — G60.8 POLYNEUROPATHY, PERIPHERAL SENSORIMOTOR AXONAL: ICD-10-CM

## 2019-11-29 DIAGNOSIS — E11.9 TYPE 2 DIABETES MELLITUS WITHOUT COMPLICATION, WITHOUT LONG-TERM CURRENT USE OF INSULIN (HCC): ICD-10-CM

## 2019-11-29 DIAGNOSIS — D86.0 PULMONARY SARCOIDOSIS (HCC): ICD-10-CM

## 2019-11-29 DIAGNOSIS — K80.20 CALCULUS OF GALLBLADDER WITHOUT CHOLECYSTITIS WITHOUT OBSTRUCTION: ICD-10-CM

## 2019-11-29 DIAGNOSIS — E03.9 HYPOTHYROID: ICD-10-CM

## 2019-11-29 DIAGNOSIS — Z86.19 HISTORY OF HEPATITIS C VIRUS INFECTION: ICD-10-CM

## 2019-11-29 DIAGNOSIS — I27.82 CHRONIC PULMONARY EMBOLISM (HCC): ICD-10-CM

## 2019-11-29 PROCEDURE — 99244 OFF/OP CNSLTJ NEW/EST MOD 40: CPT | Performed by: SPECIALIST

## 2019-11-29 NOTE — ASSESSMENT & PLAN NOTE
Patient prefers to go to [address] [place] [address]  for surgery as that is his main hospital  All his physician and cardiologist are at [facility]

## 2019-11-29 NOTE — PROGRESS NOTES
History and Physical Examination - General Surgery   [facility]  Jeanmarie Holm 60 y o  male MRN: [id number]  Unit/Bed#:  Encounter: [id number] PCP: Brandy Moore DO    History of Present Illness   Chief Complaint:   Right upper quadrant the pain and discomfort    HPI:  Jeanmarie Holm is a 60 y o  male who presents to office with history of gallstone  Which was showed up on ultrasound    Patient is a following GI for hepatitis-C which was treated in 2015 with antiviral treatment  Recently been diagnosed with the GERD and hiatal hernia/workup with ultrasound revealed the gallstone    Patient is undergoing extensive workup and treatment for sarcoidosis the involving heart with tachycardia  Patient is on Eliquis for chronic pulmonary embolus which was diagnosed in August 2019    Patient cardiologist are at [facility]   Patient preferred to have his surgery done at [facility]  Systemic sarcoidosis now on steroid taper-managed by rheumatology, on cellcept     Denies any fever chills rigors severe abdominal pain nausea vomiting diarrhea constipation    Historical Information   The following portions of the patient's history were reviewed and updated as appropriate  Past Medical History:   Diagnosis Date    BPH (benign prostatic hyperplasia)     Diabetes mellitus ([address] )     Erectile dysfunction     GERD (gastroesophageal reflux disease)     Headache(784 0) 05/01/2018    Each Day    Hematuria     Infectious viral hepatitis 2007    Cured by Dr Suzan Garcia Liver disease     hepatitis C    Mediastinal adenopathy     Obesity 2007    Vitamin D deficiency      Past Surgical History:   Procedure Laterality Date    COLONOSCOPY      last assessed: 08/28/2012; fiberoptic screening     CYSTOSCOPY      onset: 05/06/2016; Diagnostic     DENTAL SURGERY      LIVER BIOPSY      LYMPH NODE BIOPSY  6/2019    [place] BRONCHOSCOPY NEEDLE BX TRACHEA MAIN STEM&/BRON N/A 6/6/2019    Procedure: ENDOBRONCHIAL ULTRASOUND (EBUS);   Surgeon: Theola Alpers, MD;  [facility];  Service: Thoracic    [place] [address] N/A 6/6/2019    Procedure: Maureen Eglin;  Surgeon: Theola Alpers, MD;  [facility];  Service: Thoracic    [place] TRANSURETHRAL ELEC-SURG PROSTATECTOM N/A 9/13/2017    Procedure: Merl Kins; TUR PROSTATE;  Surgeon: Denver Fey, MD;  [facility];  Service: Urology    PROSTATE SURGERY  2017    SHOULDER ARTHROSCOPY Right     bicep tendon repair, rotator cuff repair and acromuim shave      Social History   Social History     Substance and Sexual Activity   Alcohol Use No    Comment: quit 12 years ago     Social History     Substance and Sexual Activity   Drug Use No     Social History     Tobacco Use   Smoking Status Former Smoker    Packs/day: 1 00    Years: 1 00    Pack years: 1 00    Types: Cigarettes    Last attempt to quit: 2009    Years since quitting: 10 9   Smokeless Tobacco Never Used     Family History:   Family History   Problem Relation Age of Onset    Stroke Father         Accidental Death at 52    Cancer Maternal Grandmother     Valvular heart disease Mother        Meds/Allergies   Allergies   Allergen Reactions    No Active Allergies        Current Outpatient Medications:     apixaban (ELIQUIS) 5 mg, Take 1 tablet (5 mg total) by mouth 2 (two) times a day Take 2 tabs twice a day for one week then one tab twice a day thereafter , Disp: 60 tablet, Rfl: 4    Cholecalciferol (VITAMIN D3) 5000 units CAPS, Take 1 capsule by mouth daily , Disp: , Rfl:     famotidine (PEPCID) 40 MG tablet, Take 1 tablet (40 mg total) by mouth daily, Disp: 30 tablet, Rfl: 3    glucose 4 g chewable tablet, Chew 4 tablets (16 g total) as needed for low blood sugar, Disp: 100 tablet, Rfl: 1    insulin aspart (NOVOLOG FLEXPEN) 100 Units/mL injection pen, Inject 14 Units under the skin 3 (three) times a day before meals, Disp: 5 pen, Rfl: 3    insulin glargine (LANTUS SOLOSTAR) 100 units/mL injection pen, Inject 45 Units under the skin daily , Disp: , Rfl:     Insulin Pen Needle (PEN NEEDLES) 31G X 6 MM MISC, by Does not apply route 4 (four) times a day, Disp: 150 each, Rfl: 4    levothyroxine 50 mcg tablet, TAKE 1 TABLET BY MOUTH EVERY DAY, Disp: 30 tablet, Rfl: 5    liraglutide (VICTOZA) injection, Inject 0 2 mL (1 2 mg total) under the skin daily, Disp: 2 pen, Rfl: 4    metFORMIN (GLUCOPHAGE) 500 mg tablet, TAKE 1 TABLET BY MOUTH TWICE A DAY WITH MEALS, Disp: 60 tablet, Rfl: 5    metoprolol succinate (TOPROL-XL) 25 mg 24 hr tablet, Take 0 5 tablets (12 5 mg total) by mouth 2 (two) times a day, Disp: 90 tablet, Rfl: 3    mycophenolate (CELLCEPT) 500 mg tablet, Take 2 tablets (1,000 mg total) by mouth 2 (two) times a day, Disp: 120 tablet, Rfl: 2    ONETOUCH DELICA LANCETS 39B MISC, USE THREE TIMES DAILY, Disp: 100 each, Rfl: 5    ONETOUCH VERIO test strip, Use to test blood sugar 4 times a day, Disp: 100 each, Rfl: 3    predniSONE 5 mg tablet, Take 3 tabs (15mg) x2 weeks, then 2 tabs (10mg) x2 weeks, then take 1 tab (5mg) daily and stay on that dose, Disp: 90 tablet, Rfl: 1     REVIEW OF SYSTEMS  Constitutional:  Denies fever or chills   Eyes:  Denies change in visual acuity   HENT:  Denies nasal congestion or sore throat   Respiratory:  Denies cough have history of shortness of breath being worked up by cardiologist  Cardiovascular:  Intermittent tachycardia being to worked up with Cardiology for sarcoidosis of the heart  As recent the Holter studies reports are not available has recent MRI of heart reports not available  GI:  Chronic intermittent right upper quad abdominal pain, no nausea, vomiting, bloody stools history of diarrhea   :  Denies dysuria, frequency, difficulty in micturition and nocturia  Musculoskeletal:  Some back pain or joint pain   Neurologic:  Polyneuropathy both upper and lower extremity secondary to sarcoidosis   Endocrine: Extensive history of type 2 diabetes mellitus  Lymphatic:  Denies swollen glands   Psychiatric:  Denies depression or anxiety     Objective   Current Vitals:   /70 (BP Location: Left arm, Patient Position: Sitting, Cuff Size: Standard)   Pulse 80   Temp (!) 95 5 °F (35 3 °C) (Tympanic)   Ht 6' 2 4" (1 89 m)   Wt 132 kg (290 lb)   BMI 36 83 kg/m²   Body mass index is 36 83 kg/m²  PHYSICAL EXAMS  General:  Patient is not in acute distress, laying in the bed comfortably, awake, alert responding to commands,   HEENT:  Both pupils normal-size atraumatic, normocephalic, nonicteric  Neck:  JVP not raised  Trachea central  Respiratory:  normal Breath sounds clear to auscultation,  Cardiovascular:  S1-S2 normal without any murmur   GI:  Abdomen soft nontender   Liver and spleen normal size, no free fluid, hernial sites unremarkable without any cough impulse  Musculoskeletal:  No back pain  Integument:  No skin rashes or ulceration  Lymphatic:  No cervical  lymphadenopathy  Neurologic:  Patient is awake alert, responding to command, well-oriented to time and place and person moving all extremities ambulating well    Visit Diagnosis:   Diagnoses and all orders for this visit:    Unspecified cirrhosis of liver ([facility] [address] )    History of hepatitis C virus infection    Type 2 diabetes mellitus without complication, without long-term current use of insulin (HCC)    Hypothyroid    Pulmonary sarcoidosis (HCC)    Chronic pulmonary embolism (HCC)    Polyneuropathy, peripheral sensorimotor axonal    Calculus of gallbladder without cholecystitis without obstruction       Plan of care was discussed with patient in detail    Pertinent labs reviewed  Pertinent images and available reads personally reviewed  Procedure: Us Right Upper Quadrant    Result Date: 11/18/2019  Narrative: RIGHT UPPER QUADRANT ULTRASOUND INDICATION:     B18 2: Chronic viral hepatitis C  COMPARISON:  Right upper quadrant ultrasound 12/3/2018 TECHNIQUE: Real-time ultrasound of the right upper quadrant was performed with a curvilinear transducer with both volumetric sweeps and still imaging techniques  FINDINGS: PANCREAS:  Visualized portions of the pancreas are within normal limits  AORTA AND IVC:  Visualized portions are normal for patient age  LIVER: Size:  Mildly enlarged  The liver measures 17 6 cm in the midclavicular line  Contour:  Surface contour is smooth  Parenchyma:  Heterogeneous echogenicity as seen on the prior study  No evidence of suspicious mass  Limited imaging of the main portal vein shows it to be patent and hepatopetal   BILIARY: Shadowing echogenic calculus within the gallbladder neck measuring 9 mm without gallbladder wall thickening, pericholecystic fluid or sonographic Cowart sign to indicate cholecystitis  No intrahepatic biliary dilatation  CBD measures 4 mm  No choledocholithiasis  KIDNEY: Right kidney measures 12 3 x 5 5 cm  Within normal limits  ASCITES:   None  Impression: 9 mm gallbladder neck calculus without pericholecystic fluid, wall thickening or sonographic Cowart sign to indicate acute cholecystitis  Pattern megaly and heterogeneous hepatic parenchyma in keeping with this patient's history of cirrhosis  Workstation performed: [id number]     Pertinent notes reviewed    Assessment/Plan   Assessment:  Symptomatic gallstones  Systemic sarcoidosis with mediastinal lymphadenopathy and PE on anticoagulation Eliquis/steroid/and on immunosuppression with CellCept  Intermittent tachycardia being workup with Holter monitor and MRI heart  Morbid obesity  Liver cirrhosis    Plan:  Proper consent was obtained  The risks, benefits, alternatives,and probabilities of success were discussed in detail with no guarantee made as to outcome  All questions were answered to the patient's satisfaction     Patient was explained about the cardiac risk stratification and cardiac clearance  Patient was explained about a higher chance of bleeding secondary to Eliquis anticoagulation and recent pulmonary embolism  CT scan of abdominal pelvis to the stratified child's classification staging for liver cirrhosis  Blood work   Preoperative prep was explained to the patient  Will repeat the blood work CBC CMP PT INR EKG CT abdominal pelvis to stage child's classification on liver cirrhosis appears compensated prior to surgery  Cardiology clearance  Patient prefers to go to [address] for laparoscopic cholecystectomy as all his physicians are at [facility] [address] patient family in detailed about coordination of care  A description of the counseling / coordination of care:  I performed an interim history, pertinent images and labs, performed a physical examination to arrive at the plan delineated above with associated thought processes  Family member/primary contact updated -  Wife /significant other at the bedside    Dr David Mann MD [place]    Office   Tel  [phone]  Fax   [phone]

## 2019-12-02 ENCOUNTER — TELEPHONE (OUTPATIENT)
Dept: OTHER | Facility: HOSPITAL | Age: 59
End: 2019-12-02

## 2019-12-02 NOTE — TELEPHONE ENCOUNTER
Maranda Stein,    Please call patient and let him know I reviewed his BGs    -having some high blood sugars after eating likely because we stopped the Victoza    Increase to 45 units lantus qHS    Increase novolog to 11 units before each meal    Venancio Lennox M D    Endocrinology

## 2019-12-03 ENCOUNTER — TELEPHONE (OUTPATIENT)
Dept: PULMONOLOGY | Facility: CLINIC | Age: 59
End: 2019-12-03

## 2019-12-03 DIAGNOSIS — Z79.4 TYPE 2 DIABETES MELLITUS WITH COMPLICATION, WITH LONG-TERM CURRENT USE OF INSULIN (HCC): ICD-10-CM

## 2019-12-03 DIAGNOSIS — E11.8 TYPE 2 DIABETES MELLITUS WITH COMPLICATION, WITH LONG-TERM CURRENT USE OF INSULIN (HCC): ICD-10-CM

## 2019-12-03 DIAGNOSIS — R94.39 ABNORMAL STRESS TEST: ICD-10-CM

## 2019-12-03 DIAGNOSIS — R06.00 DYSPNEA ON EXERTION: Primary | ICD-10-CM

## 2019-12-03 DIAGNOSIS — E78.5 DYSLIPIDEMIA: ICD-10-CM

## 2019-12-03 LAB — MISCELLANEOUS LAB TEST RESULT: NORMAL

## 2019-12-03 RX ORDER — ASPIRIN 81 MG/1
81 TABLET ORAL DAILY
Qty: 90 TABLET | Refills: 3 | Status: ON HOLD | OUTPATIENT
Start: 2019-12-03 | End: 2020-02-06 | Stop reason: CLARIF

## 2019-12-03 RX ORDER — ATORVASTATIN CALCIUM 40 MG/1
40 TABLET, FILM COATED ORAL DAILY
Qty: 90 TABLET | Refills: 3 | Status: SHIPPED | OUTPATIENT
Start: 2019-12-03 | End: 2020-12-21 | Stop reason: SDUPTHER

## 2019-12-03 NOTE — RESULT ENCOUNTER NOTE
Discussed results of cardiac MRI with the patient  He also continues to report symptoms of shortness of breath with exertion which is out of proportion to his restrictive lung disease  Stress portion of cMRI showed mild perfusion defect in the entire septum with Regadenoson (baseline EKG with no LBBB)  Given symptoms with traditional risk factors for CAD with Age, Dyslipidemia, Diabetes and family history of premature CAD and abnormal stress MRI, will schedule the patient for cardiac cath  Discussed risk benefits with the patient and he is agreeable  CT chest without contrast also reviewed which showed calcifications in the mid LAD and circumflex  In the interim, he will start Aspirin 81 mg daily, Atorvastatin 40 mg daily

## 2019-12-04 ENCOUNTER — TELEPHONE (OUTPATIENT)
Dept: CARDIOLOGY CLINIC | Facility: CLINIC | Age: 59
End: 2019-12-04

## 2019-12-04 DIAGNOSIS — R00.2 PALPITATIONS: Primary | ICD-10-CM

## 2019-12-04 DIAGNOSIS — I48.0 PAROXYSMAL ATRIAL FIBRILLATION (HCC): Primary | ICD-10-CM

## 2019-12-04 NOTE — TELEPHONE ENCOUNTER
Pt is scheduled on 12/13/19 at [facility] with Dr Beverly Pearl for a [facility]  Pt is aware of instructions  Can I get preauth please

## 2019-12-04 NOTE — TELEPHONE ENCOUNTER
Is there something I should do for preauth? I already linked it to a diagnosis of abnormal stress test with symptoms

## 2019-12-06 ENCOUNTER — TELEPHONE (OUTPATIENT)
Dept: PULMONOLOGY | Facility: CLINIC | Age: 59
End: 2019-12-06

## 2019-12-06 NOTE — TELEPHONE ENCOUNTER
He does not need auth for his cath [id number] on 12/13/19 at [place] [address] [place] at [address] [facility] 12/6/19

## 2019-12-07 ENCOUNTER — APPOINTMENT (OUTPATIENT)
Dept: LAB | Facility: CLINIC | Age: 59
End: 2019-12-07
Payer: COMMERCIAL

## 2019-12-07 DIAGNOSIS — R00.2 PALPITATIONS: ICD-10-CM

## 2019-12-07 DIAGNOSIS — K80.20 CALCULUS OF GALLBLADDER WITHOUT CHOLECYSTITIS WITHOUT OBSTRUCTION: ICD-10-CM

## 2019-12-07 DIAGNOSIS — K74.60 UNSPECIFIED CIRRHOSIS OF LIVER (HCC): ICD-10-CM

## 2019-12-07 LAB
ALBUMIN SERPL BCP-MCNC: 3.9 G/DL (ref 3.5–5)
ALP SERPL-CCNC: 66 U/L (ref 46–116)
ALT SERPL W P-5'-P-CCNC: 23 U/L (ref 12–78)
ANION GAP SERPL CALCULATED.3IONS-SCNC: 7 MMOL/L (ref 4–13)
AST SERPL W P-5'-P-CCNC: 8 U/L (ref 5–45)
BASOPHILS # BLD AUTO: 0.06 THOUSANDS/ΜL (ref 0–0.1)
BASOPHILS NFR BLD AUTO: 1 % (ref 0–1)
BILIRUB SERPL-MCNC: 0.46 MG/DL (ref 0.2–1)
BUN SERPL-MCNC: 14 MG/DL (ref 5–25)
CALCIUM SERPL-MCNC: 9.1 MG/DL (ref 8.3–10.1)
CHLORIDE SERPL-SCNC: 106 MMOL/L (ref 100–108)
CO2 SERPL-SCNC: 27 MMOL/L (ref 21–32)
CREAT SERPL-MCNC: 1.12 MG/DL (ref 0.6–1.3)
EOSINOPHIL # BLD AUTO: 0.15 THOUSAND/ΜL (ref 0–0.61)
EOSINOPHIL NFR BLD AUTO: 3 % (ref 0–6)
ERYTHROCYTE [DISTWIDTH] IN BLOOD BY AUTOMATED COUNT: 12.7 % (ref 11.6–15.1)
GFR SERPL CREATININE-BSD FRML MDRD: 71 ML/MIN/1.73SQ M
GLUCOSE P FAST SERPL-MCNC: 182 MG/DL (ref 65–99)
HCT VFR BLD AUTO: 45.6 % (ref 36.5–49.3)
HGB BLD-MCNC: 14.8 G/DL (ref 12–17)
IMM GRANULOCYTES # BLD AUTO: 0.02 THOUSAND/UL (ref 0–0.2)
IMM GRANULOCYTES NFR BLD AUTO: 0 % (ref 0–2)
INR PPP: 1.06 (ref 0.84–1.19)
LYMPHOCYTES # BLD AUTO: 1.93 THOUSANDS/ΜL (ref 0.6–4.47)
LYMPHOCYTES NFR BLD AUTO: 35 % (ref 14–44)
MCH RBC QN AUTO: 31.2 PG (ref 26.8–34.3)
MCHC RBC AUTO-ENTMCNC: 32.5 G/DL (ref 31.4–37.4)
MCV RBC AUTO: 96 FL (ref 82–98)
MONOCYTES # BLD AUTO: 0.57 THOUSAND/ΜL (ref 0.17–1.22)
MONOCYTES NFR BLD AUTO: 10 % (ref 4–12)
NEUTROPHILS # BLD AUTO: 2.83 THOUSANDS/ΜL (ref 1.85–7.62)
NEUTS SEG NFR BLD AUTO: 51 % (ref 43–75)
NRBC BLD AUTO-RTO: 0 /100 WBCS
PLATELET # BLD AUTO: 200 THOUSANDS/UL (ref 149–390)
PMV BLD AUTO: 9.3 FL (ref 8.9–12.7)
POTASSIUM SERPL-SCNC: 4.4 MMOL/L (ref 3.5–5.3)
PROT SERPL-MCNC: 7.4 G/DL (ref 6.4–8.2)
PROTHROMBIN TIME: 13.2 SECONDS (ref 11.6–14.5)
RBC # BLD AUTO: 4.74 MILLION/UL (ref 3.88–5.62)
SODIUM SERPL-SCNC: 140 MMOL/L (ref 136–145)
WBC # BLD AUTO: 5.56 THOUSAND/UL (ref 4.31–10.16)

## 2019-12-07 PROCEDURE — 85610 PROTHROMBIN TIME: CPT

## 2019-12-07 PROCEDURE — 80053 COMPREHEN METABOLIC PANEL: CPT

## 2019-12-07 PROCEDURE — 85025 COMPLETE CBC W/AUTO DIFF WBC: CPT

## 2019-12-07 PROCEDURE — 36415 COLL VENOUS BLD VENIPUNCTURE: CPT

## 2019-12-08 DIAGNOSIS — D86.9 SARCOIDOSIS: ICD-10-CM

## 2019-12-09 ENCOUNTER — OFFICE VISIT (OUTPATIENT)
Dept: ENDOCRINOLOGY | Facility: CLINIC | Age: 59
End: 2019-12-09
Payer: COMMERCIAL

## 2019-12-09 VITALS
WEIGHT: 294 LBS | SYSTOLIC BLOOD PRESSURE: 118 MMHG | HEART RATE: 100 BPM | HEIGHT: 74 IN | DIASTOLIC BLOOD PRESSURE: 76 MMHG | BODY MASS INDEX: 37.73 KG/M2

## 2019-12-09 DIAGNOSIS — E55.9 VITAMIN D DEFICIENCY: ICD-10-CM

## 2019-12-09 DIAGNOSIS — Z79.4 TYPE 2 DIABETES MELLITUS WITH HYPERGLYCEMIA, WITH LONG-TERM CURRENT USE OF INSULIN (HCC): Primary | ICD-10-CM

## 2019-12-09 DIAGNOSIS — E78.2 MIXED HYPERLIPIDEMIA: ICD-10-CM

## 2019-12-09 DIAGNOSIS — E03.9 ACQUIRED HYPOTHYROIDISM: ICD-10-CM

## 2019-12-09 DIAGNOSIS — E11.65 TYPE 2 DIABETES MELLITUS WITH HYPERGLYCEMIA, WITH LONG-TERM CURRENT USE OF INSULIN (HCC): Primary | ICD-10-CM

## 2019-12-09 PROCEDURE — 99214 OFFICE O/P EST MOD 30 MIN: CPT | Performed by: INTERNAL MEDICINE

## 2019-12-09 RX ORDER — DEXAMETHASONE SODIUM PHOSPHATE 4 MG/ML
INJECTION, SOLUTION INTRA-ARTICULAR; INTRALESIONAL; INTRAMUSCULAR; INTRAVENOUS; SOFT TISSUE
Status: ON HOLD | COMMUNITY
End: 2020-02-06 | Stop reason: CLARIF

## 2019-12-09 RX ORDER — PREDNISONE 1 MG/1
5 TABLET ORAL DAILY
Qty: 90 TABLET | Refills: 0 | Status: ON HOLD | OUTPATIENT
Start: 2019-12-09 | End: 2020-02-06 | Stop reason: CLARIF

## 2019-12-09 NOTE — PATIENT INSTRUCTIONS
Have labs done in 3 months    Follow-up in 3 months    Reduce lantus to 35 units in the morning on Thursday and Friday morning for catheterization on Friday morning, resume normal doses once home again

## 2019-12-09 NOTE — PROGRESS NOTES
ENDOCRINOLOGY  FOLLOW UP VISIT      Reason for Endocrine Consult/Chief Complaint:  Diabetes management      ? Medical Decision Making:     Impression  1  Systemic sarcoidosis now on steroid taper-managed by rheumatology, on cellcept and steroids  2  DM2    3  Acquired hypothyroidism- on levothyroxine  4  Vitamin D deficiency- on D3 replacemet  5  Mixed HLD- off statin therapy   7  Hx HCV s/p harvoni c/b liver cirrhosis   8  Gallstones pending cholecystectomy  9  PE on [facility]       Recommendations:  ?  BGs overall at goal, continue lantus 45 units qAM and novolog 11 units TID AC for now, metformin 500mg BID AC  Having cardiac catheterization on Friday, instructed to reduce lantus to 35 units Thursday morning and Friday morning, resume normal regimen after discharge  After his gallbladder surgery, goal will be to discontinue prandial insulin and switch him to oral agents + basal insulin only  SGLT-2 inhibitors tried in past but has prostate issues, GLP-1 agonist therapy c/b by gallstones       Acquired hypothyroidism- continue levothyroxine, TSH at goal, repeat thyroid labs in 3 months      Vitamin D level - continue 5000 IU D3 replacement,  repeat vitamin-D level to rule     Mixed hyperlipidemia- cardiologist started atorvastatin, repeat lipids in 3 months to assess for improvement         RTC  3 months  Michelle WITT               History of Present Illness:   Mr Tabatha Steven is a 61yr old male who presents for DM2 follow up after hospital stay for DKA  He has a hx of liver cirrhosis on prior biopsy and had pulmonary sarcoidosis with extra thoracic involvement including trigeminal neuralgia type symptoms with right sided headaches and drooping of the right face and peripheral paresthesias  He also has possible eye involvement of his sarcoid     ?  PMH-HCV s/p harvoni c/b liver cirrhosis, DM2, hypothyroidism, systemic sarcoidosis, BPH, vitamin D deficiency, mixed HLD  PSH-shoulder surgery, liver biopsy, prostate surgery  FHx-no diabetes in family  SHx-neg x 2, works at [employer]     Type of DM: 2  Age of onset: 2007 diagnosed   Most recent A1C: 12 1% August 2019  Microvascular complications: neuropathy? Macrovascular complications: none known   Exercise: started going to gym         Events since last visit:     Presently on 5 mg of prednisone daily    Fasting blood sugars in the  low to mid  100s , pre  meals  and bedtime  blood sugars in the low to mid 100s , no hypoglycemia         On basaglar 45 units qAM, novolog 11 units TID AC     Off Victoza,  still on metformin    Having a cholecystectomy,  having left heart catheterization       ? Review of Systems:   Review of Systems   Constitutional: Negative for appetite change, chills, diaphoresis, fatigue, fever and unexpected weight change  HENT: Negative for congestion, ear pain, hearing loss, rhinorrhea, sinus pressure, sinus pain, sore throat, trouble swallowing and voice change  Eyes: Negative for photophobia, redness and visual disturbance  Respiratory: Negative for apnea, cough, chest tightness, wheezing and stridor  +SOB/REYNA  Cardiovascular: Negative for chest pain, palpitations and leg swelling  Gastrointestinal: Negative for abdominal distention, abdominal pain, constipation, diarrhea, nausea and vomiting  Endocrine: Negative for cold intolerance, heat intolerance, polydipsia, polyphagia and polyuria  Genitourinary: Negative for difficulty urinating, dysuria, flank pain, frequency, hematuria and urgency  Musculoskeletal: Negative for arthralgias, back pain, gait problem, joint swelling and myalgias  Skin: Negative for color change, pallor, rash and wound  Allergic/Immunologic: Negative for immunocompromised state  Neurological: Negative for dizziness, tremors, syncope, weakness, light-headedness  +headache, nerve pain in legs   Hematological: Negative for adenopathy  Does not bruise/bleed easily     Psychiatric/Behavioral: Negative for confusion and sleep disturbance  The patient is not nervous/anxious  Patient History:     Past Medical History:   Diagnosis Date    BPH (benign prostatic hyperplasia)     Diabetes mellitus ([address] )     Erectile dysfunction     GERD (gastroesophageal reflux disease)     Headache(784 0) 05/01/2018    Each Day    Hematuria     Infectious viral hepatitis 2007    Cured by Dr Craig Coats [place] Liver disease     hepatitis C    Mediastinal adenopathy     Obesity 2007    Vitamin D deficiency      Past Surgical History:   Procedure Laterality Date    COLONOSCOPY      last assessed: 08/28/2012; fiberoptic screening     CYSTOSCOPY      onset: 05/06/2016; Diagnostic     DENTAL SURGERY      LIVER BIOPSY      LYMPH NODE BIOPSY  6/2019    OK BRONCHOSCOPY NEEDLE BX TRACHEA MAIN STEM&/BRON N/A 6/6/2019    Procedure: ENDOBRONCHIAL ULTRASOUND (EBUS);   Surgeon: Stormy Nunn MD;  [facility];  Service: Thoracic    OK [address] N/A 6/6/2019    Procedure: Cassie Luista;  Surgeon: Stormy Nunn MD;  [facility];  Service: Thoracic    OK TRANSURETHRAL ELEC-SURG PROSTATECTOM N/A 9/13/2017    Procedure: Pedro Prieto; TUR PROSTATE;  Surgeon: Toshia Frye MD;  [facility];  Service: Urology    PROSTATE SURGERY  2017    SHOULDER ARTHROSCOPY Right     bicep tendon repair, rotator cuff repair and acromuim shave      Social History     Socioeconomic History    Marital status: /Civil Union     Spouse name: Not on file    Number of children: Not on file    Years of education: Not on file    Highest education level: Not on file   Occupational History    Occupation: manager   Social Needs    Financial resource strain: Not on file    Food insecurity:     Worry: Not on file     Inability: Not on file    Transportation needs:     Medical: Not on file     Non-medical: Not on file   Tobacco Use    Smoking status: Former Smoker     Packs/day: 1 00     Years: 1 00     Pack years: 1 00     Types: Cigarettes     Last attempt to quit: 2009     Years since quitting: 10 9    Smokeless tobacco: Never Used   Substance and Sexual Activity    Alcohol use: No     Comment: quit 12 years ago    Drug use: No    Sexual activity: Not Currently     Partners: Female     Birth control/protection: None   Lifestyle    Physical activity:     Days per week: Not on file     Minutes per session: Not on file    Stress: Not on file   Relationships    Social connections:     Talks on phone: Not on file     Gets together: Not on file     Attends Zoroastrian service: Not on file     Active member of club or organization: Not on file     Attends meetings of clubs or organizations: Not on file     Relationship status: Not on file    Intimate partner violence:     Fear of current or ex partner: Not on file     Emotionally abused: Not on file     Physically abused: Not on file     Forced sexual activity: Not on file   Other Topics Concern    Not on file   Social History Narrative    Caffeine use    Daily coffee consumption     Denied daily cola consumption     Denied daily tea consumption     Former smoker (as per [employer])     Current everyday smoker (as per [employer])     Non-smoker (as per [employer])      Family History   Problem Relation Age of Onset    Stroke Father         Accidental Death at 52    Cancer Maternal Grandmother     Valvular heart disease Mother        Current Medications: At the time this note was written these were the medications the patient was on  Current Outpatient Medications   Medication Sig Dispense Refill    apixaban (ELIQUIS) 5 mg Take 1 tablet (5 mg total) by mouth 2 (two) times a day Take 2 tabs twice a day for one week then one tab twice a day thereafter   60 tablet 4    aspirin (ECOTRIN LOW STRENGTH) 81 mg EC tablet Take 1 tablet (81 mg total) by mouth daily 90 tablet 3    atorvastatin (LIPITOR) 40 mg tablet Take 1 tablet (40 mg total) by mouth daily 90 tablet 3  Cholecalciferol (VITAMIN D3) 5000 units CAPS Take 1 capsule by mouth daily       famotidine (PEPCID) 40 MG tablet Take 1 tablet (40 mg total) by mouth daily 30 tablet 3    glucose 4 g chewable tablet Chew 4 tablets (16 g total) as needed for low blood sugar 100 tablet 1    insulin aspart (NovoLOG) 100 Units/mL injection pen Inject 11 Units under the skin 3 (three) times a day before meals      insulin glargine (LANTUS SOLOSTAR) 100 units/mL injection pen Inject 45 Units under the skin daily       Insulin Pen Needle (PEN NEEDLES) 31G X 6 MM MISC by Does not apply route 4 (four) times a day 150 each 4    levothyroxine 50 mcg tablet TAKE 1 TABLET BY MOUTH EVERY DAY 30 tablet 5    metFORMIN (GLUCOPHAGE) 500 mg tablet TAKE 1 TABLET BY MOUTH TWICE A DAY WITH MEALS 60 tablet 5    metoprolol succinate (TOPROL-XL) 25 mg 24 hr tablet Take 0 5 tablets (12 5 mg total) by mouth 2 (two) times a day 90 tablet 3    mycophenolate (CELLCEPT) 500 mg tablet Take 2 tablets (1,000 mg total) by mouth 2 (two) times a day 120 tablet 2    ONETOUCH DELICA LANCETS 02T MISC USE THREE TIMES DAILY 100 each 5    ONETOUCH VERIO test strip Use to test blood sugar 4 times a day 100 each 3    predniSONE 5 mg tablet Take 1 tablet (5 mg total) by mouth daily 90 tablet 0     No current facility-administered medications for this visit  Allergies: No active allergies    Physical Exam:   Vital Signs:   /76   Pulse 100   Ht 6' 2" (1 88 m)   Wt 133 kg (294 lb)   BMI 37 75 kg/m²     Physical Exam   Constitutional: He is oriented to person, place, and time  He appears well-developed and well-nourished  HENT:   Head: Normocephalic and atraumatic  Nose: Nose normal    Mouth/Throat: Oropharynx is clear and moist  No oropharyngeal exudate  Eyes: Pupils are equal, round, and reactive to light  Conjunctivae and EOM are normal  No scleral icterus  Neck: Normal range of motion  Neck supple  No thyromegaly present  Cardiovascular: Normal rate, regular rhythm and normal heart sounds  Exam reveals no gallop and no friction rub  No murmur heard  Pulmonary/Chest: Effort normal and breath sounds normal  No stridor  No respiratory distress  He has no wheezes  He has no rales  Abdominal: Soft  Bowel sounds are normal  He exhibits no distension and no mass  There is no tenderness  There is no rebound and no guarding  Musculoskeletal: Normal range of motion  He exhibits no edema  Lymphadenopathy:     He has no cervical adenopathy  Neurological: He is alert and oriented to person, place, and time  Skin: Skin is warm and dry  No rash noted  No erythema  No pallor  Psychiatric: He has a normal mood and affect   His behavior is normal  Judgment and thought content normal         Labs and Imaging:      Component      Latest Ref Rng & Units 11/11/2019 12/7/2019   Sodium      136 - 145 mmol/L 138 140   Potassium      3 5 - 5 3 mmol/L 4 3 4 4   Chloride      100 - 108 mmol/L 107 106   CO2      21 - 32 mmol/L 24 27   Anion Gap      4 - 13 mmol/L 7 7   BUN      5 - 25 mg/dL 24 14   Creatinine      0 60 - 1 30 mg/dL 1 06 1 12   GLUCOSE FASTING      65 - 99 mg/dL 152 (H) 182 (H)   Calcium      8 3 - 10 1 mg/dL 9 3 9 1   AST      5 - 45 U/L 13 8   ALT      12 - 78 U/L 29 23   Alkaline Phosphatase      46 - 116 U/L 50 66   Total Protein      6 4 - 8 2 g/dL 7 4 7 4   Albumin      3 5 - 5 0 g/dL 4 2 3 9   TOTAL BILIRUBIN      0 20 - 1 00 mg/dL 0 81 0 46   eGFR      ml/min/1 73sq m 76 71   Cholesterol      50 - 200 mg/dL 240 (H)    Triglycerides      <=150 mg/dL 276 (H)    HDL      >=40 mg/dL 44    LDL Direct      0 - 100 mg/dL 141 (H)    Non-HDL Cholesterol      mg/dl 196    Hemoglobin A1C      4 2 - 6 3 % 7 2 (H)    EAG      mg/dl 160

## 2019-12-12 ENCOUNTER — TELEPHONE (OUTPATIENT)
Dept: INPATIENT UNIT | Facility: HOSPITAL | Age: 59
End: 2019-12-12

## 2019-12-12 RX ORDER — SODIUM CHLORIDE 9 MG/ML
125 INJECTION, SOLUTION INTRAVENOUS CONTINUOUS
Status: CANCELLED | OUTPATIENT
Start: 2019-12-12

## 2019-12-12 RX ORDER — ASPIRIN 81 MG/1
324 TABLET, CHEWABLE ORAL ONCE
Status: CANCELLED | OUTPATIENT
Start: 2019-12-12 | End: 2019-12-12

## 2019-12-13 ENCOUNTER — HOSPITAL ENCOUNTER (OUTPATIENT)
Dept: NON INVASIVE DIAGNOSTICS | Facility: HOSPITAL | Age: 59
Discharge: HOME | End: 2019-12-13
Attending: INTERNAL MEDICINE | Admitting: INTERNAL MEDICINE
Payer: COMMERCIAL

## 2019-12-13 VITALS
HEIGHT: 74 IN | BODY MASS INDEX: 37.22 KG/M2 | DIASTOLIC BLOOD PRESSURE: 65 MMHG | HEART RATE: 81 BPM | RESPIRATION RATE: 18 BRPM | SYSTOLIC BLOOD PRESSURE: 137 MMHG | WEIGHT: 290 LBS

## 2019-12-13 DIAGNOSIS — R06.00 DYSPNEA ON EXERTION: ICD-10-CM

## 2019-12-13 DIAGNOSIS — R94.39 ABNORMAL STRESS TEST: ICD-10-CM

## 2019-12-13 LAB
ATRIAL RATE: 87 BPM
CHOLEST SERPL-MCNC: 138 MG/DL (ref 50–200)
HDLC SERPL-MCNC: 42 MG/DL
LDLC SERPL CALC-MCNC: 65 MG/DL (ref 0–100)
NONHDLC SERPL-MCNC: 96 MG/DL
P AXIS: 58 DEGREES
PR INTERVAL: 160 MS
QRS AXIS: 35 DEGREES
QRSD INTERVAL: 72 MS
QT INTERVAL: 364 MS
QTC INTERVAL: 438 MS
T WAVE AXIS: 64 DEGREES
TRIGL SERPL-MCNC: 155 MG/DL
VENTRICULAR RATE: 87 BPM

## 2019-12-13 PROCEDURE — 93005 ELECTROCARDIOGRAM TRACING: CPT

## 2019-12-13 PROCEDURE — C1769 GUIDE WIRE: HCPCS | Performed by: STUDENT IN AN ORGANIZED HEALTH CARE EDUCATION/TRAINING PROGRAM

## 2019-12-13 PROCEDURE — 93010 ELECTROCARDIOGRAM REPORT: CPT | Performed by: INTERNAL MEDICINE

## 2019-12-13 PROCEDURE — C1894 INTRO/SHEATH, NON-LASER: HCPCS | Performed by: STUDENT IN AN ORGANIZED HEALTH CARE EDUCATION/TRAINING PROGRAM

## 2019-12-13 PROCEDURE — 99152 MOD SED SAME PHYS/QHP 5/>YRS: CPT | Performed by: INTERNAL MEDICINE

## 2019-12-13 PROCEDURE — 99153 MOD SED SAME PHYS/QHP EA: CPT | Performed by: STUDENT IN AN ORGANIZED HEALTH CARE EDUCATION/TRAINING PROGRAM

## 2019-12-13 PROCEDURE — NC001 PR NO CHARGE: Performed by: INTERNAL MEDICINE

## 2019-12-13 PROCEDURE — 93460 R&L HRT ART/VENTRICLE ANGIO: CPT | Performed by: STUDENT IN AN ORGANIZED HEALTH CARE EDUCATION/TRAINING PROGRAM

## 2019-12-13 PROCEDURE — 99152 MOD SED SAME PHYS/QHP 5/>YRS: CPT | Performed by: STUDENT IN AN ORGANIZED HEALTH CARE EDUCATION/TRAINING PROGRAM

## 2019-12-13 PROCEDURE — 80061 LIPID PANEL: CPT | Performed by: INTERNAL MEDICINE

## 2019-12-13 PROCEDURE — 93460 R&L HRT ART/VENTRICLE ANGIO: CPT | Performed by: INTERNAL MEDICINE

## 2019-12-13 RX ORDER — SODIUM CHLORIDE 9 MG/ML
100 INJECTION, SOLUTION INTRAVENOUS CONTINUOUS
Status: DISCONTINUED | OUTPATIENT
Start: 2019-12-13 | End: 2019-12-13 | Stop reason: HOSPADM

## 2019-12-13 RX ORDER — SODIUM CHLORIDE 9 MG/ML
125 INJECTION, SOLUTION INTRAVENOUS CONTINUOUS
Status: DISCONTINUED | OUTPATIENT
Start: 2019-12-13 | End: 2019-12-13 | Stop reason: HOSPADM

## 2019-12-13 RX ORDER — NITROGLYCERIN 20 MG/100ML
INJECTION INTRAVENOUS CODE/TRAUMA/SEDATION MEDICATION
Status: COMPLETED | OUTPATIENT
Start: 2019-12-13 | End: 2019-12-13

## 2019-12-13 RX ORDER — FENTANYL CITRATE 50 UG/ML
INJECTION, SOLUTION INTRAMUSCULAR; INTRAVENOUS CODE/TRAUMA/SEDATION MEDICATION
Status: COMPLETED | OUTPATIENT
Start: 2019-12-13 | End: 2019-12-13

## 2019-12-13 RX ORDER — VERAPAMIL HYDROCHLORIDE 2.5 MG/ML
INJECTION, SOLUTION INTRAVENOUS CODE/TRAUMA/SEDATION MEDICATION
Status: COMPLETED | OUTPATIENT
Start: 2019-12-13 | End: 2019-12-13

## 2019-12-13 RX ORDER — MIDAZOLAM HYDROCHLORIDE 2 MG/2ML
INJECTION, SOLUTION INTRAMUSCULAR; INTRAVENOUS CODE/TRAUMA/SEDATION MEDICATION
Status: COMPLETED | OUTPATIENT
Start: 2019-12-13 | End: 2019-12-13

## 2019-12-13 RX ORDER — ASPIRIN 81 MG/1
324 TABLET, CHEWABLE ORAL ONCE
Status: COMPLETED | OUTPATIENT
Start: 2019-12-13 | End: 2019-12-13

## 2019-12-13 RX ORDER — HEPARIN SODIUM 1000 [USP'U]/ML
INJECTION, SOLUTION INTRAVENOUS; SUBCUTANEOUS CODE/TRAUMA/SEDATION MEDICATION
Status: COMPLETED | OUTPATIENT
Start: 2019-12-13 | End: 2019-12-13

## 2019-12-13 RX ORDER — LIDOCAINE HYDROCHLORIDE 10 MG/ML
INJECTION, SOLUTION EPIDURAL; INFILTRATION; INTRACAUDAL; PERINEURAL CODE/TRAUMA/SEDATION MEDICATION
Status: COMPLETED | OUTPATIENT
Start: 2019-12-13 | End: 2019-12-13

## 2019-12-13 RX ORDER — PRASUGREL 10 MG/1
TABLET, FILM COATED ORAL CODE/TRAUMA/SEDATION MEDICATION
Status: COMPLETED | OUTPATIENT
Start: 2019-12-13 | End: 2019-12-13

## 2019-12-13 RX ORDER — ASPIRIN 81 MG/1
TABLET, CHEWABLE ORAL
Status: DISCONTINUED
Start: 2019-12-13 | End: 2019-12-13 | Stop reason: HOSPADM

## 2019-12-13 RX ORDER — LIDOCAINE HYDROCHLORIDE 20 MG/ML
INJECTION, SOLUTION INFILTRATION; PERINEURAL CODE/TRAUMA/SEDATION MEDICATION
Status: COMPLETED | OUTPATIENT
Start: 2019-12-13 | End: 2019-12-13

## 2019-12-13 RX ADMIN — MIDAZOLAM 1 MG: 1 INJECTION INTRAMUSCULAR; INTRAVENOUS at 10:35

## 2019-12-13 RX ADMIN — LIDOCAINE HYDROCHLORIDE 10 ML: 10 INJECTION, SOLUTION EPIDURAL; INFILTRATION; INTRACAUDAL; PERINEURAL at 10:53

## 2019-12-13 RX ADMIN — SODIUM CHLORIDE 125 ML/HR: 0.9 INJECTION, SOLUTION INTRAVENOUS at 07:11

## 2019-12-13 RX ADMIN — LIDOCAINE HYDROCHLORIDE 1 ML: 20 INJECTION, SOLUTION INFILTRATION; PERINEURAL at 10:33

## 2019-12-13 RX ADMIN — PRASUGREL HYDROCHLORIDE 60 MG: 10 TABLET, FILM COATED ORAL at 08:46

## 2019-12-13 RX ADMIN — VERAPAMIL HYDROCHLORIDE 2.5 MG: 2.5 INJECTION INTRAVENOUS at 10:41

## 2019-12-13 RX ADMIN — HEPARIN SODIUM 6000 UNITS: 1000 INJECTION INTRAVENOUS; SUBCUTANEOUS at 10:41

## 2019-12-13 RX ADMIN — IOHEXOL 75 ML: 350 INJECTION, SOLUTION INTRAVENOUS at 11:05

## 2019-12-13 RX ADMIN — FENTANYL CITRATE 50 MCG: 50 INJECTION, SOLUTION INTRAMUSCULAR; INTRAVENOUS at 10:34

## 2019-12-13 RX ADMIN — ASPIRIN 81 MG 324 MG: 81 TABLET ORAL at 07:11

## 2019-12-13 RX ADMIN — FENTANYL CITRATE 50 MCG: 50 INJECTION, SOLUTION INTRAMUSCULAR; INTRAVENOUS at 10:36

## 2019-12-13 RX ADMIN — FENTANYL CITRATE 50 MCG: 50 INJECTION, SOLUTION INTRAMUSCULAR; INTRAVENOUS at 10:32

## 2019-12-13 RX ADMIN — MIDAZOLAM 1 MG: 1 INJECTION INTRAMUSCULAR; INTRAVENOUS at 10:36

## 2019-12-13 RX ADMIN — NITROGLYCERIN 200 MCG: 20 INJECTION INTRAVENOUS at 10:41

## 2019-12-13 RX ADMIN — MIDAZOLAM 2 MG: 1 INJECTION INTRAMUSCULAR; INTRAVENOUS at 10:33

## 2019-12-13 NOTE — DISCHARGE INSTRUCTIONS
1  Please see the post cardiac catheterization dishcarge instructions  No heavy lifting, greater than 10 lbs  or strenuous  activity for 48 hrs  2 Remove band aid tomorrow  Shower and wash area- wrist and groin gently with soap and water- beginning tomorrow  Rinse and pat dry  Apply new water seal band aid  Repeat this process for 5 days  No powders, creams lotions or antibiotic ointments  for 5 days  No tub baths, hot tubs or swimming for 5 days  3  Please call our office ([phone]) if you have any fever, redness, swelling, discharge from your wrist and groin access site  4 No driving for 1 day      Left Heart Catheterization   WHAT YOU NEED TO KNOW:   A left heart catheterization is a procedure to look at your heart and its arteries  You may need this procedure if you have chest pain, heart disease, or your heart is not working as it should  DISCHARGE INSTRUCTIONS:   Follow up with your healthcare provider as directed:  Write down your questions so you remember to ask them during your visits  Limit activity as directed:   · Avoid unnecessary stair climbing for 48 hours, if a catheter was put in your groin  · Do not place pressure on your arm, hand, or wrist, if the catheter was placed in your wrist  Avoid pushing, pulling, or heavy lifting with that arm  · If you need to cough, support the area where the catheter was inserted with your hand  · Ask your healthcare provider how long you need to limit movement and avoid certain activities  · You may feel like resting more after your procedure  Slowly start to do more each day  Rest when you feel it is needed  Drink liquids as directed:  Liquids help flush the dye used for your procedure out of your body  Ask your healthcare provider how much liquid to drink each day, and which liquids to drink  Some foods, such as soup and fruit, also provide liquid     Wound care:  Ask your healthcare provider about how to care for your incision wound  Ask when you can get into a tub, shower, or pool  Contact your healthcare provider if:   · You have a fever  · The skin around your wound is red, swollen, or has pus coming from it  · You have trouble breathing, or your skin is itchy, swollen, or has a rash  · You have questions or concerns about your condition or care  Seek care immediately or call 911 if:   · The area where the catheter was placed is swollen and filled with blood or is bleeding  · The leg or arm used for the procedure becomes numb or turns white or blue  · You feel lightheaded, short of breath, and have chest pain  · You cough up blood  · You have any of the following signs of a heart attack:      ¨ Squeezing, pressure, or pain in your chest that lasts longer than 5 minutes or returns    ¨ Discomfort or pain in your back, neck, jaw, stomach, or arm     ¨ Trouble breathing    ¨ Nausea or vomiting    ¨ Lightheadedness or a sudden cold sweat, especially with chest pain or trouble breathing    · Your arm or leg feels warm, tender, and painful  It may look swollen and red  · You have any of the following signs of a stroke:     ¨ Part of your face droops or is numb    ¨ Weakness in an arm or leg    ¨ Confusion or difficulty speaking    ¨ Dizziness, a severe headache, or vision loss  © 2017 [address] [employer] Information is for End User's use only and may not be sold, redistributed or otherwise used for commercial purposes  All illustrations and images included in CareNotes® are the copyrighted property of A D A M [employer] Inc  or Sawyer Ocampo  The above information is an  only  It is not intended as medical advice for individual conditions or treatments  Talk to your doctor, nurse or pharmacist before following any medical regimen to see if it is safe and effective for you

## 2019-12-13 NOTE — H&P
Interventional Cardiology   Bruno Lockett 60 y o  male MRN: [id number]  Unit/Bed#: [facility] [place] Encounter: [id number]      PCP: Marquez Christensen DO      Assessment/Plan     Assessment/Plan:      1  Dyspnea with abnormal cardiac MRI  2  Pulmonary sarcoidosis  3  Diabetes mellitus    Coronary angiography  History of Present Illness     HPI:  Bruno Lockett is a 60 y o  male who presents for elective coronary angiography  Patient with progressive symptoms of dyspnea on exertion, underwent cardiac MRI which had abnormal perfusion in the anteroseptum  Patient recommended for coronary angiography  Patient does get continued symptoms of REYNA, denies any other acute complaints  Historical Information   Past Medical History:   Diagnosis Date    BPH (benign prostatic hyperplasia)     Diabetes mellitus ([address] )     Erectile dysfunction     GERD (gastroesophageal reflux disease)     Headache(784 0) 05/01/2018    Each Day    Hematuria     Infectious viral hepatitis 2007    [facility]     hepatitis C    Mediastinal adenopathy     Obesity 2007    Vitamin D deficiency      Past Surgical History:   Procedure Laterality Date    COLONOSCOPY      last assessed: 08/28/2012; fiberoptic screening     CYSTOSCOPY      onset: 05/06/2016; Diagnostic     DENTAL SURGERY      LIVER BIOPSY      LYMPH NODE BIOPSY  6/2019    SD BRONCHOSCOPY NEEDLE BX TRACHEA MAIN STEM&/BRON N/A 6/6/2019    Procedure: ENDOBRONCHIAL ULTRASOUND (EBUS);   Surgeon: Brandy Russell MD;  [facility];  Service: Thoracic    SD [address] N/A 6/6/2019    Procedure: Carina Candelario;  Surgeon: Brandy Russell MD;  [facility];  Service: Thoracic    SD TRANSURETHRAL ELEC-SURG PROSTATECTOM N/A 9/13/2017    Procedure: Maria M Onofre; TUR PROSTATE;  Surgeon: Deneen Rivera MD;  [facility];  Service: Urology    PROSTATE SURGERY  2017    SHOULDER ARTHROSCOPY Right     bicep tendon repair, rotator cuff repair and acromuim shave      Social History   Social History     Substance and Sexual Activity   Alcohol Use No    Comment: quit 12 years ago     Social History     Substance and Sexual Activity   Drug Use No     Social History     Tobacco Use   Smoking Status Former Smoker    Packs/day: 1 00    Years: 1 00    Pack years: 1 00    Types: Cigarettes    Last attempt to quit: 2009    Years since quitting: 10 9   Smokeless Tobacco Never Used     Family History:   Family History   Problem Relation Age of Onset    Stroke Father         Accidental Death at 52    Cancer Maternal Grandmother     Valvular heart disease Mother        Meds/Allergies   all medications and allergies reviewed  Allergies   Allergen Reactions    No Active Allergies        Objective   Vitals: Blood pressure 166/79, pulse 87, resp  rate 18, height 6' 2" (1 88 m), weight 132 kg (290 lb)  No intake or output data in the 24 hours ending 12/13/19 0838    Invasive Devices     Peripheral Intravenous Line            Peripheral IV 12/13/19 Left Antecubital less than 1 day                Review of Systems:  Review of Systems  11pt ROS completed  See HPI for positives, otherwise negative  Physical Exam   AT/NC  MMM  No JVD  Lungs clear bilateral    No S3 or S4  No MRG  Abdomen soft  NT  ND good BS  No leg edema  Lab Results: I have personally reviewed pertinent lab results  Imaging: I have personally reviewed pertinent reports  EKG: SR        Tele:SR    Code Status:    Epic/ [employer]/Care Everywhere records reviewed: Yes    ** Please Note: Fluency DirectDictation voice to text software may have been used in the creation of this document   **

## 2019-12-16 DIAGNOSIS — E11.9 TYPE 2 DIABETES MELLITUS WITHOUT COMPLICATION, WITHOUT LONG-TERM CURRENT USE OF INSULIN (HCC): ICD-10-CM

## 2019-12-17 ENCOUNTER — TELEPHONE (OUTPATIENT)
Dept: ENDOCRINOLOGY | Facility: CLINIC | Age: 59
End: 2019-12-17

## 2019-12-17 NOTE — TELEPHONE ENCOUNTER
Aidan Zamorano,    Please let patient know I reviewed his BGs    -having some high BGs in the morning and before meals    Increase lantus to 48 units in the morning and increase novolog to 14 units before each meal    Claudean Pace M D    Endocrinology

## 2019-12-18 ENCOUNTER — OFFICE VISIT (OUTPATIENT)
Dept: CARDIOLOGY CLINIC | Facility: CLINIC | Age: 59
End: 2019-12-18
Payer: COMMERCIAL

## 2019-12-18 VITALS
HEIGHT: 74 IN | HEART RATE: 82 BPM | WEIGHT: 298 LBS | BODY MASS INDEX: 38.24 KG/M2 | DIASTOLIC BLOOD PRESSURE: 82 MMHG | SYSTOLIC BLOOD PRESSURE: 128 MMHG

## 2019-12-18 DIAGNOSIS — Z01.810 PREOPERATIVE CARDIOVASCULAR EXAMINATION: Primary | ICD-10-CM

## 2019-12-18 DIAGNOSIS — R00.0 SINUS TACHYCARDIA: ICD-10-CM

## 2019-12-18 DIAGNOSIS — R06.00 DYSPNEA ON EXERTION: ICD-10-CM

## 2019-12-18 PROBLEM — R06.09 DYSPNEA ON EXERTION: Status: ACTIVE | Noted: 2019-12-18

## 2019-12-18 PROCEDURE — 99214 OFFICE O/P EST MOD 30 MIN: CPT | Performed by: INTERNAL MEDICINE

## 2019-12-18 NOTE — PROGRESS NOTES
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- admitted in August 2019 for the same  1  Shortness of breath  - patient reports symptoms of shortness of breath when walking a flight of stairs and with daily activities  - Had PFTs as well in July 2019- FEV1/FVC-80%, FVC-71% predicted, FEV1-76%, TLC-70%, RV-72%, fili DLCO-65%- suggestive of mild restrictive pattern  - Underwent stress MRI which showed small perfusion defect in the septum with cardiac cath showing no obstructive CAD with normal filling pressures as well- LVEDP-6, RA-9, PA-24/6/14 with normal CO/CI  - Suspect shortness of breath related to lung disease and deconditioning    2  Preoperative risk stratification prior to laparoscopic cholecystectomy  - Patient to be scheduled for laparoscopic cholecystectomy  - While he does have symptoms of shortness of breath with exertion, he had a normal cardiac cath with normal filling pressures  - Shortness of breath not related to cardiac etiology  - From cardiac standpoint, he is at moderate acceptable risk and can undergo surgery with no further cardiac workup  - Maintain stable BP, continue metoprolol and atorvastatin perioperatively  Hold apixaban prior to surgery and restart when stable from surgical perspective  3  Sinus tachycardia/Atrial flutter with 2:1 conduction  - 10/19- 48 Hr Holter- Sinus tachycardia with average heart rate of 101   No evidence of atrial flutter or fib or other significant arrhythmias  - current tachycardia predominantly driven by steroids and Cellcept can cause tachycardia in 22% of patients  - continue metoprolol succinate 12 5 mg twice daily    - Normal Thyroid panel in August 2019    4  Pulmonary sarcoidosis with suspected peripheral nervous system involvement  - currently on steroids and mycophenolate  - Cardiac MRI with no LGE to suggest cardiac involvement    5  Type II Diabetes  - 8/19- A1C-12 1  - On Insulin therapy, followed by endocrinology  - Hold aspirin for primary prevention as patient is on apixaban    6  Hepatitis C treated with Magy Maher in 2015    7  Hypothyroidism    8  Segmental PE of the left lower lobe   - on anticoagulation with apixaban 5 mg twice daily   9  Dyslipidemia  - on Atorvastatin 40 mg daily  - FLP-12/13- LDL-65, HDL-42      History of Present Illness: Mr Tim Smith is a 60year old male with past medical history of Pulmonary sarcoidosis, Type II Diabetes, Chronic Hepatitis C (Dx in 2007- cured with Magy Maher in 2015, BPH with TURP in Sep 2017, Hypothyroidism, 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- admitted in August 2019 for the same  He was found to have mediastinal lympadenopathy in May 2019 (although had them since 2012), underwent EBUS and biopsy in June 2019- biopsy confirmed sarcoidosis  Also found to have large sensory motor polyneuropathy with axonal and demyelinating features on EMG of lower extremities- concerning for sarcoidosis involving peripheral nervous system  Due to side affects from steroids, he is also started on Mycophenolate (Methotrexate, Imuran contraindicated due to liver cirrhosis, remicade denied by insurance)  Neurological symptoms improved with Prednisone with suspected peripheral nervous system involvement with sarcoidosis    Presented to ED on 9/11 with URI symptoms and associated palpitations from Endocrinology office- sinus tachycardia at 110 with associated shortness of breath, CTA showed small left lower lobe segmental PE, started on Xarelto with plan to repeat CTA in three months- which is in 2020  Underwent cMRI which showed perfusion defect in the septum with normal filling pressures  He is now here for follow-up visit  He continues to report symptoms of shortness of breath when walking a flight of stairs or two blocks  Improved symptoms of palpitations  No chest pain or pressure  No dizziness or lightheadedness  No syncope  No swelling of the lower extremities  Family history- Mother with valve replacement in her 62s, Father  of possible MI in 45s (No autopsy done), Paternal grandmother was  at Age 64 with MI  only child  Social history- Works at the [employer], Quit smoking in , restarted in  for six months and quit after, quit drinking in , before that had a travelmoby and had couple of six packs sometimes in a day    EKG in the office-10/19- sinus tachycardia at a rate of 130  normal ST T-waves      Patient Active Problem List   Diagnosis    Type 2 diabetes mellitus without complication, without long-term current use of insulin ([facility] [address] )    Benign prostatic hyperplasia with urinary frequency    Screening for prostate cancer    Hypothyroid    Unspecified cirrhosis of liver (HCC)    Neuralgia and neuritis, unspecified    Polyneuropathy, peripheral sensorimotor axonal    Mediastinal lymphadenopathy    Pulmonary sarcoidosis (HCC)    History of hepatitis C virus infection    Current chronic use of systemic steroids    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    Thrombus    Viral URI with cough    Chronic pulmonary embolism ([address] )    Immunosuppressed status ([facility] [address] [facility] )    Sarcoidosis    Calculus of gallbladder without cholecystitis without obstruction     Past Medical History:   Diagnosis Date    BPH (benign prostatic hyperplasia)     Diabetes mellitus ([facility] [address] )     Erectile dysfunction     GERD (gastroesophageal reflux disease)     Headache(784 0) 2018    Each Day    Hematuria     Infectious viral hepatitis     Cured by Dr Aamir Robb Nils Bajwa Sleeper Liver disease     hepatitis C    Mediastinal adenopathy     Obesity 2007    Vitamin D deficiency      Social History     Socioeconomic History    Marital status: /Civil Union     Spouse name: Not on file    Number of children: Not on file    Years of education: Not on file    Highest education level: Not on file   Occupational History    Occupation: manager   Social Needs    Financial resource strain: Not on file    Food insecurity:     Worry: Not on file     Inability: Not on file    Transportation needs:     Medical: Not on file     Non-medical: Not on file   Tobacco Use    Smoking status: Former Smoker     Packs/day: 1 00     Years: 1 00     Pack years: 1 00     Types: Cigarettes     Last attempt to quit: 2009     Years since quitting: 10 9    Smokeless tobacco: Never Used   Substance and Sexual Activity    Alcohol use: No     Comment: quit 12 years ago    Drug use: No    Sexual activity: Not Currently     Partners: Female     Birth control/protection: None   Lifestyle    Physical activity:     Days per week: Not on file     Minutes per session: Not on file    Stress: Not on file   Relationships    Social connections:     Talks on phone: Not on file     Gets together: Not on file     Attends Rastafarian service: Not on file     Active member of club or organization: Not on file     Attends meetings of clubs or organizations: Not on file     Relationship status: Not on file    Intimate partner violence:     Fear of current or ex partner: Not on file     Emotionally abused: Not on file     Physically abused: Not on file     Forced sexual activity: Not on file   Other Topics Concern    Not on file   Social History Narrative    Caffeine use    Daily coffee consumption     Denied daily cola consumption     Denied daily tea consumption     Former smoker (as per [employer])     Current everyday smoker (as per [employer])     Non-smoker (as per [employer])       Family History Problem Relation Age of Onset    Stroke Father         Accidental Death at 52    Cancer Maternal Grandmother     Valvular heart disease Mother      Past Surgical History:   Procedure Laterality Date    COLONOSCOPY      last assessed: 08/28/2012; fiberoptic screening     CYSTOSCOPY      onset: 05/06/2016; Diagnostic     DENTAL SURGERY      LIVER BIOPSY      LYMPH NODE BIOPSY  6/2019    MS BRONCHOSCOPY NEEDLE BX TRACHEA MAIN STEM&/BRON N/A 6/6/2019    Procedure: ENDOBRONCHIAL ULTRASOUND (EBUS);   Surgeon: Sugey Bazzi MD;  [facility];  Service: Thoracic    MS [address] N/A 6/6/2019    Procedure: Sobeida Welch;  Surgeon: Sugey Bazzi MD;  [facility];  Service: Thoracic    MS TRANSURETHRAL ELEC-SURG PROSTATECTOM N/A 9/13/2017    Procedure: Viv Pelletier; TUR PROSTATE;  Surgeon: Kacy Alcantara MD;  [facility];  Service: Urology    PROSTATE SURGERY  2017    SHOULDER ARTHROSCOPY Right     bicep tendon repair, rotator cuff repair and acromuim shave        Current Outpatient Medications:     apixaban (ELIQUIS) 5 mg, Take 1 tablet (5 mg total) by mouth 2 (two) times a day Take 2 tabs twice a day for one week then one tab twice a day thereafter , Disp: 60 tablet, Rfl: 4    aspirin (ECOTRIN LOW STRENGTH) 81 mg EC tablet, Take 1 tablet (81 mg total) by mouth daily, Disp: 90 tablet, Rfl: 3    atorvastatin (LIPITOR) 40 mg tablet, Take 1 tablet (40 mg total) by mouth daily, Disp: 90 tablet, Rfl: 3    Cholecalciferol (VITAMIN D3) 5000 units CAPS, Take 1 capsule by mouth daily , Disp: , Rfl:     famotidine (PEPCID) 40 MG tablet, Take 1 tablet (40 mg total) by mouth daily, Disp: 30 tablet, Rfl: 3    glucose 4 g chewable tablet, Chew 4 tablets (16 g total) as needed for low blood sugar, Disp: 100 tablet, Rfl: 1    insulin aspart (NovoLOG) 100 Units/mL injection pen, Inject 14 Units under the skin 3 (three) times a day with meals, Disp: , Rfl:    insulin glargine (LANTUS SOLOSTAR) 100 units/mL injection pen, Inject 48 Units under the skin daily Inject 48 units under the skin daily in the morning, Disp: , Rfl:     Insulin Pen Needle (PEN NEEDLES) 31G X 6 MM MISC, by Does not apply route 4 (four) times a day, Disp: 150 each, Rfl: 4    levothyroxine 50 mcg tablet, TAKE 1 TABLET BY MOUTH EVERY DAY, Disp: 30 tablet, Rfl: 5    metFORMIN (GLUCOPHAGE) 500 mg tablet, TAKE 1 TABLET BY MOUTH TWICE A DAY WITH MEALS, Disp: 60 tablet, Rfl: 5    metoprolol succinate (TOPROL-XL) 25 mg 24 hr tablet, Take 0 5 tablets (12 5 mg total) by mouth 2 (two) times a day, Disp: 90 tablet, Rfl: 3    mycophenolate (CELLCEPT) 500 mg tablet, Take 2 tablets (1,000 mg total) by mouth 2 (two) times a day, Disp: 120 tablet, Rfl: 2    ONETOUCH DELICA LANCETS 98I MISC, USE THREE TIMES DAILY, Disp: 100 each, Rfl: 5    ONETOUCH VERIO test strip, Use to test blood sugar 4 times a day, Disp: 100 each, Rfl: 3    predniSONE 5 mg tablet, Take 1 tablet (5 mg total) by mouth daily, Disp: 90 tablet, Rfl: 0    dexamethasone (DECADRON) 4 mg/mL, dexamethasone sodium phosphate 4 mg/mL injection solution  use as directed by Physical therapy staff for iontophoresis, Disp: , Rfl:   Allergies   Allergen Reactions    No Active Allergies          Labs:  Admission on 12/13/2019, Discharged on 12/13/2019   Component Date Value    Cholesterol 12/13/2019 138     Triglycerides 12/13/2019 155*    HDL, Direct 12/13/2019 42     LDL Calculated 12/13/2019 65     Non-HDL-Chol (CHOL-HDL) 12/13/2019 96     Ventricular Rate 12/13/2019 87     Atrial Rate 12/13/2019 87     RI Interval 12/13/2019 160     QRSD Interval 12/13/2019 72     QT Interval 12/13/2019 364     QTC Interval 12/13/2019 438     P Axis 12/13/2019 58     QRS Axis 12/13/2019 35     T Wave Valentine 12/13/2019 64    Appointment on 12/07/2019   Component Date Value    WBC 12/07/2019 5 56     RBC 12/07/2019 4 74     Hemoglobin 12/07/2019 14 8  Hematocrit 12/07/2019 45 6     MCV 12/07/2019 96     MCH 12/07/2019 31 2     MCHC 12/07/2019 32 5     RDW 12/07/2019 12 7     MPV 12/07/2019 9 3     Platelets 79/44/5093 200     nRBC 12/07/2019 0     Neutrophils Relative 12/07/2019 51     Immat GRANS % 12/07/2019 0     Lymphocytes Relative 12/07/2019 35     Monocytes Relative 12/07/2019 10     Eosinophils Relative 12/07/2019 3     Basophils Relative 12/07/2019 1     Neutrophils Absolute 12/07/2019 2 83     Immature Grans Absolute 12/07/2019 0 02     Lymphocytes Absolute 12/07/2019 1 93     Monocytes Absolute 12/07/2019 0 57     Eosinophils Absolute 12/07/2019 0 15     Basophils Absolute 12/07/2019 0 06     Sodium 12/07/2019 140     Potassium 12/07/2019 4 4     Chloride 12/07/2019 106     CO2 12/07/2019 27     ANION GAP 12/07/2019 7     BUN 12/07/2019 14     Creatinine 12/07/2019 1 12     Glucose, Fasting 12/07/2019 182*    Calcium 12/07/2019 9 1     AST 12/07/2019 8     ALT 12/07/2019 23     Alkaline Phosphatase 12/07/2019 66     Total Protein 12/07/2019 7 4     Albumin 12/07/2019 3 9     Total Bilirubin 12/07/2019 0 46     eGFR 12/07/2019 71     Protime 12/07/2019 13 2     INR 12/07/2019 1 06    Hospital Outpatient Visit on 11/26/2019   Component Date Value    Ventricular Rate 11/26/2019 83     Atrial Rate 11/26/2019 83     MA Interval 11/26/2019 158     QRSD Interval 11/26/2019 76     QT Interval 11/26/2019 386     QTC Interval 11/26/2019 453     P Axis 11/26/2019 52     QRS Axis 11/26/2019 53     T Wave Axis 11/26/2019 59     Ventricular Rate 11/26/2019 85     Atrial Rate 11/26/2019 85     MA Interval 11/26/2019 168     QRSD Interval 11/26/2019 74     QT Interval 11/26/2019 380     QTC Interval 11/26/2019 452     P Axis 11/26/2019 65     QRS Axis 11/26/2019 68     T Wave Axis 11/26/2019 71    Lab on 11/22/2019   Component Date Value    Miscellaneous Lab Test R* 11/22/2019 SEE WRITTEN REPORT Appointment on 11/11/2019   Component Date Value    Hemoglobin A1C 11/11/2019 7 2*    EAG 11/11/2019 160     Cholesterol 11/11/2019 240*    Triglycerides 11/11/2019 276*    HDL, Direct 11/11/2019 44     LDL Calculated 11/11/2019 141*    Non-HDL-Chol (CHOL-HDL) 11/11/2019 196     HCV PCR Quantitative 11/11/2019 HCV Not Detected     Test Information 11/11/2019 Comment     HIV-1/HIV-2 Ab 11/11/2019 Non-Reactive    Ancillary Orders on 10/25/2019   Component Date Value    Bilirubin, Direct 11/11/2019 0 18    Orders Only on 10/17/2019   Component Date Value    Ref Lab Test Name 10/17/2019 Beryllium Lymphocyte Proliferation     Ref Lab Test Results 10/17/2019 TNP    Admission on 09/11/2019, Discharged on 09/12/2019   Component Date Value    WBC 09/11/2019 8 56     RBC 09/11/2019 4 64     Hemoglobin 09/11/2019 14 9     Hematocrit 09/11/2019 44 6     MCV 09/11/2019 96     MCH 09/11/2019 32 1     MCHC 09/11/2019 33 4     RDW 09/11/2019 13 2     MPV 09/11/2019 9 2     Platelets 10/42/8400 219     nRBC 09/11/2019 0     Neutrophils Relative 09/11/2019 83*    Immat GRANS % 09/11/2019 2     Lymphocytes Relative 09/11/2019 9*    Monocytes Relative 09/11/2019 5     Eosinophils Relative 09/11/2019 0     Basophils Relative 09/11/2019 1     Neutrophils Absolute 09/11/2019 7 11     Immature Grans Absolute 09/11/2019 0 19     Lymphocytes Absolute 09/11/2019 0 78     Monocytes Absolute 09/11/2019 0 42     Eosinophils Absolute 09/11/2019 0 01     Basophils Absolute 09/11/2019 0 05     Sodium 09/11/2019 135*    Potassium 09/11/2019 4 8     Chloride 09/11/2019 101     CO2 09/11/2019 24     ANION GAP 09/11/2019 10     BUN 09/11/2019 24     Creatinine 09/11/2019 1 17     Glucose 09/11/2019 338*    Calcium 09/11/2019 8 7     AST 09/11/2019 <5*    ALT 09/11/2019 22     Alkaline Phosphatase 09/11/2019 63     Total Protein 09/11/2019 7 3     Albumin 09/11/2019 3 5     Total Bilirubin 09/11/2019 0 40     eGFR 09/11/2019 67     LACTIC ACID 09/11/2019 2 0     Protime 09/11/2019 12 6     INR 09/11/2019 1 00     PTT 09/11/2019 24     Blood Culture 09/11/2019 No Growth After 5 Days   Blood Culture 09/11/2019 No Growth After 5 Days       Color, UA 09/11/2019 Yellow     Clarity, UA 09/11/2019 Clear     pH, UA 09/11/2019 5 5     Leukocytes, UA 09/11/2019 Negative     Nitrite, UA 09/11/2019 Negative     Protein, UA 09/11/2019 Negative     Glucose, UA 09/11/2019 500 (1/2%)*    Ketones, UA 09/11/2019 Negative     Urobilinogen, UA 09/11/2019 0 2     Bilirubin, UA 09/11/2019 Negative     Blood, UA 09/11/2019 Negative     Specific Gravity, UA 09/11/2019 1 020     Ventricular Rate 09/11/2019 106     Atrial Rate 09/11/2019 106     [place] Interval 09/11/2019 144     QRSD Interval 09/11/2019 72     QT Interval 09/11/2019 314     QTC Interval 09/11/2019 417     P Axis 09/11/2019 68     QRS Axis 09/11/2019 37     T Wave Axis 09/11/2019 73     Procalcitonin 09/11/2019 <0 05     POC Glucose 09/11/2019 154*    WBC 09/12/2019 6 88     RBC 09/12/2019 4 11     Hemoglobin 09/12/2019 12 8     Hematocrit 09/12/2019 39 8     MCV 09/12/2019 97     MCH 09/12/2019 31 1     MCHC 09/12/2019 32 2     RDW 09/12/2019 13 2     Platelets 86/12/4980 178     MPV 09/12/2019 8 8*    Sodium 09/12/2019 141     Potassium 09/12/2019 3 8     Chloride 09/12/2019 108     CO2 09/12/2019 25     ANION GAP 09/12/2019 8     BUN 09/12/2019 21     Creatinine 09/12/2019 0 94     Glucose 09/12/2019 176*    Calcium 09/12/2019 8 2*    AST 09/12/2019 10     ALT 09/12/2019 25     Alkaline Phosphatase 09/12/2019 50     Total Protein 09/12/2019 5 9*    Albumin 09/12/2019 2 9*    Total Bilirubin 09/12/2019 0 50     eGFR 09/12/2019 88     POC Glucose 09/12/2019 144*    POC Glucose 09/12/2019 138    Orders Only on 09/04/2019   Component Date Value    Total Bilirubin 10/15/2019 0 66     Bilirubin, Direct 10/15/2019 0 16     Alkaline Phosphatase 10/15/2019 50     AST 10/15/2019 9     ALT 10/15/2019 31     Total Protein 10/15/2019 7 2     Albumin 10/15/2019 3 8     WBC 10/15/2019 7 88     RBC 10/15/2019 4 67     Hemoglobin 10/15/2019 14 7     Hematocrit 10/15/2019 46 1     MCV 10/15/2019 99*    MCH 10/15/2019 31 5     MCHC 10/15/2019 31 9     RDW 10/15/2019 13 5     MPV 10/15/2019 9 5     Platelets 63/49/3426 192     nRBC 10/15/2019 0     Neutrophils Relative 10/15/2019 47     Immat GRANS % 10/15/2019 2     Lymphocytes Relative 10/15/2019 39     Monocytes Relative 10/15/2019 9     Eosinophils Relative 10/15/2019 2     Basophils Relative 10/15/2019 1     Neutrophils Absolute 10/15/2019 3 81     Immature Grans Absolute 10/15/2019 0 13     Lymphocytes Absolute 10/15/2019 3 06     Monocytes Absolute 10/15/2019 0 68     Eosinophils Absolute 10/15/2019 0 12     Basophils Absolute 10/15/2019 0 08     Sodium 10/15/2019 141     Potassium 10/15/2019 4 0     Chloride 10/15/2019 107     CO2 10/15/2019 24     ANION GAP 10/15/2019 10     BUN 10/15/2019 22     Creatinine 10/15/2019 1 08     Glucose, Fasting 10/15/2019 161*    Calcium 10/15/2019 9 1     eGFR 10/15/2019 74    Orders Only on 08/26/2019   Component Date Value    Right Eye Diabetic Retin* 08/26/2019 None     Left Eye Diabetic Retino* 08/26/2019 None    There may be more visits with results that are not included  Imaging: Xr Chest 2 Views    Result Date: 9/11/2019  Narrative: CHEST INDICATION:   cough  COMPARISON:  8/1/2019 EXAM PERFORMED/VIEWS:  XR CHEST PA & LATERAL FINDINGS: Mild chronic elevation of the right hemidiaphragm  Cardiomediastinal silhouette appears unremarkable  The lungs are clear  No pneumothorax or pleural effusion  Osseous structures appear within normal limits for patient age  Impression: No acute cardiopulmonary disease   Workstation performed: [id number]     Cta Ed Chest Pe Study    Result Date: 9/11/2019  Narrative: CTA - CHEST WITH IV CONTRAST - PULMONARY ANGIOGRAM INDICATION:   sob/tachycardia/cough  Former smoker  COMPARISON: CT chest 5/15/2019  TECHNIQUE: CTA examination of the chest was performed using angiographic technique according to a protocol specifically tailored to evaluate for pulmonary embolism  Axial, sagittal, and coronal 2D reformatted images were created from the source data and  submitted for interpretation  In addition, coronal 3D MIP postprocessing was performed on the acquisition scanner  Radiation dose length product (DLP) for this visit:  812 mGy-cm   This examination, like all CT scans performed in the [facility], was performed utilizing techniques to minimize radiation dose exposure, including the use of iterative reconstruction and automated exposure control  IV Contrast:  85 mL of iohexol (OMNIPAQUE)  FINDINGS: PULMONARY ARTERIAL TREE:  No acute pulmonary embolus is seen  Chronic appearing mural thrombus and a segmental branch point of the left lower lobe as seen on image 3/180  LUNGS:  Lungs are clear  There is no tracheal or endobronchial lesion  PLEURA:  Unremarkable  HEART/GREAT VESSELS:  Unremarkable for patient's age  MEDIASTINUM AND KRIS:  Unremarkable  Mediastinal nodes have normalized  CHEST WALL AND LOWER NECK:   Unremarkable  VISUALIZED STRUCTURES IN THE UPPER ABDOMEN:  Stable splenomegaly measuring 15 cm  Stable hepatomegaly with steatosis  Small hiatal hernia unchanged  OSSEOUS STRUCTURES:  No acute fracture or destructive osseous lesion  Impression: No acute pulmonary embolus is seen  Chronic appearing mural thrombus and a segmental branch point of the left lower lobe as seen on image 3/180  Resolved mediastinal lymphadenopathy  Stable splenomegaly  Stable hepatomegaly with steatosis  Stable hiatal hernia  Workstation performed: [id number]       ECG: sinus tachycardia at a rate of 130  normal ST T-waves      Review of Systems:  Review of Systems   Constitutional: Positive for fatigue  Negative for activity change, appetite change, chills, diaphoresis and fever  HENT: Negative for congestion  Respiratory: Positive for shortness of breath  Negative for cough, chest tightness and wheezing  Cardiovascular: Negative for chest pain, palpitations and leg swelling  Gastrointestinal: Negative for abdominal pain, diarrhea, nausea and vomiting  Genitourinary: Negative for difficulty urinating and dysuria  Musculoskeletal: Negative for back pain  Skin: Negative for color change and rash  Neurological: Negative for dizziness, syncope, facial asymmetry, weakness, light-headedness, numbness and headaches  Psychiatric/Behavioral: Negative for confusion           Vitals:    12/18/19 1451   BP: 128/82   Pulse: 82       Physical Exam:  General appearance:  Appears stated age, alert, well appearing and in no distress  HEENT:  PERRLA, EOMI, no scleral icterus, no conjunctival pallor  NECK:  Supple, No elevated JVP, no thyromegaly, no carotid bruits  HEART:  Regular rate, normal rhythm, normal S1/S2, no S3/S4, no murmur or rub  LUNGS:  Clear to auscultation bilaterally  ABDOMEN:  Soft, non-tender, positive bowel sounds, no rebound or guarding, no organomegaly   EXTREMITIES:  No edema  VASCULAR:  Normal pedal pulses   SKIN: No lesions or rashes on exposed skin  NEURO:  CN II-XII intact, no focal deficits

## 2019-12-18 NOTE — PATIENT INSTRUCTIONS
Continue current medications  Okay to undergo laparoscopic gall bladder removal    Follow-up in four months

## 2020-01-01 ENCOUNTER — TELEPHONE (OUTPATIENT)
Dept: ENDOCRINOLOGY | Facility: CLINIC | Age: 60
End: 2020-01-01

## 2020-01-01 NOTE — TELEPHONE ENCOUNTER
Aidan Zamorano,    Please call pt and let him know I reviewed his BG log    -fasting BGs still running on the higher side, the daytime numbers are adequate, one low BG 51    Increase lantus to 52 units in the morning and continue same dose of novolog 14 units TID AC, let me know if he has any frequent low BGs<70 because then we can lower the doses if necessary    Claudean Pace M D    Endocrinology

## 2020-01-02 ENCOUNTER — TELEPHONE (OUTPATIENT)
Dept: ENDOCRINOLOGY | Facility: CLINIC | Age: 60
End: 2020-01-02

## 2020-01-02 NOTE — TELEPHONE ENCOUNTER
Spoke with patient and reviewed bs log and new medication regimen      He understood    Updated the medication list

## 2020-01-06 DIAGNOSIS — E11.65 TYPE 2 DIABETES MELLITUS WITH HYPERGLYCEMIA, WITH LONG-TERM CURRENT USE OF INSULIN (HCC): Primary | ICD-10-CM

## 2020-01-06 DIAGNOSIS — Z79.4 TYPE 2 DIABETES MELLITUS WITH HYPERGLYCEMIA, WITH LONG-TERM CURRENT USE OF INSULIN (HCC): Primary | ICD-10-CM

## 2020-01-07 ENCOUNTER — HOSPITAL ENCOUNTER (OUTPATIENT)
Dept: RADIOLOGY | Facility: MEDICAL CENTER | Age: 60
Discharge: HOME | End: 2020-01-07
Payer: COMMERCIAL

## 2020-01-07 DIAGNOSIS — K80.20 CALCULUS OF GALLBLADDER WITHOUT CHOLECYSTITIS WITHOUT OBSTRUCTION: ICD-10-CM

## 2020-01-07 DIAGNOSIS — Z86.19 HISTORY OF HEPATITIS C VIRUS INFECTION: ICD-10-CM

## 2020-01-07 DIAGNOSIS — I27.82 CHRONIC PULMONARY EMBOLISM (HCC): ICD-10-CM

## 2020-01-07 DIAGNOSIS — G60.8 POLYNEUROPATHY, PERIPHERAL SENSORIMOTOR AXONAL: ICD-10-CM

## 2020-01-07 DIAGNOSIS — R59.0 MEDIASTINAL LYMPHADENOPATHY: ICD-10-CM

## 2020-01-07 DIAGNOSIS — D86.0 PULMONARY SARCOIDOSIS (HCC): ICD-10-CM

## 2020-01-07 DIAGNOSIS — K74.60 UNSPECIFIED CIRRHOSIS OF LIVER (HCC): ICD-10-CM

## 2020-01-07 PROCEDURE — 74177 CT ABD & PELVIS W/CONTRAST: CPT

## 2020-01-07 PROCEDURE — 71260 CT THORAX DX C+: CPT

## 2020-01-07 RX ADMIN — IOHEXOL 100 ML: 350 INJECTION, SOLUTION INTRAVENOUS at 09:29

## 2020-01-13 ENCOUNTER — TELEPHONE (OUTPATIENT)
Dept: ENDOCRINOLOGY | Facility: CLINIC | Age: 60
End: 2020-01-13

## 2020-01-14 NOTE — TELEPHONE ENCOUNTER
Akash Rowley,    Please let pt know I reviewed his BG log    -BGs overall stable, occasional BG over 180    No low BGs <70  Continue same insulin doses 52 units in AM and novolog 14 units TID AC    Let me know the outcome of rheumatology appointment later this month, if there will be a change in the steroid dose  Cliff WITT    Endocrinology

## 2020-01-14 NOTE — TELEPHONE ENCOUNTER
Spoke with patient and reviewed the bg log  He is very unhappy being on the steroids since he has gained 50 lbs  He wants the rheumatology doctor to take him off  He will let you know the outcome

## 2020-01-16 ENCOUNTER — TELEPHONE (OUTPATIENT)
Dept: GASTROENTEROLOGY | Facility: AMBULARY SURGERY CENTER | Age: 60
End: 2020-01-16

## 2020-01-16 NOTE — TELEPHONE ENCOUNTER
----- Message from Keaton Lynn sent at 1/16/2020  2:20 PM EST -----  Regarding: FW: Non-Urgent Medical Question  Contact: [phone]      ----- Message -----  From: Kaylan Servin MA  Sent: 1/16/2020  12:18 PM EST  To: [facility]  Subject: FW: Non-Urgent Medical Question                      ----- Message -----  From: Jabier Black  Sent: 1/16/2020   9:35 AM EST  To: [facility]  Subject: Non-Urgent Medical Question                      Good Morning,     I had a CT last week along with another CT scan last week at the same time  One was for you, and the other for the Pulmonologist  The results shown for my gall bladder said I had no gallstones  I had a visit with Dr Cirilo Mejias at the end of November and he told me that I had a 9mm gallstone in my gallbladder and it needed to be removed  He suggested doing this soon, before it was an emergency  He was waiting for a clearance from my cardiologist (which was given the "go ahead"  ) I haven't seen the individual CT scan you ordered specifically of my gallbladder show up on My Chart  Was both CT combined? Do you think I still should have my gallbladder removed?      Thanks,  Jose Javier

## 2020-01-17 ENCOUNTER — OFFICE VISIT (OUTPATIENT)
Dept: RHEUMATOLOGY | Facility: CLINIC | Age: 60
End: 2020-01-17
Payer: COMMERCIAL

## 2020-01-17 VITALS
HEART RATE: 80 BPM | BODY MASS INDEX: 38.6 KG/M2 | WEIGHT: 300.8 LBS | HEIGHT: 74 IN | SYSTOLIC BLOOD PRESSURE: 158 MMHG | DIASTOLIC BLOOD PRESSURE: 90 MMHG

## 2020-01-17 DIAGNOSIS — Z79.899 LONG TERM USE OF DRUG: ICD-10-CM

## 2020-01-17 DIAGNOSIS — D86.9 SARCOIDOSIS: Primary | ICD-10-CM

## 2020-01-17 DIAGNOSIS — G60.8 POLYNEUROPATHY, PERIPHERAL SENSORIMOTOR AXONAL: ICD-10-CM

## 2020-01-17 DIAGNOSIS — D84.9 IMMUNOSUPPRESSED STATUS (HCC): ICD-10-CM

## 2020-01-17 DIAGNOSIS — R06.00 DYSPNEA ON EXERTION: ICD-10-CM

## 2020-01-17 DIAGNOSIS — Z79.52 CURRENT CHRONIC USE OF SYSTEMIC STEROIDS: ICD-10-CM

## 2020-01-17 DIAGNOSIS — R63.5 WEIGHT GAIN: ICD-10-CM

## 2020-01-17 PROCEDURE — 99214 OFFICE O/P EST MOD 30 MIN: CPT | Performed by: INTERNAL MEDICINE

## 2020-01-17 NOTE — PROGRESS NOTES
Assessment and Plan:   Patient is a 43-year-old male with history of hepatitis C s/p cure with [facility] prior biopsy, uncontrolled type 2 diabetes now insulin dependent, history of erosive gastritis and duodenal erosion, who presents for rheumatology follow-up for pulmonary sarcoidosis with suspicion for extra thoracic involvement  Since I last saw him he completed his cardiac workup with MRI stress test which did not show any evidence of cardiac sarcoidosis  He did subsequently have a cardiac catheterization which did not show any remarkable CAD  Today he expresses he would like to get off the prednisone and CellCept and go forward monitoring any symptoms  He has gained about 50 lb over the past year on the prednisone along with multiple other side effects including uncontrolled blood sugars and he generally feels unwell on the medications  We discussed the risks and benefits of this including possible relapse of sarcoid or progression  He did have resolution of his lymphadenopathy on follow-up CT of the chest which is reassuring  At this point his dyspnea is only explained by deconditioning as he has had extensive workup for this  Given his preference to come off the medications, I advised him to wean prednisone to 2 5 mg for 2 weeks, then will stop  He also can just stop his CellCept  We will monitor for any symptom changes and determine workup at that point  Plan:  Diagnoses and all orders for this visit:    Sarcoidosis    Current chronic use of systemic steroids    Polyneuropathy, peripheral sensorimotor axonal    Immunosuppressed status ([address] )    Long term use of drug    Dyspnea on exertion    Weight gain        Follow-up plan: 8 months        Rheumatic Disease Summary  1   Pulmonary sarcoidosis with probable extrathoracic involvement   -Rheum consult 7/10/19 for sarcoidosis on LN bx with suspicion for systemic disease with PNS and cranial nerve involvement and possible cardiac  -Chest/abdominal LAD noted on CT dating back to 2012 with stability for years, no prior tx; repeat CT chest 5/2019 showed mild increased mediastinal LAD prompting bx  -EBUS with LN bx 6/6/19: numerous histiocytes, some multinucleated and epitheliod, in background of polymorphous lymphocytes and benign bronchial cells, cannot exclude granulomatous lesion  -Also having issues with trigeminal autonomic cephalgia and headaches starting 8/2018; also with burning paresthesias in forearms and right thigh with subsequent EMG of the legs showing large fiber neuropathy with axonal and demyelinating process  -Also with low voltage EKG, echo with mildly increased LV wall thickness  -PFTs 7/16/19: mild restriction, mild reduced DLCO 65%  -Labs 2019: normal ACE, negative TB, hep B, undetectable HCV, negative ANCA, RF, HIV  -Increased dry cough and dyspnea in early 7/2019, started prednisone 60mg 7/10/19 for symptomatic sarcoid and suspicion for extrathoracic disease; advised ophtho eval to screen for uveitis; unable to use MTX or AZA for steroid sparing given underlying cirrhosis  -Visit 8/14/19: neurologic sx resolved after starting pred; taper pred from 60mg by 10mg q7 days until at 30mg, then by 5mg q14 days until at 20mg  -Admitted 9/11/19 with sx of URI, dyspnea and tachy, found to have chronic appearing PE left lower lobe, started on eliquis with plans for 3 months tx; CTA chest also noted resolution of mediastinal LAD  -Cellcept started for steroid sparing mid-sept 2019  -Ophtho eval 9/2019: no uveitis   -Visit 10/17/19: reduce prednisone to 15mg x2 weeks, 10mg x2 weeks, then stay on 5mg daily, cont MMF at 1g BID; berylliosis testing ordered by pulm was negative  -Messaged 12/4/19 with recurrent headaches and nerve pain in the legs, continued SOB  -CT chest/abdomen/pelvis 1/7/20: stable small subcentimeter mediastinal and hilar lymph nodes, stable splenomegaly  -Visit 1/17/20: patient wanted to stop pred and cellcept due to weight gain and side effects and monitor off tx; discussed risks and benefits of this; advised to wean pred to 2 5mg x2 weeks then stop, and to stop cellcept no need to wean   2  Large fiber neuropathy of the legs  -With axonal and demyelination on EMG, possibly due to sarcoid  -Gabapentin did not help, felt better with tegretol   3  Abnormal brain MRI, headaches, trigeminal neuralgia  -Nonspecific scattered small white matter hyperintensities on T2 and FLAIR in frontal lobes, not felt to be related to granulomatous disease in brain parenchyma by neuro   -Right sided headaches with associated drooping eyelid and tearing  4  Low voltage EKG, palpitations, chronic dyspnea   -Echo with mildly increased LV wall thickness, grade 1DD, EF 65%, unclear if possibly related to sarcoid   -Cardiac MRI stress 11/26/19 was negative for cardiac sarcoid and showed small perfusion defect in septum, subsequent cardiac cath 12/13/19 negative for obstructive CAD   -Cardio suspect SOB is related to deconditioning   5  Chronic left lower lobe PE, dyspnea, tachycardia  -Dx 9/11/19 during admission for dyspnea and tachycardia, pulm following with plans for eliquis x3-6 months   6  Type 2 DM  -Admitted 8/1/19 with DKA with glucose in 600s after insurance did not approve insulin therapy, now stable on insulin  7   Other Comorbidities: hypothyroidism, type 2 diabetes, BPH, history of hepatitis-C status post cure with [facility] (2015), history of liver biopsy, liver cirrhosis, vitamin-D deficiency, GERD, history of erosive gastritis and duodenal erosion (2018), history of trigeminal autonomic cephalgia, cervicalgia and chronic headaches, lumbar DDD/spondylosis     HPI  Jabier Black is a 60 y o   male who presents for rheumatology follow-up for pulmonary sarcoidosis with suspicion for extra thoracic involvement      His cardiac workup initially showed a possible ischemia perfusion defect on cardiac stress MRI and then subsequent cardiac catheterization did not show any obstructive CAD  Cardiology ultimately felt that his chronic dyspnea is related to deconditioning  He establish with a new GI provider with [address] [place] [address] who did an updated abdominal ultrasound for his cirrhosis and this showed a 9 mm gallstone  He then had a CT of the chest abdomen and pelvis which did not indicate there is a gallstone present and so he is not sure how to proceed  The GI doctor recommended cholecystectomy based off the ultrasound  He otherwise continues with chronic dyspnea and reports a 50 lb weight gain over the past year  He overall just does not feel well and would like to stop his prednisone and CellCept and monitor off treatment  We reviewed that his CT of the chest did improve in the lymphadenopathy resolved  He was intermittently having nerve pain in his thighs and headaches that were returning but nothing persistent  He is still on prednisone 5 mg  He is also on the CellCept 1000 mg b i d  The following portions of the patient's history were reviewed and updated as appropriate: allergies, current medications, past family history, past medical history, past social history, past surgical history and problem list     Review of Systems:   Review of Systems   Constitutional: Positive for unexpected weight change (weight gain)  Negative for chills, fatigue and fever  HENT: Negative for mouth sores and trouble swallowing  Eyes: Negative for pain and visual disturbance  Respiratory: Positive for shortness of breath  Negative for cough  Cardiovascular: Negative for chest pain and leg swelling  Gastrointestinal: Negative for abdominal pain, blood in stool, constipation, diarrhea and nausea  Genitourinary: Negative for hematuria  Musculoskeletal: Negative for arthralgias, back pain, joint swelling and myalgias  Skin: Negative for rash  Neurological: Negative for weakness and numbness     Hematological: Negative for adenopathy  Psychiatric/Behavioral: Negative for sleep disturbance         Home Medications:    Current Outpatient Medications:     apixaban (ELIQUIS) 5 mg, Take 1 tablet (5 mg total) by mouth 2 (two) times a day Take 2 tabs twice a day for one week then one tab twice a day thereafter , Disp: 60 tablet, Rfl: 4    aspirin (ECOTRIN LOW STRENGTH) 81 mg EC tablet, Take 1 tablet (81 mg total) by mouth daily, Disp: 90 tablet, Rfl: 3    atorvastatin (LIPITOR) 40 mg tablet, Take 1 tablet (40 mg total) by mouth daily, Disp: 90 tablet, Rfl: 3    Cholecalciferol (VITAMIN D3) 5000 units CAPS, Take 1 capsule by mouth daily , Disp: , Rfl:     dexamethasone (DECADRON) 4 mg/mL, dexamethasone sodium phosphate 4 mg/mL injection solution  use as directed by Physical therapy staff for iontophoresis, Disp: , Rfl:     famotidine (PEPCID) 40 MG tablet, Take 1 tablet (40 mg total) by mouth daily, Disp: 30 tablet, Rfl: 3    glucose 4 g chewable tablet, Chew 4 tablets (16 g total) as needed for low blood sugar, Disp: 100 tablet, Rfl: 1    insulin aspart (NovoLOG) 100 Units/mL injection pen, Inject 14 Units under the skin 3 (three) times a day with meals, Disp: , Rfl:     insulin glargine (LANTUS SOLOSTAR) 100 units/mL injection pen, Inject 52 Units under the skin daily, Disp: 15 pen, Rfl: 3    Insulin Pen Needle (PEN NEEDLES) 31G X 6 MM MISC, by Does not apply route 4 (four) times a day, Disp: 150 each, Rfl: 4    levothyroxine 50 mcg tablet, TAKE 1 TABLET BY MOUTH EVERY DAY, Disp: 30 tablet, Rfl: 5    metFORMIN (GLUCOPHAGE) 500 mg tablet, TAKE 1 TABLET BY MOUTH TWICE A DAY WITH MEALS, Disp: 60 tablet, Rfl: 5    metoprolol succinate (TOPROL-XL) 25 mg 24 hr tablet, Take 0 5 tablets (12 5 mg total) by mouth 2 (two) times a day, Disp: 90 tablet, Rfl: 3    mycophenolate (CELLCEPT) 500 mg tablet, Take 2 tablets (1,000 mg total) by mouth 2 (two) times a day, Disp: 120 tablet, Rfl: 2    ONETOUCH DELICA LANCETS 76R MISC, USE THREE TIMES DAILY, Disp: 100 each, Rfl: 5    ONETOUCH VERIO test strip, Use to test blood sugar 4 times a day, Disp: 100 each, Rfl: 3    predniSONE 5 mg tablet, Take 1 tablet (5 mg total) by mouth daily, Disp: 90 tablet, Rfl: 0    Objective:    Vitals:    01/17/20 1448   BP: 158/90   BP Location: Left arm   Patient Position: Sitting   Cuff Size: Standard   Pulse: 80   Weight: (!) 136 kg (300 lb 12 8 oz)   Height: 6' 2" (1 88 m)       Physical Exam   Constitutional: He appears well-developed and well-nourished  He is cooperative  HENT:   Head: Normocephalic and atraumatic  Eyes: Conjunctivae and EOM are normal  No scleral icterus  Neck: No spinous process tenderness and no muscular tenderness present  No thyromegaly present  Lymphadenopathy:     He has no cervical adenopathy  Neurological: He is alert  No sensory deficit  Skin: Skin is warm and dry  No rash noted  Nails show no clubbing  Psychiatric: He has a normal mood and affect  Imaging:   CT chest/abdomen/pelvis 1/7/20: IMPRESSION:  1  No enlarged lymph nodes in the chest, abdomen or pelvis  There are stable scattered subcentimeter mediastinal lymph nodes, likely benign  2   Stable mild splenomegaly  3   Small hiatal hernia  4   3 mm right proximal ureteral calculus without associated hydroureteronephrosis or findings of obstructive uropathy  Cardiac MRI 11/26/19: IMPRESSION:  Impression:  1  Top normal left ventricular size with normal left ventricular systolic function  2  Normal right ventricular size and systolic function  3  No significant valvular abnormalities  4  Suggestion of mild ischemia of interventricular septum with vasodilator stress  5  No evidence of myocardial scarring, inflammation, or infiltrative disease  6  No evidence of cardiac sarcoidosis      Labs:   Component      Latest Ref Rng & Units 12/7/2019   WBC      4 31 - 10 16 Thousand/uL 5 56   Red Blood Cell Count      3 88 - 5 62 Million/uL 4 74   Hemoglobin 12 0 - 17 0 g/dL 14 8   HCT      36 5 - 49 3 % 45 6   MCV      82 - 98 fL 96   MCH      26 8 - 34 3 pg 31 2   MCHC      31 4 - 37 4 g/dL 32 5   RDW      11 6 - 15 1 % 12 7   MPV      8 9 - 12 7 fL 9 3   Platelet Count      995 - 390 Thousands/uL 200   nRBC      /100 WBCs 0   Neutrophils %      43 - 75 % 51   Immat GRANS %      0 - 2 % 0   Lymphocytes Relative      14 - 44 % 35   Monocytes Relative      4 - 12 % 10   Eosinophils      0 - 6 % 3   Basophils Relative      0 - 1 % 1   Absolute Neutrophils      1 85 - 7 62 Thousands/µL 2 83   Immature Grans Absolute      0 00 - 0 20 Thousand/uL 0 02   Lymphocytes Absolute      0 60 - 4 47 Thousands/µL 1 93   Absolute Monocytes      0 17 - 1 22 Thousand/µL 0 57   Absolute Eosinophils      0 00 - 0 61 Thousand/µL 0 15   Basophils Absolute      0 00 - 0 10 Thousands/µL 0 06   Sodium      136 - 145 mmol/L 140   Potassium      3 5 - 5 3 mmol/L 4 4   Chloride      100 - 108 mmol/L 106   CO2      21 - 32 mmol/L 27   Anion Gap      4 - 13 mmol/L 7   BUN      5 - 25 mg/dL 14   Creatinine      0 60 - 1 30 mg/dL 1 12   GLUCOSE FASTING      65 - 99 mg/dL 182 (H)   Calcium      8 3 - 10 1 mg/dL 9 1   AST      5 - 45 U/L 8   ALT      12 - 78 U/L 23   Alkaline Phosphatase      46 - 116 U/L 66   Total Protein      6 4 - 8 2 g/dL 7 4   Albumin      3 5 - 5 0 g/dL 3 9   TOTAL BILIRUBIN      0 20 - 1 00 mg/dL 0 46   eGFR      ml/min/1 73sq m 71     Component      Latest Ref Rng & Units 11/11/2019   WBC      4 31 - 10 16 Thousand/uL 7 57   Red Blood Cell Count      3 88 - 5 62 Million/uL 4 59   Hemoglobin      12 0 - 17 0 g/dL 14 6   HCT      36 5 - 49 3 % 44 5   MCV      82 - 98 fL 97   MCH      26 8 - 34 3 pg 31 8   MCHC      31 4 - 37 4 g/dL 32 8   RDW      11 6 - 15 1 % 12 8   MPV      8 9 - 12 7 fL 9 6   Platelet Count      988 - 390 Thousands/uL 208   nRBC      /100 WBCs 0   Neutrophils %      43 - 75 % 69   Immat GRANS %      0 - 2 % 1   Lymphocytes Relative      14 - 44 % 20 Monocytes Relative      4 - 12 % 8   Eosinophils      0 - 6 % 1   Basophils Relative      0 - 1 % 1   Absolute Neutrophils      1 85 - 7 62 Thousands/µL 5 22   Immature Grans Absolute      0 00 - 0 20 Thousand/uL 0 06   Lymphocytes Absolute      0 60 - 4 47 Thousands/µL 1 54   Absolute Monocytes      0 17 - 1 22 Thousand/µL 0 62   Absolute Eosinophils      0 00 - 0 61 Thousand/µL 0 07   Basophils Absolute      0 00 - 0 10 Thousands/µL 0 06   Sodium      136 - 145 mmol/L 138   Potassium      3 5 - 5 3 mmol/L 4 3   Chloride      100 - 108 mmol/L 107   CO2      21 - 32 mmol/L 24   Anion Gap      4 - 13 mmol/L 7   BUN      5 - 25 mg/dL 24   Creatinine      0 60 - 1 30 mg/dL 1 06   GLUCOSE FASTING      65 - 99 mg/dL 152 (H)   Calcium      8 3 - 10 1 mg/dL 9 3   AST      5 - 45 U/L 13   ALT      12 - 78 U/L 29   Alkaline Phosphatase      46 - 116 U/L 50   Total Protein      6 4 - 8 2 g/dL 7 4   Albumin      3 5 - 5 0 g/dL 4 2   TOTAL BILIRUBIN      0 20 - 1 00 mg/dL 0 81   eGFR      ml/min/1 73sq m 76   Hemoglobin A1C      4 2 - 6 3 % 7 2 (H)   EAG      mg/dl 160   HCV PCR Quantitative      IU/mL HCV Not Detected   TEST INFORMATION       Comment   C-REACTIVE PROTEIN      <3 0 mg/L <3 0   Sed Rate      0 - 10 mm/hour 19 (H)   HIV-1/2 AB-AG      Non-Reactive Non-Reactive

## 2020-01-30 ENCOUNTER — TELEPHONE (OUTPATIENT)
Dept: ENDOCRINOLOGY | Facility: CLINIC | Age: 60
End: 2020-01-30

## 2020-01-30 NOTE — TELEPHONE ENCOUNTER
Karla Moon,    Please let pt know I reviewed BG log      -BGs look stable, continue same insulin regimen for now    I am glad he is off steroids now  Will start to decrease insulin doses in the coming weeks/months based on BGs  Adeola WITT    Endocrinology

## 2020-01-31 DIAGNOSIS — F41.9 ANXIETY: Primary | ICD-10-CM

## 2020-01-31 RX ORDER — ALPRAZOLAM 0.25 MG/1
0.25 TABLET ORAL 3 TIMES DAILY PRN
Qty: 2 TABLET | Refills: 0 | Status: ON HOLD | OUTPATIENT
Start: 2020-01-31 | End: 2020-02-06 | Stop reason: CLARIF

## 2020-02-04 ENCOUNTER — HOSPITAL ENCOUNTER (EMERGENCY)
Facility: HOSPITAL | Age: 60
Discharge: HOME | End: 2020-02-04
Attending: EMERGENCY MEDICINE | Admitting: EMERGENCY MEDICINE
Payer: COMMERCIAL

## 2020-02-04 ENCOUNTER — TELEPHONE (OUTPATIENT)
Dept: ENDOCRINOLOGY | Facility: CLINIC | Age: 60
End: 2020-02-04

## 2020-02-04 ENCOUNTER — APPOINTMENT (EMERGENCY)
Dept: RADIOLOGY | Facility: HOSPITAL | Age: 60
End: 2020-02-04
Payer: COMMERCIAL

## 2020-02-04 VITALS
DIASTOLIC BLOOD PRESSURE: 61 MMHG | TEMPERATURE: 98.4 F | SYSTOLIC BLOOD PRESSURE: 131 MMHG | RESPIRATION RATE: 18 BRPM | WEIGHT: 295 LBS | BODY MASS INDEX: 37.88 KG/M2 | HEART RATE: 76 BPM | OXYGEN SATURATION: 99 %

## 2020-02-04 DIAGNOSIS — N20.1 URETERAL STONE: Primary | ICD-10-CM

## 2020-02-04 LAB
ALBUMIN SERPL BCP-MCNC: 3.8 G/DL (ref 3.5–5)
ALP SERPL-CCNC: 59 U/L (ref 46–116)
ALT SERPL W P-5'-P-CCNC: 30 U/L (ref 12–78)
ANION GAP SERPL CALCULATED.3IONS-SCNC: 5 MMOL/L (ref 4–13)
AST SERPL W P-5'-P-CCNC: 20 U/L (ref 5–45)
BACTERIA UR QL AUTO: ABNORMAL /HPF
BASOPHILS # BLD AUTO: 0.06 THOUSANDS/ΜL (ref 0–0.1)
BASOPHILS NFR BLD AUTO: 1 % (ref 0–1)
BILIRUB SERPL-MCNC: 0.47 MG/DL (ref 0.2–1)
BILIRUB UR QL STRIP: ABNORMAL
BUN SERPL-MCNC: 17 MG/DL (ref 5–25)
CALCIUM SERPL-MCNC: 8.9 MG/DL (ref 8.3–10.1)
CHLORIDE SERPL-SCNC: 110 MMOL/L (ref 100–108)
CLARITY UR: CLEAR
CO2 SERPL-SCNC: 25 MMOL/L (ref 21–32)
COLOR UR: ABNORMAL
CREAT SERPL-MCNC: 1.09 MG/DL (ref 0.6–1.3)
EOSINOPHIL # BLD AUTO: 0.2 THOUSAND/ΜL (ref 0–0.61)
EOSINOPHIL NFR BLD AUTO: 3 % (ref 0–6)
ERYTHROCYTE [DISTWIDTH] IN BLOOD BY AUTOMATED COUNT: 12.5 % (ref 11.6–15.1)
GFR SERPL CREATININE-BSD FRML MDRD: 73 ML/MIN/1.73SQ M
GLUCOSE SERPL-MCNC: 154 MG/DL (ref 65–140)
GLUCOSE UR STRIP-MCNC: NEGATIVE MG/DL
HCT VFR BLD AUTO: 43.1 % (ref 36.5–49.3)
HGB BLD-MCNC: 14.2 G/DL (ref 12–17)
HGB UR QL STRIP.AUTO: ABNORMAL
IMM GRANULOCYTES # BLD AUTO: 0.05 THOUSAND/UL (ref 0–0.2)
IMM GRANULOCYTES NFR BLD AUTO: 1 % (ref 0–2)
KETONES UR STRIP-MCNC: ABNORMAL MG/DL
LEUKOCYTE ESTERASE UR QL STRIP: NEGATIVE
LIPASE SERPL-CCNC: 140 U/L (ref 73–393)
LYMPHOCYTES # BLD AUTO: 1.9 THOUSANDS/ΜL (ref 0.6–4.47)
LYMPHOCYTES NFR BLD AUTO: 26 % (ref 14–44)
MCH RBC QN AUTO: 30.5 PG (ref 26.8–34.3)
MCHC RBC AUTO-ENTMCNC: 32.9 G/DL (ref 31.4–37.4)
MCV RBC AUTO: 93 FL (ref 82–98)
MONOCYTES # BLD AUTO: 0.6 THOUSAND/ΜL (ref 0.17–1.22)
MONOCYTES NFR BLD AUTO: 8 % (ref 4–12)
MUCOUS THREADS UR QL AUTO: ABNORMAL
NEUTROPHILS # BLD AUTO: 4.49 THOUSANDS/ΜL (ref 1.85–7.62)
NEUTS SEG NFR BLD AUTO: 61 % (ref 43–75)
NITRITE UR QL STRIP: NEGATIVE
NON-SQ EPI CELLS URNS QL MICRO: ABNORMAL /HPF
NRBC BLD AUTO-RTO: 0 /100 WBCS
PH UR STRIP.AUTO: 5.5 [PH] (ref 4.5–8)
PLATELET # BLD AUTO: 221 THOUSANDS/UL (ref 149–390)
PMV BLD AUTO: 9.7 FL (ref 8.9–12.7)
POTASSIUM SERPL-SCNC: 3.6 MMOL/L (ref 3.5–5.3)
PROT SERPL-MCNC: 7.5 G/DL (ref 6.4–8.2)
PROT UR STRIP-MCNC: ABNORMAL MG/DL
RBC # BLD AUTO: 4.65 MILLION/UL (ref 3.88–5.62)
RBC #/AREA URNS AUTO: ABNORMAL /HPF
SODIUM SERPL-SCNC: 140 MMOL/L (ref 136–145)
SP GR UR STRIP.AUTO: >=1.03 (ref 1–1.03)
UROBILINOGEN UR QL STRIP.AUTO: 0.2 E.U./DL
WBC # BLD AUTO: 7.3 THOUSAND/UL (ref 4.31–10.16)
WBC #/AREA URNS AUTO: ABNORMAL /HPF

## 2020-02-04 PROCEDURE — 85025 COMPLETE CBC W/AUTO DIFF WBC: CPT | Performed by: EMERGENCY MEDICINE

## 2020-02-04 PROCEDURE — 96374 THER/PROPH/DIAG INJ IV PUSH: CPT

## 2020-02-04 PROCEDURE — 96375 TX/PRO/DX INJ NEW DRUG ADDON: CPT

## 2020-02-04 PROCEDURE — 99284 EMERGENCY DEPT VISIT MOD MDM: CPT

## 2020-02-04 PROCEDURE — 80053 COMPREHEN METABOLIC PANEL: CPT | Performed by: EMERGENCY MEDICINE

## 2020-02-04 PROCEDURE — 74176 CT ABD & PELVIS W/O CONTRAST: CPT

## 2020-02-04 PROCEDURE — 99285 EMERGENCY DEPT VISIT HI MDM: CPT | Performed by: EMERGENCY MEDICINE

## 2020-02-04 PROCEDURE — 96361 HYDRATE IV INFUSION ADD-ON: CPT

## 2020-02-04 PROCEDURE — 36415 COLL VENOUS BLD VENIPUNCTURE: CPT | Performed by: EMERGENCY MEDICINE

## 2020-02-04 PROCEDURE — 81001 URINALYSIS AUTO W/SCOPE: CPT

## 2020-02-04 PROCEDURE — 83690 ASSAY OF LIPASE: CPT | Performed by: EMERGENCY MEDICINE

## 2020-02-04 RX ORDER — OXYCODONE HYDROCHLORIDE 5 MG/1
5 TABLET ORAL EVERY 4 HOURS PRN
Qty: 6 TABLET | Refills: 0 | Status: SHIPPED | OUTPATIENT
Start: 2020-02-04 | End: 2020-02-07 | Stop reason: HOSPADM

## 2020-02-04 RX ORDER — HYDROMORPHONE HCL/PF 1 MG/ML
0.5 SYRINGE (ML) INJECTION ONCE
Status: DISCONTINUED | OUTPATIENT
Start: 2020-02-04 | End: 2020-02-04 | Stop reason: HOSPADM

## 2020-02-04 RX ORDER — OXYCODONE HYDROCHLORIDE 5 MG/1
5 TABLET ORAL EVERY 8 HOURS PRN
Qty: 6 TABLET | Refills: 0 | Status: SHIPPED | OUTPATIENT
Start: 2020-02-04 | End: 2020-02-07 | Stop reason: HOSPADM

## 2020-02-04 RX ORDER — INSULIN ASPART 100 [IU]/ML
12 INJECTION, SOLUTION INTRAVENOUS; SUBCUTANEOUS
Status: ON HOLD | COMMUNITY
End: 2020-02-06 | Stop reason: CLARIF

## 2020-02-04 RX ORDER — ONDANSETRON 2 MG/ML
4 INJECTION INTRAMUSCULAR; INTRAVENOUS ONCE
Status: COMPLETED | OUTPATIENT
Start: 2020-02-04 | End: 2020-02-04

## 2020-02-04 RX ORDER — KETOROLAC TROMETHAMINE 30 MG/ML
15 INJECTION, SOLUTION INTRAMUSCULAR; INTRAVENOUS ONCE
Status: COMPLETED | OUTPATIENT
Start: 2020-02-04 | End: 2020-02-04

## 2020-02-04 RX ORDER — FENTANYL CITRATE 50 UG/ML
50 INJECTION, SOLUTION INTRAMUSCULAR; INTRAVENOUS ONCE
Status: COMPLETED | OUTPATIENT
Start: 2020-02-04 | End: 2020-02-04

## 2020-02-04 RX ORDER — NAPROXEN 500 MG/1
500 TABLET ORAL 2 TIMES DAILY WITH MEALS
Qty: 8 TABLET | Refills: 0 | Status: ON HOLD | OUTPATIENT
Start: 2020-02-04 | End: 2020-02-06 | Stop reason: CLARIF

## 2020-02-04 RX ORDER — KETOROLAC TROMETHAMINE 30 MG/ML
INJECTION, SOLUTION INTRAMUSCULAR; INTRAVENOUS
Status: COMPLETED
Start: 2020-02-04 | End: 2020-02-04

## 2020-02-04 RX ORDER — TAMSULOSIN HYDROCHLORIDE 0.4 MG/1
0.4 CAPSULE ORAL
Qty: 3 CAPSULE | Refills: 0 | Status: SHIPPED | OUTPATIENT
Start: 2020-02-04 | End: 2020-02-11 | Stop reason: ALTCHOICE

## 2020-02-04 RX ORDER — ONDANSETRON 4 MG/1
4 TABLET, ORALLY DISINTEGRATING ORAL EVERY 8 HOURS PRN
Qty: 21 TABLET | Refills: 0 | Status: SHIPPED | OUTPATIENT
Start: 2020-02-04 | End: 2020-02-07 | Stop reason: HOSPADM

## 2020-02-04 RX ADMIN — ONDANSETRON 4 MG: 2 INJECTION INTRAMUSCULAR; INTRAVENOUS at 10:13

## 2020-02-04 RX ADMIN — SODIUM CHLORIDE 1000 ML: 0.9 INJECTION, SOLUTION INTRAVENOUS at 10:28

## 2020-02-04 RX ADMIN — KETOROLAC TROMETHAMINE 15 MG: 30 INJECTION, SOLUTION INTRAMUSCULAR; INTRAVENOUS at 10:13

## 2020-02-04 RX ADMIN — KETOROLAC TROMETHAMINE 15 MG: 30 INJECTION, SOLUTION INTRAMUSCULAR at 10:13

## 2020-02-04 RX ADMIN — FENTANYL CITRATE 50 MCG: 50 INJECTION, SOLUTION INTRAMUSCULAR; INTRAVENOUS at 11:13

## 2020-02-04 NOTE — ED PROVIDER NOTES
History  Chief Complaint   Patient presents with    Abdominal Pain     History of Gall stones since thanksgiving  Started today and vomitting  Pale and ddiaphoretic  pain right flank area     54-year-old male past medical history including gallstone as well as intermittent kidney stones presents to ED stating that approximately 730 this morning he was at work when he had a bagel with cream cheese and immediate right flank pain with nausea and 1 episode of nonbloody vomit  Patient denies any fever, night sweats, chills, headache, dizziness, sore throat, cough, chest pain shortness of breath, diarrhea constipation, dysuria  Patient scribe's the pain is a squeezing 6-8/10 in the right flank region  Prior to Admission Medications   Prescriptions Last Dose Informant Patient Reported? Taking? ALPRAZolam (XANAX) 0 25 mg tablet Past Month at Unknown time  No Yes   Sig: Take 1 tablet (0 25 mg total) by mouth 3 (three) times a day as needed for anxiety   Cholecalciferol (VITAMIN D3) 5000 units CAPS 2/4/2020 at Unknown time Self Yes Yes   Sig: Take 1 capsule by mouth daily    Insulin Pen Needle (PEN NEEDLES) 31G X 6 MM MISC 2/4/2020 at Unknown time Self No Yes   Sig: by Does not apply route 4 (four) times a day   ONETOUCH DELICA LANCETS 54Y MISC 2/4/2020 at Unknown time Self No Yes   Sig: USE THREE TIMES DAILY   ONETOUCH VERIO test strip 2/4/2020 at Unknown time Self No Yes   Sig: Use to test blood sugar 4 times a day   apixaban (ELIQUIS) 5 mg 2/4/2020 at Unknown time Self No Yes   Sig: Take 1 tablet (5 mg total) by mouth 2 (two) times a day Take 2 tabs twice a day for one week then one tab twice a day thereafter     aspirin (ECOTRIN LOW STRENGTH) 81 mg EC tablet 2/4/2020 at Unknown time Self No Yes   Sig: Take 1 tablet (81 mg total) by mouth daily   atorvastatin (LIPITOR) 40 mg tablet 2/4/2020 at Unknown time Self No Yes   Sig: Take 1 tablet (40 mg total) by mouth daily   dexamethasone (DECADRON) 4 mg/mL 2/4/2020 at Unknown time Self Yes Yes   Sig: dexamethasone sodium phosphate 4 mg/mL injection solution   use as directed by Physical therapy staff for iontophoresis   famotidine (PEPCID) 40 MG tablet 2/4/2020 at Unknown time Self No Yes   Sig: Take 1 tablet (40 mg total) by mouth daily   glucose 4 g chewable tablet 2/4/2020 at Unknown time Self No Yes   Sig: Chew 4 tablets (16 g total) as needed for low blood sugar   levothyroxine 50 mcg tablet 2/4/2020 at Unknown time Self No Yes   Sig: TAKE 1 TABLET BY MOUTH EVERY DAY   metFORMIN (GLUCOPHAGE) 500 mg tablet 2/4/2020 at Unknown time Self No Yes   Sig: TAKE 1 TABLET BY MOUTH TWICE A DAY WITH MEALS   metoprolol succinate (TOPROL-XL) 25 mg 24 hr tablet 2/4/2020 at Unknown time Self No Yes   Sig: Take 0 5 tablets (12 5 mg total) by mouth 2 (two) times a day   mycophenolate (CELLCEPT) 500 mg tablet  Self No No   Sig: Take 2 tablets (1,000 mg total) by mouth 2 (two) times a day   predniSONE 5 mg tablet  Self No No   Sig: Take 1 tablet (5 mg total) by mouth daily      Facility-Administered Medications: None       Past Medical History:   Diagnosis Date    BPH (benign prostatic hyperplasia)     Diabetes mellitus (HCC)     Erectile dysfunction     GERD (gastroesophageal reflux disease)     Headache(784 0) 05/01/2018    Each Day    Hematuria     Infectious viral hepatitis 2007    Cured by Dr Darvin Aburto    Liver disease     hepatitis C    Mediastinal adenopathy     Obesity 2007    Vitamin D deficiency        Past Surgical History:   Procedure Laterality Date    COLONOSCOPY      last assessed: 08/28/2012; fiberoptic screening     CYSTOSCOPY      onset: 05/06/2016; Diagnostic     DENTAL SURGERY      LIVER BIOPSY      LYMPH NODE BIOPSY  6/2019    DC BRONCHOSCOPY NEEDLE BX TRACHEA MAIN STEM&/BRON N/A 6/6/2019    Procedure: ENDOBRONCHIAL ULTRASOUND (EBUS);   Surgeon: Sean Ying MD;  [facility];  Service: Thoracic    DC BRONCHOSCOPY,DIAGNOSTIC N/A 2019    Procedure: Niranjan Enrique;  Surgeon: Bharat Monte MD;  [facility];  Service: Thoracic    KS TRANSURETHRAL ELEC-SURG PROSTATECTOM N/A 2017    Procedure: Anjelica Lopez; TUR PROSTATE;  Surgeon: Buck Almaguer MD;  [facility];  Service: Urology    PROSTATE SURGERY      SHOULDER ARTHROSCOPY Right     bicep tendon repair, rotator cuff repair and acromuim shave        Family History   Problem Relation Age of Onset    Stroke Father         Accidental Death at 52    Cancer Maternal Grandmother     Valvular heart disease Mother      I have reviewed and agree with the history as documented  Social History     Tobacco Use    Smoking status: Former Smoker     Packs/day: 1 00     Years: 1 00     Pack years: 1 00     Types: Cigarettes     Last attempt to quit: 2009     Years since quittin 0    Smokeless tobacco: Never Used   Substance Use Topics    Alcohol use: No     Comment: quit 12 years ago    Drug use: No        Review of Systems   Constitutional: Negative for activity change, appetite change, chills, diaphoresis, fatigue and fever  HENT: Negative for congestion, postnasal drip, rhinorrhea, sinus pressure, sinus pain, sneezing and sore throat  Eyes: Negative for redness and visual disturbance  Respiratory: Negative for apnea, cough, chest tightness, shortness of breath, wheezing and stridor  Cardiovascular: Negative for chest pain, palpitations and leg swelling  Gastrointestinal: Positive for nausea and vomiting  Negative for abdominal distention, abdominal pain, constipation and diarrhea  Endocrine: Negative for polydipsia, polyphagia and polyuria  Genitourinary: Positive for flank pain  Negative for difficulty urinating, dysuria, frequency, hematuria and urgency  Musculoskeletal: Negative for arthralgias, back pain, gait problem, joint swelling, myalgias, neck pain and neck stiffness     Skin: Negative for color change, pallor, rash and wound    Neurological: Negative for dizziness, facial asymmetry, weakness, light-headedness, numbness and headaches  Physical Exam  ED Triage Vitals   Temperature Pulse Respirations Blood Pressure SpO2   02/04/20 1114 02/04/20 1102 02/04/20 1102 02/04/20 1102 02/04/20 1102   98 4 °F (36 9 °C) 60 18 131/61 97 %      Temp src Heart Rate Source Patient Position - Orthostatic VS BP Location FiO2 (%)   -- 02/04/20 1102 02/04/20 1102 02/04/20 1102 --    Monitor Lying Right arm       Pain Score       02/04/20 0939       9             Orthostatic Vital Signs  Vitals:    02/04/20 1102 02/04/20 1115   BP: 131/61 131/61   Pulse: 60 76   Patient Position - Orthostatic VS: Lying        Physical Exam   Constitutional: He is oriented to person, place, and time  He appears well-developed and well-nourished  No distress  HENT:   Head: Normocephalic and atraumatic  Right Ear: External ear normal    Left Ear: External ear normal    Nose: Nose normal    Eyes: Conjunctivae are normal  Right eye exhibits no discharge  Left eye exhibits no discharge  No scleral icterus  Neck: Normal range of motion  Neck supple  Cardiovascular: Normal rate, regular rhythm, normal heart sounds and intact distal pulses  Exam reveals no gallop and no friction rub  No murmur heard  Pulmonary/Chest: Effort normal and breath sounds normal  No respiratory distress  He has no wheezes  He has no rales  Abdominal: Soft  Bowel sounds are normal  He exhibits no distension and no mass  There is no hepatosplenomegaly, splenomegaly or hepatomegaly  There is no tenderness  There is no rigidity, no rebound, no guarding, no CVA tenderness, no tenderness at McBurney's point and negative Cowart's sign  Musculoskeletal: Normal range of motion  He exhibits no edema, tenderness or deformity  Neurological: He is alert and oriented to person, place, and time  Skin: Skin is warm and dry  No rash noted  He is not diaphoretic  No erythema  No pallor  Psychiatric: He has a normal mood and affect  His behavior is normal  Judgment and thought content normal    Nursing note and vitals reviewed        ED Medications  Medications   HYDROmorphone (DILAUDID) injection 0 5 mg (0 5 mg Intravenous Not Given 2/4/20 1230)   ondansetron (ZOFRAN) injection 4 mg (4 mg Intravenous Given 2/4/20 1013)   ketorolac (TORADOL) injection 15 mg (15 mg Intravenous Given 2/4/20 1013)   sodium chloride 0 9 % bolus 1,000 mL (0 mL Intravenous Stopped 2/4/20 1228)   fentanyl citrate (PF) 100 MCG/2ML 50 mcg (50 mcg Intravenous Given 2/4/20 1113)       Diagnostic Studies  Results Reviewed     Procedure Component Value Units Date/Time    Comprehensive metabolic panel [[id number]]  (Abnormal) Collected:  02/04/20 1013    Lab Status:  Final result Specimen:  Blood from Arm, Left Updated:  02/04/20 1055     Sodium 140 mmol/L      Potassium 3 6 mmol/L      Chloride 110 mmol/L      CO2 25 mmol/L      ANION GAP 5 mmol/L      BUN 17 mg/dL      Creatinine 1 09 mg/dL      Glucose 154 mg/dL      Calcium 8 9 mg/dL      AST 20 U/L      ALT 30 U/L      Alkaline Phosphatase 59 U/L      Total Protein 7 5 g/dL      Albumin 3 8 g/dL      Total Bilirubin 0 47 mg/dL      eGFR 73 ml/min/1 73sq m     Narrative:       Tommy guidelines for Chronic Kidney Disease (CKD):     Stage 1 with normal or high GFR (GFR > 90 mL/min/1 73 square meters)    Stage 2 Mild CKD (GFR = 60-89 mL/min/1 73 square meters)    Stage 3A Moderate CKD (GFR = 45-59 mL/min/1 73 square meters)    Stage 3B Moderate CKD (GFR = 30-44 mL/min/1 73 square meters)    Stage 4 Severe CKD (GFR = 15-29 mL/min/1 73 square meters)    Stage 5 End Stage CKD (GFR <15 mL/min/1 73 square meters)  Note: GFR calculation is accurate only with a steady state creatinine    Lipase [[id number]]  (Normal) Collected:  02/04/20 1013    Lab Status:  Final result Specimen:  Blood from Arm, Left Updated:  02/04/20 1055     Lipase 140 u/L Urine Microscopic [[id number]]  (Abnormal) Collected:  02/04/20 1023    Lab Status:  Final result Specimen:  Urine Updated:  02/04/20 1048     RBC, UA Innumerable /hpf      WBC, UA None Seen /hpf      Epithelial Cells Occasional /hpf      Bacteria, UA None Seen /hpf      MUCUS THREADS Occasional    CBC and differential [[id number]] Collected:  02/04/20 1013    Lab Status:  Final result Specimen:  Blood from Arm, Left Updated:  02/04/20 1039     WBC 7 30 Thousand/uL      RBC 4 65 Million/uL      Hemoglobin 14 2 g/dL      Hematocrit 43 1 %      MCV 93 fL      MCH 30 5 pg      MCHC 32 9 g/dL      RDW 12 5 %      MPV 9 7 fL      Platelets 586 Thousands/uL      nRBC 0 /100 WBCs      Neutrophils Relative 61 %      Immat GRANS % 1 %      Lymphocytes Relative 26 %      Monocytes Relative 8 %      Eosinophils Relative 3 %      Basophils Relative 1 %      Neutrophils Absolute 4 49 Thousands/µL      Immature Grans Absolute 0 05 Thousand/uL      Lymphocytes Absolute 1 90 Thousands/µL      Monocytes Absolute 0 60 Thousand/µL      Eosinophils Absolute 0 20 Thousand/µL      Basophils Absolute 0 06 Thousands/µL     Urine Macroscopic, POC [[id number]]  (Abnormal) Collected:  02/04/20 1023    Lab Status:  Final result Specimen:  Urine Updated:  02/04/20 1020     Color, UA Brown     Clarity, UA Clear     pH, UA 5 5     Leukocytes, UA Negative     Nitrite, UA Negative     Protein,  (2+) mg/dl      Glucose, UA Negative mg/dl      Ketones, UA Trace mg/dl      Urobilinogen, UA 0 2 E U /dl      Bilirubin, UA Interference- unable to analyze     Blood, UA Large     Specific Gravity, UA >=1 030    Narrative:       CLINITEK RESULT                 CT abdomen pelvis wo contrast   Final Result by Viv Fox MD (02/04 1131)      1  Very mild right-sided hydroureteronephrosis secondary to a 3 mm mid right ureteral calculus  2   Stable epicardial and paraesophageal lymph nodes  3   Small hiatal hernia  4   Diverticulosis coli  The study was marked in [facility] for immediate notification  Workstation performed: [id number]               Procedures  Procedures      ED Course  ED Course as of Feb 04 1337   Tue Feb 04, 2020   1133 Bacteria, UA: None Seen   1133 Nitrite, UA: Negative   1133 Leukocytes, UA: Negative                               MDM  Number of Diagnoses or Management Options  Ureteral stone:   Diagnosis management comments: Bedside ultrasound shows gallstone near the gallbladder neck but no pericholecystic cystic changes or common bile duct dilation or increase in size of the gallbladder wall or gallbladder itself  Pain likely secondary to 3 mm mid ureteral kidney stone, will assess patient's pain control  Patient feeling better and will be discharged       Amount and/or Complexity of Data Reviewed  Clinical lab tests: reviewed  Tests in the radiology section of CPT®: reviewed          Disposition  Final diagnoses:   Ureteral stone     Time reflects when diagnosis was documented in both MDM as applicable and the Disposition within this note     Time User Action Codes Description Comment    2/4/2020 12:18 PM Hina Whyte Add [N20 1] Ureteral stone       ED Disposition     ED Disposition Condition Date/Time Comment    Discharge Stable Tue Feb 4, 2020 12:18 PM James Parker discharge to home/self care  Follow-up Information     Follow up With Specialties Details Why Contact Info Additional An Patten 47, DO Family Medicine Go to   [place] [address]    [address] [facility] Schedule an appointment as soon as possible for a visit   [address] [id number]  [address] [place] [address] For Urology [address], [address] [facility] Emergency Department Emergency Medicine  As needed, If symptoms worsen [phone] [facility] [address]  [phone]  ED, [address], [address] [phone]          Discharge Medication List as of 2/4/2020 12:21 PM      START taking these medications    Details   naproxen (NAPROSYN) 500 mg tablet Take 1 tablet (500 mg total) by mouth 2 (two) times a day with meals for 4 days, Starting Tue 2/4/2020, Until Sat 2/8/2020, Print      ondansetron (ZOFRAN-ODT) 4 mg disintegrating tablet Take 1 tablet (4 mg total) by mouth every 8 (eight) hours as needed for nausea or vomiting for up to 7 days, Starting Tue 2/4/2020, Until Tue 2/11/2020, Print      oxyCODONE (ROXICODONE) 5 mg immediate release tablet Take 1 tablet (5 mg total) by mouth every 8 (eight) hours as needed for moderate pain for up to 2 daysMax Daily Amount: 15 mg, Starting Tue 2/4/2020, Until u 2/6/2020, Print         CONTINUE these medications which have NOT CHANGED    Details   ALPRAZolam (XANAX) 0 25 mg tablet Take 1 tablet (0 25 mg total) by mouth 3 (three) times a day as needed for anxiety, Starting Fri 1/31/2020, Normal      apixaban (ELIQUIS) 5 mg Take 1 tablet (5 mg total) by mouth 2 (two) times a day Take 2 tabs twice a day for one week then one tab twice a day thereafter , Starting Tue 10/8/2019, Normal      aspirin (ECOTRIN LOW STRENGTH) 81 mg EC tablet Take 1 tablet (81 mg total) by mouth daily, Starting Tue 12/3/2019, Normal      atorvastatin (LIPITOR) 40 mg tablet Take 1 tablet (40 mg total) by mouth daily, Starting Tue 12/3/2019, Normal      Cholecalciferol (VITAMIN D3) 5000 units CAPS Take 1 capsule by mouth daily , Historical Med      dexamethasone (DECADRON) 4 mg/mL dexamethasone sodium phosphate 4 mg/mL injection solution   use as directed by Physical therapy staff for iontophoresis, Historical Med      famotidine (PEPCID) 40 MG tablet Take 1 tablet (40 mg total) by mouth daily, Starting Mon 11/11/2019, Normal      glucose 4 g chewable tablet Chew 4 tablets (16 g total) as needed for low blood sugar, Starting Wed 8/14/2019, Normal      Insulin Pen Needle (PEN NEEDLES) 31G X 6 MM MISC by Does not apply route 4 (four) times a day, Starting Thu 8/1/2019, Normal      levothyroxine 50 mcg tablet TAKE 1 TABLET BY MOUTH EVERY DAY, Normal      metFORMIN (GLUCOPHAGE) 500 mg tablet TAKE 1 TABLET BY MOUTH TWICE A DAY WITH MEALS, Normal      metoprolol succinate (TOPROL-XL) 25 mg 24 hr tablet Take 0 5 tablets (12 5 mg total) by mouth 2 (two) times a day, Starting Fri 10/18/2019, Normal      mycophenolate (CELLCEPT) 500 mg tablet Take 2 tablets (1,000 mg total) by mouth 2 (two) times a day, Starting Thu 11/7/2019, Normal      ONETOUCH DELICA LANCETS 67W MISC USE THREE TIMES DAILY, Normal      ONETOUCH VERIO test strip Use to test blood sugar 4 times a day, Normal      predniSONE 5 mg tablet Take 1 tablet (5 mg total) by mouth daily, Starting Mon 12/9/2019, Normal      insulin aspart (NovoLOG) 100 Units/mL injection pen Inject 14 Units under the skin 3 (three) times a day with meals, Historical Med      insulin glargine (LANTUS SOLOSTAR) 100 units/mL injection pen Inject 52 Units under the skin daily, Starting Mon 1/6/2020, Normal           No discharge procedures on file  ED Provider  Attending physically available and evaluated Annita Lopez  CONCHITA managed the patient along with the ED Attending      Electronically Signed by         Ignacio Bhatia DO  02/04/20 7281

## 2020-02-04 NOTE — TELEPHONE ENCOUNTER
Spoke with patient and reviewed bs log and the changes to his medications  He understood     He will send bs log when he returns from his vacation

## 2020-02-04 NOTE — TELEPHONE ENCOUNTER
Allison Bhatia,    Please let pt know I reviewed his BG log    -BGs are slowly downtrending now that he is off the steroids    Reduce lantus to 48 units in the morning and reduce novolog to 12 units TID Lorraine WITT    Endocrinology

## 2020-02-04 NOTE — ED ATTENDING ATTESTATION
2/4/2020  IAmmon DO, saw and evaluated the patient  I have discussed the patient with the resident/non-physician practitioner and agree with the resident's/non-physician practitioner's findings, Plan of Care, and MDM as documented in the resident's/non-physician practitioner's note, except where noted  All available labs and Radiology studies were reviewed  I was present for key portions of any procedure(s) performed by the resident/non-physician practitioner and I was immediately available to provide assistance  At this point I agree with the current assessment done in the Emergency Department  I have conducted an independent evaluation of this patient a history and physical is as follows:    Patient presents for evaluation of sudden onset of right upper quadrant abdominal/right flank pain after eating a bagel with cream cheese this morning  Patient has a known 9 mm nonobstructing biliary stone was which is monitored annually  He also has a h/o renal stones noted on CTs but states he does not think he has ever passed a ureteral stone  He had a normal bowel movement and urination this morning, prior to eating the bagel  He has not noted any hematuria or change in stool color  He did vomit after the pain began  He felt diaphoretic and states he looked pale  He now presents without significant pain or other physical findings  ROS: Denies f/c, CP, SOB, diarrhea or dysuria  12 system ROS o/w negative  PE:  No acute distress, alert; PERRL, EOMI; MMM, no posterior oropharyngeal exudate, edema or erythema; HRR, no murmur; lungs CTA w/o w/r/r, POx 99% on RA (nl); abdomen s//nd, mild RUQ and right CVA TTP, obese, nl BS in all 4 quadrants; (-) LE edema, FROM extremities x4; skin p/w/d  DDx: Flank/RUQ pain - ureteral stone, UTI/pyelonephritis, biliary colic/dyskinesia, hepatic steatosis/hepatitis, doubt intestinal cause       A/P: Will check CT stone study, abdominal labs, urine, treat symptoms, reevaluate for further w/u or disposition            ED Course         Critical Care Time  Procedures

## 2020-02-06 ENCOUNTER — TELEPHONE (OUTPATIENT)
Dept: UROLOGY | Facility: CLINIC | Age: 60
End: 2020-02-06

## 2020-02-06 ENCOUNTER — ANESTHESIA EVENT (INPATIENT)
Dept: PERIOP | Facility: HOSPITAL | Age: 60
DRG: 660 | End: 2020-02-06
Payer: COMMERCIAL

## 2020-02-06 ENCOUNTER — ANESTHESIA (INPATIENT)
Dept: PERIOP | Facility: HOSPITAL | Age: 60
DRG: 660 | End: 2020-02-06
Payer: COMMERCIAL

## 2020-02-06 ENCOUNTER — APPOINTMENT (EMERGENCY)
Dept: RADIOLOGY | Facility: HOSPITAL | Age: 60
DRG: 660 | End: 2020-02-06
Payer: COMMERCIAL

## 2020-02-06 ENCOUNTER — HOSPITAL ENCOUNTER (INPATIENT)
Facility: HOSPITAL | Age: 60
LOS: 1 days | Discharge: HOME | DRG: 660 | End: 2020-02-07
Attending: EMERGENCY MEDICINE | Admitting: INTERNAL MEDICINE
Payer: COMMERCIAL

## 2020-02-06 ENCOUNTER — PREP FOR PROCEDURE (OUTPATIENT)
Dept: UROLOGY | Facility: CLINIC | Age: 60
End: 2020-02-06

## 2020-02-06 ENCOUNTER — APPOINTMENT (INPATIENT)
Dept: RADIOLOGY | Facility: HOSPITAL | Age: 60
DRG: 660 | End: 2020-02-06
Payer: COMMERCIAL

## 2020-02-06 DIAGNOSIS — N17.9 ACUTE KIDNEY INJURY (HCC): Primary | ICD-10-CM

## 2020-02-06 DIAGNOSIS — R10.9 ACUTE RIGHT FLANK PAIN: ICD-10-CM

## 2020-02-06 DIAGNOSIS — N20.0 NEPHROLITHIASIS: Primary | ICD-10-CM

## 2020-02-06 DIAGNOSIS — N20.1 URETERAL CALCULUS, RIGHT: ICD-10-CM

## 2020-02-06 PROBLEM — Z01.810 PREOPERATIVE CARDIOVASCULAR EXAMINATION: Status: RESOLVED | Noted: 2018-04-18 | Resolved: 2020-02-06

## 2020-02-06 PROBLEM — R00.0 SINUS TACHYCARDIA: Status: RESOLVED | Noted: 2019-09-11 | Resolved: 2020-02-06

## 2020-02-06 PROBLEM — J06.9 VIRAL URI WITH COUGH: Status: RESOLVED | Noted: 2019-09-11 | Resolved: 2020-02-06

## 2020-02-06 LAB
ANION GAP SERPL CALCULATED.3IONS-SCNC: 13 MMOL/L (ref 4–13)
BACTERIA UR QL AUTO: ABNORMAL /HPF
BASOPHILS # BLD AUTO: 0.03 THOUSANDS/ΜL (ref 0–0.1)
BASOPHILS NFR BLD AUTO: 0 % (ref 0–1)
BILIRUB UR QL STRIP: ABNORMAL
BUN SERPL-MCNC: 17 MG/DL (ref 5–25)
CALCIUM SERPL-MCNC: 8.7 MG/DL (ref 8.3–10.1)
CHLORIDE SERPL-SCNC: 103 MMOL/L (ref 100–108)
CLARITY UR: CLEAR
CO2 SERPL-SCNC: 25 MMOL/L (ref 21–32)
COLOR UR: YELLOW
CREAT SERPL-MCNC: 1.67 MG/DL (ref 0.6–1.3)
EOSINOPHIL # BLD AUTO: 0.02 THOUSAND/ΜL (ref 0–0.61)
EOSINOPHIL NFR BLD AUTO: 0 % (ref 0–6)
ERYTHROCYTE [DISTWIDTH] IN BLOOD BY AUTOMATED COUNT: 12.3 % (ref 11.6–15.1)
GFR SERPL CREATININE-BSD FRML MDRD: 44 ML/MIN/1.73SQ M
GLUCOSE SERPL-MCNC: 125 MG/DL (ref 65–140)
GLUCOSE SERPL-MCNC: 191 MG/DL (ref 65–140)
GLUCOSE SERPL-MCNC: 241 MG/DL (ref 65–140)
GLUCOSE UR STRIP-MCNC: ABNORMAL MG/DL
HCT VFR BLD AUTO: 41.7 % (ref 36.5–49.3)
HGB BLD-MCNC: 13.8 G/DL (ref 12–17)
HGB UR QL STRIP.AUTO: ABNORMAL
IMM GRANULOCYTES # BLD AUTO: 0.04 THOUSAND/UL (ref 0–0.2)
IMM GRANULOCYTES NFR BLD AUTO: 1 % (ref 0–2)
KETONES UR STRIP-MCNC: ABNORMAL MG/DL
LEUKOCYTE ESTERASE UR QL STRIP: NEGATIVE
LYMPHOCYTES # BLD AUTO: 0.86 THOUSANDS/ΜL (ref 0.6–4.47)
LYMPHOCYTES NFR BLD AUTO: 11 % (ref 14–44)
MCH RBC QN AUTO: 30.7 PG (ref 26.8–34.3)
MCHC RBC AUTO-ENTMCNC: 33.1 G/DL (ref 31.4–37.4)
MCV RBC AUTO: 93 FL (ref 82–98)
MONOCYTES # BLD AUTO: 0.69 THOUSAND/ΜL (ref 0.17–1.22)
MONOCYTES NFR BLD AUTO: 9 % (ref 4–12)
MUCOUS THREADS UR QL AUTO: ABNORMAL
NEUTROPHILS # BLD AUTO: 6.18 THOUSANDS/ΜL (ref 1.85–7.62)
NEUTS SEG NFR BLD AUTO: 79 % (ref 43–75)
NITRITE UR QL STRIP: NEGATIVE
NON-SQ EPI CELLS URNS QL MICRO: ABNORMAL /HPF
NRBC BLD AUTO-RTO: 0 /100 WBCS
PH UR STRIP.AUTO: 5.5 [PH] (ref 4.5–8)
PLATELET # BLD AUTO: 188 THOUSANDS/UL (ref 149–390)
PMV BLD AUTO: 9.7 FL (ref 8.9–12.7)
POTASSIUM SERPL-SCNC: 4.8 MMOL/L (ref 3.5–5.3)
PROT UR STRIP-MCNC: ABNORMAL MG/DL
RBC # BLD AUTO: 4.5 MILLION/UL (ref 3.88–5.62)
RBC #/AREA URNS AUTO: ABNORMAL /HPF
SODIUM SERPL-SCNC: 141 MMOL/L (ref 136–145)
SP GR UR STRIP.AUTO: >=1.03 (ref 1–1.03)
UROBILINOGEN UR QL STRIP.AUTO: 0.2 E.U./DL
WBC # BLD AUTO: 7.82 THOUSAND/UL (ref 4.31–10.16)
WBC #/AREA URNS AUTO: ABNORMAL /HPF

## 2020-02-06 PROCEDURE — BT1DZZZ FLUOROSCOPY OF RIGHT KIDNEY, URETER AND BLADDER: ICD-10-PCS | Performed by: INTERNAL MEDICINE

## 2020-02-06 PROCEDURE — 80048 BASIC METABOLIC PNL TOTAL CA: CPT | Performed by: EMERGENCY MEDICINE

## 2020-02-06 PROCEDURE — 99223 1ST HOSP IP/OBS HIGH 75: CPT | Performed by: INTERNAL MEDICINE

## 2020-02-06 PROCEDURE — 74018 RADEX ABDOMEN 1 VIEW: CPT

## 2020-02-06 PROCEDURE — 99254 IP/OBS CNSLTJ NEW/EST MOD 60: CPT | Performed by: NURSE PRACTITIONER

## 2020-02-06 PROCEDURE — 96375 TX/PRO/DX INJ NEW DRUG ADDON: CPT

## 2020-02-06 PROCEDURE — C1769 GUIDE WIRE: HCPCS | Performed by: UROLOGY

## 2020-02-06 PROCEDURE — NC001 PR NO CHARGE: Performed by: UROLOGY

## 2020-02-06 PROCEDURE — 81001 URINALYSIS AUTO W/SCOPE: CPT

## 2020-02-06 PROCEDURE — C2617 STENT, NON-COR, TEM W/O DEL: HCPCS | Performed by: UROLOGY

## 2020-02-06 PROCEDURE — 0T768DZ DILATION OF RIGHT URETER WITH INTRALUMINAL DEVICE, VIA NATURAL OR ARTIFICIAL OPENING ENDOSCOPIC: ICD-10-PCS | Performed by: INTERNAL MEDICINE

## 2020-02-06 PROCEDURE — 96374 THER/PROPH/DIAG INJ IV PUSH: CPT

## 2020-02-06 PROCEDURE — 85025 COMPLETE CBC W/AUTO DIFF WBC: CPT | Performed by: EMERGENCY MEDICINE

## 2020-02-06 PROCEDURE — 3066F NEPHROPATHY DOC TX: CPT | Performed by: INTERNAL MEDICINE

## 2020-02-06 PROCEDURE — 99285 EMERGENCY DEPT VISIT HI MDM: CPT | Performed by: EMERGENCY MEDICINE

## 2020-02-06 PROCEDURE — 52332 CYSTOSCOPY AND TREATMENT: CPT | Performed by: UROLOGY

## 2020-02-06 PROCEDURE — 99285 EMERGENCY DEPT VISIT HI MDM: CPT

## 2020-02-06 PROCEDURE — 96361 HYDRATE IV INFUSION ADD-ON: CPT

## 2020-02-06 PROCEDURE — 82948 REAGENT STRIP/BLOOD GLUCOSE: CPT

## 2020-02-06 PROCEDURE — 74420 UROGRAPHY RTRGR +-KUB: CPT

## 2020-02-06 DEVICE — STENT URETERAL 6 FR 28CM INLAY OPTIMA
Type: IMPLANTABLE DEVICE | Site: URETER | Status: NON-FUNCTIONAL
Removed: 2020-03-03

## 2020-02-06 RX ORDER — ACETAMINOPHEN 325 MG/1
650 TABLET ORAL EVERY 6 HOURS PRN
Status: DISCONTINUED | OUTPATIENT
Start: 2020-02-06 | End: 2020-02-06

## 2020-02-06 RX ORDER — HEPARIN SODIUM 5000 [USP'U]/ML
5000 INJECTION, SOLUTION INTRAVENOUS; SUBCUTANEOUS EVERY 8 HOURS SCHEDULED
Status: DISCONTINUED | OUTPATIENT
Start: 2020-02-06 | End: 2020-02-06

## 2020-02-06 RX ORDER — ATORVASTATIN CALCIUM 40 MG/1
40 TABLET, FILM COATED ORAL DAILY
Status: DISCONTINUED | OUTPATIENT
Start: 2020-02-06 | End: 2020-02-07 | Stop reason: HOSPADM

## 2020-02-06 RX ORDER — ACETAMINOPHEN 325 MG/1
975 TABLET ORAL ONCE
Status: CANCELLED | OUTPATIENT
Start: 2020-02-06 | End: 2020-02-06

## 2020-02-06 RX ORDER — CEFAZOLIN SODIUM 2 G/50ML
2000 SOLUTION INTRAVENOUS
Status: COMPLETED | OUTPATIENT
Start: 2020-02-07 | End: 2020-02-06

## 2020-02-06 RX ORDER — KETOROLAC TROMETHAMINE 30 MG/ML
15 INJECTION, SOLUTION INTRAMUSCULAR; INTRAVENOUS ONCE
Status: COMPLETED | OUTPATIENT
Start: 2020-02-06 | End: 2020-02-06

## 2020-02-06 RX ORDER — PHENAZOPYRIDINE HYDROCHLORIDE 200 MG/1
200 TABLET, FILM COATED ORAL
Qty: 6 TABLET | Refills: 0 | Status: SHIPPED | OUTPATIENT
Start: 2020-02-06 | End: 2020-02-08

## 2020-02-06 RX ORDER — METOPROLOL SUCCINATE 25 MG/1
12.5 TABLET, EXTENDED RELEASE ORAL 2 TIMES DAILY
Status: DISCONTINUED | OUTPATIENT
Start: 2020-02-06 | End: 2020-02-07 | Stop reason: HOSPADM

## 2020-02-06 RX ORDER — ONDANSETRON 2 MG/ML
4 INJECTION INTRAMUSCULAR; INTRAVENOUS EVERY 6 HOURS PRN
Status: DISCONTINUED | OUTPATIENT
Start: 2020-02-06 | End: 2020-02-06

## 2020-02-06 RX ORDER — LEVOTHYROXINE SODIUM 0.05 MG/1
50 TABLET ORAL DAILY
Status: DISCONTINUED | OUTPATIENT
Start: 2020-02-06 | End: 2020-02-07 | Stop reason: HOSPADM

## 2020-02-06 RX ORDER — ONDANSETRON 2 MG/ML
4 INJECTION INTRAMUSCULAR; INTRAVENOUS ONCE AS NEEDED
Status: DISCONTINUED | OUTPATIENT
Start: 2020-02-06 | End: 2020-02-06 | Stop reason: HOSPADM

## 2020-02-06 RX ORDER — ONDANSETRON 2 MG/ML
4 INJECTION INTRAMUSCULAR; INTRAVENOUS EVERY 6 HOURS PRN
Status: DISCONTINUED | OUTPATIENT
Start: 2020-02-06 | End: 2020-02-07 | Stop reason: HOSPADM

## 2020-02-06 RX ORDER — SODIUM CHLORIDE 9 MG/ML
125 INJECTION, SOLUTION INTRAVENOUS CONTINUOUS
Status: DISCONTINUED | OUTPATIENT
Start: 2020-02-06 | End: 2020-02-07

## 2020-02-06 RX ORDER — TAMSULOSIN HYDROCHLORIDE 0.4 MG/1
0.4 CAPSULE ORAL
Status: DISCONTINUED | OUTPATIENT
Start: 2020-02-06 | End: 2020-02-07 | Stop reason: HOSPADM

## 2020-02-06 RX ORDER — HYDROMORPHONE HCL/PF 1 MG/ML
0.5 SYRINGE (ML) INJECTION
Status: DISCONTINUED | OUTPATIENT
Start: 2020-02-06 | End: 2020-02-07 | Stop reason: HOSPADM

## 2020-02-06 RX ORDER — ONDANSETRON 4 MG/1
4 TABLET, ORALLY DISINTEGRATING ORAL EVERY 6 HOURS PRN
Qty: 20 TABLET | Refills: 0 | Status: SHIPPED | OUTPATIENT
Start: 2020-02-06 | End: 2020-02-11 | Stop reason: ALTCHOICE

## 2020-02-06 RX ORDER — FENTANYL CITRATE 50 UG/ML
INJECTION, SOLUTION INTRAMUSCULAR; INTRAVENOUS AS NEEDED
Status: DISCONTINUED | OUTPATIENT
Start: 2020-02-06 | End: 2020-02-06 | Stop reason: SURG

## 2020-02-06 RX ORDER — FENTANYL CITRATE 50 UG/ML
50 INJECTION, SOLUTION INTRAMUSCULAR; INTRAVENOUS ONCE
Status: COMPLETED | OUTPATIENT
Start: 2020-02-06 | End: 2020-02-06

## 2020-02-06 RX ORDER — ONDANSETRON 2 MG/ML
4 INJECTION INTRAMUSCULAR; INTRAVENOUS ONCE
Status: COMPLETED | OUTPATIENT
Start: 2020-02-06 | End: 2020-02-06

## 2020-02-06 RX ORDER — LIDOCAINE HYDROCHLORIDE 10 MG/ML
INJECTION, SOLUTION EPIDURAL; INFILTRATION; INTRACAUDAL; PERINEURAL AS NEEDED
Status: DISCONTINUED | OUTPATIENT
Start: 2020-02-06 | End: 2020-02-06 | Stop reason: SURG

## 2020-02-06 RX ORDER — FAMOTIDINE 20 MG/1
40 TABLET, FILM COATED ORAL DAILY
Status: DISCONTINUED | OUTPATIENT
Start: 2020-02-06 | End: 2020-02-07 | Stop reason: HOSPADM

## 2020-02-06 RX ORDER — OXYCODONE HYDROCHLORIDE AND ACETAMINOPHEN 5; 325 MG/1; MG/1
1 TABLET ORAL EVERY 6 HOURS PRN
Qty: 12 TABLET | Refills: 0 | Status: SHIPPED | OUTPATIENT
Start: 2020-02-06 | End: 2020-02-11 | Stop reason: ALTCHOICE

## 2020-02-06 RX ORDER — CEPHALEXIN 500 MG/1
500 CAPSULE ORAL EVERY 12 HOURS SCHEDULED
Qty: 6 CAPSULE | Refills: 0 | Status: SHIPPED | OUTPATIENT
Start: 2020-02-06 | End: 2020-02-07 | Stop reason: SDUPTHER

## 2020-02-06 RX ORDER — INSULIN GLARGINE 100 [IU]/ML
48 INJECTION, SOLUTION SUBCUTANEOUS EVERY MORNING
Status: DISCONTINUED | OUTPATIENT
Start: 2020-02-07 | End: 2020-02-07 | Stop reason: HOSPADM

## 2020-02-06 RX ORDER — OXYCODONE HYDROCHLORIDE 5 MG/1
5 TABLET ORAL EVERY 4 HOURS PRN
Status: DISCONTINUED | OUTPATIENT
Start: 2020-02-06 | End: 2020-02-07 | Stop reason: HOSPADM

## 2020-02-06 RX ORDER — DOCUSATE SODIUM 100 MG/1
100 CAPSULE, LIQUID FILLED ORAL 3 TIMES DAILY
Qty: 21 CAPSULE | Refills: 0 | Status: SHIPPED | OUTPATIENT
Start: 2020-02-06 | End: 2020-02-11 | Stop reason: ALTCHOICE

## 2020-02-06 RX ORDER — OXYCODONE HYDROCHLORIDE AND ACETAMINOPHEN 5; 325 MG/1; MG/1
2 TABLET ORAL EVERY 6 HOURS PRN
Status: DISCONTINUED | OUTPATIENT
Start: 2020-02-06 | End: 2020-02-06

## 2020-02-06 RX ORDER — ONDANSETRON 2 MG/ML
INJECTION INTRAMUSCULAR; INTRAVENOUS AS NEEDED
Status: DISCONTINUED | OUTPATIENT
Start: 2020-02-06 | End: 2020-02-06 | Stop reason: SURG

## 2020-02-06 RX ORDER — METOCLOPRAMIDE HYDROCHLORIDE 5 MG/ML
10 INJECTION INTRAMUSCULAR; INTRAVENOUS ONCE AS NEEDED
Status: DISCONTINUED | OUTPATIENT
Start: 2020-02-06 | End: 2020-02-06 | Stop reason: HOSPADM

## 2020-02-06 RX ORDER — CEFAZOLIN SODIUM 1 G/50ML
1000 SOLUTION INTRAVENOUS ONCE
Status: DISCONTINUED | OUTPATIENT
Start: 2020-02-06 | End: 2020-02-06

## 2020-02-06 RX ORDER — CEFAZOLIN SODIUM 1 G/50ML
SOLUTION INTRAVENOUS AS NEEDED
Status: DISCONTINUED | OUTPATIENT
Start: 2020-02-06 | End: 2020-02-06 | Stop reason: SURG

## 2020-02-06 RX ORDER — OXYCODONE HYDROCHLORIDE 10 MG/1
10 TABLET ORAL EVERY 4 HOURS PRN
Status: DISCONTINUED | OUTPATIENT
Start: 2020-02-06 | End: 2020-02-07 | Stop reason: HOSPADM

## 2020-02-06 RX ORDER — FENTANYL CITRATE/PF 50 MCG/ML
25 SYRINGE (ML) INJECTION
Status: DISCONTINUED | OUTPATIENT
Start: 2020-02-06 | End: 2020-02-06 | Stop reason: HOSPADM

## 2020-02-06 RX ORDER — GABAPENTIN 100 MG/1
300 CAPSULE ORAL ONCE
Status: CANCELLED | OUTPATIENT
Start: 2020-02-06 | End: 2020-02-06

## 2020-02-06 RX ORDER — SUCCINYLCHOLINE/SOD CL,ISO/PF 100 MG/5ML
SYRINGE (ML) INTRAVENOUS AS NEEDED
Status: DISCONTINUED | OUTPATIENT
Start: 2020-02-06 | End: 2020-02-06 | Stop reason: SURG

## 2020-02-06 RX ORDER — PROPOFOL 10 MG/ML
INJECTION, EMULSION INTRAVENOUS AS NEEDED
Status: DISCONTINUED | OUTPATIENT
Start: 2020-02-06 | End: 2020-02-06 | Stop reason: SURG

## 2020-02-06 RX ORDER — OXYBUTYNIN CHLORIDE 5 MG/1
5 TABLET ORAL 3 TIMES DAILY
Status: DISCONTINUED | OUTPATIENT
Start: 2020-02-06 | End: 2020-02-07 | Stop reason: HOSPADM

## 2020-02-06 RX ADMIN — ONDANSETRON 4 MG: 2 INJECTION INTRAMUSCULAR; INTRAVENOUS at 11:57

## 2020-02-06 RX ADMIN — FENTANYL CITRATE 50 MCG: 50 INJECTION INTRAMUSCULAR; INTRAVENOUS at 14:06

## 2020-02-06 RX ADMIN — OXYCODONE HYDROCHLORIDE 5 MG: 5 TABLET ORAL at 11:57

## 2020-02-06 RX ADMIN — FAMOTIDINE 40 MG: 20 TABLET, FILM COATED ORAL at 18:03

## 2020-02-06 RX ADMIN — TAMSULOSIN HYDROCHLORIDE 0.4 MG: 0.4 CAPSULE ORAL at 12:21

## 2020-02-06 RX ADMIN — CEFAZOLIN SODIUM 1000 MG: 1 SOLUTION INTRAVENOUS at 13:51

## 2020-02-06 RX ADMIN — ONDANSETRON 4 MG: 2 INJECTION INTRAMUSCULAR; INTRAVENOUS at 14:05

## 2020-02-06 RX ADMIN — METOPROLOL SUCCINATE 12.5 MG: 25 TABLET, EXTENDED RELEASE ORAL at 18:02

## 2020-02-06 RX ADMIN — SODIUM CHLORIDE 1000 ML: 0.9 INJECTION, SOLUTION INTRAVENOUS at 10:59

## 2020-02-06 RX ADMIN — SODIUM CHLORIDE 1000 ML: 0.9 INJECTION, SOLUTION INTRAVENOUS at 08:21

## 2020-02-06 RX ADMIN — LIDOCAINE HYDROCHLORIDE 50 MG: 10 INJECTION, SOLUTION EPIDURAL; INFILTRATION; INTRACAUDAL; PERINEURAL at 13:55

## 2020-02-06 RX ADMIN — FENTANYL CITRATE 50 MCG: 50 INJECTION, SOLUTION INTRAMUSCULAR; INTRAVENOUS at 11:00

## 2020-02-06 RX ADMIN — FENTANYL CITRATE 50 MCG: 50 INJECTION, SOLUTION INTRAMUSCULAR; INTRAVENOUS at 08:22

## 2020-02-06 RX ADMIN — ACETAMINOPHEN 650 MG: 325 TABLET, FILM COATED ORAL at 18:03

## 2020-02-06 RX ADMIN — OXYBUTYNIN CHLORIDE 5 MG: 5 TABLET ORAL at 22:16

## 2020-02-06 RX ADMIN — Medication 200 MG: at 13:55

## 2020-02-06 RX ADMIN — FENTANYL CITRATE 100 MCG: 50 INJECTION INTRAMUSCULAR; INTRAVENOUS at 13:55

## 2020-02-06 RX ADMIN — CEFAZOLIN SODIUM 2000 MG: 2 SOLUTION INTRAVENOUS at 13:46

## 2020-02-06 RX ADMIN — PROPOFOL 200 MG: 10 INJECTION, EMULSION INTRAVENOUS at 13:55

## 2020-02-06 RX ADMIN — SODIUM CHLORIDE 125 ML/HR: 0.9 INJECTION, SOLUTION INTRAVENOUS at 19:29

## 2020-02-06 RX ADMIN — ATORVASTATIN CALCIUM 40 MG: 40 TABLET, FILM COATED ORAL at 18:03

## 2020-02-06 RX ADMIN — KETOROLAC TROMETHAMINE 15 MG: 30 INJECTION, SOLUTION INTRAMUSCULAR at 08:21

## 2020-02-06 RX ADMIN — SODIUM CHLORIDE: 0.9 INJECTION, SOLUTION INTRAVENOUS at 13:48

## 2020-02-06 RX ADMIN — SODIUM CHLORIDE 1000 ML: 0.9 INJECTION, SOLUTION INTRAVENOUS at 12:21

## 2020-02-06 RX ADMIN — LEVOTHYROXINE SODIUM 50 MCG: 50 TABLET ORAL at 18:03

## 2020-02-06 RX ADMIN — ONDANSETRON 4 MG: 2 INJECTION INTRAMUSCULAR; INTRAVENOUS at 08:21

## 2020-02-06 RX ADMIN — INSULIN LISPRO 12 UNITS: 100 INJECTION, SOLUTION INTRAVENOUS; SUBCUTANEOUS at 18:34

## 2020-02-06 NOTE — ASSESSMENT & PLAN NOTE
· An elevated at 1 67 with baseline appears closer to 0 8-1  17  This is likely post renal secondary to obstruction from nephrolithiasis, also consideration for NSAID use as he was recently prescribed naproxen  · Patient status post cystoscopy  Continue IV fluids  · Avoid nephrotoxic agents  · Monitor a m   BMP

## 2020-02-06 NOTE — DISCHARGE INSTRUCTIONS
Ureteroscopy   WHAT YOU NEED TO KNOW:     Ilsa Haynes,    Today we entered your bladder with the intent remove your kidney stone, unfortunately when we placed a wire past your kidney, we saw that infected urine exited your kidney, as such it was not safe to treat your kidney stone today  We did place a stent to drain the kidney, I have sent a message to our surgical schedulers to start looking for a date for your subsequent stone surgery  I did speak with your wife, I was sorry to hear that you are unable to go to your son's wedding tomorrow  With a stent in place it is normal to have urgency of urination, frequency, and discomfort when you urinate, especially in your right-hand side  These will get better once we remove your stent in the future  If you have questions or concerns in the postoperative time frame, please do not hesitate to contact my office  Take care and be well,  Dr Nicole Manrique  A ureteroscopy is a procedure to examine in the inside of your urinary tract, which includes your urethra, bladder, ureters, and kidneys  A ureteroscope is a small, thin tube with a light and camera on the end  Ureteroscopy can help your healthcare provider diagnose and treat problems in your urinary tract, such as kidney stones  DISCHARGE INSTRUCTIONS:   Medicine:   · Antibiotics  may be given to treat or prevent an infection  · Take your medicine as directed  Contact your healthcare provider if you think your medicine is not helping or if you have side effects  Tell him or her if you are allergic to any medicine  Keep a list of the medicines, vitamins, and herbs you take  Include the amounts, and when and why you take them  Bring the list or the pill bottles to follow-up visits  Carry your medicine list with you in case of an emergency  Follow up with your healthcare provider as directed:  Write down your questions so you remember to ask them during your visits  Drink liquids as directed    Liquids can help prevent kidney stones and urinary tract infections  Drink water and limit the amount of caffeine you drink  Caffeine may be found in coffee, tea, soda, sports drinks, and foods  Ask your healthcare provider how much liquid to drink each day  Contact your healthcare provider if:   · You have a fever  · You cannot urinate  · You have blood in your urine  · You are vomiting  · You have pain in your abdomen or side  · You have questions or concerns about your condition or care  © 2017 [facility] Information is for End User's use only and may not be sold, redistributed or otherwise used for commercial purposes  All illustrations and images included in CareNotes® are the copyrighted property of A D A M , Inc  or Sawyer Ocampo  The above information is an  only  It is not intended as medical advice for individual conditions or treatments  Talk to your doctor, nurse or pharmacist before following any medical regimen to see if it is safe and effective for you

## 2020-02-06 NOTE — ASSESSMENT & PLAN NOTE
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

## 2020-02-06 NOTE — ANESTHESIA PREPROCEDURE EVALUATION
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, weaned off corticosteroids 2 weeks ago     GI/Hepatic    GERD , Liver disease , cirrhosis, Hepatitis (s/p Harvoni) C,        Kidney stones, Kidney disease ARF,        Endo/Other  Diabetes type 2 Insulin, History of thyroid disease , hypothyroidism,      GYN       Hematology  Negative hematology ROS      Musculoskeletal  Negative musculoskeletal ROS        Neurology    Headaches,    Psychology   Anxiety,          CT Chest 1/7/2020:  LUNGS:  The central airways are patent  No focal consolidation or suspicious mass  There are few linear parenchymal densities in the lungs bilaterally, likely reflecting areas of subsegmental atelectasis versus scarring  There is a stable 5 mm pleural-based nodule versus focal subpleural atelectasis in the right middle lobe  No enlarged lymph nodes in the chest, abdomen or pelvis  There are stable scattered subcentimeter mediastinal lymph nodes, likely benign  Cardiac Cath 12/13/19:  CORONARY VESSELS:   --  The coronary circulation is right dominant  --  Left main: The vessel was medium to large sized  Angiography showed no evidence of disease  --  LAD: The vessel was medium to large sized  --  1st obtuse marginal: There was a discrete 40 % stenosis  --  RCA: The vessel was small to medium sized  Angiography showed no evidence of disease      IMPRESSIONS:  There is no significant coronary artery disease  Left ventricular function is normal     Cardiac MRI 11/26/19:  1  Top normal left ventricular size with normal left ventricular systolic function  2  Normal right ventricular size and systolic function    3  No significant valvular abnormalities  4  Suggestion of mild ischemia of interventricular septum with vasodilator stress  5  No evidence of myocardial scarring, inflammation, or infiltrative disease  6  No evidence of cardiac sarcoidosis  TTE 8/5/19:  LEFT VENTRICLE:  Size was normal   Systolic function was normal  Ejection fraction was estimated to be 65 %  Wall thickness was mildly increased  Doppler parameters were consistent with abnormal left ventricular relaxation (grade 1 diastolic dysfunction)      RIGHT VENTRICLE:  The size was normal   Systolic function was normal      LEFT ATRIUM:  The atrium was mildly dilated      RIGHT ATRIUM:  The atrium was mildly dilated      MITRAL VALVE:  There was trace regurgitation  PFTs 7/2019:  FEV1/FVC Ratio: 80 %  Forced Vital Capacity: 3 79 L    71 % predicted  FEV1: 3 05 L     76 % predicted     Lung volumes by body plethysmography:   Total Lung Capacity 70 % predicted   Residual volume 72 % predicted     DLCO corrected for patients hemoglobin level: 65 %      Lab Results   Component Value Date    WBC 7 82 02/06/2020    HGB 13 8 02/06/2020    HCT 41 7 02/06/2020    MCV 93 02/06/2020     02/06/2020     Lab Results   Component Value Date    SODIUM 141 02/06/2020    K 4 8 02/06/2020     02/06/2020    CO2 25 02/06/2020    BUN 17 02/06/2020    CREATININE 1 67 (H) 02/06/2020    GLUC 241 (H) 02/06/2020    CALCIUM 8 7 02/06/2020     Lab Results   Component Value Date    INR 1 06 12/07/2019    INR 1 00 09/11/2019    INR 1 04 05/25/2019    PROTIME 13 2 12/07/2019    PROTIME 12 6 09/11/2019    PROTIME 13 3 05/25/2019           Physical Exam    Airway    Mallampati score: I  TM Distance: >3 FB  Neck ROM: full     Dental   No notable dental hx     Cardiovascular  Cardiovascular exam normal    Pulmonary  Pulmonary exam normal     Other Findings        Anesthesia Plan  ASA Score- 3 Emergent    Anesthesia Type- general with ASA Monitors     Additional Monitors:   Airway Plan: ETT         Plan Factors-    Induction- intravenous and rapid sequence induction  Postoperative Plan- Plan for postoperative opioid use  Informed Consent- Anesthetic plan and risks discussed with patient  I personally reviewed this patient with the CRNA  Discussed and agreed on the Anesthesia Plan with the CRNA  Gil Barron

## 2020-02-06 NOTE — ED PROVIDER NOTES
History  Chief Complaint   Patient presents with    Flank Pain     pt states was seen on Tues at [facility] has a kidney stone was given Percocet and Zofran states it not helping       History provided by:  Patient  Flank Pain   Pain location:  R flank  Pain quality: aching, sharp and stabbing    Pain radiates to:  Suprapubic region  Pain severity:  Severe  Onset quality:  Gradual  Duration:  2 days  Timing:  Intermittent  Progression:  Waxing and waning  Chronicity:  New  Context comment:  Seen at [facility] diagnosed with 3 mm nephrolithiasis has been taking medications as prescribed still in significant discomfort prompting recurrent visit  Not eating not drinking multiple episodes of vomiting  Relieved by:  Nothing  Worsened by:  Nothing  Ineffective treatments: Oxycodone  Associated symptoms: anorexia, nausea and vomiting    Associated symptoms: no chest pain, no chills, no constipation, no cough, no diarrhea, no dysuria, no fever, no shortness of breath and no sore throat    Associated symptoms comment:  Inactive due to mainly just lying in bed trying to deal with discomfort  Prior to Admission Medications   Prescriptions Last Dose Informant Patient Reported? Taking?    ALPRAZolam (XANAX) 0 25 mg tablet   No No   Sig: Take 1 tablet (0 25 mg total) by mouth 3 (three) times a day as needed for anxiety   Cholecalciferol (VITAMIN D3) 5000 units CAPS  Self Yes No   Sig: Take 1 capsule by mouth daily    Insulin Aspart FlexPen 100 UNIT/ML SOPN   Yes No   Sig: Inject 12 Units under the skin 3 (three) times a day with meals   Insulin Pen Needle (PEN NEEDLES) 31G X 6 MM MISC  Self No No   Sig: by Does not apply route 4 (four) times a day   ONETOUCH DELICA LANCETS 36D MISC  Self No No   Sig: USE THREE TIMES DAILY   ONETOUCH VERIO test strip  Self No No   Sig: Use to test blood sugar 4 times a day   apixaban (ELIQUIS) 5 mg  Self No No   Sig: Take 1 tablet (5 mg total) by mouth 2 (two) times a day Take 2 tabs twice a day for one week then one tab twice a day thereafter     aspirin (ECOTRIN LOW STRENGTH) 81 mg EC tablet  Self No No   Sig: Take 1 tablet (81 mg total) by mouth daily   atorvastatin (LIPITOR) 40 mg tablet  Self No No   Sig: Take 1 tablet (40 mg total) by mouth daily   dexamethasone (DECADRON) 4 mg/mL  Self Yes No   Sig: dexamethasone sodium phosphate 4 mg/mL injection solution   use as directed by Physical therapy staff for iontophoresis   famotidine (PEPCID) 40 MG tablet  Self No No   Sig: Take 1 tablet (40 mg total) by mouth daily   glucose 4 g chewable tablet  Self No No   Sig: Chew 4 tablets (16 g total) as needed for low blood sugar   insulin glargine (LANTUS SOLOSTAR) 100 units/mL injection pen   Yes No   Sig: Inject 48 Units under the skin daily   levothyroxine 50 mcg tablet  Self No No   Sig: TAKE 1 TABLET BY MOUTH EVERY DAY   metFORMIN (GLUCOPHAGE) 500 mg tablet  Self No No   Sig: TAKE 1 TABLET BY MOUTH TWICE A DAY WITH MEALS   metoprolol succinate (TOPROL-XL) 25 mg 24 hr tablet  Self No No   Sig: Take 0 5 tablets (12 5 mg total) by mouth 2 (two) times a day   mycophenolate (CELLCEPT) 500 mg tablet  Self No No   Sig: Take 2 tablets (1,000 mg total) by mouth 2 (two) times a day   naproxen (NAPROSYN) 500 mg tablet   No No   Sig: Take 1 tablet (500 mg total) by mouth 2 (two) times a day with meals for 4 days   ondansetron (ZOFRAN-ODT) 4 mg disintegrating tablet   No No   Sig: Take 1 tablet (4 mg total) by mouth every 8 (eight) hours as needed for nausea or vomiting for up to 7 days   oxyCODONE (ROXICODONE) 5 mg immediate release tablet   No No   Sig: Take 1 tablet (5 mg total) by mouth every 8 (eight) hours as needed for moderate pain for up to 2 daysMax Daily Amount: 15 mg   oxyCODONE (ROXICODONE) 5 mg immediate release tablet   No No   Sig: Take 1 tablet (5 mg total) by mouth every 4 (four) hours as needed for moderate pain for up to 6 dosesMax Daily Amount: 30 mg   predniSONE 5 mg tablet  Self No No Sig: Take 1 tablet (5 mg total) by mouth daily   tamsulosin (FLOMAX) 0 4 mg   No No   Sig: Take 1 capsule (0 4 mg total) by mouth daily with dinner for 3 days      Facility-Administered Medications: None       Past Medical History:   Diagnosis Date    BPH (benign prostatic hyperplasia)     Diabetes mellitus ([address] )     Erectile dysfunction     GERD (gastroesophageal reflux disease)     Headache(784 0) 2018    Each Day    Hematuria     Infectious viral hepatitis     Cured by Dr Craig Wild    Liver disease     hepatitis C    Mediastinal adenopathy     Obesity 2007    Vitamin D deficiency        Past Surgical History:   Procedure Laterality Date    COLONOSCOPY      last assessed: 2012; fiberoptic screening     CYSTOSCOPY      onset: 2016; Diagnostic     DENTAL SURGERY      LIVER BIOPSY      LYMPH NODE BIOPSY  2019    VT BRONCHOSCOPY NEEDLE BX TRACHEA MAIN STEM&/BRON N/A 2019    Procedure: ENDOBRONCHIAL ULTRASOUND (EBUS); Surgeon: Stormy Nunn MD;  [facility];  Service: Thoracic    VT [address] N/A 2019    Procedure: Cassie Pinta;  Surgeon: Stormy Nunn MD;  [facility];  Service: Thoracic    VT TRANSURETHRAL ELEC-SURG PROSTATECTOM N/A 2017    Procedure: Pedro Prieto; TUR PROSTATE;  Surgeon: Toshia Frye MD;  [facility];  Service: Urology    PROSTATE SURGERY      SHOULDER ARTHROSCOPY Right     bicep tendon repair, rotator cuff repair and acromuim shave        Family History   Problem Relation Age of Onset    Stroke Father         Accidental Death at 52    Cancer Maternal Grandmother     Valvular heart disease Mother      I have reviewed and agree with the history as documented      Social History     Tobacco Use    Smoking status: Former Smoker     Packs/day: 1 00     Years: 1 00     Pack years: 1 00     Types: Cigarettes     Last attempt to quit:      Years since quittin 1    Smokeless tobacco: Never Used   Substance Use Topics    Alcohol use: No     Comment: quit 12 years ago    Drug use: No        Review of Systems   Constitutional: Positive for activity change  Negative for chills, diaphoresis and fever  HENT: Negative for congestion, sinus pressure and sore throat  Eyes: Negative for pain and visual disturbance  Respiratory: Negative for cough, chest tightness, shortness of breath, wheezing and stridor  Cardiovascular: Negative for chest pain and palpitations  Gastrointestinal: Positive for anorexia, nausea and vomiting  Negative for abdominal distention, abdominal pain, constipation and diarrhea  Genitourinary: Positive for flank pain  Negative for dysuria and frequency  Musculoskeletal: Negative for neck pain and neck stiffness  Skin: Negative for rash  Neurological: Negative for dizziness, speech difficulty, light-headedness, numbness and headaches  Physical Exam  Physical Exam   Constitutional: He is oriented to person, place, and time  He appears well-developed  He appears distressed (Patient appears moderately uncomfortable)  HENT:   Head: Normocephalic and atraumatic  Eyes: Pupils are equal, round, and reactive to light  Neck: Normal range of motion  Neck supple  No tracheal deviation present  Cardiovascular: Normal rate, regular rhythm, normal heart sounds and intact distal pulses  No murmur heard  Pulmonary/Chest: Effort normal and breath sounds normal  No stridor  No respiratory distress  Abdominal: Soft  He exhibits no distension  There is tenderness (right CVA  No tenderness to deep palpation all 4 quadrants  No tenderness over McBurney's point negative Cowart sign)  There is no rebound and no guarding  Musculoskeletal: Normal range of motion  Neurological: He is alert and oriented to person, place, and time  Skin: Skin is warm and dry  He is not diaphoretic  No erythema  No pallor     Psychiatric: He has a normal mood and affect  Vitals reviewed        Vital Signs  ED Triage Vitals [02/06/20 0741]   Temperature Pulse Respirations Blood Pressure SpO2   98 1 °F (36 7 °C) 99 18 (!) 186/96 96 %      Temp Source Heart Rate Source Patient Position - Orthostatic VS BP Location FiO2 (%)   Oral -- -- -- --      Pain Score       9           Vitals:    02/06/20 0741   BP: (!) 186/96   Pulse: 99         Visual Acuity      ED Medications  Medications   fentanyl citrate (PF) 100 MCG/2ML 50 mcg (has no administration in time range)   sodium chloride 0 9 % bolus 1,000 mL (has no administration in time range)   sodium chloride 0 9 % bolus 1,000 mL (0 mL Intravenous Stopped 2/6/20 1001)   ondansetron (ZOFRAN) injection 4 mg (4 mg Intravenous Given 2/6/20 0821)   ketorolac (TORADOL) injection 15 mg (15 mg Intravenous Given 2/6/20 0821)   fentanyl citrate (PF) 100 MCG/2ML 50 mcg (50 mcg Intravenous Given 2/6/20 0822)       Diagnostic Studies  Results Reviewed     Procedure Component Value Units Date/Time    Basic metabolic panel [[id number]]  (Abnormal) Collected:  02/06/20 0816    Lab Status:  Final result Specimen:  Blood Updated:  02/06/20 0830     Sodium 141 mmol/L      Potassium 4 8 mmol/L      Chloride 103 mmol/L      CO2 25 mmol/L      ANION GAP 13 mmol/L      BUN 17 mg/dL      Creatinine 1 67 mg/dL      Glucose 241 mg/dL      Calcium 8 7 mg/dL      eGFR 44 ml/min/1 73sq m     Narrative:       Tommy guidelines for Chronic Kidney Disease (CKD):     Stage 1 with normal or high GFR (GFR > 90 mL/min/1 73 square meters)    Stage 2 Mild CKD (GFR = 60-89 mL/min/1 73 square meters)    Stage 3A Moderate CKD (GFR = 45-59 mL/min/1 73 square meters)    Stage 3B Moderate CKD (GFR = 30-44 mL/min/1 73 square meters)    Stage 4 Severe CKD (GFR = 15-29 mL/min/1 73 square meters)    Stage 5 End Stage CKD (GFR <15 mL/min/1 73 square meters)  Note: GFR calculation is accurate only with a steady state creatinine    Urine Microscopic [[id number]]  (Abnormal) Collected:  02/06/20 0752    Lab Status:  Final result Specimen:  Urine, Clean Catch Updated:  02/06/20 0821     RBC, UA 10-20 /hpf      WBC, UA 2-4 /hpf      Epithelial Cells Occasional /hpf      Bacteria, UA Occasional /hpf      MUCUS THREADS Moderate    CBC and differential [[id number]]  (Abnormal) Resulted:  02/06/20 0819    Lab Status:  Final result Specimen:  Blood Updated:  02/06/20 0819     WBC 7 82 Thousand/uL      RBC 4 50 Million/uL      Hemoglobin 13 8 g/dL      Hematocrit 41 7 %      MCV 93 fL      MCH 30 7 pg      MCHC 33 1 g/dL      RDW 12 3 %      MPV 9 7 fL      Platelets 393 Thousands/uL      nRBC 0 /100 WBCs      Neutrophils Relative 79 %      Immat GRANS % 1 %      Lymphocytes Relative 11 %      Monocytes Relative 9 %      Eosinophils Relative 0 %      Basophils Relative 0 %      Neutrophils Absolute 6 18 Thousands/µL      Immature Grans Absolute 0 04 Thousand/uL      Lymphocytes Absolute 0 86 Thousands/µL      Monocytes Absolute 0 69 Thousand/µL      Eosinophils Absolute 0 02 Thousand/µL      Basophils Absolute 0 03 Thousands/µL     Urine Macroscopic, POC [[id number]]  (Abnormal) Collected:  02/06/20 0752    Lab Status:  Final result Specimen:  Urine Updated:  02/06/20 0748     Color, UA Yellow     Clarity, UA Clear     pH, UA 5 5     Leukocytes, UA Negative     Nitrite, UA Negative     Protein, UA 30 (1+) mg/dl      Glucose,  (1/2%) mg/dl      Ketones, UA >=160 (4+) mg/dl      Urobilinogen, UA 0 2 E U /dl      Bilirubin, UA Interference- unable to analyze     Blood, UA Moderate     Specific Gravity, UA >=1 030    Narrative:       CLINITEK RESULT                 XR abdomen 1 view kub   Final Result by Lee Byrd DO (02/06 1033)      5 mm stone projects to the right of the L3-4 disc space along the course of the mid right ureter  This corresponds to the abnormality seen on recent CT                 Workstation performed: [id number] Procedures  Procedures         ED Course  ED Course as of Feb 06 1055   Thu Feb 06, 2020   2518 Patient acute kidney injury, creatinine greater than 0 5 since previous visit  , this fits the patient not eating not drinking and feeling clinically dehydrated  MDM  Number of Diagnoses or Management Options  Acute kidney injury [facility]): new and requires workup  Acute right flank pain: new and requires workup  Diagnosis management comments:       Initial ED assessment:  79-year-old male presents with right flank pain, recent diagnosis of nephrolithiasis, presents with significant worsening pain    Initial DDx includes but is not limited to:   Hydroureteronephrosis from nephrolithiasis/movement of nephrolithiasis, doubt alternative pathology such as colitis appendicitis, as anterior abdomen is nontender to palpation  Patient has not been eating or drinking, renal injury/failure is considered, eat doubt urinary tract infection    Initial ED plan:   Pain controlling medications IV fluids, KUB to evaluate for positioning of stone, disposition pending ED workup, if pain cannot be controlled and if there is a degree of acute kidney injury will likely consult Urology for possible intervention        Final ED summary/disposition:   After evaluation and workup in the emergency department, found have acute kidney injury, mid hospital further workup evaluation  Urology consult           Amount and/or Complexity of Data Reviewed  Clinical lab tests: ordered and reviewed  Tests in the radiology section of CPT®: ordered and reviewed  Review and summarize past medical records: yes  Discuss the patient with other providers: yes  Independent visualization of images, tracings, or specimens: yes          Disposition  Final diagnoses:   Acute kidney injury ([address] )   Acute right flank pain     Time reflects when diagnosis was documented in both MDM as applicable and the Disposition within this note     Time User Action Codes Description Comment    2/6/2020 10:43 AM Aislinn BLACKWOOD Add [R10 9] Flank pain     2/6/2020 10:43 AM Anil Connelly Remove [R10 9] Flank pain     2/6/2020 10:43 AM Aislinn BLACKWOOD Add [N17 9] Acute kidney injury ([facility] [address] )     2/6/2020 10:43 AM Flor Mackey Add [R10 9] Acute right flank pain       ED Disposition     ED Disposition Condition Date/Time Comment    Admit Stable Thu Feb 6, 2020 10:49 AM Case was discussed with Dr Fariba Grove and the patient's admission status was agreed to be Admission Status: In-patient status to the service of Dr Fariba Grove   Follow-up Information    None         Patient's Medications   Discharge Prescriptions    No medications on file     No discharge procedures on file      ED Provider  Electronically Signed by           Benoit Tinoco DO  02/06/20 4406

## 2020-02-06 NOTE — LETTER
[facility] [address] [facility]G [facility]  [address]  [address]  [address] [place] [address]  Dept: [phone]    February 7, 2020     Patient: Jabier Black   YOB: 1960   Date of Visit: 2/6/2020       To Whom it May Concern:    Bahman Jerez is under my professional care  He was seen in the hospital from 2/6/2020   to 02/07/20  He may return to work on 2/10/2020 without limitations  If you have any questions or concerns, please don't hesitate to call           Sincerely,          Vero Rocha MD

## 2020-02-06 NOTE — ASSESSMENT & PLAN NOTE
· Patient noted to have right ureteral nephrolithiasis measuring 3 mm with some associated mild hydroureteronephrosis  · Status post cystoscopy and lithotripsy with retrograde pyelogram and stent placement with Urology today    Appreciate urology assistance  · Will need to follow-up in their office in approximately 4 weeks for definitive stone therapy/extraction  · Continue IV fluids  · Pain control  · Flomax  · Urinary retention protocol

## 2020-02-06 NOTE — ANESTHESIA POSTPROCEDURE EVALUATION
Post-Op Assessment Note    CV Status:  Stable  Pain Score: 0    Pain management: adequate     Mental Status:  Alert and awake   Hydration Status:  Euvolemic   PONV Controlled:  Controlled   Airway Patency:  Patent   Post Op Vitals Reviewed: Yes      Staff: CRNA           /76 (02/06/20 1430)    Temp 97 7 °F (36 5 °C) (02/06/20 1423)    Pulse (!) 112 (02/06/20 1430)   Resp 19 (02/06/20 1430)    SpO2 98 % (02/06/20 1430)

## 2020-02-06 NOTE — H&P
The patient was seen and examined  There are no changes from the prior inpatient history and physical examination  The patient is appropriately prepared for surgery today as planned

## 2020-02-06 NOTE — CONSULTS
CONSULT    Patient Name: Paula Morgan  Patient MRN: [id number]  Date of Service: 2/6/2020   Date / Time Note Created: 2/6/2020 12:21 PM   Referring Provider: Jesus Byers MD    Provider Creating Note: ROSA MARIA Knowles    PCP: Arlen Srivastava  Attending Provider:  Jesus Byers MD    Reason for Consult: Flank Pain    HPI:  Paula Morgan is a 60-year-old male known to our service for BPH status post TURP by Dr Lázaro Diehl; evaluated in emergency room with chief presenting symptom right flank pain; not accompanied by fever, chills, but positive nausea/vomiting  He is afebrile and slightly hypertensive secondary to pain  With acute kidney injury from baseline of 1 03 to 1 67  No leukocytosis  Urine analysis negative for infection  CT of the abdomen and pelvis demonstrate proximal right 3 mm stone with mild hydronephrosis  He is admitted to the Internal Medicine service for aggressive IV fluids and pain management  Urologic consultation requested for possible surgical intervention       Active Problems:    Patient Active Problem List   Diagnosis    Type 2 diabetes mellitus without complication, without long-term current use of insulin (HCC)    Benign prostatic hyperplasia with urinary frequency    Preoperative cardiovascular examination    Hypothyroid    Unspecified cirrhosis of liver (HCC)    Neuralgia and neuritis, unspecified    Polyneuropathy, peripheral sensorimotor axonal    Mediastinal lymphadenopathy    Pulmonary sarcoidosis (HCC)    History of hepatitis C virus infection    Current chronic use of systemic steroids    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    Sinus tachycardia    Thrombus    Viral URI with cough    Chronic pulmonary embolism ([address] )    Immunosuppressed status (HCC)    Sarcoidosis    Calculus of gallbladder without cholecystitis without obstruction    Dyspnea on exertion    Acute right flank pain    Ureteral calculus, right    Acute kidney injury ([address] ) Impressions  Right Ureteral Calculus  Hydronephrosis  Renal Colic    Recommendations  1  Initiate aggressive IVFs   2  Flomax  3  Analgesia/Narcotics   4  Anti-emetics   5  ATBs empirically while awaiting culture   6  Strain urine   7  NPO for OR if no spontaneous expulsion achieved  Explained risk, benefits and potential complications of ureteroscopic stone extraction  Patient has verbalized understanding of possible need for ureteral stent only for any signs of infection and/or technical difficult prohibiting stone retrieval etc ; requiring staged ureteroscopy electively once recovered and infection free as OP  Formal consent by surgeon  Past Medical History:   Diagnosis Date    BPH (benign prostatic hyperplasia)     Diabetes mellitus ([address] )     Erectile dysfunction     GERD (gastroesophageal reflux disease)     Headache(784 0) 05/01/2018    Each Day    Hematuria     Infectious viral hepatitis 2007    Cured by Dr Christy Grant Liver disease     hepatitis C    Mediastinal adenopathy     Obesity 2007    Vitamin D deficiency        Past Surgical History:   Procedure Laterality Date    COLONOSCOPY      last assessed: 08/28/2012; fiberoptic screening     CYSTOSCOPY      onset: 05/06/2016; Diagnostic     DENTAL SURGERY      LIVER BIOPSY      LYMPH NODE BIOPSY  6/2019    AZ BRONCHOSCOPY NEEDLE BX TRACHEA MAIN STEM&/BRON N/A 6/6/2019    Procedure: ENDOBRONCHIAL ULTRASOUND (EBUS);   Surgeon: Bertha Carvalho MD;  [facility];  Service: Thoracic    AZ [address] N/A 6/6/2019    Procedure: Elizabeth Shade;  Surgeon: Bertha Carvalho MD;  [facility];  Service: Thoracic    AZ TRANSURETHRAL ELEC-SURG PROSTATECTOM N/A 9/13/2017    Procedure: Ivory Garay; TUR PROSTATE;  Surgeon: Trino Gillespie MD;  [facility];  Service: Urology    PROSTATE SURGERY  2017    SHOULDER ARTHROSCOPY Right     bicep tendon repair, rotator cuff repair and acromuim shave        Family History   Problem Relation Age of Onset    Stroke Father         Accidental Death at 52    Cancer Maternal Grandmother     Valvular heart disease Mother        Social History     Socioeconomic History    Marital status: /Civil Union     Spouse name: Not on file    Number of children: Not on file    Years of education: Not on file    Highest education level: Not on file   Occupational History    Occupation: manager   Social Needs    Financial resource strain: Not on file    Food insecurity:     Worry: Not on file     Inability: Not on file    Transportation needs:     Medical: Not on file     Non-medical: Not on file   Tobacco Use    Smoking status: Former Smoker     Packs/day: 1 00     Years: 1 00     Pack years: 1 00     Types: Cigarettes     Last attempt to quit:      Years since quittin     Smokeless tobacco: Never Used   Substance and Sexual Activity    Alcohol use: No     Comment: quit 12 years ago    Drug use: No    Sexual activity: Not Currently     Partners: Female     Birth control/protection: None   Lifestyle    Physical activity:     Days per week: Not on file     Minutes per session: Not on file    Stress: Not on file   Relationships    Social connections:     Talks on phone: Not on file     Gets together: Not on file     Attends Buddhist service: Not on file     Active member of club or organization: Not on file     Attends meetings of clubs or organizations: Not on file     Relationship status: Not on file    Intimate partner violence:     Fear of current or ex partner: Not on file     Emotionally abused: Not on file     Physically abused: Not on file     Forced sexual activity: Not on file   Other Topics Concern    Not on file   Social History Narrative    Caffeine use    Daily coffee consumption     Denied daily cola consumption     Denied daily tea consumption     Former smoker (as per [employer])     Current everyday smoker (as per [employer])     Non-smoker (as per [employer])        Allergies   Allergen Reactions    No Active Allergies        Review of Systems  10 point review of systems negative except as noted in HPI  Constitutional:   negative for - chills or fever  Cardiovascular:   no chest pain or dyspnea on exertion  Gastrointestinal:   positive for - nausea/vomiting  Genito-Urinary:   no dysuria, trouble voiding, or hematuria  Neurological:   no TIA or stroke symptoms     Chart Review   Allergies, current medications, history, problem list    Vital Signs  /96 (BP Location: Right arm)   Pulse 89   Temp 98 4 °F (36 9 °C) (Oral)   Resp 18   Ht 6' 3" (1 905 m)   Wt (!) 137 kg (300 lb 14 9 oz)   SpO2 97%   BMI 37 61 kg/m²     Physical Exam  General appearance: alert and oriented, in no acute distress, appears stated age, cooperative and no distress  Head: Normocephalic, without obvious abnormality, atraumatic  Neck: no adenopathy, no carotid bruit, no JVD, supple, symmetrical, trachea midline and thyroid not enlarged, symmetric, no tenderness/mass/nodules  Lungs: diminished breath sounds  Heart: regular rate and rhythm, S1, S2 normal, no murmur, click, rub or gallop  Abdomen: abnormal findings:  moderate tenderness in the RLQ and in the lower abdomen  Extremities: extremities normal, warm and well-perfused; no cyanosis, clubbing, or edema  Pulses: 2+ and symmetric  Neurologic: Grossly normal  No urinary drains     Laboratory Studies  Lab Results   Component Value Date    HGBA1C 7 2 (H) 11/11/2019    HGBA1C 7 8 (H) 06/24/2016     07/12/2017    K 4 8 02/06/2020    K 4 4 07/12/2017     02/06/2020     07/12/2017    CO2 25 02/06/2020    CO2 27 07/12/2017    GLUCOSE 145 (H) 01/01/2016    CREATININE 1 67 (H) 02/06/2020    CREATININE 1 08 07/12/2017    BUN 17 02/06/2020    BUN 17 07/12/2017    MG 2 2 08/15/2019    MG 2 3 01/01/2016    PHOS 3 4 08/15/2019     Lab Results   Component Value Date    WBC 7 82 02/06/2020 WBC 0-5 03/28/2016    RBC 4 50 02/06/2020    RBC 5 42 01/21/2016    HGB 13 8 02/06/2020    HGB 16 8 01/21/2016    HCT 41 7 02/06/2020    HCT 50 4 (H) 01/21/2016    MCV 93 02/06/2020    MCV 93 1 01/21/2016    MCH 30 7 02/06/2020    MCH 31 0 01/21/2016    RDW 12 3 02/06/2020    RDW 13 6 01/21/2016     02/06/2020     01/21/2016         Imaging and Other Studies  )  Ct Abdomen Pelvis Wo Contrast    Result Date: 2/4/2020  Narrative: CT ABDOMEN AND PELVIS WITHOUT IV CONTRAST INDICATION:   R flank pain with hx of gallstones and kidneys stones  COMPARISON:  7/15/2017 and 1/7/2020 TECHNIQUE:  CT examination of the abdomen and pelvis was performed without intravenous contrast   Axial, sagittal, and coronal 2D reformatted images were created from the source data and submitted for interpretation  Radiation dose length product (DLP) for this visit:  1477 81 mGy-cm   This examination, like all CT scans performed in the [facility], was performed utilizing techniques to minimize radiation dose exposure, including the use of iterative reconstruction and automated exposure control  Enteric contrast was not administered in accordance with physician request  FINDINGS: ABDOMEN LOWER CHEST:  There are multiple prominent epicardial and paraesophageal lymph nodes of uncertain etiology though unchanged from 2017  There is a small hiatal hernia  LIVER/BILIARY TREE:  Unremarkable  GALLBLADDER:  No calcified gallstones  No pericholecystic inflammatory change  SPLEEN:  Unremarkable  PANCREAS:  Unremarkable  ADRENAL GLANDS:  Unremarkable  KIDNEYS/URETERS:  There is a 3 mm mid right ureteral calculus with very mild hydroureteronephrosis  STOMACH AND BOWEL:  There is colonic diverticulosis without evidence of acute diverticulitis  APPENDIX:  No findings to suggest appendicitis  ABDOMINOPELVIC CAVITY:  No ascites or free intraperitoneal air  No lymphadenopathy  VESSELS:  Unremarkable for patient's age   PELVIS REPRODUCTIVE ORGANS:  Unremarkable for patient's age  URINARY BLADDER:  Unremarkable  ABDOMINAL WALL/INGUINAL REGIONS:  Unremarkable  OSSEOUS STRUCTURES:  No acute fracture or destructive osseous lesion  Impression: 1  Very mild right-sided hydroureteronephrosis secondary to a 3 mm mid right ureteral calculus  2   Stable epicardial and paraesophageal lymph nodes  3   Small hiatal hernia  4   Diverticulosis coli  The study was marked in [facility] for immediate notification  Workstation performed: [id number]     Xr Abdomen 1 View Kub    Result Date: 2/6/2020  Narrative: ABDOMEN INDICATION:   Known right mid ureter 3 mm nephrolithiasis from CT scan 2 days ago, still in significant discomfort, evaluate for movement/progression  COMPARISON:  CT February 4, 2020 VIEWS:  AP supine FINDINGS: There is a nonobstructive bowel gas pattern  No discernible free air on this supine study  Upright or left lateral decubitus imaging is more sensitive to detect subtle free air in the appropriate setting  Linear calcification projecting to the right of the L3-4 disc space measures 5 mm  This corresponds to the patient's known right-sided ureteral stone  Visualized lung bases are clear  Visualized osseous structures are unremarkable for the patient's age  Impression: 5 mm stone projects to the right of the L3-4 disc space along the course of the mid right ureter  This corresponds to the abnormality seen on recent CT   Workstation performed: [id number]       Medications     Current Facility-Administered Medications:  acetaminophen 650 mg Oral Q6H PRN Nitesh Vazquez MD   [START ON 2/7/2020] cefazolin 2,000 mg Intravenous On Call To [address] [place] [address], NP   heparin (porcine) 5,000 Units Subcutaneous [facility] Nitesh Vazquez MD   HYDROmorphone 0 5 mg Intravenous Q3H PRN Nitesh Vazquez MD   ondansetron 4 mg Intravenous Q6H PRN Nitesh Vazquez MD   oxyCODONE 10 mg Oral Q4H PRN Nitesh Vazquez MD   oxyCODONE 5 mg Oral Q4H PRN Nitesh Vazquez MD   sodium chloride 1,000 mL Intravenous Once Anil Lin DO   sodium chloride 125 mL/hr Intravenous Continuous Aleks Moyer MD   tamsulosin 0 4 mg Oral Daily With Beth Espinoza MD                 Alejandrina [place]

## 2020-02-06 NOTE — ASSESSMENT & PLAN NOTE
Lab Results   Component Value Date    HGBA1C 7 2 (H) 11/11/2019       No results for input(s): POCGLU in the last 72 hours  Blood Sugar Average: Last 72 hrs:   Patient follows with endocrinology  His Lantus was recently decreased to 40 units q a m  And NovoLog 12 units t i d  With meals  Continue home regimen  Start ISS algorithm 3 t i d  With meals and algorithm 1 q h s    Diabetic diet  Hold home metformin  Blood glucose goal 140-180 while in the hospital  Hypoglycemia protocol

## 2020-02-06 NOTE — ASSESSMENT & PLAN NOTE
· Currently on Eliquis 5 mg b i d  Plan is to possibly stop anticoagulation next month  Recommend continued follow-up    · Restart [facility] tomorrow as long as patient does not have significant hematuria

## 2020-02-06 NOTE — ASSESSMENT & PLAN NOTE
· Patient follows with rheumatology  Recently was tapered off his steroids and CellCept due to excessive weight gain and unwanted adverse side effects from these medications  Saw his rheumatologist in January    · Recommend continued outpatient follow-up with patient's rheumatologist

## 2020-02-06 NOTE — TELEPHONE ENCOUNTER
Patient is status post cystoscopy and right ureteral stent placement, needs a 2nd procedure by way of the [facility] option and roughly 4 weeks, for right ureteroscopy with laser lithotripsy, orders have been placed      Our office will update the stent database accordingly,  6 [place] by 28 centimeter right side, no string

## 2020-02-06 NOTE — H&P
H&P- Carlos A Jasondict 1960, 60 y o  male MRN: [id number]    Unit/Bed#: S -01 Encounter: [id number]    Primary Care Provider: Young Reyes DO   Date and time admitted to hospital: 2/6/2020  7:39 AM    * Ureteral calculus, right  Assessment & Plan  · Patient noted to have right ureteral nephrolithiasis measuring 3 mm with some associated mild hydroureteronephrosis  · Status post cystoscopy and lithotripsy with retrograde pyelogram and stent placement with Urology today  Appreciate urology assistance  · Will need to follow-up in their office in approximately 4 weeks for definitive stone therapy/extraction  · Continue IV fluids  · Pain control  · Flomax  · Urinary retention protocol    Acute kidney injury [facility])  Assessment & Plan  · An elevated at 1 67 with baseline appears closer to 0 8-1  17  This is likely post renal secondary to obstruction from nephrolithiasis, also consideration for NSAID use as he was recently prescribed naproxen  · Patient status post cystoscopy  Continue IV fluids  · Avoid nephrotoxic agents  · Monitor a m  BMP    Acute right flank pain  Assessment & Plan  · Secondary to right-sided nephrolithiasis  Supportive care as outlined principal problem    Sarcoidosis  Assessment & Plan  · Patient follows with rheumatology  Recently was tapered off his steroids and CellCept due to excessive weight gain and unwanted adverse side effects from these medications  Saw his rheumatologist in January  · Recommend continued outpatient follow-up with patient's rheumatologist    Chronic pulmonary embolism ([facility] )  Assessment & Plan  · Currently on Eliquis 5 mg b i d  Plan is to possibly stop anticoagulation next month  Recommend continued follow-up  · Restart [facility] tomorrow as long as patient does not have significant hematuria    Unspecified cirrhosis of liver ([facility] )  Assessment & Plan  · Patient has history of liver cirrhosis secondary to hepatitis-C status post treatment with Harvoni in 2017  Myra Kendrick Follows with GI  · LFTs are within normal limits  · Patient is planned for EGD when he is off of Eliquis and March of 2020 for esophageal and gastric variceal screening  · Recommend continued outpatient follow-up    Hypothyroid  Assessment & Plan  · Continue levothyroxine    Benign prostatic hyperplasia with urinary frequency  Assessment & Plan  · Continue Flomax    Type 2 diabetes mellitus without complication, without long-term current use of insulin ([address] )  Assessment & Plan  Lab Results   Component Value Date    HGBA1C 7 2 (H) 11/11/2019       No results for input(s): POCGLU in the last 72 hours  Blood Sugar Average: Last 72 hrs:   Patient follows with endocrinology  His Lantus was recently decreased to 40 units q a m  And NovoLog 12 units t i d  With meals  Continue home regimen  Start ISS algorithm 3 t i d  With meals and algorithm 1 q h s  Diabetic diet  Hold home metformin  Blood glucose goal 140-180 while in the hospital  Hypoglycemia protocol    VTE Prophylaxis: Apixaban (Eliquis)  / sequential compression device   Code Status:  Level 1 full code       Anticipated Length of Stay:  Patient will be admitted on an Inpatient basis with an anticipated length of stay of  > 2 midnights  Justification for Hospital Stay:  Management of acute kidney injury  Cystoscopy and lithotripsy with stent placement for right ureteral calculus    Total Time for Visit, including Counseling / Coordination of Care: 45 minutes  Greater than 50% of this total time spent on direct patient counseling and coordination of care  Chief Complaint:   Right flank pain    History of Present Illness:    Madeleine Infante is a 60 y o  male who presents to the hospital with chief complaint of worsening right-sided flank pain with nausea and vomiting  [facility] and was diagnosed with a renal calculus  He was given Percocet and Zofran and was discharged home  Patient states that his pain became unbearable and he presented to the ED for further evaluation  Patient denies any fevers or chills  Denies any dysuria  Positive CVA tenderness on the right side  CT of his abdomen and pelvis redemonstrated proximal 3 mm right ureteral calculus with very mild right-sided hydroureteronephrosis  Patient subsequently admitted to the medicine service for medical management of his right ureteral calculus as well as urology consultation for consideration for cystoscopy/lithotripsy/stent placement    Review of Systems:    Review of Systems   Constitutional: Positive for activity change and appetite change  Negative for chills, diaphoresis, fatigue and fever  Respiratory: Negative for cough, shortness of breath and wheezing  Cardiovascular: Negative for chest pain, palpitations and leg swelling  Gastrointestinal: Positive for abdominal distention, nausea and vomiting  Negative for abdominal pain, anal bleeding, blood in stool, constipation and diarrhea  Genitourinary: Positive for flank pain  Negative for difficulty urinating, dysuria and frequency  Musculoskeletal: Positive for arthralgias  Neurological: Negative for dizziness, tremors, seizures, syncope, weakness and headaches  All other systems reviewed and are negative        Past Medical and Surgical History:     Past Medical History:   Diagnosis Date    BPH (benign prostatic hyperplasia)     Diabetes mellitus ([facility] )     Erectile dysfunction     GERD (gastroesophageal reflux disease)     Headache(784 0) 05/01/2018    Each Day    Hematuria     Infectious viral hepatitis 2007    Cured by Dr Kaity Calderon Ana Liver disease     hepatitis C    Mediastinal adenopathy     Obesity 2007    Vitamin D deficiency        Past Surgical History:   Procedure Laterality Date    COLONOSCOPY      last assessed: 08/28/2012; fiberoptic screening     CYSTOSCOPY      onset: 05/06/2016; Diagnostic     DENTAL SURGERY      FL RETROGRADE PYELOGRAM  2/6/2020    LIVER BIOPSY      LYMPH NODE BIOPSY  6/2019    MS BRONCHOSCOPY NEEDLE BX TRACHEA MAIN STEM&/BRON N/A 6/6/2019    Procedure: ENDOBRONCHIAL ULTRASOUND (EBUS); Surgeon: Janessa Mota MD;  Location: [facility];  Service: Thoracic    MS [address] N/A 6/6/2019    Procedure: Bere Lose;  Surgeon: Janessa Mota MD;  Location: [facility];  Service: Thoracic    MS CYSTO/URETERO W/LITHOTRIPSY &INDWELL STENT INSRT Right 2/6/2020    Procedure: CYSTOSCOPY WITH LITHOTRIPSY RETROGRADE PYELOGRAM AND INSERTION STENT URETERAL;  Surgeon: Jose Juan Rivera MD;  Location[facility];  Service: Urology    MS TRANSURETHRAL ELEC-SURG PROSTATECTOM N/A 9/13/2017    Procedure: Jolene Brochure; TUR PROSTATE;  Surgeon: Dona Barclay MD;  Location: [facility];  Service: Urology    PROSTATE SURGERY  2017    SHOULDER ARTHROSCOPY Right     bicep tendon repair, rotator cuff repair and acromuim shave        Meds/Allergies:    Prior to Admission medications    Medication Sig Start Date End Date Taking? Authorizing Provider   apixaban (ELIQUIS) 5 mg Take 1 tablet (5 mg total) by mouth 2 (two) times a day Take 2 tabs twice a day for one week then one tab twice a day thereafter   10/8/19  Yes Otto Campos DO   atorvastatin (LIPITOR) 40 mg tablet Take 1 tablet (40 mg total) by mouth daily 12/3/19  Yes Eliana Lagunas MD   insulin aspart (NovoLOG) 100 units/mL injection Inject 12 Units under the skin 3 (three) times a day before meals   Yes Historical Provider, MD   insulin glargine (LANTUS SOLOSTAR) 100 units/mL injection pen Inject 48 Units under the skin daily   Yes Historical Provider, MD   levothyroxine 50 mcg tablet TAKE 1 TABLET BY MOUTH EVERY DAY 8/18/19  Yes Moe Maldonado MD   metFORMIN (GLUCOPHAGE) 500 mg tablet TAKE 1 TABLET BY MOUTH TWICE A DAY WITH MEALS 10/14/19  Yes Moe Maldonado MD   metoprolol succinate (TOPROL-XL) 25 mg 24 hr tablet Take 0 5 tablets (12 5 mg total) by mouth 2 (two) times a day 10/18/19  Yes Jaleesa Valdez MD   tamsulosin (FLOMAX) 0 4 mg Take 1 capsule (0 4 mg total) by mouth daily with dinner for 3 days 2/4/20 2/7/20 Yes Jose Fall DO   cephalexin (KEFLEX) 500 mg capsule Take 1 capsule (500 mg total) by mouth every 12 (twelve) hours for 3 days Start day prior to stent removal 2/6/20 2/9/20  Marina Layne MD   docusate sodium (COLACE) 100 mg capsule Take 1 capsule (100 mg total) by mouth 3 (three) times a day for 7 days 2/6/20 2/13/20  Marina Layne MD   famotidine (PEPCID) 40 MG tablet Take 1 tablet (40 mg total) by mouth daily 11/11/19   Makayla Murrell MD   glucose 4 g chewable tablet Chew 4 tablets (16 g total) as needed for low blood sugar 8/14/19   Margarito Phan MD   Insulin Pen Needle (PEN NEEDLES) 31G X 6 MM MISC by Does not apply route 4 (four) times a day 8/1/19   Baljinder Mcclure MD   ondansetron (ZOFRAN-ODT) 4 mg disintegrating tablet Take 1 tablet (4 mg total) by mouth every 8 (eight) hours as needed for nausea or vomiting for up to 7 days 2/4/20 2/11/20  Cici Fall DO   ondansetron (ZOFRAN-ODT) 4 mg disintegrating tablet Take 1 tablet (4 mg total) by mouth every 6 (six) hours as needed for nausea or vomiting 2/6/20   MD Zakiya Stanley [place] LANCETS 78Y MISC USE THREE TIMES DAILY 9/8/19   Baljinder Mcclure MD   [facility] VERIO test strip Use to test blood sugar 4 times a day 10/9/19   Margarito Phan MD   oxyCODONE (ROXICODONE) 5 mg immediate release tablet Take 1 tablet (5 mg total) by mouth every 8 (eight) hours as needed for moderate pain for up to 2 daysMax Daily Amount: 15 mg 2/4/20 2/6/20  Cici Fall DO   oxyCODONE (ROXICODONE) 5 mg immediate release tablet Take 1 tablet (5 mg total) by mouth every 4 (four) hours as needed for moderate pain for up to 6 dosesMax Daily Amount: 30 mg 2/4/20   Sharla Brunner, DO   oxyCODONE-acetaminophen (PERCOCET) 5-325 mg per tablet Take 1 tablet by mouth every 6 (six) hours as needed for moderate pain for up to 5 daysMax Daily Amount: 4 tablets 2/6/20 2/11/20  Beverly Guardado MD   phenazopyridine (PYRIDIUM) 200 mg tablet Take 1 tablet (200 mg total) by mouth 3 (three) times a day with meals for 2 days 2/6/20 2/8/20  Beverly Guardado MD   ALPRAZolam Florestine Nidia) 0 25 mg tablet Take 1 tablet (0 25 mg total) by mouth 3 (three) times a day as needed for anxiety  Patient not taking: Reported on 2/6/2020 1/31/20 2/6/20  Theresa Dakin, DO   aspirin (ECOTRIN LOW STRENGTH) 81 mg EC tablet Take 1 tablet (81 mg total) by mouth daily  Patient not taking: Reported on 2/6/2020 12/3/19 2/6/20  Hima Pond MD   Cholecalciferol (VITAMIN D3) 5000 units CAPS Take 1 capsule by mouth daily   2/6/20  Historical Provider, MD   dexamethasone (DECADRON) 4 mg/mL dexamethasone sodium phosphate 4 mg/mL injection solution   use as directed by Physical therapy staff for iontophoresis  2/6/20  Historical Provider, MD   Insulin Aspart FlexPen 100 UNIT/ML SOPN Inject 12 Units under the skin 3 (three) times a day with meals  2/6/20  Historical Provider, MD   mycophenolate (CELLCEPT) 500 mg tablet Take 2 tablets (1,000 mg total) by mouth 2 (two) times a day 11/7/19 2/6/20  Akshat Diallo DO   naproxen (NAPROSYN) 500 mg tablet Take 1 tablet (500 mg total) by mouth 2 (two) times a day with meals for 4 days 2/4/20 2/6/20  Jamison Fall DO   predniSONE 5 mg tablet Take 1 tablet (5 mg total) by mouth daily 12/9/19 2/6/20  Akshat Diallo DO     I have reviewed home medications with patient personally  Allergies:    Allergies   Allergen Reactions    No Active Allergies        Social History:     Marital Status: /Civil Union     Substance Use History:   Social History     Substance and Sexual Activity   Alcohol Use No    Comment: quit 12 years ago     Social History     Tobacco Use   Smoking Status Former Smoker    Packs/day: 1 00    Years: 1 00    Pack years: 1 00    Types: Cigarettes    Last attempt to quit:     Years since quittin 1   Smokeless Tobacco Never Used     Social History     Substance and Sexual Activity   Drug Use No       Family History:    Family History   Problem Relation Age of Onset    Stroke Father         Accidental Death at 52    Cancer Maternal Grandmother     Valvular heart disease Mother        Physical Exam:     Vitals:   Blood Pressure: 142/69 (20 1538)  Pulse: 81 (20 1538)  Temperature: 98 °F (36 7 °C) (20 1538)  Temp Source: Oral (20 153)  Respirations: 18 (20 153)  Height: 6' 3" (190 5 cm) (20 1319)  Weight - Scale: 136 kg (300 lb) (20 1319)  SpO2: 92 % (20 153)    Physical Exam   Constitutional: He is oriented to person, place, and time  He appears well-developed and well-nourished  No distress  HENT:   Head: Normocephalic and atraumatic  Eyes: Pupils are equal, round, and reactive to light  Neck: Normal range of motion  Cardiovascular: Normal rate  No murmur heard  Pulmonary/Chest: Effort normal  No respiratory distress  He has no wheezes  Abdominal: He exhibits distension  He exhibits no mass  There is no tenderness  There is no guarding  Obese abdomen  Hypoactive bowel sounds   Musculoskeletal: He exhibits no edema  Right-sided CVA tenderness noted   Neurological: He is alert and oriented to person, place, and time  Skin: He is not diaphoretic  Nursing note and vitals reviewed  Additional Data:     Lab Results: I have personally reviewed pertinent reports        Results from last 7 days   Lab Units 20  0819   WBC Thousand/uL 7 82   HEMOGLOBIN g/dL 13 8   HEMATOCRIT % 41 7   PLATELETS Thousands/uL 188   NEUTROS PCT % 79*   LYMPHS PCT % 11*   MONOS PCT % 9   EOS PCT % 0     Results from last 7 days   Lab Units 20  0816 20  1013   POTASSIUM mmol/L 4 8 3 6   CHLORIDE mmol/L 103 110*   CO2 mmol/L 25 25   BUN mg/dL 17 17   CREATININE mg/dL 1 67* 1 09   CALCIUM mg/dL 8 7 8 9   ALK PHOS U/L  --  59   ALT U/L  --  30   AST U/L  --  20           Imaging: I have personally reviewed pertinent reports  Ct Abdomen Pelvis Wo Contrast    Result Date: 2/4/2020  Narrative: CT ABDOMEN AND PELVIS WITHOUT IV CONTRAST INDICATION:   R flank pain with hx of gallstones and kidneys stones  COMPARISON:  7/15/2017 and 1/7/2020 TECHNIQUE:  CT examination of the abdomen and pelvis was performed without intravenous contrast   Axial, sagittal, and coronal 2D reformatted images were created from the source data and submitted for interpretation  Radiation dose length product (DLP) for this visit:  1477 81 mGy-cm   This examination, like all CT scans performed in the [facility], was performed utilizing techniques to minimize radiation dose exposure, including the use of iterative reconstruction and automated exposure control  Enteric contrast was not administered in accordance with physician request  FINDINGS: ABDOMEN LOWER CHEST:  There are multiple prominent epicardial and paraesophageal lymph nodes of uncertain etiology though unchanged from 2017  There is a small hiatal hernia  LIVER/BILIARY TREE:  Unremarkable  GALLBLADDER:  No calcified gallstones  No pericholecystic inflammatory change  SPLEEN:  Unremarkable  PANCREAS:  Unremarkable  ADRENAL GLANDS:  Unremarkable  KIDNEYS/URETERS:  There is a 3 mm mid right ureteral calculus with very mild hydroureteronephrosis  STOMACH AND BOWEL:  There is colonic diverticulosis without evidence of acute diverticulitis  APPENDIX:  No findings to suggest appendicitis  ABDOMINOPELVIC CAVITY:  No ascites or free intraperitoneal air  No lymphadenopathy  VESSELS:  Unremarkable for patient's age  PELVIS REPRODUCTIVE ORGANS:  Unremarkable for patient's age  URINARY BLADDER:  Unremarkable  ABDOMINAL WALL/INGUINAL REGIONS:  Unremarkable   OSSEOUS STRUCTURES:  No acute fracture or destructive osseous lesion  Impression: 1  Very mild right-sided hydroureteronephrosis secondary to a 3 mm mid right ureteral calculus  2   Stable epicardial and paraesophageal lymph nodes  3   Small hiatal hernia  4   Diverticulosis coli  The study was marked in [facility] for immediate notification  Workstation performed: [id number]     Xr Abdomen 1 View Kub    Result Date: 2/6/2020  Narrative: ABDOMEN INDICATION:   Known right mid ureter 3 mm nephrolithiasis from CT scan 2 days ago, still in significant discomfort, evaluate for movement/progression  COMPARISON:  CT February 4, 2020 VIEWS:  AP supine FINDINGS: There is a nonobstructive bowel gas pattern  No discernible free air on this supine study  Upright or left lateral decubitus imaging is more sensitive to detect subtle free air in the appropriate setting  Linear calcification projecting to the right of the L3-4 disc space measures 5 mm  This corresponds to the patient's known right-sided ureteral stone  Visualized lung bases are clear  Visualized osseous structures are unremarkable for the patient's age  Impression: 5 mm stone projects to the right of the L3-4 disc space along the course of the mid right ureter  This corresponds to the abnormality seen on recent CT  Workstation performed: [id number]     Fl Retrograde Pyelogram    Result Date: 2/6/2020  Narrative: C-ARM - INDICATION: Acute right flank pain [R10 9]  Procedure guidance  COMPARISON:  None TECHNIQUE: FLUOROSCOPY TIME:   9 sft 5 FLUOROSCOPIC IMAGES FINDINGS: Fluoroscopic guidance provided for surgical procedure  Osseous and soft tissue detail limited by technique  Impression: Fluoroscopic guidance provided for surgical procedure  Please refer to the separate procedure notes for additional details  Workstation performed: [id number]       Allscripts / Epic Records Reviewed: Yes     ** Please Note: This note has been constructed using a voice recognition system   **

## 2020-02-06 NOTE — OP NOTE
OPERATIVE REPORT  PATIENT NAME: Neel Rolon    :  1960  MRN: [id number]  Pt Location: [facility] OR ROOM 04    SURGERY DATE: 2020    Surgeon(s) and Role:     * Jeff Gibbs MD - Primary    Preop Diagnosis:  Acute right flank pain [R10 9]  Ureteral calculus, right [N20 1]  Acute kidney injury ([address] ) [N17 9]    Post-Op Diagnosis Codes:     * Acute right flank pain [R10 9]     * Ureteral calculus, right [N20 1]     * Acute kidney injury ([address] ) [N17 9]    Procedure(s) (LRB):  CYSTOSCOPY WITH LITHOTRIPSY RETROGRADE PYELOGRAM AND INSERTION STENT URETERAL (Right)    Specimen(s):  * No specimens in log *    Estimated Blood Loss:   Minimal    Drains:  Ureteral Drain/Stent Right ureter 6 Fr  (Active)   Number of days: 0       Anesthesia Type:   Choice    Operative Indications:  Acute right flank pain [R10 9]  Ureteral calculus, right [N20 1]  Acute kidney injury ([address] ) [N17 9]      Operative Findings:    Previous TURP defect is present, debris and small stones present within the bladder, these were irrigated free  Upon wire placement into the right collecting system, copious cloudy urine along with pus exited from the collecting system, as such decision was made to place only a stent, a 6 [place] by 28 centimeter right ureteral stent was placed without issue in the bladder was drained  The patient will require subsequent stone treatment in 3-4 weeks    Complications:   None    Procedure and Technique:    PROCEDURES PERFORMED:  1) Cystoscopy  2) right retrograde pyelography with fluoroscopic interpretation  3) right ureteral stent placement (6F x 28cm    SURGEON:  Jeff Gibbs MD    ASSISTANTS:  None    NOTE:  There were no qualified teaching residents to assist with this case    ANESTHESIA: Choice     COMPLICATIONS:   None    ANTIBIOTICS:  Ancef    INTRAOPERATIVE THROMBOEMBOLISM PROPHYLAXIS:  Pneumatic compression stockings     RADIOLOGIC FINDINGS:    1   Retrograde pyelogram was performed on the right side using a 5 Fr open ended catheter  5 of 50% dilute contrast was injected  2  The following findings were noted: Mild hydroureteronephrosis  Mildly dilated calyces  No filling defects  Contrast drained out of the collecting system  INDICATIONS FOR PROCEDURE:  Bahman Jerez is an 60 y o  old male with a right hydronephrosis  After discussing the options, the patient elected to undergo ureteral stent placement  We discussed the procedure in detail, the alternatives, and the risks, and they signed informed consent to proceed  PROCEDURE IN DETAIL:   The patient was identified and brought to the OR  Antibiotic prophylaxis and DVT prophylaxis were administered  They were placed in the comfortable dorsal lithotomy position with care to pad all pressure points  They were prepped and draped in the usual sterile fashion using hibiclens  A surgical time out was performed with all in the room in agreement with the correct patient, procedure, indications, and laterality  A 21-[place] rigid cystoscope was used to enter the bladder  The bladder was inspected in its entirety and there were no lesions noted  The ureteral orifices were identified in their orthotopic positions  Evidence of previous TURP is present, there is some debris and small stone burden within the base the bladder    The right ureteral orifice was identified and a 5 Fr open ended catheter was placed into the ureteral orifice, cloudy urine/pus was seen to exit from the collecting system  The stone was not visible on  fluoroscopic guidance  A retrograde pyelogram was performed with injection of 50/50 Isovue which demonstrated mild to moderate hydroureteronephrosis  A solo wire was up to the kidney under fluoroscopic guidance  A 6 [place] by 28 centimeter right JJ stent was then passed up the wire  under fluoroscopic guidance into the right  kidney with a good curl noted in the kidney and in the bladder     The bladder was drained  The patient was placed back in the supine position, awakened from general anesthesia and brought to the recovery room in stable condition  SPECIMENS:   * No orders in the log *     IMPLANTS:   Implant Name Type Inv  Item Serial No   Lot No  LRB No  Used   URETERAL STENT 6 FR X 28 CM OPTIMA INLAY - [id number]  URETERAL STENT 6 FR X 28 CM OPTIMA INLAY  [place] [facility] [id number] Right 1        COMPLICATIONS:  None    DISPOSITION: PACU     PLAN:  Patient is status post ureteral stent placement, I do recommend that he be kept in hospital overnight to ensure that he does not develop a systemic inflammatory response syndrome given the cloudy urine exiting from his collecting system, he has received a dose of Ancef in the operating room today      I did speak with his wife regarding the operative events, we will have our surgical schedulers look for a subsequent date for stone surgery in roughly 4 weeks or so     I was present for the entire procedure and A qualified resident physician was not available    Patient Disposition:  PACU     SIGNATURE: Beverly Guardado MD  DATE: February 6, 2020  TIME: 2:16 PM

## 2020-02-07 ENCOUNTER — TRANSITIONAL CARE MANAGEMENT (OUTPATIENT)
Dept: FAMILY MEDICINE CLINIC | Facility: CLINIC | Age: 60
End: 2020-02-07

## 2020-02-07 VITALS
HEIGHT: 75 IN | SYSTOLIC BLOOD PRESSURE: 133 MMHG | BODY MASS INDEX: 37.3 KG/M2 | RESPIRATION RATE: 18 BRPM | OXYGEN SATURATION: 95 % | TEMPERATURE: 98.1 F | HEART RATE: 70 BPM | DIASTOLIC BLOOD PRESSURE: 63 MMHG | WEIGHT: 300 LBS

## 2020-02-07 LAB
ANION GAP SERPL CALCULATED.3IONS-SCNC: 10 MMOL/L (ref 4–13)
BASOPHILS # BLD AUTO: 0.03 THOUSANDS/ΜL (ref 0–0.1)
BASOPHILS NFR BLD AUTO: 1 % (ref 0–1)
BUN SERPL-MCNC: 14 MG/DL (ref 5–25)
CALCIUM SERPL-MCNC: 7.8 MG/DL (ref 8.3–10.1)
CHLORIDE SERPL-SCNC: 110 MMOL/L (ref 100–108)
CO2 SERPL-SCNC: 24 MMOL/L (ref 21–32)
CREAT SERPL-MCNC: 0.99 MG/DL (ref 0.6–1.3)
EOSINOPHIL # BLD AUTO: 0.16 THOUSAND/ΜL (ref 0–0.61)
EOSINOPHIL NFR BLD AUTO: 3 % (ref 0–6)
ERYTHROCYTE [DISTWIDTH] IN BLOOD BY AUTOMATED COUNT: 12.6 % (ref 11.6–15.1)
GFR SERPL CREATININE-BSD FRML MDRD: 82 ML/MIN/1.73SQ M
GLUCOSE SERPL-MCNC: 148 MG/DL (ref 65–140)
GLUCOSE SERPL-MCNC: 158 MG/DL (ref 65–140)
HCT VFR BLD AUTO: 35.7 % (ref 36.5–49.3)
HGB BLD-MCNC: 11.4 G/DL (ref 12–17)
IMM GRANULOCYTES # BLD AUTO: 0.02 THOUSAND/UL (ref 0–0.2)
IMM GRANULOCYTES NFR BLD AUTO: 0 % (ref 0–2)
LYMPHOCYTES # BLD AUTO: 1.43 THOUSANDS/ΜL (ref 0.6–4.47)
LYMPHOCYTES NFR BLD AUTO: 26 % (ref 14–44)
MCH RBC QN AUTO: 30.8 PG (ref 26.8–34.3)
MCHC RBC AUTO-ENTMCNC: 31.9 G/DL (ref 31.4–37.4)
MCV RBC AUTO: 97 FL (ref 82–98)
MONOCYTES # BLD AUTO: 0.56 THOUSAND/ΜL (ref 0.17–1.22)
MONOCYTES NFR BLD AUTO: 10 % (ref 4–12)
NEUTROPHILS # BLD AUTO: 3.28 THOUSANDS/ΜL (ref 1.85–7.62)
NEUTS SEG NFR BLD AUTO: 60 % (ref 43–75)
NRBC BLD AUTO-RTO: 0 /100 WBCS
PLATELET # BLD AUTO: 153 THOUSANDS/UL (ref 149–390)
PMV BLD AUTO: 9.3 FL (ref 8.9–12.7)
POTASSIUM SERPL-SCNC: 3.9 MMOL/L (ref 3.5–5.3)
RBC # BLD AUTO: 3.7 MILLION/UL (ref 3.88–5.62)
SODIUM SERPL-SCNC: 144 MMOL/L (ref 136–145)
WBC # BLD AUTO: 5.48 THOUSAND/UL (ref 4.31–10.16)

## 2020-02-07 PROCEDURE — 82948 REAGENT STRIP/BLOOD GLUCOSE: CPT

## 2020-02-07 PROCEDURE — 99232 SBSQ HOSP IP/OBS MODERATE 35: CPT | Performed by: UROLOGY

## 2020-02-07 PROCEDURE — 85025 COMPLETE CBC W/AUTO DIFF WBC: CPT | Performed by: UROLOGY

## 2020-02-07 PROCEDURE — 99239 HOSP IP/OBS DSCHRG MGMT >30: CPT | Performed by: INTERNAL MEDICINE

## 2020-02-07 PROCEDURE — 80048 BASIC METABOLIC PNL TOTAL CA: CPT | Performed by: UROLOGY

## 2020-02-07 RX ORDER — CEPHALEXIN 500 MG/1
500 CAPSULE ORAL EVERY 12 HOURS SCHEDULED
Qty: 6 CAPSULE | Refills: 0 | Status: SHIPPED | OUTPATIENT
Start: 2020-02-07 | End: 2020-02-11 | Stop reason: ALTCHOICE

## 2020-02-07 RX ORDER — OXYBUTYNIN CHLORIDE 5 MG/1
5 TABLET ORAL 3 TIMES DAILY
Qty: 90 TABLET | Refills: 0 | Status: SHIPPED | OUTPATIENT
Start: 2020-02-07 | End: 2020-02-26

## 2020-02-07 RX ADMIN — ATORVASTATIN CALCIUM 40 MG: 40 TABLET, FILM COATED ORAL at 08:44

## 2020-02-07 RX ADMIN — APIXABAN 5 MG: 5 TABLET, FILM COATED ORAL at 08:44

## 2020-02-07 RX ADMIN — INSULIN LISPRO 12 UNITS: 100 INJECTION, SOLUTION INTRAVENOUS; SUBCUTANEOUS at 08:55

## 2020-02-07 RX ADMIN — OXYBUTYNIN CHLORIDE 5 MG: 5 TABLET ORAL at 08:44

## 2020-02-07 RX ADMIN — METOPROLOL SUCCINATE 12.5 MG: 25 TABLET, EXTENDED RELEASE ORAL at 08:45

## 2020-02-07 RX ADMIN — LEVOTHYROXINE SODIUM 50 MCG: 50 TABLET ORAL at 05:07

## 2020-02-07 RX ADMIN — FAMOTIDINE 40 MG: 20 TABLET, FILM COATED ORAL at 08:44

## 2020-02-07 RX ADMIN — SODIUM CHLORIDE 125 ML/HR: 0.9 INJECTION, SOLUTION INTRAVENOUS at 03:43

## 2020-02-07 RX ADMIN — INSULIN GLARGINE 48 UNITS: 100 INJECTION, SOLUTION SUBCUTANEOUS at 08:54

## 2020-02-07 NOTE — DISCHARGE INSTR - AVS FIRST PAGE
Dear Desiree Barry,     It was our pleasure to care for you here at [facility], [facility]  It is our hope that we were always able to exceed the expected standards for your care during your stay  You were hospitalized due to right flank pain secondary to stone in your ureter    You were cared for on the [facility] 4th floor by Long Ayala MD under the service of Masoud Pham MD with the [facility] who covers for your primary care physician (PCP), Panchito Hurt DO, while you were hospitalized  If you have any questions or concerns related to this hospitalization, you may contact us at [phone]  For follow up as well as any medication refills, we recommend that you follow up with your primary care physician  A registered nurse will reach out to you by phone within a few days after your discharge to answer any additional questions that you may have after going home  However, at this time we provide for you here, the most important instructions / recommendations at discharge:     · Notable Medication Adjustments -   · Continue oxybutynin and Flomax that were started in the hospital   · Testing Required after Discharge -   · None  · Important follow up information -   · Follow up with PCP and urology in office in 4 weeks   · Other Instructions -   · None  · Please review this entire after visit summary as additional general instructions including medication list, appointments, activity, diet, any pertinent wound care, and other additional recommendations from your care team that may be provided for you        Sincerely,     Long Ayala MD

## 2020-02-07 NOTE — PLAN OF CARE
Problem: PAIN - ADULT  Goal: Verbalizes/displays adequate comfort level or baseline comfort level  Description  Interventions:  - Encourage patient to monitor pain and request assistance  - Assess pain using appropriate pain scale  - Administer analgesics based on type and severity of pain and evaluate response  - Implement non-pharmacological measures as appropriate and evaluate response  - Consider cultural and social influences on pain and pain management  - Notify physician/advanced practitioner if interventions unsuccessful or patient reports new pain  Outcome: Progressing     Problem: DISCHARGE PLANNING  Goal: Discharge to home or other facility with appropriate resources  Description  INTERVENTIONS:  - Identify barriers to discharge w/patient and caregiver  - Arrange for needed discharge resources and transportation as appropriate  - Identify discharge learning needs (meds, wound care, etc )  - Arrange for interpretive services to assist at discharge as needed  - Refer to Case Management Department for coordinating discharge planning if the patient needs post-hospital services based on physician/advanced practitioner order or complex needs related to functional status, cognitive ability, or social support system  Outcome: Progressing     Problem: Knowledge Deficit  Goal: Patient/family/caregiver demonstrates understanding of disease process, treatment plan, medications, and discharge instructions  Description  Complete learning assessment and assess knowledge base    Interventions:  - Provide teaching at level of understanding  - Provide teaching via preferred learning methods  Outcome: Progressing     Problem: GENITOURINARY - ADULT  Goal: Maintains or returns to baseline urinary function  Description  INTERVENTIONS:  - Assess urinary function  - Encourage oral fluids to ensure adequate hydration if ordered  - Administer IV fluids as ordered to ensure adequate hydration  - Administer ordered medications as needed  - Offer frequent toileting  - Follow urinary retention protocol if ordered  Outcome: Progressing  Goal: Absence of urinary retention  Description  INTERVENTIONS:  - Assess patients ability to void and empty bladder  - Monitor I/O  - Bladder scan as needed  - Discuss with physician/AP medications to alleviate retention as needed  - Discuss catheterization for long term situations as appropriate  Outcome: Progressing

## 2020-02-07 NOTE — DISCHARGE SUMMARY
Discharge- Waldemar Elizondo 1960, 60 y o  male MRN: [id number]    Unit/Bed#: S -01 Encounter: [id number]    Primary Care Provider: Soha Zeng DO   Date and time admitted to hospital: 2/6/2020  7:39 AM        * Ureteral calculus, right  Assessment & Plan  · Patient noted to have right ureteral nephrolithiasis measuring 3 mm with some associated mild hydroureteronephrosis  · Status post cystoscopy and lithotripsy with retrograde pyelogram and stent placement with Urology today  Appreciate urology assistance  · Will need to follow-up in their office in approximately 4 weeks for definitive stone therapy/extraction  · Continue Flomax and oxybutynin  · Patient did not use any pain medications after procedure and he reported that he did not experience any pain since then  Acute kidney injury [facility])  Assessment & Plan  · An elevated at 1 67 with baseline appears closer to 0 8-1  17  This is likely post renal secondary to obstruction from nephrolithiasis, also consideration for NSAID use as he was recently prescribed naproxen  · Patient status post cystoscopy  · Avoid nephrotoxic agents  · Back to baseline     Acute right flank pain  Assessment & Plan  · Secondary to right-sided nephrolithiasis  Supportive care as outlined principal problem    Sarcoidosis  Assessment & Plan  · Patient follows with rheumatology  Recently was tapered off his steroids and CellCept due to excessive weight gain and unwanted adverse side effects from these medications  Saw his rheumatologist in January  · Recommend continued outpatient follow-up with patient's rheumatologist    Chronic pulmonary embolism ([facility] )  Assessment & Plan  · Currently on Eliquis 5 mg b i d  Plan is to possibly stop anticoagulation next month  Recommend continued follow-up    · AC restarted today     Unspecified cirrhosis of liver ([facility] )  Assessment & Plan  · Patient has history of liver cirrhosis secondary to hepatitis-C status post treatment with Aba Smith in 2017     Follows with GI  · LFTs are within normal limits  · Patient is planned for EGD when he is off of Eliquis and March of 2020 for esophageal and gastric variceal screening  · Recommend continued outpatient follow-up    Hypothyroid  Assessment & Plan  · Continue levothyroxine    Benign prostatic hyperplasia with urinary frequency  Assessment & Plan  · Continue Flomax and oxybutynin     Type 2 diabetes mellitus without complication, without long-term current use of insulin [facility])  Assessment & Plan  Lab Results   Component Value Date    HGBA1C 7 2 (H) 11/11/2019       Recent Labs     02/06/20  1631 02/06/20  2056 02/07/20  0815   POCGLU 191* 125 148*       Blood Sugar Average: Last 72 hrs:  (P) 193 [id number] Patient follows with endocrinology  His Lantus was recently decreased to 40 units q a m  And NovoLog 12 units t i d  With meals  Continue home regimen      Discharging Resident Physician: Rashad Carrillo MD  Attending: No att  providers found  PCP: Timi Zhang DO  Admission Date: 2/6/2020  Discharge Date: 02/07/20    Disposition:     Home    Reason for Admission:  Right flank pain secondary to ureteral calculus    Consultations During Hospital Stay:  · Urology    Procedures Performed:     · Cystoscopy and lithotripsy with retrograde pyelogram and stent placement    Significant Findings / Test Results:     · Abdominal x-ray:  3 mm stone projects to the right of the L3-4 disc space along the course of the mid right ureter  · CT abdomen:  Very mild right-sided hydroureteronephrosis secondary to a 3 mm mid right ureteral calculus    Incidental Findings:   · None     Test Results Pending at Discharge (will require follow up):    · None     Outpatient Tests Requested:  · None    Complications:  None    Hospital Course:     Ines Randall is a 60 y o  male patient who originally presented to the hospital on 2/6/2020 due to right flank pain for the last couple of days that became unbearable and came to the hospital for further evaluation and management  X-ray and CT scan of the abdomen showed 5 mm stone of the right ureter with very mild right-sided hydroureteronephrosis  Urology was consulted and patient underwent cystoscopy and lithotripsy with retrograde pyelogram and stent placement on 02/06/2020 and patient was started on Flomax and oxybutynin  Patient was free of pain since the procedure and no bright red hematuria noted  An appointment with urology was scheduled in 4 weeks for the removal of the stent  Patient will be discharged with Flomax and oxybutynin which were sent to his pharmacy  Patient is medically stable to be discharged today  Condition at Discharge: stable     Discharge Day Visit / Exam:     Subjective:  Patient stated that he does not have any pain since the procedure, his urine is brown in color but no bright red urine noted  Vitals: Blood Pressure: 133/63 (02/07/20 0700)  Pulse: 70 (02/07/20 0700)  Temperature: 98 1 °F (36 7 °C) (02/07/20 0700)  Temp Source: Oral (02/07/20 0700)  Respirations: 18 (02/07/20 0700)  Height: 6' 3" (190 5 cm) (02/06/20 1319)  Weight - Scale: 136 kg (300 lb) (02/06/20 1319)  SpO2: 95 % (02/07/20 0700)  Exam:   Physical Exam   Constitutional: He is oriented to person, place, and time  He appears well-developed  No distress  HENT:   Head: Normocephalic  Mouth/Throat: Oropharynx is clear and moist  No oropharyngeal exudate  Eyes: Conjunctivae are normal  No scleral icterus  Neck: Neck supple  No JVD present  Cardiovascular: Normal rate, regular rhythm and normal heart sounds  No murmur heard  Pulmonary/Chest: Effort normal and breath sounds normal  No respiratory distress  He has no wheezes  He has no rales  Abdominal: Soft  Bowel sounds are normal  There is no tenderness  Musculoskeletal: He exhibits no edema  Neurological: He is alert and oriented to person, place, and time  Skin: Skin is warm and dry   Capillary refill takes less than 2 seconds  He is not diaphoretic  Psychiatric: He has a normal mood and affect  His behavior is normal    Nursing note and vitals reviewed  Discussion with Family:  Patient stated that he will update his family and declined the offer to update them alert by us    Discharge instructions/Information to patient and family:   See after visit summary for information provided to patient and family  Provisions for Follow-Up Care:  See after visit summary for information related to follow-up care and any pertinent home health orders  Planned Readmission:  No     Discharge Medications:  See after visit summary for reconciled discharge medications provided to patient and family        ** Please Note: This note has been constructed using a voice recognition system **

## 2020-02-07 NOTE — ASSESSMENT & PLAN NOTE
Lab Results   Component Value Date    HGBA1C 7 2 (H) 11/11/2019       Recent Labs     02/06/20  1631 02/06/20  2056 02/07/20  0815   POCGLU 191* 125 148*       Blood Sugar Average: Last 72 hrs:  (P) 797 [id number] Patient follows with endocrinology  His Lantus was recently decreased to 40 units q a m  And NovoLog 12 units t i d   With meals  Continue home regimen

## 2020-02-07 NOTE — PROGRESS NOTES
UROLOGY PROGRESS NOTE   Patient Identifiers: Elsy Florez (MRN [id number])  Date of Service: 2/7/2020        Assessment:   34-year-old gentleman postoperative day 1  Status post right ureteral stent placement, feeling much better, reviewed with him the intraoperative events, and the need for subsequent ureteroscopic stone intervention  He is grateful for our intervention, and doing well  He is cleared for discharge from a urology perspective     Plan:   Urology signing off  My office will arrange for his subsequent surgery    Please re-consult as necessary      Subjective:     24 HR EVENTS:   no significant events  Patient has  no complaints        Objective:     VITALS:    Vitals:    02/07/20 0042   BP: 120/66   Pulse: 74   Resp: 18   Temp: 98 2 °F (36 8 °C)   SpO2: 94%       INS & OUTS:    Reviewed pertinent values I's/O's      LABS:  Lab Results   Component Value Date    HGB 11 4 (L) 02/07/2020    HCT 35 7 (L) 02/07/2020    WBC 5 48 02/07/2020     02/07/2020   ]    Lab Results   Component Value Date     07/12/2017    K 4 8 02/06/2020     02/06/2020    CO2 25 02/06/2020    BUN 17 02/06/2020    CREATININE 1 67 (H) 02/06/2020    CALCIUM 8 7 02/06/2020    GLUCOSE 145 (H) 01/01/2016   ]    INPATIENT MEDS:    Current Facility-Administered Medications:     apixaban (ELIQUIS) tablet 5 mg, 5 mg, Oral, BID, Renetta Ward MD    atorvastatin (LIPITOR) tablet 40 mg, 40 mg, Oral, Daily, Renetta Ward MD, 40 mg at 02/06/20 1803    famotidine (PEPCID) tablet 40 mg, 40 mg, Oral, Daily, Renetta Ward MD, 40 mg at 02/06/20 1803    HYDROmorphone (DILAUDID) injection 0 5 mg, 0 5 mg, Intravenous, Q3H PRN, Andree Hankins MD    insulin glargine (LANTUS) subcutaneous injection 48 Units 0 48 mL, 48 Units, Subcutaneous, QAM, Renetta Ward MD    insulin lispro (HumaLOG) 100 units/mL subcutaneous injection 1-5 Units, 1-5 Units, Subcutaneous, TID AC **AND** Fingerstick Glucose (POCT), , , TID DEVIKA, Elian Michelle MD    insulin lispro (HumaLOG) 100 units/mL subcutaneous injection 1-5 Units, 1-5 Units, Subcutaneous, HS, Elian Michelle MD    insulin lispro (HumaLOG) 100 units/mL subcutaneous injection 12 Units, 12 Units, Subcutaneous, TID With Meals, Elian Michelle MD, 12 Units at 02/06/20 4169    levothyroxine tablet 50 mcg, 50 mcg, Oral, Daily, Elian Michelle MD, 50 mcg at 02/07/20 0507    metoprolol succinate (TOPROL-XL) 24 hr tablet 12 5 mg, 12 5 mg, Oral, BID, Elian Michelle MD, 12 5 mg at 02/06/20 1802    ondansetron (ZOFRAN) injection 4 mg, 4 mg, Intravenous, Q6H PRN, Irish Edward MD    oxybutynin [facility]) tablet 5 mg, 5 mg, Oral, TID, ROSA MARIA Valencia, 5 mg at 02/06/20 2216    oxyCODONE (ROXICODONE) immediate release tablet 10 mg, 10 mg, Oral, Q4H PRN, Irish Edward MD    oxyCODONE (ROXICODONE) IR tablet 5 mg, 5 mg, Oral, Q4H PRN, Irish Edward MD, 5 mg at 02/06/20 1157    sodium chloride 0 9 % infusion, 125 mL/hr, Intravenous, Continuous, Irish Edward MD, Last Rate: 125 mL/hr at 02/07/20 0343, 125 mL/hr at 02/07/20 0343    tamsulosin (FLOMAX) capsule 0 4 mg, 0 4 mg, Oral, Daily With Valeria Garay MD, 0 4 mg at 02/06/20 1221      Physical Exam:   /66 (BP Location: Right arm)   Pulse 74   Temp 98 2 °F (36 8 °C) (Oral)   Resp 18   Ht 6' 3" (1 905 m)   Wt 136 kg (300 lb)   SpO2 94%   BMI 37 50 kg/m²   GEN: alert and oriented x 3    RESP: breathing comfortably with no accessory muscle use    CARDIAC: peripheral pulses present and regular rate and rhythm    ABD: soft, non-tender, non-distended   INCISION: None   EXT: no significant peripheral edema   DRAINS: none  NEURO: cranial nerves 2-12 intact, no sensory deficits, no motor deficits and the remainder of the neurological examination is unremarkable   PSYCHIATRIC: normal mood and affect and normal train of thought    GENITOURINARY:no bladder tenderness            ORTIZ: none        RADIOLOGY:   No new films for my review

## 2020-02-07 NOTE — UTILIZATION REVIEW
Initial Clinical Review    Admission: Date/Time/Statement: Admission Orders (From admission, onward)     Ordered        02/06/20 1050  Inpatient Admission (expected length of stay for this patient Order details is greater than two midnights)  Once         02/06/20 1042  Place in Observation (expected length of stay for this patient is less than two midnights)  Once,   Status:  Canceled                   Orders Placed This Encounter   Procedures    Inpatient Admission (expected length of stay for this patient Order details is greater than two midnights)     Standing Status:   Standing     Number of Occurrences:   1     Order Specific Question:   Admitting Physician     Answer:   Marely Magaña [[id number]]     Order Specific Question:   Level of Care     Answer:   Med Surg [[id number]]     Order Specific Question:   Estimated length of stay     Answer:   More than 2 Midnights     Order Specific Question:   Certification     Answer:   I certify that inpatient services are medically necessary for this patient for a duration of greater than two midnights  See H&P and MD Progress Notes for additional information about the patient's course of treatment  ED Arrival Information     Expected Arrival Acuity Means of Arrival Escorted By Service Admission Type    - 2/6/2020 07:34 Urgent Walk-In Self General Medicine Urgent    Arrival Complaint    flank pain        Chief Complaint   Patient presents with    Flank Pain     pt states was seen on Tues at [facility] has a kidney stone was given Percocet and Zofran states it not helping     Assessment/Plan: 61 yo male to ED from home pmh sarcoidosis ,chronic pe on eliquis, cirrhosis liver, hypothyroid ,dm 2,with c/o abdominal flank pain  [facility] with kidney stone d/c with percocet and zofran  Pt states it did not help so came to [facility]  In ED abdominal pain right flank sharp stabbing radiates to suprapubic region  Abdomen with tenderness right CVA   ROSE creatinine elevated 1 67 baseline 0 8-1  17  Likely post renal 2/2 obstruction from nephrolithiasis,also nsaid use  Per urology failed attempts to pass his stone and decision for surgery to proceed with cystoscoopy with ureteroscopy and laser lithotripsy with stent placement  Admitted inpatient initiate aggressive IVF, flomax ,analgesia narcotics  Anti emetics , atbx while awaiting cultures  Strain urine NPO for OR if no expulsion achieved  OR  Procedure(s) (LRB):  CYSTOSCOPY WITH LITHOTRIPSY RETROGRADE PYELOGRAM AND INSERTION STENT URETERAL (Right)  Operative Findings:   Previous TURP defect is present, debris and small stones present within the bladder, these were irrigated free  Upon wire placement into the right collecting system, copious cloudy urine along with pus exited from the collecting system, as such decision was made to place only a stent, a 6 [place] by 28 centimeter right ureteral stent was placed without issue in the bladder was drained    The patient will require subsequent stone treatment in 3-4 weeks  ED Triage Vitals   Temperature Pulse Respirations Blood Pressure SpO2   02/06/20 0741 02/06/20 0741 02/06/20 0741 02/06/20 0741 02/06/20 0741   98 1 °F (36 7 °C) 99 18 (!) 186/96 96 %      Temp Source Heart Rate Source Patient Position - Orthostatic VS BP Location FiO2 (%)   02/06/20 0741 02/06/20 1101 02/06/20 1101 02/06/20 1101 --   Oral Monitor Lying Right arm       Pain Score       02/06/20 0741       9        Wt Readings from Last 1 Encounters:   02/06/20 136 kg (300 lb)     Additional Vital Signs:   02/06/20 1450  97 5 °F (36 4 °C)  93  18  130/59    95 %  None (Room air)  X     02/06/20 1430    112Abnormal   19  141/76    98 %  Simple mask       02/06/20 1423  97 7 °F (36 5 °C)  112Abnormal   20  127/77    98 %  Simple mask  X     02/06/20 1319  98 5 °F (36 9 °C)  84  18  154/74    94 %  None (Room air)       02/06/20 1130              None (Room air)       02/06/20 1115  98 4 °F (36 9 °C)  89  18  142/96 106  97 %  None          Pertinent Labs/Diagnostic Test Results:   FL retrograde pyelogram  2/6 kub 5 mm stone projects to the right of the L3-4 disc space along the course of the mid right ureter   This corresponds to the abnormality seen on recent CT   2/4 ct abdomen Very mild right-sided hydroureteronephrosis secondary to a 3 mm mid right ureteral calculus  2   Stable epicardial and paraesophageal lymph nodes  3   Small hiatal hernia  4   Diverticulosis coli    Results from last 7 days   Lab Units 02/07/20  0624 02/06/20  0819 02/04/20  1013   WBC Thousand/uL 5 48 7 82 7 30   HEMOGLOBIN g/dL 11 4* 13 8 14 2   HEMATOCRIT % 35 7* 41 7 43 1   PLATELETS Thousands/uL 153 188 221   NEUTROS ABS Thousands/µL 3 28 6 18 4 49     Results from last 7 days   Lab Units 02/07/20  0624 02/06/20  0816 02/04/20  1013   SODIUM mmol/L 144 141 140   POTASSIUM mmol/L 3 9 4 8 3 6   CHLORIDE mmol/L 110* 103 110*   CO2 mmol/L 24 25 25   ANION GAP mmol/L 10 13 5   BUN mg/dL 14 17 17   CREATININE mg/dL 0 99 1 67* 1 09   EGFR ml/min/1 73sq m 82 44 73   CALCIUM mg/dL 7 8* 8 7 8 9     Results from last 7 days   Lab Units 02/04/20  1013   AST U/L 20   ALT U/L 30   ALK PHOS U/L 59   TOTAL PROTEIN g/dL 7 5   ALBUMIN g/dL 3 8   TOTAL BILIRUBIN mg/dL 0 47     Results from last 7 days   Lab Units 02/07/20  0815 02/06/20  2056 02/06/20  1631   POC GLUCOSE mg/dl 148* 125 191*     Results from last 7 days   Lab Units 02/07/20  0624 02/06/20  0816 02/04/20  1013   GLUCOSE RANDOM mg/dL 158* 241* 154*      Results from last 7 days   Lab Units 02/04/20  1013   LIPASE u/L 140     Results from last 7 days   Lab Units 02/06/20  0752 02/04/20  1023   CLARITY UA  Clear Clear   COLOR UA  Yellow Brown   SPEC GRAV UA  >=1 030 >=1 030   PH UA  5 5 5 5   GLUCOSE UA mg/dl 500 (1/2%)* Negative   KETONES UA mg/dl >=160 (4+)* Trace*   BLOOD UA  Moderate* Large*   PROTEIN UA mg/dl 30 (1+)* 100 (2+)*   NITRITE UA  Negative Negative   BILIRUBIN UA  Interference- unable to analyze* Interference- unable to analyze*   UROBILINOGEN UA E U /dl 0 2 0 2   LEUKOCYTES UA  Negative Negative   WBC UA /hpf 2-4* None Seen   RBC UA /hpf 10-20* Innumerable*   BACTERIA UA /hpf Occasional None Seen   EPITHELIAL CELLS WET PREP /hpf Occasional Occasional   MUCUS THREADS  Moderate* Occasional*     ED Treatment:   Medication Administration from 02/06/2020 0734 to 02/06/2020 1134       Date/Time Order Dose Route Action Action by Comments     02/06/2020 1001 sodium chloride 0 9 % bolus 1,000 mL 0 mL Intravenous Stopped Reed Mancilla RN      02/06/2020 4410 sodium chloride 0 9 % bolus 1,000 mL 1,000 mL Intravenous New Bag Tim Mendosa RN      02/06/2020 0456 ondansetron (ZOFRAN) injection 4 mg 4 mg Intravenous Given Tim Mendosa RN      02/06/2020 2101 ketorolac (TORADOL) injection 15 mg 15 mg Intravenous Given Tim Mendosa RN      02/06/2020 4159 fentanyl citrate (PF) 100 MCG/2ML 50 mcg 50 mcg Intravenous Given Tim Mendosa RN      02/06/2020 1100 fentanyl citrate (PF) 100 MCG/2ML 50 mcg 50 mcg Intravenous Given Reed Mancilla RN      02/06/2020 1059 sodium chloride 0 9 % bolus 1,000 mL 1,000 mL Intravenous New Bag Reed Mancilla RN         Past Medical History:   Diagnosis Date    BPH (benign prostatic hyperplasia)     Diabetes mellitus ([facility] [address] )     Erectile dysfunction     GERD (gastroesophageal reflux disease)     Headache(784 0) 05/01/2018    Each Day    Hematuria     Infectious viral hepatitis 2007    Cured by Dr Severo Lard Nils Amaryllis Root Liver disease     hepatitis C    Mediastinal adenopathy     Obesity 2007    Vitamin D deficiency      Present on Admission:   Acute right flank pain   Ureteral calculus, right   Acute kidney injury ([address] )   Benign prostatic hyperplasia with urinary frequency   Hypothyroid   Type 2 diabetes mellitus without complication, without long-term current use of insulin ([address] )   Sarcoidosis   Unspecified cirrhosis of liver (HCC)   Chronic pulmonary embolism (HCC)      Admitting Diagnosis: Flank pain [R10 9]  Ureteral calculus, right [N20 1]  Acute kidney injury ([address] ) [N17 9]  Acute right flank pain [R10 9]  Age/Sex: 60 y o  male  Admission Orders:  INPATIENT  NPO  STRAIN URINE  OR   IS  Scheduled Medications:    Medications:  apixaban 5 mg Oral BID   atorvastatin 40 mg Oral Daily   famotidine 40 mg Oral Daily   insulin glargine 48 Units Subcutaneous QAM   insulin lispro 1-5 Units Subcutaneous TID AC   insulin lispro 1-5 Units Subcutaneous HS   insulin lispro 12 Units Subcutaneous TID With Meals   levothyroxine 50 mcg Oral Daily   metoprolol succinate 12 5 mg Oral BID   oxybutynin 5 mg Oral TID   tamsulosin 0 4 mg Oral Daily With Dinner     Continuous IV Infusions:     PRN Meds:    HYDROmorphone 0 5 mg Intravenous Q3H PRN   ondansetron 4 mg Intravenous Q6H PRN   oxyCODONE 10 mg Oral Q4H PRN   oxyCODONE 5 mg Oral Q4H PRN       IP CONSULT TO UROLOGY    Network Utilization Review Department  [email or web address]  ATTENTION: Please call with any questions or concerns to [phone] and carefully listen to the prompts so that you are directed to the right person  All voicemails are confidential   Ben Caicedo all requests for admission clinical reviews, approved or denied determinations and any other requests to dedicated fax number below belonging to the campus where the patient is receiving treatment   List of dedicated fax numbers for the Facilities:  FACILITY NAME UR FAX NUMBER   ADMISSION DENIALS (Administrative/Medical Necessity) [phone]   [address] (Maternity/NICU/Pediatrics) [phone]   [place] Notice [phone]   Alis Llamas [phone]   [facility] [phone]   [facility] [address] [address] [address] [address] [place] [phone]   [address] [phone]

## 2020-02-07 NOTE — ASSESSMENT & PLAN NOTE
· Currently on Eliquis 5 mg b i d  Plan is to possibly stop anticoagulation next month  Recommend continued follow-up    · AC restarted today

## 2020-02-07 NOTE — ASSESSMENT & PLAN NOTE
· An elevated at 1 67 with baseline appears closer to 0 8-1  17  This is likely post renal secondary to obstruction from nephrolithiasis, also consideration for NSAID use as he was recently prescribed naproxen  · Patient status post cystoscopy    · Avoid nephrotoxic agents  · Back to baseline

## 2020-02-07 NOTE — ASSESSMENT & PLAN NOTE
· Patient noted to have right ureteral nephrolithiasis measuring 3 mm with some associated mild hydroureteronephrosis  · Status post cystoscopy and lithotripsy with retrograde pyelogram and stent placement with Urology today  Appreciate urology assistance  · Will need to follow-up in their office in approximately 4 weeks for definitive stone therapy/extraction  · Continue Flomax and oxybutynin  · Patient did not use any pain medications after procedure and he reported that he did not experience any pain since then

## 2020-02-11 ENCOUNTER — APPOINTMENT (OUTPATIENT)
Dept: LAB | Facility: CLINIC | Age: 60
End: 2020-02-11
Payer: COMMERCIAL

## 2020-02-11 ENCOUNTER — HOSPITAL ENCOUNTER (OUTPATIENT)
Dept: ULTRASOUND IMAGING | Facility: HOSPITAL | Age: 60
Discharge: HOME | End: 2020-02-11
Attending: FAMILY MEDICINE
Payer: COMMERCIAL

## 2020-02-11 ENCOUNTER — TELEPHONE (OUTPATIENT)
Dept: OTHER | Facility: OTHER | Age: 60
End: 2020-02-11

## 2020-02-11 ENCOUNTER — OFFICE VISIT (OUTPATIENT)
Dept: FAMILY MEDICINE CLINIC | Facility: CLINIC | Age: 60
End: 2020-02-11
Payer: COMMERCIAL

## 2020-02-11 ENCOUNTER — TRANSCRIBE ORDERS (OUTPATIENT)
Dept: LAB | Facility: CLINIC | Age: 60
End: 2020-02-11

## 2020-02-11 VITALS
HEART RATE: 109 BPM | OXYGEN SATURATION: 97 % | DIASTOLIC BLOOD PRESSURE: 76 MMHG | TEMPERATURE: 98.6 F | HEIGHT: 75 IN | SYSTOLIC BLOOD PRESSURE: 140 MMHG | WEIGHT: 293.3 LBS | BODY MASS INDEX: 36.47 KG/M2 | RESPIRATION RATE: 20 BRPM

## 2020-02-11 DIAGNOSIS — K80.20 CALCULUS OF GALLBLADDER WITHOUT CHOLECYSTITIS WITHOUT OBSTRUCTION: ICD-10-CM

## 2020-02-11 DIAGNOSIS — N20.0 NEPHROLITHIASIS: ICD-10-CM

## 2020-02-11 DIAGNOSIS — K80.20 CALCULUS OF GALLBLADDER WITHOUT CHOLECYSTITIS WITHOUT OBSTRUCTION: Primary | ICD-10-CM

## 2020-02-11 LAB
ALBUMIN SERPL BCP-MCNC: 3.7 G/DL (ref 3.5–5)
ALP SERPL-CCNC: 66 U/L (ref 46–116)
ALT SERPL W P-5'-P-CCNC: 24 U/L (ref 12–78)
ANION GAP SERPL CALCULATED.3IONS-SCNC: 11 MMOL/L (ref 4–13)
AST SERPL W P-5'-P-CCNC: 22 U/L (ref 5–45)
BILIRUB SERPL-MCNC: 0.46 MG/DL (ref 0.2–1)
BUN SERPL-MCNC: 12 MG/DL (ref 5–25)
CALCIUM SERPL-MCNC: 9.6 MG/DL (ref 8.3–10.1)
CHLORIDE SERPL-SCNC: 106 MMOL/L (ref 100–108)
CO2 SERPL-SCNC: 27 MMOL/L (ref 21–32)
CREAT SERPL-MCNC: 1.06 MG/DL (ref 0.6–1.3)
ERYTHROCYTE [DISTWIDTH] IN BLOOD BY AUTOMATED COUNT: 12.4 % (ref 11.6–15.1)
GFR SERPL CREATININE-BSD FRML MDRD: 76 ML/MIN/1.73SQ M
GLUCOSE SERPL-MCNC: 166 MG/DL (ref 65–140)
HCT VFR BLD AUTO: 46.1 % (ref 36.5–49.3)
HGB BLD-MCNC: 15.1 G/DL (ref 12–17)
LIPASE SERPL-CCNC: 119 U/L (ref 73–393)
MCH RBC QN AUTO: 30.6 PG (ref 26.8–34.3)
MCHC RBC AUTO-ENTMCNC: 32.8 G/DL (ref 31.4–37.4)
MCV RBC AUTO: 93 FL (ref 82–98)
PLATELET # BLD AUTO: 250 THOUSANDS/UL (ref 149–390)
PMV BLD AUTO: 9.3 FL (ref 8.9–12.7)
POTASSIUM SERPL-SCNC: 4.2 MMOL/L (ref 3.5–5.3)
PROT SERPL-MCNC: 7.7 G/DL (ref 6.4–8.2)
RBC # BLD AUTO: 4.94 MILLION/UL (ref 3.88–5.62)
SODIUM SERPL-SCNC: 144 MMOL/L (ref 136–145)
WBC # BLD AUTO: 6.25 THOUSAND/UL (ref 4.31–10.16)

## 2020-02-11 PROCEDURE — 36415 COLL VENOUS BLD VENIPUNCTURE: CPT

## 2020-02-11 PROCEDURE — 83690 ASSAY OF LIPASE: CPT

## 2020-02-11 PROCEDURE — 99496 TRANSJ CARE MGMT HIGH F2F 7D: CPT | Performed by: FAMILY MEDICINE

## 2020-02-11 PROCEDURE — 80053 COMPREHEN METABOLIC PANEL: CPT

## 2020-02-11 PROCEDURE — 85027 COMPLETE CBC AUTOMATED: CPT

## 2020-02-11 PROCEDURE — 1111F DSCHRG MED/CURRENT MED MERGE: CPT | Performed by: FAMILY MEDICINE

## 2020-02-11 PROCEDURE — 76705 ECHO EXAM OF ABDOMEN: CPT

## 2020-02-11 RX ORDER — TAMSULOSIN HYDROCHLORIDE 0.4 MG/1
0.4 CAPSULE ORAL
Qty: 30 CAPSULE | Refills: 1 | Status: SHIPPED | OUTPATIENT
Start: 2020-02-11 | End: 2020-10-27 | Stop reason: ALTCHOICE

## 2020-02-11 NOTE — PROGRESS NOTES
Assessment/Plan:    No problem-specific Assessment & Plan notes found for this encounter  Diagnoses and all orders for this visit:    Calculus of gallbladder without cholecystitis without obstruction   Pt has known GB stones with ? Of plan for surgery prior to kidney stone issue   Today, pt's pain is somewhat different than in the office  This could represent the stone moving, but it is fairly anterior on exam   Need to r/o acute GB   Check stat labs and US GB/kidney     -     US abdomen limited; Future  -     CBC; Future  -     Comprehensive metabolic panel; Future  -     Lipase; Future  -     US abdomen limited; Future    Nephrolithiasis   Worsening hematuria and pain from hospitalization   On a/c for hx of PE   Check stat labs including hgb   Restart flomax as pt hasn't passed stone   F/u with urology--given ongoing problems, would like him seen sooner than 4 weeks  -     tamsulosin (FLOMAX) 0 4 mg; Take 1 capsule (0 4 mg total) by mouth daily with dinner        Subjective:      Patient ID: Jonathan Flores is a 60 y o  male  HPI  TCM Call (since 1/11/2020)     Date and time call was made  2/7/2020  4:41 PM    Hospital care reviewed  Records reviewed    Patient was hospitialized at  [address] [place] [address]    Date of Admission  02/06/20    Date of discharge  02/07/20    Diagnosis  Ureteral calculus, right    Disposition  Home    Were the patients medications reviewed and updated  Yes    Current Symptoms  None      TCM Call (since 1/11/2020)     Post hospital issues  None    Should patient be enrolled in anticoag monitoring?   No    Patients specialists  Urologist    Did you obtain your prescribed medications  Yes    Do you need help managing your prescriptions or medications  No    Is transportation to your appointment needed  No    I have advised the patient to call PCP with any new or worsening symptoms  Rowan Zayas MA     Living Arrangements  Spouse or Significiant other; [address] [place] [address] Family    The type of support provided  Physical; Emotional    Do you have social support  Yes, as much as I need    Are you recieving any outpatient services  No    Are you recieving home care services  No    Are you using any community resources  No        Pt presents s/p hospitalization 2/6-2/7 for nephrolithiasis with R ureteral hydronephrosis  He had failed home/conservative mgmt  Had worsening pain and was admitted  uro was consulted and did lithotripsy, cysto  Stone wasn't able to be retrieval wasn't possible  Stent was placed, and pt was d/c'd with instructions to f/u with uro in 4 weeks  Since hospital d/c, pt has had some discomfort in R upper abd/flank  No back pain  Doesn't radiate to groin  Feels dull rather than the sharpness with the stone he initially felt  While inpt, pt had ROSE due to obstruction  This resolved by the time he was d/c'd  He was d/c'd with instructions to continue flomax/oxybutinin  He ran out of both  He has also noticed that his hematuria got better and is now worsening  Isn't passing clots  On eliquis d/t hx of PE in the fall    Pt has hx of gallstones  No evidence of cholecystits  This was an incidental finding on US f/u for cirrhosis  He has not had pain related to this issue in the past, though he had contact with a surgeon who had wanted to operate  The following portions of the patient's history were reviewed and updated as appropriate: allergies, current medications, past family history, past medical history, past social history, past surgical history and problem list     Review of Systems   Constitutional: Negative for chills, fatigue, fever and unexpected weight change  HENT: Negative for congestion, ear pain, hearing loss, postnasal drip, rhinorrhea, sinus pressure, sinus pain, sore throat, trouble swallowing and voice change  Eyes: Negative for pain, redness and visual disturbance  Respiratory: Negative for cough and shortness of breath  Cardiovascular: Negative for chest pain, palpitations and leg swelling  Gastrointestinal: Positive for abdominal pain  Negative for constipation, diarrhea and nausea  Endocrine: Negative for cold intolerance, heat intolerance, polydipsia and polyuria  Genitourinary: Positive for hematuria  Negative for dysuria, frequency and urgency  Musculoskeletal: Negative for arthralgias, joint swelling and myalgias  Skin: Negative for rash  No suspicious lesions   Neurological: Negative for weakness, numbness and headaches  Hematological: Negative for adenopathy  Objective: There were no vitals taken for this visit  Physical Exam   Constitutional: He is oriented to person, place, and time  He appears well-developed and well-nourished  No distress  HENT:   Head: Normocephalic and atraumatic  Right Ear: Tympanic membrane, external ear and ear canal normal    Left Ear: Tympanic membrane, external ear and ear canal normal    Nose: Nose normal    Mouth/Throat: Oropharynx is clear and moist and mucous membranes are normal  No oropharyngeal exudate  Eyes: Pupils are equal, round, and reactive to light  Conjunctivae and EOM are normal    Neck: No JVD present  Carotid bruit is not present  No thyromegaly present  Cardiovascular: Regular rhythm, S1 normal and S2 normal  Exam reveals no gallop, no S3, no S4 and no friction rub  No murmur heard  Pulmonary/Chest: Effort normal and breath sounds normal  He has no wheezes  He has no rhonchi  He has no rales  Abdominal: Soft  Bowel sounds are normal  He exhibits no distension  There is tenderness in the right upper quadrant  There is no rigidity, no rebound, no guarding and no CVA tenderness  Lymphadenopathy:     He has no cervical adenopathy  Neurological: He is alert and oriented to person, place, and time  He has normal strength and normal reflexes  No cranial nerve deficit or sensory deficit

## 2020-02-11 NOTE — TELEPHONE ENCOUNTER
Maik from [address] [place] [address] Radiology/ [phone]/ PT: Raghu Mancini/: 1960/ Stat Ultrasound Results/PT is waiting   Thanks

## 2020-02-12 ENCOUNTER — TELEPHONE (OUTPATIENT)
Dept: UROLOGY | Facility: MEDICAL CENTER | Age: 60
End: 2020-02-12

## 2020-02-12 NOTE — TELEPHONE ENCOUNTER
Called and spoke with Mary Ann from [address] [place] [address]  Advised the office note from yesterday was not finalized  She reports patient was complaining of continued flank pain and had microscopic hematuria  Informed Mary Ann this is normal with a stent in place  He is being scheduled for a repeat procedure with our office  I will contact patient and reassure him  Mary Ann verbalized understanding  Called and spoke with patient at this time  Informed himhis family doctor office had reached out to urology and we wanted to give him a call  He reports he is having continue flank pain  Advised this is normal with a stent in  Reviewed stent discomfort measures  Also reviewed expected follow up  Dr Charles Caputo recommended repeat procedure in about 4 weeks  One of our surgery schedulers will be in contact, additional office visit not required  Patient verbalized understanding and had no further questions

## 2020-02-12 NOTE — PROGRESS NOTES
Physical Exam   Constitutional: He is oriented to person, place, and time  He appears well-developed and well-nourished  No distress  HENT:   Head: Normocephalic and atraumatic  Right Ear: Tympanic membrane, external ear and ear canal normal    Left Ear: Tympanic membrane, external ear and ear canal normal    Nose: Nose normal    Mouth/Throat: Oropharynx is clear and moist and mucous membranes are normal  No oropharyngeal exudate  Eyes: Pupils are equal, round, and reactive to light  Conjunctivae and EOM are normal    Neck: No JVD present  Carotid bruit is not present  No thyromegaly present  Cardiovascular: Regular rhythm, S1 normal and S2 normal  Exam reveals no gallop, no S3, no S4 and no friction rub  No murmur heard  Pulmonary/Chest: Effort normal and breath sounds normal  He has no wheezes  He has no rhonchi  He has no rales  Abdominal: Soft  Bowel sounds are normal  He exhibits no distension  There is tenderness in the right upper quadrant  There is no rigidity, no rebound, no guarding and no CVA tenderness  Lymphadenopathy:     He has no cervical adenopathy  Neurological: He is alert and oriented to person, place, and time  He has normal strength and normal reflexes  No cranial nerve deficit or sensory deficit

## 2020-02-12 NOTE — PROGRESS NOTES
Assessment/Plan:    No problem-specific Assessment & Plan notes found for this encounter  {Assess/PlanSmartLinks:18427}      Subjective:      Patient ID: Otto Knox is a 60 y o  male  HPI  TCM Call (since 1/11/2020)     Date and time call was made  2/7/2020  4:41 PM    Hospital care reviewed  Records reviewed    Patient was hospitialized at  [address] [place] [address]    Date of Admission  02/06/20    Date of discharge  02/07/20    Diagnosis  Ureteral calculus, right    Disposition  Home    Were the patients medications reviewed and updated  Yes    Current Symptoms  None      TCM Call (since 1/11/2020)     Post hospital issues  None    Should patient be enrolled in anticoag monitoring?   No    Patients specialists  Urologist    Did you obtain your prescribed medications  Yes    Do you need help managing your prescriptions or medications  No    Is transportation to your appointment needed  No    I have advised the patient to call PCP with any new or worsening symptoms  Naomie Ferrera MA     Living Arrangements  Spouse or Significiant other; Friends    Support System  Family    The type of support provided  Physical; Emotional    Do you have social support  Yes, as much as I need    Are you recieving any outpatient services  No    Are you recieving home care services  No    Are you using any community resources  No          {Common ambulatory SmartLinks:16226}    Review of Systems      Objective:      /76   Pulse (!) 109   Temp 98 6 °F (37 °C)   Resp 20   Ht 6' 3" (1 905 m)   Wt 133 kg (293 lb 4 8 oz)   SpO2 97%   BMI 36 66 kg/m²          Physical Exam

## 2020-02-12 NOTE — TELEPHONE ENCOUNTER
Pt managed by Mary Ann [facility] called asking for appointment sooner than later for pt who was seen 02/11/20 by Emiliana Simon please review office note,labs and u/s results

## 2020-02-12 NOTE — PROGRESS NOTES
Assessment/Plan:     No problem-specific Assessment & Plan notes found for this encounter          Diagnoses and all orders for this visit:     Calculus of gallbladder without cholecystitis without obstruction              Pt has known GB stones with ? Of plan for surgery prior to kidney stone issue              Today, pt's pain is somewhat different than in the office  This could represent the stone moving, but it is fairly anterior on exam              Need to r/o acute GB              Check stat labs and US GB/kidney                -     US abdomen limited; Future  -     CBC; Future  -     Comprehensive metabolic panel; Future  -     Lipase; Future  -     US abdomen limited; Future     Nephrolithiasis              Worsening hematuria and pain from hospitalization              On a/c for hx of PE              Check stat labs including hgb              Restart flomax as pt hasn't passed stone              F/u with urology--given ongoing problems, would like him seen sooner than 4 weeks  -     tamsulosin (FLOMAX) 0 4 mg; Take 1 capsule (0 4 mg total) by mouth daily with dinner         Subjective:       Patient ID: Tiago Bryant is a 60 y o  male      HPI       TCM Call (since 1/11/2020)      Date and time call was made  2/7/2020  4:41 PM     Hospital care reviewed  Records reviewed     Patient was hospitialized at  [address] [place] [address]     Date of Admission  02/06/20     Date of discharge  02/07/20     Diagnosis  Ureteral calculus, right     Disposition  Home     Were the patients medications reviewed and updated  Yes     Current Symptoms  None                 TCM Call (since 1/11/2020)      Post hospital issues  None     Should patient be enrolled in anticoag monitoring?   No     Patients specialists  Urologist     Did you obtain your prescribed medications  Yes     Do you need help managing your prescriptions or medications  No     Is transportation to your appointment needed  No     I have advised the patient to call PCP with any new or worsening symptoms  Lori Koenig MA      Living Arrangements  Spouse or Significiant other; Friends     Support System  Family     The type of support provided  Physical; Emotional     Do you have social support  Yes, as much as I need     Are you recieving any outpatient services  No     Are you recieving home care services  No     Are you using any community resources  No          Pt presents s/p hospitalization 2/6-2/7 for nephrolithiasis with R ureteral hydronephrosis  He had failed home/conservative mgmt  Had worsening pain and was admitted  uro was consulted and did lithotripsy, cysto  Stone wasn't able to be retrieval wasn't possible  Stent was placed, and pt was d/c'd with instructions to f/u with uro in 4 weeks  Since hospital d/c, pt has had some discomfort in R upper abd/flank  No back pain  Doesn't radiate to groin  Feels dull rather than the sharpness with the stone he initially felt  While inpt, pt had ROSE due to obstruction  This resolved by the time he was d/c'd  He was d/c'd with instructions to continue flomax/oxybutinin  He ran out of both  He has also noticed that his hematuria got better and is now worsening  Isn't passing clots  On eliquis d/t hx of PE in the fall     Pt has hx of gallstones  No evidence of cholecystits  This was an incidental finding on US f/u for cirrhosis  He has not had pain related to this issue in the past, though he had contact with a surgeon who had wanted to operate         The following portions of the patient's history were reviewed and updated as appropriate: allergies, current medications, past family history, past medical history, past social history, past surgical history and problem list      Review of Systems   Constitutional: Negative for chills, fatigue, fever and unexpected weight change     HENT: Negative for congestion, ear pain, hearing loss, postnasal drip, rhinorrhea, sinus pressure, sinus pain, sore throat, trouble swallowing and voice change  Eyes: Negative for pain, redness and visual disturbance  Respiratory: Negative for cough and shortness of breath  Cardiovascular: Negative for chest pain, palpitations and leg swelling  Gastrointestinal: Positive for abdominal pain  Negative for constipation, diarrhea and nausea  Endocrine: Negative for cold intolerance, heat intolerance, polydipsia and polyuria  Genitourinary: Positive for hematuria  Negative for dysuria, frequency and urgency  Musculoskeletal: Negative for arthralgias, joint swelling and myalgias  Skin: Negative for rash  No suspicious lesions   Neurological: Negative for weakness, numbness and headaches  Hematological: Negative for adenopathy           Objective:        There were no vitals taken for this visit             Physical Exam   Constitutional: He is oriented to person, place, and time  He appears well-developed and well-nourished  No distress  HENT:   Head: Normocephalic and atraumatic  Right Ear: Tympanic membrane, external ear and ear canal normal    Left Ear: Tympanic membrane, external ear and ear canal normal    Nose: Nose normal    Mouth/Throat: Oropharynx is clear and moist and mucous membranes are normal  No oropharyngeal exudate  Eyes: Pupils are equal, round, and reactive to light  Conjunctivae and EOM are normal    Neck: No JVD present  Carotid bruit is not present  No thyromegaly present  Cardiovascular: Regular rhythm, S1 normal and S2 normal  Exam reveals no gallop, no S3, no S4 and no friction rub  No murmur heard  Pulmonary/Chest: Effort normal and breath sounds normal  He has no wheezes  He has no rhonchi  He has no rales  Abdominal: Soft  Bowel sounds are normal  He exhibits no distension  There is tenderness in the right upper quadrant  There is no rigidity, no rebound, no guarding and no CVA tenderness  Lymphadenopathy:     He has no cervical adenopathy     Neurological: He is alert and oriented to person, place, and time  He has normal strength and normal reflexes  No cranial nerve deficit or sensory deficit

## 2020-02-18 ENCOUNTER — APPOINTMENT (EMERGENCY)
Dept: CT IMAGING | Facility: HOSPITAL | Age: 60
DRG: 690 | End: 2020-02-18
Payer: COMMERCIAL

## 2020-02-18 ENCOUNTER — TELEPHONE (OUTPATIENT)
Dept: UROLOGY | Facility: CLINIC | Age: 60
End: 2020-02-18

## 2020-02-18 ENCOUNTER — HOSPITAL ENCOUNTER (INPATIENT)
Facility: HOSPITAL | Age: 60
LOS: 2 days | Discharge: HOME | DRG: 690 | End: 2020-02-20
Attending: EMERGENCY MEDICINE | Admitting: INTERNAL MEDICINE
Payer: COMMERCIAL

## 2020-02-18 DIAGNOSIS — N39.0 UTI (URINARY TRACT INFECTION): Primary | ICD-10-CM

## 2020-02-18 PROBLEM — N12 PYELONEPHRITIS: Status: ACTIVE | Noted: 2020-02-18

## 2020-02-18 PROBLEM — E11.9 DIABETES (HCC): Status: ACTIVE | Noted: 2020-02-18

## 2020-02-18 LAB
ALBUMIN SERPL BCP-MCNC: 3.6 G/DL (ref 3.5–5)
ALP SERPL-CCNC: 71 U/L (ref 46–116)
ALT SERPL W P-5'-P-CCNC: 21 U/L (ref 12–78)
ANION GAP SERPL CALCULATED.3IONS-SCNC: 10 MMOL/L (ref 4–13)
AST SERPL W P-5'-P-CCNC: 14 U/L (ref 5–45)
BACTERIA UR QL AUTO: ABNORMAL /HPF
BASOPHILS # BLD AUTO: 0.05 THOUSANDS/ΜL (ref 0–0.1)
BASOPHILS NFR BLD AUTO: 1 % (ref 0–1)
BILIRUB SERPL-MCNC: 0.42 MG/DL (ref 0.2–1)
BILIRUB UR QL STRIP: ABNORMAL
BUN SERPL-MCNC: 19 MG/DL (ref 5–25)
CALCIUM SERPL-MCNC: 8.8 MG/DL (ref 8.3–10.1)
CHLORIDE SERPL-SCNC: 102 MMOL/L (ref 100–108)
CLARITY UR: ABNORMAL
CO2 SERPL-SCNC: 24 MMOL/L (ref 21–32)
COLOR UR: ABNORMAL
CREAT SERPL-MCNC: 1.13 MG/DL (ref 0.6–1.3)
EOSINOPHIL # BLD AUTO: 0.2 THOUSAND/ΜL (ref 0–0.61)
EOSINOPHIL NFR BLD AUTO: 3 % (ref 0–6)
ERYTHROCYTE [DISTWIDTH] IN BLOOD BY AUTOMATED COUNT: 12.3 % (ref 11.6–15.1)
GFR SERPL CREATININE-BSD FRML MDRD: 70 ML/MIN/1.73SQ M
GLUCOSE SERPL-MCNC: 175 MG/DL (ref 65–140)
GLUCOSE SERPL-MCNC: 248 MG/DL (ref 65–140)
GLUCOSE SERPL-MCNC: 331 MG/DL (ref 65–140)
GLUCOSE SERPL-MCNC: 52 MG/DL (ref 65–140)
GLUCOSE UR STRIP-MCNC: ABNORMAL MG/DL
HCT VFR BLD AUTO: 41.4 % (ref 36.5–49.3)
HGB BLD-MCNC: 13.8 G/DL (ref 12–17)
HGB UR QL STRIP.AUTO: ABNORMAL
IMM GRANULOCYTES # BLD AUTO: 0.04 THOUSAND/UL (ref 0–0.2)
IMM GRANULOCYTES NFR BLD AUTO: 1 % (ref 0–2)
KETONES UR STRIP-MCNC: ABNORMAL MG/DL
LEUKOCYTE ESTERASE UR QL STRIP: ABNORMAL
LYMPHOCYTES # BLD AUTO: 1.9 THOUSANDS/ΜL (ref 0.6–4.47)
LYMPHOCYTES NFR BLD AUTO: 24 % (ref 14–44)
MCH RBC QN AUTO: 30.9 PG (ref 26.8–34.3)
MCHC RBC AUTO-ENTMCNC: 33.3 G/DL (ref 31.4–37.4)
MCV RBC AUTO: 93 FL (ref 82–98)
MONOCYTES # BLD AUTO: 0.67 THOUSAND/ΜL (ref 0.17–1.22)
MONOCYTES NFR BLD AUTO: 8 % (ref 4–12)
NEUTROPHILS # BLD AUTO: 5.1 THOUSANDS/ΜL (ref 1.85–7.62)
NEUTS SEG NFR BLD AUTO: 63 % (ref 43–75)
NITRITE UR QL STRIP: POSITIVE
NON-SQ EPI CELLS URNS QL MICRO: ABNORMAL /HPF
NRBC BLD AUTO-RTO: 0 /100 WBCS
PH UR STRIP.AUTO: 6.5 [PH]
PLATELET # BLD AUTO: 241 THOUSANDS/UL (ref 149–390)
PMV BLD AUTO: 9.6 FL (ref 8.9–12.7)
POTASSIUM SERPL-SCNC: 4.1 MMOL/L (ref 3.5–5.3)
PROT SERPL-MCNC: 7.5 G/DL (ref 6.4–8.2)
PROT UR STRIP-MCNC: >=300 MG/DL
RBC # BLD AUTO: 4.46 MILLION/UL (ref 3.88–5.62)
RBC #/AREA URNS AUTO: ABNORMAL /HPF
SODIUM SERPL-SCNC: 136 MMOL/L (ref 136–145)
SP GR UR STRIP.AUTO: >=1.03 (ref 1–1.03)
UROBILINOGEN UR QL STRIP.AUTO: 2 E.U./DL
WBC # BLD AUTO: 7.96 THOUSAND/UL (ref 4.31–10.16)
WBC #/AREA URNS AUTO: ABNORMAL /HPF

## 2020-02-18 PROCEDURE — 85025 COMPLETE CBC W/AUTO DIFF WBC: CPT | Performed by: EMERGENCY MEDICINE

## 2020-02-18 PROCEDURE — 82948 REAGENT STRIP/BLOOD GLUCOSE: CPT

## 2020-02-18 PROCEDURE — 80053 COMPREHEN METABOLIC PANEL: CPT | Performed by: EMERGENCY MEDICINE

## 2020-02-18 PROCEDURE — 99285 EMERGENCY DEPT VISIT HI MDM: CPT

## 2020-02-18 PROCEDURE — 99223 1ST HOSP IP/OBS HIGH 75: CPT | Performed by: INTERNAL MEDICINE

## 2020-02-18 PROCEDURE — 99285 EMERGENCY DEPT VISIT HI MDM: CPT | Performed by: EMERGENCY MEDICINE

## 2020-02-18 PROCEDURE — 74176 CT ABD & PELVIS W/O CONTRAST: CPT

## 2020-02-18 PROCEDURE — 96374 THER/PROPH/DIAG INJ IV PUSH: CPT

## 2020-02-18 PROCEDURE — 36415 COLL VENOUS BLD VENIPUNCTURE: CPT

## 2020-02-18 PROCEDURE — 81001 URINALYSIS AUTO W/SCOPE: CPT | Performed by: EMERGENCY MEDICINE

## 2020-02-18 RX ORDER — ATORVASTATIN CALCIUM 40 MG/1
40 TABLET, FILM COATED ORAL DAILY
Status: DISCONTINUED | OUTPATIENT
Start: 2020-02-18 | End: 2020-02-20 | Stop reason: HOSPADM

## 2020-02-18 RX ORDER — OXYCODONE HYDROCHLORIDE 10 MG/1
10 TABLET ORAL EVERY 4 HOURS PRN
Status: DISCONTINUED | OUTPATIENT
Start: 2020-02-18 | End: 2020-02-20 | Stop reason: HOSPADM

## 2020-02-18 RX ORDER — PHENAZOPYRIDINE HYDROCHLORIDE 100 MG/1
100 TABLET, FILM COATED ORAL 3 TIMES DAILY PRN
Status: DISCONTINUED | OUTPATIENT
Start: 2020-02-18 | End: 2020-02-20 | Stop reason: HOSPADM

## 2020-02-18 RX ORDER — SODIUM CHLORIDE 9 MG/ML
75 INJECTION, SOLUTION INTRAVENOUS CONTINUOUS
Status: DISCONTINUED | OUTPATIENT
Start: 2020-02-18 | End: 2020-02-20 | Stop reason: HOSPADM

## 2020-02-18 RX ORDER — ACETAMINOPHEN 160 MG/5ML
650 SUSPENSION, ORAL (FINAL DOSE FORM) ORAL EVERY 4 HOURS PRN
Status: DISCONTINUED | OUTPATIENT
Start: 2020-02-18 | End: 2020-02-20 | Stop reason: HOSPADM

## 2020-02-18 RX ORDER — OXYBUTYNIN CHLORIDE 5 MG/1
10 TABLET, EXTENDED RELEASE ORAL DAILY
Status: DISCONTINUED | OUTPATIENT
Start: 2020-02-19 | End: 2020-02-20 | Stop reason: HOSPADM

## 2020-02-18 RX ORDER — FAMOTIDINE 20 MG/1
20 TABLET, FILM COATED ORAL DAILY
Status: DISCONTINUED | OUTPATIENT
Start: 2020-02-18 | End: 2020-02-20 | Stop reason: HOSPADM

## 2020-02-18 RX ORDER — FENTANYL CITRATE 50 UG/ML
50 INJECTION, SOLUTION INTRAMUSCULAR; INTRAVENOUS ONCE
Status: COMPLETED | OUTPATIENT
Start: 2020-02-18 | End: 2020-02-18

## 2020-02-18 RX ORDER — TAMSULOSIN HYDROCHLORIDE 0.4 MG/1
0.4 CAPSULE ORAL
Status: DISCONTINUED | OUTPATIENT
Start: 2020-02-18 | End: 2020-02-20 | Stop reason: HOSPADM

## 2020-02-18 RX ORDER — OXYCODONE HYDROCHLORIDE 5 MG/1
5 TABLET ORAL EVERY 4 HOURS PRN
Status: DISCONTINUED | OUTPATIENT
Start: 2020-02-18 | End: 2020-02-20 | Stop reason: HOSPADM

## 2020-02-18 RX ORDER — METOPROLOL SUCCINATE 25 MG/1
12.5 TABLET, EXTENDED RELEASE ORAL 2 TIMES DAILY
Status: DISCONTINUED | OUTPATIENT
Start: 2020-02-18 | End: 2020-02-20 | Stop reason: HOSPADM

## 2020-02-18 RX ORDER — MORPHINE SULFATE 10 MG/ML
5 INJECTION, SOLUTION INTRAMUSCULAR; INTRAVENOUS EVERY 4 HOURS PRN
Status: DISCONTINUED | OUTPATIENT
Start: 2020-02-18 | End: 2020-02-20 | Stop reason: HOSPADM

## 2020-02-18 RX ORDER — INSULIN GLARGINE 100 [IU]/ML
48 INJECTION, SOLUTION SUBCUTANEOUS EVERY MORNING
Status: DISCONTINUED | OUTPATIENT
Start: 2020-02-19 | End: 2020-02-20 | Stop reason: HOSPADM

## 2020-02-18 RX ORDER — LEVOTHYROXINE SODIUM 0.05 MG/1
50 TABLET ORAL
Status: DISCONTINUED | OUTPATIENT
Start: 2020-02-19 | End: 2020-02-20 | Stop reason: HOSPADM

## 2020-02-18 RX ADMIN — OXYCODONE HYDROCHLORIDE 5 MG: 5 TABLET ORAL at 17:43

## 2020-02-18 RX ADMIN — TAMSULOSIN HYDROCHLORIDE 0.4 MG: 0.4 CAPSULE ORAL at 17:31

## 2020-02-18 RX ADMIN — INSULIN LISPRO 6 UNITS: 100 INJECTION, SOLUTION INTRAVENOUS; SUBCUTANEOUS at 20:05

## 2020-02-18 RX ADMIN — FENTANYL CITRATE 50 MCG: 50 INJECTION, SOLUTION INTRAMUSCULAR; INTRAVENOUS at 13:42

## 2020-02-18 RX ADMIN — METOPROLOL SUCCINATE 12.5 MG: 25 TABLET, EXTENDED RELEASE ORAL at 17:32

## 2020-02-18 RX ADMIN — SODIUM CHLORIDE 75 ML/HR: 0.9 INJECTION, SOLUTION INTRAVENOUS at 17:43

## 2020-02-18 RX ADMIN — CEFTRIAXONE SODIUM 1000 MG: 10 INJECTION, POWDER, FOR SOLUTION INTRAVENOUS at 13:07

## 2020-02-18 NOTE — H&P
H&P- Booker Mcmillan 1960, 60 y o  male MRN: [id number]    Unit/Bed#: S -01 Encounter: [id number]    Primary Care Provider: Synetta Brunner, DO   Date and time admitted to hospital: 2/18/2020 11:55 AM      * Pyelonephritis  Assessment & Plan  Pt had stent placed on 2/6 for a renal calculus  Reports gross dark hematuria with clots throughout the stream  Reports pain currently at 5/10  Pt afebrile, WBC 7 9  Urinalysis in the ED was positive for leukocytes, nitrites, and blood  Pt received ceftriaxone in the ED  · CT renal stone study: Interval placement of a right ureteral stent with resolution of the previously reported right hydronephrosis  No evidence of a urinary tract calculus  · Admit for administration of IV antibiotics   · Continue Ceftriaxone IV  · Urology consult - they would not like to remove stent under current conditions  · Recommend pain control and IVF  · Pain control    Diabetes [facility])  Assessment & Plan  Lab Results   Component Value Date    HGBA1C 7 2 (H) 11/11/2019       No results for input(s): POCGLU in the last 72 hours  Blood Sugar Average: Last 72 hrs:  · On insulin and metformin at home, hold metformin  · Restart home insulin  · Glucose checks    Sarcoidosis  Assessment & Plan  · [facility] pulmonology and rheumatology outpatient    Hypothyroid  Assessment & Plan  · Continue Levothyroxine 50 mcg daily      VTE Prophylaxis: Home Eliquis restarted  / sequential compression device   Code Status: Full  POLST: There is no POLST form on file for this patient (pre-hospital)    Anticipated Length of Stay:  Patient will be admitted on an Inpatient basis with an anticipated length of stay of  greater than 2 midnights  Justification for Hospital Stay: IV antibiotics    Chief Complaint:   Hematuria    History of Present Illness:    Booker Mcmillan is a 60 y o  male who presents with hematuria    Patient reports that 2 weeks ago he was at work and he started to experience pain in the right flank pain  Reports that he went to [facility], was told he had a kidney stone and was discharged with pain medication  Patient states days later he had incredible pain, was admitted to [facility], and had a stent placed  Reports that for the past few days he has experienced fever, hematuria with clots, and right flank pain  Reports that he messaged his doctor, who spoke to Urology, who recommended the patient come to the ED  In the ED patient urinalysis positive for leukocytes and nitrite  CT renal stone study showed a right urethral stent and no evidence of urinary tract calculus  Patient was started on IV ceftriaxone and admitted  Patient reports current right flank pain a 5/10, increasing frequency and urgency of urination, reports dark hematuria with clots  Denies purulent discharge in urine  Patient states that during a previous admission he was told that the stone could not be removed due to pus around the stone  Denies current N/V  Review of Systems:    Review of Systems   Constitutional: Negative for chills and fever  HENT: Negative  Eyes: Negative  Respiratory: Negative for cough, chest tightness, shortness of breath and wheezing  Cardiovascular: Negative for chest pain, palpitations and leg swelling  Gastrointestinal: Negative for abdominal pain, diarrhea, nausea and vomiting  Endocrine: Negative  Genitourinary: Positive for difficulty urinating, flank pain, frequency, hematuria (clots) and urgency  Skin: Negative  Neurological: Negative  Hematological: Negative  Psychiatric/Behavioral: Negative          Past Medical and Surgical History:     Past Medical History:   Diagnosis Date    BPH (benign prostatic hyperplasia)     Diabetes mellitus ([facility] [address] )     Erectile dysfunction     GERD (gastroesophageal reflux disease)     Headache(784 0) 05/01/2018    Each Day    Hematuria     Infectious viral hepatitis 2007    Cured by Dr Ally Ward [facility] disease     hepatitis C    Mediastinal adenopathy     Obesity 2007    Vitamin D deficiency        Past Surgical History:   Procedure Laterality Date    COLONOSCOPY      last assessed: 08/28/2012; fiberoptic screening     CYSTOSCOPY      onset: 05/06/2016; Diagnostic     DENTAL SURGERY      FL RETROGRADE PYELOGRAM  2/6/2020    LIVER BIOPSY      LYMPH NODE BIOPSY  6/2019    CO BRONCHOSCOPY NEEDLE BX TRACHEA MAIN STEM&/BRON N/A 6/6/2019    Procedure: ENDOBRONCHIAL ULTRASOUND (EBUS); Surgeon: Valentino Dupont, MD;  [facility];  Service: Thoracic    CO [address] N/A 6/6/2019    Procedure: Debbie Deep;  Surgeon: Valentino Dupont, MD;  [facility];  Service: Thoracic    CO CYSTO/URETERO W/LITHOTRIPSY &INDWELL STENT INSRT Right 2/6/2020    Procedure: CYSTOSCOPY WITH LITHOTRIPSY RETROGRADE PYELOGRAM AND INSERTION STENT URETERAL;  Surgeon: Fausto Puga MD;  [facility];  Service: Urology    CO TRANSURETHRAL ELEC-SURG PROSTATECTOM N/A 9/13/2017    Procedure: Mary George; TUR PROSTATE;  Surgeon: Lázaro Diehl MD;  [facility];  Service: Urology    PROSTATE SURGERY  2017    SHOULDER ARTHROSCOPY Right     bicep tendon repair, rotator cuff repair and acromuim shave        Meds/Allergies:    Prior to Admission medications    Medication Sig Start Date End Date Taking? Authorizing Provider   apixaban (ELIQUIS) 5 mg Take 1 tablet (5 mg total) by mouth 2 (two) times a day Take 2 tabs twice a day for one week then one tab twice a day thereafter   10/8/19  Yes Arlen Srivastava DO   atorvastatin (LIPITOR) 40 mg tablet Take 1 tablet (40 mg total) by mouth daily 12/3/19  Yes Ez Harding MD   famotidine (PEPCID) 40 MG tablet Take 1 tablet (40 mg total) by mouth daily 11/11/19  Yes Valarie Kumar MD   insulin aspart (NovoLOG) 100 units/mL injection Inject 12 Units under the skin 3 (three) times a day before meals   Yes Historical MD Michelle insulin glargine (LANTUS SOLOSTAR) 100 units/mL injection pen Inject 48 Units under the skin daily   Yes Historical Provider, MD   Insulin Pen Needle (PEN NEEDLES) 31G X 6 MM MISC by Does not apply route 4 (four) times a day 19  Yes Licha Marrero MD   levothyroxine 50 mcg tablet TAKE 1 TABLET BY MOUTH EVERY DAY 19  Yes Licha Marrero MD   metFORMIN (GLUCOPHAGE) 500 mg tablet TAKE 1 TABLET BY MOUTH TWICE A DAY WITH MEALS 10/14/19  Yes Licha Marrero MD   metoprolol succinate (TOPROL-XL) 25 mg 24 hr tablet Take 0 5 tablets (12 5 mg total) by mouth 2 (two) times a day 10/18/19  Yes Bo Mane MD   [facility] LANCETS 22P [address] [facility] 19  Yes MD Erasto Akhtar Poot test strip Use to test blood sugar 4 times a day 10/9/19  Yes Matthieu YOUNG MD   tamsulosin [facility]) 0 4 mg Take 1 capsule (0 4 mg total) by mouth daily with dinner 20  Yes Russ Dickson DO   glucose 4 g chewable tablet Chew 4 tablets (16 g total) as needed for low blood sugar 19   Chrales Garcia MD   oxybutynin (DITROPAN) 5 mg tablet Take 1 tablet (5 mg total) by mouth 3 (three) times a day  Patient not taking: Reported on 2020   Darvin Howell MD     I have reviewed home medications with patient personally  Allergies:    Allergies   Allergen Reactions    No Active Allergies        Social History:     Marital Status: /Civil Union   Patient Pre-hospital Living Situation: Home  Patient Pre-hospital Level of Mobility: Ambulates  Patient Pre-hospital Diet Restrictions: Diabetic  Substance Use History:   Social History     Substance and Sexual Activity   Alcohol Use No    Comment: quit 12 years ago     Social History     Tobacco Use   Smoking Status Former Smoker    Packs/day: 1     Years: 1     Pack years: 1     Types: Cigarettes    Last attempt to quit:     Years since quittin    Smokeless Tobacco Never Used Social History     Substance and Sexual Activity   Drug Use No       Family History:    Family History   Problem Relation Age of Onset    Stroke Father         Accidental Death at 52    Cancer Maternal Grandmother     Valvular heart disease Mother        Physical Exam:     Vitals:   Blood Pressure: 162/77 (02/18/20 1549)  Pulse: 76 (02/18/20 1549)  Temperature: 98 8 °F (37 1 °C) (02/18/20 1549)  Temp Source: Oral (02/18/20 1549)  Respirations: 18 (02/18/20 1549)  Height: 6' (182 9 cm) (02/18/20 1114)  Weight - Scale: 133 kg (293 lb) (02/18/20 1114)  SpO2: 98 % (02/18/20 1549)    Physical Exam   Constitutional: He is oriented to person, place, and time  He appears well-developed and well-nourished  No distress  HENT:   Head: Normocephalic and atraumatic  Eyes: Conjunctivae and EOM are normal    Cardiovascular: Normal rate, regular rhythm and normal heart sounds  No murmur heard  Pulmonary/Chest: Effort normal and breath sounds normal    Abdominal: Soft  Bowel sounds are normal  There is no tenderness  Musculoskeletal: He exhibits no edema or tenderness  No CVA tenderness   Neurological: He is alert and oriented to person, place, and time  Skin: Skin is warm and dry  He is not diaphoretic  No erythema  Psychiatric: He has a normal mood and affect  His behavior is normal  Judgment and thought content normal      Additional Data:     Lab Results: I have personally reviewed pertinent reports  Results from last 7 days   Lab Units 02/18/20  1134   WBC Thousand/uL 7 96   HEMOGLOBIN g/dL 13 8   HEMATOCRIT % 41 4   PLATELETS Thousands/uL 241   NEUTROS PCT % 63   LYMPHS PCT % 24   MONOS PCT % 8   EOS PCT % 3     Results from last 7 days   Lab Units 02/18/20  1134   POTASSIUM mmol/L 4 1   CHLORIDE mmol/L 102   CO2 mmol/L 24   BUN mg/dL 19   CREATININE mg/dL 1 13   CALCIUM mg/dL 8 8   ALK PHOS U/L 71   ALT U/L 21   AST U/L 14           Imaging: I have personally reviewed pertinent reports        Ct Abdomen Pelvis Wo Contrast    Result Date: 2/4/2020  Narrative: CT ABDOMEN AND PELVIS WITHOUT IV CONTRAST INDICATION:   R flank pain with hx of gallstones and kidneys stones  COMPARISON:  7/15/2017 and 1/7/2020 TECHNIQUE:  CT examination of the abdomen and pelvis was performed without intravenous contrast   Axial, sagittal, and coronal 2D reformatted images were created from the source data and submitted for interpretation  Radiation dose length product (DLP) for this visit:  1477 81 mGy-cm   This examination, like all CT scans performed in the [facility], was performed utilizing techniques to minimize radiation dose exposure, including the use of iterative reconstruction and automated exposure control  Enteric contrast was not administered in accordance with physician request  FINDINGS: ABDOMEN LOWER CHEST:  There are multiple prominent epicardial and paraesophageal lymph nodes of uncertain etiology though unchanged from 2017  There is a small hiatal hernia  LIVER/BILIARY TREE:  Unremarkable  GALLBLADDER:  No calcified gallstones  No pericholecystic inflammatory change  SPLEEN:  Unremarkable  PANCREAS:  Unremarkable  ADRENAL GLANDS:  Unremarkable  KIDNEYS/URETERS:  There is a 3 mm mid right ureteral calculus with very mild hydroureteronephrosis  STOMACH AND BOWEL:  There is colonic diverticulosis without evidence of acute diverticulitis  APPENDIX:  No findings to suggest appendicitis  ABDOMINOPELVIC CAVITY:  No ascites or free intraperitoneal air  No lymphadenopathy  VESSELS:  Unremarkable for patient's age  PELVIS REPRODUCTIVE ORGANS:  Unremarkable for patient's age  URINARY BLADDER:  Unremarkable  ABDOMINAL WALL/INGUINAL REGIONS:  Unremarkable  OSSEOUS STRUCTURES:  No acute fracture or destructive osseous lesion  Impression: 1  Very mild right-sided hydroureteronephrosis secondary to a 3 mm mid right ureteral calculus  2   Stable epicardial and paraesophageal lymph nodes   3   Small hiatal hernia  4   Diverticulosis coli  The study was marked in [facility] for immediate notification  Workstation performed: [id number]     Xr Abdomen 1 View Kub    Result Date: 2/6/2020  Narrative: ABDOMEN INDICATION:   Known right mid ureter 3 mm nephrolithiasis from CT scan 2 days ago, still in significant discomfort, evaluate for movement/progression  COMPARISON:  CT February 4, 2020 VIEWS:  AP supine FINDINGS: There is a nonobstructive bowel gas pattern  No discernible free air on this supine study  Upright or left lateral decubitus imaging is more sensitive to detect subtle free air in the appropriate setting  Linear calcification projecting to the right of the L3-4 disc space measures 5 mm  This corresponds to the patient's known right-sided ureteral stone  Visualized lung bases are clear  Visualized osseous structures are unremarkable for the patient's age  Impression: 5 mm stone projects to the right of the L3-4 disc space along the course of the mid right ureter  This corresponds to the abnormality seen on recent CT  Workstation performed: [id number]     Fl Retrograde Pyelogram    Result Date: 2/6/2020  Narrative: C-ARM - INDICATION: Acute right flank pain [R10 9]  Procedure guidance  COMPARISON:  None TECHNIQUE: FLUOROSCOPY TIME:   9 sft 5 FLUOROSCOPIC IMAGES FINDINGS: Fluoroscopic guidance provided for surgical procedure  Osseous and soft tissue detail limited by technique  Impression: Fluoroscopic guidance provided for surgical procedure  Please refer to the separate procedure notes for additional details  Workstation performed: [id number]     Us Abdomen Limited    Result Date: 2/11/2020  Narrative: RIGHT UPPER QUADRANT ULTRASOUND INDICATION: Abdominal pain  COMPARISON: 2/4/2020  TECHNIQUE:   Real-time ultrasound of the right upper quadrant was performed with a curvilinear transducer with both volumetric sweeps and still imaging techniques   FINDINGS: PANCREAS:  Visualized portions show no abnormality  AORTA AND IVC:  Visualized portions are normal for patient age  LIVER: Normal size  Echogenicity and contour are normal  No evidence of mass  Normal directional flow is present within the portal vein  BILIARY: The gallbladder is normal in caliber  No wall thickening or pericholecystic fluid  Cholelithiasis is present  No sonographic Cowart's sign  No intrahepatic biliary dilatation  CBD measures 3 mm  No choledocholithiasis  KIDNEY: Right kidney measures 11 6 x 7 8 cm  Within normal limits  ASCITES:   None  Impression: Cholelithiasis without evidence of acute cholecystitis  Workstation performed: [id number]     Ct Renal Stone Study Abdomen Pelvis Without Contrast    Result Date: 2/18/2020  Narrative: CT ABDOMEN AND PELVIS WITHOUT IV CONTRAST - LOW DOSE RENAL STONE INDICATION:   flank pain, hematuria  COMPARISON:  2/4/2020 TECHNIQUE:  Low dose thin section CT examination of the abdomen and pelvis was performed without intravenous or oral contrast according to a protocol specifically designed to evaluate for urinary tract calculus  Axial, sagittal, and coronal 2D reformatted images were created from the source data and submitted for interpretation  Evaluation for pathology in the abdomen and pelvis that is unrelated to urinary tract calculi is limited  Radiation dose length product (DLP) for this visit:  760 mGy-cm   This examination, like all CT scans performed in the [facility], was performed utilizing techniques to minimize radiation dose exposure, including the use of iterative reconstruction and automated exposure control  FINDINGS: RIGHT KIDNEY AND URETER: No urinary tract calculi  No hydronephrosis or hydroureter  Ureteral stent with proximal loop in the renal pelvis and distal loop in the urinary bladder  LEFT KIDNEY AND URETER: No urinary tract calculi  No hydronephrosis or hydroureter  URINARY BLADDER: Unremarkable  Small hiatal hernia   Stable epicardial and paraesophageal lymph nodes  Limited low radiation dose noncontrast CT evaluation demonstrates no clinically significant abnormality of liver, spleen, pancreas, or adrenal glands  No calcified gallstones or gallbladder wall thickening noted  No ascites or bulky lymphadenopathy on this limited noncontrast study  Colonic diverticula are noted, without evidence to suggest acute diverticulitis  Visualized bowel appears otherwise unremarkable  Limited evaluation demonstrates no evidence to suggest acute appendicitis  No acute fracture or destructive osseous lesion is identified  Impression: Interval placement of a right ureteral stent with resolution of the previously reported right hydronephrosis  No evidence of a urinary tract calculus  Workstation performed: [id number]       ** Please Note: This note has been constructed using a voice recognition system   **

## 2020-02-18 NOTE — ED PROVIDER NOTES
History  Chief Complaint   Patient presents with    Blood in Urine     Pt presents to ED with gross hematuria (thick appearing) for past two days  Recent RIGHT kidney surgery (two weeks ago) for stone, found "puss"  Pt appears pale  61 y/o male presents today reporting right flank pain and hematuria which started yesterday  He had a ureteral stent placed 2/6 for a proximal stone  He does not think that he passed the stone  Spoke with his urologist this morning and was directed to the ED for further evaluation and workup  History provided by:  Patient  Blood in Urine   This is a new problem  The current episode started yesterday  The problem is unchanged  He describes the hematuria as gross hematuria  The hematuria occurs throughout his entire urinary stream  He reports clotting at the end, middle and beginning (Entire) of his urine stream  His pain is at a severity of 4/10  The pain is severe  He describes his urine color as dark red  Irritative symptoms include frequency  Obstructive symptoms include a weak stream  Associated symptoms include abdominal pain  Pertinent negatives include no chills, dysuria or fever  His sexual activity is non-contributory to the current illness  His past medical history is significant for kidney stones  Prior to Admission Medications   Prescriptions Last Dose Informant Patient Reported? Taking? Insulin Pen Needle (PEN NEEDLES) 31G X 6 MM MISC  Self No Yes   Sig: by Does not apply route 4 (four) times a day   ONETOUCH DELICA LANCETS 27C MISC  Self No Yes   Sig: USE THREE TIMES DAILY   ONETOUCH VERIO test strip  Self No Yes   Sig: Use to test blood sugar 4 times a day   apixaban (ELIQUIS) 5 mg  Self No Yes   Sig: Take 1 tablet (5 mg total) by mouth 2 (two) times a day Take 2 tabs twice a day for one week then one tab twice a day thereafter     atorvastatin (LIPITOR) 40 mg tablet  Self No Yes   Sig: Take 1 tablet (40 mg total) by mouth daily   famotidine (PEPCID) 40 MG tablet  Self No Yes   Sig: Take 1 tablet (40 mg total) by mouth daily   glucose 4 g chewable tablet  Self No No   Sig: Chew 4 tablets (16 g total) as needed for low blood sugar   insulin aspart (NovoLOG) 100 units/mL injection   Yes Yes   Sig: Inject 12 Units under the skin 3 (three) times a day before meals   insulin glargine (LANTUS SOLOSTAR) 100 units/mL injection pen   Yes Yes   Sig: Inject 48 Units under the skin daily   levothyroxine 50 mcg tablet  Self No Yes   Sig: TAKE 1 TABLET BY MOUTH EVERY DAY   metFORMIN (GLUCOPHAGE) 500 mg tablet  Self No Yes   Sig: TAKE 1 TABLET BY MOUTH TWICE A DAY WITH MEALS   metoprolol succinate (TOPROL-XL) 25 mg 24 hr tablet  Self No Yes   Sig: Take 0 5 tablets (12 5 mg total) by mouth 2 (two) times a day   oxybutynin (DITROPAN) 5 mg tablet   No No   Sig: Take 1 tablet (5 mg total) by mouth 3 (three) times a day   Patient not taking: Reported on 2/11/2020   tamsulosin (FLOMAX) 0 4 mg   No Yes   Sig: Take 1 capsule (0 4 mg total) by mouth daily with dinner      Facility-Administered Medications: None       Past Medical History:   Diagnosis Date    BPH (benign prostatic hyperplasia)     Diabetes mellitus (HCC)     Erectile dysfunction     GERD (gastroesophageal reflux disease)     Headache(784 0) 05/01/2018    Each Day    Hematuria     Infectious viral hepatitis 2007    Cured by Dr Melodie Doran Edison    Liver disease     hepatitis C    Mediastinal adenopathy     Obesity 2007    Vitamin D deficiency        Past Surgical History:   Procedure Laterality Date    COLONOSCOPY      last assessed: 08/28/2012; fiberoptic screening     CYSTOSCOPY      onset: 05/06/2016; Diagnostic     DENTAL SURGERY      FL RETROGRADE PYELOGRAM  2/6/2020    LIVER BIOPSY      LYMPH NODE BIOPSY  6/2019    MO BRONCHOSCOPY NEEDLE BX TRACHEA MAIN STEM&/BRON N/A 6/6/2019    Procedure: ENDOBRONCHIAL ULTRASOUND (EBUS);   Surgeon: Portia Mendez MD;  [facility];  Service: Thoracic    MO BRONCHOSCOPY,DIAGNOSTIC N/A 2019    Procedure: Ryan Simeon;  Surgeon: Sen Serrano MD;  [facility];  Service: Thoracic    KS CYSTO/URETERO W/LITHOTRIPSY &INDWELL STENT INSRT Right 2020    Procedure: CYSTOSCOPY WITH LITHOTRIPSY RETROGRADE PYELOGRAM AND INSERTION STENT URETERAL;  Surgeon: Barb Nguyen MD;  [facility];  Service: Urology    KS TRANSURETHRAL ELEC-SURG PROSTATECTOM N/A 2017    Procedure: Margarito Jarquin; TUR PROSTATE;  Surgeon: Asha Carreon MD;  [facility];  Service: Urology    PROSTATE SURGERY  2017    SHOULDER ARTHROSCOPY Right     bicep tendon repair, rotator cuff repair and acromuim shave        Family History   Problem Relation Age of Onset    Stroke Father         Accidental Death at 52    Cancer Maternal Grandmother     Valvular heart disease Mother      I have reviewed and agree with the history as documented  Social History     Tobacco Use    Smoking status: Former Smoker     Packs/day: 1 00     Years: 1 00     Pack years: 1 00     Types: Cigarettes     Last attempt to quit: 2009     Years since quittin 1    Smokeless tobacco: Never Used   Substance Use Topics    Alcohol use: No     Comment: quit 12 years ago    Drug use: No       Review of Systems   Constitutional: Negative for chills, fatigue and fever  HENT: Negative for postnasal drip, sore throat and trouble swallowing  Eyes: Negative for visual disturbance  Respiratory: Negative for chest tightness and shortness of breath  Cardiovascular: Negative for chest pain and leg swelling  Gastrointestinal: Positive for abdominal pain  Genitourinary: Positive for frequency and hematuria  Negative for dysuria  Musculoskeletal: Positive for back pain  Skin: Negative for rash  Allergic/Immunologic: Negative for immunocompromised state  Neurological: Negative for dizziness, light-headedness and headaches     Psychiatric/Behavioral: Negative for confusion  Physical Exam  Physical Exam   Constitutional: He is oriented to person, place, and time  He appears well-developed and well-nourished  HENT:   Head: Normocephalic and atraumatic  Mouth/Throat: Uvula is midline, oropharynx is clear and moist and mucous membranes are normal  No tonsillar exudate  Eyes: Pupils are equal, round, and reactive to light  Neck: Normal range of motion  Neck supple  Cardiovascular: Normal rate and regular rhythm  Pulmonary/Chest: Effort normal and breath sounds normal    Abdominal: Soft  Bowel sounds are normal  There is tenderness (right flank)  There is no rebound and no guarding  Musculoskeletal: He exhibits no edema, tenderness or deformity  Neurological: He is alert and oriented to person, place, and time  Patient moving all extremities equally, no focal neuro deficits noted  Skin: Skin is warm and dry  Capillary refill takes less than 2 seconds  Psychiatric: He has a normal mood and affect  Nursing note and vitals reviewed        Vital Signs  ED Triage Vitals [02/18/20 1120]   Temperature Pulse Respirations Blood Pressure SpO2   98 7 °F (37 1 °C) 77 18 133/81 94 %      Temp Source Heart Rate Source Patient Position - Orthostatic VS BP Location FiO2 (%)   Oral Monitor Sitting Left arm --      Pain Score       --           Vitals:    02/18/20 1120   BP: 133/81   Pulse: 77   Patient Position - Orthostatic VS: Sitting         Visual Acuity      ED Medications  Medications   ceftriaxone (ROCEPHIN) 1 g/50 mL in dextrose IVPB (0 mg Intravenous Stopped 2/18/20 1342)   fentanyl citrate (PF) 100 MCG/2ML 50 mcg (50 mcg Intravenous Given 2/18/20 1342)       Diagnostic Studies  Results Reviewed     Procedure Component Value Units Date/Time    Comprehensive metabolic panel [[id number]]  (Abnormal) Collected:  02/18/20 1134    Lab Status:  Final result Specimen:  Blood from Arm, Right Updated:  02/18/20 1200     Sodium 136 mmol/L      Potassium 4 1 mmol/L Chloride 102 mmol/L      CO2 24 mmol/L      ANION GAP 10 mmol/L      BUN 19 mg/dL      Creatinine 1 13 mg/dL      Glucose 248 mg/dL      Calcium 8 8 mg/dL      AST 14 U/L      ALT 21 U/L      Alkaline Phosphatase 71 U/L      Total Protein 7 5 g/dL      Albumin 3 6 g/dL      Total Bilirubin 0 42 mg/dL      eGFR 70 ml/min/1 73sq m     Narrative:       National Kidney Disease Foundation guidelines for Chronic Kidney Disease (CKD):     Stage 1 with normal or high GFR (GFR > 90 mL/min/1 73 square meters)    Stage 2 Mild CKD (GFR = 60-89 mL/min/1 73 square meters)    Stage 3A Moderate CKD (GFR = 45-59 mL/min/1 73 square meters)    Stage 3B Moderate CKD (GFR = 30-44 mL/min/1 73 square meters)    Stage 4 Severe CKD (GFR = 15-29 mL/min/1 73 square meters)    Stage 5 End Stage CKD (GFR <15 mL/min/1 73 square meters)  Note: GFR calculation is accurate only with a steady state creatinine    Urine Microscopic [[id number]]  (Abnormal) Collected:  02/18/20 1131    Lab Status:  Final result Specimen:  Urine, Clean Catch Updated:  02/18/20 1159     RBC, UA Innumerable /hpf      WBC, UA 0-1 /hpf      Epithelial Cells None Seen /hpf      Bacteria, UA Occasional /hpf     UA w Reflex to Microscopic w Reflex to Culture [[id number]]  (Abnormal) Collected:  02/18/20 1131    Lab Status:  Final result Specimen:  Urine, Clean Catch Updated:  02/18/20 1156     Color, UA Red     Clarity, UA Cloudy     Specific Gravity, UA >=1 030     pH, UA 6 5     Leukocytes, UA Small     Nitrite, UA Positive     Protein, UA >=300 mg/dl      Glucose,  (1/10%) mg/dl      Ketones, UA Trace mg/dl      Urobilinogen, UA 2 0 E U /dl      Bilirubin, UA Moderate     Blood, UA Large    CBC and differential [[id number]] Collected:  02/18/20 1134    Lab Status:  Final result Specimen:  Blood from Arm, Right Updated:  02/18/20 1144     WBC 7 96 Thousand/uL      RBC 4 46 Million/uL      Hemoglobin 13 8 g/dL      Hematocrit 41 4 %      MCV 93 fL      MCH 30 9 pg      MCHC 33 3 g/dL      RDW 12 3 %      MPV 9 6 fL      Platelets 599 Thousands/uL      nRBC 0 /100 WBCs      Neutrophils Relative 63 %      Immat GRANS % 1 %      Lymphocytes Relative 24 %      Monocytes Relative 8 %      Eosinophils Relative 3 %      Basophils Relative 1 %      Neutrophils Absolute 5 10 Thousands/µL      Immature Grans Absolute 0 04 Thousand/uL      Lymphocytes Absolute 1 90 Thousands/µL      Monocytes Absolute 0 67 Thousand/µL      Eosinophils Absolute 0 20 Thousand/µL      Basophils Absolute 0 05 Thousands/µL                  CT renal stone study abdomen pelvis without contrast   Final Result by Zeeshan Abreu MD (02/18 1248)      Interval placement of a right ureteral stent with resolution of the previously reported right hydronephrosis  No evidence of a urinary tract calculus  Workstation performed: [id number]                    Procedures  Procedures         ED Course                               MDM  Number of Diagnoses or Management Options  UTI (urinary tract infection): new and requires workup  Diagnosis management comments: 1:40 PM  MDM: Patient presents to the Emergency Department and was diagnosed with acute UTI with a ureteral stent  This is a new problem that will require additional planned work-up in a hospitalized setting  Clinical laboratory testing, radiology imaging, and medical testing (EKG) were ordered  I independently reviewed the radiologic imaging, EKG, and laboratory studies  This case is considered high risk secondary to the above listed disease process that poses a threat to bodily function that requires further diagnostic testing and management which may include the administration of parenteral controlled substances  Discussed with CLAIRE  We had a detailed discussion of the patient's condition and case,  including need for admission  Accepts to his/her service  Bed request/bridging orders placed             Amount and/or Complexity of Data Reviewed  Clinical lab tests: ordered and reviewed  Tests in the radiology section of CPT®: reviewed and ordered  Tests in the medicine section of CPT®: ordered and reviewed  Review and summarize past medical records: yes  Discuss the patient with other providers: yes  Independent visualization of images, tracings, or specimens: yes    Risk of Complications, Morbidity, and/or Mortality  Presenting problems: high  Diagnostic procedures: high  Management options: high    Patient Progress  Patient progress: stable        Disposition  Final diagnoses:   UTI (urinary tract infection)     Time reflects when diagnosis was documented in both MDM as applicable and the Disposition within this note     Time User Action Codes Description Comment    2/18/2020  1:06 PM Ginny Vilchis Add [N39 0] UTI (urinary tract infection)       ED Disposition     ED Disposition Condition Date/Time Comment    Admit Stable Tue Feb 18, 2020  1:06 PM Case was discussed with CLAIRE and the patient's admission status was agreed to be Admission Status: inpatient status to the service of Dr Asif Mark   Follow-up Information    None         Patient's Medications   Discharge Prescriptions    No medications on file     No discharge procedures on file      PDMP Review       Value Time User    PDMP Reviewed  Yes 2/7/2020 10:07 AM Yossi Martinez MD          ED Provider  Electronically Signed by           Elysia Alvarez DO  02/18/20 2560

## 2020-02-18 NOTE — TELEPHONE ENCOUNTER
Dr Santos Johnson calling in at this time from PCP office  Patient messaging her today in regards to increased flank/abdominal pain and gross hematuria with clots  Dr Santos Johnson saw patient on 2/11/20 for some blood in the urine and flank/abdominal pain  She did US to rule out gall stones, did not see any and hydronephrosis was resolving  Requesting we call and see patient today  RN to consult with providers and call patient  Called and spoke with patient at this time  He reports he has been having 'burgundy wine' colored urine with clots since yesterday  It is uncomfortable when he urinates  His flank pain is still there and he describes it as 'raw feeling ' Patient reports no fever but does get chills at night  Advised I had already spoke with Dr Pablo Willis after I spoke with Dr Santos Johnson  At this point we recommend going to ER for more acute management of this continued hematuria and pain  Patient verbalized understanding

## 2020-02-18 NOTE — ASSESSMENT & PLAN NOTE
Pt had stent placed on 2/6 for a renal calculus  Reports gross dark hematuria with clots throughout the stream  Reports pain currently at 5/10  Pt afebrile, WBC 7 9  Urinalysis in the ED was positive for leukocytes, nitrites, and blood  Pt received ceftriaxone in the ED  · CT renal stone study: Interval placement of a right ureteral stent with resolution of the previously reported right hydronephrosis  No evidence of a urinary tract calculus    · Admit for administration of IV antibiotics   · Continue Ceftriaxone IV  · Urology consult - they would not like to remove stent under current conditions  · Recommend pain control and IVF  · Pain control

## 2020-02-18 NOTE — LETTER
[facility] [address]  [address]  [facility] [address]  Dept: [phone]    February 20, 2020     Patient: Paula Morgan   YOB: 1960   Date of Visit: 2/18/2020       To Whom it May Concern:    Radha Julien is under my professional care  He was seen in the hospital from 2/18/2020   to 02/20/20  He may return to work on 2/24/20 with the following limitations - lift no more than 10 lbs       If you have any questions or concerns, please don't hesitate to call           Sincerely,          Jessica Briggs, DO

## 2020-02-18 NOTE — ASSESSMENT & PLAN NOTE
Lab Results   Component Value Date    HGBA1C 7 2 (H) 11/11/2019       No results for input(s): POCGLU in the last 72 hours      Blood Sugar Average: Last 72 hrs:  · On insulin and metformin at home, hold metformin  · Restart home insulin  · Glucose checks

## 2020-02-19 ENCOUNTER — TELEPHONE (OUTPATIENT)
Dept: ENDOCRINOLOGY | Facility: CLINIC | Age: 60
End: 2020-02-19

## 2020-02-19 PROBLEM — I10 HYPERTENSION: Status: ACTIVE | Noted: 2020-02-19

## 2020-02-19 LAB
ANION GAP SERPL CALCULATED.3IONS-SCNC: 8 MMOL/L (ref 4–13)
BACTERIA UR QL AUTO: ABNORMAL /HPF
BILIRUB UR QL STRIP: ABNORMAL
BUN SERPL-MCNC: 15 MG/DL (ref 5–25)
CALCIUM SERPL-MCNC: 8.6 MG/DL (ref 8.3–10.1)
CHLORIDE SERPL-SCNC: 108 MMOL/L (ref 100–108)
CLARITY UR: ABNORMAL
CO2 SERPL-SCNC: 28 MMOL/L (ref 21–32)
COLOR UR: ABNORMAL
CREAT SERPL-MCNC: 1 MG/DL (ref 0.6–1.3)
ERYTHROCYTE [DISTWIDTH] IN BLOOD BY AUTOMATED COUNT: 12.4 % (ref 11.6–15.1)
GFR SERPL CREATININE-BSD FRML MDRD: 81 ML/MIN/1.73SQ M
GLUCOSE SERPL-MCNC: 100 MG/DL (ref 65–140)
GLUCOSE SERPL-MCNC: 105 MG/DL (ref 65–140)
GLUCOSE SERPL-MCNC: 117 MG/DL (ref 65–140)
GLUCOSE SERPL-MCNC: 119 MG/DL (ref 65–140)
GLUCOSE SERPL-MCNC: 141 MG/DL (ref 65–140)
GLUCOSE SERPL-MCNC: 150 MG/DL (ref 65–140)
GLUCOSE UR STRIP-MCNC: ABNORMAL MG/DL
HCT VFR BLD AUTO: 39.7 % (ref 36.5–49.3)
HGB BLD-MCNC: 12.8 G/DL (ref 12–17)
HGB UR QL STRIP.AUTO: ABNORMAL
KETONES UR STRIP-MCNC: NEGATIVE MG/DL
LEUKOCYTE ESTERASE UR QL STRIP: ABNORMAL
MCH RBC QN AUTO: 30.2 PG (ref 26.8–34.3)
MCHC RBC AUTO-ENTMCNC: 32.2 G/DL (ref 31.4–37.4)
MCV RBC AUTO: 94 FL (ref 82–98)
NITRITE UR QL STRIP: NEGATIVE
NON-SQ EPI CELLS URNS QL MICRO: ABNORMAL /HPF
PH UR STRIP.AUTO: 6 [PH]
PLATELET # BLD AUTO: 204 THOUSANDS/UL (ref 149–390)
PMV BLD AUTO: 9.5 FL (ref 8.9–12.7)
POTASSIUM SERPL-SCNC: 4.3 MMOL/L (ref 3.5–5.3)
PROT UR STRIP-MCNC: >=300 MG/DL
RBC # BLD AUTO: 4.24 MILLION/UL (ref 3.88–5.62)
RBC #/AREA URNS AUTO: ABNORMAL /HPF
SODIUM SERPL-SCNC: 144 MMOL/L (ref 136–145)
SP GR UR STRIP.AUTO: >=1.03 (ref 1–1.03)
UROBILINOGEN UR QL STRIP.AUTO: 1 E.U./DL
WBC # BLD AUTO: 5.4 THOUSAND/UL (ref 4.31–10.16)
WBC #/AREA URNS AUTO: ABNORMAL /HPF

## 2020-02-19 PROCEDURE — 82948 REAGENT STRIP/BLOOD GLUCOSE: CPT

## 2020-02-19 PROCEDURE — 80048 BASIC METABOLIC PNL TOTAL CA: CPT | Performed by: INTERNAL MEDICINE

## 2020-02-19 PROCEDURE — 81001 URINALYSIS AUTO W/SCOPE: CPT | Performed by: INTERNAL MEDICINE

## 2020-02-19 PROCEDURE — 87086 URINE CULTURE/COLONY COUNT: CPT | Performed by: INTERNAL MEDICINE

## 2020-02-19 PROCEDURE — 99232 SBSQ HOSP IP/OBS MODERATE 35: CPT | Performed by: INTERNAL MEDICINE

## 2020-02-19 PROCEDURE — 85027 COMPLETE CBC AUTOMATED: CPT | Performed by: INTERNAL MEDICINE

## 2020-02-19 PROCEDURE — 99255 IP/OBS CONSLTJ NEW/EST HI 80: CPT | Performed by: UROLOGY

## 2020-02-19 RX ORDER — ASPIRIN 81 MG/1
81 TABLET ORAL DAILY
Status: DISCONTINUED | OUTPATIENT
Start: 2020-02-20 | End: 2020-02-20 | Stop reason: HOSPADM

## 2020-02-19 RX ADMIN — OXYCODONE HYDROCHLORIDE 10 MG: 10 TABLET ORAL at 01:22

## 2020-02-19 RX ADMIN — METOPROLOL SUCCINATE 12.5 MG: 25 TABLET, EXTENDED RELEASE ORAL at 17:44

## 2020-02-19 RX ADMIN — INSULIN LISPRO 12 UNITS: 100 INJECTION, SOLUTION INTRAVENOUS; SUBCUTANEOUS at 08:34

## 2020-02-19 RX ADMIN — ATORVASTATIN CALCIUM 40 MG: 40 TABLET, FILM COATED ORAL at 08:33

## 2020-02-19 RX ADMIN — OXYCODONE HYDROCHLORIDE 10 MG: 10 TABLET ORAL at 10:00

## 2020-02-19 RX ADMIN — METOPROLOL SUCCINATE 12.5 MG: 25 TABLET, EXTENDED RELEASE ORAL at 08:34

## 2020-02-19 RX ADMIN — OXYCODONE HYDROCHLORIDE 10 MG: 10 TABLET ORAL at 19:26

## 2020-02-19 RX ADMIN — INSULIN GLARGINE 48 UNITS: 100 INJECTION, SOLUTION SUBCUTANEOUS at 08:33

## 2020-02-19 RX ADMIN — FAMOTIDINE 20 MG: 20 TABLET ORAL at 08:34

## 2020-02-19 RX ADMIN — TAMSULOSIN HYDROCHLORIDE 0.4 MG: 0.4 CAPSULE ORAL at 17:44

## 2020-02-19 RX ADMIN — LEVOTHYROXINE SODIUM 50 MCG: 50 TABLET ORAL at 05:39

## 2020-02-19 RX ADMIN — INSULIN LISPRO 12 UNITS: 100 INJECTION, SOLUTION INTRAVENOUS; SUBCUTANEOUS at 17:45

## 2020-02-19 RX ADMIN — CEFTRIAXONE SODIUM 1000 MG: 10 INJECTION, POWDER, FOR SOLUTION INTRAVENOUS at 12:28

## 2020-02-19 RX ADMIN — OXYBUTYNIN 10 MG: 5 TABLET, FILM COATED, EXTENDED RELEASE ORAL at 08:33

## 2020-02-19 RX ADMIN — INSULIN LISPRO 12 UNITS: 100 INJECTION, SOLUTION INTRAVENOUS; SUBCUTANEOUS at 12:21

## 2020-02-19 NOTE — TELEPHONE ENCOUNTER
Lucian,    Please let pt know I reviewed his BG log    -BGs look great    When you get home from the hospital continue lantus 48 units in the morning and reduce the novolog down to 10 units TID [facility]    Sorry to hear that you are in the hospital, hope you feel better soon from your kidney issue  Eric WITT    Endocrinology

## 2020-02-19 NOTE — TELEPHONE ENCOUNTER
Spoke with patient  He thanks you for your well wishes  I went over his BG log and changes to medications  He understood

## 2020-02-19 NOTE — PROGRESS NOTES
Progress Note - Paula Morgan 1960, 60 y o  male MRN: [id number]    Unit/Bed#: S -01 Encounter: [id number]    Primary Care Provider: Arlen Srivastava DO   Date and time admitted to hospital: 2/18/2020 11:55 AM        * Pyelonephritis  Assessment & Plan  Pt reports continued hematuria with clots  Pt urine is red-black  Wife reports sediment  Cultures still pending  Pt reports new R lumbar pain, mild tenderness to palpation  Hemoglobin 12 8 from 13 8 (?hemodilution)  · CT renal stone study: Interval placement of a right ureteral stent with resolution of the previously reported right hydronephrosis  No evidence of a urinary tract calculus  · Admit for administration of IV antibiotics   · Continue Ceftriaxone IV  · Urology consult - they would not like to remove stent under current conditions  Continue medical optimization, and symptom management  Antibiotics per medicine  Have contacted office to arrange for sooner rather than later ureteroscopy  Stent colic in the interim can be treated with IV fluids, anti spasmodic, anti inflammatory, bowel regimen and only modest narcotics  Emergent  intervention not indicated this hospital stay    · Pain control  · IVF at 75 ml/hr  · Follow cultures  · Follow CBC  · Will restart Eliquis night dose    Hypertension  Assessment & Plan  137/73  · Continue home meds    Diabetes [facility])  Assessment & Plan  Lab Results   Component Value Date    HGBA1C 7 2 (H) 11/11/2019       Recent Labs     02/18/20  1931 02/18/20  2142 02/19/20  0415 02/19/20  0735   POCGLU 175* 331* 105 117       Blood Sugar Average: Last 72 hrs:  · (P) 156   · On insulin and metformin at home, hold metformin  · Home insulin  · Sliding scale  · Glucose checks    Sarcoidosis  Assessment & Plan  · [address] [facility] pulmonology and rheumatology outpatient    Hypothyroid  Assessment & Plan  · Continue Levothyroxine 50 mcg daily      VTE Pharmacologic Prophylaxis:   Pharmacologic: Eliquis, starting tonight  Mechanical VTE Prophylaxis in Place: Yes    Discussions with Specialists or Other Care Team Provider: Urology    Education and Discussions with Family / Patient: Pt and wife    Current Length of Stay: 1 day(s)    Current Patient Status: Inpatient     Discharge Plan / Estimated Discharge Date: TBD    Code Status: Level 1 - Full Code      Subjective:   Pt reports increased back pain over night in R back  Points to lower thoracic and upper lumbar  Reports improvement with pain medication  Pt able to void and reports continued dark hematuria  Pt had urinal in bathroom with red-black urine  Wife reports sediment present in urine  Pt reports increased urgency and frequency (presumably due to IVF), however reports decreased difficulty and discomfort with urination  Denies chest pain, SOB  Denies other or new complaints  Objective:     Vitals:   Temp (24hrs), Av 2 °F (36 8 °C), Min:97 9 °F (36 6 °C), Max:98 8 °F (37 1 °C)    Temp:  [97 9 °F (36 6 °C)-98 8 °F (37 1 °C)] 97 9 °F (36 6 °C)  HR:  [76-91] 79  Resp:  [16-18] 16  BP: (122-162)/(62-77) 137/73  SpO2:  [93 %-98 %] 96 %  Body mass index is 39 74 kg/m²  Input and Output Summary (last 24 hours): Intake/Output Summary (Last 24 hours) at 2020 1154  Last data filed at 2020 0801  Gross per 24 hour   Intake    Output 825 ml   Net -825 ml       Physical Exam:     Physical Exam   Constitutional: He is oriented to person, place, and time  He appears well-developed and well-nourished  No distress  Pleasant   HENT:   Head: Normocephalic and atraumatic  Eyes: Conjunctivae and EOM are normal    Cardiovascular: Normal rate, regular rhythm and normal heart sounds  No murmur heard  Pulmonary/Chest: Effort normal and breath sounds normal    Abdominal: Soft  Bowel sounds are normal  There is no tenderness  Musculoskeletal: He exhibits no edema or tenderness  No CVA tenderness   Mild tenderness R lumbar area   Neurological: He is alert and oriented to person, place, and time  Skin: Skin is warm and dry  He is not diaphoretic  No erythema  Psychiatric: He has a normal mood and affect  His behavior is normal  Judgment and thought content normal            Additional Data:     Labs:    Results from last 7 days   Lab Units 02/19/20  0630 02/18/20  1134   WBC Thousand/uL 5 40 7 96   HEMOGLOBIN g/dL 12 8 13 8   HEMATOCRIT % 39 7 41 4   PLATELETS Thousands/uL 204 241   NEUTROS PCT %  --  63   LYMPHS PCT %  --  24   MONOS PCT %  --  8   EOS PCT %  --  3     Results from last 7 days   Lab Units 02/19/20  0630 02/18/20  1134   SODIUM mmol/L 144 136   POTASSIUM mmol/L 4 3 4 1   CHLORIDE mmol/L 108 102   CO2 mmol/L 28 24   BUN mg/dL 15 19   CREATININE mg/dL 1 00 1 13   CALCIUM mg/dL 8 6 8 8   ALK PHOS U/L  --  71   ALT U/L  --  21   AST U/L  --  14           * I Have Reviewed All Lab Data Listed Above  * Additional Pertinent Lab Tests Reviewed:  All Labs Within Last 24 Hours Reviewed    Imaging:    Imaging Reports Reviewed Today Include: None today    Recent Cultures (last 7 days):           Last 24 Hours Medication List:     Current Facility-Administered Medications:  acetaminophen 650 mg Oral Q4H PRN Dillon Gonzales, DO    atorvastatin 40 mg Oral Daily Jeremiah Castro, DO    cefTRIAXone 1,000 mg Intravenous Q24H Jeremiah Castro, DO    famotidine 20 mg Oral Daily Jeremiah Castro, DO    insulin glargine 48 Units Subcutaneous QAM Jeremiah Castro, DO    insulin lispro 12 Units Subcutaneous TID With Meals Raymond Castro, DO    levothyroxine 50 mcg Oral Early Morning Jeremiah Castro, DO    metoprolol succinate 12 5 mg Oral BID Jeremiah Castro, DO    morphine injection 5 mg Intravenous Q4H PRN Dillon Gonzalse, DO    oxybutynin 10 mg Oral Daily ROSA MARIA Salazar    oxyCODONE 10 mg Oral Q4H PRN Dillon Gonzales, DO    oxyCODONE 5 mg Oral Q4H PRN Dillon Gonzales, DO    phenazopyridine 100 mg Oral TID PRN Roe Hagan, DO    sodium chloride 75 mL/hr Intravenous Continuous Adrian DO Kenji Last Rate: 75 mL/hr (02/18/20 1041)   tamsulosin 0 4 mg Oral Daily With Dinner Tricia Guzman DO         Today, Patient Was Seen By: Tricia Guzman DO    ** Please Note: This note has been constructed using a voice recognition system   **

## 2020-02-19 NOTE — UTILIZATION REVIEW
Initial Clinical Review    Admission: Date/Time/Statement: Admission Orders (From admission, onward)     Ordered        02/18/20 1308  Inpatient Admission (expected length of stay for this patient Order details is greater than two midnights)  Once                   Orders Placed This Encounter   Procedures    Inpatient Admission (expected length of stay for this patient Order details is greater than two midnights)     Standing Status:   Standing     Number of Occurrences:   1     Order Specific Question:   Admitting Physician     Answer:   Rafiq Clayton [[id number]]     Order Specific Question:   Level of Care     Answer:   Med Surg [[id number]]     Order Specific Question:   Estimated length of stay     Answer:   More than 2 Midnights     Order Specific Question:   Certification     Answer:   I certify that inpatient services are medically necessary for this patient for a duration of greater than two midnights  See H&P and MD Progress Notes for additional information about the patient's course of treatment  ED Arrival Information     Expected Arrival Acuity Means of Arrival Escorted By Service Admission Type    - 2/18/2020 10:50 Urgent Walk-In Self General Medicine Urgent    Arrival Complaint    blood in urine        Chief Complaint   Patient presents with    Blood in Urine     Pt presents to ED with gross hematuria (thick appearing) for past two days  Recent RIGHT kidney surgery (two weeks ago) for stone, found "puss"  Pt appears pale  Assessment/Plan:  this is a 60year old male from home to ED per urology,  admitted to inpatient due to acute pyelonephritis  Presented due to right flank pain and hematuria starting 2/17, had ureteral stent placed on 2/6 for proximal stone  On exam has tenderness right flank  glucose 248 with history of diabetes  UA innumerable RBC, 0-1 WBC, small leukocytes, + nitrite, >=300 protein  1/10% glucose  Wbc 7 96   CT showed resolution of hydronephrosis  Urine culture sen    Pain control, IV antibiotic, IVF in progress  Hold eliquis  Consult urology    On 2/19 had continued hematuria, urine red-black  Has continued right lumbar pain  H&H dropped - hemoglobin about 1 gram to 12 8, suspect hemo dilutional    Continue IVF and IV antibitocs     On 2/19 per urology - 60 y o  old male with  recent right obstructing ureteral calculus, status post cystoscopy, retrograde pyelography and right ureteral stent placement, awaiting definitive ureteroscopy  Continue antibiotics, stent colic can be treated with IVF, antispasmodic, antiinflammatory, bowel regime and modest narcotics  ED Triage Vitals   Temperature Pulse Respirations Blood Pressure SpO2   02/18/20 1120 02/18/20 1120 02/18/20 1120 02/18/20 1120 02/18/20 1120   98 7 °F (37 1 °C) 77 18 133/81 94 %      Temp Source Heart Rate Source Patient Position - Orthostatic VS BP Location FiO2 (%)   02/18/20 1120 02/18/20 1120 02/18/20 1120 02/18/20 1120 --   Oral Monitor Sitting Left arm       Pain Score       02/18/20 1544       6        Wt Readings from Last 1 Encounters:   02/18/20 133 kg (293 lb)     Additional Vital Signs:   02/19/20 0732  97 9 °F (36 6 °C)  79  16  137/73  97  96 %  None (Room air)  Lying   02/18/20 2255  98 °F (36 7 °C)  91  16  122/62  83  93 %  None (Room air)  Lying   02/18/20 1549  98 8 °F (37 1 °C)  76  18  162/77  110  98 %  None (Room air         Pertinent Labs/Diagnostic Test Results:   2/18/2020 CT renal stone study abdomen -   Interval placement of a right ureteral stent with resolution of the previously reported right hydronephrosis     No evidence of a urinary tract calculus    Results from last 7 days   Lab Units 02/19/20  0630 02/18/20  1134   WBC Thousand/uL 5 40 7 96   HEMOGLOBIN g/dL 12 8 13 8   HEMATOCRIT % 39 7 41 4   PLATELETS Thousands/uL 204 241   NEUTROS ABS Thousands/µL  --  5 10     Results from last 7 days   Lab Units 02/19/20  0630 02/18/20  1134   SODIUM mmol/L 144 136   POTASSIUM mmol/L 4 3 4 1 CHLORIDE mmol/L 108 102   CO2 mmol/L 28 24   ANION GAP mmol/L 8 10   BUN mg/dL 15 19   CREATININE mg/dL 1 00 1 13   EGFR ml/min/1 73sq m 81 70   CALCIUM mg/dL 8 6 8 8     Results from last 7 days   Lab Units 02/18/20  1134   AST U/L 14   ALT U/L 21   ALK PHOS U/L 71   TOTAL PROTEIN g/dL 7 5   ALBUMIN g/dL 3 6   TOTAL BILIRUBIN mg/dL 0 42     Results from last 7 days   Lab Units 02/19/20  1050 02/19/20  0735 02/19/20  0415 02/18/20  2142 02/18/20  1931 02/18/20  1704   POC GLUCOSE mg/dl 150* 117 105 331* 175* 52*     Results from last 7 days   Lab Units 02/19/20  0630 02/18/20  1134   GLUCOSE RANDOM mg/dL 119 248*     BETA-HYDROXYBUTYRATE   Date Value Ref Range Status   08/01/2019 1 5 (H) <0 6 mmol/L Final      Results from last 7 days   Lab Units 02/19/20  1147 02/18/20  1131   CLARITY UA  Turbid Cloudy   COLOR UA  Brown Red   SPEC GRAV UA  >=1 030 >=1 030   PH UA  6 0 6 5   GLUCOSE UA mg/dl 100 (1/10%)* 100 (1/10%)*   KETONES UA mg/dl Negative Trace*   BLOOD UA  Large* Large*   PROTEIN UA mg/dl >=300* >=300*   NITRITE UA  Negative Positive*   BILIRUBIN UA  Small* Moderate*   UROBILINOGEN UA E U /dl 1 0 2 0*   LEUKOCYTES UA  Trace* Small*   WBC UA /hpf Field obscured, unable to enumerate* 0-1*   RBC UA /hpf Innumerable* Innumerable*   BACTERIA UA /hpf Field obscured, unable to enumerate* Occasional   EPITHELIAL CELLS WET PREP /hpf Field obscured, unable to enumerate* None Seen       ED Treatment:   Medication Administration from 02/18/2020 1050 to 02/18/2020 1509       Date/Time Order Dose Route Action Comments     02/18/2020 1307 ceftriaxone (ROCEPHIN) 1 g/50 mL in dextrose IVPB 1,000 mg Intravenous New Bag      02/18/2020 1342 fentanyl citrate (PF) 100 MCG/2ML 50 mcg 50 mcg Intravenous Given         Past Medical History:   Diagnosis Date    BPH (benign prostatic hyperplasia)     Diabetes mellitus (HCC)     Erectile dysfunction     GERD (gastroesophageal reflux disease)     Headache(784 0) 05/01/2018    Each Day    Hematuria     Infectious viral hepatitis 2007    Cured by Dr Jovany Ferrari Nils Julienmelisa Reyes Liver disease     hepatitis C    Mediastinal adenopathy     Obesity 2007    Vitamin D deficiency      Present on Admission:   Hypothyroid   Sarcoidosis      Admitting Diagnosis: Blood in urine [R31 9]  UTI (urinary tract infection) [N39 0]  Age/Sex: 60 y o  male  Admission Orders: 2/18/2020 1308 Inpatient   Scheduled Medications:  Medications:  apixaban 5 mg Oral BID   atorvastatin 40 mg Oral Daily   cefTRIAXone 1,000 mg Intravenous Q24H   famotidine 20 mg Oral Daily   insulin glargine 48 Units Subcutaneous QAM   insulin lispro 12 Units Subcutaneous TID With Meals   levothyroxine 50 mcg Oral Early Morning   metoprolol succinate 12 5 mg Oral BID   oxybutynin 10 mg Oral Daily   tamsulosin 0 4 mg Oral Daily With Dinner     Continuous IV Infusions:  sodium chloride 75 mL/hr Intravenous Continuous     PRN Meds:  acetaminophen 650 mg Oral Q4H PRN   morphine injection 5 mg Intravenous Q4H PRN   oxyCODONE - used x 2 thus far 2/19 10 mg Oral Q4H PRN   oxyCODONE - used x 1 on 2/18 5 mg Oral Q4H PRN   phenazopyridine 100 mg Oral TID PRN       IP CONSULT TO UROLOGY    Network Utilization Review Department  [email or web address]  ATTENTION: Please call with any questions or concerns to [phone] and carefully listen to the prompts so that you are directed to the right person  All voicemails are confidential   Chrystine Can all requests for admission clinical reviews, approved or denied determinations and any other requests to dedicated fax number below belonging to the campus where the patient is receiving treatment   List of dedicated fax numbers for the Facilities:  FACILITY NAME UR FAX NUMBER   ADMISSION DENIALS (Administrative/Medical Necessity) [phone]   [address] (Maternity/NICU/Pediatrics) [phone]   Joaquin Congress [address] [phone]   Anahy Gonzalez Colletta Riser [phone]   Junnir Palmer [facility] Julius [address] [facility] [phone]   [facility]  [phone]   [address] [facility]  [phone]   [address] [place] [address] [address] [facility] [phone]

## 2020-02-19 NOTE — ASSESSMENT & PLAN NOTE
Lab Results   Component Value Date    HGBA1C 7 2 (H) 11/11/2019       Recent Labs     02/18/20  1931 02/18/20  2142 02/19/20  0415 02/19/20  0735   POCGLU 175* 331* 105 117       Blood Sugar Average: Last 72 hrs:  · (P) 156   · On insulin and metformin at home, hold metformin  · Home insulin  · Sliding scale  · Glucose checks

## 2020-02-19 NOTE — ASSESSMENT & PLAN NOTE
Pt reports continued hematuria with clots  Pt urine is red-black  Wife reports sediment  Cultures still pending  Pt reports new R lumbar pain, mild tenderness to palpation  Hemoglobin 12 8 from 13 8 (?hemodilution)  · CT renal stone study: Interval placement of a right ureteral stent with resolution of the previously reported right hydronephrosis  No evidence of a urinary tract calculus  · Admit for administration of IV antibiotics   · Continue Ceftriaxone IV  · Urology consult - they would not like to remove stent under current conditions  Continue medical optimization, and symptom management  Antibiotics per medicine  Have contacted office to arrange for sooner rather than later ureteroscopy  Stent colic in the interim can be treated with IV fluids, anti spasmodic, anti inflammatory, bowel regimen and only modest narcotics  Emergent  intervention not indicated this hospital stay    · Pain control  · IVF at 75 ml/hr  · Follow cultures  · Follow CBC  · Will restart Eliquis night dose

## 2020-02-19 NOTE — TELEPHONE ENCOUNTER
Karri Crespo was seen in consultation during admission by Internal Medicine service at [facility] 2/18  He will be treated for stent colic and pyelonephritis  Looking at the future scheduling  Patient is scheduled for visit with Janae Mcfarland in April  However, this is not an acceptable time frame for patient with severe colic  He will have stent in far too long  Please review schedule and squeeze inpatient where you can for follow-up ureteroscopy within the next 2 weeks  Patient will likely return to hospital with symptoms repeatedly unless definitive treatment is completed    Thank you

## 2020-02-19 NOTE — TELEPHONE ENCOUNTER
Per telephone encounter from 2/6/20 patient is to be scheduled for second procedure by way of stone center in roughly 4 weeks from initial procedure

## 2020-02-19 NOTE — PLAN OF CARE
Problem: PAIN - ADULT  Goal: Verbalizes/displays adequate comfort level or baseline comfort level  Description  Interventions:  - Encourage patient to monitor pain and request assistance  - Assess pain using appropriate pain scale  - Administer analgesics based on type and severity of pain and evaluate response  - Implement non-pharmacological measures as appropriate and evaluate response  - Consider cultural and social influences on pain and pain management  - Notify physician/advanced practitioner if interventions unsuccessful or patient reports new pain  Outcome: Progressing     Problem: INFECTION - ADULT  Goal: Absence or prevention of progression during hospitalization  Description  INTERVENTIONS:  - Assess and monitor for signs and symptoms of infection  - Monitor lab/diagnostic results  - Monitor all insertion sites, i e  indwelling lines, tubes, and drains  - Monitor endotracheal if appropriate and nasal secretions for changes in amount and color  - [place] appropriate cooling/warming therapies per order  - Administer medications as ordered  - Instruct and encourage patient and family to use good hand hygiene technique  - Identify and instruct in appropriate isolation precautions for identified infection/condition  Outcome: Progressing     Problem: GENITOURINARY - ADULT  Goal: Maintains or returns to baseline urinary function  Description  INTERVENTIONS:  - Assess urinary function  - Encourage oral fluids to ensure adequate hydration if ordered  - Administer IV fluids as ordered to ensure adequate hydration  - Administer ordered medications as needed  - Offer frequent toileting  - Follow urinary retention protocol if ordered  Outcome: Progressing  Goal: Absence of urinary retention  Description  INTERVENTIONS:  - Assess patients ability to void and empty bladder  - Monitor I/O  - Bladder scan as needed  - Discuss with physician/AP medications to alleviate retention as needed  - Discuss catheterization for long term situations as appropriate  Outcome: Progressing

## 2020-02-20 VITALS
HEIGHT: 72 IN | HEART RATE: 76 BPM | RESPIRATION RATE: 18 BRPM | OXYGEN SATURATION: 96 % | SYSTOLIC BLOOD PRESSURE: 128 MMHG | WEIGHT: 293 LBS | TEMPERATURE: 98.1 F | BODY MASS INDEX: 39.68 KG/M2 | DIASTOLIC BLOOD PRESSURE: 69 MMHG

## 2020-02-20 LAB
BACTERIA UR CULT: NORMAL
ERYTHROCYTE [DISTWIDTH] IN BLOOD BY AUTOMATED COUNT: 12.2 % (ref 11.6–15.1)
GLUCOSE SERPL-MCNC: 117 MG/DL (ref 65–140)
GLUCOSE SERPL-MCNC: 131 MG/DL (ref 65–140)
HCT VFR BLD AUTO: 39.1 % (ref 36.5–49.3)
HGB BLD-MCNC: 12.8 G/DL (ref 12–17)
MCH RBC QN AUTO: 30.3 PG (ref 26.8–34.3)
MCHC RBC AUTO-ENTMCNC: 32.7 G/DL (ref 31.4–37.4)
MCV RBC AUTO: 92 FL (ref 82–98)
PLATELET # BLD AUTO: 204 THOUSANDS/UL (ref 149–390)
PMV BLD AUTO: 9.1 FL (ref 8.9–12.7)
RBC # BLD AUTO: 4.23 MILLION/UL (ref 3.88–5.62)
WBC # BLD AUTO: 5.05 THOUSAND/UL (ref 4.31–10.16)

## 2020-02-20 PROCEDURE — 82948 REAGENT STRIP/BLOOD GLUCOSE: CPT

## 2020-02-20 PROCEDURE — 99239 HOSP IP/OBS DSCHRG MGMT >30: CPT | Performed by: INTERNAL MEDICINE

## 2020-02-20 PROCEDURE — 85027 COMPLETE CBC AUTOMATED: CPT | Performed by: PSYCHIATRY & NEUROLOGY

## 2020-02-20 RX ORDER — CEPHALEXIN 500 MG/1
500 CAPSULE ORAL EVERY 8 HOURS SCHEDULED
Qty: 24 CAPSULE | Refills: 0 | Status: SHIPPED | OUTPATIENT
Start: 2020-02-20 | End: 2020-02-28

## 2020-02-20 RX ORDER — PHENAZOPYRIDINE HYDROCHLORIDE 100 MG/1
100 TABLET, FILM COATED ORAL 3 TIMES DAILY PRN
Qty: 30 TABLET | Refills: 0 | Status: CANCELLED | OUTPATIENT
Start: 2020-02-20

## 2020-02-20 RX ADMIN — OXYCODONE HYDROCHLORIDE 10 MG: 10 TABLET ORAL at 02:40

## 2020-02-20 RX ADMIN — OXYBUTYNIN 10 MG: 5 TABLET, FILM COATED, EXTENDED RELEASE ORAL at 08:21

## 2020-02-20 RX ADMIN — INSULIN LISPRO 12 UNITS: 100 INJECTION, SOLUTION INTRAVENOUS; SUBCUTANEOUS at 13:12

## 2020-02-20 RX ADMIN — LEVOTHYROXINE SODIUM 50 MCG: 50 TABLET ORAL at 06:11

## 2020-02-20 RX ADMIN — ASPIRIN 81 MG: 81 TABLET, COATED ORAL at 08:31

## 2020-02-20 RX ADMIN — ATORVASTATIN CALCIUM 40 MG: 40 TABLET, FILM COATED ORAL at 08:21

## 2020-02-20 RX ADMIN — CEFTRIAXONE SODIUM 1000 MG: 10 INJECTION, POWDER, FOR SOLUTION INTRAVENOUS at 13:12

## 2020-02-20 RX ADMIN — INSULIN LISPRO 12 UNITS: 100 INJECTION, SOLUTION INTRAVENOUS; SUBCUTANEOUS at 08:26

## 2020-02-20 RX ADMIN — INSULIN GLARGINE 48 UNITS: 100 INJECTION, SOLUTION SUBCUTANEOUS at 08:23

## 2020-02-20 RX ADMIN — FAMOTIDINE 20 MG: 20 TABLET ORAL at 08:21

## 2020-02-20 RX ADMIN — METOPROLOL SUCCINATE 12.5 MG: 25 TABLET, EXTENDED RELEASE ORAL at 08:21

## 2020-02-20 NOTE — ASSESSMENT & PLAN NOTE
Lab Results   Component Value Date    HGBA1C 7 2 (H) 11/11/2019       Recent Labs     02/19/20  1604 02/19/20  2057 02/20/20  0706 02/20/20  1105   POCGLU 100 141* 117 131       Blood Sugar Average: Last 72 hrs:  · (P) 141 9   · On insulin and metformin at home  · Home insulin  · Glucose checks

## 2020-02-20 NOTE — ASSESSMENT & PLAN NOTE
· CT renal stone study: Interval placement of a right ureteral stent with resolution of the previously reported right hydronephrosis  No evidence of a urinary tract calculus  Pt reports improvement in back pain  States his hematuria is slightly lighter and has less sediment   Denies fever, chills, N/V     · Discharge  · Keflex 500 mg TID x 8 days  · Eliquis discontinued due to hematuria  · pt is near end of treatment for hx of thrombosis  · Follow up with urology outpatient

## 2020-02-20 NOTE — NURSING NOTE
Pt discharged home  Reviewed all the discharged instruction with the pt  Gathered all the belongings  Pt acknowledged all the discharged instruction  Home star brought the medication to bed  Pt left the floor in a stable condition

## 2020-02-20 NOTE — DISCHARGE SUMMARY
Discharge- Otto Knox 1960, 60 y o  male MRN: [id number]    Unit/Bed#: S -01 Encounter: [id number]    Primary Care Provider: Muna Conde DO   Date and time admitted to hospital: 2/18/2020 11:55 AM        * Pyelonephritis  Assessment & Plan  · CT renal stone study: Interval placement of a right ureteral stent with resolution of the previously reported right hydronephrosis  No evidence of a urinary tract calculus  Pt reports improvement in back pain  States his hematuria is slightly lighter and has less sediment  Denies fever, chills, N/V     · Discharge  · Keflex 500 mg TID x 8 days  · Eliquis discontinued due to hematuria  · pt is near end of treatment for hx of thrombosis  · Follow up with urology outpatient    Hypertension  Assessment & Plan  128/69  · Continue home meds    Diabetes [facility])  Assessment & Plan  Lab Results   Component Value Date    HGBA1C 7 2 (H) 11/11/2019       Recent Labs     02/19/20  1604 02/19/20  2057 02/20/20  0706 02/20/20  1105   POCGLU 100 141* 117 131       Blood Sugar Average: Last 72 hrs:  · (P) 141 9   · On insulin and metformin at home  · Home insulin  · Glucose checks    Sarcoidosis  Assessment & Plan  · [facility] pulmonology and rheumatology outpatient    Hypothyroid  Assessment & Plan  · Continue Levothyroxine 50 mcg daily      Discharging Resident Physician: Nirmal Mejia DO  Attending: Yesi Killian DO  PCP: Muna Conde DO  Admission Date: 2/18/2020  Discharge Date: 02/20/20    Disposition:     Home    Reason for Admission: hematuria    Consultations During Hospital Stay:  · Urology    Procedures Performed:     · none    Significant Findings / Test Results:     · CT renal stone study: Interval placement of a right ureteral stent with resolution of the previously reported right hydronephrosis   No evidence of a urinary tract calculus      Incidental Findings:   · none     Hospital Course:     Otto Knox is a 60 y o  male patient who originally presented to the hospital on 2/18/2020 due to hematuria  Patient reported had a right ureteral stent placed on 2/6 for an obstructing ureteral calculus, with pur surrounding the stone  Reports that for the few days prior to arrival, he was experiencing fever, hematuria with clots, and right flank pain  In the ED patient urinalysis positive for leukocytes and nitrite  CT renal stone study showed a right urethral stent and no evidence of urinary tract calculus  Patient was started on IV ceftriaxone for presumed pyelonephritis  Patient reported right flank pain, increasing frequency and urgency of urination  Urine was red-black with sediment and clots  He denied purulent discharge in urine  Over the course of his stay, he was given ceftriaxone, antispasmodics, pain control, and IVF  Condition at Discharge: fair     Discharge Day Visit / Exam:     Subjective:  Pt reports improvement in back pain  States his hematuria is slightly lighter and has less sediment  Denies fever, chills, N/V  Vitals: Blood Pressure: 128/69 (02/20/20 0704)  Pulse: 76 (02/20/20 0704)  Temperature: 98 1 °F (36 7 °C) (02/20/20 0704)  Temp Source: Oral (02/20/20 0704)  Respirations: 18 (02/20/20 0704)  Height: 6' (182 9 cm) (02/18/20 1114)  Weight - Scale: 133 kg (293 lb) (02/18/20 1114)  SpO2: 96 % (02/20/20 0704)  Exam:   Physical Exam   Constitutional: He is oriented to person, place, and time  He appears well-developed and well-nourished  No distress  Pleasant   HENT:   Head: Normocephalic and atraumatic  Eyes: Conjunctivae and EOM are normal    Cardiovascular: Normal rate, regular rhythm and normal heart sounds  No murmur heard  Pulmonary/Chest: Effort normal and breath sounds normal    Abdominal: Soft  Bowel sounds are normal  There is no tenderness  Musculoskeletal: He exhibits no edema or tenderness  No CVA tenderness   Neurological: He is alert and oriented to person, place, and time  Skin: Skin is warm and dry  He is not diaphoretic  No erythema  Psychiatric: He has a normal mood and affect  His behavior is normal  Judgment and thought content normal        Discharge instructions/Information to patient and family:   See after visit summary for information provided to patient and family  Provisions for Follow-Up Care:  See after visit summary for information related to follow-up care and any pertinent home health orders  Discharge Medications:  See after visit summary for reconciled discharge medications provided to patient and family        ** Please Note: This note has been constructed using a voice recognition system **

## 2020-02-20 NOTE — PLAN OF CARE
Problem: GENITOURINARY - ADULT  Goal: Maintains or returns to baseline urinary function  Description  INTERVENTIONS:  - Assess urinary function  - Encourage oral fluids to ensure adequate hydration if ordered  - Administer IV fluids as ordered to ensure adequate hydration  - Administer ordered medications as needed  - Offer frequent toileting  - Follow urinary retention protocol if ordered  Outcome: Progressing  Goal: Absence of urinary retention  Description  INTERVENTIONS:  - Assess patients ability to void and empty bladder  - Monitor I/O  - Bladder scan as needed  - Discuss with physician/AP medications to alleviate retention as needed  - Discuss catheterization for long term situations as appropriate  Outcome: Progressing

## 2020-02-20 NOTE — DISCHARGE INSTR - AVS FIRST PAGE
Dear Carlos A Crowell,     It was our pleasure to care for you here at [facility]  It is our hope that we were always able to exceed the expected standards for your care during your stay  You were hospitalized due to hematuria  You were cared for on the 4th floor by Miles Bragg DO under the service of Susannah Herman DO with the [facility] Internal Medicine Hospitalist Group who covers for your primary care physician (PCP), Young Reyes DO, while you were hospitalized  If you have any questions or concerns related to this hospitalization, you may contact us at [phone]  For follow up as well as any medication refills, we recommend that you follow up with your primary care physician  A registered nurse will reach out to you by phone within a few days after your discharge to answer any additional questions that you may have after going home  However, at this time we provide for you here, the most important instructions / recommendations at discharge:     Notable Medication Adjustments -   Keflex 500 mg every 8 hours for 8 days  Discontinued Eliquis  Important follow up information -   Follow up with Urology outpatient  Follow up with your primary care physician  Please review this entire after visit summary as additional general instructions including medication list, appointments, activity, diet, any pertinent wound care, and other additional recommendations from your care team that may be provided for you        Sincerely,     Miles Bragg DO

## 2020-02-21 DIAGNOSIS — E11.9 TYPE 2 DIABETES MELLITUS WITHOUT COMPLICATION, WITHOUT LONG-TERM CURRENT USE OF INSULIN (HCC): ICD-10-CM

## 2020-02-24 ENCOUNTER — TRANSITIONAL CARE MANAGEMENT (OUTPATIENT)
Dept: FAMILY MEDICINE CLINIC | Facility: CLINIC | Age: 60
End: 2020-02-24

## 2020-02-24 DIAGNOSIS — R00.0 SINUS TACHYCARDIA: ICD-10-CM

## 2020-02-24 DIAGNOSIS — Z79.4 TYPE 2 DIABETES MELLITUS WITH HYPERGLYCEMIA, WITH LONG-TERM CURRENT USE OF INSULIN (HCC): Primary | ICD-10-CM

## 2020-02-24 DIAGNOSIS — E11.65 TYPE 2 DIABETES MELLITUS WITH HYPERGLYCEMIA, WITH LONG-TERM CURRENT USE OF INSULIN (HCC): Primary | ICD-10-CM

## 2020-02-24 DIAGNOSIS — E11.9 TYPE 2 DIABETES MELLITUS WITHOUT COMPLICATION, WITHOUT LONG-TERM CURRENT USE OF INSULIN (HCC): ICD-10-CM

## 2020-02-24 RX ORDER — INSULIN ASPART 100 [IU]/ML
10 INJECTION, SOLUTION INTRAVENOUS; SUBCUTANEOUS
Qty: 5 PEN | Refills: 3 | Status: SHIPPED | OUTPATIENT
Start: 2020-02-24 | End: 2020-10-27 | Stop reason: ALTCHOICE

## 2020-02-24 RX ORDER — LANCETS 33 GAUGE
EACH MISCELLANEOUS
Qty: 100 EACH | Refills: 5 | Status: SHIPPED | OUTPATIENT
Start: 2020-02-24 | End: 2020-12-02

## 2020-02-25 ENCOUNTER — TELEPHONE (OUTPATIENT)
Dept: OTHER | Facility: HOSPITAL | Age: 60
End: 2020-02-25

## 2020-02-25 ENCOUNTER — TELEPHONE (OUTPATIENT)
Dept: ENDOCRINOLOGY | Facility: CLINIC | Age: 60
End: 2020-02-25

## 2020-02-25 RX ORDER — METOPROLOL SUCCINATE 25 MG/1
12.5 TABLET, EXTENDED RELEASE ORAL 2 TIMES DAILY
Qty: 90 TABLET | Refills: 3 | Status: SHIPPED | OUTPATIENT
Start: 2020-02-25 | End: 2020-07-22

## 2020-02-25 NOTE — TELEPHONE ENCOUNTER
Keena Pack,    Please let pt know I reviewed his BG log    -BGs look stable continue same regimen for now    Jemima Styles

## 2020-02-26 ENCOUNTER — OFFICE VISIT (OUTPATIENT)
Dept: FAMILY MEDICINE CLINIC | Facility: CLINIC | Age: 60
End: 2020-02-26
Payer: COMMERCIAL

## 2020-02-26 VITALS
HEIGHT: 72 IN | SYSTOLIC BLOOD PRESSURE: 126 MMHG | BODY MASS INDEX: 39.47 KG/M2 | HEART RATE: 113 BPM | WEIGHT: 291.4 LBS | TEMPERATURE: 97.8 F | RESPIRATION RATE: 16 BRPM | DIASTOLIC BLOOD PRESSURE: 78 MMHG | OXYGEN SATURATION: 95 %

## 2020-02-26 DIAGNOSIS — R00.0 SINUS TACHYCARDIA: ICD-10-CM

## 2020-02-26 DIAGNOSIS — D86.0 PULMONARY SARCOIDOSIS (HCC): ICD-10-CM

## 2020-02-26 DIAGNOSIS — E11.9 TYPE 2 DIABETES MELLITUS WITHOUT COMPLICATION, WITHOUT LONG-TERM CURRENT USE OF INSULIN (HCC): ICD-10-CM

## 2020-02-26 DIAGNOSIS — N12 PYELONEPHRITIS: Primary | ICD-10-CM

## 2020-02-26 DIAGNOSIS — E78.2 MIXED HYPERLIPIDEMIA: ICD-10-CM

## 2020-02-26 DIAGNOSIS — K80.20 GALLSTONES: ICD-10-CM

## 2020-02-26 PROCEDURE — 1111F DSCHRG MED/CURRENT MED MERGE: CPT | Performed by: FAMILY MEDICINE

## 2020-02-26 PROCEDURE — 99214 OFFICE O/P EST MOD 30 MIN: CPT | Performed by: FAMILY MEDICINE

## 2020-02-26 RX ORDER — APIXABAN 5 MG/1
5 TABLET, FILM COATED ORAL
COMMUNITY
Start: 2020-02-23 | End: 2020-02-26

## 2020-02-26 NOTE — PROGRESS NOTES
Assessment/Plan:    No problem-specific Assessment & Plan notes found for this encounter  Diagnoses and all orders for this visit:    Pyelonephritis  Comments:  finishing keflex  negative culture, but pt had already been given abx when checked  call for worsening pain, fevers, more bleeding  f/u with uro as planned    Type 2 diabetes mellitus without complication, without long-term current use of insulin (HCC)  Comments:  continue with endo  expected to improve with d/c of steroids    Mixed hyperlipidemia  Comments:  discussed 10 yr cv risk and DM status and recommended he continue statin  labs as ordered  Orders:  -     Comprehensive metabolic panel; Future  -     Lipid panel; Future    Gallstones  Comments:  pt had been told he required surgery fairly quickly at consult with surgeon, but he doesn't want to go forward unless he has to   recent [address] shows gallstones but no signs of acute vivi  Refer to dr Baljinder Hay for opinion  Orders:  -     Ambulatory referral to General Surgery; Future    Pulmonary sarcoidosis ([facility] [address] )  Comments:  continue f/u with rheum     Sinus tachycardia  Comments:  discussed rationale for metoprolol and advised him to keep taking    Other orders  -     Discontinue: ELIQUIS 5 MG; Take 5 mg by mouth        Subjective:      Patient ID: Fany Travis is a 60 y o  male  HPI    TCM Call (since 1/26/2020)     Date and time call was made  2/24/2020  9:59 AM    Hospital care reviewed  Records reviewed    Patient was hospitialized at  [address] [place] [address]    Date of Admission  02/18/20    Date of discharge  02/20/20    Diagnosis  Pyelonephritis     Disposition  Home    Were the patients medications reviewed and updated  Yes    Current Symptoms  None      TCM Call (since 1/26/2020)     Post hospital issues  Reduced activity    Should patient be enrolled in anticoag monitoring? No    Scheduled for follow up?   Yes    Patients specialists  Urologist    Urologist name  Dr Yesi Manrique     Urologist contact #  [phone]    Did you obtain your prescribed medications  Yes    Do you need help managing your prescriptions or medications  No    Is transportation to your appointment needed  No    I have advised the patient to call PCP with any new or worsening symptoms  John Mcdermott LPN     Living Arrangements  Spouse or Significiant other; [address] [facility]  Family    The type of support provided  Physical; Emotional    Do you have social support  Yes, as much as I need    Are you recieving any outpatient services  No    Are you recieving home care services  No    Are you using any community resources  No        Pt presents in f/u from recent hospitalization dates above  Developed pyelonephritis after stent placement for stone  Was hospitalized and given abx  He was d/c'd on keflex which is he to finish on Sunday  Recommendation was to see uro after abx to discuss stent removal   Pt denies fevers, chills  States he feels like he has a sidesticker but feels otherwise okay  While inpt, he was taken off his eliquis  Will have completed 6 months of eliquis after PE in a few weeks  Recent CT chest didn't show clot  Likely developed PE at the time of his extended illness/DKA while he was less mobile    Pt wonders whether he really needs to take statin and metoprolol  These were given by cards  The following portions of the patient's history were reviewed and updated as appropriate: allergies, current medications, past family history, past medical history, past social history, past surgical history and problem list     Review of Systems   Constitutional: Negative for chills, fatigue and unexpected weight change  HENT: Negative for congestion, ear pain, hearing loss, postnasal drip, rhinorrhea, sinus pressure, sinus pain, sore throat, trouble swallowing and voice change  Eyes: Negative for pain, redness and visual disturbance  Respiratory: Negative for cough and shortness of breath  Cardiovascular: Negative for chest pain, palpitations and leg swelling  Gastrointestinal: Negative for abdominal pain, constipation, diarrhea and nausea  Endocrine: Negative for cold intolerance, heat intolerance, polydipsia and polyuria  Genitourinary: Positive for flank pain and hematuria  Negative for dysuria, frequency and urgency  Musculoskeletal: Negative for arthralgias, joint swelling and myalgias  Skin: Negative for rash  No suspicious lesions   Neurological: Negative for weakness, numbness and headaches  Hematological: Negative for adenopathy  Objective:      /78   Pulse (!) 113   Temp 97 8 °F (36 6 °C) (Tympanic)   Resp 16   Ht 6' (1 829 m)   Wt 132 kg (291 lb 6 4 oz)   SpO2 95%   BMI 39 52 kg/m²          Physical Exam   Constitutional: He is oriented to person, place, and time  He appears well-developed and well-nourished  No distress  HENT:   Head: Normocephalic and atraumatic  Right Ear: Tympanic membrane, external ear and ear canal normal    Left Ear: Tympanic membrane, external ear and ear canal normal    Nose: Nose normal    Mouth/Throat: Oropharynx is clear and moist and mucous membranes are normal  No oropharyngeal exudate  Eyes: Pupils are equal, round, and reactive to light  Conjunctivae and EOM are normal    Neck: No JVD present  Carotid bruit is not present  No thyromegaly present  Cardiovascular: Regular rhythm, S1 normal and S2 normal  Exam reveals no gallop, no S3, no S4 and no friction rub  No murmur heard  Pulmonary/Chest: Effort normal and breath sounds normal  He has no wheezes  He has no rhonchi  He has no rales  Abdominal: Soft  Bowel sounds are normal  He exhibits no distension  There is no tenderness  There is CVA tenderness  Lymphadenopathy:     He has no cervical adenopathy  Neurological: He is alert and oriented to person, place, and time  He has normal strength and normal reflexes   No cranial nerve deficit or sensory deficit

## 2020-03-02 ENCOUNTER — ANESTHESIA EVENT (OUTPATIENT)
Dept: PERIOP | Facility: AMBULARY SURGERY CENTER | Age: 60
End: 2020-03-02
Payer: COMMERCIAL

## 2020-03-02 ENCOUNTER — TELEPHONE (OUTPATIENT)
Dept: UROLOGY | Facility: AMBULATORY SURGERY CENTER | Age: 60
End: 2020-03-02

## 2020-03-02 PROBLEM — N20.0 NEPHROLITHIASIS: Status: ACTIVE | Noted: 2020-03-02

## 2020-03-02 NOTE — TELEPHONE ENCOUNTER
Waldemar Elizondo To  [place] [facility] Sent  3/2/2020 10:30 AM   Good Morning Dr Kaelyn Carroll,     I had a stent put in my kidney area almost one month ago  I developed an infection and was hospitalized and given antibiotics  My antibiotics are complete now  I am still urinating blood, but not thick and with sediment as before  I am reaching out to ask when should we schedule the removal of my stent  The Urologist on duty at the hospital mentioned she was recommending having it taken out sooner  Anyway, thanks for your help       Julio Chapin

## 2020-03-02 NOTE — PRE-PROCEDURE INSTRUCTIONS
Pre-Surgery Instructions:   Medication Instructions    atorvastatin (LIPITOR) 40 mg tablet Instructed patient per Anesthesia Guidelines   famotidine (PEPCID) 40 MG tablet Instructed patient per Anesthesia Guidelines   glucose 4 g chewable tablet Instructed patient per Anesthesia Guidelines   Insulin Aspart FlexPen (NovoLOG FlexPen) 100 UNIT/ML SOPN Instructed patient per Anesthesia Guidelines   insulin glargine (LANTUS SOLOSTAR) 100 units/mL injection pen Patient was instructed to contact Physician for medication instruction   Lancets ([address]) [address] [facility] Instructed patient per Anesthesia Guidelines   levothyroxine 50 mcg tablet Instructed patient per Anesthesia Guidelines   metFORMIN (GLUCOPHAGE) 500 mg tablet Instructed patient per Anesthesia Guidelines   metoprolol succinate (TOPROL-XL) 25 mg 24 hr tablet Instructed patient per Anesthesia Guidelines   tamsulosin (FLOMAX) 0 4 mg Instructed patient per Anesthesia Guidelines  Preop,medications and showering instructions using an antibacterial soap reviewed

## 2020-03-03 ENCOUNTER — ANESTHESIA (OUTPATIENT)
Dept: PERIOP | Facility: AMBULARY SURGERY CENTER | Age: 60
End: 2020-03-03
Payer: COMMERCIAL

## 2020-03-03 ENCOUNTER — HOSPITAL ENCOUNTER (OUTPATIENT)
Facility: AMBULARY SURGERY CENTER | Age: 60
Setting detail: OUTPATIENT SURGERY
Discharge: HOME | End: 2020-03-03
Attending: UROLOGY | Admitting: UROLOGY
Payer: COMMERCIAL

## 2020-03-03 ENCOUNTER — TELEPHONE (OUTPATIENT)
Dept: UROLOGY | Facility: CLINIC | Age: 60
End: 2020-03-03

## 2020-03-03 ENCOUNTER — APPOINTMENT (OUTPATIENT)
Dept: RADIOLOGY | Facility: AMBULARY SURGERY CENTER | Age: 60
End: 2020-03-03
Payer: COMMERCIAL

## 2020-03-03 ENCOUNTER — TELEPHONE (OUTPATIENT)
Dept: ENDOCRINOLOGY | Facility: CLINIC | Age: 60
End: 2020-03-03

## 2020-03-03 VITALS
RESPIRATION RATE: 16 BRPM | OXYGEN SATURATION: 98 % | BODY MASS INDEX: 37.35 KG/M2 | WEIGHT: 291 LBS | HEART RATE: 68 BPM | SYSTOLIC BLOOD PRESSURE: 128 MMHG | TEMPERATURE: 98 F | HEIGHT: 74 IN | DIASTOLIC BLOOD PRESSURE: 64 MMHG

## 2020-03-03 DIAGNOSIS — N20.1 URETERAL CALCULUS, RIGHT: Primary | ICD-10-CM

## 2020-03-03 DIAGNOSIS — N20.0 NEPHROLITHIASIS: Primary | ICD-10-CM

## 2020-03-03 DIAGNOSIS — N20.0 NEPHROLITHIASIS: ICD-10-CM

## 2020-03-03 PROBLEM — R10.9 ACUTE RIGHT FLANK PAIN: Status: RESOLVED | Noted: 2020-02-06 | Resolved: 2020-03-03

## 2020-03-03 PROBLEM — N12 PYELONEPHRITIS: Status: RESOLVED | Noted: 2020-02-18 | Resolved: 2020-03-03

## 2020-03-03 PROBLEM — N17.9 ACUTE KIDNEY INJURY (HCC): Status: RESOLVED | Noted: 2020-02-06 | Resolved: 2020-03-03

## 2020-03-03 LAB — GLUCOSE SERPL-MCNC: 143 MG/DL (ref 65–140)

## 2020-03-03 PROCEDURE — 52356 CYSTO/URETERO W/LITHOTRIPSY: CPT | Performed by: UROLOGY

## 2020-03-03 PROCEDURE — NC001 PR NO CHARGE: Performed by: UROLOGY

## 2020-03-03 PROCEDURE — C2617 STENT, NON-COR, TEM W/O DEL: HCPCS | Performed by: UROLOGY

## 2020-03-03 PROCEDURE — 82360 CALCULUS ASSAY QUANT: CPT | Performed by: UROLOGY

## 2020-03-03 PROCEDURE — 74420 UROGRAPHY RTRGR +-KUB: CPT

## 2020-03-03 PROCEDURE — 88300 SURGICAL PATH GROSS: CPT | Performed by: PATHOLOGY

## 2020-03-03 PROCEDURE — C1769 GUIDE WIRE: HCPCS | Performed by: UROLOGY

## 2020-03-03 PROCEDURE — 82948 REAGENT STRIP/BLOOD GLUCOSE: CPT

## 2020-03-03 DEVICE — URETERAL STENT 6 FR X 28 CM INLAY: Type: IMPLANTABLE DEVICE | Site: URETER | Status: FUNCTIONAL

## 2020-03-03 RX ORDER — ONDANSETRON 2 MG/ML
4 INJECTION INTRAMUSCULAR; INTRAVENOUS ONCE AS NEEDED
Status: DISCONTINUED | OUTPATIENT
Start: 2020-03-03 | End: 2020-03-03 | Stop reason: HOSPADM

## 2020-03-03 RX ORDER — OXYCODONE HYDROCHLORIDE AND ACETAMINOPHEN 5; 325 MG/1; MG/1
1 TABLET ORAL EVERY 6 HOURS PRN
Qty: 12 TABLET | Refills: 0 | Status: SHIPPED | OUTPATIENT
Start: 2020-03-03 | End: 2020-03-08

## 2020-03-03 RX ORDER — DOCUSATE SODIUM 100 MG/1
100 CAPSULE, LIQUID FILLED ORAL 3 TIMES DAILY
Qty: 21 CAPSULE | Refills: 0 | Status: SHIPPED | OUTPATIENT
Start: 2020-03-03 | End: 2020-04-06 | Stop reason: ALTCHOICE

## 2020-03-03 RX ORDER — KETOROLAC TROMETHAMINE 30 MG/ML
15 INJECTION, SOLUTION INTRAMUSCULAR; INTRAVENOUS EVERY 6 HOURS PRN
Status: DISCONTINUED | OUTPATIENT
Start: 2020-03-03 | End: 2020-03-03 | Stop reason: HOSPADM

## 2020-03-03 RX ORDER — ONDANSETRON 4 MG/1
4 TABLET, ORALLY DISINTEGRATING ORAL EVERY 6 HOURS PRN
Qty: 20 TABLET | Refills: 0 | Status: SHIPPED | OUTPATIENT
Start: 2020-03-03 | End: 2020-04-06 | Stop reason: ALTCHOICE

## 2020-03-03 RX ORDER — ACETAMINOPHEN 325 MG/1
975 TABLET ORAL ONCE
Status: COMPLETED | OUTPATIENT
Start: 2020-03-03 | End: 2020-03-03

## 2020-03-03 RX ORDER — MAGNESIUM HYDROXIDE 1200 MG/15ML
LIQUID ORAL AS NEEDED
Status: DISCONTINUED | OUTPATIENT
Start: 2020-03-03 | End: 2020-03-03 | Stop reason: HOSPADM

## 2020-03-03 RX ORDER — CEFAZOLIN SODIUM 1 G/50ML
1000 SOLUTION INTRAVENOUS
Status: DISCONTINUED | OUTPATIENT
Start: 2020-03-04 | End: 2020-03-03

## 2020-03-03 RX ORDER — PROPOFOL 10 MG/ML
INJECTION, EMULSION INTRAVENOUS AS NEEDED
Status: DISCONTINUED | OUTPATIENT
Start: 2020-03-03 | End: 2020-03-03 | Stop reason: SURG

## 2020-03-03 RX ORDER — CEFAZOLIN SODIUM 2 G/50ML
2000 SOLUTION INTRAVENOUS
Status: DISCONTINUED | OUTPATIENT
Start: 2020-03-03 | End: 2020-03-03

## 2020-03-03 RX ORDER — DIPHENHYDRAMINE HCL 25 MG
25 TABLET ORAL EVERY 6 HOURS PRN
Status: DISCONTINUED | OUTPATIENT
Start: 2020-03-03 | End: 2020-03-03 | Stop reason: HOSPADM

## 2020-03-03 RX ORDER — PHENAZOPYRIDINE HYDROCHLORIDE 100 MG/1
100 TABLET, FILM COATED ORAL
Status: DISCONTINUED | OUTPATIENT
Start: 2020-03-03 | End: 2020-03-03 | Stop reason: HOSPADM

## 2020-03-03 RX ORDER — SODIUM CHLORIDE 9 MG/ML
INJECTION, SOLUTION INTRAVENOUS AS NEEDED
Status: DISCONTINUED | OUTPATIENT
Start: 2020-03-03 | End: 2020-03-03 | Stop reason: HOSPADM

## 2020-03-03 RX ORDER — FENTANYL CITRATE/PF 50 MCG/ML
25 SYRINGE (ML) INJECTION
Status: DISCONTINUED | OUTPATIENT
Start: 2020-03-03 | End: 2020-03-03 | Stop reason: HOSPADM

## 2020-03-03 RX ORDER — CEFAZOLIN SODIUM 1 G/50ML
1000 SOLUTION INTRAVENOUS
Status: COMPLETED | OUTPATIENT
Start: 2020-03-03 | End: 2020-03-03

## 2020-03-03 RX ORDER — ONDANSETRON 2 MG/ML
INJECTION INTRAMUSCULAR; INTRAVENOUS AS NEEDED
Status: DISCONTINUED | OUTPATIENT
Start: 2020-03-03 | End: 2020-03-03 | Stop reason: SURG

## 2020-03-03 RX ORDER — GABAPENTIN 300 MG/1
300 CAPSULE ORAL ONCE
Status: COMPLETED | OUTPATIENT
Start: 2020-03-03 | End: 2020-03-03

## 2020-03-03 RX ORDER — CEFAZOLIN SODIUM 2 G/50ML
2000 SOLUTION INTRAVENOUS
Status: DISCONTINUED | OUTPATIENT
Start: 2020-03-04 | End: 2020-03-03

## 2020-03-03 RX ORDER — LIDOCAINE HYDROCHLORIDE 10 MG/ML
INJECTION, SOLUTION EPIDURAL; INFILTRATION; INTRACAUDAL; PERINEURAL AS NEEDED
Status: DISCONTINUED | OUTPATIENT
Start: 2020-03-03 | End: 2020-03-03 | Stop reason: SURG

## 2020-03-03 RX ORDER — ACETAMINOPHEN 325 MG/1
650 TABLET ORAL EVERY 6 HOURS PRN
Status: DISCONTINUED | OUTPATIENT
Start: 2020-03-03 | End: 2020-03-03 | Stop reason: HOSPADM

## 2020-03-03 RX ORDER — ONDANSETRON 2 MG/ML
4 INJECTION INTRAMUSCULAR; INTRAVENOUS EVERY 6 HOURS PRN
Status: DISCONTINUED | OUTPATIENT
Start: 2020-03-03 | End: 2020-03-03 | Stop reason: HOSPADM

## 2020-03-03 RX ORDER — SODIUM CHLORIDE, SODIUM LACTATE, POTASSIUM CHLORIDE, CALCIUM CHLORIDE 600; 310; 30; 20 MG/100ML; MG/100ML; MG/100ML; MG/100ML
125 INJECTION, SOLUTION INTRAVENOUS CONTINUOUS
Status: DISCONTINUED | OUTPATIENT
Start: 2020-03-03 | End: 2020-03-03 | Stop reason: HOSPADM

## 2020-03-03 RX ORDER — FENTANYL CITRATE 50 UG/ML
INJECTION, SOLUTION INTRAMUSCULAR; INTRAVENOUS AS NEEDED
Status: DISCONTINUED | OUTPATIENT
Start: 2020-03-03 | End: 2020-03-03 | Stop reason: SURG

## 2020-03-03 RX ORDER — GLYCOPYRROLATE 0.2 MG/ML
INJECTION INTRAMUSCULAR; INTRAVENOUS AS NEEDED
Status: DISCONTINUED | OUTPATIENT
Start: 2020-03-03 | End: 2020-03-03 | Stop reason: SURG

## 2020-03-03 RX ORDER — OXYCODONE HYDROCHLORIDE AND ACETAMINOPHEN 5; 325 MG/1; MG/1
2 TABLET ORAL EVERY 6 HOURS PRN
Status: DISCONTINUED | OUTPATIENT
Start: 2020-03-03 | End: 2020-03-03 | Stop reason: HOSPADM

## 2020-03-03 RX ORDER — SULFAMETHOXAZOLE AND TRIMETHOPRIM 800; 160 MG/1; MG/1
1 TABLET ORAL EVERY 12 HOURS SCHEDULED
Qty: 6 TABLET | Refills: 0 | Status: SHIPPED | OUTPATIENT
Start: 2020-03-03 | End: 2020-03-06

## 2020-03-03 RX ADMIN — GLYCOPYRROLATE 0.1 MG: 0.2 INJECTION, SOLUTION INTRAMUSCULAR; INTRAVENOUS at 14:41

## 2020-03-03 RX ADMIN — ACETAMINOPHEN 975 MG: 325 TABLET, FILM COATED ORAL at 13:02

## 2020-03-03 RX ADMIN — CEFAZOLIN SODIUM 3000 MG: 1 SOLUTION INTRAVENOUS at 14:34

## 2020-03-03 RX ADMIN — FENTANYL CITRATE 25 MCG: 50 INJECTION, SOLUTION INTRAMUSCULAR; INTRAVENOUS at 15:29

## 2020-03-03 RX ADMIN — PROPOFOL 200 MG: 10 INJECTION, EMULSION INTRAVENOUS at 14:29

## 2020-03-03 RX ADMIN — SODIUM CHLORIDE, SODIUM LACTATE, POTASSIUM CHLORIDE, AND CALCIUM CHLORIDE: .6; .31; .03; .02 INJECTION, SOLUTION INTRAVENOUS at 13:53

## 2020-03-03 RX ADMIN — FENTANYL CITRATE 50 MCG: 50 INJECTION, SOLUTION INTRAMUSCULAR; INTRAVENOUS at 14:29

## 2020-03-03 RX ADMIN — PHENYLEPHRINE HYDROCHLORIDE 100 MCG: 10 INJECTION INTRAVENOUS at 14:52

## 2020-03-03 RX ADMIN — LIDOCAINE HYDROCHLORIDE 50 MG: 10 INJECTION, SOLUTION EPIDURAL; INFILTRATION; INTRACAUDAL; PERINEURAL at 14:29

## 2020-03-03 RX ADMIN — PHENYLEPHRINE HYDROCHLORIDE 100 MCG: 10 INJECTION INTRAVENOUS at 14:48

## 2020-03-03 RX ADMIN — GABAPENTIN 300 MG: 300 CAPSULE ORAL at 13:02

## 2020-03-03 RX ADMIN — ONDANSETRON 4 MG: 2 INJECTION INTRAMUSCULAR; INTRAVENOUS at 14:41

## 2020-03-03 RX ADMIN — FENTANYL CITRATE 25 MCG: 50 INJECTION, SOLUTION INTRAMUSCULAR; INTRAVENOUS at 15:25

## 2020-03-03 NOTE — DISCHARGE INSTRUCTIONS
Ureteroscopy   WHAT YOU NEED TO KNOW:     Norma Castro,    Today you had ureteroscopy with laser lithotripsy which we destroyed the stone that was within your ureter, you had not yet passed this but we were able to basket out the fragments  You have a new stent, 6 [place] by 28 centimeters, on a string  This string will be used to remove your stent later this week  We then typically get an x-ray and ultrasound some 3 months later to ensure that your kidney is happy and healthy  As you have seen, with a stent in place it is normal to have urgency and frequency of urination along with some blood in your urine, we will send you on 3 days of antibiotics to cover you until your stent is removed  If you have questions or concerns in the postoperative time frame, please not hesitate to contact our office  Our office will contact you regarding your stent removal     Take care and be well,  Dr Daniel Cleaning  A ureteroscopy is a procedure to examine in the inside of your urinary tract, which includes your urethra, bladder, ureters, and kidneys  A ureteroscope is a small, thin tube with a light and camera on the end  Ureteroscopy can help your healthcare provider diagnose and treat problems in your urinary tract, such as kidney stones  DISCHARGE INSTRUCTIONS:   Medicine:   · Antibiotics  may be given to treat or prevent an infection  · Take your medicine as directed  Contact your healthcare provider if you think your medicine is not helping or if you have side effects  Tell him or her if you are allergic to any medicine  Keep a list of the medicines, vitamins, and herbs you take  Include the amounts, and when and why you take them  Bring the list or the pill bottles to follow-up visits  Carry your medicine list with you in case of an emergency  Follow up with your healthcare provider as directed:  Write down your questions so you remember to ask them during your visits  Drink liquids as directed    Liquids can help prevent kidney stones and urinary tract infections  Drink water and limit the amount of caffeine you drink  Caffeine may be found in coffee, tea, soda, sports drinks, and foods  Ask your healthcare provider how much liquid to drink each day  Contact your healthcare provider if:   · You have a fever  · You cannot urinate  · You have blood in your urine  · You are vomiting  · You have pain in your abdomen or side  · You have questions or concerns about your condition or care  © 2017 [facility] Information is for End User's use only and may not be sold, redistributed or otherwise used for commercial purposes  All illustrations and images included in CareNotes® are the copyrighted property of A D A M , Inc  or Sawyer Ocampo  The above information is an  only  It is not intended as medical advice for individual conditions or treatments  Talk to your doctor, nurse or pharmacist before following any medical regimen to see if it is safe and effective for you

## 2020-03-03 NOTE — TELEPHONE ENCOUNTER
Patient is status post ureteroscopy with laser lithotripsy and stone basketing, his stent will be removed later this week, we will then get a 3 month KUB and renal ultrasound, he will see 1 of our advanced practitioners at that time

## 2020-03-03 NOTE — TELEPHONE ENCOUNTER
Miky Mcclendon,    Please let pt know I reviewed BG log    -BGs look great, continue same regimen for now    At next week's appt we will discuss about how to get you off of mealtime insulin  Chely WITT    Endocrinology

## 2020-03-03 NOTE — ANESTHESIA PREPROCEDURE EVALUATION
Review of Systems/Medical History          Cardiovascular  EKG reviewed, Hyperlipidemia, Hypertension ,   Comment: 8/19-EF-65%, PE,  Pulmonary  Smoker ex-smoker  , No recent URI ,   Comment: Pulmonary Sarcoidosis Off Steroids X 6 weeks     GI/Hepatic    GERD , Liver disease , cirrhosis, Chronic liver disease, Hepatitis (Viral) C,        Kidney stones,        Endo/Other  Diabetes , History of thyroid disease , hypothyroidism,   Obesity    GYN       Hematology   Musculoskeletal       Neurology    Headaches,    Psychology           Physical Exam    Airway    Mallampati score: II  TM Distance: >3 FB  Neck ROM: full     Dental   No notable dental hx     Cardiovascular      Pulmonary  Breath sounds clear to auscultation,     Other Findings        Anesthesia Plan  ASA Score- 3     Anesthesia Type- general with ASA Monitors  Additional Monitors:   Airway Plan: LMA  Plan Factors-    Induction- intravenous  Postoperative Plan-     Informed Consent- Anesthetic plan and risks discussed with patient and spouse  I personally reviewed this patient with the CRNA  Discussed and agreed on the Anesthesia Plan with the CRNA             Lab Results   Component Value Date    GLUC 119 02/19/2020    GLUF 182 (H) 12/07/2019    ALT 21 02/18/2020    AST 14 02/18/2020    BUN 15 02/19/2020    CALCIUM 8 6 02/19/2020     02/19/2020    CHOL 225 (H) 01/21/2016    CO2 28 02/19/2020    CREATININE 1 00 02/19/2020    HDL 42 12/13/2019    INR 1 06 12/07/2019    HCT 39 1 02/20/2020    HGB 12 8 02/20/2020    PROT 7 8 01/21/2016    HGBA1C 7 2 (H) 11/11/2019    MG 2 2 08/15/2019    PHOS 3 4 08/15/2019     02/20/2020    K 4 3 02/19/2020    PSA 1 2 03/15/2019     07/12/2017    TRIG 155 (H) 12/13/2019    WBC 5 05 02/20/2020

## 2020-03-03 NOTE — OP NOTE
OPERATIVE REPORT  PATIENT NAME: Jabier Black    :  1960  MRN: [id number]  Pt Location: [facility] OR ROOM 04    SURGERY DATE: 3/3/2020    Surgeon(s) and Role:     * Rogerio Armijo MD - Primary    Preop Diagnosis:  Nephrolithiasis [N20 0]    Post-Op Diagnosis Codes:     * Nephrolithiasis [N20 0]    Procedure(s) (LRB):  CYSTOSCOPY URETEROSCOPY WITH LITHOTRIPSY HOLMIUM LASER; BASKET STONE RETRIEVAL; RETROGRADE PYELOGRAM AND INSERTION STENT URETERAL (Right)    Specimen(s):  ID Type Source Tests Collected by Time Destination   1 :  Calculus Ureter, Right STONE ANALYSIS, TISSUE EXAM Rogerio Armijo MD 3/3/2020 4404        Estimated Blood Loss:   Minimal    Drains:  Ureteral Drain/Stent Right ureter 6 Fr  (Active)   Number of days: 26       Ureteral Drain/Stent Right ureter 6 Fr  (Active)   Number of days: 0       Anesthesia Type:   General    Operative Indications:  Nephrolithiasis [N20 0]      Operative Findings:  Distal ureteral stone was encountered, this was broken with laser lithotripsy and stone fragments were basketed free and sent for specimen, at the end of the case there were no stone fragments within the ureter, given recent imaging showing no upper tract stone burden and normal retrograde pyelography flexible ureteroscopy was not performed today    Successful placement of a 6 [place] Elena by 28 centimeter ureteral stent on a string (this will be removed by our office later this week)    Complications:   None    Procedure and Technique:    PROCEDURES PERFORMED:  1) Cystoscopy  2) Right retrograde pyelography with fluoroscopic interpretation  3) Right ureteroscopy with laser lithotripsy and basket extraction of stone  4) Right ureteral stent placement (6F x 28cm    SURGEON:  Rogeiro Armijo MD    ASSISTANTS:  None    NOTE:  There were no qualified teaching residents to assist with this case    ANESTHESIA: General     COMPLICATIONS:   None    ANTIBIOTICS:  Ancef    INTRAOPERATIVE THROMBOEMBOLISM PROPHYLAXIS:  Pneumatic compression stockings         FINDINGS:    1  The Right calculus was not radiopaque on plain fluoroscopy  2  Retrograde pyelogram was performed on the Right side using a 5 Fr open ended catheter  5 of 50% dilute Isovue was injected  3  The following findings were noted: Mild hydroureteronephrosis  Mildly dilated calyces  No filling defects  Contrast drained out of the collecting system  INDICATIONS FOR PROCEDURE:  Trina Altamirano is an 60 y o  old male with Right ureteral calculus, he had previously been stented  After discussing the options for treatment, including medical expulsive therapy, extracorporeal shockwave lithotripsy, and ureteroscopy, the patient elected to undergo ureteroscopy and ureteral stent placement  We discussed the procedure in detail, the alternatives, and the risks, and they signed informed consent to proceed (these are outlined in the surgical consent form)  PROCEDURE IN DETAIL:     The patient was identified by name, date of birth, and MRN  and brought to the OR  Antibiotic prophylaxis and DVT prophylaxis were administered as per the guidelines  They were placed in the dorsal lithotomy position with care to pad all pressure points  They were prepped and draped in the usual sterile fashion  A surgical time out was performed with all in the room in agreement with the correct patient, procedure, indications, and laterality  A 21-Greek rigid cystoscope was used to enter the bladder  The bladder was inspected in its entirety and there were no lesions noted  The ureteral orifices were identified in their normal orthotopic positions  The previously indwelling stent was delivered through the meatus and a wire was placed through this      A retrograde pyelogram was performed with the findings as described above  A Solo wire was advanced up to the kidney under fluoroscopic guidance  Leaving this safety wire in place, the bladder was drained  A "7 5 Fr semi-rigid ureteroscope was advanced up the ureter under vision   The stone was encountered in the distal ureter location  The stone was not noted to be impacted  A holmium laser fiber was passed through the ureteroscope and laser lithotripsy was commenced at settings of 1 J and 5 Hz  The stones were fragmented to very small pieces and dust       Once we were satisfied that the stone was fragmented to dust, a 1 9 Western Elena zero tip nitinol basket was passed through the ureteroscope  The residual fragments were basketed out and removed  The ureteroscope was backed down the ureter under vision and there were no residual fragments and the ureter was noted to be intact with no injury and mild edema where the stone was located  A 6 Khmer by 28 centimeter right JJ stent was then passed up the wire  under fluoroscopic guidance into the kidney with a good curl noted in the kidney and in the bladder  The stent string was not removed  The bladder was drained  All instrument counts and sponge counts were correct  The patient was placed back into the supine position, awakened from general anesthesia and brought to recovery room in stable condition  ESTIMATED BLOOD LOSS:  Minimal      DRAINS:   Ureteral Drain/Stent Right ureter 6 Fr  (Active)       Ureteral Drain/Stent Right ureter 6 Fr  (Active)       SPECIMENS:   Order Name Source Comment Collection Info Order Time   STONE ANALYSIS Ureter, Right  Collected By: Alexsander Arellano MD 3/3/2020  2:54 PM   TISSUE EXAM Ureter, Right  Collected By: Alexsander Arellano MD 3/3/2020  2:54 PM        IMPLANTS:   Implant Name Type Inv   Item Serial No   Lot No  LRB No  Used   URETERAL STENT 6 FR X 28 CM INLAY - [id number]  URETERAL STENT 6 FR X 28 CM INLAY  [employer] [id number] 1        COMPLICATIONS:  None    DISPOSITION: PACU     PLAN:  Patient is now stone free, his stent will be removed later this week, we will then get a 3 month KUB renal ultrasound for follow-up purposes         I was present for the entire procedure and A qualified resident physician was not available    Patient Disposition:  PACU     SIGNATURE: Joe Resendiz MD  DATE: March 3, 2020  TIME: 3:00 PM

## 2020-03-03 NOTE — H&P
The patient was seen and examined  There are no changes from the prior outpatient history and physical examination  The patient is appropriately prepared for surgery today as planned      /81   Pulse 90   Temp (!) 97 3 °F (36 3 °C)   Resp 18   Ht 6' 2" (1 88 m)   Wt 132 kg (291 lb)   SpO2 95%   BMI 37 36 kg/m²     Marked on right arm    Ready for right URS and laser lithotripsy today

## 2020-03-04 NOTE — TELEPHONE ENCOUNTER
Patient scheduled for stent removal for Friday at the [facility] office at 9:00  Patient is aware of office location  Post Op Note    Fany Travis is a 60 y o  male s/p CYSTOSCOPY URETEROSCOPY WITH LITHOTRIPSY HOLMIUM LASER; BASKET STONE RETRIEVAL; RETROGRADE PYELOGRAM AND INSERTION STENT URETERAL (Right Bladder) performed 3/4/20  Fany Travis is a patient of Dr Akosua Yao and is seen at the [facility] office  How would you rate your pain on a scale from 1 to 10, 10 being the worst pain ever? 3  Have you had a fever? No  If so, what was your highest temperature? n/a F What is your current temperature? n/a F  Have your bowel movements been regular? Yes  Do you have any difficulty urinating? No  If the patient has an incision- do you have any redness around the incision or any drainage from the incision? No  If the patient has a drain- are you having any issues with the drain? N/a  If the patient has a espinosa- are you comfortable caring for your espinosa? N/a Is it draining urine? N/a  Do you have any other questions or concerns that I can address at this time? No  Did review with patient normal stent symptoms and recommendations for them including hydration, medication, and heating pad

## 2020-03-04 NOTE — TELEPHONE ENCOUNTER
Patient requested [facility] office for stent removal    He can be reached at [phone] for assist with this appointment  Thank you

## 2020-03-06 ENCOUNTER — PROCEDURE VISIT (OUTPATIENT)
Dept: UROLOGY | Facility: CLINIC | Age: 60
End: 2020-03-06
Payer: COMMERCIAL

## 2020-03-06 VITALS
DIASTOLIC BLOOD PRESSURE: 82 MMHG | HEIGHT: 74 IN | SYSTOLIC BLOOD PRESSURE: 146 MMHG | WEIGHT: 290 LBS | BODY MASS INDEX: 37.22 KG/M2

## 2020-03-06 DIAGNOSIS — N20.0 NEPHROLITHIASIS: Primary | ICD-10-CM

## 2020-03-06 PROCEDURE — 2022F DILAT RTA XM EVC RTNOPTHY: CPT | Performed by: UROLOGY

## 2020-03-06 PROCEDURE — 3079F DIAST BP 80-89 MM HG: CPT | Performed by: UROLOGY

## 2020-03-06 PROCEDURE — 3077F SYST BP >= 140 MM HG: CPT | Performed by: UROLOGY

## 2020-03-06 PROCEDURE — 1111F DSCHRG MED/CURRENT MED MERGE: CPT | Performed by: UROLOGY

## 2020-03-06 PROCEDURE — 99211 OFF/OP EST MAY X REQ PHY/QHP: CPT | Performed by: UROLOGY

## 2020-03-06 PROCEDURE — 3008F BODY MASS INDEX DOCD: CPT | Performed by: UROLOGY

## 2020-03-06 PROCEDURE — 3066F NEPHROPATHY DOC TX: CPT | Performed by: UROLOGY

## 2020-03-06 NOTE — PROGRESS NOTES
3/6/2020  Otto Knox is a 60 y o  male  [id number]        Diagnosis  Chief Complaint     Nephrolithiasis          Patient is s/p CYSTOSCOPY URETEROSCOPY WITH LITHOTRIPSY HOLMIUM LASER; BASKET STONE RETRIEVAL; RETROGRADE PYELOGRAM AND INSERTION STENT URETERAL (Right Bladder) on 3/3/2020 with Dr Monica Freitas  Plan  Patient will have a renal US and KUB prior to his follow up appointment on 6/8/2020   Procedure Stent with String Removal    Vitals:    03/06/20 0859   BP: 146/82   BP Location: Left arm   Patient Position: Sitting   Cuff Size: Standard   Weight: 132 kg (290 lb)   Height: 6' 2" (1 88 m)       Stent with string removed intact without difficulty  Reviewed post stent removal symptoms including flank pain, dysuria, and hematuria  Instructed patient to increase oral fluid intake  Encouraged the use of NSAIDS and other prescribed pain medication as needed for discomfort  Patient instructed to call the office or report to the ER for uncontrolled pain, fever, chills, nausea or vomiting  Patient verbalizes understanding         Love Calderon RN

## 2020-03-08 DIAGNOSIS — E03.9 HYPOTHYROIDISM, UNSPECIFIED TYPE: ICD-10-CM

## 2020-03-08 RX ORDER — LEVOTHYROXINE SODIUM 0.05 MG/1
TABLET ORAL
Qty: 30 TABLET | Refills: 5 | OUTPATIENT
Start: 2020-03-08

## 2020-03-09 ENCOUNTER — PATIENT MESSAGE (OUTPATIENT)
Dept: FAMILY MEDICINE CLINIC | Facility: CLINIC | Age: 60
End: 2020-03-09

## 2020-03-09 DIAGNOSIS — E03.9 HYPOTHYROIDISM, UNSPECIFIED TYPE: ICD-10-CM

## 2020-03-09 RX ORDER — LEVOTHYROXINE SODIUM 0.05 MG/1
50 TABLET ORAL DAILY
Qty: 30 TABLET | Refills: 3 | Status: SHIPPED | OUTPATIENT
Start: 2020-03-09 | End: 2020-05-14 | Stop reason: SDUPTHER

## 2020-03-09 NOTE — PROGRESS NOTES
Assessment and Plan:   Patient is a 79-year-old male with history of hepatitis C s/p cure with Carmen, liver cirrhosis on prior biopsy, uncontrolled type 2 diabetes now insulin dependent, history of erosive gastritis and duodenal erosion, who presents for rheumatology follow-up for pulmonary sarcoidosis with suspicion for extra thoracic involvement  In particular he developed trigeminal neuralgia type symptoms with right-sided headaches and drooping on the right side of his face, along with peripheral paresthesias suspicious for neurologic involvement from sarcoid  On his EMG he had evidence of a large fiber neuropathy of his legs  We did further investigation of his heart due to his low voltage EKG and his complaints of palpitations, however echo did not reveal any significant findings to suggest cardiac sarcoid and showed only mildly increased LV thickness  In addition he has an ophthalmology exam pending at the end of this month to screen for uveitis, which can be subclinical and sarcoidosis  Since starting him on the prednisone he has had definite improvement in his neurologic symptoms and they have essentially resolved and not recurred at this time  It is difficult to tell how much his respiratory complaints improved since he did have the DKA which was causing him shortness of breath  Unfortunately he is having multiple complications from the steroid use and particularly the hyperglycemia with recent hospitalization for DKA  He also is experiencing increased appetite, weight gain, facial fullness, anxiety, and general sense of feeling hyper and tense  We discussed that since he is having some any issues with the steroids it would be appropriate to implement steroid sparing therapy at this time  He also seems to have severe sarcoid manifestations with systemic involvement with his pulmonary and neurologic neurologic systems, and possibly his eyes    Because of his other comorbidities with the liver Macula Risk DNA Test discussed and patient desires testing. cirrhosis that was appreciated on a previous biopsy, typical first-line agents like methotrexate and Imuran are contraindicated  Because of this we discussed the risks and benefits of Remicade therapy and he was provided with an ACR handout on Remicade  He was agreeable with this plan and we will send for prior authorization for Remicade infusions  This will serve as his steroid sparing agent and hopefully we will ultimately be able to taper him off the prednisone sooner rather than later once the Remicade is on board  In the meantime I instructed him to taper down his prednisone by 10 mg every 7 days until he arrives at 30 mg  Then he will do this for 7 days, decreased to 25 mg and stay at that dose for 14 days, and then will go to 20 mg daily and stay on that dose until he sees me again in about 8 weeks  He does have his endocrinology appointment later today and their assistance will be appreciated with managing his sugars  Will get labs 1 week before he sees me again  We will reach out to him to set up the infusions once we have insurance approval     Plan:  Diagnoses and all orders for this visit:    Sarcoidosis  -     Comprehensive metabolic panel  -     C-reactive protein  -     CBC and differential  -     Sedimentation rate, automated    Neuralgia and neuritis, unspecified    Current chronic use of systemic steroids  -     Comprehensive metabolic panel  -     C-reactive protein  -     CBC and differential  -     Sedimentation rate, automated    History of hepatitis C virus infection    Type 2 diabetes mellitus without complication, without long-term current use of insulin (HCC)  -     Comprehensive metabolic panel  -     C-reactive protein  -     CBC and differential  -     Sedimentation rate, automated        Follow-up plan: 2 months        Rheumatic Disease Summary  1   Pulmonary sarcoidosis with probable extrathoracic involvement   -Rheum consult 7/10/19 for sarcoidosis on LN bx with suspicion for systemic disease with PNS and cranial nerve involvement and possible cardiac  -Chest/abdominal LAD noted on CT dating back to 2012 with stability for years, no prior tx; repeat CT chest 5/2019 showed mild increased mediastinal LAD prompting bx  -EBUS with LN bx 6/6/19: numerous histiocytes, some multinucleated and epitheliod, in background of polymorphous lymphocytes and benign bronchial cells, cannot exclude granulomatous lesion  -Also having issues with trigeminal autonomic cephalgia and headaches starting 8/2018; also with burning paresthesias in forearms and right thigh with subsequent EMG of the legs showing large fiber neuropathy with axonal and demyelinating process  -Also with low voltage EKG, echo with mildly increased LV wall thickness  -PFTs 7/16/19: mild restriction, mild reduced DLCO 65%  -Labs 2019: normal ACE, negative TB, hep B, undetectable HCV, negative ANCA, RF, HIV  -Increased dry cough and dyspnea in early 7/2019, started prednisone 60mg 7/10/19 for symptomatic sarcoid and suspicion for extrathoracic disease; advised ophtho eval to screen for uveitis; unable to use MTX or AZA for steroid sparing given underlying cirrhosis  2  Large fiber neuropathy of the legs  -With axonal and demyelination on EMG, possibly due to sarcoid  -Gabapentin did not help, felt better with tegretol   3  Abnormal brain MRI, headaches, trigeminal neuralgia  -Nonspecific scattered small white matter hyperintensities on T2 and FLAIR in frontal lobes, not felt to be related to granulomatous disease in brain parenchyma by neuro   -Right sided headaches with associated drooping eyelid and tearing  4  Low voltage EKG, palpitations  -Echo with mildly increased LV wall thickness, grade 1DD, EF 65%, unclear if possibly related to sarcoid   5  Type 2 DM  -Admitted 8/1/19 with DKA with glucose in 600s after insurance did not approve insulin therapy, now stable on insulin  6   Comorbidities: hypothyroidism, type 2 diabetes, BPH, history of hepatitis-C status post cure with Carmen (2015), history of liver biopsy, liver cirrhosis, vitamin-D deficiency, GERD, history of erosive gastritis and duodenal erosion (2018), history of trigeminal autonomic cephalgia, cervicalgia and chronic headaches, lumbar DDD/spondylosis     HPI  Debbi Arshad is a 60 y o   male who presents for rheumatology follow-up for pulmonary sarcoidosis with suspicion for extra thoracic involvement  At last visit given his respiratory symptoms and neurologic symptoms we started him on prednisone 60 mg daily based on his body weight  Unfortunately he was admitted to the hospital 2 weeks ago with DKA with his glucose in the 600s  He has recovered from this and is now on insulin therapy  He actually sees endocrinology to establish care later today  Other than that he reports that he actually was feeling much better since starting the prednisone  The headaches with the trigeminal nerve type distribution have resolved and not recurred  He also is not getting any the paresthesias in his legs that he was experiencing  He started getting progressively short of breath throughout July when his blood glucose was chronically elevated but he admits that he was unaware of this because he was monitoring it consistently  He thought the shortness of breath was likely related to sarcoid but ultimately he was found to have the severe hyperglycemia  He is unsure if his respiratory complaints improved prior to this since this all seem to happen quickly  He does feel better in terms of his energy and does not have to take as many naps and does not feel as exhausted  He has an eye doctor appointment at the end of this month to screen for uveitis  He did have the pulmonary function studies which showed a mild restriction and he also was able to establish with pulmonology    He also had the heart echocardiogram which showed mild thickening of the LV but otherwise no significant findings  He denies any new symptoms such as skin rashes, joint pain or swelling  Aside from the hyperglycemia he is also experiencing increased appetite, weight gain, facial fullness, some difficulty with sleep, and generally feeling hyper  The following portions of the patient's history were reviewed and updated as appropriate: allergies, current medications, past family history, past medical history, past social history, past surgical history and problem list     Review of Systems:   Review of Systems   Constitutional: Negative for chills, fatigue, fever and unexpected weight change  HENT: Negative for mouth sores and trouble swallowing  Eyes: Negative for pain and visual disturbance  Respiratory: Negative for cough and shortness of breath  Cardiovascular: Negative for chest pain and leg swelling  Gastrointestinal: Negative for abdominal pain, blood in stool, constipation, diarrhea and nausea  Genitourinary: Negative for hematuria  Musculoskeletal: Negative for arthralgias, back pain, joint swelling and myalgias  Skin: Negative for rash  Neurological: Negative for weakness and numbness  Hematological: Negative for adenopathy  Psychiatric/Behavioral: Negative for sleep disturbance         Home Medications:    Current Outpatient Medications:     Cholecalciferol (VITAMIN D3) 5000 units CAPS, Take by mouth, Disp: , Rfl:     famotidine (PEPCID) 40 MG tablet, Take 40 mg by mouth daily , Disp: , Rfl:     insulin aspart (NOVOLOG FLEXPEN) 100 Units/mL injection pen, Inject 18 Units under the skin 3 (three) times a day with meals As well as sliding scale coverage as follows: 150-200 2 units 200-250 4 units 250-300 6 units >300 8 units, Disp: 5 pen, Rfl: 3    insulin glargine (BASAGLAR KWIKPEN) 100 units/mL injection pen, Inject 35 Units under the skin daily, Disp: 32 14 mL, Rfl: 0    Insulin Pen Needle (PEN NEEDLES) 31G X 6 MM MISC, by Does not apply route 4 (four) times a day, Disp: 150 each, Rfl: 4    levothyroxine 50 mcg tablet, TAKE 1 TABLET BY MOUTH EVERY DAY, Disp: 90 tablet, Rfl: 1    liraglutide (VICTOZA) injection, Inject 0 2 mL (1 2 mg total) under the skin daily, Disp: 2 pen, Rfl: 4    metFORMIN (GLUCOPHAGE) 500 mg tablet, TAKE 1 TABLET BY MOUTH TWICE A DAY WITH MEALS, Disp: 180 tablet, Rfl: 1    NOVOTWIST 32G X 5 MM MISC, , Disp: , Rfl:     predniSONE 20 mg tablet, Take 3 tablets (60 mg total) by mouth daily, Disp: 90 tablet, Rfl: 0    Objective:    Vitals:    08/14/19 1102   BP: 132/76   Pulse: 82   Weight: 122 kg (270 lb)   Height: 6' 3" (1 905 m)       Physical Exam   Constitutional: He appears well-developed and well-nourished  He is cooperative  No distress  HENT:   Head: Normocephalic and atraumatic  Mouth/Throat: Oropharynx is clear and moist and mucous membranes are normal    Eyes: Conjunctivae and EOM are normal  No scleral icterus  Neck: Neck supple  No spinous process tenderness and no muscular tenderness present  No thyromegaly present  Cardiovascular: Normal rate, regular rhythm, S1 normal and S2 normal  Exam reveals no friction rub  No murmur heard  Pulmonary/Chest: Breath sounds normal  No respiratory distress  He has no wheezes  He has no rhonchi  He has no rales  Musculoskeletal:   No reproducible soft tissue or joint tenderness  No joint swelling or synovitis anywhere  Lymphadenopathy:     He has no cervical adenopathy  Neurological: He is alert  No sensory deficit  Skin: Skin is warm and dry  No rash noted  Nails show no clubbing  Psychiatric: He has a normal mood and affect           Labs:       Component      Latest Ref Rng & Units 7/5/2019   QFT Nil      0 - 8 0 IU/ml 0 04   QFT TB1-NIL      IU/ml 0 00   QFT TB2-NIL      IU/ml 0 00   QFT Mitogen-NIL      IU/ml >10 00   QFT Final Interpretation      Negative Negative     Component      Latest Ref Rng & Units 7/10/2019   C-ANCA      Neg:<1:20 titer <1:20   Atypical pANCA      Neg:<1:20 titer <1:20   EXTERNAL MPO AB      0 0 - 9 0 U/mL <9 0   EXTERNAL DC-3 AB      0 0 - 3 5 U/mL <3 5   P-ANCA      Neg:<1:20 titer <1:20     Component      Latest Ref Rng & Units 7/10/2019   WBC      4 31 - 10 16 Thousand/uL 6 42   Red Blood Cell Count      3 88 - 5 62 Million/uL 5 17   Hemoglobin      12 0 - 17 0 g/dL 16 1   HCT      36 5 - 49 3 % 47 6   MCV      82 - 98 fL 92   MCH      26 8 - 34 3 pg 31 1   MCHC      31 4 - 37 4 g/dL 33 8   RDW      11 6 - 15 1 % 12 4   MPV      8 9 - 12 7 fL 9 6   Platelet Count      957 - 390 Thousands/uL 235   nRBC      /100 WBCs 0   Neutrophils %      43 - 75 % 55   Immat GRANS %      0 - 2 % 1   Lymphocytes Relative      14 - 44 % 32   Monocytes Relative      4 - 12 % 9   Eosinophils      0 - 6 % 2   Basophils Relative      0 - 1 % 1   Absolute Neutrophils      1 85 - 7 62 Thousands/µL 3 49   Immature Grans Absolute      0 00 - 0 20 Thousand/uL 0 08   Lymphocytes Absolute      0 60 - 4 47 Thousands/µL 2 08   Absolute Monocytes      0 17 - 1 22 Thousand/µL 0 57   Absolute Eosinophils      0 00 - 0 61 Thousand/µL 0 13   Basophils Absolute      0 00 - 0 10 Thousands/µL 0 07   Clarity, UA       Clear   Color, UA       Yellow   SL AMB SPECIFIC GRAVITY-URINE      1 003 - 1 030 1 015   pH, UA      4 5, 5 0, 5 5, 6 0, 6 5, 7 0, 7 5, 8 0 5 5   Glucose, UA      Negative mg/dl 250 (1/4%) (A)   Ketones, UA      Negative mg/dl Negative   Blood, UA      Negative Negative   POCT URINE PROTEIN      Negative mg/dl Negative   Nitrite, UA      Negative Negative   Bilirubin, UA      Negative Negative   SL AMB POCT UROBILINOGEN      0 2, 1 0 E U /dl E U /dl 0 2   Leukocytes, UA      Negative Negative   WBC, UA      None Seen, 0-5, 5-55, 5-65 /hpf None Seen   RBC, UA      None Seen, 0-5 /hpf None Seen   Bacteria, UA      None Seen, Occasional /hpf None Seen   Epithelial Cells      None Seen, Occasional /hpf Occasional   Sodium      136 - 145 mmol/L 134 (L)   Potassium      3 5 - 5 3 mmol/L 3 8 Chloride      100 - 108 mmol/L 98 (L)   CO2      21 - 32 mmol/L 25   Anion Gap      4 - 13 mmol/L 11   BUN      5 - 25 mg/dL 15   Creatinine      0 60 - 1 30 mg/dL 1 23   Glucose, Random      65 - 140 mg/dL 362 (H)   Calcium      8 3 - 10 1 mg/dL 8 9   eGFR      ml/min/1 73sq m 63   TOTAL BILIRUBIN      0 20 - 1 00 mg/dL 0 70   BILIRUBIN DIRECT      0 00 - 0 20 mg/dL 0 19   Alkaline Phosphatase      46 - 116 U/L 85   AST      5 - 45 U/L 17   ALT      12 - 78 U/L 30   Total Protein      6 4 - 8 2 g/dL 8 2   Albumin      3 5 - 5 0 g/dL 4 1   EXT Creatinine Urine      mg/dL 99 8   Protein Urine Random      mg/dL <6   Prot/Creat Ratio, Ur      0 00 - 0 10 <0 06   HCV PCR Quantitative      IU/mL HCV Not Detected   TEST INFORMATION       Comment   RAPID HIV 1 AND 2      Non-Reactive Non-Reactive   HIV-1 P24 Ag      Non-Reactive Non-Reactive   Total CK      39 - 308 U/L 113   Sed Rate      0 - 10 mm/hour 14 (H)   C-REACTIVE PROTEIN      <3 0 mg/L <3 0   HEPATITIS B CORE TOTAL ANTIBODY      Non-reactive Non-reactive   HEPATITIS B SURFACE ANTIGEN      Non-reactive, NonReactive - Confirmed Non-reactive   HEPATITIS B SURFACE ANTIBODY      mIU/mL <3 10   Calcium, Ionized      1 12 - 1 32 mmol/L 1 11 (L)   Vit D, 25-Hydroxy      30 0 - 100 0 ng/mL 28 4 (L)   ANGIOTENSIN CONVERTING ENZYME      14 - 82 U/L 64   RHEUMATOID FACTOR      Negative Negative

## 2020-03-09 NOTE — TELEPHONE ENCOUNTER
Medication: Levothyroxine 50 mcg   Last refilled: 8/18/19  Last Office Visit: 2/16/20  Next Office Visit: 5/27/20  Pharmacy:     Last TSH level 8/1/19 historical

## 2020-03-09 NOTE — TELEPHONE ENCOUNTER
From: [facility]  To: Liliana Almonte DO  Sent: 3/9/2020 1:29 PM EDT  Subject: Non-Urgent Medical Question    Hi Dr Varghese Gross,     I hate to bother you with this  My pharmacy will not fill levothyroxine 50 mcg tablet  I think it is because the doctor on [address] is my old Doctor (Dr Renetta Rock)  Can you authorize a new prescription?      Thank you for all you do,  Lee Ann Pitch

## 2020-03-10 ENCOUNTER — TELEPHONE (OUTPATIENT)
Dept: ENDOCRINOLOGY | Facility: CLINIC | Age: 60
End: 2020-03-10

## 2020-03-10 NOTE — TELEPHONE ENCOUNTER
Lucian,    Please let pt know I reviewed BG log    -BGs look good only a few occasional high BG in 200s    We will discuss at upcoming appt about how we can lower the insulin doses  Continue same regimen for now  Chester WITT    Endocrinology

## 2020-03-12 ENCOUNTER — OFFICE VISIT (OUTPATIENT)
Dept: ENDOCRINOLOGY | Facility: CLINIC | Age: 60
End: 2020-03-12
Payer: COMMERCIAL

## 2020-03-12 VITALS
HEART RATE: 92 BPM | WEIGHT: 295 LBS | HEIGHT: 74 IN | BODY MASS INDEX: 37.86 KG/M2 | SYSTOLIC BLOOD PRESSURE: 110 MMHG | DIASTOLIC BLOOD PRESSURE: 66 MMHG

## 2020-03-12 DIAGNOSIS — E03.9 ACQUIRED HYPOTHYROIDISM: ICD-10-CM

## 2020-03-12 DIAGNOSIS — E11.9 TYPE 2 DIABETES MELLITUS WITHOUT COMPLICATION, WITHOUT LONG-TERM CURRENT USE OF INSULIN (HCC): ICD-10-CM

## 2020-03-12 DIAGNOSIS — Z79.4 TYPE 2 DIABETES MELLITUS WITH HYPERGLYCEMIA, WITH LONG-TERM CURRENT USE OF INSULIN (HCC): Primary | ICD-10-CM

## 2020-03-12 DIAGNOSIS — E78.2 MIXED HYPERLIPIDEMIA: ICD-10-CM

## 2020-03-12 DIAGNOSIS — E55.9 VITAMIN D DEFICIENCY: ICD-10-CM

## 2020-03-12 DIAGNOSIS — E11.65 TYPE 2 DIABETES MELLITUS WITH HYPERGLYCEMIA, WITH LONG-TERM CURRENT USE OF INSULIN (HCC): Primary | ICD-10-CM

## 2020-03-12 PROCEDURE — 3074F SYST BP LT 130 MM HG: CPT | Performed by: INTERNAL MEDICINE

## 2020-03-12 PROCEDURE — 1036F TOBACCO NON-USER: CPT | Performed by: INTERNAL MEDICINE

## 2020-03-12 PROCEDURE — 1111F DSCHRG MED/CURRENT MED MERGE: CPT | Performed by: INTERNAL MEDICINE

## 2020-03-12 PROCEDURE — 99214 OFFICE O/P EST MOD 30 MIN: CPT | Performed by: INTERNAL MEDICINE

## 2020-03-12 PROCEDURE — 3066F NEPHROPATHY DOC TX: CPT | Performed by: INTERNAL MEDICINE

## 2020-03-12 PROCEDURE — 3008F BODY MASS INDEX DOCD: CPT | Performed by: INTERNAL MEDICINE

## 2020-03-12 PROCEDURE — 3078F DIAST BP <80 MM HG: CPT | Performed by: INTERNAL MEDICINE

## 2020-03-12 PROCEDURE — 2022F DILAT RTA XM EVC RTNOPTHY: CPT | Performed by: INTERNAL MEDICINE

## 2020-03-12 RX ORDER — PEN NEEDLE, DIABETIC 31 G X1/4"
NEEDLE, DISPOSABLE MISCELLANEOUS 4 TIMES DAILY
Qty: 100 EACH | Refills: 4 | Status: SHIPPED | OUTPATIENT
Start: 2020-03-12 | End: 2021-06-23 | Stop reason: SDUPTHER

## 2020-03-12 NOTE — PATIENT INSTRUCTIONS
Have labs done in 2 months before next appointment    Follow up in 2 months    Increase metformin to 1000mg twice a day    Reduce novolog to 8 units before meals     Send me blood sugar log in 1 week

## 2020-03-16 ENCOUNTER — TELEPHONE (OUTPATIENT)
Dept: ENDOCRINOLOGY | Facility: CLINIC | Age: 60
End: 2020-03-16

## 2020-03-16 NOTE — TELEPHONE ENCOUNTER
Bo Zaldivar,    Please let patient know I reviewed BG log    -BGs look good    Lets try stopping the novolog for now and see how he does    Continue same dose of lantus 48 units qAM  Continue same dose metformin 1000mg BID DEVIKA WITT    Endocrinology

## 2020-03-20 LAB
CALCIUM OXALATE DIHYDRATE MFR STONE IR: 10 %
COLOR STONE: NORMAL
COM MFR STONE: 90 %
COMMENT-STONE3: NORMAL
COMPOSITION: NORMAL
LABORATORY COMMENT REPORT: NORMAL
PHOTO: NORMAL
SIZE STONE: NORMAL MM
SPEC SOURCE SUBJ: NORMAL
STONE ANALYSIS-IMP: NORMAL
WT STONE: 18.9 MG

## 2020-03-24 ENCOUNTER — TELEPHONE (OUTPATIENT)
Dept: ENDOCRINOLOGY | Facility: CLINIC | Age: 60
End: 2020-03-24

## 2020-03-24 NOTE — TELEPHONE ENCOUNTER
[place] Kirk,    Please let pt know I reviewed BG log    -BGs look pretty good    Continue same dose of lantus 48 units qAM  Continue same dose metformin 1000mg BID DEVIKA Styles

## 2020-03-27 DIAGNOSIS — E11.65 UNCONTROLLED TYPE 2 DIABETES MELLITUS WITH HYPERGLYCEMIA (HCC): ICD-10-CM

## 2020-03-30 ENCOUNTER — TELEPHONE (OUTPATIENT)
Dept: PULMONOLOGY | Facility: CLINIC | Age: 60
End: 2020-03-30

## 2020-03-30 NOTE — TELEPHONE ENCOUNTER
Called patient to discuss updates regarding his sarcoid  He has since been tapered off of steroids and is feeling relatively well  He had a CT chest in January that did not show any parenchymal lung involvement  He is otherwise stable and plans to call back in one month to schedule his next follow up visit   All questions answered and patient verbalized understanding

## 2020-04-02 DIAGNOSIS — E11.65 UNCONTROLLED TYPE 2 DIABETES MELLITUS WITH HYPERGLYCEMIA (HCC): ICD-10-CM

## 2020-04-06 ENCOUNTER — TELEPHONE (OUTPATIENT)
Dept: ENDOCRINOLOGY | Facility: CLINIC | Age: 60
End: 2020-04-06

## 2020-04-06 DIAGNOSIS — N20.0 NEPHROLITHIASIS: ICD-10-CM

## 2020-04-06 RX ORDER — TAMSULOSIN HYDROCHLORIDE 0.4 MG/1
CAPSULE ORAL
Qty: 30 CAPSULE | Refills: 1 | OUTPATIENT
Start: 2020-04-06

## 2020-04-08 ENCOUNTER — TELEMEDICINE (OUTPATIENT)
Dept: CARDIOLOGY CLINIC | Facility: CLINIC | Age: 60
End: 2020-04-08
Payer: COMMERCIAL

## 2020-04-08 DIAGNOSIS — R00.0 SINUS TACHYCARDIA: Primary | ICD-10-CM

## 2020-04-08 PROCEDURE — 99214 OFFICE O/P EST MOD 30 MIN: CPT | Performed by: INTERNAL MEDICINE

## 2020-04-10 ENCOUNTER — TELEPHONE (OUTPATIENT)
Dept: ENDOCRINOLOGY | Facility: CLINIC | Age: 60
End: 2020-04-10

## 2020-04-13 ENCOUNTER — TELEMEDICINE (OUTPATIENT)
Dept: ENDOCRINOLOGY | Facility: CLINIC | Age: 60
End: 2020-04-13
Payer: COMMERCIAL

## 2020-04-13 DIAGNOSIS — Z79.4 TYPE 2 DIABETES MELLITUS WITH HYPERGLYCEMIA, WITH LONG-TERM CURRENT USE OF INSULIN (HCC): Primary | ICD-10-CM

## 2020-04-13 DIAGNOSIS — E55.9 VITAMIN D DEFICIENCY: ICD-10-CM

## 2020-04-13 DIAGNOSIS — E11.65 TYPE 2 DIABETES MELLITUS WITH HYPERGLYCEMIA, WITH LONG-TERM CURRENT USE OF INSULIN (HCC): Primary | ICD-10-CM

## 2020-04-13 DIAGNOSIS — E03.9 ACQUIRED HYPOTHYROIDISM: ICD-10-CM

## 2020-04-13 DIAGNOSIS — E78.2 MIXED HYPERLIPIDEMIA: ICD-10-CM

## 2020-04-13 PROCEDURE — 99213 OFFICE O/P EST LOW 20 MIN: CPT | Performed by: INTERNAL MEDICINE

## 2020-04-17 ENCOUNTER — TELEPHONE (OUTPATIENT)
Dept: ENDOCRINOLOGY | Facility: CLINIC | Age: 60
End: 2020-04-17

## 2020-04-24 ENCOUNTER — TELEPHONE (OUTPATIENT)
Dept: ENDOCRINOLOGY | Facility: CLINIC | Age: 60
End: 2020-04-24

## 2020-04-27 DIAGNOSIS — E11.65 TYPE 2 DIABETES MELLITUS WITH HYPERGLYCEMIA, WITH LONG-TERM CURRENT USE OF INSULIN (HCC): Primary | ICD-10-CM

## 2020-04-27 DIAGNOSIS — E11.65 TYPE 2 DIABETES MELLITUS WITH HYPERGLYCEMIA, WITH LONG-TERM CURRENT USE OF INSULIN (HCC): ICD-10-CM

## 2020-04-27 DIAGNOSIS — Z79.4 TYPE 2 DIABETES MELLITUS WITH HYPERGLYCEMIA, WITH LONG-TERM CURRENT USE OF INSULIN (HCC): Primary | ICD-10-CM

## 2020-04-27 DIAGNOSIS — Z79.4 TYPE 2 DIABETES MELLITUS WITH HYPERGLYCEMIA, WITH LONG-TERM CURRENT USE OF INSULIN (HCC): ICD-10-CM

## 2020-04-27 RX ORDER — GLIMEPIRIDE 1 MG/1
1 TABLET ORAL
Qty: 90 TABLET | Refills: 3 | Status: SHIPPED | OUTPATIENT
Start: 2020-04-27 | End: 2020-07-08

## 2020-05-01 ENCOUNTER — TELEPHONE (OUTPATIENT)
Dept: ENDOCRINOLOGY | Facility: CLINIC | Age: 60
End: 2020-05-01

## 2020-05-04 DIAGNOSIS — Z79.4 TYPE 2 DIABETES MELLITUS WITH HYPERGLYCEMIA, WITH LONG-TERM CURRENT USE OF INSULIN (HCC): ICD-10-CM

## 2020-05-04 DIAGNOSIS — E11.65 TYPE 2 DIABETES MELLITUS WITH HYPERGLYCEMIA, WITH LONG-TERM CURRENT USE OF INSULIN (HCC): ICD-10-CM

## 2020-05-08 ENCOUNTER — TELEPHONE (OUTPATIENT)
Dept: ENDOCRINOLOGY | Facility: CLINIC | Age: 60
End: 2020-05-08

## 2020-05-11 ENCOUNTER — TELEMEDICINE (OUTPATIENT)
Dept: ENDOCRINOLOGY | Facility: CLINIC | Age: 60
End: 2020-05-11
Payer: COMMERCIAL

## 2020-05-11 DIAGNOSIS — Z79.4 TYPE 2 DIABETES MELLITUS WITH HYPERGLYCEMIA, WITH LONG-TERM CURRENT USE OF INSULIN (HCC): Primary | ICD-10-CM

## 2020-05-11 DIAGNOSIS — E03.9 ACQUIRED HYPOTHYROIDISM: ICD-10-CM

## 2020-05-11 DIAGNOSIS — E11.65 TYPE 2 DIABETES MELLITUS WITH HYPERGLYCEMIA, WITH LONG-TERM CURRENT USE OF INSULIN (HCC): Primary | ICD-10-CM

## 2020-05-11 DIAGNOSIS — E55.9 VITAMIN D DEFICIENCY: ICD-10-CM

## 2020-05-11 DIAGNOSIS — E78.2 MIXED HYPERLIPIDEMIA: ICD-10-CM

## 2020-05-11 PROCEDURE — 99214 OFFICE O/P EST MOD 30 MIN: CPT | Performed by: INTERNAL MEDICINE

## 2020-05-13 ENCOUNTER — APPOINTMENT (OUTPATIENT)
Dept: LAB | Facility: CLINIC | Age: 60
End: 2020-05-13
Payer: COMMERCIAL

## 2020-05-13 ENCOUNTER — HOSPITAL ENCOUNTER (OUTPATIENT)
Dept: NON INVASIVE DIAGNOSTICS | Facility: CLINIC | Age: 60
Discharge: HOME | End: 2020-05-13
Payer: COMMERCIAL

## 2020-05-13 DIAGNOSIS — E55.9 VITAMIN D DEFICIENCY: ICD-10-CM

## 2020-05-13 DIAGNOSIS — E78.2 MIXED HYPERLIPIDEMIA: ICD-10-CM

## 2020-05-13 DIAGNOSIS — E11.65 TYPE 2 DIABETES MELLITUS WITH HYPERGLYCEMIA, WITH LONG-TERM CURRENT USE OF INSULIN (HCC): ICD-10-CM

## 2020-05-13 DIAGNOSIS — Z79.4 TYPE 2 DIABETES MELLITUS WITH HYPERGLYCEMIA, WITH LONG-TERM CURRENT USE OF INSULIN (HCC): ICD-10-CM

## 2020-05-13 DIAGNOSIS — R00.0 SINUS TACHYCARDIA: ICD-10-CM

## 2020-05-13 DIAGNOSIS — E03.9 ACQUIRED HYPOTHYROIDISM: ICD-10-CM

## 2020-05-13 LAB
25(OH)D3 SERPL-MCNC: 40.5 NG/ML (ref 30–100)
ALBUMIN SERPL BCP-MCNC: 3.8 G/DL (ref 3.5–5)
ALP SERPL-CCNC: 64 U/L (ref 46–116)
ALT SERPL W P-5'-P-CCNC: 30 U/L (ref 12–78)
ANION GAP SERPL CALCULATED.3IONS-SCNC: 10 MMOL/L (ref 4–13)
AST SERPL W P-5'-P-CCNC: 18 U/L (ref 5–45)
BILIRUB SERPL-MCNC: 0.47 MG/DL (ref 0.2–1)
BUN SERPL-MCNC: 10 MG/DL (ref 5–25)
CALCIUM SERPL-MCNC: 8.6 MG/DL (ref 8.3–10.1)
CHLORIDE SERPL-SCNC: 104 MMOL/L (ref 100–108)
CHOLEST SERPL-MCNC: 108 MG/DL (ref 50–200)
CO2 SERPL-SCNC: 26 MMOL/L (ref 21–32)
CREAT SERPL-MCNC: 1.1 MG/DL (ref 0.6–1.3)
CREAT UR-MCNC: 144 MG/DL
EST. AVERAGE GLUCOSE BLD GHB EST-MCNC: 163 MG/DL
GFR SERPL CREATININE-BSD FRML MDRD: 72 ML/MIN/1.73SQ M
GLUCOSE P FAST SERPL-MCNC: 118 MG/DL (ref 65–99)
HBA1C MFR BLD: 7.3 %
HDLC SERPL-MCNC: 35 MG/DL
LDLC SERPL CALC-MCNC: 46 MG/DL (ref 0–100)
MICROALBUMIN UR-MCNC: 10 MG/L (ref 0–20)
MICROALBUMIN/CREAT 24H UR: 7 MG/G CREATININE (ref 0–30)
NONHDLC SERPL-MCNC: 73 MG/DL
POTASSIUM SERPL-SCNC: 4.4 MMOL/L (ref 3.5–5.3)
PROT SERPL-MCNC: 7.4 G/DL (ref 6.4–8.2)
SODIUM SERPL-SCNC: 140 MMOL/L (ref 136–145)
T4 FREE SERPL-MCNC: 1.16 NG/DL (ref 0.76–1.46)
TRIGL SERPL-MCNC: 135 MG/DL
TSH SERPL DL<=0.05 MIU/L-ACNC: 3.81 UIU/ML (ref 0.36–3.74)

## 2020-05-13 PROCEDURE — 93225 XTRNL ECG REC<48 HRS REC: CPT

## 2020-05-13 PROCEDURE — 84439 ASSAY OF FREE THYROXINE: CPT

## 2020-05-13 PROCEDURE — 93226 XTRNL ECG REC<48 HR SCAN A/R: CPT

## 2020-05-13 PROCEDURE — 36415 COLL VENOUS BLD VENIPUNCTURE: CPT

## 2020-05-13 PROCEDURE — 80061 LIPID PANEL: CPT

## 2020-05-13 PROCEDURE — 84443 ASSAY THYROID STIM HORMONE: CPT

## 2020-05-13 PROCEDURE — 82043 UR ALBUMIN QUANTITATIVE: CPT

## 2020-05-13 PROCEDURE — 80053 COMPREHEN METABOLIC PANEL: CPT

## 2020-05-13 PROCEDURE — 3061F NEG MICROALBUMINURIA REV: CPT | Performed by: INTERNAL MEDICINE

## 2020-05-13 PROCEDURE — 82570 ASSAY OF URINE CREATININE: CPT

## 2020-05-13 PROCEDURE — 82306 VITAMIN D 25 HYDROXY: CPT

## 2020-05-13 PROCEDURE — 83036 HEMOGLOBIN GLYCOSYLATED A1C: CPT

## 2020-05-14 DIAGNOSIS — E03.9 ACQUIRED HYPOTHYROIDISM: Primary | ICD-10-CM

## 2020-05-14 DIAGNOSIS — E03.9 HYPOTHYROIDISM, UNSPECIFIED TYPE: ICD-10-CM

## 2020-05-14 RX ORDER — LEVOTHYROXINE SODIUM 0.05 MG/1
50 TABLET ORAL DAILY
Qty: 30 TABLET | Refills: 3
Start: 2020-05-14 | End: 2020-07-21 | Stop reason: SDUPTHER

## 2020-05-15 ENCOUNTER — TELEPHONE (OUTPATIENT)
Dept: ENDOCRINOLOGY | Facility: CLINIC | Age: 60
End: 2020-05-15

## 2020-05-15 PROCEDURE — 93227 XTRNL ECG REC<48 HR R&I: CPT | Performed by: INTERNAL MEDICINE

## 2020-05-26 ENCOUNTER — TELEPHONE (OUTPATIENT)
Dept: FAMILY MEDICINE CLINIC | Facility: CLINIC | Age: 60
End: 2020-05-26

## 2020-05-27 ENCOUNTER — OFFICE VISIT (OUTPATIENT)
Dept: FAMILY MEDICINE CLINIC | Facility: CLINIC | Age: 60
End: 2020-05-27

## 2020-05-27 VITALS — WEIGHT: 290 LBS | BODY MASS INDEX: 37.22 KG/M2 | HEIGHT: 74 IN

## 2020-05-27 DIAGNOSIS — E11.69 TYPE 2 DIABETES MELLITUS WITH OTHER SPECIFIED COMPLICATION, WITH LONG-TERM CURRENT USE OF INSULIN (HCC): ICD-10-CM

## 2020-05-27 DIAGNOSIS — K74.69 OTHER CIRRHOSIS OF LIVER (HCC): ICD-10-CM

## 2020-05-27 DIAGNOSIS — K29.70 GASTRITIS WITHOUT BLEEDING, UNSPECIFIED CHRONICITY, UNSPECIFIED GASTRITIS TYPE: ICD-10-CM

## 2020-05-27 DIAGNOSIS — D86.0 PULMONARY SARCOIDOSIS (HCC): Primary | ICD-10-CM

## 2020-05-27 DIAGNOSIS — K80.20 CALCULUS OF GALLBLADDER WITHOUT CHOLECYSTITIS WITHOUT OBSTRUCTION: ICD-10-CM

## 2020-05-27 DIAGNOSIS — E03.9 HYPOTHYROIDISM, UNSPECIFIED TYPE: ICD-10-CM

## 2020-05-27 DIAGNOSIS — R00.0 SINUS TACHYCARDIA: ICD-10-CM

## 2020-05-27 DIAGNOSIS — I27.82 OTHER CHRONIC PULMONARY EMBOLISM WITHOUT ACUTE COR PULMONALE (HCC): ICD-10-CM

## 2020-05-27 DIAGNOSIS — Z79.4 TYPE 2 DIABETES MELLITUS WITH OTHER SPECIFIED COMPLICATION, WITH LONG-TERM CURRENT USE OF INSULIN (HCC): ICD-10-CM

## 2020-05-27 PROBLEM — M79.2 NEURALGIA AND NEURITIS, UNSPECIFIED: Status: RESOLVED | Noted: 2019-03-17 | Resolved: 2020-05-27

## 2020-05-27 PROBLEM — Z79.52 CURRENT CHRONIC USE OF SYSTEMIC STEROIDS: Status: RESOLVED | Noted: 2019-07-10 | Resolved: 2020-05-27

## 2020-05-27 PROCEDURE — 99214 OFFICE O/P EST MOD 30 MIN: CPT | Performed by: FAMILY MEDICINE

## 2020-05-27 RX ORDER — FAMOTIDINE 40 MG/1
40 TABLET, FILM COATED ORAL DAILY
Qty: 90 TABLET | Refills: 1 | Status: SHIPPED | OUTPATIENT
Start: 2020-05-27 | End: 2020-10-27

## 2020-05-29 ENCOUNTER — TELEPHONE (OUTPATIENT)
Dept: ENDOCRINOLOGY | Facility: CLINIC | Age: 60
End: 2020-05-29

## 2020-05-29 ENCOUNTER — TELEPHONE (OUTPATIENT)
Dept: UROLOGY | Facility: CLINIC | Age: 60
End: 2020-05-29

## 2020-05-30 DIAGNOSIS — E03.9 HYPOTHYROIDISM, UNSPECIFIED TYPE: ICD-10-CM

## 2020-06-01 ENCOUNTER — HOSPITAL ENCOUNTER (OUTPATIENT)
Dept: RADIOLOGY | Facility: MEDICAL CENTER | Age: 60
Discharge: HOME | End: 2020-06-01
Payer: COMMERCIAL

## 2020-06-01 DIAGNOSIS — N20.0 NEPHROLITHIASIS: ICD-10-CM

## 2020-06-01 PROCEDURE — 76770 US EXAM ABDO BACK WALL COMP: CPT

## 2020-06-02 RX ORDER — LEVOTHYROXINE SODIUM 0.05 MG/1
TABLET ORAL
Qty: 30 TABLET | Refills: 3 | OUTPATIENT
Start: 2020-06-02

## 2020-06-08 ENCOUNTER — TELEPHONE (OUTPATIENT)
Dept: ENDOCRINOLOGY | Facility: CLINIC | Age: 60
End: 2020-06-08

## 2020-06-08 ENCOUNTER — TELEMEDICINE (OUTPATIENT)
Dept: UROLOGY | Facility: CLINIC | Age: 60
End: 2020-06-08
Payer: COMMERCIAL

## 2020-06-08 DIAGNOSIS — N20.0 NEPHROLITHIASIS: ICD-10-CM

## 2020-06-08 DIAGNOSIS — Z12.5 PROSTATE CANCER SCREENING: Primary | ICD-10-CM

## 2020-06-08 PROCEDURE — 99214 OFFICE O/P EST MOD 30 MIN: CPT | Performed by: PHYSICIAN ASSISTANT

## 2020-06-12 ENCOUNTER — TELEPHONE (OUTPATIENT)
Dept: ENDOCRINOLOGY | Facility: CLINIC | Age: 60
End: 2020-06-12

## 2020-06-15 ENCOUNTER — TELEPHONE (OUTPATIENT)
Dept: CARDIOLOGY CLINIC | Facility: CLINIC | Age: 60
End: 2020-06-15

## 2020-06-16 DIAGNOSIS — Z79.4 TYPE 2 DIABETES MELLITUS WITH HYPERGLYCEMIA, WITH LONG-TERM CURRENT USE OF INSULIN (HCC): ICD-10-CM

## 2020-06-16 DIAGNOSIS — E11.65 TYPE 2 DIABETES MELLITUS WITH HYPERGLYCEMIA, WITH LONG-TERM CURRENT USE OF INSULIN (HCC): ICD-10-CM

## 2020-06-23 ENCOUNTER — TELEMEDICINE (OUTPATIENT)
Dept: ENDOCRINOLOGY | Facility: CLINIC | Age: 60
End: 2020-06-23
Payer: COMMERCIAL

## 2020-06-23 DIAGNOSIS — E55.9 VITAMIN D DEFICIENCY: ICD-10-CM

## 2020-06-23 DIAGNOSIS — E78.2 MIXED HYPERLIPIDEMIA: ICD-10-CM

## 2020-06-23 DIAGNOSIS — Z79.4 TYPE 2 DIABETES MELLITUS WITH HYPERGLYCEMIA, WITH LONG-TERM CURRENT USE OF INSULIN (HCC): Primary | ICD-10-CM

## 2020-06-23 DIAGNOSIS — E03.9 ACQUIRED HYPOTHYROIDISM: ICD-10-CM

## 2020-06-23 DIAGNOSIS — E11.65 TYPE 2 DIABETES MELLITUS WITH HYPERGLYCEMIA, WITH LONG-TERM CURRENT USE OF INSULIN (HCC): Primary | ICD-10-CM

## 2020-06-23 PROCEDURE — 99214 OFFICE O/P EST MOD 30 MIN: CPT | Performed by: INTERNAL MEDICINE

## 2020-06-29 ENCOUNTER — TELEPHONE (OUTPATIENT)
Dept: ENDOCRINOLOGY | Facility: CLINIC | Age: 60
End: 2020-06-29

## 2020-07-07 ENCOUNTER — TELEPHONE (OUTPATIENT)
Dept: ENDOCRINOLOGY | Facility: CLINIC | Age: 60
End: 2020-07-07

## 2020-07-08 DIAGNOSIS — Z79.4 TYPE 2 DIABETES MELLITUS WITH HYPERGLYCEMIA, WITH LONG-TERM CURRENT USE OF INSULIN (HCC): Primary | ICD-10-CM

## 2020-07-08 DIAGNOSIS — E11.65 TYPE 2 DIABETES MELLITUS WITH HYPERGLYCEMIA, WITH LONG-TERM CURRENT USE OF INSULIN (HCC): Primary | ICD-10-CM

## 2020-07-08 RX ORDER — GLIMEPIRIDE 2 MG/1
2 TABLET ORAL
Qty: 90 TABLET | Refills: 1 | Status: SHIPPED | OUTPATIENT
Start: 2020-07-08 | End: 2020-12-31

## 2020-07-08 RX ORDER — FLASH GLUCOSE SCANNING READER
EACH MISCELLANEOUS
Qty: 1 DEVICE | Refills: 0 | Status: SHIPPED | OUTPATIENT
Start: 2020-07-08 | End: 2020-10-27 | Stop reason: ALTCHOICE

## 2020-07-08 RX ORDER — FLASH GLUCOSE SENSOR
KIT MISCELLANEOUS
Qty: 2 EACH | Refills: 12 | Status: SHIPPED | OUTPATIENT
Start: 2020-07-08 | End: 2020-10-27 | Stop reason: ALTCHOICE

## 2020-07-10 ENCOUNTER — OFFICE VISIT (OUTPATIENT)
Dept: URGENT CARE | Facility: MEDICAL CENTER | Age: 60
End: 2020-07-10
Payer: COMMERCIAL

## 2020-07-10 VITALS
TEMPERATURE: 97.8 F | BODY MASS INDEX: 38.63 KG/M2 | DIASTOLIC BLOOD PRESSURE: 88 MMHG | HEART RATE: 92 BPM | HEIGHT: 74 IN | SYSTOLIC BLOOD PRESSURE: 150 MMHG | OXYGEN SATURATION: 96 % | WEIGHT: 301 LBS | RESPIRATION RATE: 18 BRPM

## 2020-07-10 DIAGNOSIS — S99.922A FOOT INJURY, LEFT, INITIAL ENCOUNTER: ICD-10-CM

## 2020-07-10 DIAGNOSIS — S92.402A CLOSED FRACTURE OF PHALANX OF LEFT GREAT TOE, PHYSEAL INVOLVEMENT UNSPECIFIED, UNSPECIFIED PHALANX, INITIAL ENCOUNTER: Primary | ICD-10-CM

## 2020-07-10 PROCEDURE — G0382 LEV 3 HOSP TYPE B ED VISIT: HCPCS | Performed by: PHYSICIAN ASSISTANT

## 2020-07-10 PROCEDURE — S9083 URGENT CARE CENTER GLOBAL: HCPCS | Performed by: PHYSICIAN ASSISTANT

## 2020-07-10 NOTE — PROGRESS NOTES
[facility]        NAME: Kenny Bone is a 63 y o  male  : 1960    MRN: [id number]  DATE: July 10, 2020  TIME: 7:52 PM    Assessment and Plan   Closed fracture of phalanx of left great toe, physeal involvement unspecified, unspecified phalanx, initial encounter [S92 [id number]]  1  Closed fracture of phalanx of left great toe, physeal involvement unspecified, unspecified phalanx, initial encounter  Ambulatory referral to [address] [place] [address]   2  Foot injury, left, initial encounter  XR foot 3+ vw left     Xray- appears to be a left great toe fracture, pending final read  Patient declined splint and crutches he would like hard shoe, states he has a cane at home he will use  Hard shoe- placed by medical staff for comfort, NV intact post-splinting    Patient Instructions     RICE- rest, ice, compression, elevation  Hard shoe and cane/nonweightbearing until seen by Ortho  Call Ortho today and follow-up with them in the next 1-2 days for further evaluation and treatment  Call [facility] to schedule an appointment: [phone]  Or the direct Ortho number at [phone] to schedule an appointment   Go to the ER immediately if any numbness, tingling, weakness, change in intensity or location of pain, calf pain or swelling, other new or concerning symptoms    Chief Complaint     Chief Complaint   Patient presents with    Toe Injury     Pt dropped a heavy picture on his left 1st and 2nd toes tonight  History of Present Illness       23-year-old male presents with left 1st and 2nd toe pain since this afternoon  Patient states he accidentally dropped a heavy picture on his left 1st and 2nd toes  He states he noticed swelling and bruising  He denies any lacerations, abrasions, toenail involvement, bleeding, redness, warmth or other deformity/abnormality  He states the pain is worse with walking or palpation, better at rest   He has not tried anything for it    He denies any numbness, tingling, weakness, calf pain or swelling, chest pain, shortness of breath or other complaints  Review of Systems   Review of Systems   Constitutional: Negative for fever  Respiratory: Negative for chest tightness, shortness of breath and wheezing  Cardiovascular: Negative for chest pain and leg swelling  Gastrointestinal: Negative for abdominal pain, nausea and vomiting  Musculoskeletal: Positive for arthralgias and joint swelling  Negative for back pain, neck pain and neck stiffness  Skin: Negative for rash and wound  Neurological: Negative for dizziness, weakness, numbness and headaches  All other systems reviewed and are negative          Current Medications       Current Outpatient Medications:     atorvastatin (LIPITOR) 40 mg tablet, Take 1 tablet (40 mg total) by mouth daily (Patient taking differently: Take 40 mg by mouth daily in the early morning ), Disp: 90 tablet, Rfl: 3    Continuous Blood Gluc  (AdCampYLE JEANNE 14 DAY READER) [facility], Use reader to check blood sugar, Disp: 1 Device, Rfl: 0    Continuous Blood Gluc Sensor (FREESTYLE JEANNE 14 DAY SENSOR) MISC, Use 1 sensor every 14 days to check blood sugar, Disp: 2 each, Rfl: 12    famotidine (PEPCID) 40 MG tablet, Take 1 tablet (40 mg total) by mouth daily, Disp: 90 tablet, Rfl: 1    glimepiride (AMARYL) 2 mg tablet, Take 1 tablet (2 mg total) by mouth daily with breakfast, Disp: 90 tablet, Rfl: 1    glucose 4 g chewable tablet, Chew 4 tablets (16 g total) as needed for low blood sugar, Disp: 100 tablet, Rfl: 1    glucose blood (OneTouch Verio) test strip, Use to test blood sugar 4 times a day, Disp: 300 each, Rfl: 3    Insulin Aspart FlexPen (NovoLOG FlexPen) 100 UNIT/ML SOPN, Inject 10 Units under the skin 3 (three) times a day before meals, Disp: 5 pen, Rfl: 3    insulin glargine (LANTUS SOLOSTAR) 100 units/mL injection pen, Inject 42 units under the skin daily, Disp: 15 pen, Rfl: 3    Insulin Pen Needle (PEN NEEDLES) 31G X 6 MM MISC, by Does not apply route 4 (four) times a day, Disp: 100 each, Rfl: 4    Lancets (ONETOUCH DELICA PLUS JJPFJR42T) MISC, USE THREE TIMES DAILY, Disp: 100 each, Rfl: 5    levothyroxine 50 mcg tablet, Take 1 tablet (50 mcg total) by mouth daily Take 1 tablet (50 mcg total) Monday, Tuesday, Wednesday, Thursday, Friday, Saturday and (100 mcg total) Sunday, Disp: 30 tablet, Rfl: 3    metFORMIN (GLUCOPHAGE) 500 mg tablet, Take 2 tablets (1,000 mg total) by mouth 2 (two) times a day with meals, Disp: 360 tablet, Rfl: 3    metoprolol succinate (TOPROL-XL) 25 mg 24 hr tablet, Take 0 5 tablets (12 5 mg total) by mouth 2 (two) times a day (Patient not taking: Reported on 6/15/2020), Disp: 90 tablet, Rfl: 3    tamsulosin (FLOMAX) 0 4 mg, Take 1 capsule (0 4 mg total) by mouth daily with dinner (Patient not taking: Reported on 7/10/2020), Disp: 30 capsule, Rfl: 1    Current Allergies     Allergies as of 07/10/2020    (No Known Allergies)            The following portions of the patient's history were reviewed and updated as appropriate: allergies, current medications, past family history, past medical history, past social history, past surgical history and problem list      Past Medical History:   Diagnosis Date    BPH (benign prostatic hyperplasia)     Diabetes mellitus ([address] )     Disease of thyroid gland     Erectile dysfunction     GERD (gastroesophageal reflux disease)     Headache(784 0) 05/01/2018    Each Day    Hematuria     Hyperlipidemia     Hypertension     Infectious viral hepatitis 2007    Cured by Dr Holli Wray [facility]    Liver disease     hepatitis C    Mediastinal adenopathy     Obesity 2007    PE (pulmonary thromboembolism) ([address] )     Sarcoidosis     Vitamin D deficiency        Past Surgical History:   Procedure Laterality Date    COLONOSCOPY      last assessed: 08/28/2012; fiberoptic screening     CYSTOSCOPY      onset: 05/06/2016; Diagnostic     DENTAL SURGERY      FL RETROGRADE PYELOGRAM  2/6/2020    FL RETROGRADE PYELOGRAM  3/3/2020    LIVER BIOPSY      LYMPH NODE BIOPSY  6/2019    [place] BRONCHOSCOPY NEEDLE BX TRACHEA MAIN STEM&/BRON N/A 6/6/2019    Procedure: ENDOBRONCHIAL ULTRASOUND (EBUS); Surgeon: Kristyn Kruger MD;  [facility];  Service: Thoracic    [place] [address] N/A 6/6/2019    Procedure: Nancy Filler;  Surgeon: Kristyn Kruger MD;  [facility];  Service: Thoracic    [place] CYSTO/URETERO W/LITHOTRIPSY &INDWELL STENT INSRT Right 2/6/2020    Procedure: CYSTOSCOPY WITH LITHOTRIPSY RETROGRADE PYELOGRAM AND INSERTION STENT URETERAL;  Surgeon: Emery Porras MD;  [facility];  Service: Urology    [place] CYSTO/URETERO W/LITHOTRIPSY &INDWELL STENT INSRT Right 3/3/2020    Procedure: CYSTOSCOPY URETEROSCOPY WITH LITHOTRIPSY HOLMIUM LASER; BASKET STONE RETRIEVAL; RETROGRADE PYELOGRAM AND INSERTION STENT URETERAL;  Surgeon: Emery Porras MD;  [facility];  Service: Urology    [place] TRANSURETHRAL ELEC-SURG PROSTATECTOM N/A 9/13/2017    Procedure: Omar Conway; TUR PROSTATE;  Surgeon: Vasile Mac MD;  [facility];  Service: Urology    SHOULDER ARTHROSCOPY Right     bicep tendon repair, rotator cuff repair and acromuim shave        Family History   Problem Relation Age of Onset    Stroke Father         Accidental Death at 52    Cancer Maternal Grandmother     Valvular heart disease Mother     Stroke Mother          Medications have been verified  Objective   /88   Pulse 92   Temp 97 8 °F (36 6 °C) (Temporal)   Resp 18   Ht 6' 2" (1 88 m)   Wt (!) 137 kg (301 lb)   SpO2 96%   BMI 38 65 kg/m²        Physical Exam     Physical Exam   Constitutional: He is oriented to person, place, and time  He appears well-developed and well-nourished  No distress  Neck: Normal range of motion  Neck supple  Cardiovascular: Normal rate, regular rhythm and normal heart sounds  Pulmonary/Chest: Effort normal and breath sounds normal  He has no wheezes  Musculoskeletal:        Left ankle: Normal         Left lower leg: Normal         Left foot: There is tenderness and swelling  There is normal range of motion, normal capillary refill, no deformity and no laceration  Feet:    Able to wiggle toes, dorsiflexion/plantar flexion intact  No calf or ankle swelling or tenderness to palpation  No nail involvement or subungual hematoma visualized   Neurological: He is alert and oriented to person, place, and time  He has normal reflexes  No sensory deficit  NV intact with sensation and strong peripheral pulses  5/5 strength of LE bilaterally   Skin: Capillary refill takes less than 2 seconds  Psychiatric: He has a normal mood and affect  His behavior is normal    Nursing note and vitals reviewed

## 2020-07-10 NOTE — PATIENT INSTRUCTIONS
RICE- rest, ice, compression, elevation  Hard shoe and cane/nonweightbearing until seen by Ortho  Call Ortho today and follow-up with them in the next 1-2 days for further evaluation and treatment     Call [facility] to schedule an appointment: [phone]  Or the direct Ortho number at [phone] to schedule an appointment   Go to the ER immediately if any numbness, tingling, weakness, change in intensity or location of pain, calf pain or swelling, other new or concerning symptoms

## 2020-07-15 ENCOUNTER — TELEPHONE (OUTPATIENT)
Dept: ENDOCRINOLOGY | Facility: CLINIC | Age: 60
End: 2020-07-15

## 2020-07-15 DIAGNOSIS — Z79.4 TYPE 2 DIABETES MELLITUS WITH HYPERGLYCEMIA, WITH LONG-TERM CURRENT USE OF INSULIN (HCC): ICD-10-CM

## 2020-07-15 DIAGNOSIS — E03.9 ACQUIRED HYPOTHYROIDISM: ICD-10-CM

## 2020-07-15 DIAGNOSIS — E55.9 VITAMIN D DEFICIENCY: ICD-10-CM

## 2020-07-15 DIAGNOSIS — Z79.4 TYPE 2 DIABETES MELLITUS WITH HYPERGLYCEMIA, WITH LONG-TERM CURRENT USE OF INSULIN (HCC): Primary | ICD-10-CM

## 2020-07-15 DIAGNOSIS — E78.2 MIXED HYPERLIPIDEMIA: ICD-10-CM

## 2020-07-15 DIAGNOSIS — E11.65 TYPE 2 DIABETES MELLITUS WITH HYPERGLYCEMIA, WITH LONG-TERM CURRENT USE OF INSULIN (HCC): ICD-10-CM

## 2020-07-15 DIAGNOSIS — E11.65 TYPE 2 DIABETES MELLITUS WITH HYPERGLYCEMIA, WITH LONG-TERM CURRENT USE OF INSULIN (HCC): Primary | ICD-10-CM

## 2020-07-15 NOTE — TELEPHONE ENCOUNTER
Please let patient know I reviewed BG log    -BGs look good, a few high readings in the morning and before dinner and one before bedtime    Let's reduce lantus to 34 units and continue glimepiride 2mg qday and metformin 1000mg BID AC    His morning BGs might increase slightly but lets see what happens    I ordered repeat labs before his August 2020 appointment including his A1C

## 2020-07-19 DIAGNOSIS — E03.9 HYPOTHYROIDISM, UNSPECIFIED TYPE: ICD-10-CM

## 2020-07-20 ENCOUNTER — TELEPHONE (OUTPATIENT)
Dept: ENDOCRINOLOGY | Facility: CLINIC | Age: 60
End: 2020-07-20

## 2020-07-20 NOTE — TELEPHONE ENCOUNTER
Placed call to patient, he believes he is fine and does not need a refill at this time  Patient will double check and call his Endo if refill is needed  Please deny refill as I am unable to do so

## 2020-07-20 NOTE — TELEPHONE ENCOUNTER
Looks like this is coming from dr Doree Goltz office  Please see if pt requires med, and if so, have him request from endo

## 2020-07-20 NOTE — TELEPHONE ENCOUNTER
Reviewed BG log and medications  He understood  I sent him the link to [facility] to send reports  He is not happy with the [facility] because his numbers are always at least 20 off

## 2020-07-21 DIAGNOSIS — E03.9 HYPOTHYROIDISM, UNSPECIFIED TYPE: ICD-10-CM

## 2020-07-21 RX ORDER — LEVOTHYROXINE SODIUM 0.05 MG/1
TABLET ORAL
Qty: 30 TABLET | Refills: 3 | OUTPATIENT
Start: 2020-07-21

## 2020-07-21 RX ORDER — LEVOTHYROXINE SODIUM 0.05 MG/1
50 TABLET ORAL DAILY
Qty: 90 TABLET | Refills: 3 | Status: SHIPPED | OUTPATIENT
Start: 2020-07-21 | End: 2021-06-08 | Stop reason: SDUPTHER

## 2020-07-22 ENCOUNTER — TELEMEDICINE (OUTPATIENT)
Dept: CARDIOLOGY CLINIC | Facility: CLINIC | Age: 60
End: 2020-07-22
Payer: COMMERCIAL

## 2020-07-22 VITALS — BODY MASS INDEX: 38.12 KG/M2 | WEIGHT: 297 LBS | HEIGHT: 74 IN

## 2020-07-22 DIAGNOSIS — I10 ESSENTIAL HYPERTENSION: Primary | ICD-10-CM

## 2020-07-22 DIAGNOSIS — D86.0 PULMONARY SARCOIDOSIS (HCC): ICD-10-CM

## 2020-07-22 DIAGNOSIS — R06.00 DYSPNEA ON EXERTION: ICD-10-CM

## 2020-07-22 DIAGNOSIS — R00.0 SINUS TACHYCARDIA: ICD-10-CM

## 2020-07-22 PROCEDURE — 99214 OFFICE O/P EST MOD 30 MIN: CPT | Performed by: INTERNAL MEDICINE

## 2020-07-22 PROCEDURE — 3008F BODY MASS INDEX DOCD: CPT | Performed by: INTERNAL MEDICINE

## 2020-07-22 RX ORDER — ASPIRIN 81 MG/1
81 TABLET ORAL DAILY
COMMUNITY

## 2020-07-22 NOTE — PROGRESS NOTES
Virtual Brief Visit    Assessment/Plan:    Problem List Items Addressed This Visit        Respiratory    Pulmonary sarcoidosis ([address] )       Cardiovascular and Mediastinum    Essential hypertension - Primary       Other    Sinus tachycardia    Dyspnea on exertion                Tachycardia: sinus previously  One, maybe 2 episodes of atrial flutter in the past, was brief and symptomatic  No recurrence  Was in the setting of being on steroids as well  Discussed if there was recurrence, I would resume anticoagulation, and also beta blocker  He wants to remain off as many medications as possible  Sarcoidosis: no cardiac involvement noted on MRI, echo  Follows with rheumatology and to see pulmonary as well  Now off anticoagulation post PE  Reason for visit is   Chief Complaint   Patient presents with    Follow-up    Shortness of Breath    Virtual Brief Visit        Encounter provider Marcello Bai MD    Provider located at [address]    Recent Visits  No visits were found meeting these conditions  Showing recent visits within past 7 days and meeting all other requirements     Today's Visits  Date Type Provider Dept   07/22/20 Telemedicine Marcello Bai MD [address] [place] [address] today's visits and meeting all other requirements     Future Appointments  No visits were found meeting these conditions  Showing future appointments within next 150 days and meeting all other requirements      It was my intent to perform this visit via video technology but the patient was not able to do a video connection so the visit was completed via audio telephone only  Could not connect to teams or D[employer]  After connecting through telephone, the patient was identified by name and date of birth  Marlilly Osbornals was informed that this is a telemedicine visit and that the visit is being conducted through telephone  My office door was closed  No one else was in the room  He acknowledged consent and understanding of privacy and security of the platform  The patient has agreed to participate and understands he can discontinue the visit at any time  Patient is aware this is a billable service  Subjective    Diana Dubon is a 63 y o  male  HPI     26-year-old gentleman  This is my 1st visit with him, albeit over the telephone  He has a history of tachycardia with 1 episode of atrial flutter which was in August of 2019 while the patient was on steroids  He was symptomatic with this  He reports an episode in 2016 of similar, I do not have records of that  He has pulmonary sarcoidosis, but cardiac MRI has not shown any involvement  Similarly, echocardiogram has been normal     Type 2 diabetic, diabetes, dyslipidemia  No evidence of coronary disease from prior cardiac catheterization  History of PE now off anticoagulation  Last visit with our fellow, he was on metoprolol  He wanted to wean himself off so Holter monitor was done in he has had controlled heart rates  He is now off metoprolol, and feels no different  He occasionally feels shortness of breath, but denies this is significant limitation for him currently        Past Medical History:   Diagnosis Date    BPH (benign prostatic hyperplasia)     Diabetes mellitus ([address] )     Disease of thyroid gland     Erectile dysfunction     GERD (gastroesophageal reflux disease)     Headache(784 0) 05/01/2018    Each Day    Hematuria     Hyperlipidemia     Hypertension     Infectious viral hepatitis 2007    Cured by Dr Holli Brandon Liver disease     hepatitis C    Mediastinal adenopathy     Obesity 2007    PE (pulmonary thromboembolism) [facility] [address] [facility]     Sarcoidosis     Vitamin D deficiency        Past Surgical History:   Procedure Laterality Date    COLONOSCOPY      last assessed: 08/28/2012; fiberoptic screening     CYSTOSCOPY      onset: 05/06/2016; Diagnostic     DENTAL SURGERY      FL RETROGRADE PYELOGRAM  2/6/2020    FL RETROGRADE PYELOGRAM  3/3/2020    LIVER BIOPSY      LYMPH NODE BIOPSY  6/2019    [place] BRONCHOSCOPY NEEDLE BX TRACHEA MAIN STEM&/BRON N/A 6/6/2019    Procedure: ENDOBRONCHIAL ULTRASOUND (EBUS);   Surgeon: Lizbeth Francisco MD;  [facility];  Service: Thoracic    [place] [address] N/A 6/6/2019    Procedure: Coni Vance;  Surgeon: Lizbeth Francisco MD;  [facility];  Service: Thoracic    [place] CYSTO/URETERO W/LITHOTRIPSY &INDWELL STENT INSRT Right 2/6/2020    Procedure: CYSTOSCOPY WITH LITHOTRIPSY RETROGRADE PYELOGRAM AND INSERTION STENT URETERAL;  Surgeon: Frances Davila MD;  [facility];  Service: Urology    [place] CYSTO/URETERO W/LITHOTRIPSY &INDWELL STENT INSRT Right 3/3/2020    Procedure: CYSTOSCOPY URETEROSCOPY WITH LITHOTRIPSY HOLMIUM LASER; BASKET STONE RETRIEVAL; RETROGRADE PYELOGRAM AND INSERTION STENT URETERAL;  Surgeon: Frances Davila MD;  [facility];  Service: Urology    [place] TRANSURETHRAL ELEC-SURG [facility] N/A 9/13/2017    Procedure: Rojas Dumont; TUR PROSTATE;  Surgeon: Mana Crocker MD;  [facility];  Service: Urology    SHOULDER ARTHROSCOPY Right     bicep tendon repair, rotator cuff repair and acromuim shave        Current Outpatient Medications   Medication Sig Dispense Refill    aspirin (ECOTRIN LOW STRENGTH) 81 mg EC tablet Take 81 mg by mouth daily      atorvastatin (LIPITOR) 40 mg tablet Take 1 tablet (40 mg total) by mouth daily (Patient taking differently: Take 40 mg by mouth daily in the early morning ) 90 tablet 3    Continuous Blood Gluc  (FREESTYLE JEANNE 14 DAY READER) ANNA Use reader to check blood sugar 1 Device 0    Continuous Blood Gluc Sensor (FREESTYLE JEANNE 14 DAY SENSOR) MISC Use 1 sensor every 14 days to check blood sugar 2 each 12    famotidine (PEPCID) 40 MG tablet Take 1 tablet (40 mg total) by mouth daily 90 tablet 1    glimepiride (AMARYL) 2 mg tablet Take 1 tablet (2 mg total) by mouth daily with breakfast 90 tablet 1    glucose 4 g chewable tablet Chew 4 tablets (16 g total) as needed for low blood sugar 100 tablet 1    glucose blood (OneTouch Verio) test strip Use to test blood sugar 4 times a day 300 each 3    insulin glargine (LANTUS SOLOSTAR) 100 units/mL injection pen Inject 34 units under the skin daily 15 pen 3    Insulin Pen Needle (PEN NEEDLES) 31G X 6 MM MISC by Does not apply route 4 (four) times a day 100 each 4    Lancets (ONETOUCH DELICA PLUS [id number]) MISC USE THREE TIMES DAILY 100 each 5    levothyroxine 50 mcg tablet Take 1 tablet (50 mcg total) by mouth daily Take 1 tablet (50 mcg total) Monday, Tuesday, Wednesday, Thursday, Friday, Saturday and (100 mcg total) Sunday 90 tablet 3    metFORMIN (GLUCOPHAGE) 500 mg tablet Take 2 tablets (1,000 mg total) by mouth 2 (two) times a day with meals 360 tablet 3    Insulin Aspart FlexPen (NovoLOG FlexPen) 100 UNIT/ML SOPN Inject 10 Units under the skin 3 (three) times a day before meals (Patient not taking: Reported on 7/22/2020) 5 pen 3    tamsulosin (FLOMAX) 0 4 mg Take 1 capsule (0 4 mg total) by mouth daily with dinner (Patient not taking: Reported on 7/10/2020) 30 capsule 1     No current facility-administered medications for this visit  No Known Allergies    Review of Systems    Vitals:    07/22/20 1526   Weight: 135 kg (297 lb)   Height: 6' 2" (1 88 m)     I spent 28 minutes directly with the patient during this visit    VIRTUAL VISIT DISCLAIMER    Lester Messer acknowledges that he has consented to an online visit or consultation  He understands that the online visit is based solely on information provided by him, and that, in the absence of a face-to-face physical evaluation by the physician, the diagnosis he receives is both limited and provisional in terms of accuracy and completeness   This is not intended to replace a full medical face-to-face evaluation by the physician  Otto Basurto understands and accepts these terms

## 2020-07-27 ENCOUNTER — TELEPHONE (OUTPATIENT)
Dept: ENDOCRINOLOGY | Facility: CLINIC | Age: 60
End: 2020-07-27

## 2020-07-27 DIAGNOSIS — Z79.4 TYPE 2 DIABETES MELLITUS WITH HYPERGLYCEMIA, WITH LONG-TERM CURRENT USE OF INSULIN (HCC): ICD-10-CM

## 2020-07-27 DIAGNOSIS — E11.65 TYPE 2 DIABETES MELLITUS WITH HYPERGLYCEMIA, WITH LONG-TERM CURRENT USE OF INSULIN (HCC): ICD-10-CM

## 2020-07-27 NOTE — TELEPHONE ENCOUNTER
Please let patient know I reviewed BG log     -BGs look good, a few high readings in the morning     Reduce lantus to 32 units and continue glimepiride 2mg qday and metformin 1000mg BID AC

## 2020-07-27 NOTE — TELEPHONE ENCOUNTER
Spoke to patient, advised BG log reviewed  Decrease Lantus to 32 units (med list updated)  Continue glimepiride 2 mg qday and metformin 1000 mg BID AC  Pt understood

## 2020-08-04 ENCOUNTER — TELEPHONE (OUTPATIENT)
Dept: ENDOCRINOLOGY | Facility: CLINIC | Age: 60
End: 2020-08-04

## 2020-08-04 DIAGNOSIS — Z79.4 TYPE 2 DIABETES MELLITUS WITH HYPERGLYCEMIA, WITH LONG-TERM CURRENT USE OF INSULIN (HCC): ICD-10-CM

## 2020-08-04 DIAGNOSIS — E11.65 TYPE 2 DIABETES MELLITUS WITH HYPERGLYCEMIA, WITH LONG-TERM CURRENT USE OF INSULIN (HCC): ICD-10-CM

## 2020-08-04 NOTE — TELEPHONE ENCOUNTER
Please let patient know I reviewed BG log     -BGs look good, a few high readings in the morning     Reduce lantus to 28 units and continue glimepiride 2mg qday and metformin 1000mg BID AC      Sorry you had a bad experience with the [employer] CGM but I have heard about the innaccuracies from other patients as well   Dylon Vazquez just came out with a 2nd generation of the device so we will see if it is more accurate in the coming months

## 2020-08-04 NOTE — TELEPHONE ENCOUNTER
Spoke with patient and reviewed bg log and changes to medication  He understood  He is going to look into the Dexcom G6 with his insurance for coverage

## 2020-08-10 ENCOUNTER — APPOINTMENT (OUTPATIENT)
Dept: LAB | Facility: CLINIC | Age: 60
End: 2020-08-10
Payer: COMMERCIAL

## 2020-08-10 ENCOUNTER — TRANSCRIBE ORDERS (OUTPATIENT)
Dept: LAB | Facility: CLINIC | Age: 60
End: 2020-08-10

## 2020-08-10 DIAGNOSIS — E55.9 VITAMIN D DEFICIENCY: ICD-10-CM

## 2020-08-10 DIAGNOSIS — D86.9 SARCOIDOSIS: ICD-10-CM

## 2020-08-10 DIAGNOSIS — Z79.4 TYPE 2 DIABETES MELLITUS WITH HYPERGLYCEMIA, WITH LONG-TERM CURRENT USE OF INSULIN (HCC): ICD-10-CM

## 2020-08-10 DIAGNOSIS — E11.65 TYPE 2 DIABETES MELLITUS WITH HYPERGLYCEMIA, WITH LONG-TERM CURRENT USE OF INSULIN (HCC): ICD-10-CM

## 2020-08-10 DIAGNOSIS — E03.9 HYPOTHYROIDISM, UNSPECIFIED TYPE: ICD-10-CM

## 2020-08-10 DIAGNOSIS — E03.9 ACQUIRED HYPOTHYROIDISM: ICD-10-CM

## 2020-08-10 DIAGNOSIS — E11.9 TYPE 2 DIABETES MELLITUS WITHOUT COMPLICATION, WITHOUT LONG-TERM CURRENT USE OF INSULIN (HCC): ICD-10-CM

## 2020-08-10 DIAGNOSIS — E78.2 MIXED HYPERLIPIDEMIA: ICD-10-CM

## 2020-08-10 LAB
ALBUMIN SERPL BCP-MCNC: 3.8 G/DL (ref 3.5–5)
ALP SERPL-CCNC: 70 U/L (ref 46–116)
ALT SERPL W P-5'-P-CCNC: 35 U/L (ref 12–78)
ANION GAP SERPL CALCULATED.3IONS-SCNC: 10 MMOL/L (ref 4–13)
AST SERPL W P-5'-P-CCNC: 22 U/L (ref 5–45)
BASOPHILS # BLD AUTO: 0.04 THOUSANDS/ΜL (ref 0–0.1)
BASOPHILS NFR BLD AUTO: 1 % (ref 0–1)
BILIRUB SERPL-MCNC: 0.63 MG/DL (ref 0.2–1)
BUN SERPL-MCNC: 28 MG/DL (ref 5–25)
CALCIUM SERPL-MCNC: 9.1 MG/DL (ref 8.3–10.1)
CHLORIDE SERPL-SCNC: 105 MMOL/L (ref 100–108)
CHOLEST SERPL-MCNC: 110 MG/DL (ref 50–200)
CO2 SERPL-SCNC: 26 MMOL/L (ref 21–32)
CREAT SERPL-MCNC: 1.21 MG/DL (ref 0.6–1.3)
EOSINOPHIL # BLD AUTO: 0.16 THOUSAND/ΜL (ref 0–0.61)
EOSINOPHIL NFR BLD AUTO: 3 % (ref 0–6)
ERYTHROCYTE [DISTWIDTH] IN BLOOD BY AUTOMATED COUNT: 12.8 % (ref 11.6–15.1)
EST. AVERAGE GLUCOSE BLD GHB EST-MCNC: 143 MG/DL
GFR SERPL CREATININE-BSD FRML MDRD: 64 ML/MIN/1.73SQ M
GLUCOSE P FAST SERPL-MCNC: 155 MG/DL (ref 65–99)
HBA1C MFR BLD: 6.6 %
HCT VFR BLD AUTO: 45.3 % (ref 36.5–49.3)
HDLC SERPL-MCNC: 26 MG/DL
HGB BLD-MCNC: 15 G/DL (ref 12–17)
IMM GRANULOCYTES # BLD AUTO: 0.04 THOUSAND/UL (ref 0–0.2)
IMM GRANULOCYTES NFR BLD AUTO: 1 % (ref 0–2)
LDLC SERPL CALC-MCNC: 49 MG/DL (ref 0–100)
LYMPHOCYTES # BLD AUTO: 1.88 THOUSANDS/ΜL (ref 0.6–4.47)
LYMPHOCYTES NFR BLD AUTO: 33 % (ref 14–44)
MCH RBC QN AUTO: 30.4 PG (ref 26.8–34.3)
MCHC RBC AUTO-ENTMCNC: 33.1 G/DL (ref 31.4–37.4)
MCV RBC AUTO: 92 FL (ref 82–98)
MONOCYTES # BLD AUTO: 0.53 THOUSAND/ΜL (ref 0.17–1.22)
MONOCYTES NFR BLD AUTO: 9 % (ref 4–12)
NEUTROPHILS # BLD AUTO: 3 THOUSANDS/ΜL (ref 1.85–7.62)
NEUTS SEG NFR BLD AUTO: 53 % (ref 43–75)
NONHDLC SERPL-MCNC: 84 MG/DL
NRBC BLD AUTO-RTO: 0 /100 WBCS
PLATELET # BLD AUTO: 193 THOUSANDS/UL (ref 149–390)
PMV BLD AUTO: 9.9 FL (ref 8.9–12.7)
POTASSIUM SERPL-SCNC: 4.4 MMOL/L (ref 3.5–5.3)
PROT SERPL-MCNC: 7.4 G/DL (ref 6.4–8.2)
RBC # BLD AUTO: 4.93 MILLION/UL (ref 3.88–5.62)
SODIUM SERPL-SCNC: 141 MMOL/L (ref 136–145)
T4 FREE SERPL-MCNC: 1.07 NG/DL (ref 0.76–1.46)
TRIGL SERPL-MCNC: 176 MG/DL
TSH SERPL DL<=0.05 MIU/L-ACNC: 3.15 UIU/ML (ref 0.36–3.74)
WBC # BLD AUTO: 5.65 THOUSAND/UL (ref 4.31–10.16)

## 2020-08-10 PROCEDURE — 80053 COMPREHEN METABOLIC PANEL: CPT

## 2020-08-10 PROCEDURE — 84443 ASSAY THYROID STIM HORMONE: CPT

## 2020-08-10 PROCEDURE — 83036 HEMOGLOBIN GLYCOSYLATED A1C: CPT

## 2020-08-10 PROCEDURE — 80061 LIPID PANEL: CPT

## 2020-08-10 PROCEDURE — 36415 COLL VENOUS BLD VENIPUNCTURE: CPT

## 2020-08-10 PROCEDURE — 84439 ASSAY OF FREE THYROXINE: CPT

## 2020-08-10 PROCEDURE — 3044F HG A1C LEVEL LT 7.0%: CPT | Performed by: INTERNAL MEDICINE

## 2020-08-10 PROCEDURE — 85025 COMPLETE CBC W/AUTO DIFF WBC: CPT

## 2020-08-17 ENCOUNTER — TELEMEDICINE (OUTPATIENT)
Dept: ENDOCRINOLOGY | Facility: CLINIC | Age: 60
End: 2020-08-17
Payer: COMMERCIAL

## 2020-08-17 DIAGNOSIS — Z79.4 TYPE 2 DIABETES MELLITUS WITH HYPERGLYCEMIA, WITH LONG-TERM CURRENT USE OF INSULIN (HCC): Primary | ICD-10-CM

## 2020-08-17 DIAGNOSIS — E55.9 VITAMIN D DEFICIENCY: ICD-10-CM

## 2020-08-17 DIAGNOSIS — E78.2 MIXED HYPERLIPIDEMIA: ICD-10-CM

## 2020-08-17 DIAGNOSIS — E11.65 TYPE 2 DIABETES MELLITUS WITH HYPERGLYCEMIA, WITH LONG-TERM CURRENT USE OF INSULIN (HCC): Primary | ICD-10-CM

## 2020-08-17 DIAGNOSIS — E03.9 ACQUIRED HYPOTHYROIDISM: ICD-10-CM

## 2020-08-17 PROCEDURE — 3044F HG A1C LEVEL LT 7.0%: CPT | Performed by: INTERNAL MEDICINE

## 2020-08-17 PROCEDURE — 3077F SYST BP >= 140 MM HG: CPT | Performed by: INTERNAL MEDICINE

## 2020-08-17 PROCEDURE — 3079F DIAST BP 80-89 MM HG: CPT | Performed by: INTERNAL MEDICINE

## 2020-08-17 PROCEDURE — 3066F NEPHROPATHY DOC TX: CPT | Performed by: INTERNAL MEDICINE

## 2020-08-17 PROCEDURE — 2022F DILAT RTA XM EVC RTNOPTHY: CPT | Performed by: INTERNAL MEDICINE

## 2020-08-17 PROCEDURE — 99214 OFFICE O/P EST MOD 30 MIN: CPT | Performed by: INTERNAL MEDICINE

## 2020-08-17 PROCEDURE — 1036F TOBACCO NON-USER: CPT | Performed by: INTERNAL MEDICINE

## 2020-08-17 NOTE — PROGRESS NOTES
Virtual Regular Visit      Assessment/Plan:    Problem List Items Addressed This Visit        Endocrine    Hypothyroid    Diabetes ([facility] [address] ) - Primary      Other Visit Diagnoses     Mixed hyperlipidemia        Vitamin D deficiency                   Reason for visit is   Chief Complaint   Patient presents with    Virtual Regular Visit        Encounter provider Dre Skelton MD    Provider located at [address] [id number] [phone]      Recent Visits  No visits were found meeting these conditions  Showing recent visits within past 7 days and meeting all other requirements     Future Appointments  No visits were found meeting these conditions  Showing future appointments within next 150 days and meeting all other requirements        The patient was identified by name and date of birth  Rosario Greco was informed that this is a telemedicine visit and that the visit is being conducted through [employer]  My office door was closed  No one else was in the room  He acknowledged consent and understanding of privacy and security of the video platform  The patient has agreed to participate and understands they can discontinue the visit at any time  Patient is aware this is a billable service  Medical Decision Making:     Impression  1  Systemic sarcoidosis now off steroids and cellcept  2  DM2    3  Acquired hypothyroidism- on levothyroxine  4  Vitamin D deficiency- on D3 replacemet  5  Mixed HLD- on statin therapy   7  Hx HCV s/p harvoni c/b liver cirrhosis   8  Gallstones pending cholecystectomy- to be monitored for now  9   PE now off AC  10  Kidney stones, s/p stent and UTI now resolved         Recommendations:  ?  Blood sugars controlled, mild fasting hyperglycemia but will continue to reduce basal insulin, will reduce lantus to 28 units qAM, continue metformin to 1000mg BID AC and continue glimepiride 2mg qday          Cannot use GLP-1 agonists due to known gallstones and cholecystectomy still pending  Had side effects from SGLT-2 inhibitors due to prostate       Acquired hypothyroidism- continue levothyroxine 50mcg/day and extra tab on Sunday, repeat thyroid labs normal Aug 2020       Vitamin D level - continue 5000 IU D3 replacement,  vitamin D level normal 40 5 May 2020     Mixed hyperlipidemia-  LDL controlled at 49 Aug 2020, triglycerides mildly elevated 176 Aug 2020, continue statin for now        Randy WITT               History of Present Illness:   Mr Padmini Hawkins is a 61yr old male who presents for DM2 follow up  He has a hx of liver cirrhosis on prior biopsy and had pulmonary sarcoidosis with extra thoracic involvement including trigeminal neuralgia type symptoms with right sided headaches and drooping of the right face and peripheral paresthesias  He also has possible eye involvement of his sarcoid  ?  PMH-HCV s/p harvoni c/b liver cirrhosis, DM2, hypothyroidism, systemic sarcoidosis, BPH, vitamin D deficiency, mixed HLD  PSH-shoulder surgery, liver biopsy, prostate surgery  FHx-no diabetes in family  SHx-neg x 2, works at [employer]     Type of DM: 2  Age of onset: 2007 diagnosed   Microvascular complications: neuropathy? Macrovascular complications: none known         Events since last visit:     Off steroids and cellcept now     FBG-mid to high 100s  Premeal/qHS BG-mid to high 100s  Hypoglycemia-  none     On lantus 32 units qAM (forgot to reduce to 28 units),  on metformin 1000mg BID AC and glimepiride 2mg qday     Left great toe fracture      Saw general surgeon in March 2020 and gallstones will be observed for now      ? Review of Systems:     Review of Systems   Constitutional: Negative for appetite change, chills, diaphoresis, fatigue, fever and unexpected weight change     HENT: Negative for congestion, ear pain, hearing loss, rhinorrhea, sinus pressure, sinus pain, sore throat, trouble swallowing and voice change  Eyes: Negative for photophobia, redness and visual disturbance  Respiratory: Negative for apnea, cough, chest tightness, shortness of breath, wheezing and stridor  Cardiovascular: Negative for chest pain, palpitations and leg swelling  Gastrointestinal: Negative for abdominal distention, abdominal pain, constipation, diarrhea, nausea and vomiting  Endocrine: Negative for cold intolerance, heat intolerance, polydipsia, polyphagia and polyuria  Genitourinary: Negative for difficulty urinating, dysuria, flank pain, frequency, hematuria and urgency  Musculoskeletal: Negative for arthralgias, back pain, gait problem, joint swelling and myalgias  Skin: Negative for color change, pallor, rash and wound  Allergic/Immunologic: Negative for immunocompromised state  Neurological: Negative for dizziness, tremors, syncope, weakness, light-headedness and headaches  Hematological: Negative for adenopathy  Does not bruise/bleed easily  Psychiatric/Behavioral: Negative for confusion and sleep disturbance  The patient is not nervous/anxious          Past Medical History:   Diagnosis Date    BPH (benign prostatic hyperplasia)     [facility]     Disease of thyroid gland     Erectile dysfunction     GERD (gastroesophageal reflux disease)     Headache(784 0) 05/01/2018    Each Day    Hematuria     Hyperlipidemia     Hypertension     Infectious viral hepatitis 2007    Cured by Dr Holli Brandon Liver disease     hepatitis C    Mediastinal adenopathy     Obesity 2007    PE (pulmonary thromboembolism) ([facility] [address] )     Sarcoidosis     Vitamin D deficiency        Past Surgical History:   Procedure Laterality Date    COLONOSCOPY      last assessed: 08/28/2012; fiberoptic screening     CYSTOSCOPY      onset: 05/06/2016; Diagnostic     DENTAL SURGERY      FL RETROGRADE PYELOGRAM  2/6/2020    FL RETROGRADE PYELOGRAM  3/3/2020    LIVER BIOPSY      LYMPH NODE BIOPSY  6/2019    FL BRONCHOSCOPY NEEDLE BX TRACHEA MAIN STEM&/BRON N/A 6/6/2019    Procedure: ENDOBRONCHIAL ULTRASOUND (EBUS);   Surgeon: Maddie German MD;  [facility];  Service: Thoracic    [place] [address] N/A 6/6/2019    Procedure: Iona Abraham;  Surgeon: Maddie German MD;  [facility];  Service: Thoracic    FL CYSTO/URETERO W/LITHOTRIPSY &INDWELL STENT INSRT Right 2/6/2020    Procedure: CYSTOSCOPY WITH LITHOTRIPSY RETROGRADE PYELOGRAM AND INSERTION STENT URETERAL;  Surgeon: Marce Malave MD;  [facility];  Service: Urology    FL CYSTO/URETERO W/LITHOTRIPSY &INDWELL STENT INSRT Right 3/3/2020    Procedure: CYSTOSCOPY URETEROSCOPY WITH LITHOTRIPSY HOLMIUM LASER; BASKET STONE RETRIEVAL; RETROGRADE PYELOGRAM AND INSERTION STENT URETERAL;  Surgeon: Marce Malave MD;  [facility];  Service: Urology    FL TRANSURETHRAL ELEC-SURG [facility] N/A 9/13/2017    Procedure: Stacey Newton; TUR PROSTATE;  Surgeon: Kendall Atkins MD;  [facility];  Service: Urology    SHOULDER ARTHROSCOPY Right     bicep tendon repair, rotator cuff repair and acromuim shave        Current Outpatient Medications   Medication Sig Dispense Refill    aspirin (ECOTRIN LOW STRENGTH) 81 mg EC tablet Take 81 mg by mouth daily      atorvastatin (LIPITOR) 40 mg tablet Take 1 tablet (40 mg total) by mouth daily (Patient taking differently: Take 40 mg by mouth daily in the early morning ) 90 tablet 3    Continuous Blood Gluc  (FREESTYLE JEANNE 14 DAY READER) ANNA Use reader to check blood sugar 1 Device 0    Continuous Blood Gluc Sensor (FREESTYLE JEANNE 14 DAY SENSOR) MISC Use 1 sensor every 14 days to check blood sugar 2 each 12    famotidine (PEPCID) 40 MG tablet Take 1 tablet (40 mg total) by mouth daily 90 tablet 1    glimepiride (AMARYL) 2 mg tablet Take 1 tablet (2 mg total) by mouth daily with breakfast 90 tablet 1    glucose 4 g chewable tablet Chew 4 tablets (16 g total) as needed for low blood sugar 100 tablet 1    glucose blood (OneTouch Verio) test strip Use to test blood sugar 4 times a day 300 each 3    Insulin Aspart FlexPen (NovoLOG FlexPen) 100 UNIT/ML SOPN Inject 10 Units under the skin 3 (three) times a day before meals (Patient not taking: Reported on 7/22/2020) 5 pen 3    insulin glargine (LANTUS SOLOSTAR) 100 units/mL injection pen Inject 28 units under the skin daily 15 pen 3    Insulin Pen Needle (PEN NEEDLES) 31G X 6 MM MISC by Does not apply route 4 (four) times a day 100 each 4    Lancets (ONETOUCH DELICA PLUS [id number]) MISC USE THREE TIMES DAILY 100 each 5    levothyroxine 50 mcg tablet Take 1 tablet (50 mcg total) by mouth daily Take 1 tablet (50 mcg total) Monday, Tuesday, Wednesday, Thursday, Friday, Saturday and (100 mcg total) Sunday 90 tablet 3    metFORMIN (GLUCOPHAGE) 500 mg tablet Take 2 tablets (1,000 mg total) by mouth 2 (two) times a day with meals 360 tablet 3    tamsulosin (FLOMAX) 0 4 mg Take 1 capsule (0 4 mg total) by mouth daily with dinner (Patient not taking: Reported on 7/10/2020) 30 capsule 1     No current facility-administered medications for this visit  No Known Allergies  Video Exam    There were no vitals filed for this visit  Physical Exam  Vitals signs reviewed  Constitutional:       Appearance: Normal appearance  He is normal weight  He is not ill-appearing or toxic-appearing  HENT:      Head: Normocephalic and atraumatic  Right Ear: External ear normal       Left Ear: External ear normal       Nose: Nose normal    Eyes:      Extraocular Movements: Extraocular movements intact  Conjunctiva/sclera: Conjunctivae normal    Pulmonary:      Effort: Pulmonary effort is normal  No respiratory distress  Musculoskeletal: Normal range of motion  Skin:     Coloration: Skin is not pale        Findings: No erythema or rash    Neurological:      General: No focal deficit present  Mental Status: He is oriented to person, place, and time  Psychiatric:         Mood and Affect: Mood normal          Behavior: Behavior normal          Thought Content: Thought content normal          Judgment: Judgment normal           I spent 20 minutes directly with the patient during this visit      VIRTUAL VISIT DISCLAIMER    Paolo Remedios acknowledges that he has consented to an online visit or consultation  He understands that the online visit is based solely on information provided by him, and that, in the absence of a face-to-face physical evaluation by the physician, the diagnosis he receives is both limited and provisional in terms of accuracy and completeness  This is not intended to replace a full medical face-to-face evaluation by the physician  Paolo Whittaker understands and accepts these terms

## 2020-08-24 DIAGNOSIS — Z79.4 TYPE 2 DIABETES MELLITUS WITH HYPERGLYCEMIA, WITH LONG-TERM CURRENT USE OF INSULIN (HCC): ICD-10-CM

## 2020-08-24 DIAGNOSIS — E11.65 TYPE 2 DIABETES MELLITUS WITH HYPERGLYCEMIA, WITH LONG-TERM CURRENT USE OF INSULIN (HCC): ICD-10-CM

## 2020-08-24 NOTE — TELEPHONE ENCOUNTER
Please let patient know I reviewed BG log     -BGs look good, a few lower readings in 70s before lunch and dinner     Reduce lantus to 24 units and continue glimepiride 2mg qday and metformin 1000mg BID AC    If has any low BGs <70 let us know sooner

## 2020-09-01 DIAGNOSIS — Z79.4 TYPE 2 DIABETES MELLITUS WITH HYPERGLYCEMIA, WITH LONG-TERM CURRENT USE OF INSULIN (HCC): ICD-10-CM

## 2020-09-01 DIAGNOSIS — E11.65 TYPE 2 DIABETES MELLITUS WITH HYPERGLYCEMIA, WITH LONG-TERM CURRENT USE OF INSULIN (HCC): ICD-10-CM

## 2020-09-01 NOTE — TELEPHONE ENCOUNTER
Spoke with patient and reviewed bg log and changes to medication    He understood    Update med list

## 2020-09-01 NOTE — TELEPHONE ENCOUNTER
Please let patient know I reviewed BG log     -BGs look good     Reduce lantus to 20 units and continue glimepiride 2mg qday and metformin 1000mg BID AC

## 2020-09-04 DIAGNOSIS — D86.9 SARCOIDOSIS: ICD-10-CM

## 2020-09-06 RX ORDER — PREDNISONE 1 MG/1
TABLET ORAL
Qty: 70 TABLET | Refills: 2 | OUTPATIENT
Start: 2020-09-06

## 2020-09-09 ENCOUNTER — TELEPHONE (OUTPATIENT)
Dept: ENDOCRINOLOGY | Facility: CLINIC | Age: 60
End: 2020-09-09

## 2020-09-09 DIAGNOSIS — Z79.4 TYPE 2 DIABETES MELLITUS WITH HYPERGLYCEMIA, WITH LONG-TERM CURRENT USE OF INSULIN (HCC): ICD-10-CM

## 2020-09-09 DIAGNOSIS — E11.65 TYPE 2 DIABETES MELLITUS WITH HYPERGLYCEMIA, WITH LONG-TERM CURRENT USE OF INSULIN (HCC): ICD-10-CM

## 2020-09-09 NOTE — TELEPHONE ENCOUNTER
Please let pt know I reviewed BG log    -BGs look pretty good    Reduce lantus to 18 units    Continue same doses of glimepiride and metformin

## 2020-09-22 ENCOUNTER — TELEPHONE (OUTPATIENT)
Dept: ENDOCRINOLOGY | Facility: CLINIC | Age: 60
End: 2020-09-22

## 2020-09-22 NOTE — TELEPHONE ENCOUNTER
Please let pt know I reviewed BG log     -BGs look pretty good     Reduce lantus to 15 units     Continue same doses of glimepiride and metformin

## 2020-09-24 DIAGNOSIS — E11.65 TYPE 2 DIABETES MELLITUS WITH HYPERGLYCEMIA, WITH LONG-TERM CURRENT USE OF INSULIN (HCC): ICD-10-CM

## 2020-09-24 DIAGNOSIS — Z79.4 TYPE 2 DIABETES MELLITUS WITH HYPERGLYCEMIA, WITH LONG-TERM CURRENT USE OF INSULIN (HCC): ICD-10-CM

## 2020-09-29 ENCOUNTER — TELEPHONE (OUTPATIENT)
Dept: OTHER | Facility: HOSPITAL | Age: 60
End: 2020-09-29

## 2020-09-29 ENCOUNTER — IMMUNIZATIONS (OUTPATIENT)
Dept: FAMILY MEDICINE CLINIC | Facility: CLINIC | Age: 60
End: 2020-09-29
Payer: COMMERCIAL

## 2020-09-29 DIAGNOSIS — Z23 NEED FOR VACCINATION: Primary | ICD-10-CM

## 2020-09-29 PROCEDURE — 90682 RIV4 VACC RECOMBINANT DNA IM: CPT

## 2020-09-29 PROCEDURE — 90471 IMMUNIZATION ADMIN: CPT

## 2020-09-29 NOTE — TELEPHONE ENCOUNTER
Please let pt know I reviewed BG log     -BGs look good but looks like the morning numbers increased slightly     Continue lantus 15 units for now     Continue same doses of glimepiride and metformin

## 2020-10-08 ENCOUNTER — TELEPHONE (OUTPATIENT)
Dept: ENDOCRINOLOGY | Facility: CLINIC | Age: 60
End: 2020-10-08

## 2020-10-08 DIAGNOSIS — E11.65 TYPE 2 DIABETES MELLITUS WITH HYPERGLYCEMIA, WITH LONG-TERM CURRENT USE OF INSULIN (HCC): ICD-10-CM

## 2020-10-08 DIAGNOSIS — Z79.4 TYPE 2 DIABETES MELLITUS WITH HYPERGLYCEMIA, WITH LONG-TERM CURRENT USE OF INSULIN (HCC): ICD-10-CM

## 2020-10-13 DIAGNOSIS — Z79.4 TYPE 2 DIABETES MELLITUS WITH HYPERGLYCEMIA, WITH LONG-TERM CURRENT USE OF INSULIN (HCC): ICD-10-CM

## 2020-10-13 DIAGNOSIS — E11.65 TYPE 2 DIABETES MELLITUS WITH HYPERGLYCEMIA, WITH LONG-TERM CURRENT USE OF INSULIN (HCC): ICD-10-CM

## 2020-10-20 DIAGNOSIS — Z79.4 TYPE 2 DIABETES MELLITUS WITH HYPERGLYCEMIA, WITH LONG-TERM CURRENT USE OF INSULIN (HCC): ICD-10-CM

## 2020-10-20 DIAGNOSIS — E11.65 TYPE 2 DIABETES MELLITUS WITH HYPERGLYCEMIA, WITH LONG-TERM CURRENT USE OF INSULIN (HCC): ICD-10-CM

## 2020-10-26 PROBLEM — E66.01 MORBID (SEVERE) OBESITY DUE TO EXCESS CALORIES (HCC): Status: ACTIVE | Noted: 2020-10-26

## 2020-10-27 ENCOUNTER — TELEPHONE (OUTPATIENT)
Dept: ENDOCRINOLOGY | Facility: CLINIC | Age: 60
End: 2020-10-27

## 2020-10-27 ENCOUNTER — OFFICE VISIT (OUTPATIENT)
Dept: FAMILY MEDICINE CLINIC | Facility: CLINIC | Age: 60
End: 2020-10-27
Payer: COMMERCIAL

## 2020-10-27 DIAGNOSIS — R00.0 SINUS TACHYCARDIA: ICD-10-CM

## 2020-10-27 DIAGNOSIS — K74.69 OTHER CIRRHOSIS OF LIVER (HCC): ICD-10-CM

## 2020-10-27 DIAGNOSIS — I10 ESSENTIAL HYPERTENSION: ICD-10-CM

## 2020-10-27 DIAGNOSIS — D86.0 PULMONARY SARCOIDOSIS (HCC): ICD-10-CM

## 2020-10-27 DIAGNOSIS — Z23 NEED FOR HEPATITIS B VACCINATION: ICD-10-CM

## 2020-10-27 DIAGNOSIS — K21.9 GASTROESOPHAGEAL REFLUX DISEASE, UNSPECIFIED WHETHER ESOPHAGITIS PRESENT: Primary | ICD-10-CM

## 2020-10-27 DIAGNOSIS — E11.69 TYPE 2 DIABETES MELLITUS WITH OTHER SPECIFIED COMPLICATION, WITH LONG-TERM CURRENT USE OF INSULIN (HCC): ICD-10-CM

## 2020-10-27 DIAGNOSIS — Z79.4 TYPE 2 DIABETES MELLITUS WITH OTHER SPECIFIED COMPLICATION, WITH LONG-TERM CURRENT USE OF INSULIN (HCC): ICD-10-CM

## 2020-10-27 DIAGNOSIS — E03.9 HYPOTHYROIDISM, UNSPECIFIED TYPE: ICD-10-CM

## 2020-10-27 PROCEDURE — 90746 HEPB VACCINE 3 DOSE ADULT IM: CPT

## 2020-10-27 PROCEDURE — 90471 IMMUNIZATION ADMIN: CPT

## 2020-10-27 PROCEDURE — 99214 OFFICE O/P EST MOD 30 MIN: CPT | Performed by: FAMILY MEDICINE

## 2020-10-27 RX ORDER — PANTOPRAZOLE SODIUM 40 MG/1
40 TABLET, DELAYED RELEASE ORAL
Qty: 30 TABLET | Refills: 5 | Status: SHIPPED | OUTPATIENT
Start: 2020-10-27 | End: 2021-04-27

## 2020-10-28 VITALS
OXYGEN SATURATION: 98 % | WEIGHT: 296.8 LBS | TEMPERATURE: 97.6 F | BODY MASS INDEX: 38.09 KG/M2 | SYSTOLIC BLOOD PRESSURE: 124 MMHG | RESPIRATION RATE: 20 BRPM | HEIGHT: 74 IN | HEART RATE: 92 BPM | DIASTOLIC BLOOD PRESSURE: 68 MMHG

## 2020-10-28 PROBLEM — N20.1 URETERAL CALCULUS, RIGHT: Status: RESOLVED | Noted: 2020-02-06 | Resolved: 2020-10-28

## 2020-10-28 PROBLEM — D84.9 IMMUNOSUPPRESSED STATUS (HCC): Status: RESOLVED | Noted: 2019-10-17 | Resolved: 2020-10-28

## 2020-10-28 PROBLEM — I27.82 CHRONIC PULMONARY EMBOLISM (HCC): Status: RESOLVED | Noted: 2019-10-16 | Resolved: 2020-10-28

## 2020-10-29 ENCOUNTER — TELEPHONE (OUTPATIENT)
Dept: GASTROENTEROLOGY | Facility: AMBULARY SURGERY CENTER | Age: 60
End: 2020-10-29

## 2020-11-04 ENCOUNTER — OFFICE VISIT (OUTPATIENT)
Dept: PULMONOLOGY | Facility: CLINIC | Age: 60
End: 2020-11-04
Payer: COMMERCIAL

## 2020-11-04 VITALS
WEIGHT: 300 LBS | DIASTOLIC BLOOD PRESSURE: 80 MMHG | OXYGEN SATURATION: 99 % | HEIGHT: 74 IN | SYSTOLIC BLOOD PRESSURE: 122 MMHG | BODY MASS INDEX: 38.5 KG/M2 | TEMPERATURE: 97.7 F | HEART RATE: 100 BPM

## 2020-11-04 DIAGNOSIS — Z86.19 HISTORY OF HEPATITIS C VIRUS INFECTION: ICD-10-CM

## 2020-11-04 DIAGNOSIS — R59.0 MEDIASTINAL LYMPHADENOPATHY: ICD-10-CM

## 2020-11-04 DIAGNOSIS — D86.9 SARCOIDOSIS: ICD-10-CM

## 2020-11-04 DIAGNOSIS — R06.00 DYSPNEA ON EXERTION: Primary | ICD-10-CM

## 2020-11-04 PROCEDURE — 99214 OFFICE O/P EST MOD 30 MIN: CPT | Performed by: INTERNAL MEDICINE

## 2020-11-04 PROCEDURE — 1036F TOBACCO NON-USER: CPT | Performed by: INTERNAL MEDICINE

## 2020-11-04 PROCEDURE — 3074F SYST BP LT 130 MM HG: CPT | Performed by: INTERNAL MEDICINE

## 2020-11-04 PROCEDURE — 3079F DIAST BP 80-89 MM HG: CPT | Performed by: INTERNAL MEDICINE

## 2020-11-04 PROCEDURE — 3008F BODY MASS INDEX DOCD: CPT | Performed by: INTERNAL MEDICINE

## 2020-11-10 ENCOUNTER — TELEPHONE (OUTPATIENT)
Dept: ENDOCRINOLOGY | Facility: CLINIC | Age: 60
End: 2020-11-10

## 2020-11-13 ENCOUNTER — HOSPITAL ENCOUNTER (OUTPATIENT)
Dept: PULMONOLOGY | Facility: HOSPITAL | Age: 60
Discharge: HOME | End: 2020-11-13
Attending: INTERNAL MEDICINE
Payer: COMMERCIAL

## 2020-11-13 DIAGNOSIS — D86.9 SARCOIDOSIS: ICD-10-CM

## 2020-11-13 DIAGNOSIS — R59.0 MEDIASTINAL LYMPHADENOPATHY: ICD-10-CM

## 2020-11-13 DIAGNOSIS — R06.00 DYSPNEA ON EXERTION: ICD-10-CM

## 2020-11-13 PROCEDURE — 94010 BREATHING CAPACITY TEST: CPT | Performed by: INTERNAL MEDICINE

## 2020-11-13 PROCEDURE — 94726 PLETHYSMOGRAPHY LUNG VOLUMES: CPT

## 2020-11-13 PROCEDURE — 94760 N-INVAS EAR/PLS OXIMETRY 1: CPT

## 2020-11-13 PROCEDURE — 94729 DIFFUSING CAPACITY: CPT | Performed by: INTERNAL MEDICINE

## 2020-11-13 PROCEDURE — 94726 PLETHYSMOGRAPHY LUNG VOLUMES: CPT | Performed by: INTERNAL MEDICINE

## 2020-11-13 PROCEDURE — 94010 BREATHING CAPACITY TEST: CPT

## 2020-11-13 PROCEDURE — 94729 DIFFUSING CAPACITY: CPT

## 2020-11-25 ENCOUNTER — TELEPHONE (OUTPATIENT)
Dept: ENDOCRINOLOGY | Facility: CLINIC | Age: 60
End: 2020-11-25

## 2020-11-25 ENCOUNTER — TELEMEDICINE (OUTPATIENT)
Dept: ENDOCRINOLOGY | Facility: CLINIC | Age: 60
End: 2020-11-25
Payer: COMMERCIAL

## 2020-11-25 DIAGNOSIS — E55.9 VITAMIN D DEFICIENCY: ICD-10-CM

## 2020-11-25 DIAGNOSIS — E03.9 ACQUIRED HYPOTHYROIDISM: ICD-10-CM

## 2020-11-25 DIAGNOSIS — Z79.4 TYPE 2 DIABETES MELLITUS WITH HYPERGLYCEMIA, WITH LONG-TERM CURRENT USE OF INSULIN (HCC): Primary | ICD-10-CM

## 2020-11-25 DIAGNOSIS — E11.65 TYPE 2 DIABETES MELLITUS WITH HYPERGLYCEMIA, WITH LONG-TERM CURRENT USE OF INSULIN (HCC): Primary | ICD-10-CM

## 2020-11-25 DIAGNOSIS — E78.2 MIXED HYPERLIPIDEMIA: ICD-10-CM

## 2020-11-25 PROCEDURE — 99442 PR PHYS/QHP TELEPHONE EVALUATION 11-20 MIN: CPT | Performed by: INTERNAL MEDICINE

## 2020-11-27 ENCOUNTER — CLINICAL SUPPORT (OUTPATIENT)
Dept: FAMILY MEDICINE CLINIC | Facility: CLINIC | Age: 60
End: 2020-11-27
Payer: COMMERCIAL

## 2020-11-27 ENCOUNTER — LAB (OUTPATIENT)
Dept: LAB | Facility: CLINIC | Age: 60
End: 2020-11-27
Payer: COMMERCIAL

## 2020-11-27 DIAGNOSIS — Z79.4 TYPE 2 DIABETES MELLITUS WITH HYPERGLYCEMIA, WITH LONG-TERM CURRENT USE OF INSULIN (HCC): ICD-10-CM

## 2020-11-27 DIAGNOSIS — Z23 NEED FOR VACCINATION: Primary | ICD-10-CM

## 2020-11-27 DIAGNOSIS — E03.9 ACQUIRED HYPOTHYROIDISM: ICD-10-CM

## 2020-11-27 DIAGNOSIS — E55.9 VITAMIN D DEFICIENCY: ICD-10-CM

## 2020-11-27 DIAGNOSIS — E11.65 TYPE 2 DIABETES MELLITUS WITH HYPERGLYCEMIA, WITH LONG-TERM CURRENT USE OF INSULIN (HCC): ICD-10-CM

## 2020-11-27 DIAGNOSIS — E78.2 MIXED HYPERLIPIDEMIA: ICD-10-CM

## 2020-11-27 LAB
25(OH)D3 SERPL-MCNC: 40.6 NG/ML (ref 30–100)
ALBUMIN SERPL BCP-MCNC: 3.8 G/DL (ref 3.5–5)
ALP SERPL-CCNC: 64 U/L (ref 46–116)
ALT SERPL W P-5'-P-CCNC: 38 U/L (ref 12–78)
ANION GAP SERPL CALCULATED.3IONS-SCNC: 11 MMOL/L (ref 4–13)
AST SERPL W P-5'-P-CCNC: 17 U/L (ref 5–45)
BILIRUB SERPL-MCNC: 0.5 MG/DL (ref 0.2–1)
BUN SERPL-MCNC: 20 MG/DL (ref 5–25)
CALCIUM SERPL-MCNC: 8.9 MG/DL (ref 8.3–10.1)
CHLORIDE SERPL-SCNC: 104 MMOL/L (ref 100–108)
CHOLEST SERPL-MCNC: 107 MG/DL (ref 50–200)
CO2 SERPL-SCNC: 23 MMOL/L (ref 21–32)
CREAT SERPL-MCNC: 1.12 MG/DL (ref 0.6–1.3)
EST. AVERAGE GLUCOSE BLD GHB EST-MCNC: 146 MG/DL
GFR SERPL CREATININE-BSD FRML MDRD: 71 ML/MIN/1.73SQ M
GLUCOSE P FAST SERPL-MCNC: 158 MG/DL (ref 65–99)
HBA1C MFR BLD: 6.7 %
HDLC SERPL-MCNC: 33 MG/DL
LDLC SERPL CALC-MCNC: 44 MG/DL (ref 0–100)
NONHDLC SERPL-MCNC: 74 MG/DL
POTASSIUM SERPL-SCNC: 4.1 MMOL/L (ref 3.5–5.3)
PROT SERPL-MCNC: 7.1 G/DL (ref 6.4–8.2)
SODIUM SERPL-SCNC: 138 MMOL/L (ref 136–145)
T4 FREE SERPL-MCNC: 1.25 NG/DL (ref 0.76–1.46)
TRIGL SERPL-MCNC: 148 MG/DL
TSH SERPL DL<=0.05 MIU/L-ACNC: 2.08 UIU/ML (ref 0.36–3.74)

## 2020-11-27 PROCEDURE — 84443 ASSAY THYROID STIM HORMONE: CPT

## 2020-11-27 PROCEDURE — 80053 COMPREHEN METABOLIC PANEL: CPT

## 2020-11-27 PROCEDURE — 80061 LIPID PANEL: CPT

## 2020-11-27 PROCEDURE — 82306 VITAMIN D 25 HYDROXY: CPT

## 2020-11-27 PROCEDURE — 90471 IMMUNIZATION ADMIN: CPT

## 2020-11-27 PROCEDURE — 83036 HEMOGLOBIN GLYCOSYLATED A1C: CPT

## 2020-11-27 PROCEDURE — 3044F HG A1C LEVEL LT 7.0%: CPT | Performed by: INTERNAL MEDICINE

## 2020-11-27 PROCEDURE — 84439 ASSAY OF FREE THYROXINE: CPT

## 2020-11-27 PROCEDURE — 90746 HEPB VACCINE 3 DOSE ADULT IM: CPT

## 2020-11-27 PROCEDURE — 36415 COLL VENOUS BLD VENIPUNCTURE: CPT

## 2020-11-30 ENCOUNTER — TELEPHONE (OUTPATIENT)
Dept: PULMONOLOGY | Facility: CLINIC | Age: 60
End: 2020-11-30

## 2020-11-30 ENCOUNTER — TELEPHONE (OUTPATIENT)
Dept: ENDOCRINOLOGY | Facility: CLINIC | Age: 60
End: 2020-11-30

## 2020-12-02 DIAGNOSIS — E11.9 TYPE 2 DIABETES MELLITUS WITHOUT COMPLICATION, WITHOUT LONG-TERM CURRENT USE OF INSULIN (HCC): ICD-10-CM

## 2020-12-02 RX ORDER — LANCETS 33 GAUGE
EACH MISCELLANEOUS
Qty: 100 EACH | Refills: 5 | Status: SHIPPED | OUTPATIENT
Start: 2020-12-02 | End: 2021-07-16

## 2020-12-08 ENCOUNTER — TELEPHONE (OUTPATIENT)
Dept: ENDOCRINOLOGY | Facility: CLINIC | Age: 60
End: 2020-12-08

## 2020-12-21 ENCOUNTER — TELEPHONE (OUTPATIENT)
Dept: NON INVASIVE DIAGNOSTICS | Facility: HOSPITAL | Age: 60
End: 2020-12-21

## 2020-12-21 DIAGNOSIS — E78.5 DYSLIPIDEMIA: ICD-10-CM

## 2020-12-21 RX ORDER — ATORVASTATIN CALCIUM 40 MG/1
40 TABLET, FILM COATED ORAL DAILY
Qty: 90 TABLET | Refills: 3 | Status: SHIPPED | OUTPATIENT
Start: 2020-12-21 | End: 2021-12-16

## 2020-12-22 ENCOUNTER — TELEPHONE (OUTPATIENT)
Dept: ENDOCRINOLOGY | Facility: CLINIC | Age: 60
End: 2020-12-22

## 2020-12-31 DIAGNOSIS — Z79.4 TYPE 2 DIABETES MELLITUS WITH HYPERGLYCEMIA, WITH LONG-TERM CURRENT USE OF INSULIN (HCC): ICD-10-CM

## 2020-12-31 DIAGNOSIS — E11.65 TYPE 2 DIABETES MELLITUS WITH HYPERGLYCEMIA, WITH LONG-TERM CURRENT USE OF INSULIN (HCC): ICD-10-CM

## 2020-12-31 RX ORDER — GLIMEPIRIDE 2 MG/1
TABLET ORAL
Qty: 90 TABLET | Refills: 1 | Status: SHIPPED | OUTPATIENT
Start: 2020-12-31 | End: 2021-06-25

## 2021-01-06 ENCOUNTER — TELEPHONE (OUTPATIENT)
Dept: ENDOCRINOLOGY | Facility: CLINIC | Age: 61
End: 2021-01-06

## 2021-01-06 ENCOUNTER — OFFICE VISIT (OUTPATIENT)
Dept: GASTROENTEROLOGY | Facility: AMBULARY SURGERY CENTER | Age: 61
End: 2021-01-06
Payer: COMMERCIAL

## 2021-01-06 VITALS — BODY MASS INDEX: 38.37 KG/M2 | RESPIRATION RATE: 18 BRPM | HEIGHT: 74 IN | WEIGHT: 299 LBS

## 2021-01-06 DIAGNOSIS — K74.69 OTHER CIRRHOSIS OF LIVER (HCC): ICD-10-CM

## 2021-01-06 DIAGNOSIS — Z86.19 HISTORY OF HEPATITIS C VIRUS INFECTION: Primary | ICD-10-CM

## 2021-01-06 DIAGNOSIS — K29.70 GASTRITIS WITHOUT BLEEDING, UNSPECIFIED CHRONICITY, UNSPECIFIED GASTRITIS TYPE: ICD-10-CM

## 2021-01-06 DIAGNOSIS — R19.7 DIARRHEA, UNSPECIFIED TYPE: ICD-10-CM

## 2021-01-06 PROCEDURE — 1036F TOBACCO NON-USER: CPT | Performed by: INTERNAL MEDICINE

## 2021-01-06 PROCEDURE — 99214 OFFICE O/P EST MOD 30 MIN: CPT | Performed by: INTERNAL MEDICINE

## 2021-01-06 PROCEDURE — 3008F BODY MASS INDEX DOCD: CPT | Performed by: INTERNAL MEDICINE

## 2021-01-06 RX ORDER — FAMOTIDINE 40 MG/1
40 TABLET, FILM COATED ORAL
Qty: 30 TABLET | Refills: 3 | Status: SHIPPED | OUTPATIENT
Start: 2021-01-06 | End: 2021-04-26

## 2021-01-06 NOTE — PROGRESS NOTES
[facility]  Progress Note - Tino Macias 63 y o  male MRN: [id number]    Unit/Bed#:  Encounter: [id number]    Assessment/Plan:    1  History of hepatitis-C-genotype 1a, completed treatment with Harvoni in 2017-viral load on November 11, 2019 showed undetectable viral load  Subsequent ultrasound in February of 2020 did not show any liver lesions, appearance of the liver was normal   Clinically and biochemically there is no evidence of cirrhosis  -would recommend checking viral load at this time   -check AFP  -recommend MRI for [place] [address]  surveillance especially in setting of?? History of cirrhosis while multiple ultrasounds at [address] [place] [address] have not shown any evidence of cirrhosis  There is no clinical evidence of cirrhosis, there is no biochemical evidence of cirrhosis  Patient insists that he was diagnosed with cirrhosis either based on liver biopsy or imaging at an outside facility when he was treated for hepatitis-C     2  History of gastritis/duodenal erosions-reports worsening epigastric pain, nausea over the past few months  Patient also reports NSAID use for migraines  Additionally, patient reports taking pantoprazole 40 mg 3 hours before breakfast   Symptoms are mostly in the mornings   -given his history of erosions a and increased use of NSAIDs recently, suspect may have peptic ulcer disease contributing to his new symptoms  Would recommend to add Pepcid 40 mg at bedtime in addition to the pantoprazole 40 mg in the morning   -would also recommend EGD at this time for further evaluation  Patient was explained about the lifestyle and dietary modifications  Advised to avoid fatty foods, chocolates, caffeine, alcohol and any other triggering foods  Avoid eating for at least 3 hours before going to bed  3  Diarrhea-possibly infectious versus post infectious IBS versus less likely malabsorption secondary to celiac disease or microscopic colitis    The possibility includes PPI use, patient reports beginnings of diarrhea around the time that he began taking pantoprazole from omeprazole   -would recommend checking stool studies at this time   -avoid dairy products   -if infectious workup is negative and there is no improvement diarrhea, could consider repeat colonoscopy to assess for microscopic colitis  Would also check celiac at the time of the EGD with small-bowel biopsies  4  Follow-up in 6 months, sooner if symptoms do not improve  Subjective: This is a 59-year-old male with history of systemic sarcoidosis, diabetes, hypothyroidism, hyperlipidemia, HCV ? cirrhosis- genotype 1a, completed treatment with harvoni in 2017 by Dr Duane Tadeo presents for follow-up  Patient was initially seen by me in November 2019  He was recommended an EGD for variceal surveillance however due to PE and need for Eliquis, EGD was recommended to be held until at least March of 2020  This has not been done yet  Interestingly, he has had normal platelets, normal albumin, and normal INR on our documentation  Additionally, right upper quadrant ultrasound in February of 2020 showed normal appearing liver without any liver lesions  No nodularity was seen  There was evidence of cholelithiasis-he was recommended a follow-up with surgery however after numerous consultations, patient reports that he was told that he did not need to have gallbladder taken out unless he was having symptoms  He denies any right upper quadrant abdominal pain, jaundice or pruritus  Patient reports that over the past months or so he has had symptoms of epigastric abdominal pain associated with nausea, belching and loose stools  He does endorse increasing the use of NSAIDs for migraines  He denies hematemesis, coffee-ground emesis or melena  He denies any nocturnal symptoms of diarrhea  He reports that dairy does cause loose stools  He denies history of pancreatitis    No unintentional weight loss or lower abdominal pain  He underwent colonoscopy by Dr Golden in August of 2018 which was notable for diverticulosis and internal hemorrhoids only  He was recommended a repeat colonoscopy at 10 year interval     Objective:     Vitals: There were no vitals taken for this visit  ,There is no height or weight on file to calculate BMI  [unfilled]    Physical Exam:    GEN: wn/wd, NAD  HEENT: MMM, no cervical or supraclavicular LAD, anciteric  CV: RRR, no m/r/g  CHEST: CTA b/l, no w/r/r  ABD: +BS, soft, NT/ND, no hepatosplenomegaly  EXT: no c/c/e  SKIN: no rashes  NEURO: aaox3      Invasive Devices     None                         Lab, Imaging and other studies:     No visits with results within 1 Day(s) from this visit  Latest known visit with results is:   Lab on 11/27/2020   Component Date Value    Free T4 11/27/2020 1 25     TSH 3RD GENERATON 11/27/2020 2 078     Sodium 11/27/2020 138     Potassium 11/27/2020 4 1     Chloride 11/27/2020 104     CO2 11/27/2020 23     ANION GAP 11/27/2020 11     BUN 11/27/2020 20     Creatinine 11/27/2020 1 12     Glucose, Fasting 11/27/2020 158*    Calcium 11/27/2020 8 9     AST 11/27/2020 17     ALT 11/27/2020 38     Alkaline Phosphatase 11/27/2020 64     Total Protein 11/27/2020 7 1     Albumin 11/27/2020 3 8     Total Bilirubin 11/27/2020 0 50     eGFR 11/27/2020 71     Vit D, 25-Hydroxy 11/27/2020 40 6     Cholesterol 11/27/2020 107     Triglycerides 11/27/2020 148     HDL, Direct 11/27/2020 33*    LDL Calculated 11/27/2020 44     Non-HDL-Chol (CHOL-HDL) 11/27/2020 74     Hemoglobin A1C 11/27/2020 6 7*    EAG 11/27/2020 146          I have personally reviewed pertinent films in PACS    No current facility-administered medications for this visit

## 2021-01-06 NOTE — LETTER
January 6, 2021     Dong DO [address]  [address]  [facility]    Patient: Jeny Jonas   YOB: 1960   Date of Visit: 1/6/2021       Dear Dr Tereso Rivera: Thank you for referring Thelma Foster to me for evaluation  Below are my notes for this consultation  If you have questions, please do not hesitate to call me  I look forward to following your patient along with you  Sincerely,        Agnes Fink MD        CC: No Recipients  Agnes Fink MD  1/6/2021  5:24 PM  Sign when Signing Visit  [facility]  Progress Note - Jeny Jonas 63 y o  male MRN: [id number]    Unit/Bed#:  Encounter: [id number]    Assessment/Plan:    1  History of hepatitis-C-genotype 1a, completed treatment with Harvoni in 2017-viral load on November 11, 2019 showed undetectable viral load  Subsequent ultrasound in February of 2020 did not show any liver lesions, appearance of the liver was normal   Clinically and biochemically there is no evidence of cirrhosis  -would recommend checking viral load at this time   -check AFP  -recommend MRI for [place] [address]  surveillance especially in setting of?? History of cirrhosis while multiple ultrasounds at [facility] have not shown any evidence of cirrhosis  There is no clinical evidence of cirrhosis, there is no biochemical evidence of cirrhosis  Patient insists that he was diagnosed with cirrhosis either based on liver biopsy or imaging at an outside facility when he was treated for hepatitis-C     2  History of gastritis/duodenal erosions-reports worsening epigastric pain, nausea over the past few months  Patient also reports NSAID use for migraines  Additionally, patient reports taking pantoprazole 40 mg 3 hours before breakfast   Symptoms are mostly in the mornings   -given his history of erosions a and increased use of NSAIDs recently, suspect may have peptic ulcer disease contributing to his new symptoms    Would recommend to add Pepcid 40 mg at bedtime in addition to the pantoprazole 40 mg in the morning   -would also recommend EGD at this time for further evaluation  Patient was explained about the lifestyle and dietary modifications  Advised to avoid fatty foods, chocolates, caffeine, alcohol and any other triggering foods  Avoid eating for at least 3 hours before going to bed  3  Diarrhea-possibly infectious versus post infectious IBS versus less likely malabsorption secondary to celiac disease or microscopic colitis  The possibility includes PPI use, patient reports beginnings of diarrhea around the time that he began taking pantoprazole from omeprazole   -would recommend checking stool studies at this time   -avoid dairy products   -if infectious workup is negative and there is no improvement diarrhea, could consider repeat colonoscopy to assess for microscopic colitis  Would also check celiac at the time of the EGD with small-bowel biopsies  4  Follow-up in 6 months, sooner if symptoms do not improve  Subjective: This is a 56-year-old male with history of systemic sarcoidosis, diabetes, hypothyroidism, hyperlipidemia, HCV ? cirrhosis- genotype 1a, completed treatment with harvoni in 2017 by Dr Britta Neville presents for follow-up  Patient was initially seen by me in November 2019  He was recommended an EGD for variceal surveillance however due to PE and need for Eliquis, EGD was recommended to be held until at least March of 2020  This has not been done yet  Interestingly, he has had normal platelets, normal albumin, and normal INR on our documentation  Additionally, right upper quadrant ultrasound in February of 2020 showed normal appearing liver without any liver lesions  No nodularity was seen    There was evidence of cholelithiasis-he was recommended a follow-up with surgery however after numerous consultations, patient reports that he was told that he did not need to have gallbladder taken out unless he was having symptoms  He denies any right upper quadrant abdominal pain, jaundice or pruritus  Patient reports that over the past months or so he has had symptoms of epigastric abdominal pain associated with nausea, belching and loose stools  He does endorse increasing the use of NSAIDs for migraines  He denies hematemesis, coffee-ground emesis or melena  He denies any nocturnal symptoms of diarrhea  He reports that dairy does cause loose stools  He denies history of pancreatitis  No unintentional weight loss or lower abdominal pain  He underwent colonoscopy by Dr Golden in August of 2018 which was notable for diverticulosis and internal hemorrhoids only  He was recommended a repeat colonoscopy at 10 year interval     Objective:     Vitals: There were no vitals taken for this visit  ,There is no height or weight on file to calculate BMI  [unfilled]    Physical Exam:    GEN: wn/wd, NAD  HEENT: MMM, no cervical or supraclavicular LAD, anciteric  CV: RRR, no m/r/g  CHEST: CTA b/l, no w/r/r  ABD: +BS, soft, NT/ND, no hepatosplenomegaly  EXT: no c/c/e  SKIN: no rashes  NEURO: aaox3      Invasive Devices     None                         Lab, Imaging and other studies:     No visits with results within 1 Day(s) from this visit     Latest known visit with results is:   Lab on 11/27/2020   Component Date Value    Free T4 11/27/2020 1 25     TSH 3RD GENERATON 11/27/2020 2 078     Sodium 11/27/2020 138     Potassium 11/27/2020 4 1     Chloride 11/27/2020 104     CO2 11/27/2020 23     ANION GAP 11/27/2020 11     BUN 11/27/2020 20     Creatinine 11/27/2020 1 12     Glucose, Fasting 11/27/2020 158*    Calcium 11/27/2020 8 9     AST 11/27/2020 17     ALT 11/27/2020 38     Alkaline Phosphatase 11/27/2020 64     Total Protein 11/27/2020 7 1     Albumin 11/27/2020 3 8     Total Bilirubin 11/27/2020 0 50     eGFR 11/27/2020 71     Vit D, 25-Hydroxy 11/27/2020 40 6     Cholesterol 11/27/2020 107     Triglycerides 11/27/2020 148     HDL, Direct 11/27/2020 33*    LDL Calculated 11/27/2020 44     Non-HDL-Chol (CHOL-HDL) 11/27/2020 74     Hemoglobin A1C 11/27/2020 6 7*    EAG 11/27/2020 146          I have personally reviewed pertinent films in PACS    No current facility-administered medications for this visit

## 2021-01-06 NOTE — TELEPHONE ENCOUNTER
Please let pt know I reviewed BG log     Having some high BGs in the morning but daytime numbers are better, will see what his A1c is in February before next appointment and if increases significantly might have to adjust regimen     Continue same regimen for now     Can send me next blood sugar log in 3 weeks

## 2021-01-07 NOTE — TELEPHONE ENCOUNTER
Spoke to patient, went over info regarding BG readings, continue same regimen for now, send log in 3 weeks  He understood

## 2021-01-16 ENCOUNTER — LAB (OUTPATIENT)
Dept: LAB | Facility: CLINIC | Age: 61
End: 2021-01-16
Payer: COMMERCIAL

## 2021-01-16 DIAGNOSIS — Z86.19 HISTORY OF HEPATITIS C VIRUS INFECTION: ICD-10-CM

## 2021-01-16 LAB
AFP-TM SERPL-MCNC: 3.2 NG/ML (ref 0.5–8)
ALBUMIN SERPL BCP-MCNC: 4.1 G/DL (ref 3.5–5)
ALP SERPL-CCNC: 66 U/L (ref 46–116)
ALT SERPL W P-5'-P-CCNC: 42 U/L (ref 12–78)
ANION GAP SERPL CALCULATED.3IONS-SCNC: 10 MMOL/L (ref 4–13)
AST SERPL W P-5'-P-CCNC: 22 U/L (ref 5–45)
BILIRUB SERPL-MCNC: 0.94 MG/DL (ref 0.2–1)
BUN SERPL-MCNC: 16 MG/DL (ref 5–25)
CALCIUM SERPL-MCNC: 8.8 MG/DL (ref 8.3–10.1)
CHLORIDE SERPL-SCNC: 105 MMOL/L (ref 100–108)
CO2 SERPL-SCNC: 27 MMOL/L (ref 21–32)
CREAT SERPL-MCNC: 1.26 MG/DL (ref 0.6–1.3)
ERYTHROCYTE [DISTWIDTH] IN BLOOD BY AUTOMATED COUNT: 12.4 % (ref 11.6–15.1)
GFR SERPL CREATININE-BSD FRML MDRD: 61 ML/MIN/1.73SQ M
GLUCOSE P FAST SERPL-MCNC: 188 MG/DL (ref 65–99)
HCT VFR BLD AUTO: 49.2 % (ref 36.5–49.3)
HGB BLD-MCNC: 15.9 G/DL (ref 12–17)
INR PPP: 1.04 (ref 0.84–1.19)
MCH RBC QN AUTO: 30.3 PG (ref 26.8–34.3)
MCHC RBC AUTO-ENTMCNC: 32.3 G/DL (ref 31.4–37.4)
MCV RBC AUTO: 94 FL (ref 82–98)
PLATELET # BLD AUTO: 205 THOUSANDS/UL (ref 149–390)
PMV BLD AUTO: 10.1 FL (ref 8.9–12.7)
POTASSIUM SERPL-SCNC: 4.2 MMOL/L (ref 3.5–5.3)
PROT SERPL-MCNC: 7.7 G/DL (ref 6.4–8.2)
PROTHROMBIN TIME: 13.7 SECONDS (ref 11.6–14.5)
RBC # BLD AUTO: 5.24 MILLION/UL (ref 3.88–5.62)
SODIUM SERPL-SCNC: 142 MMOL/L (ref 136–145)
WBC # BLD AUTO: 5.33 THOUSAND/UL (ref 4.31–10.16)

## 2021-01-16 PROCEDURE — 82105 ALPHA-FETOPROTEIN SERUM: CPT

## 2021-01-16 PROCEDURE — 80053 COMPREHEN METABOLIC PANEL: CPT

## 2021-01-16 PROCEDURE — 85027 COMPLETE CBC AUTOMATED: CPT

## 2021-01-16 PROCEDURE — 85610 PROTHROMBIN TIME: CPT

## 2021-01-16 PROCEDURE — 36415 COLL VENOUS BLD VENIPUNCTURE: CPT

## 2021-01-16 PROCEDURE — 87522 HEPATITIS C REVRS TRNSCRPJ: CPT

## 2021-01-19 LAB
HCV RNA SERPL NAA+PROBE-ACNC: NORMAL IU/ML
TEST INFORMATION: NORMAL

## 2021-01-24 ENCOUNTER — HOSPITAL ENCOUNTER (OUTPATIENT)
Dept: MRI IMAGING | Facility: HOSPITAL | Age: 61
Discharge: HOME | End: 2021-01-24
Attending: INTERNAL MEDICINE
Payer: COMMERCIAL

## 2021-01-24 DIAGNOSIS — Z86.19 HISTORY OF HEPATITIS C VIRUS INFECTION: ICD-10-CM

## 2021-01-24 PROCEDURE — 74183 MRI ABD W/O CNTR FLWD CNTR: CPT

## 2021-01-24 PROCEDURE — A9585 GADOBUTROL INJECTION: HCPCS | Performed by: INTERNAL MEDICINE

## 2021-01-24 PROCEDURE — G1004 CDSM NDSC: HCPCS

## 2021-01-24 RX ADMIN — GADOBUTROL 13 ML: 604.72 INJECTION INTRAVENOUS at 10:13

## 2021-01-26 ENCOUNTER — TELEPHONE (OUTPATIENT)
Dept: ENDOCRINOLOGY | Facility: CLINIC | Age: 61
End: 2021-01-26

## 2021-01-26 DIAGNOSIS — E11.65 TYPE 2 DIABETES MELLITUS WITH HYPERGLYCEMIA, WITH LONG-TERM CURRENT USE OF INSULIN (HCC): Primary | ICD-10-CM

## 2021-01-26 DIAGNOSIS — Z79.4 TYPE 2 DIABETES MELLITUS WITH HYPERGLYCEMIA, WITH LONG-TERM CURRENT USE OF INSULIN (HCC): Primary | ICD-10-CM

## 2021-01-26 RX ORDER — INSULIN GLARGINE 100 [IU]/ML
12 INJECTION, SOLUTION SUBCUTANEOUS EVERY MORNING
Qty: 5 PEN | Refills: 1 | Status: SHIPPED | OUTPATIENT
Start: 2021-01-26 | End: 2021-07-20 | Stop reason: SDUPTHER

## 2021-01-26 NOTE — TELEPHONE ENCOUNTER
Spoke to patient, he stared he will start back on the Lantus 12 units in the am  He requested Lantus pen  And he will discuss at next appt about if his BG numbers stabilize if he can stop taking some of the medications  Prescription submitted to go to [facility]

## 2021-01-26 NOTE — TELEPHONE ENCOUNTER
Please let pt know I reviewed BG log    Having higher morning BGs into 200s    We can either add back the lantus 12 units in the morning in addition to the glimepiride and metformin or we can add a DPP-4 inhibitor pill januvia 100mg qday although this would be less effective in helping with the morning numbers than the lantus    We can discuss more at his upcoming appt if he has questions

## 2021-02-05 ENCOUNTER — TELEPHONE (OUTPATIENT)
Dept: GASTROENTEROLOGY | Facility: AMBULARY SURGERY CENTER | Age: 61
End: 2021-02-05

## 2021-02-06 NOTE — TELEPHONE ENCOUNTER
Informed pt of results, pt verbalized understanding  EGD scheduled  Pt would like to discuss , he doesn't understand how the MRI shows no Cirrhosis but he has had two biopsy's that said he did have cirrhosis   Can someone please call pt and discuss?   Thank you

## 2021-02-06 NOTE — TELEPHONE ENCOUNTER
----- Message from Jose Manzo MD sent at 1/30/2021  9:15 AM EST -----  Please inform the patient that the MRI does not show any evidence of cirrhosis  There are no suspicious liver lesions  There is evidence of gallstones and a small hiatal hernia  Would recommend EGD as discussed during the office visit

## 2021-02-08 NOTE — TELEPHONE ENCOUNTER
I spoke with patient  He reports that he had 2 liver biopsies, the most recent he believes was did either 2007 or 2009 at [facility]  When he was on a trial drug for hepatitis-C  He will try to get copies of this, as he thinks that his wife may have this  Also, I let him know that on the endoscopy will be able to see if there are any signs of cirrhosis they are  Fortunately, his MRI and his lab work do not indicate this at this time  He had no other questions

## 2021-02-16 DIAGNOSIS — E11.65 UNCONTROLLED TYPE 2 DIABETES MELLITUS WITH HYPERGLYCEMIA (HCC): ICD-10-CM

## 2021-02-16 RX ORDER — BLOOD SUGAR DIAGNOSTIC
STRIP MISCELLANEOUS
Qty: 300 EACH | Refills: 0 | Status: SHIPPED | OUTPATIENT
Start: 2021-02-16 | End: 2022-03-10

## 2021-02-16 RX ORDER — BLOOD SUGAR DIAGNOSTIC
STRIP MISCELLANEOUS
Qty: 300 EACH | Refills: 0 | Status: SHIPPED | OUTPATIENT
Start: 2021-02-16 | End: 2021-02-16

## 2021-02-17 ENCOUNTER — TELEPHONE (OUTPATIENT)
Dept: FAMILY MEDICINE CLINIC | Facility: CLINIC | Age: 61
End: 2021-02-17

## 2021-02-17 ENCOUNTER — OFFICE VISIT (OUTPATIENT)
Dept: URGENT CARE | Age: 61
End: 2021-02-17
Payer: COMMERCIAL

## 2021-02-17 ENCOUNTER — TELEPHONE (OUTPATIENT)
Dept: ENDOCRINOLOGY | Facility: CLINIC | Age: 61
End: 2021-02-17

## 2021-02-17 VITALS
HEART RATE: 107 BPM | RESPIRATION RATE: 18 BRPM | SYSTOLIC BLOOD PRESSURE: 132 MMHG | HEIGHT: 75 IN | BODY MASS INDEX: 36.68 KG/M2 | DIASTOLIC BLOOD PRESSURE: 64 MMHG | TEMPERATURE: 97.6 F | WEIGHT: 295 LBS | OXYGEN SATURATION: 96 %

## 2021-02-17 DIAGNOSIS — L03.032 PARONYCHIA OF GREAT TOE OF LEFT FOOT: Primary | ICD-10-CM

## 2021-02-17 PROCEDURE — S9083 URGENT CARE CENTER GLOBAL: HCPCS | Performed by: PHYSICIAN ASSISTANT

## 2021-02-17 PROCEDURE — G0381 LEV 2 HOSP TYPE B ED VISIT: HCPCS | Performed by: PHYSICIAN ASSISTANT

## 2021-02-17 RX ORDER — CEPHALEXIN 500 MG/1
500 CAPSULE ORAL EVERY 6 HOURS SCHEDULED
Qty: 20 CAPSULE | Refills: 0 | Status: SHIPPED | OUTPATIENT
Start: 2021-02-17 | End: 2021-02-22

## 2021-02-17 NOTE — TELEPHONE ENCOUNTER
Spoke to patient,  Dr Krystina Guevara advised to call PCP or go to urgent care  He vernally understood

## 2021-02-17 NOTE — PATIENT INSTRUCTIONS
Began taking antibiotic as directed  Take with probiotic or yogurt to prevent stomach upset  Continue to do warm soaks to the area  Use over-the-counter pain relief alternating between Tylenol and ibuprofen to help with pain   follow-up with podiatry for continued symptoms   proceed to the ER with any worsening of symptoms, fever chills not responsive to over-the-counter medication, increased swelling, increased pain  Paronychia   WHAT YOU NEED TO KNOW:   Paronychia is an infection of your nail fold caused by bacteria or a fungus  The nail fold is the skin around your nail  Paronychia may happen suddenly and last for 6 weeks or longer  You may have paronychia on more than 1 finger or toe  DISCHARGE INSTRUCTIONS:   Medicines:   · Td vaccine  is a booster shot used to help prevent tetanus and diphtheria  The Td booster may be given to adolescents and adults every 10 years or for certain wounds and injuries  · Antibiotics: This medicine will help fight or prevent an infection  It may be given as a pill, cream, or ointment  · Steroids: This medicine will help decrease inflammation  It may be given as a pill, cream, or ointment  · Antifungal medicine: This medicine helps kill fungus that may be causing your infection  It may be given as a cream or ointment  · NSAIDs:  These medicines decrease pain and swelling  NSAIDs are available without a doctor's order  Ask your healthcare provider which medicine is right for you  Ask how much to take and when to take it  Take as directed  NSAIDs can cause stomach bleeding and kidney problems if not taken correctly  · Take your medicine as directed  Contact your healthcare provider if you think your medicine is not helping or if you have side effects  Tell him of her if you are allergic to any medicine  Keep a list of the medicines, vitamins, and herbs you take  Include the amounts, and when and why you take them   Bring the list or the pill bottles to follow-up visits  Carry your medicine list with you in case of an emergency  Follow up with your healthcare provider as directed:  Write down your questions so you remember to ask them during your visits  Self-care:   · Soak your nail:  Soak your nail in a mixture of equal parts vinegar and water 3 or 4 times each day  This will help decrease inflammation  · Apply a warm compress:  Soak a washcloth in warm water and place it on your nail  This will help decrease inflammation  · Elevate:  Raise your nail above the level of your heart as often as you can  This will help decrease swelling and pain  Prop your nail on pillows or blankets to keep it elevated comfortably  · Use lotion:  Apply lotion after you wash your hands  This will prevent your skin from becoming too dry  Prevent paronychia:   · Avoid chemicals and allergens that may harm your skin and nails  This includes soaps, laundry detergents, and nail products  · Keep your nails clean and dry  Avoid soaking your nails in water  Use cotton-lined rubber gloves or wear 2 rubber gloves if you work with food or water  The gloves will help protect your nail folds  · Keep your nails short  Do not bite your nails, pick at your hangnails, suck your fingers, or wear fake nails  Bring your own nail tools when you go to the nail salon  Contact your healthcare provider if:   · Your nail becomes loose, deformed, or falls off  · You have a large abscess on your nail  · You have questions or concerns about your condition or care  Return to the emergency department if:   · You have severe nail pain  · The inflammation spreads to your hand or arm  © Copyright [address] Information is for End User's use only and may not be sold, redistributed or otherwise used for commercial purposes   All illustrations and images included in CareNotes® are the copyrighted property of A D A M , Inc  or [facility] Tori Sinclair   The above information is an  only  It is not intended as medical advice for individual conditions or treatments  Talk to your doctor, nurse or pharmacist before following any medical regimen to see if it is safe and effective for you

## 2021-02-17 NOTE — PROGRESS NOTES
[address]        NAME: Tati Pastrana is a 63 y o  male  : 1960    MRN: [id number]  DATE: 2021  TIME: 4:01 PM    Assessment and Plan   Paronychia of great toe of left foot [L03 032]  1  Paronychia of great toe of left foot  Ambulatory referral to Podiatry    cephalexin (KEFLEX) 500 mg capsule         Patient Instructions      Began taking antibiotic as directed  Take with probiotic or yogurt to prevent stomach upset  Continue to do warm soaks to the area  Use over-the-counter pain relief alternating between Tylenol and ibuprofen to help with pain   follow-up with podiatry for continued symptoms   proceed to the ER with any worsening of symptoms, fever chills not responsive to over-the-counter medication, increased swelling, increased pain  Follow up with PCP in 3-5 days  Proceed to  ER if symptoms worsen  Chief Complaint     Chief Complaint   Patient presents with    Toe Pain     left left great toe red and swollen , broke toe in 2020         History of Present Illness        50-year-old  Diabetic male presents the office for chief complaint of left great toe pain that began for him  Over the past month  Patient had previous injury to the area where he had a fracture to the great left toe  Patient states that his nail fell off and that his new nail is now starting to grow in  He states however that the corner where his nail is growing in he has had increased swelling, redness and pain to the area  He denies any numbness or tingling  He has some mild difficulty with range of motion secondary to pain  Patient has been doing over-the-counter pain medication in the morning and Epson salt soaks which have been mildly beneficial  Patient has been able to bear weight on the affected extremity however feels like he lifts up his toe to prevent pain  Toe Pain   The incident occurred more than 1 week ago   Injury mechanism: Previous direct blow in 2020 which caused nail to fall off  The pain is present in the left toes  The quality of the pain is described as aching  The pain is moderate  The pain has been fluctuating since onset  Associated symptoms include a loss of motion  Pertinent negatives include no loss of sensation, numbness or tingling  Associated symptoms comments: + gait disturbance   He reports no foreign bodies present  Nothing aggravates the symptoms  Treatments tried: soaks and OTC meds  The treatment provided moderate relief  Review of Systems   Review of Systems   Constitutional: Negative for chills and fever  Respiratory: Negative for chest tightness and shortness of breath  Cardiovascular: Negative for chest pain and palpitations  Gastrointestinal: Negative  Genitourinary: Negative  Musculoskeletal: Positive for arthralgias, gait problem and myalgias  Skin: Positive for color change  Neurological: Negative for tingling and numbness           Current Medications       Current Outpatient Medications:     aspirin (ECOTRIN LOW STRENGTH) 81 mg EC tablet, Take 81 mg by mouth daily, Disp: , Rfl:     atorvastatin (LIPITOR) 40 mg tablet, Take 1 tablet (40 mg total) by mouth daily, Disp: 90 tablet, Rfl: 3    famotidine (PEPCID) 40 MG tablet, Take 1 tablet (40 mg total) by mouth daily at bedtime, Disp: 30 tablet, Rfl: 3    glimepiride (AMARYL) 2 mg tablet, TAKE 1 TABLET BY MOUTH DAILY WITH BREAKFAST, Disp: 90 tablet, Rfl: 1    glucose 4 g chewable tablet, Chew 4 tablets (16 g total) as needed for low blood sugar, Disp: 100 tablet, Rfl: 1    glucose blood (OneTouch Verio) test strip, Use to test blood sugar 4 times a day, Disp: 300 each, Rfl: 0    insulin glargine (Lantus SoloStar) 100 units/mL injection pen, Inject 12 Units under the skin every morning, Disp: 5 pen, Rfl: 1    Insulin Pen Needle (PEN NEEDLES) 31G X 6 MM MISC, by Does not apply route 4 (four) times a day, Disp: 100 each, Rfl: 4    Lancets (OneTouch Delica Plus Hlqopy94F) MISC, USE THREE TIMES DAILY, Disp: 100 each, Rfl: 5    levothyroxine 50 mcg tablet, Take 1 tablet (50 mcg total) by mouth daily Take 1 tablet (50 mcg total) Monday, Tuesday, Wednesday, Thursday, Friday, Saturday and (100 mcg total) Sunday, Disp: 90 tablet, Rfl: 3    metFORMIN (GLUCOPHAGE) 500 mg tablet, Take 2 tablets (1,000 mg total) by mouth 2 (two) times a day with meals, Disp: 360 tablet, Rfl: 3    pantoprazole (PROTONIX) 40 mg tablet, Take 1 tablet (40 mg total) by mouth daily before breakfast, Disp: 30 tablet, Rfl: 5    cephalexin (KEFLEX) 500 mg capsule, Take 1 capsule (500 mg total) by mouth every 6 (six) hours for 5 days, Disp: 20 capsule, Rfl: 0    Current Allergies     Allergies as of 02/17/2021    (No Known Allergies)            The following portions of the patient's history were reviewed and updated as appropriate: allergies, current medications, past family history, past medical history, past social history, past surgical history and problem list      Past Medical History:   Diagnosis Date    BPH (benign prostatic hyperplasia)     Diabetes mellitus ([address] )     Disease of thyroid gland     Erectile dysfunction     GERD (gastroesophageal reflux disease)     Headache(784 0) 05/01/2018    Each Day    Hematuria     Hyperlipidemia     Hypertension     Infectious viral hepatitis 2007    Cured by Dr Severo Lard Nils Jez Maryjo    Liver disease     hepatitis C    Mediastinal adenopathy     Obesity 2007    PE (pulmonary thromboembolism) [address] [address]     Sarcoidosis     Vitamin D deficiency        Past Surgical History:   Procedure Laterality Date    COLONOSCOPY      last assessed: 08/28/2012; fiberoptic screening     CYSTOSCOPY      onset: 05/06/2016; Diagnostic     DENTAL SURGERY      FL RETROGRADE PYELOGRAM  2/6/2020    FL RETROGRADE PYELOGRAM  3/3/2020    LIVER BIOPSY      LYMPH NODE BIOPSY  6/2019    TN BRONCHOSCOPY NEEDLE BX TRACHEA MAIN STEM&/BRON N/A 6/6/2019    Procedure: ENDOBRONCHIAL ULTRASOUND (EBUS); Surgeon: Patricia Salmeron MD;  [facility];  Service: Thoracic    [place] [address] N/A 6/6/2019    Procedure: [place];  Surgeon: Patricia Salmeron MD;  [facility];  Service: Thoracic    VA CYSTO/URETERO W/LITHOTRIPSY &INDWELL STENT INSRT Right 2/6/2020    Procedure: CYSTOSCOPY WITH LITHOTRIPSY RETROGRADE PYELOGRAM AND INSERTION STENT URETERAL;  Surgeon: Irish Edward MD;  [facility];  Service: Urology    VA CYSTO/URETERO W/LITHOTRIPSY &INDWELL STENT INSRT Right 3/3/2020    Procedure: CYSTOSCOPY URETEROSCOPY WITH LITHOTRIPSY HOLMIUM LASER; BASKET STONE RETRIEVAL; RETROGRADE PYELOGRAM AND INSERTION STENT URETERAL;  Surgeon: Irish Edward MD;  [facility];  Service: Urology    VA TRANSURETHRAL ELEC-SURG PROSTATECTOM N/A 9/13/2017    Procedure: Wilhemina Verónica; TUR PROSTATE;  Surgeon: Christian Valencia MD;  [facility];  Service: Urology    SHOULDER ARTHROSCOPY Right     bicep tendon repair, rotator cuff repair and acromuim shave        Family History   Problem Relation Age of Onset    Stroke Father         Accidental Death at 52    Cancer Maternal Grandmother     Valvular heart disease Mother     Stroke Mother          Medications have been verified  Objective   /64   Pulse (!) 107   Temp 97 6 °F (36 4 °C)   Resp 18   Ht 6' 3" (1 905 m)   Wt 134 kg (295 lb)   SpO2 96%   BMI 36 87 kg/m²   No LMP for male patient  Physical Exam     Physical Exam  Vitals signs and nursing note reviewed  Constitutional:       General: He is not in acute distress  Appearance: He is well-developed  HENT:      Head: Normocephalic and atraumatic  Right Ear: Hearing and external ear normal       Left Ear: Hearing and external ear normal       Nose: Nose normal    Eyes:      Conjunctiva/sclera: Conjunctivae normal       Pupils: Pupils are equal, round, and reactive to light     Neck:      Musculoskeletal: Normal range of motion  Cardiovascular:      Rate and Rhythm: Normal rate and regular rhythm  Pulses: Normal pulses  Heart sounds: Normal heart sounds  No murmur  No friction rub  No gallop  Pulmonary:      Effort: Pulmonary effort is normal  No respiratory distress  Breath sounds: Normal breath sounds  No wheezing or rales  Musculoskeletal:         General: No tenderness or deformity  Left foot: Decreased range of motion (  Mild decreased with flexion extension of the toe secondary to pain  )  Feet:       Comments:   Gait slightly antalgic secondary to pain   Feet:      Comments:  Sensation intact to crude touch in all toes  Capillary refill within normal limits  Skin:     General: Skin is warm and dry  Capillary Refill: Capillary refill takes less than 2 seconds  Findings: No ecchymosis, erythema or laceration  Neurological:      Mental Status: He is alert  Sensory: No sensory deficit  Motor: No abnormal muscle tone  Deep Tendon Reflexes: Reflexes normal    Psychiatric:         Behavior: Behavior is cooperative

## 2021-02-17 NOTE — TELEPHONE ENCOUNTER
----- Message from Naga Dunlap MA sent at 2/17/2021 12:06 PM EST -----  Regarding: FW: Non-Urgent Medical Question  Contact: [phone]  [facility] Dr Rhina Begum,    Please see below from patient    ----- Message -----  From: Larry Zarate  Sent: 2/17/2021   9:52 AM EST  To: , #  Subject: Non-Urgent Medical Question                      Good Morning Dr Rhina Begum,     I hope you are doing well with all that's going on in this past year! Now we also have the weather  I have a question  I broke my big toe last July  I went to [place] [address] to have it x-rayed and it has not bothered me too much even after I broke it  In this last month (January), the nail has fallen off leaving a half grown in nail  For the last three weeks the toe has swollen up, is red and hurts  It hurts far more than it ever did when I broke it  There are no lines of red running up my foot, but it is red and throbs  I know feet are not your specialty, but with diabetes I wanted to ask if I should have it checked out? And if so, then which doctor should I see? I am scheduled to see you virtually next week     Thanks,  Dede Collins

## 2021-02-17 NOTE — TELEPHONE ENCOUNTER
Please let patient know to have his toe checked out today either through his family doctor Dr Siria Hensley or going to urgent care  He should have it looked at to make sure there is no infection and evaluate need for antibiotics and further imaging  Can also see podiatry

## 2021-02-19 ENCOUNTER — TELEPHONE (OUTPATIENT)
Dept: ENDOCRINOLOGY | Facility: CLINIC | Age: 61
End: 2021-02-19

## 2021-02-19 DIAGNOSIS — E11.65 TYPE 2 DIABETES MELLITUS WITH HYPERGLYCEMIA, WITH LONG-TERM CURRENT USE OF INSULIN (HCC): Primary | ICD-10-CM

## 2021-02-19 DIAGNOSIS — E78.2 MIXED HYPERLIPIDEMIA: ICD-10-CM

## 2021-02-19 DIAGNOSIS — Z79.4 TYPE 2 DIABETES MELLITUS WITH HYPERGLYCEMIA, WITH LONG-TERM CURRENT USE OF INSULIN (HCC): Primary | ICD-10-CM

## 2021-02-19 DIAGNOSIS — E55.9 VITAMIN D DEFICIENCY: ICD-10-CM

## 2021-02-19 DIAGNOSIS — E03.9 ACQUIRED HYPOTHYROIDISM: ICD-10-CM

## 2021-02-19 NOTE — TELEPHONE ENCOUNTER
Spoke with patient and asked him to have lab work done in the next few day    He understood but isn't sure with the snow if he can comply

## 2021-02-22 ENCOUNTER — TELEMEDICINE (OUTPATIENT)
Dept: ENDOCRINOLOGY | Facility: CLINIC | Age: 61
End: 2021-02-22
Payer: COMMERCIAL

## 2021-02-22 DIAGNOSIS — E03.9 ACQUIRED HYPOTHYROIDISM: ICD-10-CM

## 2021-02-22 DIAGNOSIS — E11.65 TYPE 2 DIABETES MELLITUS WITH HYPERGLYCEMIA, WITH LONG-TERM CURRENT USE OF INSULIN (HCC): Primary | ICD-10-CM

## 2021-02-22 DIAGNOSIS — Z79.4 TYPE 2 DIABETES MELLITUS WITH HYPERGLYCEMIA, WITH LONG-TERM CURRENT USE OF INSULIN (HCC): Primary | ICD-10-CM

## 2021-02-22 DIAGNOSIS — E78.2 MIXED HYPERLIPIDEMIA: ICD-10-CM

## 2021-02-22 DIAGNOSIS — E55.9 VITAMIN D DEFICIENCY: ICD-10-CM

## 2021-02-22 PROCEDURE — 99214 OFFICE O/P EST MOD 30 MIN: CPT | Performed by: INTERNAL MEDICINE

## 2021-02-22 PROCEDURE — 1036F TOBACCO NON-USER: CPT | Performed by: INTERNAL MEDICINE

## 2021-02-22 NOTE — PROGRESS NOTES
Virtual Regular Visit      Assessment/Plan:    Problem List Items Addressed This Visit        Endocrine    Hypothyroid    Diabetes ([address] ) - Primary      Other Visit Diagnoses     Vitamin D deficiency        Mixed hyperlipidemia                   Reason for visit is   Chief Complaint   Patient presents with    Virtual Regular Visit        Encounter provider Ana Lawrence MD    Provider located at [address] [id number] [phone]      Recent Visits  Date Type Provider Dept   02/19/21 Telephone Marques Salcedo MD [facility] Wes Reza   02/17/21 Telephone Marques Salcedo MD [facility] Maria T Frankelsie recent visits within past 7 days and meeting all other requirements     Future Appointments  No visits were found meeting these conditions  Showing future appointments within next 150 days and meeting all other requirements        The patient was identified by name and date of birth  Larry Zarate was informed that this is a telemedicine visit and that the visit is being conducted through [employer] and patient was informed that this is a secure, HIPAA-compliant platform  He agrees to proceed     My office door was closed  No one else was in the room  He acknowledged consent and understanding of privacy and security of the video platform  The patient has agreed to participate and understands they can discontinue the visit at any time  Patient is aware this is a billable service  Medical Decision Making:      Impression  1  Systemic sarcoidosis now off steroids and cellcept  2  DM2    3  Acquired hypothyroidism- on levothyroxine  4  Vitamin D deficiency- on D3 replacemet  5  Mixed HLD- on statin therapy   7  Hx HCV s/p harvoni c/b liver cirrhosis   8  Gallstones pending cholecystectomy- to be monitored for now  9   PE now off AC  10  Kidney stones, s/p stent and UTI now resolved   11  Left great toe erythema/pain/swelling- ingrown nail     Recommendations:  ?  Blood sugars controlled, continue to lantus 12 units qAM, continue metformin to 1000mg BID AC and continue glimepiride 2mg qday          Cannot use GLP-1 agonists due to known gallstones and cholecystectomy still pending  Had side effects from SGLT-2 inhibitors due to prostate       Due for diabetes labs now      Acquired hypothyroidism- continue levothyroxine 50mcg/day and extra tab on Sunday, TFTs normal Nov 2020      Vitamin D level - continue 5000 IU D3 replacement,  vitamin D level normal 40 6  Nov 2020      Mixed hyperlipidemia-  LDL controlled at 40 Nov 2020, continue statin      Left great toe erythema/pain/swelling- looks like ingrown nail, finishing abx course, instructed to see podiatry to fix the nail and remove any dead tissue     Nithya WITT               History of Present Illness:   Mr Candie Emerson is a 61yr old male who presents for DM2 follow up  He has a hx of liver cirrhosis on prior biopsy and had pulmonary sarcoidosis with extra thoracic involvement including trigeminal neuralgia type symptoms with right sided headaches and drooping of the right face and peripheral paresthesias  He also has possible eye involvement of his sarcoid  ?  PMH-HCV s/p harvoni c/b liver cirrhosis, DM2, hypothyroidism, systemic sarcoidosis, BPH, vitamin D deficiency, mixed HLD  PSH-shoulder surgery, liver biopsy, prostate surgery  FHx-no diabetes in family  SHx-neg x 2, works at [employer]     Type of DM: 2  Age of onset: 2007 diagnosed   Microvascular complications: neuropathy?   Macrovascular complications: none known         Events since last visit:     Off steroids and cellcept now     FBG-mid to high 100s  Premeal/qHS BG-low to mid 100s  Hypoglycemia-  none     On lantus 12 units qAM,  on metformin 1000mg BID AC and glimepiride 2mg qday     Left great toe fracture, now has redness of the left great toe went to urgent care and placed on abx still having redness and pain     Saw general surgeon in March 2020 and gallstones will be observed for now      ?  Review of Systems:      Review of Systems   Constitutional: Negative for appetite change, chills, diaphoresis, fatigue, fever and unexpected weight change  HENT: Negative for congestion, ear pain, hearing loss, rhinorrhea, sinus pressure, sinus pain, sore throat, trouble swallowing and voice change     Eyes: Negative for photophobia, redness and visual disturbance  Respiratory: Negative for apnea, cough, chest tightness, shortness of breath, wheezing and stridor     Cardiovascular: Negative for chest pain, palpitations and leg swelling  Gastrointestinal: Negative for abdominal distention, abdominal pain, constipation, diarrhea, nausea and vomiting  Endocrine: Negative for cold intolerance, heat intolerance, polydipsia, polyphagia and polyuria  Genitourinary: Negative for difficulty urinating, dysuria, flank pain, frequency, hematuria and urgency  Musculoskeletal: +left great toe erythema/swelling/pain  Skin: Negative for pallor  Allergic/Immunologic: Negative for immunocompromised state  Neurological: Negative for dizziness, tremors, syncope, weakness, light-headedness and headaches  Hematological: Negative for adenopathy  Does not bruise/bleed easily  Psychiatric/Behavioral: Negative for confusion and sleep disturbance   The patient is not nervous/anxious         Past Medical History:   Diagnosis Date    BPH (benign prostatic hyperplasia)     Diabetes mellitus ([address] )     Disease of thyroid gland     Erectile dysfunction     GERD (gastroesophageal reflux disease)     Headache(784 0) 05/01/2018    Each Day    Hematuria     Hyperlipidemia     Hypertension     Infectious viral hepatitis 2007    Cured by Dr Craig Nick Liver disease     hepatitis C    Mediastinal adenopathy     Obesity 2007    PE [facility] thromboembolism) [facility]     Sarcoidosis     Vitamin D deficiency        Past Surgical History:   Procedure Laterality Date    COLONOSCOPY      last assessed: 08/28/2012; fiberoptic screening     CYSTOSCOPY      onset: 05/06/2016; Diagnostic     DENTAL SURGERY      FL RETROGRADE PYELOGRAM  2/6/2020    FL RETROGRADE PYELOGRAM  3/3/2020    LIVER BIOPSY      LYMPH NODE BIOPSY  6/2019    MD BRONCHOSCOPY NEEDLE BX TRACHEA MAIN STEM&/BRON N/A 6/6/2019    Procedure: ENDOBRONCHIAL ULTRASOUND (EBUS);   Surgeon: Papito Lee MD;  [facility];  Service: Thoracic    MD [address] N/A 6/6/2019    Procedure: Jacki Wong;  Surgeon: Papito Lee MD;  [facility];  Service: Thoracic    MD CYSTO/URETERO W/LITHOTRIPSY &INDWELL STENT INSRT Right 2/6/2020    Procedure: CYSTOSCOPY WITH LITHOTRIPSY RETROGRADE PYELOGRAM AND INSERTION STENT URETERAL;  Surgeon: Kiya Sanchez MD;  [facility];  Service: Urology    MD CYSTO/URETERO W/LITHOTRIPSY &INDWELL STENT INSRT Right 3/3/2020    Procedure: CYSTOSCOPY URETEROSCOPY WITH LITHOTRIPSY HOLMIUM LASER; BASKET STONE RETRIEVAL; RETROGRADE PYELOGRAM AND INSERTION STENT URETERAL;  Surgeon: Kiya Sanchez MD;  [facility];  Service: Urology    MD TRANSURETHRAL ELEC-SURG [facility] N/A 9/13/2017    Procedure: Annemarie Burch; TUR PROSTATE;  Surgeon: Scott Bernard MD;  [facility];  Service: Urology    SHOULDER ARTHROSCOPY Right     bicep tendon repair, rotator cuff repair and acromuim shave        Current Outpatient Medications   Medication Sig Dispense Refill    aspirin (ECOTRIN LOW STRENGTH) 81 mg EC tablet Take 81 mg by mouth daily      atorvastatin (LIPITOR) 40 mg tablet Take 1 tablet (40 mg total) by mouth daily 90 tablet 3    cephalexin (KEFLEX) 500 mg capsule Take 1 capsule (500 mg total) by mouth every 6 (six) hours for 5 days 20 capsule 0    famotidine (PEPCID) 40 MG tablet Take 1 tablet (40 mg total) by mouth daily at bedtime 30 tablet 3    glimepiride (AMARYL) 2 mg tablet TAKE 1 TABLET BY MOUTH DAILY WITH BREAKFAST 90 tablet 1    glucose 4 g chewable tablet Chew 4 tablets (16 g total) as needed for low blood sugar 100 tablet 1    glucose blood (OneTouch Verio) test strip Use to test blood sugar 4 times a day 300 each 0    insulin glargine (Lantus SoloStar) 100 units/mL injection pen Inject 12 Units under the skin every morning 5 pen 1    Insulin Pen Needle (PEN NEEDLES) 31G X 6 MM MISC by Does not apply route 4 (four) times a day 100 each 4    Lancets (OneTouch Delica Plus [id number]) MISC USE THREE TIMES DAILY 100 each 5    levothyroxine 50 mcg tablet Take 1 tablet (50 mcg total) by mouth daily Take 1 tablet (50 mcg total) Monday, Tuesday, Wednesday, Thursday, Friday, Saturday and (100 mcg total) Sunday 90 tablet 3    metFORMIN (GLUCOPHAGE) 500 mg tablet Take 2 tablets (1,000 mg total) by mouth 2 (two) times a day with meals 360 tablet 3    pantoprazole (PROTONIX) 40 mg tablet Take 1 tablet (40 mg total) by mouth daily before breakfast 30 tablet 5     No current facility-administered medications for this visit  No Known Allergies    Video Exam    There were no vitals filed for this visit  Physical Exam  Constitutional:       General: He is not in acute distress  Appearance: Normal appearance  He is not ill-appearing, toxic-appearing or diaphoretic  HENT:      Head: Normocephalic and atraumatic  Right Ear: External ear normal       Left Ear: External ear normal       Nose: Nose normal    Eyes:      General: No scleral icterus  Extraocular Movements: Extraocular movements intact  Conjunctiva/sclera: Conjunctivae normal    Pulmonary:      Effort: Pulmonary effort is normal       Breath sounds: Normal breath sounds  Musculoskeletal: Normal range of motion        Comments: +left great toe corner is erythematous, slightly swollen   Skin: Coloration: Skin is not pale  Findings: No erythema or rash  Neurological:      General: No focal deficit present  Mental Status: He is alert and oriented to person, place, and time  Psychiatric:         Mood and Affect: Mood normal          Behavior: Behavior normal          Thought Content: Thought content normal          Judgment: Judgment normal           I spent 15 minutes directly with the patient during this visit      VIRTUAL VISIT DISCLAIMER    Fany Travis acknowledges that he has consented to an online visit or consultation  He understands that the online visit is based solely on information provided by him, and that, in the absence of a face-to-face physical evaluation by the physician, the diagnosis he receives is both limited and provisional in terms of accuracy and completeness  This is not intended to replace a full medical face-to-face evaluation by the physician  Fany Travis understands and accepts these terms

## 2021-02-27 ENCOUNTER — TRANSCRIBE ORDERS (OUTPATIENT)
Dept: LAB | Facility: CLINIC | Age: 61
End: 2021-02-27

## 2021-02-27 ENCOUNTER — TELEPHONE (OUTPATIENT)
Dept: ENDOCRINOLOGY | Facility: CLINIC | Age: 61
End: 2021-02-27

## 2021-02-27 ENCOUNTER — LAB (OUTPATIENT)
Dept: LAB | Facility: CLINIC | Age: 61
End: 2021-02-27
Payer: COMMERCIAL

## 2021-02-27 DIAGNOSIS — E11.65 TYPE 2 DIABETES MELLITUS WITH HYPERGLYCEMIA, WITH LONG-TERM CURRENT USE OF INSULIN (HCC): ICD-10-CM

## 2021-02-27 DIAGNOSIS — Z79.4 TYPE 2 DIABETES MELLITUS WITH HYPERGLYCEMIA, WITH LONG-TERM CURRENT USE OF INSULIN (HCC): ICD-10-CM

## 2021-02-27 DIAGNOSIS — E55.9 VITAMIN D DEFICIENCY: ICD-10-CM

## 2021-02-27 DIAGNOSIS — E78.2 MIXED HYPERLIPIDEMIA: ICD-10-CM

## 2021-02-27 DIAGNOSIS — E03.9 ACQUIRED HYPOTHYROIDISM: ICD-10-CM

## 2021-02-27 LAB
25(OH)D3 SERPL-MCNC: 73 NG/ML (ref 30–100)
ALBUMIN SERPL BCP-MCNC: 4 G/DL (ref 3.5–5)
ALP SERPL-CCNC: 70 U/L (ref 46–116)
ALT SERPL W P-5'-P-CCNC: 29 U/L (ref 12–78)
ANION GAP SERPL CALCULATED.3IONS-SCNC: 8 MMOL/L (ref 4–13)
AST SERPL W P-5'-P-CCNC: 16 U/L (ref 5–45)
BILIRUB SERPL-MCNC: 0.54 MG/DL (ref 0.2–1)
BUN SERPL-MCNC: 22 MG/DL (ref 5–25)
CALCIUM SERPL-MCNC: 8.7 MG/DL (ref 8.3–10.1)
CHLORIDE SERPL-SCNC: 106 MMOL/L (ref 100–108)
CHOLEST SERPL-MCNC: 95 MG/DL (ref 50–200)
CO2 SERPL-SCNC: 27 MMOL/L (ref 21–32)
CREAT SERPL-MCNC: 1.17 MG/DL (ref 0.6–1.3)
EST. AVERAGE GLUCOSE BLD GHB EST-MCNC: 140 MG/DL
GFR SERPL CREATININE-BSD FRML MDRD: 67 ML/MIN/1.73SQ M
GLUCOSE P FAST SERPL-MCNC: 164 MG/DL (ref 65–99)
HBA1C MFR BLD: 6.5 %
HDLC SERPL-MCNC: 30 MG/DL
LDLC SERPL CALC-MCNC: 43 MG/DL (ref 0–100)
NONHDLC SERPL-MCNC: 65 MG/DL
POTASSIUM SERPL-SCNC: 4.3 MMOL/L (ref 3.5–5.3)
PROT SERPL-MCNC: 7.6 G/DL (ref 6.4–8.2)
SODIUM SERPL-SCNC: 141 MMOL/L (ref 136–145)
T4 FREE SERPL-MCNC: 1.22 NG/DL (ref 0.76–1.46)
TRIGL SERPL-MCNC: 108 MG/DL
TSH SERPL DL<=0.05 MIU/L-ACNC: 2.57 UIU/ML (ref 0.36–3.74)

## 2021-02-27 PROCEDURE — 82306 VITAMIN D 25 HYDROXY: CPT

## 2021-02-27 PROCEDURE — 36415 COLL VENOUS BLD VENIPUNCTURE: CPT

## 2021-02-27 PROCEDURE — 80061 LIPID PANEL: CPT

## 2021-02-27 PROCEDURE — 3044F HG A1C LEVEL LT 7.0%: CPT | Performed by: INTERNAL MEDICINE

## 2021-02-27 PROCEDURE — 80053 COMPREHEN METABOLIC PANEL: CPT

## 2021-02-27 PROCEDURE — 84439 ASSAY OF FREE THYROXINE: CPT

## 2021-02-27 PROCEDURE — 84443 ASSAY THYROID STIM HORMONE: CPT

## 2021-02-27 PROCEDURE — 83036 HEMOGLOBIN GLYCOSYLATED A1C: CPT

## 2021-02-27 NOTE — TELEPHONE ENCOUNTER
Please let pt know labs    LDL bad cholesterol is good 43    Vitamin D level increased to 73, decrease D3 replacement to 4000 IU daily    A1C is excellent 6 5%    Thyroid labs normal

## 2021-03-10 DIAGNOSIS — Z23 ENCOUNTER FOR IMMUNIZATION: ICD-10-CM

## 2021-03-17 ENCOUNTER — ANESTHESIA EVENT (OUTPATIENT)
Dept: GASTROENTEROLOGY | Facility: HOSPITAL | Age: 61
End: 2021-03-17

## 2021-03-18 ENCOUNTER — ANESTHESIA (OUTPATIENT)
Dept: GASTROENTEROLOGY | Facility: HOSPITAL | Age: 61
End: 2021-03-18

## 2021-03-18 ENCOUNTER — HOSPITAL ENCOUNTER (OUTPATIENT)
Dept: GASTROENTEROLOGY | Facility: HOSPITAL | Age: 61
Setting detail: OUTPATIENT SURGERY
Discharge: HOME | End: 2021-03-18
Attending: INTERNAL MEDICINE | Admitting: INTERNAL MEDICINE
Payer: COMMERCIAL

## 2021-03-18 VITALS
SYSTOLIC BLOOD PRESSURE: 120 MMHG | HEART RATE: 78 BPM | TEMPERATURE: 96.9 F | OXYGEN SATURATION: 95 % | BODY MASS INDEX: 36.68 KG/M2 | DIASTOLIC BLOOD PRESSURE: 65 MMHG | RESPIRATION RATE: 18 BRPM | HEIGHT: 75 IN | WEIGHT: 295 LBS

## 2021-03-18 DIAGNOSIS — Z86.19 HISTORY OF HEPATITIS C VIRUS INFECTION: ICD-10-CM

## 2021-03-18 DIAGNOSIS — R19.7 DIARRHEA, UNSPECIFIED TYPE: ICD-10-CM

## 2021-03-18 DIAGNOSIS — K80.20 CALCULUS OF GALLBLADDER WITHOUT CHOLECYSTITIS WITHOUT OBSTRUCTION: ICD-10-CM

## 2021-03-18 DIAGNOSIS — K74.69 OTHER CIRRHOSIS OF LIVER (HCC): ICD-10-CM

## 2021-03-18 DIAGNOSIS — B18.2 CHRONIC HEPATITIS C WITHOUT HEPATIC COMA (HCC): ICD-10-CM

## 2021-03-18 DIAGNOSIS — K44.9 HIATAL HERNIA: Primary | ICD-10-CM

## 2021-03-18 DIAGNOSIS — K29.70 GASTRITIS WITHOUT BLEEDING, UNSPECIFIED CHRONICITY, UNSPECIFIED GASTRITIS TYPE: ICD-10-CM

## 2021-03-18 PROCEDURE — 43239 EGD BIOPSY SINGLE/MULTIPLE: CPT | Performed by: INTERNAL MEDICINE

## 2021-03-18 PROCEDURE — 88305 TISSUE EXAM BY PATHOLOGIST: CPT | Performed by: PATHOLOGY

## 2021-03-18 PROCEDURE — 88342 IMHCHEM/IMCYTCHM 1ST ANTB: CPT | Performed by: PATHOLOGY

## 2021-03-18 RX ORDER — LIDOCAINE HYDROCHLORIDE 10 MG/ML
INJECTION, SOLUTION EPIDURAL; INFILTRATION; INTRACAUDAL; PERINEURAL AS NEEDED
Status: DISCONTINUED | OUTPATIENT
Start: 2021-03-18 | End: 2021-03-18

## 2021-03-18 RX ORDER — SODIUM CHLORIDE 9 MG/ML
INJECTION, SOLUTION INTRAVENOUS CONTINUOUS PRN
Status: DISCONTINUED | OUTPATIENT
Start: 2021-03-18 | End: 2021-03-18

## 2021-03-18 RX ORDER — PROPOFOL 10 MG/ML
INJECTION, EMULSION INTRAVENOUS CONTINUOUS PRN
Status: DISCONTINUED | OUTPATIENT
Start: 2021-03-18 | End: 2021-03-18

## 2021-03-18 RX ORDER — SODIUM CHLORIDE, SODIUM LACTATE, POTASSIUM CHLORIDE, CALCIUM CHLORIDE 600; 310; 30; 20 MG/100ML; MG/100ML; MG/100ML; MG/100ML
125 INJECTION, SOLUTION INTRAVENOUS CONTINUOUS
Status: CANCELLED | OUTPATIENT
Start: 2021-03-18

## 2021-03-18 RX ORDER — PROPOFOL 10 MG/ML
INJECTION, EMULSION INTRAVENOUS AS NEEDED
Status: DISCONTINUED | OUTPATIENT
Start: 2021-03-18 | End: 2021-03-18

## 2021-03-18 RX ADMIN — LIDOCAINE HYDROCHLORIDE 100 MG: 10 INJECTION, SOLUTION EPIDURAL; INFILTRATION; INTRACAUDAL at 08:34

## 2021-03-18 RX ADMIN — PROPOFOL 170 MG: 10 INJECTION, EMULSION INTRAVENOUS at 08:34

## 2021-03-18 RX ADMIN — PROPOFOL 150 MCG/KG/MIN: 10 INJECTION, EMULSION INTRAVENOUS at 08:35

## 2021-03-18 RX ADMIN — SODIUM CHLORIDE: 0.9 INJECTION, SOLUTION INTRAVENOUS at 08:28

## 2021-03-18 NOTE — H&P
History and Physical - [facility]  Tino Macias 63 y o  male MRN: [id number]    HPI: Tino Macias is a 63y o  year old male who presents for evaluation of epigastric pain  Review of Systems    Historical Information   Past Medical History:   Diagnosis Date    BPH (benign prostatic hyperplasia)     [facility] )     Disease of thyroid gland     Erectile dysfunction     GERD (gastroesophageal reflux disease)     Headache(784 0) 05/01/2018    Each Day    Hematuria     Hyperlipidemia     Hypertension     Infectious viral hepatitis 2007    Cured by Dr Chapo Salamanca Liver disease     hepatitis C    Mediastinal adenopathy     Obesity 2007    PE [facility]olism) ([facility] )     Sarcoidosis     Vitamin D deficiency      Past Surgical History:   Procedure Laterality Date    COLONOSCOPY      last assessed: 08/28/2012; fiberoptic screening     CYSTOSCOPY      onset: 05/06/2016; Diagnostic     DENTAL SURGERY      FL RETROGRADE PYELOGRAM  2/6/2020    FL RETROGRADE PYELOGRAM  3/3/2020    LIVER BIOPSY      LYMPH NODE BIOPSY  6/2019    VT BRONCHOSCOPY NEEDLE BX TRACHEA MAIN STEM&/BRON N/A 6/6/2019    Procedure: ENDOBRONCHIAL ULTRASOUND (EBUS);   Surgeon: Papito Lee MD;  [facility];  Service: Thoracic    [address] N/A 6/6/2019    Procedure: Jacki Wong;  Surgeon: Papito Lee MD;  [facility];  Service: Thoracic    VT CYSTO/URETERO W/LITHOTRIPSY &INDWELL STENT INSRT Right 2/6/2020    Procedure: CYSTOSCOPY WITH LITHOTRIPSY RETROGRADE PYELOGRAM AND INSERTION STENT URETERAL;  Surgeon: Kiya Sanchez MD;  [facility];  Service: Urology    VT CYSTO/URETERO W/LITHOTRIPSY &INDWELL STENT INSRT Right 3/3/2020    Procedure: CYSTOSCOPY URETEROSCOPY WITH LITHOTRIPSY HOLMIUM LASER; BASKET STONE RETRIEVAL; RETROGRADE PYELOGRAM AND INSERTION STENT URETERAL;  Surgeon: Kiya Sanchez MD;  [facility];  Service: Urology    PA TRANSURETHRAL ELEC-SURG PROSTATECTOM N/A 2017    Procedure: Merl Kins; TUR PROSTATE;  Surgeon: Denver Fey, MD;  [facility];  Service: Urology    SHOULDER ARTHROSCOPY Right     bicep tendon repair, rotator cuff repair and acromuim shave      Social History   Social History     Substance and Sexual Activity   Alcohol Use Not Currently    Comment: quit      Social History     Substance and Sexual Activity   Drug Use No     Social History     Tobacco Use   Smoking Status Former Smoker    Packs/day: 1 00    Years: 1 00    Pack years: 1 00    Types: Cigarettes    Quit date:     Years since quittin 2   Smokeless Tobacco Never Used     Family History   Problem Relation Age of Onset    Stroke Father         Accidental Death at 52    Cancer Maternal Grandmother     Valvular heart disease Mother     Stroke Mother        Meds/Allergies     (Not in a hospital admission)      No Known Allergies    Objective     /82   Pulse 79   Temp (!) 97 3 °F (36 3 °C) (Temporal)   Resp 18   Ht 6' 3" (1 905 m)   Wt 134 kg (295 lb)   SpO2 96%   BMI 36 87 kg/m²       PHYSICAL EXAM    Gen: NAD  CV: RRR  CHEST: Clear  ABD: soft, NT/ND  EXT: no edema  Neuro: AAO      ASSESSMENT/PLAN:  This is a 63y o  year old male here for epigastric pain evaluation  PLAN:   Procedure:  EGD

## 2021-03-18 NOTE — ANESTHESIA PREPROCEDURE EVALUATION
Procedure:  EGD    Relevant Problems   CARDIO   (+) Dyspnea on exertion   (+) Essential hypertension      ENDO   (+) Hypothyroid   (+) Type 2 diabetes mellitus without complication, without long-term current use of insulin (HCC)      GI/HEPATIC   (+) Unspecified cirrhosis of liver (HCC)      /RENAL   (+) Nephrolithiasis      NEURO/PSYCH   (+) History of hepatitis C virus infection      PULMONARY   (+) Dyspnea on exertion      Other   (+) Mediastinal lymphadenopathy        Physical Exam    Airway    Mallampati score: I  TM Distance: >3 FB  Neck ROM: full     Dental       Cardiovascular      Pulmonary      Other Findings        Anesthesia Plan  ASA Score- 3     Anesthesia Type- IV sedation with anesthesia with ASA Monitors  Additional Monitors:   Airway Plan:           Plan Factors-Exercise tolerance (METS): >4 METS  Chart reviewed  Existing labs reviewed  Patient summary reviewed  Induction- intravenous  Postoperative Plan-     Informed Consent- Anesthetic plan and risks discussed with patient  I personally reviewed this patient with the CRNA  Discussed and agreed on the Anesthesia Plan with the CRNA  Brigette Vergara

## 2021-03-18 NOTE — ANESTHESIA POSTPROCEDURE EVALUATION
Post-Op Assessment Note    CV Status:  Stable  Pain Score: 0    Pain management: adequate     Mental Status:  Sleepy and arousable   Hydration Status:  Euvolemic   PONV Controlled:  Controlled   Airway Patency:  Patent      Post Op Vitals Reviewed: Yes      Staff: CRNA         No complications documented      BP   123/79   Temp     Pulse  88   Resp   14   SpO2   98%

## 2021-04-01 DIAGNOSIS — E55.9 VITAMIN D DEFICIENCY: ICD-10-CM

## 2021-04-01 DIAGNOSIS — E78.2 MIXED HYPERLIPIDEMIA: ICD-10-CM

## 2021-04-01 DIAGNOSIS — E03.9 ACQUIRED HYPOTHYROIDISM: ICD-10-CM

## 2021-04-01 DIAGNOSIS — Z79.4 TYPE 2 DIABETES MELLITUS WITH HYPERGLYCEMIA, WITH LONG-TERM CURRENT USE OF INSULIN (HCC): ICD-10-CM

## 2021-04-01 DIAGNOSIS — E11.65 TYPE 2 DIABETES MELLITUS WITH HYPERGLYCEMIA, WITH LONG-TERM CURRENT USE OF INSULIN (HCC): ICD-10-CM

## 2021-04-05 LAB
LEFT EYE DIABETIC RETINOPATHY: NORMAL
RIGHT EYE DIABETIC RETINOPATHY: NORMAL

## 2021-04-21 ENCOUNTER — RA CDI HCC (OUTPATIENT)
Dept: OTHER | Facility: HOSPITAL | Age: 61
End: 2021-04-21

## 2021-04-21 ENCOUNTER — TELEPHONE (OUTPATIENT)
Dept: ENDOCRINOLOGY | Facility: CLINIC | Age: 61
End: 2021-04-21

## 2021-04-21 NOTE — PROGRESS NOTES
[place] [address]  coding opportunities             Chart reviewed, (number of) suggestions sent to provider: 2     Problem listed updated   Provider Accepted, (number of) suggestions accepted: 2        Patients insurance company: [employer] (Med[employer]are Advantage and [employer])     Visit status: Patient arrived for their scheduled appointment        [place] [address]  coding opportunities             Chart reviewed, (number of) suggestions sent to provider: 2           Patients insurance company: [employer] (Medicare Advantage and [employer])           K74 60 : Unspecified cirrhosis of liver ([place] [address] ) -  USED   D86 0: Pulmonary sarcoidosis ([place] [address] ) - USED

## 2021-04-26 DIAGNOSIS — K29.70 GASTRITIS WITHOUT BLEEDING, UNSPECIFIED CHRONICITY, UNSPECIFIED GASTRITIS TYPE: ICD-10-CM

## 2021-04-26 RX ORDER — FAMOTIDINE 40 MG/1
TABLET, FILM COATED ORAL
Qty: 30 TABLET | Refills: 3 | Status: SHIPPED | OUTPATIENT
Start: 2021-04-26 | End: 2021-08-19

## 2021-04-27 ENCOUNTER — OFFICE VISIT (OUTPATIENT)
Dept: FAMILY MEDICINE CLINIC | Facility: CLINIC | Age: 61
End: 2021-04-27
Payer: COMMERCIAL

## 2021-04-27 VITALS
WEIGHT: 287 LBS | DIASTOLIC BLOOD PRESSURE: 82 MMHG | BODY MASS INDEX: 35.68 KG/M2 | HEIGHT: 75 IN | RESPIRATION RATE: 18 BRPM | TEMPERATURE: 96.9 F | HEART RATE: 92 BPM | OXYGEN SATURATION: 98 % | SYSTOLIC BLOOD PRESSURE: 112 MMHG

## 2021-04-27 DIAGNOSIS — K21.9 GASTROESOPHAGEAL REFLUX DISEASE WITHOUT ESOPHAGITIS: ICD-10-CM

## 2021-04-27 DIAGNOSIS — E03.9 HYPOTHYROIDISM, UNSPECIFIED TYPE: ICD-10-CM

## 2021-04-27 DIAGNOSIS — K21.9 GASTROESOPHAGEAL REFLUX DISEASE, UNSPECIFIED WHETHER ESOPHAGITIS PRESENT: ICD-10-CM

## 2021-04-27 DIAGNOSIS — E11.9 TYPE 2 DIABETES MELLITUS WITHOUT COMPLICATION, WITHOUT LONG-TERM CURRENT USE OF INSULIN (HCC): ICD-10-CM

## 2021-04-27 DIAGNOSIS — K74.69 OTHER CIRRHOSIS OF LIVER (HCC): ICD-10-CM

## 2021-04-27 DIAGNOSIS — K80.20 CALCULUS OF GALLBLADDER WITHOUT CHOLECYSTITIS WITHOUT OBSTRUCTION: ICD-10-CM

## 2021-04-27 DIAGNOSIS — D86.0 PULMONARY SARCOIDOSIS (HCC): ICD-10-CM

## 2021-04-27 DIAGNOSIS — Z23 ENCOUNTER FOR IMMUNIZATION: Primary | ICD-10-CM

## 2021-04-27 PROBLEM — D86.9 SARCOIDOSIS: Status: RESOLVED | Noted: 2019-10-17 | Resolved: 2021-04-27

## 2021-04-27 PROBLEM — R06.00 DYSPNEA ON EXERTION: Status: RESOLVED | Noted: 2019-12-18 | Resolved: 2021-04-27

## 2021-04-27 PROBLEM — R06.09 DYSPNEA ON EXERTION: Status: RESOLVED | Noted: 2019-12-18 | Resolved: 2021-04-27

## 2021-04-27 PROCEDURE — 90472 IMMUNIZATION ADMIN EACH ADD: CPT

## 2021-04-27 PROCEDURE — 3725F SCREEN DEPRESSION PERFORMED: CPT | Performed by: FAMILY MEDICINE

## 2021-04-27 PROCEDURE — 90471 IMMUNIZATION ADMIN: CPT

## 2021-04-27 PROCEDURE — 1036F TOBACCO NON-USER: CPT | Performed by: FAMILY MEDICINE

## 2021-04-27 PROCEDURE — 90746 HEPB VACCINE 3 DOSE ADULT IM: CPT

## 2021-04-27 PROCEDURE — 90632 HEPA VACCINE ADULT IM: CPT

## 2021-04-27 PROCEDURE — 99214 OFFICE O/P EST MOD 30 MIN: CPT | Performed by: FAMILY MEDICINE

## 2021-04-27 RX ORDER — PANTOPRAZOLE SODIUM 40 MG/1
TABLET, DELAYED RELEASE ORAL
Qty: 30 TABLET | Refills: 5 | Status: SHIPPED | OUTPATIENT
Start: 2021-04-27 | End: 2021-10-20 | Stop reason: SDUPTHER

## 2021-04-27 NOTE — PROGRESS NOTES
Assessment/Plan:    No problem-specific Assessment & Plan notes found for this encounter  Diagnoses and all orders for this visit:    Encounter for immunization  -     HEPATITIS B VACCINE ADULT IM  -     HEPATITIS A VACCINE ADULT IM    Type 2 diabetes mellitus without complication, without long-term current use of insulin ([facility] )  Comments:  continue f/u with dr Girma Ortega  excellent control    Other cirrhosis of liver ([facility] )  Comments:  continue routine surveillance  continue f/u with GI  hep B #3 and Hep A #1 today      Calculus of gallbladder without cholecystitis without obstruction  Comments:  stable  no symptoms    Hypothyroidism, unspecified type  Comments:  tsh at goal  continue f/u with endo    Pulmonary sarcoidosis ([facility] )  Comments:  symptoms stable  establish with pulm if symptoms worsen    Gastroesophageal reflux disease without esophagitis  Comments:  continue protonix  agree with surgical referral for hiatal hernia        Subjective:      Patient ID: Kristal Barlow is a 57 y o  male  HPI  Pt presents in routine f/u  Shortness of breath is stable  Hasn't needed to f/u with pulm given stable PFTs  Feels like his previous symptoms of sarcoid (paresthesias, shortness of breath) are gone  Doesn't want meds, so isn't following with rheum at this time  Following with GI for hx of hep C  Had gastritis on EGD and fairly nml liver MRI  He has chronic diarrhea   had been having vomiting until he started protonix though that has stopped since starting the med  He was referred to bariatric surgery for hiatal hernia repair  Due for hep a and b vaccines today  Following with Dr Houston Celis for DM  Excellent control                The following portions of the patient's history were reviewed and updated as appropriate: allergies, current medications, past family history, past medical history, past social history, past surgical history and problem list     Review of Systems   Constitutional: Negative for chills, fatigue, fever and unexpected weight change  HENT: Negative for congestion, ear pain, hearing loss, postnasal drip, rhinorrhea, sinus pressure, sinus pain, sore throat, trouble swallowing and voice change  Eyes: Negative for pain, redness and visual disturbance  Respiratory: Positive for shortness of breath  Negative for cough  Cardiovascular: Negative for chest pain, palpitations and leg swelling  Gastrointestinal: Negative for abdominal pain, constipation, diarrhea and nausea  Endocrine: Negative for cold intolerance, heat intolerance, polydipsia and polyuria  Genitourinary: Negative for dysuria, frequency and urgency  Musculoskeletal: Negative for arthralgias, joint swelling and myalgias  Skin: Negative for rash  No suspicious lesions   Neurological: Negative for weakness and numbness  Hematological: Negative for adenopathy  Objective:      /82   Pulse 92   Temp (!) 96 9 °F (36 1 °C)   Resp 18   Ht 6' 3" (1 905 m)   Wt 130 kg (287 lb)   SpO2 98%   BMI 35 87 kg/m²          Physical Exam  Constitutional:       General: He is not in acute distress  Appearance: He is well-developed  HENT:      Head: Normocephalic and atraumatic  Right Ear: Tympanic membrane, ear canal and external ear normal       Left Ear: Tympanic membrane, ear canal and external ear normal       Nose: Nose normal       Mouth/Throat:      Pharynx: No oropharyngeal exudate  Eyes:      Conjunctiva/sclera: Conjunctivae normal       Pupils: Pupils are equal, round, and reactive to light  Neck:      Thyroid: No thyromegaly  Vascular: No carotid bruit or JVD  Cardiovascular:      Rate and Rhythm: Regular rhythm  Heart sounds: S1 normal and S2 normal  No murmur  No friction rub  No gallop  No S3 or S4 sounds  Pulmonary:      Effort: Pulmonary effort is normal       Breath sounds: Normal breath sounds  No wheezing, rhonchi or rales     Abdominal:      General: Bowel sounds are normal  There is no distension  Palpations: Abdomen is soft  Tenderness: There is no abdominal tenderness  Lymphadenopathy:      Cervical: No cervical adenopathy  Neurological:      Mental Status: He is alert and oriented to person, place, and time  Cranial Nerves: No cranial nerve deficit  Sensory: No sensory deficit  Deep Tendon Reflexes: Reflexes are normal and symmetric  BMI Counseling: Body mass index is 35 87 kg/m²  The BMI is above normal  Nutrition recommendations include encouraging healthy choices of fruits and vegetables  Exercise recommendations include exercising 3-5 times per week

## 2021-05-07 ENCOUNTER — VBI (OUTPATIENT)
Dept: ADMINISTRATIVE | Facility: OTHER | Age: 61
End: 2021-05-07

## 2021-05-13 ENCOUNTER — TELEPHONE (OUTPATIENT)
Dept: ENDOCRINOLOGY | Facility: CLINIC | Age: 61
End: 2021-05-13

## 2021-05-19 ENCOUNTER — TELEMEDICINE (OUTPATIENT)
Dept: ENDOCRINOLOGY | Facility: CLINIC | Age: 61
End: 2021-05-19
Payer: COMMERCIAL

## 2021-05-19 DIAGNOSIS — E78.2 MIXED HYPERLIPIDEMIA: ICD-10-CM

## 2021-05-19 DIAGNOSIS — E03.9 ACQUIRED HYPOTHYROIDISM: ICD-10-CM

## 2021-05-19 DIAGNOSIS — Z79.4 TYPE 2 DIABETES MELLITUS WITH HYPERGLYCEMIA, WITH LONG-TERM CURRENT USE OF INSULIN (HCC): Primary | ICD-10-CM

## 2021-05-19 DIAGNOSIS — E11.65 TYPE 2 DIABETES MELLITUS WITH HYPERGLYCEMIA, WITH LONG-TERM CURRENT USE OF INSULIN (HCC): Primary | ICD-10-CM

## 2021-05-19 DIAGNOSIS — E55.9 VITAMIN D DEFICIENCY: ICD-10-CM

## 2021-05-19 PROCEDURE — 99214 OFFICE O/P EST MOD 30 MIN: CPT | Performed by: INTERNAL MEDICINE

## 2021-05-19 NOTE — PROGRESS NOTES
Virtual Brief Visit    Assessment/Plan:    Problem List Items Addressed This Visit        Endocrine    Hypothyroid    Diabetes ([address] ) - Primary      Other Visit Diagnoses     Mixed hyperlipidemia        Vitamin D deficiency                    Reason for visit is   Chief Complaint   Patient presents with    Virtual Brief Visit        Encounter provider Pauly Zhu MD    Provider located at [place] [address]  P O  [address] [id number]  [phone]    Recent Visits  Date Type Provider Dept   05/13/21 Telephone Rayna Boas, [address] [place] [address] For Diabetes [facility] Endocrinology [facility] recent visits within past 7 days and meeting all other requirements     Today's Visits  Date Type Provider Dept   05/19/21 Telemedicine Marques Lopez MD [facility] Endocrinology [facility] today's visits and meeting all other requirements     Future Appointments  No visits were found meeting these conditions  Showing future appointments within next 150 days and meeting all other requirements        After connecting through telephone, the patient was identified by name and date of birth  Karan Combs was informed that this is a telemedicine visit and that the visit is being conducted through telephone  My office door was closed  No one else was in the room  He acknowledged consent and understanding of privacy and security of the platform  The patient has agreed to participate and understands he can discontinue the visit at any time  Patient is aware this is a billable service  Medical Decision Making:      Impression  1  Systemic sarcoidosis now off steroids and cellcept  2  DM2    3  Acquired hypothyroidism- on levothyroxine  4  Vitamin D deficiency- on D3 replacemet  5  Mixed HLD- on statin therapy   7   Hx HCV s/p harvoni c/b liver cirrhosis   8  Gallstones pending cholecystectomy- to be monitored for now  9  PE now off AC  10  Kidney stones, s/p stent and UTI now resolved   11  Left great toe erythema/pain/swelling- ingrown nail     Recommendations:  ?  Blood sugars controlled, A1C 6 5% Feb 2021 continue to lantus 12 units qAM, continue metformin to 1000mg BID AC and continue glimepiride 2mg qday          Cannot use GLP-1 agonists due to known gallstones and cholecystectomy still pending  Had side effects from SGLT-2 inhibitors due to prostate       Due for diabetes labs now      Acquired hypothyroidism- continue levothyroxine 50mcg/day and extra tab on Sunday, TFTs normal Feb 2021      Vitamin D level - continue 5000 IU D3 replacement,  vitamin D level normal 73 Feb 2021     Mixed hyperlipidemia-  LDL controlled at 43, triglycerides 108 Feb 2021, continue statin           Mildred WITT               History of Present Illness:   Mr Lilo Duval is a 61yr old male who presents for DM2 follow up  He has a hx of liver cirrhosis on prior biopsy and had pulmonary sarcoidosis with extra thoracic involvement including trigeminal neuralgia type symptoms with right sided headaches and drooping of the right face and peripheral paresthesias  He also has possible eye involvement of his sarcoid  ?  PMH-HCV s/p harvoni c/b liver cirrhosis, DM2, hypothyroidism, systemic sarcoidosis, BPH, vitamin D deficiency, mixed HLD  PSH-shoulder surgery, liver biopsy, prostate surgery  FHx-no diabetes in family  SHx-neg x 2, works at "[employer]     Type of DM: 2  Age of onset: 2007 diagnosed   Microvascular complications: neuropathy? Macrovascular complications: none known         Events since last visit:     Off steroids and cellcept now     FBG-mid to high 100s  Premeal/qHS BG-low to mid 100s  Hypoglycemia-  none     On lantus 12 units qAM,  on metformin 1000mg BID AC and glimepiride 2mg qday       ?   Review of Systems:      Review of Systems   Constitutional: Negative for appetite change, chills, diaphoresis, fatigue, fever and unexpected weight change  HENT: Negative for congestion, ear pain, hearing loss, rhinorrhea, sinus pressure, sinus pain, sore throat, trouble swallowing and voice change     Eyes: Negative for photophobia, redness and visual disturbance  Respiratory: Negative for apnea, cough, chest tightness, shortness of breath, wheezing and stridor     Cardiovascular: Negative for chest pain, palpitations and leg swelling  Gastrointestinal: Negative for abdominal distention, abdominal pain, constipation, diarrhea, nausea and vomiting  Endocrine: Negative for cold intolerance, heat intolerance, polydipsia, polyphagia and polyuria  Genitourinary: Negative for difficulty urinating, dysuria, flank pain, frequency, hematuria and urgency  Musculoskeletal: No myalgias  Skin: Negative for pallor  Allergic/Immunologic: Negative for immunocompromised state  Neurological: Negative for dizziness, tremors, syncope, weakness, light-headedness and headaches  Hematological: Negative for adenopathy  Does not bruise/bleed easily  Psychiatric/Behavioral: Negative for confusion and sleep disturbance   The patient is not nervous/anxious         Past Medical History:   Diagnosis Date    BPH (benign prostatic hyperplasia)     [facility]     Disease of thyroid gland     Erectile dysfunction     GERD (gastroesophageal reflux disease)     Headache(784 0) 05/01/2018    Each Day    Hematuria     Hyperlipidemia     Hypertension     Infectious viral hepatitis 2007    Cured by Dr Cristy [facility] Liver disease     hepatitis C    Mediastinal adenopathy     Obesity 2007    PE [facility]ism[facility]     Sarcoidosis     Vitamin D deficiency        Past Surgical History:   Procedure Laterality Date    COLONOSCOPY      last assessed: 08/28/2012; fiberoptic screening     CYSTOSCOPY      onset: 05/06/2016; Diagnostic     DENTAL SURGERY      FL RETROGRADE PYELOGRAM  2/6/2020  FL RETROGRADE PYELOGRAM  3/3/2020    LIVER BIOPSY      LYMPH NODE BIOPSY  6/2019    MA BRONCHOSCOPY NEEDLE BX TRACHEA MAIN STEM&/BRON N/A 6/6/2019    Procedure: ENDOBRONCHIAL ULTRASOUND (EBUS);   Surgeon: Philip Sun MD;  [facility];  Service: Thoracic    MA [address] N/A 6/6/2019    Procedure: Marveen Fair;  Surgeon: Philip Sun MD;  [facility];  Service: Thoracic    MA CYSTO/URETERO W/LITHOTRIPSY &INDWELL STENT INSRT Right 2/6/2020    Procedure: CYSTOSCOPY WITH LITHOTRIPSY RETROGRADE PYELOGRAM AND INSERTION STENT URETERAL;  Surgeon: Deepthi Sanchez MD;  [facility];  Service: Urology    MA CYSTO/URETERO W/LITHOTRIPSY &INDWELL STENT INSRT Right 3/3/2020    Procedure: CYSTOSCOPY URETEROSCOPY WITH LITHOTRIPSY HOLMIUM LASER; BASKET STONE RETRIEVAL; RETROGRADE PYELOGRAM AND INSERTION STENT URETERAL;  Surgeon: Deepthi Sanchez MD;  [facility];  Service: Urology    MA TRANSURETHRAL ELEC-SURG [facility] N/A 9/13/2017    Procedure: Norris Huerta; TUR PROSTATE;  Surgeon: Lizbet Palacios MD;  [facility];  Service: Urology    PROSTATE SURGERY  08/2017    SHOULDER ARTHROSCOPY Right     bicep tendon repair, rotator cuff repair and acromuim shave        Current Outpatient Medications   Medication Sig Dispense Refill    aspirin (ECOTRIN LOW STRENGTH) 81 mg EC tablet Take 81 mg by mouth daily      atorvastatin (LIPITOR) 40 mg tablet Take 1 tablet (40 mg total) by mouth daily 90 tablet 3    famotidine (PEPCID) 40 MG tablet TAKE 1 TABLET BY MOUTH DAILY AT BEDTIME 30 tablet 3    glimepiride (AMARYL) 2 mg tablet TAKE 1 TABLET BY MOUTH DAILY WITH BREAKFAST 90 tablet 1    glucose 4 g chewable tablet Chew 4 tablets (16 g total) as needed for low blood sugar 100 tablet 1    glucose blood (OneTouch Verio) test strip Use to test blood sugar 4 times a day 300 each 0    insulin glargine (Lantus SoloStar) 100 units/mL injection pen Inject 12 Units under the skin every morning 5 pen 1    Insulin Pen Needle (PEN NEEDLES) 31G X 6 MM MISC by Does not apply route 4 (four) times a day 100 each 4    Lancets (OneTouch Delica Plus [id number]) MISC USE THREE TIMES DAILY 100 each 5    levothyroxine 50 mcg tablet Take 1 tablet (50 mcg total) by mouth daily Take 1 tablet (50 mcg total) Monday, Tuesday, Wednesday, Thursday, Friday, Saturday and (100 mcg total) Sunday 90 tablet 3    metFORMIN (GLUCOPHAGE) 500 mg tablet TAKE 2 TABLETS BY MOUTH TWICE A DAY WITH FOOD 360 tablet 1    pantoprazole (PROTONIX) 40 mg tablet TAKE 1 TABLET BY MOUTH DAILY BEFORE BREAKFAST 30 tablet 5     No current facility-administered medications for this visit  No Known Allergies    There were no vitals filed for this visit  I spent 15 minutes directly with the patient during this visit    VIRTUAL VISIT DISCLAIMER    Clemente Epstein acknowledges that he has consented to an online visit or consultation  He understands that the online visit is based solely on information provided by him, and that, in the absence of a face-to-face physical evaluation by the physician, the diagnosis he receives is both limited and provisional in terms of accuracy and completeness  This is not intended to replace a full medical face-to-face evaluation by the physician  Clemente Epstein understands and accepts these terms

## 2021-06-02 ENCOUNTER — APPOINTMENT (OUTPATIENT)
Dept: LAB | Facility: CLINIC | Age: 61
End: 2021-06-02
Payer: COMMERCIAL

## 2021-06-02 ENCOUNTER — HOSPITAL ENCOUNTER (OUTPATIENT)
Dept: RADIOLOGY | Facility: HOSPITAL | Age: 61
Discharge: HOME | End: 2021-06-02
Payer: COMMERCIAL

## 2021-06-02 ENCOUNTER — TELEPHONE (OUTPATIENT)
Dept: ENDOCRINOLOGY | Facility: CLINIC | Age: 61
End: 2021-06-02

## 2021-06-02 ENCOUNTER — TRANSCRIBE ORDERS (OUTPATIENT)
Dept: LAB | Facility: CLINIC | Age: 61
End: 2021-06-02

## 2021-06-02 DIAGNOSIS — E78.2 MIXED HYPERLIPIDEMIA: ICD-10-CM

## 2021-06-02 DIAGNOSIS — E11.65 TYPE 2 DIABETES MELLITUS WITH HYPERGLYCEMIA, WITH LONG-TERM CURRENT USE OF INSULIN (HCC): ICD-10-CM

## 2021-06-02 DIAGNOSIS — Z12.5 PROSTATE CANCER SCREENING: ICD-10-CM

## 2021-06-02 DIAGNOSIS — Z79.4 TYPE 2 DIABETES MELLITUS WITH HYPERGLYCEMIA, WITH LONG-TERM CURRENT USE OF INSULIN (HCC): ICD-10-CM

## 2021-06-02 DIAGNOSIS — E03.9 ACQUIRED HYPOTHYROIDISM: ICD-10-CM

## 2021-06-02 DIAGNOSIS — N20.0 NEPHROLITHIASIS: ICD-10-CM

## 2021-06-02 DIAGNOSIS — E55.9 VITAMIN D DEFICIENCY: ICD-10-CM

## 2021-06-02 LAB
25(OH)D3 SERPL-MCNC: 65.1 NG/ML (ref 30–100)
ALBUMIN SERPL BCP-MCNC: 4 G/DL (ref 3.5–5)
ALP SERPL-CCNC: 58 U/L (ref 46–116)
ALT SERPL W P-5'-P-CCNC: 37 U/L (ref 12–78)
ANION GAP SERPL CALCULATED.3IONS-SCNC: 12 MMOL/L (ref 4–13)
AST SERPL W P-5'-P-CCNC: 24 U/L (ref 5–45)
BILIRUB SERPL-MCNC: 0.7 MG/DL (ref 0.2–1)
BUN SERPL-MCNC: 18 MG/DL (ref 5–25)
CALCIUM SERPL-MCNC: 9.4 MG/DL (ref 8.3–10.1)
CHLORIDE SERPL-SCNC: 105 MMOL/L (ref 100–108)
CHOLEST SERPL-MCNC: 123 MG/DL (ref 50–200)
CO2 SERPL-SCNC: 26 MMOL/L (ref 21–32)
CREAT SERPL-MCNC: 1.1 MG/DL (ref 0.6–1.3)
EST. AVERAGE GLUCOSE BLD GHB EST-MCNC: 148 MG/DL
GFR SERPL CREATININE-BSD FRML MDRD: 72 ML/MIN/1.73SQ M
GLUCOSE P FAST SERPL-MCNC: 177 MG/DL (ref 65–99)
HBA1C MFR BLD: 6.8 %
HDLC SERPL-MCNC: 34 MG/DL
LDLC SERPL CALC-MCNC: 60 MG/DL (ref 0–100)
NONHDLC SERPL-MCNC: 89 MG/DL
POTASSIUM SERPL-SCNC: 4.2 MMOL/L (ref 3.5–5.3)
PROT SERPL-MCNC: 7.5 G/DL (ref 6.4–8.2)
PSA SERPL-MCNC: 1.6 NG/ML (ref 0–4)
SODIUM SERPL-SCNC: 143 MMOL/L (ref 136–145)
T4 FREE SERPL-MCNC: 1.32 NG/DL (ref 0.76–1.46)
TRIGL SERPL-MCNC: 143 MG/DL
TSH SERPL DL<=0.05 MIU/L-ACNC: 6.01 UIU/ML (ref 0.36–3.74)

## 2021-06-02 PROCEDURE — 3044F HG A1C LEVEL LT 7.0%: CPT | Performed by: SURGERY

## 2021-06-02 PROCEDURE — 74018 RADEX ABDOMEN 1 VIEW: CPT

## 2021-06-02 PROCEDURE — 84439 ASSAY OF FREE THYROXINE: CPT

## 2021-06-02 PROCEDURE — G0103 PSA SCREENING: HCPCS

## 2021-06-02 PROCEDURE — 80061 LIPID PANEL: CPT

## 2021-06-02 PROCEDURE — 36415 COLL VENOUS BLD VENIPUNCTURE: CPT

## 2021-06-02 PROCEDURE — 84443 ASSAY THYROID STIM HORMONE: CPT

## 2021-06-02 PROCEDURE — 82306 VITAMIN D 25 HYDROXY: CPT

## 2021-06-02 PROCEDURE — 80053 COMPREHEN METABOLIC PANEL: CPT

## 2021-06-02 PROCEDURE — 83036 HEMOGLOBIN GLYCOSYLATED A1C: CPT

## 2021-06-08 DIAGNOSIS — E03.9 ACQUIRED HYPOTHYROIDISM: Primary | ICD-10-CM

## 2021-06-08 DIAGNOSIS — E03.9 HYPOTHYROIDISM, UNSPECIFIED TYPE: ICD-10-CM

## 2021-06-08 RX ORDER — LEVOTHYROXINE SODIUM 0.05 MG/1
TABLET ORAL
Qty: 90 TABLET | Refills: 3
Start: 2021-06-08 | End: 2021-06-25

## 2021-06-08 NOTE — TELEPHONE ENCOUNTER
Called the patient and he was vacationing in [address] [facility]  Quickly gave him the information  Sent the change in medication through to his My Chart

## 2021-06-08 NOTE — TELEPHONE ENCOUNTER
Please let pt know A1C 6 8%    TSH increased slightly to 6 014 meaning his thyroid hormone level mildly low - increase levothyroxine to 50mcg/day except extra tablet on Sat/Sun    Repeat TSH, Free T4 in 6 weeks (ordered)

## 2021-06-16 ENCOUNTER — CONSULT (OUTPATIENT)
Dept: BARIATRICS | Facility: CLINIC | Age: 61
End: 2021-06-16
Payer: COMMERCIAL

## 2021-06-16 VITALS
WEIGHT: 289.5 LBS | BODY MASS INDEX: 37.15 KG/M2 | SYSTOLIC BLOOD PRESSURE: 130 MMHG | HEART RATE: 94 BPM | HEIGHT: 74 IN | DIASTOLIC BLOOD PRESSURE: 70 MMHG | TEMPERATURE: 97.4 F

## 2021-06-16 DIAGNOSIS — E11.9 TYPE 2 DIABETES MELLITUS WITHOUT COMPLICATION, WITH LONG-TERM CURRENT USE OF INSULIN (HCC): ICD-10-CM

## 2021-06-16 DIAGNOSIS — E66.01 MORBID (SEVERE) OBESITY DUE TO EXCESS CALORIES (HCC): ICD-10-CM

## 2021-06-16 DIAGNOSIS — K44.9 HIATAL HERNIA: Primary | ICD-10-CM

## 2021-06-16 DIAGNOSIS — Z79.4 TYPE 2 DIABETES MELLITUS WITHOUT COMPLICATION, WITH LONG-TERM CURRENT USE OF INSULIN (HCC): ICD-10-CM

## 2021-06-16 PROCEDURE — 99203 OFFICE O/P NEW LOW 30 MIN: CPT | Performed by: SURGERY

## 2021-06-16 PROCEDURE — 3008F BODY MASS INDEX DOCD: CPT | Performed by: SURGERY

## 2021-06-16 PROCEDURE — 1036F TOBACCO NON-USER: CPT | Performed by: SURGERY

## 2021-06-16 NOTE — PROGRESS NOTES
OFFICE VISIT - BARIATRIC SURGERY  Thompson Escobar 57 y o  male MRN: [id number]  Unit/Bed#:  Encounter: [id number]      HPI:  Thompson Escobar is a 57 y o  male with PMH significant for Diabetes, complaining of nausea and vomiting, with moderate hiatal hernia found on recent imaging, referred by Dr Fidel Humphrey  Subjective     Patient notes throwing up in the morning and being nausea at night  Patient notes having the hiatal hernia for years and has gotten bigger in March  Has previously taken Prilosec and famotidine with relief but recently symptoms have been worse  He is also taking pantoprozole for the past 6 months  He notes throwing up and feeling better after he throws up  He also notes have a gallstone found in 2019 but is asymptomatic  He notes history of Diabetes  He is interested in weight loss  Smoking: Denies  Alcohol use: Denies  NSAID use: Advil PRN  Caffeine use: Coffee 1 per day  Current treatment: Prilosec, pantoprazole, famotidine  Hx: DM  Previous cholecystectomy or pertinent abdominal surgeries: None  Previous EGD: Yes  Previous KUB: Yes  Previous Manometry: No  Ambulation is not impaired  Review of Systems   Constitutional: Negative for chills and fever  HENT: Negative for ear pain and sore throat  Eyes: Negative for pain and visual disturbance  Respiratory: Negative for cough and shortness of breath  Cardiovascular: Negative for chest pain and palpitations  Gastrointestinal: Positive for nausea and vomiting  Negative for abdominal pain  Genitourinary: Negative for dysuria and hematuria  Musculoskeletal: Negative for arthralgias and back pain  Skin: Negative for color change and rash  Neurological: Negative for seizures and syncope  All other systems reviewed and are negative        Historical Information   Past Medical History:   Diagnosis Date    BPH (benign prostatic hyperplasia)     [facility]     Disease of thyroid gland     Erectile dysfunction     GERD (gastroesophageal reflux disease)     Headache(784 0) 05/01/2018    Each Day    Hematuria     Hyperlipidemia     Hypertension     Infectious viral hepatitis 2007    Cured by Dr Oma Olvera [facility] Liver disease     hepatitis C    Mediastinal adenopathy     Obesity 2007    [facility]ism) [facility]     Sarcoidosis     Vitamin D deficiency      Past Surgical History:   Procedure Laterality Date    COLONOSCOPY      last assessed: 08/28/2012; fiberoptic screening     CYSTOSCOPY      onset: 05/06/2016; Diagnostic     DENTAL SURGERY      FL RETROGRADE PYELOGRAM  2/6/2020    FL RETROGRADE PYELOGRAM  3/3/2020    LIVER BIOPSY      LYMPH NODE BIOPSY  6/2019    AR BRONCHOSCOPY NEEDLE BX TRACHEA MAIN STEM&/BRON N/A 6/6/2019    Procedure: ENDOBRONCHIAL ULTRASOUND (EBUS);   Surgeon: Glen Hamm MD;  [facility];  Service: Thoracic    [address] N/A 6/6/2019    Procedure: Astonward Abts;  Surgeon: Glen Hamm MD;  [facility];  Service: Thoracic    AR CYSTO/URETERO W/LITHOTRIPSY &INDWELL STENT INSRT Right 2/6/2020    Procedure: CYSTOSCOPY WITH LITHOTRIPSY RETROGRADE PYELOGRAM AND INSERTION STENT URETERAL;  Surgeon: Jane Rabago MD;  [facility];  Service: Urology    AR CYSTO/URETERO W/LITHOTRIPSY &INDWELL STENT INSRT Right 3/3/2020    Procedure: CYSTOSCOPY URETEROSCOPY WITH LITHOTRIPSY HOLMIUM LASER; BASKET STONE RETRIEVAL; RETROGRADE PYELOGRAM AND INSERTION STENT URETERAL;  Surgeon: Jane Rabago MD;  [facility];  Service: Urology    AR TRANSURETHRAL ELEC-SURG PROSTATECTOM N/A 9/13/2017    Procedure: Hipolito Mcburney; TUR PROSTATE;  Surgeon: Bety Grullon MD;  [facility];  Service: Urology    PROSTATE SURGERY  08/2017    SHOULDER ARTHROSCOPY Right     bicep tendon repair, rotator cuff repair and acromuim shave      Social History   Social History     Substance and Sexual Activity Alcohol Use Not Currently    Alcohol/week: 0 0 standard drinks    Comment: quit 2006     Social History     Substance and Sexual Activity   Drug Use No     Social History     Tobacco Use   Smoking Status Former Smoker    Packs/day: 1 00    Years: 1 00    Pack years: 1 00    Types: Cigarettes    Start date: 1971   Roxann Espitia Quit date:     Years since quittin 4   Smokeless Tobacco Never Used   Tobacco Comment    Quit       Objective       Current Vitals:   Blood Pressure: 130/70 (21 1007)  Pulse: 94 (21 1007)  Temperature: (!) 97 4 °F (36 3 °C) (21 1007)  Height: 6' 2 25" (188 6 cm) (21)  Weight - Scale: 131 kg (289 lb 8 oz) (21)    Invasive Devices     None                 Physical Exam  Constitutional:       Appearance: Normal appearance  He is obese  HENT:      Head: Normocephalic and atraumatic  Nose: Nose normal       Mouth/Throat:      Mouth: Mucous membranes are moist    Eyes:      Extraocular Movements: Extraocular movements intact  Pupils: Pupils are equal, round, and reactive to light  Cardiovascular:      Rate and Rhythm: Normal rate and regular rhythm  Pulses: Normal pulses  Heart sounds: Normal heart sounds  Pulmonary:      Effort: Pulmonary effort is normal       Breath sounds: Normal breath sounds  Abdominal:      Palpations: Abdomen is soft  Musculoskeletal:      Cervical back: Normal range of motion  Skin:     General: Skin is warm  Neurological:      General: No focal deficit present  Mental Status: He is alert and oriented to person, place, and time  Psychiatric:         Mood and Affect: Mood normal          Behavior: Behavior normal            Pathology, and Other Studies: I have personally reviewed pertinent reports          Assessment/PLAN:    Diana Dubon is a 57 y o  male with PMH significant for Diabetes, complaining of nausea and vomiting, with moderate hiatal hernia found on recent imaging, referred by Dr Jess Cooney  Workup thus far reviewed and discussed with patient  Workup includes:       EGD (1/6/21)  DATE OF SERVICE:  3/18/21     PHYSICIAN(S):  Tonie Santos MD - Attending Physician        INDICATION:  Other cirrhosis of liver ([facility] ), History of hepatitis C virus infection, Gastritis without bleeding, unspecified chronicity, unspecified gastritis type, Diarrhea, unspecified type, Calculus of gallbladder without cholecystitis without obstruction, Chronic hepatitis C without hepatic coma ([facility] )     POST-OP DIAGNOSIS:  See the impression below      PREPROCEDURE:  Informed consent was obtained for the procedure, including sedation  Risks of perforation, hemorrhage, adverse drug reaction and aspiration were discussed  The patient was placed in the left lateral decubitus position      Patient was explained about the risks and benefits of the procedure  Risks including but not limited to bleeding, infection, and perforation were explained in detail  Also explained about less than 100% sensitivity with the exam and other alternatives      DETAILS OF PROCEDURE:  Patient was taken to the procedure room where a time out was performed to confirm correct patient and correct procedure  The patient underwent monitored anesthesia care, which was administered by an anesthesia professional  The patient's blood pressure, heart rate, level of consciousness, respirations and oxygen were monitored throughout the procedure  The scope was advanced to the second part of the duodenum  Retroflexion was performed in the fundus  The patient experienced no blood loss  The procedure was not difficult  The patient tolerated the procedure well  There were no apparent complications       ANESTHESIA INFORMATION:  ASA: ASA status not filed in the log  Anesthesia Type: Anesthesia type not filed in the log      FINDINGS:  · Regular Z-line 40 cm from the incisors  No esophageal varices    · Medium sliding hiatal hernia (type I hiatal hernia) without Gil Breslow lesions present - GE junction 40 cm from the incisors, diaphragmatic impression 45 cm from the incisors  · Moderate, patchy edematous and erythematous mucosa in the antrum, prepyloric region and pylorus; performed cold forceps biopsy  Retroflexed view was notable for sliding hiatal hernia  · No evidence of GOV1, GOV2, IGV1 or IGV2  · Mild, patchy nodular mucosa in the duodenal bulb; performed cold forceps biopsy  Likely duodenitis within the bulb, D2 appeared normal   Biopsies were obtained from duodenal bulb         SPECIMENS:  ID Type Source Tests Collected by Time Destination   1 : duodenal bx Tissue Small Bowel, NOS TISSUE EXAM Alba Perez MD 3/18/2021  8:38 AM     2 : gastric bx Tissue Stomach TISSUE EXAM Alba Perez MD 3/18/2021  8:39 AM           IMPRESSION:  1  Medium-sized hiatal hernia  2  Moderate gastritis and duodenitis  3  No evidence of esophageal or gastric varices        RECOMMENDATION:  1  Continue daily PPI and Pepcid for now, with hopes of stopping PPI due to long-term side effect profile associated with the and using H2 blockers only  2  Avoid NSAIDs  Use acetaminophen instead  3  Follow biopsy results in 2 weeks  4  Will refer to bariatric surgery to discuss hiatal hernia repair  5  Follow-up in the office in 6 months -year   Mark Daniels MD      Pathology from EGD   A  Small Bowel, NOS, duodenal bx:       - Benign oxyntic mucosa with chronic inactive inflammation        - No Helicobacter pylori organisms are identified on the immunohistochemical stain        - No dysplasia or malignancy is identified      Note: No duodenal mucosa is identified  This may represent gastric heterotopia; clinical correlation is required      B  Stomach, gastric bx: - Antral and oxyntic mucosa with chronic inactive gastritis         - No Helicobacter pylori organisms are identified on the immunohistochemical stain        - No intestinal metaplasia, dysplasia or malignancy is identified        Interpretation performed at [facility] (6/2/21)  ABDOMEN     INDICATION:   N20 0: Calculus of kidney      COMPARISON:  Abdominal radiograph 2/6/2020     VIEWS:  AP supine     FINDINGS:     No radiopaque renal calculi seen      No radiopaque ureteral calculi identified      Nonobstructive bowel gas pattern      No acute osseous abnormality is seen      IMPRESSION:     No radiopaque urinary tract calculi         Workstation performed: [id number]    --------------------------------------------------------------------    Risk of recurrence discussed with patient today in the office  He has has Diabetes and takes insulin for this  Also discussed that weight loss will decrease the chance of recurrence of hernia and help with blood sugars  Best surgical option in his case would be Laparoscopic RNYGB with hernia repair with robotic assist   Patient is interested in weight loss procedure as well as hernia repair  We will start by scheduling patient with dietician and  and check with patient's insurance  Recommended for patient to call their insurance to check himself if he is interested  Russell Delvalle PA-C  Bariatric Surgery  6/16/2021  10:09 AM

## 2021-06-21 ENCOUNTER — TELEPHONE (OUTPATIENT)
Dept: ENDOCRINOLOGY | Facility: CLINIC | Age: 61
End: 2021-06-21

## 2021-06-23 DIAGNOSIS — E78.2 MIXED HYPERLIPIDEMIA: ICD-10-CM

## 2021-06-23 DIAGNOSIS — E11.65 TYPE 2 DIABETES MELLITUS WITH HYPERGLYCEMIA, WITH LONG-TERM CURRENT USE OF INSULIN (HCC): ICD-10-CM

## 2021-06-23 DIAGNOSIS — E03.9 ACQUIRED HYPOTHYROIDISM: ICD-10-CM

## 2021-06-23 DIAGNOSIS — Z79.4 TYPE 2 DIABETES MELLITUS WITH HYPERGLYCEMIA, WITH LONG-TERM CURRENT USE OF INSULIN (HCC): ICD-10-CM

## 2021-06-23 DIAGNOSIS — E55.9 VITAMIN D DEFICIENCY: ICD-10-CM

## 2021-06-23 RX ORDER — PEN NEEDLE, DIABETIC 31 G X1/4"
NEEDLE, DISPOSABLE MISCELLANEOUS 4 TIMES DAILY
Qty: 200 EACH | Refills: 4 | Status: SHIPPED | OUTPATIENT
Start: 2021-06-23 | End: 2022-06-27

## 2021-06-25 DIAGNOSIS — Z79.4 TYPE 2 DIABETES MELLITUS WITH HYPERGLYCEMIA, WITH LONG-TERM CURRENT USE OF INSULIN (HCC): ICD-10-CM

## 2021-06-25 DIAGNOSIS — E11.65 TYPE 2 DIABETES MELLITUS WITH HYPERGLYCEMIA, WITH LONG-TERM CURRENT USE OF INSULIN (HCC): ICD-10-CM

## 2021-06-25 DIAGNOSIS — E03.9 HYPOTHYROIDISM, UNSPECIFIED TYPE: ICD-10-CM

## 2021-06-25 RX ORDER — LEVOTHYROXINE SODIUM 0.05 MG/1
TABLET ORAL
Qty: 30 TABLET | Refills: 11 | Status: SHIPPED | OUTPATIENT
Start: 2021-06-25 | End: 2022-05-03

## 2021-06-25 RX ORDER — GLIMEPIRIDE 2 MG/1
TABLET ORAL
Qty: 30 TABLET | Refills: 5 | Status: SHIPPED | OUTPATIENT
Start: 2021-06-25 | End: 2021-12-13

## 2021-07-02 ENCOUNTER — OFFICE VISIT (OUTPATIENT)
Dept: UROLOGY | Facility: CLINIC | Age: 61
End: 2021-07-02
Payer: COMMERCIAL

## 2021-07-02 VITALS
BODY MASS INDEX: 37.17 KG/M2 | DIASTOLIC BLOOD PRESSURE: 88 MMHG | WEIGHT: 289.6 LBS | HEART RATE: 90 BPM | SYSTOLIC BLOOD PRESSURE: 124 MMHG | HEIGHT: 74 IN

## 2021-07-02 DIAGNOSIS — N20.0 NEPHROLITHIASIS: Primary | ICD-10-CM

## 2021-07-02 LAB
SL AMB  POCT GLUCOSE, UA: NORMAL
SL AMB LEUKOCYTE ESTERASE,UA: NORMAL
SL AMB POCT BILIRUBIN,UA: NORMAL
SL AMB POCT BLOOD,UA: NORMAL
SL AMB POCT CLARITY,UA: CLEAR
SL AMB POCT COLOR,UA: YELLOW
SL AMB POCT KETONES,UA: NORMAL
SL AMB POCT NITRITE,UA: NORMAL
SL AMB POCT PH,UA: 5
SL AMB POCT SPECIFIC GRAVITY,UA: 1.03
SL AMB POCT URINE PROTEIN: NORMAL
SL AMB POCT UROBILINOGEN: NORMAL

## 2021-07-02 PROCEDURE — 1036F TOBACCO NON-USER: CPT | Performed by: PHYSICIAN ASSISTANT

## 2021-07-02 PROCEDURE — 99213 OFFICE O/P EST LOW 20 MIN: CPT | Performed by: PHYSICIAN ASSISTANT

## 2021-07-02 PROCEDURE — 3061F NEG MICROALBUMINURIA REV: CPT | Performed by: PHYSICIAN ASSISTANT

## 2021-07-02 PROCEDURE — 81002 URINALYSIS NONAUTO W/O SCOPE: CPT | Performed by: PHYSICIAN ASSISTANT

## 2021-07-02 PROCEDURE — 3008F BODY MASS INDEX DOCD: CPT | Performed by: PHYSICIAN ASSISTANT

## 2021-07-02 NOTE — PROGRESS NOTES
1  Nephrolithiasis  POCT urine dip       Assessment and plan:       1  Nephrolithiasis   -  I was happy to review with the patient that his KUB is negative for evidence of any urinary tract calculi  Reviewed the importance of proper hydration dietary modifications to minimize future stone formation  Patient is encouraged to contact us in the future with any signs and symptoms passing stone  All questions answered  2   Prostate cancer screening  - recent PSA 1 6  -  Patient declined prostate examination in the office today despite counseling  -  Reviewed AUA  Guideline recommendations of annual screening with PSAs and digital rectal examination is between 54 and 70  Patient verbalized understanding  Annual screening can be performed with primary care provider  Audrey Saez PA-C      Chief Complaint     Kidney stones    History of Present Illness     Ramirez Allen is a 57 y o  male patient of Dr Paris Bentley history of nephrolithiasis status post staged intervention presenting for follow-up      Patient had an obstructing ureteral calculus and underwent a right ureteral stent placement 02/06/2020  He was admitted shortly thereafter secondary to stent colic  Patient underwent definitive therapy with cystoscopy, right ureteroscopy, holmium laser, basket extraction, retrograde pyelogram, and right ureteral stent insertion (03/03/2020)  Stent removed in the office shortly thereafter by nursing staff  Stone analysis was calcium oxalate in origin  Patient had a ultrasound of the kidney and bladder (06/01/2020) that was negative for any urologic abnormality of obstructive uropathy  Recent KUB (6/2/21) negative for any urinary tract calculi  MRI abdomen (1/24/21) negative for urinary tract abnormalities  PSA 1 6 (6/2/21)  Urine dip leukocyte, nitrite, blood negative       Laboratory     Lab Results   Component Value Date    CREATININE 1 10 06/02/2021       Lab Results   Component Value Date    PSA 1 6 06/02/2021    PSA 1 2 03/15/2019    PSA 1 3 04/11/2018     Review of Systems     Review of Systems   Constitutional: Negative for activity change, appetite change, chills, diaphoresis, fatigue, fever and unexpected weight change  Respiratory: Negative for chest tightness and shortness of breath  Cardiovascular: Negative for chest pain, palpitations and leg swelling  Gastrointestinal: Negative for abdominal distention, abdominal pain, constipation, diarrhea, nausea and vomiting  Genitourinary: Negative for decreased urine volume, difficulty urinating, dysuria, enuresis, flank pain, frequency, genital sores, hematuria and urgency  Musculoskeletal: Negative for back pain, gait problem and myalgias  Skin: Negative for color change, pallor, rash and wound  Psychiatric/Behavioral: Negative for behavioral problems  The patient is not nervous/anxious  Allergies     No Known Allergies    Physical Exam     Physical Exam  Constitutional:       General: He is not in acute distress  Appearance: Normal appearance  He is obese  He is not ill-appearing, toxic-appearing or diaphoretic  HENT:      Head: Normocephalic and atraumatic  Eyes:      General:         Right eye: No discharge  Left eye: No discharge  Conjunctiva/sclera: Conjunctivae normal    Pulmonary:      Effort: Pulmonary effort is normal  No respiratory distress  Genitourinary:     Comments: Patient declined JORJE despite counseling  Musculoskeletal:         General: Normal range of motion  Skin:     General: Skin is warm and dry  Coloration: Skin is not jaundiced or pale  Neurological:      General: No focal deficit present  Mental Status: He is alert and oriented to person, place, and time  Psychiatric:         Mood and Affect: Mood normal          Behavior: Behavior normal          Thought Content:  Thought content normal          Vital Signs     Vitals:    07/02/21 1431   BP: 124/88 Pulse: 90   Weight: 131 kg (289 lb 9 6 oz)   Height: 6' 2 25" (1 886 m)         Current Medications       Current Outpatient Medications:     aspirin (ECOTRIN LOW STRENGTH) 81 mg EC tablet, Take 81 mg by mouth daily, Disp: , Rfl:     atorvastatin (LIPITOR) 40 mg tablet, Take 1 tablet (40 mg total) by mouth daily, Disp: 90 tablet, Rfl: 3    Cholecalciferol (DIALYVITE VITAMIN D 5000 PO), Take by mouth, Disp: , Rfl:     famotidine (PEPCID) 40 MG tablet, TAKE 1 TABLET BY MOUTH DAILY AT BEDTIME, Disp: 30 tablet, Rfl: 3    glimepiride (AMARYL) 2 mg tablet, TAKE 1 TABLET BY MOUTH EVERY DAY WITH BREAKFAST, Disp: 30 tablet, Rfl: 5    glucose 4 g chewable tablet, Chew 4 tablets (16 g total) as needed for low blood sugar, Disp: 100 tablet, Rfl: 1    glucose blood (OneTouch Verio) test strip, Use to test blood sugar 4 times a day, Disp: 300 each, Rfl: 0    insulin glargine (Lantus SoloStar) 100 units/mL injection pen, Inject 12 Units under the skin every morning, Disp: 5 pen, Rfl: 1    Insulin Pen Needle (Pen Needles) 31G X 6 MM MISC, Use 4 (four) times a day Use to inject insulin 4 times a day, Disp: 200 each, Rfl: 4    Lancets (OneTouch Delica Plus ROFUMN60G) MISC, USE THREE TIMES DAILY, Disp: 100 each, Rfl: 5    levothyroxine 50 mcg tablet, TAKE 1 TAB DAILY MONDAY, TUESDAY, WEDNESDAY, THURSDAY, FRIDAY, SATURDAY AND (100 MCG TOTAL) SUNDAY, Disp: 30 tablet, Rfl: 11    metFORMIN (GLUCOPHAGE) 500 mg tablet, TAKE 2 TABLETS BY MOUTH TWICE A DAY WITH FOOD, Disp: 360 tablet, Rfl: 1    pantoprazole (PROTONIX) 40 mg tablet, TAKE 1 TABLET BY MOUTH DAILY BEFORE BREAKFAST, Disp: 30 tablet, Rfl: 5      Active Problems     Patient Active Problem List   Diagnosis    Type 2 diabetes mellitus without complication, without long-term current use of insulin (HCC)    Benign prostatic hyperplasia with urinary frequency    Hypothyroid    Unspecified cirrhosis of liver (HCC)    Mediastinal lymphadenopathy    Pulmonary sarcoidosis ([facility] )    History of hepatitis C virus infection    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    Sinus tachycardia    Calculus of gallbladder without cholecystitis without obstruction    Diabetes [facility]    Essential hypertension    Nephrolithiasis    Morbid (severe) obesity due to excess calories (HCC)    Gastritis without bleeding    Diarrhea    GERD (gastroesophageal reflux disease)         Past Medical History     Past Medical History:   Diagnosis Date    BPH (benign prostatic hyperplasia)     Diabetes mellitus ([facility]     Disease of thyroid gland     Erectile dysfunction     GERD (gastroesophageal reflux disease)     Headache(784 0) 05/01/2018    Each Day    Hematuria     Hyperlipidemia     Hypertension     Infectious viral hepatitis 2007    C[facility] by Dr Ijeoma [facility]     Liver disease     hepatitis C    Mediastinal adenopathy     Obesity 2007    PE (pulmonary thromboembolism) ([place] [address] )     Sarcoidosis     Vitamin D deficiency          Surgical History     Past Surgical History:   Procedure Laterality Date    COLONOSCOPY      last assessed: 08/28/2012; fiberoptic screening     CYSTOSCOPY      onset: 05/06/2016; Diagnostic     DENTAL SURGERY      FL RETROGRADE PYELOGRAM  2/6/2020    FL RETROGRADE PYELOGRAM  3/3/2020    LIVER BIOPSY      LYMPH NODE BIOPSY  6/2019    [place] BRONCHOSCOPY NEEDLE BX TRACHEA MAIN STEM&/BRON N/A 6/6/2019    Procedure: ENDOBRONCHIAL ULTRASOUND (EBUS);   Surgeon: Russell Julien MD;  [facility];  Service: Thoracic    [place] [address] N/A 6/6/2019    Procedure: Marcus Phillips;  Surgeon: Russell Julien MD;  [facility];  Service: Thoracic    [place] CYSTO/URETERO W/LITHOTRIPSY &INDWELL STENT INSRT Right 2/6/2020    Procedure: CYSTOSCOPY WITH LITHOTRIPSY RETROGRADE PYELOGRAM AND INSERTION STENT URETERAL;  Surgeon: Supa Eng MD;  [facility];  Service: Urology    [place] CYSTO/URETERO W/LITHOTRIPSY &INDWELL [address] INSRT Right 3/3/2020    Procedure: CYSTOSCOPY URETEROSCOPY WITH LITHOTRIPSY HOLMIUM LASER; BASKET STONE RETRIEVAL; RETROGRADE PYELOGRAM AND INSERTION STENT URETERAL;  Surgeon: Selena Kaiser MD;  [facility];  Service: Urology    [place] TRANSURETHRAL ELEC-SURG PROSTATECTOM N/A 9/13/2017    Procedure: Thurnell Livers; TUR PROSTATE;  Surgeon: Ellen Ried MD;  [facility];  Service: Urology    PROSTATE SURGERY  08/2017    SHOULDER ARTHROSCOPY Right     bicep tendon repair, rotator cuff repair and acromuim shave          Family History     Family History   Problem Relation Age of Onset    Stroke Father         Accidental Death at 52    Cancer Maternal Grandmother     Valvular heart disease Mother     Stroke Mother          Social History     Social History       Radiology

## 2021-07-13 ENCOUNTER — TELEPHONE (OUTPATIENT)
Dept: ENDOCRINOLOGY | Facility: CLINIC | Age: 61
End: 2021-07-13

## 2021-07-16 DIAGNOSIS — E11.9 TYPE 2 DIABETES MELLITUS WITHOUT COMPLICATION, WITHOUT LONG-TERM CURRENT USE OF INSULIN (HCC): ICD-10-CM

## 2021-07-16 RX ORDER — LANCETS 33 GAUGE
EACH MISCELLANEOUS
Qty: 100 EACH | Refills: 5 | Status: SHIPPED | OUTPATIENT
Start: 2021-07-16 | End: 2022-08-08 | Stop reason: SDUPTHER

## 2021-07-20 ENCOUNTER — CLINICAL SUPPORT (OUTPATIENT)
Dept: BARIATRICS | Facility: CLINIC | Age: 61
End: 2021-07-20

## 2021-07-20 VITALS
HEIGHT: 74 IN | TEMPERATURE: 97.1 F | DIASTOLIC BLOOD PRESSURE: 82 MMHG | HEART RATE: 82 BPM | BODY MASS INDEX: 37.02 KG/M2 | WEIGHT: 288.5 LBS | SYSTOLIC BLOOD PRESSURE: 124 MMHG

## 2021-07-20 DIAGNOSIS — E66.01 MORBID (SEVERE) OBESITY DUE TO EXCESS CALORIES (HCC): Primary | ICD-10-CM

## 2021-07-20 DIAGNOSIS — I10 ESSENTIAL HYPERTENSION: ICD-10-CM

## 2021-07-20 DIAGNOSIS — Z79.4 TYPE 2 DIABETES MELLITUS WITH OTHER SPECIFIED COMPLICATION, WITH LONG-TERM CURRENT USE OF INSULIN (HCC): ICD-10-CM

## 2021-07-20 DIAGNOSIS — K21.9 GASTROESOPHAGEAL REFLUX DISEASE WITHOUT ESOPHAGITIS: ICD-10-CM

## 2021-07-20 DIAGNOSIS — Z79.4 TYPE 2 DIABETES MELLITUS WITH HYPERGLYCEMIA, WITH LONG-TERM CURRENT USE OF INSULIN (HCC): ICD-10-CM

## 2021-07-20 DIAGNOSIS — E03.9 HYPOTHYROIDISM, UNSPECIFIED TYPE: ICD-10-CM

## 2021-07-20 DIAGNOSIS — E11.69 TYPE 2 DIABETES MELLITUS WITH OTHER SPECIFIED COMPLICATION, WITH LONG-TERM CURRENT USE OF INSULIN (HCC): ICD-10-CM

## 2021-07-20 DIAGNOSIS — Z01.818 PREOPERATIVE CLEARANCE: ICD-10-CM

## 2021-07-20 DIAGNOSIS — E11.65 TYPE 2 DIABETES MELLITUS WITH HYPERGLYCEMIA, WITH LONG-TERM CURRENT USE OF INSULIN (HCC): ICD-10-CM

## 2021-07-20 DIAGNOSIS — E66.9 OBESITY, UNSPECIFIED CLASSIFICATION, UNSPECIFIED OBESITY TYPE, UNSPECIFIED WHETHER SERIOUS COMORBIDITY PRESENT: Primary | ICD-10-CM

## 2021-07-20 DIAGNOSIS — K74.69 OTHER CIRRHOSIS OF LIVER (HCC): ICD-10-CM

## 2021-07-20 PROCEDURE — 3008F BODY MASS INDEX DOCD: CPT | Performed by: PHYSICIAN ASSISTANT

## 2021-07-20 PROCEDURE — RECHECK

## 2021-07-20 RX ORDER — INSULIN GLARGINE 100 [IU]/ML
INJECTION, SOLUTION SUBCUTANEOUS
Qty: 15 ML | Refills: 0 | Status: SHIPPED | OUTPATIENT
Start: 2021-07-20 | End: 2021-11-15 | Stop reason: SDUPTHER
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
## 2021-07-20 NOTE — PROGRESS NOTES
Bariatric Nutrition Assessment Note    Type of surgery    Preop 4 months  Surgery Date: TBD- Tentative October 2021  Surgeon: Dr Timothy Johnson  57 y o   male     Wt with BMI of 25: 194 8lbs  Pre-Op Excess Wt: 93 7lbs  /82 (BP Location: Left arm, Patient Position: Sitting, Cuff Size: Standard)   Pulse 82   Temp (!) 97 1 °F (36 2 °C) (Tympanic)   Ht 6' 2" (1 88 m)   Wt 131 kg (288 lb 8 oz)   BMI 37 04 kg/m²     [place] Equation:     Weight maintenance= 2614 kcal/day  Estimated calories for weight loss 3941-2700 kcal/day ( 1-2# per wk wt loss - sedentary )  Estimated protein needs 88 5-106 25 g/day(1 0-1 2 gms/kg IBW )   Estimated fluid needs 2713-6964 ml/day (30-35 ml/kg IBW )      Weight History   Onset of Obesity: Adult: started steroids in 2019, gained over 50lbs  Family history of obesity: No  Wt Loss Attempts: Exercise  FAD Diets (Cabbage soup, Grapefruit, Cleanse, etc )  High Protein/Low CHO diets (Atkins, Union, etc )  Self Created Diets (Portion Control, Healthy Food Choices, etc )  Patient has tried the above for 6 months or more with insufficient weight loss or weight regain, which is why patient has requested to be evaluated for weight loss surgery today  Maximum Wt Lost: 20-30lbs    Review of History and Medications   Past Medical History:   Diagnosis Date    BPH (benign prostatic hyperplasia)     Diabetes mellitus ([address] )     Disease of thyroid gland     Erectile dysfunction     GERD (gastroesophageal reflux disease)     Headache(784 0) 05/01/2018    Each Day    Hematuria     Hyperlipidemia     Hypertension     Infectious viral hepatitis 2007    Cured by Dr Roya La Nils [facility] Shield Liver disease     hepatitis C    Mediastinal adenopathy     Obesity 2007    PE (pulmonary thromboembolism) ([address] )     Sarcoidosis     Vitamin D deficiency      Past Surgical History:   Procedure Laterality Date    COLONOSCOPY      last assessed: 2012; fiberoptic screening     CYSTOSCOPY      onset: 2016; Diagnostic     DENTAL SURGERY      FL RETROGRADE PYELOGRAM  2020    FL RETROGRADE PYELOGRAM  3/3/2020    LIVER BIOPSY      LYMPH NODE BIOPSY  2019    [place] BRONCHOSCOPY NEEDLE BX TRACHEA MAIN STEM&/BRON N/A 2019    Procedure: ENDOBRONCHIAL ULTRASOUND (EBUS);   Surgeon: Karina Leslie MD;  [facility];  Service: Thoracic    [place] [address] N/A 2019    Procedure: Laura Pew;  Surgeon: Karina Leslie MD;  [facility];  Service: Thoracic    [place] CYSTO/URETERO W/LITHOTRIPSY &INDWELL STENT INSRT Right 2020    Procedure: CYSTOSCOPY WITH LITHOTRIPSY RETROGRADE PYELOGRAM AND INSERTION STENT URETERAL;  Surgeon: Glenn Garcia MD;  [facility];  Service: Urology    [place] CYSTO/URETERO W/LITHOTRIPSY &INDWELL STENT INSRT Right 3/3/2020    Procedure: CYSTOSCOPY URETEROSCOPY WITH LITHOTRIPSY HOLMIUM LASER; BASKET STONE RETRIEVAL; RETROGRADE PYELOGRAM AND INSERTION STENT URETERAL;  Surgeon: Glenn Garcia MD;  [facility];  Service: Urology    [place] TRANSURETHRAL ELEC-SURG [facility] N/A 2017    Procedure: Leyla Kaur; TUR PROSTATE;  Surgeon: Franki Meza MD;  [facility];  Service: Urology    PROSTATE SURGERY  2017    SHOULDER ARTHROSCOPY Right     bicep tendon repair, rotator cuff repair and acromuim shave      Social History     Socioeconomic History    Marital status: /Civil Union     Spouse name: None    Number of children: 3    Years of education: None    Highest education level: None   Occupational History    Occupation: manager   Tobacco Use    Smoking status: Former Smoker     Packs/day: 1 00     Years: 1 00     Pack years: 1 00     Types: Cigarettes     Start date: 1971     Quit date: 2009     Years since quittin 5    Smokeless tobacco: Never Used    Tobacco comment: Quit   Vaping Use    Vaping Use: Never used Substance and Sexual Activity    Alcohol use: Not Currently     Alcohol/week: 0 0 standard drinks     Comment: quit 2006    Drug use: No    Sexual activity: Not Currently     Partners: Female     Birth control/protection: None   Other Topics Concern    None   Social History Narrative    Caffeine use    Daily coffee consumption     Denied daily cola consumption     Denied daily tea consumption     Former smoker (as per [facility])     Current everyday smoker (as per [employer])     Non-smoker (as per [employer])      Social Determinants of Health     Financial Resource Strain:     Difficulty of Paying Living Expenses:    Food Insecurity:     Worried About Running Out of Food in the Last Year:     Ran Out of Food in the Last Year:    Transportation Needs:     Lack of Transportation (Medical):      Lack of Transportation (Non-Medical):    Physical Activity:     Days of Exercise per Week:     Minutes of Exercise per Session:    Stress:     Feeling of Stress :    Social Connections:     Frequency of Communication with Friends and Family:     Frequency of Social Gatherings with Friends and Family:     Attends Hoahaoism Services:     Active Member of Clubs or Organizations:     Attends Club or Organization Meetings:     Marital Status:    Intimate Partner Violence:     Fear of Current or Ex-Partner:     Emotionally Abused:     Physically Abused:     Sexually Abused:        Current Outpatient Medications:     aspirin (ECOTRIN LOW STRENGTH) 81 mg EC tablet, Take 81 mg by mouth daily, Disp: , Rfl:     atorvastatin (LIPITOR) 40 mg tablet, Take 1 tablet (40 mg total) by mouth daily, Disp: 90 tablet, Rfl: 3    Cholecalciferol (DIALYVITE VITAMIN D 5000 PO), Take by mouth, Disp: , Rfl:     famotidine (PEPCID) 40 MG tablet, TAKE 1 TABLET BY MOUTH DAILY AT BEDTIME, Disp: 30 tablet, Rfl: 3    glimepiride (AMARYL) 2 mg tablet, TAKE 1 TABLET BY MOUTH EVERY DAY WITH BREAKFAST, Disp: 30 tablet, Rfl: 5   glucose 4 g chewable tablet, Chew 4 tablets (16 g total) as needed for low blood sugar, Disp: 100 tablet, Rfl: 1    glucose blood (OneTouch Verio) test strip, Use to test blood sugar 4 times a day, Disp: 300 each, Rfl: 0    insulin glargine (Lantus SoloStar) 100 units/mL injection pen, Inject 12 Units under the skin every morning, Disp: 5 pen, Rfl: 1    Insulin Pen Needle (Pen Needles) 31G X 6 MM MISC, Use 4 (four) times a day Use to inject insulin 4 times a day, Disp: 200 each, Rfl: 4    Lancets (OneTouch Delica Plus [id number]) MISC, USE THREE TIMES DAILY, Disp: 100 each, Rfl: 5    levothyroxine 50 mcg tablet, TAKE 1 TAB DAILY MONDAY, TUESDAY, WEDNESDAY, THURSDAY, FRIDAY, SATURDAY AND (100 MCG TOTAL) SUNDAY, Disp: 30 tablet, Rfl: 11    metFORMIN (GLUCOPHAGE) 500 mg tablet, TAKE 2 TABLETS BY MOUTH TWICE A DAY WITH FOOD, Disp: 360 tablet, Rfl: 1    pantoprazole (PROTONIX) 40 mg tablet, TAKE 1 TABLET BY MOUTH DAILY BEFORE BREAKFAST, Disp: 30 tablet, Rfl: 5     Food Intake and Lifestyle Assessment   Food Intake Assessment completed via usual diet recall  Breakfast: 2 cup coffee  10am: iona bar or maybe oatmeal  Per pt report am blood sugar= 165-180  Snack: none   Lunch: iona bar 1pm mid day blood sugars= 130s  Snack:  leftovers or half a sandwich  Dinner: 7:30-8:30pm: quinoa with chicken, salad  Usually chicken or beef or turkey  Pm blood sugars= Maybe 180  Snack: none   HS blood cpagwi=661-549's before bed    Beverage intake: water, coffee, and seltzer harkins, unsweetened iced tea occasionally  Protein supplement: iona bar 1-2 per day  Estimated protein intake per day: 60g  Estimated fluid intake per day: 32oz water x1 at work plus 32 oz x 1 5 per day of seltzer  Meals eaten away from home: 1-2 per week  Typical meal pattern: 2 meals per day and 2 snacks per day  Eating Behaviors: Consumption of high calorie/ high fat foods  Food allergies or intolerances: No Known Allergies dislikes robledo and mushrooms  Cultural or Scientology considerations: none    Physical Assessment  Physical Activity  Types of exercise: Walking 1833-6313 steps per day at work  Resistance bands or stationary bike at home or kettlebell 7 days per week  Current physical limitations: occasional SOB    Psychosocial Assessment   Support systems: spouse and children  Socioeconomic factors: [place]  Lives with wife and two adult children  One adult child not at home  Nutrition Diagnosis  Diagnosis: Overweight / Obesity (NC-3 3), Excessive energy intake (NI-1 5) and Altered GI function (NC-1 4)  Related to: Excessive energy intake and Altered GI function  As Evidenced by: BMI >25, Unintentional weight gain and hernia causing significant nausea, vomiting, reflux     Nutrition Prescription: Recommend the following diet  Regular and consistent carbohydrate    Interventions and Teaching   Discussed pre-op and post-op nutrition guidelines  Patient educated and handouts provided    Surgical changes to stomach / GI  Capacity of post-surgery stomach  Diet progression  Adequate hydration  Sugar and fat restriction to decrease "dumping syndrome"  Fat restriction to decrease steatorrhea  Expected weight loss  Weight loss plateaus/ possibility of weight regain  Exercise  Suggestions for pre-op diet  Nutrition considerations after surgery  Protein supplements  Meal planning and preparation  Appropriate carbohydrate, protein, and fat intake, and food/fluid choices to maximize safe weight loss, nutrient intake, and tolerance   Dietary and lifestyle changes  Possible problems with poor eating habits  Techniques for self monitoring and keeping daily food journal  Potential for food intolerance after surgery, and ways to deal with them including: lactose intolerance, nausea, reflux, vomiting, diarrhea, food intolerance, appetite changes, gas  Vitamin / Mineral supplementation of Multivitamin with minerals and Vitamin D  Vitamin D 5000 IU daily x couple years  Education provided to: patient and spouse Pauly Durán  Barriers to learning: Desire/Motivation:  Pt unsure if he wants bariatric surgery and is gathering information at this time  Readiness to change: contemplation  Prior research on procedure: discussed with provider  Comprehension: verbalizes understanding   Expected Compliance: good    Recommendations  Pt is an appropriate candidate for surgery  Yes  5% wt loss=14 425#=Day of surgery goal of 274 075#  Minimum BMI of 97=712 63#  Pt had Lipids, TSH, HgbA1c, CMP done 6/2/21  Ordered CBC today  Pt also needs to recheck TSH as was elevated in June    Already ordered by endocrinologist     Evaluation / Monitoring  Dietitian to Monitor: Eating pattern as discussed Tolerance of nutrition prescription Body weight Lab values Physical activity Bowel pattern    Goals  Eliminate sugar sweetened beverages, Food journal, Exercise 30 minutes 5 times per week, Complete lession plans 1-6, Eat 3 meals per day and Eliminate mindless snacking    Time Spent:   1 Hour

## 2021-07-22 ENCOUNTER — TELEPHONE (OUTPATIENT)
Dept: BARIATRICS | Facility: CLINIC | Age: 61
End: 2021-07-22

## 2021-08-03 ENCOUNTER — TELEPHONE (OUTPATIENT)
Dept: ENDOCRINOLOGY | Facility: CLINIC | Age: 61
End: 2021-08-03

## 2021-08-03 NOTE — TELEPHONE ENCOUNTER
I do not see a lot of low or high numbers  He is schedule to see Dr Jose Alberto Nieto in September

## 2021-08-19 ENCOUNTER — OFFICE VISIT (OUTPATIENT)
Dept: BARIATRICS | Facility: CLINIC | Age: 61
End: 2021-08-19

## 2021-08-19 DIAGNOSIS — E66.01 MORBID (SEVERE) OBESITY DUE TO EXCESS CALORIES (HCC): Primary | ICD-10-CM

## 2021-08-19 DIAGNOSIS — K29.70 GASTRITIS WITHOUT BLEEDING, UNSPECIFIED CHRONICITY, UNSPECIFIED GASTRITIS TYPE: ICD-10-CM

## 2021-08-19 PROCEDURE — RECHECK

## 2021-08-19 RX ORDER — FAMOTIDINE 40 MG/1
TABLET, FILM COATED ORAL
Qty: 30 TABLET | Refills: 3 | Status: SHIPPED | OUTPATIENT
Start: 2021-08-19 | End: 2022-05-09

## 2021-08-19 NOTE — PROGRESS NOTES
Behavioral Health Follow Up Note:      3 / 4  Weight Check  Starting weight 289 5  #  Today's weight 286 7  #    Attended the support group  Food is consistent through out the day  Is more mindful with his choices and his chewing  Still struggling with the idea of bariatric surgery as it was added to his hernia repair treatment plan  Is talking to other who are post surgery and feeling more comfortable with the idea  Would like to improve his overall medical record

## 2021-08-23 ENCOUNTER — TELEPHONE (OUTPATIENT)
Dept: ENDOCRINOLOGY | Facility: CLINIC | Age: 61
End: 2021-08-23

## 2021-08-23 NOTE — TELEPHONE ENCOUNTER
Dr Duong [facility] patient  Blood sugar log reviewed    Has fasting hyperglycemia other blood sugars are within goal   Continue current regimen    Discuss further during follow-up

## 2021-09-14 ENCOUNTER — TELEPHONE (OUTPATIENT)
Dept: ENDOCRINOLOGY | Facility: CLINIC | Age: 61
End: 2021-09-14

## 2021-09-14 NOTE — TELEPHONE ENCOUNTER
BG log attached for review  Last seen by Dr Beverly Call 5/19/21  No upcoming appt       Medications:  Lantus: 12 units daily  Glimepiride: 2 mg 1 tab daily  Metformin: 500 mg 2 tabs twice a day

## 2021-09-15 NOTE — TELEPHONE ENCOUNTER
Blood sugars over the past few weeks appear to be doing reasonably well on current regimen  I do not note any hypoglycemia < 70  Suggest continue current regimen  Arrange follow up appt

## 2021-09-16 NOTE — TELEPHONE ENCOUNTER
Patient understood bg log review    He will call to set up appointment when he knows his work schedule

## 2021-09-24 ENCOUNTER — OFFICE VISIT (OUTPATIENT)
Dept: BARIATRICS | Facility: CLINIC | Age: 61
End: 2021-09-24
Payer: COMMERCIAL

## 2021-09-24 VITALS
SYSTOLIC BLOOD PRESSURE: 128 MMHG | HEART RATE: 106 BPM | BODY MASS INDEX: 36.83 KG/M2 | DIASTOLIC BLOOD PRESSURE: 70 MMHG | TEMPERATURE: 96.9 F | WEIGHT: 287 LBS | HEIGHT: 74 IN

## 2021-09-24 DIAGNOSIS — K21.9 GASTROESOPHAGEAL REFLUX DISEASE WITHOUT ESOPHAGITIS: Primary | ICD-10-CM

## 2021-09-24 PROCEDURE — 99243 OFF/OP CNSLTJ NEW/EST LOW 30: CPT | Performed by: SURGERY

## 2021-09-24 PROCEDURE — 3008F BODY MASS INDEX DOCD: CPT | Performed by: SURGERY

## 2021-09-24 PROCEDURE — 3074F SYST BP LT 130 MM HG: CPT | Performed by: SURGERY

## 2021-09-24 PROCEDURE — 3078F DIAST BP <80 MM HG: CPT | Performed by: SURGERY

## 2021-09-24 PROCEDURE — 1036F TOBACCO NON-USER: CPT | Performed by: SURGERY

## 2021-09-24 NOTE — PROGRESS NOTES
Consultation - Bariatric Surgery   Dee Bloch 57 y o  male MRN: [id number]  Unit/Bed#:  Encounter: [id number]    Assessment/Plan     Assessment:  56 yo male with hx of moderate sized hiatal hernia with GERD symptoms     Plan:  Needs cardiac clearance  Will schedule for robotic PEH repair with possible fundoplication vs linx  This determination will be made in the OR depending on the size of the hernia    History of Present Illness     HPI:  Dee Bloch is a 57 y o  male Body mass index is 36 65 kg/m²  with history of hiatal hernia with GERD symptoms now presenting to discuss surgical options for repair    Patient states that he has daily GERD/reflux symptoms despite taking pepcid and protonix  He also has a hx of diabetes  Given his diabetes and obesity we discussed the possibility of doing a RYGB with the [address] repair as this option will have the lowest recurrence rate of the hiatal hernia and GERD/reflux symptoms  However, the patient is not interested the RYGB and wants to pursue the [address] repair with either fundoplication or linx       Review of Systems   Constitutional: Negative for chills and fever  HENT: Negative for ear pain and sore throat  Eyes: Negative for pain and visual disturbance  Respiratory: Negative for cough and shortness of breath  Cardiovascular: Negative for chest pain and palpitations  Gastrointestinal: Negative for abdominal pain and vomiting  +GERD/reflux   Genitourinary: Negative for dysuria and hematuria  Musculoskeletal: Negative for arthralgias and back pain  Skin: Negative for color change and rash  Neurological: Negative for seizures and syncope  All other systems reviewed and are negative        Historical Information   Past Medical History:   Diagnosis Date    BPH (benign prostatic hyperplasia)     Diabetes mellitus ([address] )     Disease of thyroid gland     Erectile dysfunction     GERD (gastroesophageal reflux disease)     Headache(784 0) 05/01/2018    Each Day    Hematuria     Hyperlipidemia     Hypertension     Infectious viral hepatitis 2007    Cured by Dr Kimberlyn Rondon [facility] Liver disease     hepatitis C    Mediastinal adenopathy     Obesity 2007    PE (pulmonary thromboembolism) ([address] )     Sarcoidosis     Vitamin D deficiency      Past Surgical History:   Procedure Laterality Date    COLONOSCOPY      last assessed: 08/28/2012; fiberoptic screening     CYSTOSCOPY      onset: 05/06/2016; Diagnostic     DENTAL SURGERY      FL RETROGRADE PYELOGRAM  2/6/2020    FL RETROGRADE PYELOGRAM  3/3/2020    LIVER BIOPSY      LYMPH NODE BIOPSY  6/2019    [place] BRONCHOSCOPY NEEDLE BX TRACHEA MAIN STEM&/BRON N/A 6/6/2019    Procedure: ENDOBRONCHIAL ULTRASOUND (EBUS);   Surgeon: Alirio Wakefield MD;  [facility];  Service: Thoracic    [place] [address] N/A 6/6/2019    Procedure: Melyssa Wilkes;  Surgeon: Alirio Wakefield MD;  [facility];  Service: Thoracic    [place] CYSTO/URETERO W/LITHOTRIPSY &INDWELL STENT INSRT Right 2/6/2020    Procedure: CYSTOSCOPY WITH LITHOTRIPSY RETROGRADE PYELOGRAM AND INSERTION STENT URETERAL;  Surgeon: Milan Rey MD;  [facility];  Service: Urology    [place] CYSTO/URETERO W/LITHOTRIPSY &INDWELL STENT INSRT Right 3/3/2020    Procedure: CYSTOSCOPY URETEROSCOPY WITH LITHOTRIPSY HOLMIUM LASER; BASKET STONE RETRIEVAL; RETROGRADE PYELOGRAM AND INSERTION STENT URETERAL;  Surgeon: Milan Rey MD;  [facility];  Service: Urology    [place] TRANSURETHRAL ELEC-SURG [facility] N/A 9/13/2017    Procedure: Faith Kerr; TUR PROSTATE;  Surgeon: Sheri Monzon MD;  [facility];  Service: Urology    PROSTATE SURGERY  08/2017    SHOULDER ARTHROSCOPY Right     bicep tendon repair, rotator cuff repair and acromuim shave      Social History   Social History     Substance and Sexual Activity   Alcohol Use Not Currently    Alcohol/week: 0 0 standard drinks    Comment: quit      Social History     Substance and Sexual Activity   Drug Use No     Social History     Tobacco Use   Smoking Status Former Smoker    Packs/day: 1 00    Years: 1 00    Pack years: 1 00    Types: Cigarettes    Start date: 1971   Cheli Silence Quit date: 2009    Years since quittin 7   Smokeless Tobacco Never Used   Tobacco Comment    Quit     Family History: non-contributory    Meds/Allergies   all current active meds have been reviewed  No Known Allergies    Objective     Current Vitals:   Blood Pressure: 128/70 (21 1408)  Pulse: (!) 106 (21 1408)  Temperature: (!) 96 9 °F (36 1 °C) (21 1408)  Temp Source: Tympanic (21 1408)  Height: 6' 2 2" (188 5 cm) (21 1408)  Weight - Scale: 130 kg (287 lb) (21 1408)      Invasive Devices     None                   Lab Results: I have personally reviewed pertinent lab results  Imaging: I have personally reviewed pertinent reports  EKG, Pathology, and Other Studies: I have personally reviewed pertinent reports  EGD 3/18/21  FINDINGS:  · Regular Z-line 40 cm from the incisors  No esophageal varices  · Medium sliding hiatal hernia (type I hiatal hernia) without Abdulaziz Habermann lesions present - GE junction 40 cm from the incisors, diaphragmatic impression 45 cm from the incisors  · Moderate, patchy edematous and erythematous mucosa in the antrum, prepyloric region and pylorus; performed cold forceps biopsy  Retroflexed view was notable for sliding hiatal hernia  · No evidence of GOV1, GOV2, IGV1 or IGV2  · Mild, patchy nodular mucosa in the duodenal bulb; performed cold forceps biopsy   Likely duodenitis within the bulb, D2 appeared normal   Biopsies were obtained from duodenal bulb         SPECIMENS:  ID Type Source Tests Collected by Time Destination   1 : duodenal bx Tissue Small Bowel, NOS TISSUE EXAM Lisa Llamas MD 3/18/2021  8:38 AM     2 : gastric bx Tissue Stomach TISSUE EXAM Lisa Llamas MD 3/18/2021  8:39 AM         IMPRESSION:  1  Medium-sized hiatal hernia  2  Moderate gastritis and duodenitis  3  No evidence of esophageal or gastric varices        RECOMMENDATION:  1  Continue daily PPI and Pepcid for now, with hopes of stopping PPI due to long-term side effect profile associated with the and using H2 blockers only  2  Avoid NSAIDs  Use acetaminophen instead  3  Follow biopsy results in 2 weeks  4  Will refer to bariatric surgery to discuss hiatal hernia repair  5  Follow-up in the office in 6 months -year

## 2021-10-05 ENCOUNTER — TELEPHONE (OUTPATIENT)
Dept: ENDOCRINOLOGY | Facility: CLINIC | Age: 61
End: 2021-10-05

## 2021-10-16 DIAGNOSIS — K21.9 GASTROESOPHAGEAL REFLUX DISEASE, UNSPECIFIED WHETHER ESOPHAGITIS PRESENT: ICD-10-CM

## 2021-10-18 RX ORDER — PANTOPRAZOLE SODIUM 40 MG/1
TABLET, DELAYED RELEASE ORAL
Qty: 30 TABLET | Refills: 5 | OUTPATIENT
Start: 2021-10-18

## 2021-10-20 RX ORDER — PANTOPRAZOLE SODIUM 40 MG/1
40 TABLET, DELAYED RELEASE ORAL
Qty: 30 TABLET | Refills: 5 | Status: SHIPPED | OUTPATIENT
Start: 2021-10-20 | End: 2022-05-09

## 2021-10-26 ENCOUNTER — TELEPHONE (OUTPATIENT)
Dept: ENDOCRINOLOGY | Facility: CLINIC | Age: 61
End: 2021-10-26

## 2021-10-26 ENCOUNTER — APPOINTMENT (OUTPATIENT)
Dept: LAB | Facility: AMBULARY SURGERY CENTER | Age: 61
End: 2021-10-26
Payer: COMMERCIAL

## 2021-10-26 DIAGNOSIS — I10 ESSENTIAL HYPERTENSION: ICD-10-CM

## 2021-10-26 DIAGNOSIS — K21.9 GASTROESOPHAGEAL REFLUX DISEASE WITHOUT ESOPHAGITIS: ICD-10-CM

## 2021-10-26 DIAGNOSIS — E66.01 MORBID (SEVERE) OBESITY DUE TO EXCESS CALORIES (HCC): ICD-10-CM

## 2021-10-26 DIAGNOSIS — Z01.818 PREOPERATIVE CLEARANCE: ICD-10-CM

## 2021-10-26 DIAGNOSIS — Z79.4 TYPE 2 DIABETES MELLITUS WITH OTHER SPECIFIED COMPLICATION, WITH LONG-TERM CURRENT USE OF INSULIN (HCC): ICD-10-CM

## 2021-10-26 DIAGNOSIS — E11.69 TYPE 2 DIABETES MELLITUS WITH OTHER SPECIFIED COMPLICATION, WITH LONG-TERM CURRENT USE OF INSULIN (HCC): ICD-10-CM

## 2021-10-26 DIAGNOSIS — K74.69 OTHER CIRRHOSIS OF LIVER (HCC): ICD-10-CM

## 2021-10-26 DIAGNOSIS — E03.9 HYPOTHYROIDISM, UNSPECIFIED TYPE: ICD-10-CM

## 2021-10-26 DIAGNOSIS — E03.9 ACQUIRED HYPOTHYROIDISM: ICD-10-CM

## 2021-10-26 LAB
BASOPHILS # BLD AUTO: 0.07 THOUSANDS/ΜL (ref 0–0.1)
BASOPHILS NFR BLD AUTO: 1 % (ref 0–1)
EOSINOPHIL # BLD AUTO: 0.17 THOUSAND/ΜL (ref 0–0.61)
EOSINOPHIL NFR BLD AUTO: 3 % (ref 0–6)
ERYTHROCYTE [DISTWIDTH] IN BLOOD BY AUTOMATED COUNT: 12.6 % (ref 11.6–15.1)
HCT VFR BLD AUTO: 47 % (ref 36.5–49.3)
HGB BLD-MCNC: 15.6 G/DL (ref 12–17)
IMM GRANULOCYTES # BLD AUTO: 0.03 THOUSAND/UL (ref 0–0.2)
IMM GRANULOCYTES NFR BLD AUTO: 1 % (ref 0–2)
LYMPHOCYTES # BLD AUTO: 2.12 THOUSANDS/ΜL (ref 0.6–4.47)
LYMPHOCYTES NFR BLD AUTO: 32 % (ref 14–44)
MCH RBC QN AUTO: 30.8 PG (ref 26.8–34.3)
MCHC RBC AUTO-ENTMCNC: 33.2 G/DL (ref 31.4–37.4)
MCV RBC AUTO: 93 FL (ref 82–98)
MONOCYTES # BLD AUTO: 0.54 THOUSAND/ΜL (ref 0.17–1.22)
MONOCYTES NFR BLD AUTO: 8 % (ref 4–12)
NEUTROPHILS # BLD AUTO: 3.69 THOUSANDS/ΜL (ref 1.85–7.62)
NEUTS SEG NFR BLD AUTO: 55 % (ref 43–75)
NRBC BLD AUTO-RTO: 0 /100 WBCS
PLATELET # BLD AUTO: 223 THOUSANDS/UL (ref 149–390)
PMV BLD AUTO: 9.9 FL (ref 8.9–12.7)
RBC # BLD AUTO: 5.06 MILLION/UL (ref 3.88–5.62)
T4 FREE SERPL-MCNC: 1.49 NG/DL (ref 0.76–1.46)
TSH SERPL DL<=0.05 MIU/L-ACNC: 2.61 UIU/ML (ref 0.36–3.74)
WBC # BLD AUTO: 6.62 THOUSAND/UL (ref 4.31–10.16)

## 2021-10-26 PROCEDURE — 36415 COLL VENOUS BLD VENIPUNCTURE: CPT

## 2021-10-26 PROCEDURE — 85025 COMPLETE CBC W/AUTO DIFF WBC: CPT

## 2021-10-26 PROCEDURE — 84443 ASSAY THYROID STIM HORMONE: CPT

## 2021-10-26 PROCEDURE — 84439 ASSAY OF FREE THYROXINE: CPT

## 2021-10-28 DIAGNOSIS — E55.9 VITAMIN D DEFICIENCY: ICD-10-CM

## 2021-10-28 DIAGNOSIS — E11.65 TYPE 2 DIABETES MELLITUS WITH HYPERGLYCEMIA, WITH LONG-TERM CURRENT USE OF INSULIN (HCC): ICD-10-CM

## 2021-10-28 DIAGNOSIS — E03.9 ACQUIRED HYPOTHYROIDISM: ICD-10-CM

## 2021-10-28 DIAGNOSIS — Z79.4 TYPE 2 DIABETES MELLITUS WITH HYPERGLYCEMIA, WITH LONG-TERM CURRENT USE OF INSULIN (HCC): ICD-10-CM

## 2021-10-28 DIAGNOSIS — E78.2 MIXED HYPERLIPIDEMIA: ICD-10-CM

## 2021-10-29 ENCOUNTER — OFFICE VISIT (OUTPATIENT)
Dept: FAMILY MEDICINE CLINIC | Facility: CLINIC | Age: 61
End: 2021-10-29
Payer: COMMERCIAL

## 2021-10-29 VITALS
WEIGHT: 290 LBS | HEART RATE: 93 BPM | SYSTOLIC BLOOD PRESSURE: 126 MMHG | OXYGEN SATURATION: 98 % | TEMPERATURE: 97.6 F | BODY MASS INDEX: 37.22 KG/M2 | DIASTOLIC BLOOD PRESSURE: 80 MMHG | RESPIRATION RATE: 18 BRPM | HEIGHT: 74 IN

## 2021-10-29 DIAGNOSIS — E03.9 HYPOTHYROIDISM, UNSPECIFIED TYPE: ICD-10-CM

## 2021-10-29 DIAGNOSIS — D86.0 PULMONARY SARCOIDOSIS (HCC): ICD-10-CM

## 2021-10-29 DIAGNOSIS — K74.69 OTHER CIRRHOSIS OF LIVER (HCC): ICD-10-CM

## 2021-10-29 DIAGNOSIS — E11.9 TYPE 2 DIABETES MELLITUS WITHOUT COMPLICATION, WITHOUT LONG-TERM CURRENT USE OF INSULIN (HCC): Primary | ICD-10-CM

## 2021-10-29 DIAGNOSIS — K21.9 GASTROESOPHAGEAL REFLUX DISEASE WITHOUT ESOPHAGITIS: ICD-10-CM

## 2021-10-29 DIAGNOSIS — Z23 NEED FOR VACCINATION: ICD-10-CM

## 2021-10-29 LAB
CREAT UR-MCNC: 243 MG/DL
MICROALBUMIN UR-MCNC: 20.7 MG/L (ref 0–20)
MICROALBUMIN/CREAT 24H UR: 9 MG/G CREATININE (ref 0–30)

## 2021-10-29 PROCEDURE — 3061F NEG MICROALBUMINURIA REV: CPT | Performed by: SURGERY

## 2021-10-29 PROCEDURE — 90632 HEPA VACCINE ADULT IM: CPT

## 2021-10-29 PROCEDURE — 90682 RIV4 VACC RECOMBINANT DNA IM: CPT

## 2021-10-29 PROCEDURE — 90471 IMMUNIZATION ADMIN: CPT

## 2021-10-29 PROCEDURE — 3008F BODY MASS INDEX DOCD: CPT | Performed by: SURGERY

## 2021-10-29 PROCEDURE — 90472 IMMUNIZATION ADMIN EACH ADD: CPT

## 2021-10-29 PROCEDURE — 99214 OFFICE O/P EST MOD 30 MIN: CPT | Performed by: FAMILY MEDICINE

## 2021-10-29 PROCEDURE — 82043 UR ALBUMIN QUANTITATIVE: CPT | Performed by: FAMILY MEDICINE

## 2021-10-29 PROCEDURE — 82570 ASSAY OF URINE CREATININE: CPT | Performed by: FAMILY MEDICINE

## 2021-11-11 ENCOUNTER — OFFICE VISIT (OUTPATIENT)
Dept: BARIATRICS | Facility: CLINIC | Age: 61
End: 2021-11-11
Payer: COMMERCIAL

## 2021-11-11 VITALS
WEIGHT: 282 LBS | DIASTOLIC BLOOD PRESSURE: 64 MMHG | TEMPERATURE: 97 F | HEIGHT: 74 IN | BODY MASS INDEX: 36.19 KG/M2 | SYSTOLIC BLOOD PRESSURE: 128 MMHG | HEART RATE: 103 BPM

## 2021-11-11 DIAGNOSIS — K21.9 GASTROESOPHAGEAL REFLUX DISEASE, UNSPECIFIED WHETHER ESOPHAGITIS PRESENT: Primary | ICD-10-CM

## 2021-11-11 DIAGNOSIS — K44.9 PARAESOPHAGEAL HIATAL HERNIA: ICD-10-CM

## 2021-11-11 PROCEDURE — 99213 OFFICE O/P EST LOW 20 MIN: CPT | Performed by: SURGERY

## 2021-11-11 RX ORDER — ACETAMINOPHEN 325 MG/1
975 TABLET ORAL ONCE
Status: CANCELLED | OUTPATIENT
Start: 2021-12-03 | End: 2021-11-11

## 2021-11-11 RX ORDER — SCOLOPAMINE TRANSDERMAL SYSTEM 1 MG/1
1 PATCH, EXTENDED RELEASE TRANSDERMAL ONCE
Status: CANCELLED | OUTPATIENT
Start: 2021-12-03 | End: 2021-11-11

## 2021-11-11 RX ORDER — HEPARIN SODIUM 5000 [USP'U]/ML
5000 INJECTION, SOLUTION INTRAVENOUS; SUBCUTANEOUS
Status: CANCELLED | OUTPATIENT
Start: 2021-12-04 | End: 2021-12-05

## 2021-11-11 RX ORDER — CELECOXIB 200 MG/1
200 CAPSULE ORAL ONCE
Status: CANCELLED | OUTPATIENT
Start: 2021-12-03 | End: 2021-11-11

## 2021-11-11 RX ORDER — GABAPENTIN 300 MG/1
300 CAPSULE ORAL ONCE
Status: CANCELLED | OUTPATIENT
Start: 2021-12-03 | End: 2021-11-11

## 2021-11-11 RX ORDER — CEFAZOLIN SODIUM 2 G/50ML
2000 SOLUTION INTRAVENOUS ONCE
Status: CANCELLED | OUTPATIENT
Start: 2021-11-11 | End: 2021-11-11

## 2021-11-12 ENCOUNTER — OFFICE VISIT (OUTPATIENT)
Dept: CARDIOLOGY CLINIC | Facility: MEDICAL CENTER | Age: 61
End: 2021-11-12
Payer: COMMERCIAL

## 2021-11-12 VITALS
SYSTOLIC BLOOD PRESSURE: 120 MMHG | BODY MASS INDEX: 36.32 KG/M2 | WEIGHT: 283 LBS | HEART RATE: 84 BPM | OXYGEN SATURATION: 97 % | DIASTOLIC BLOOD PRESSURE: 60 MMHG | HEIGHT: 74 IN

## 2021-11-12 DIAGNOSIS — I10 ESSENTIAL HYPERTENSION: Primary | ICD-10-CM

## 2021-11-12 DIAGNOSIS — E66.9 OBESITY, UNSPECIFIED CLASSIFICATION, UNSPECIFIED OBESITY TYPE, UNSPECIFIED WHETHER SERIOUS COMORBIDITY PRESENT: ICD-10-CM

## 2021-11-12 PROCEDURE — 3008F BODY MASS INDEX DOCD: CPT | Performed by: INTERNAL MEDICINE

## 2021-11-12 PROCEDURE — 3074F SYST BP LT 130 MM HG: CPT | Performed by: INTERNAL MEDICINE

## 2021-11-12 PROCEDURE — 1036F TOBACCO NON-USER: CPT | Performed by: INTERNAL MEDICINE

## 2021-11-12 PROCEDURE — 99203 OFFICE O/P NEW LOW 30 MIN: CPT | Performed by: INTERNAL MEDICINE

## 2021-11-12 PROCEDURE — 3078F DIAST BP <80 MM HG: CPT | Performed by: INTERNAL MEDICINE

## 2021-11-15 ENCOUNTER — TELEPHONE (OUTPATIENT)
Dept: ENDOCRINOLOGY | Facility: CLINIC | Age: 61
End: 2021-11-15

## 2021-11-15 DIAGNOSIS — E11.65 TYPE 2 DIABETES MELLITUS WITH HYPERGLYCEMIA, WITH LONG-TERM CURRENT USE OF INSULIN (HCC): ICD-10-CM

## 2021-11-15 DIAGNOSIS — Z79.4 TYPE 2 DIABETES MELLITUS WITH HYPERGLYCEMIA, WITH LONG-TERM CURRENT USE OF INSULIN (HCC): ICD-10-CM

## 2021-11-15 DIAGNOSIS — E66.01 MORBID (SEVERE) OBESITY DUE TO EXCESS CALORIES (HCC): Primary | ICD-10-CM

## 2021-11-15 RX ORDER — INSULIN GLARGINE 100 [IU]/ML
INJECTION, SOLUTION SUBCUTANEOUS
Qty: 15 ML | Refills: 4 | Status: SHIPPED | OUTPATIENT
Start: 2021-11-15 | End: 2021-12-02 | Stop reason: SDUPTHER

## 2021-11-15 RX ORDER — OMEPRAZOLE 20 MG/1
20 CAPSULE, DELAYED RELEASE ORAL DAILY
Qty: 30 CAPSULE | Refills: 3 | Status: SHIPPED | OUTPATIENT
Start: 2021-11-15 | End: 2022-05-09

## 2021-11-15 RX ORDER — OXYCODONE HYDROCHLORIDE 5 MG/1
5 TABLET ORAL EVERY 4 HOURS PRN
Qty: 10 TABLET | Refills: 0 | Status: SHIPPED | OUTPATIENT
Start: 2021-12-03 | End: 2022-05-09

## 2021-11-22 ENCOUNTER — HOSPITAL ENCOUNTER (OUTPATIENT)
Dept: RADIOLOGY | Facility: HOSPITAL | Age: 61
Discharge: HOME | End: 2021-11-22
Payer: COMMERCIAL

## 2021-11-22 DIAGNOSIS — K21.9 GASTROESOPHAGEAL REFLUX DISEASE, UNSPECIFIED WHETHER ESOPHAGITIS PRESENT: ICD-10-CM

## 2021-11-22 PROCEDURE — 74220 X-RAY XM ESOPHAGUS 1CNTRST: CPT

## 2021-11-29 DIAGNOSIS — E11.65 TYPE 2 DIABETES MELLITUS WITH HYPERGLYCEMIA, WITH LONG-TERM CURRENT USE OF INSULIN (HCC): ICD-10-CM

## 2021-11-29 DIAGNOSIS — Z79.4 TYPE 2 DIABETES MELLITUS WITH HYPERGLYCEMIA, WITH LONG-TERM CURRENT USE OF INSULIN (HCC): ICD-10-CM

## 2021-11-30 ENCOUNTER — ANESTHESIA EVENT (OUTPATIENT)
Dept: PERIOP | Facility: HOSPITAL | Age: 61
End: 2021-11-30
Payer: COMMERCIAL

## 2021-11-30 RX ORDER — ACETAMINOPHEN 500 MG
500 TABLET ORAL EVERY 6 HOURS PRN
COMMUNITY

## 2021-11-30 RX ORDER — IBUPROFEN 200 MG
TABLET ORAL EVERY 6 HOURS PRN
COMMUNITY

## 2021-12-02 RX ORDER — INSULIN GLARGINE 100 [IU]/ML
INJECTION, SOLUTION SUBCUTANEOUS
Qty: 15 ML | Refills: 4
Start: 2021-12-02 | End: 2022-04-01 | Stop reason: SDUPTHER

## 2021-12-03 ENCOUNTER — ANESTHESIA (OUTPATIENT)
Dept: PERIOP | Facility: HOSPITAL | Age: 61
End: 2021-12-03
Payer: COMMERCIAL

## 2021-12-03 ENCOUNTER — HOSPITAL ENCOUNTER (OUTPATIENT)
Facility: HOSPITAL | Age: 61
Setting detail: OUTPATIENT SURGERY
Discharge: HOME | End: 2021-12-04
Attending: SURGERY | Admitting: SURGERY
Payer: COMMERCIAL

## 2021-12-03 DIAGNOSIS — K44.9 HIATAL HERNIA: ICD-10-CM

## 2021-12-03 DIAGNOSIS — E11.9 DIABETES MELLITUS (HCC): ICD-10-CM

## 2021-12-03 DIAGNOSIS — E11.9 INSULIN-REQUIRING OR DEPENDENT TYPE II DIABETES MELLITUS (HCC): ICD-10-CM

## 2021-12-03 DIAGNOSIS — I10 ESSENTIAL HYPERTENSION: ICD-10-CM

## 2021-12-03 DIAGNOSIS — Z79.4 INSULIN-REQUIRING OR DEPENDENT TYPE II DIABETES MELLITUS (HCC): ICD-10-CM

## 2021-12-03 DIAGNOSIS — Z79.4 TYPE 2 DIABETES MELLITUS WITH OTHER SPECIFIED COMPLICATION, WITH LONG-TERM CURRENT USE OF INSULIN (HCC): Primary | ICD-10-CM

## 2021-12-03 DIAGNOSIS — K21.9 GERD (GASTROESOPHAGEAL REFLUX DISEASE): ICD-10-CM

## 2021-12-03 DIAGNOSIS — E11.69 TYPE 2 DIABETES MELLITUS WITH OTHER SPECIFIED COMPLICATION, WITH LONG-TERM CURRENT USE OF INSULIN (HCC): Primary | ICD-10-CM

## 2021-12-03 LAB
ABO GROUP BLD: NORMAL
ANION GAP SERPL CALCULATED.3IONS-SCNC: 11 MMOL/L (ref 4–13)
BLD GP AB SCN SERPL QL: NEGATIVE
BUN SERPL-MCNC: 13 MG/DL (ref 5–25)
CALCIUM SERPL-MCNC: 8.6 MG/DL (ref 8.3–10.1)
CHLORIDE SERPL-SCNC: 103 MMOL/L (ref 100–108)
CO2 SERPL-SCNC: 25 MMOL/L (ref 21–32)
CREAT SERPL-MCNC: 1.13 MG/DL (ref 0.6–1.3)
GFR SERPL CREATININE-BSD FRML MDRD: 69 ML/MIN/1.73SQ M
GLUCOSE P FAST SERPL-MCNC: 270 MG/DL (ref 65–99)
GLUCOSE SERPL-MCNC: 131 MG/DL (ref 65–140)
GLUCOSE SERPL-MCNC: 197 MG/DL (ref 65–140)
GLUCOSE SERPL-MCNC: 228 MG/DL (ref 65–140)
GLUCOSE SERPL-MCNC: 270 MG/DL (ref 65–140)
POTASSIUM SERPL-SCNC: 4 MMOL/L (ref 3.5–5.3)
RH BLD: POSITIVE
SODIUM SERPL-SCNC: 139 MMOL/L (ref 136–145)
SPECIMEN EXPIRATION DATE: NORMAL

## 2021-12-03 PROCEDURE — 88302 TISSUE EXAM BY PATHOLOGIST: CPT | Performed by: PATHOLOGY

## 2021-12-03 PROCEDURE — S2900 ROBOTIC SURGICAL SYSTEM: HCPCS | Performed by: SURGERY

## 2021-12-03 PROCEDURE — C1781 MESH (IMPLANTABLE): HCPCS | Performed by: SURGERY

## 2021-12-03 PROCEDURE — 80048 BASIC METABOLIC PNL TOTAL CA: CPT | Performed by: ANESTHESIOLOGY

## 2021-12-03 PROCEDURE — 86901 BLOOD TYPING SEROLOGIC RH(D): CPT | Performed by: ANESTHESIOLOGY

## 2021-12-03 PROCEDURE — C9290 INJ, BUPIVACAINE LIPOSOME: HCPCS | Performed by: SURGERY

## 2021-12-03 PROCEDURE — 43282 LAP PARAESOPH HER RPR W/MESH: CPT | Performed by: STUDENT IN AN ORGANIZED HEALTH CARE EDUCATION/TRAINING PROGRAM

## 2021-12-03 PROCEDURE — 82948 REAGENT STRIP/BLOOD GLUCOSE: CPT

## 2021-12-03 PROCEDURE — 43282 LAP PARAESOPH HER RPR W/MESH: CPT | Performed by: SURGERY

## 2021-12-03 PROCEDURE — 86900 BLOOD TYPING SEROLOGIC ABO: CPT | Performed by: ANESTHESIOLOGY

## 2021-12-03 PROCEDURE — 86850 RBC ANTIBODY SCREEN: CPT | Performed by: ANESTHESIOLOGY

## 2021-12-03 DEVICE — BIO-A TISSUE REINFORCEMENT 7CMX10CM
Type: IMPLANTABLE DEVICE | Site: ABDOMEN | Status: FUNCTIONAL
Brand: GORE BIO-A TISSUE REINFORCEMENT

## 2021-12-03 RX ORDER — ACETAMINOPHEN 325 MG/1
975 TABLET ORAL EVERY 8 HOURS
Status: DISCONTINUED | OUTPATIENT
Start: 2021-12-03 | End: 2021-12-04 | Stop reason: HOSPADM

## 2021-12-03 RX ORDER — MIDAZOLAM HYDROCHLORIDE 2 MG/2ML
INJECTION, SOLUTION INTRAMUSCULAR; INTRAVENOUS AS NEEDED
Status: DISCONTINUED | OUTPATIENT
Start: 2021-12-03 | End: 2021-12-03

## 2021-12-03 RX ORDER — ONDANSETRON 2 MG/ML
4 INJECTION INTRAMUSCULAR; INTRAVENOUS EVERY 6 HOURS PRN
Status: DISCONTINUED | OUTPATIENT
Start: 2021-12-03 | End: 2021-12-04 | Stop reason: HOSPADM

## 2021-12-03 RX ORDER — FENTANYL CITRATE/PF 50 MCG/ML
50 SYRINGE (ML) INJECTION
Status: DISCONTINUED | OUTPATIENT
Start: 2021-12-03 | End: 2021-12-03 | Stop reason: HOSPADM

## 2021-12-03 RX ORDER — OXYCODONE HCL 5 MG/5 ML
10 SOLUTION, ORAL ORAL EVERY 4 HOURS PRN
Status: DISCONTINUED | OUTPATIENT
Start: 2021-12-03 | End: 2021-12-04 | Stop reason: HOSPADM

## 2021-12-03 RX ORDER — KETOROLAC TROMETHAMINE 30 MG/ML
INJECTION, SOLUTION INTRAMUSCULAR; INTRAVENOUS AS NEEDED
Status: DISCONTINUED | OUTPATIENT
Start: 2021-12-03 | End: 2021-12-03

## 2021-12-03 RX ORDER — MORPHINE SULFATE 4 MG/ML
4 INJECTION, SOLUTION INTRAMUSCULAR; INTRAVENOUS EVERY 4 HOURS PRN
Status: DISCONTINUED | OUTPATIENT
Start: 2021-12-03 | End: 2021-12-04 | Stop reason: HOSPADM

## 2021-12-03 RX ORDER — SODIUM CHLORIDE 9 MG/ML
125 INJECTION, SOLUTION INTRAVENOUS CONTINUOUS
Status: DISCONTINUED | OUTPATIENT
Start: 2021-12-03 | End: 2021-12-04

## 2021-12-03 RX ORDER — CEFAZOLIN SODIUM 2 G/50ML
2000 SOLUTION INTRAVENOUS ONCE
Status: COMPLETED | OUTPATIENT
Start: 2021-12-03 | End: 2021-12-03

## 2021-12-03 RX ORDER — VECURONIUM BROMIDE 1 MG/ML
INJECTION, POWDER, LYOPHILIZED, FOR SOLUTION INTRAVENOUS AS NEEDED
Status: DISCONTINUED | OUTPATIENT
Start: 2021-12-03 | End: 2021-12-03

## 2021-12-03 RX ORDER — SODIUM CHLORIDE, SODIUM LACTATE, POTASSIUM CHLORIDE, CALCIUM CHLORIDE 600; 310; 30; 20 MG/100ML; MG/100ML; MG/100ML; MG/100ML
100 INJECTION, SOLUTION INTRAVENOUS CONTINUOUS
Status: DISCONTINUED | OUTPATIENT
Start: 2021-12-03 | End: 2021-12-04 | Stop reason: HOSPADM

## 2021-12-03 RX ORDER — METOCLOPRAMIDE HYDROCHLORIDE 5 MG/ML
10 INJECTION INTRAMUSCULAR; INTRAVENOUS EVERY 6 HOURS SCHEDULED
Status: DISCONTINUED | OUTPATIENT
Start: 2021-12-03 | End: 2021-12-04 | Stop reason: HOSPADM

## 2021-12-03 RX ORDER — SCOLOPAMINE TRANSDERMAL SYSTEM 1 MG/1
1 PATCH, EXTENDED RELEASE TRANSDERMAL ONCE
Status: DISCONTINUED | OUTPATIENT
Start: 2021-12-03 | End: 2021-12-04 | Stop reason: HOSPADM

## 2021-12-03 RX ORDER — SIMETHICONE 80 MG
80 TABLET,CHEWABLE ORAL 4 TIMES DAILY PRN
Status: DISCONTINUED | OUTPATIENT
Start: 2021-12-03 | End: 2021-12-04 | Stop reason: HOSPADM

## 2021-12-03 RX ORDER — GABAPENTIN 300 MG/1
300 CAPSULE ORAL ONCE
Status: COMPLETED | OUTPATIENT
Start: 2021-12-03 | End: 2021-12-03

## 2021-12-03 RX ORDER — ACETAMINOPHEN 160 MG/5ML
975 SUSPENSION, ORAL (FINAL DOSE FORM) ORAL EVERY 8 HOURS
Status: DISCONTINUED | OUTPATIENT
Start: 2021-12-03 | End: 2021-12-04 | Stop reason: HOSPADM

## 2021-12-03 RX ORDER — FENTANYL CITRATE 50 UG/ML
INJECTION, SOLUTION INTRAMUSCULAR; INTRAVENOUS AS NEEDED
Status: DISCONTINUED | OUTPATIENT
Start: 2021-12-03 | End: 2021-12-03

## 2021-12-03 RX ORDER — EPHEDRINE SULFATE 50 MG/ML
INJECTION INTRAVENOUS AS NEEDED
Status: DISCONTINUED | OUTPATIENT
Start: 2021-12-03 | End: 2021-12-03

## 2021-12-03 RX ORDER — PROMETHAZINE HYDROCHLORIDE 25 MG/ML
25 INJECTION, SOLUTION INTRAMUSCULAR; INTRAVENOUS EVERY 6 HOURS PRN
Status: DISCONTINUED | OUTPATIENT
Start: 2021-12-03 | End: 2021-12-04 | Stop reason: HOSPADM

## 2021-12-03 RX ORDER — PROPOFOL 10 MG/ML
INJECTION, EMULSION INTRAVENOUS AS NEEDED
Status: DISCONTINUED | OUTPATIENT
Start: 2021-12-03 | End: 2021-12-03

## 2021-12-03 RX ORDER — ONDANSETRON 2 MG/ML
INJECTION INTRAMUSCULAR; INTRAVENOUS AS NEEDED
Status: DISCONTINUED | OUTPATIENT
Start: 2021-12-03 | End: 2021-12-03

## 2021-12-03 RX ORDER — ACETAMINOPHEN 325 MG/1
975 TABLET ORAL ONCE
Status: COMPLETED | OUTPATIENT
Start: 2021-12-03 | End: 2021-12-03

## 2021-12-03 RX ORDER — NEOSTIGMINE METHYLSULFATE 1 MG/ML
INJECTION INTRAVENOUS AS NEEDED
Status: DISCONTINUED | OUTPATIENT
Start: 2021-12-03 | End: 2021-12-03

## 2021-12-03 RX ORDER — BUPIVACAINE HYDROCHLORIDE 5 MG/ML
INJECTION, SOLUTION PERINEURAL AS NEEDED
Status: DISCONTINUED | OUTPATIENT
Start: 2021-12-03 | End: 2021-12-03 | Stop reason: HOSPADM

## 2021-12-03 RX ORDER — OXYCODONE HCL 5 MG/5 ML
5 SOLUTION, ORAL ORAL EVERY 4 HOURS PRN
Status: DISCONTINUED | OUTPATIENT
Start: 2021-12-03 | End: 2021-12-04 | Stop reason: HOSPADM

## 2021-12-03 RX ORDER — HYDROMORPHONE HCL/PF 1 MG/ML
0.5 SYRINGE (ML) INJECTION
Status: DISCONTINUED | OUTPATIENT
Start: 2021-12-03 | End: 2021-12-03 | Stop reason: HOSPADM

## 2021-12-03 RX ORDER — LIDOCAINE HYDROCHLORIDE 10 MG/ML
INJECTION, SOLUTION EPIDURAL; INFILTRATION; INTRACAUDAL; PERINEURAL AS NEEDED
Status: DISCONTINUED | OUTPATIENT
Start: 2021-12-03 | End: 2021-12-03

## 2021-12-03 RX ORDER — GLYCOPYRROLATE 0.2 MG/ML
INJECTION INTRAMUSCULAR; INTRAVENOUS AS NEEDED
Status: DISCONTINUED | OUTPATIENT
Start: 2021-12-03 | End: 2021-12-03

## 2021-12-03 RX ORDER — HEPARIN SODIUM 5000 [USP'U]/ML
5000 INJECTION, SOLUTION INTRAVENOUS; SUBCUTANEOUS
Status: COMPLETED | OUTPATIENT
Start: 2021-12-03 | End: 2021-12-03

## 2021-12-03 RX ORDER — DIPHENHYDRAMINE HCL 25 MG
25 TABLET ORAL EVERY 8 HOURS PRN
Status: DISCONTINUED | OUTPATIENT
Start: 2021-12-03 | End: 2021-12-04 | Stop reason: HOSPADM

## 2021-12-03 RX ORDER — CELECOXIB 200 MG/1
200 CAPSULE ORAL ONCE
Status: COMPLETED | OUTPATIENT
Start: 2021-12-03 | End: 2021-12-03

## 2021-12-03 RX ORDER — ONDANSETRON 2 MG/ML
4 INJECTION INTRAMUSCULAR; INTRAVENOUS ONCE AS NEEDED
Status: DISCONTINUED | OUTPATIENT
Start: 2021-12-03 | End: 2021-12-03 | Stop reason: HOSPADM

## 2021-12-03 RX ADMIN — FENTANYL CITRATE 50 MCG: 50 INJECTION INTRAMUSCULAR; INTRAVENOUS at 17:13

## 2021-12-03 RX ADMIN — CELECOXIB 200 MG: 200 CAPSULE ORAL at 11:05

## 2021-12-03 RX ADMIN — EPHEDRINE SULFATE 5 MG: 50 INJECTION, SOLUTION INTRAVENOUS at 15:02

## 2021-12-03 RX ADMIN — SODIUM CHLORIDE: 0.9 INJECTION, SOLUTION INTRAVENOUS at 14:52

## 2021-12-03 RX ADMIN — SODIUM CHLORIDE: 0.9 INJECTION, SOLUTION INTRAVENOUS at 16:19

## 2021-12-03 RX ADMIN — GLYCOPYRROLATE 0.4 MG: 0.2 INJECTION, SOLUTION INTRAMUSCULAR; INTRAVENOUS at 16:20

## 2021-12-03 RX ADMIN — FENTANYL CITRATE 50 MCG: 50 INJECTION INTRAMUSCULAR; INTRAVENOUS at 14:22

## 2021-12-03 RX ADMIN — EPHEDRINE SULFATE 7.5 MG: 50 INJECTION, SOLUTION INTRAVENOUS at 14:12

## 2021-12-03 RX ADMIN — SODIUM CHLORIDE: 0.9 INJECTION, SOLUTION INTRAVENOUS at 13:58

## 2021-12-03 RX ADMIN — METOCLOPRAMIDE 10 MG: 5 INJECTION, SOLUTION INTRAMUSCULAR; INTRAVENOUS at 18:20

## 2021-12-03 RX ADMIN — FENTANYL CITRATE 50 MCG: 50 INJECTION INTRAMUSCULAR; INTRAVENOUS at 14:20

## 2021-12-03 RX ADMIN — ONDANSETRON 4 MG: 2 INJECTION INTRAMUSCULAR; INTRAVENOUS at 16:20

## 2021-12-03 RX ADMIN — FENTANYL CITRATE 50 MCG: 50 INJECTION INTRAMUSCULAR; INTRAVENOUS at 13:40

## 2021-12-03 RX ADMIN — FENTANYL CITRATE 50 MCG: 50 INJECTION INTRAMUSCULAR; INTRAVENOUS at 15:41

## 2021-12-03 RX ADMIN — ACETAMINOPHEN 975 MG: 650 SUSPENSION ORAL at 21:44

## 2021-12-03 RX ADMIN — EPHEDRINE SULFATE 7.5 MG: 50 INJECTION, SOLUTION INTRAVENOUS at 15:32

## 2021-12-03 RX ADMIN — PROPOFOL 200 MG: 10 INJECTION, EMULSION INTRAVENOUS at 13:40

## 2021-12-03 RX ADMIN — EPHEDRINE SULFATE 7.5 MG: 50 INJECTION, SOLUTION INTRAVENOUS at 14:08

## 2021-12-03 RX ADMIN — MIDAZOLAM 2 MG: 1 INJECTION INTRAMUSCULAR; INTRAVENOUS at 13:31

## 2021-12-03 RX ADMIN — KETOROLAC TROMETHAMINE 15 MG: 30 INJECTION, SOLUTION INTRAMUSCULAR at 16:20

## 2021-12-03 RX ADMIN — SCOPALAMINE 1 PATCH: 1 PATCH, EXTENDED RELEASE TRANSDERMAL at 11:02

## 2021-12-03 RX ADMIN — LIDOCAINE HYDROCHLORIDE 60 MG: 10 INJECTION, SOLUTION EPIDURAL; INFILTRATION; INTRACAUDAL; PERINEURAL at 13:40

## 2021-12-03 RX ADMIN — FAMOTIDINE 20 MG: 10 INJECTION INTRAVENOUS at 21:46

## 2021-12-03 RX ADMIN — VECURONIUM BROMIDE 3 MG: 1 INJECTION, POWDER, LYOPHILIZED, FOR SOLUTION INTRAVENOUS at 14:21

## 2021-12-03 RX ADMIN — ONDANSETRON 4 MG: 2 INJECTION INTRAMUSCULAR; INTRAVENOUS at 13:51

## 2021-12-03 RX ADMIN — EPHEDRINE SULFATE 5 MG: 50 INJECTION, SOLUTION INTRAVENOUS at 14:57

## 2021-12-03 RX ADMIN — VECURONIUM BROMIDE 7 MG: 1 INJECTION, POWDER, LYOPHILIZED, FOR SOLUTION INTRAVENOUS at 13:40

## 2021-12-03 RX ADMIN — SODIUM CHLORIDE, SODIUM LACTATE, POTASSIUM CHLORIDE, AND CALCIUM CHLORIDE 100 ML/HR: .6; .31; .03; .02 INJECTION, SOLUTION INTRAVENOUS at 18:24

## 2021-12-03 RX ADMIN — MORPHINE SULFATE 4 MG: 4 INJECTION INTRAVENOUS at 21:45

## 2021-12-03 RX ADMIN — CEFAZOLIN SODIUM 2000 MG: 2 SOLUTION INTRAVENOUS at 13:15

## 2021-12-03 RX ADMIN — OXYCODONE HYDROCHLORIDE 10 MG: 5 SOLUTION ORAL at 18:29

## 2021-12-03 RX ADMIN — ACETAMINOPHEN 975 MG: 325 TABLET, FILM COATED ORAL at 11:06

## 2021-12-03 RX ADMIN — HEPARIN SODIUM 5000 UNITS: 5000 INJECTION INTRAVENOUS; SUBCUTANEOUS at 11:40

## 2021-12-03 RX ADMIN — VECURONIUM BROMIDE 4 MG: 1 INJECTION, POWDER, LYOPHILIZED, FOR SOLUTION INTRAVENOUS at 14:59

## 2021-12-03 RX ADMIN — GABAPENTIN 300 MG: 300 CAPSULE ORAL at 11:06

## 2021-12-03 RX ADMIN — NEOSTIGMINE METHYLSULFATE 3 MG: 1 INJECTION INTRAVENOUS at 16:20

## 2021-12-03 RX ADMIN — EPHEDRINE SULFATE 7.5 MG: 50 INJECTION, SOLUTION INTRAVENOUS at 14:03

## 2021-12-03 RX ADMIN — SODIUM CHLORIDE 125 ML/HR: 0.9 INJECTION, SOLUTION INTRAVENOUS at 11:28

## 2021-12-04 VITALS
HEIGHT: 74 IN | HEART RATE: 98 BPM | BODY MASS INDEX: 35.82 KG/M2 | SYSTOLIC BLOOD PRESSURE: 139 MMHG | WEIGHT: 279.1 LBS | TEMPERATURE: 98.4 F | RESPIRATION RATE: 18 BRPM | OXYGEN SATURATION: 93 % | DIASTOLIC BLOOD PRESSURE: 92 MMHG

## 2021-12-04 PROBLEM — Z79.4 TYPE 2 DIABETES MELLITUS, WITH LONG-TERM CURRENT USE OF INSULIN (HCC): Status: ACTIVE | Noted: 2018-01-25

## 2021-12-04 LAB
ANION GAP SERPL CALCULATED.3IONS-SCNC: 11 MMOL/L (ref 4–13)
BUN SERPL-MCNC: 13 MG/DL (ref 5–25)
CALCIUM SERPL-MCNC: 7.8 MG/DL (ref 8.3–10.1)
CHLORIDE SERPL-SCNC: 105 MMOL/L (ref 100–108)
CO2 SERPL-SCNC: 25 MMOL/L (ref 21–32)
CREAT SERPL-MCNC: 1.12 MG/DL (ref 0.6–1.3)
ERYTHROCYTE [DISTWIDTH] IN BLOOD BY AUTOMATED COUNT: 12.3 % (ref 11.6–15.1)
GFR SERPL CREATININE-BSD FRML MDRD: 70 ML/MIN/1.73SQ M
GLUCOSE P FAST SERPL-MCNC: 167 MG/DL (ref 65–99)
GLUCOSE SERPL-MCNC: 167 MG/DL (ref 65–140)
HCT VFR BLD AUTO: 38.9 % (ref 36.5–49.3)
HCT VFR BLD AUTO: 39.2 % (ref 36.5–49.3)
HGB BLD-MCNC: 12.9 G/DL (ref 12–17)
HGB BLD-MCNC: 12.9 G/DL (ref 12–17)
MCH RBC QN AUTO: 31.2 PG (ref 26.8–34.3)
MCHC RBC AUTO-ENTMCNC: 32.9 G/DL (ref 31.4–37.4)
MCV RBC AUTO: 95 FL (ref 82–98)
PLATELET # BLD AUTO: 137 THOUSANDS/UL (ref 149–390)
PMV BLD AUTO: 9.9 FL (ref 8.9–12.7)
POTASSIUM SERPL-SCNC: 3.7 MMOL/L (ref 3.5–5.3)
RBC # BLD AUTO: 4.13 MILLION/UL (ref 3.88–5.62)
SODIUM SERPL-SCNC: 141 MMOL/L (ref 136–145)
WBC # BLD AUTO: 5.89 THOUSAND/UL (ref 4.31–10.16)

## 2021-12-04 PROCEDURE — 85027 COMPLETE CBC AUTOMATED: CPT | Performed by: PHYSICIAN ASSISTANT

## 2021-12-04 PROCEDURE — NC001 PR NO CHARGE: Performed by: STUDENT IN AN ORGANIZED HEALTH CARE EDUCATION/TRAINING PROGRAM

## 2021-12-04 PROCEDURE — 85018 HEMOGLOBIN: CPT | Performed by: STUDENT IN AN ORGANIZED HEALTH CARE EDUCATION/TRAINING PROGRAM

## 2021-12-04 PROCEDURE — 99024 POSTOP FOLLOW-UP VISIT: CPT | Performed by: STUDENT IN AN ORGANIZED HEALTH CARE EDUCATION/TRAINING PROGRAM

## 2021-12-04 PROCEDURE — 80048 BASIC METABOLIC PNL TOTAL CA: CPT | Performed by: PHYSICIAN ASSISTANT

## 2021-12-04 PROCEDURE — 85014 HEMATOCRIT: CPT | Performed by: STUDENT IN AN ORGANIZED HEALTH CARE EDUCATION/TRAINING PROGRAM

## 2021-12-04 RX ORDER — ONDANSETRON 4 MG/1
4 TABLET, FILM COATED ORAL EVERY 8 HOURS PRN
Qty: 20 TABLET | Refills: 0 | Status: SHIPPED | OUTPATIENT
Start: 2021-12-04

## 2021-12-04 RX ORDER — METOCLOPRAMIDE 10 MG/1
10 TABLET ORAL 4 TIMES DAILY
Qty: 5 TABLET | Refills: 0 | Status: SHIPPED | OUTPATIENT
Start: 2021-12-04 | End: 2021-12-06

## 2021-12-04 RX ADMIN — FAMOTIDINE 20 MG: 10 INJECTION INTRAVENOUS at 10:32

## 2021-12-04 RX ADMIN — SODIUM CHLORIDE, SODIUM LACTATE, POTASSIUM CHLORIDE, AND CALCIUM CHLORIDE 100 ML/HR: .6; .31; .03; .02 INJECTION, SOLUTION INTRAVENOUS at 04:14

## 2021-12-04 RX ADMIN — METOCLOPRAMIDE 10 MG: 5 INJECTION, SOLUTION INTRAMUSCULAR; INTRAVENOUS at 13:48

## 2021-12-04 RX ADMIN — ACETAMINOPHEN 975 MG: 650 SUSPENSION ORAL at 13:48

## 2021-12-04 RX ADMIN — METOCLOPRAMIDE 10 MG: 5 INJECTION, SOLUTION INTRAMUSCULAR; INTRAVENOUS at 05:46

## 2021-12-04 RX ADMIN — METOCLOPRAMIDE 10 MG: 5 INJECTION, SOLUTION INTRAMUSCULAR; INTRAVENOUS at 00:20

## 2021-12-04 RX ADMIN — ACETAMINOPHEN 975 MG: 650 SUSPENSION ORAL at 05:46

## 2021-12-04 RX ADMIN — OXYCODONE HYDROCHLORIDE 10 MG: 5 SOLUTION ORAL at 05:44

## 2021-12-04 RX ADMIN — OXYCODONE HYDROCHLORIDE 10 MG: 5 SOLUTION ORAL at 10:39

## 2021-12-06 ENCOUNTER — TELEPHONE (OUTPATIENT)
Dept: MEDSURG UNIT | Facility: HOSPITAL | Age: 61
End: 2021-12-06

## 2021-12-09 ENCOUNTER — OFFICE VISIT (OUTPATIENT)
Dept: BARIATRICS | Facility: CLINIC | Age: 61
End: 2021-12-09

## 2021-12-09 VITALS
WEIGHT: 274.5 LBS | SYSTOLIC BLOOD PRESSURE: 128 MMHG | HEART RATE: 95 BPM | DIASTOLIC BLOOD PRESSURE: 87 MMHG | BODY MASS INDEX: 35.23 KG/M2 | TEMPERATURE: 96 F | HEIGHT: 74 IN

## 2021-12-09 DIAGNOSIS — E66.01 MORBID (SEVERE) OBESITY DUE TO EXCESS CALORIES (HCC): Primary | ICD-10-CM

## 2021-12-09 DIAGNOSIS — K21.9 GASTROESOPHAGEAL REFLUX DISEASE, UNSPECIFIED WHETHER ESOPHAGITIS PRESENT: Primary | ICD-10-CM

## 2021-12-09 DIAGNOSIS — K29.70 GASTRITIS WITHOUT BLEEDING, UNSPECIFIED CHRONICITY, UNSPECIFIED GASTRITIS TYPE: ICD-10-CM

## 2021-12-09 DIAGNOSIS — K21.9 GASTROESOPHAGEAL REFLUX DISEASE, UNSPECIFIED WHETHER ESOPHAGITIS PRESENT: ICD-10-CM

## 2021-12-09 PROCEDURE — 3008F BODY MASS INDEX DOCD: CPT | Performed by: INTERNAL MEDICINE

## 2021-12-09 PROCEDURE — 99024 POSTOP FOLLOW-UP VISIT: CPT | Performed by: SURGERY

## 2021-12-09 PROCEDURE — RECHECK: Performed by: DIETITIAN, REGISTERED

## 2021-12-11 DIAGNOSIS — E11.65 TYPE 2 DIABETES MELLITUS WITH HYPERGLYCEMIA, WITH LONG-TERM CURRENT USE OF INSULIN (HCC): ICD-10-CM

## 2021-12-11 DIAGNOSIS — Z79.4 TYPE 2 DIABETES MELLITUS WITH HYPERGLYCEMIA, WITH LONG-TERM CURRENT USE OF INSULIN (HCC): ICD-10-CM

## 2021-12-13 RX ORDER — GLIMEPIRIDE 2 MG/1
TABLET ORAL
Qty: 30 TABLET | Refills: 5 | Status: SHIPPED | OUTPATIENT
Start: 2021-12-13 | End: 2022-08-08 | Stop reason: SDUPTHER

## 2021-12-16 DIAGNOSIS — E78.5 DYSLIPIDEMIA: ICD-10-CM

## 2021-12-16 RX ORDER — ATORVASTATIN CALCIUM 40 MG/1
TABLET, FILM COATED ORAL
Qty: 30 TABLET | Refills: 11 | Status: SHIPPED | OUTPATIENT
Start: 2021-12-16

## 2021-12-20 ENCOUNTER — TELEPHONE (OUTPATIENT)
Dept: ENDOCRINOLOGY | Facility: CLINIC | Age: 61
End: 2021-12-20

## 2022-01-10 ENCOUNTER — TELEPHONE (OUTPATIENT)
Dept: ENDOCRINOLOGY | Facility: CLINIC | Age: 62
End: 2022-01-10

## 2022-01-31 ENCOUNTER — TELEPHONE (OUTPATIENT)
Dept: ENDOCRINOLOGY | Facility: CLINIC | Age: 62
End: 2022-01-31

## 2022-02-01 NOTE — TELEPHONE ENCOUNTER
Spoke with patient and reviewed bg log  He understood  He did lose a lot of weight    He will monitor his bg's closely and send us a log should they drop

## 2022-02-03 ENCOUNTER — OFFICE VISIT (OUTPATIENT)
Dept: BARIATRICS | Facility: CLINIC | Age: 62
End: 2022-02-03

## 2022-02-03 VITALS
TEMPERATURE: 97 F | HEIGHT: 74 IN | HEART RATE: 88 BPM | WEIGHT: 261 LBS | DIASTOLIC BLOOD PRESSURE: 76 MMHG | BODY MASS INDEX: 33.5 KG/M2 | SYSTOLIC BLOOD PRESSURE: 112 MMHG

## 2022-02-03 DIAGNOSIS — K44.9 PARAESOPHAGEAL HIATAL HERNIA: ICD-10-CM

## 2022-02-03 DIAGNOSIS — Z98.890 STATUS POST LAPAROSCOPIC NISSEN FUNDOPLICATION: ICD-10-CM

## 2022-02-03 DIAGNOSIS — K21.9 GASTROESOPHAGEAL REFLUX DISEASE, UNSPECIFIED WHETHER ESOPHAGITIS PRESENT: Primary | ICD-10-CM

## 2022-02-03 PROCEDURE — 99024 POSTOP FOLLOW-UP VISIT: CPT | Performed by: SURGERY

## 2022-02-03 NOTE — PROGRESS NOTES
Patient ID: Lizabeth Sanders is a 57 y o  male  Subjective:      57 y o  male status post robotic PEH repair with mesh and Nissen fundoplication performed by Dr Ashlie Odonnell on 12/3/21 returning to office for first post op visit since surgery      Patient has been tolerating a soft diet without N/V, dysphagia  Denies reflux symptoms and has stopped all anti-acid medication  Patient is having regular BMs and passing flatus  Historical Information   Past Medical History:   Diagnosis Date    BPH (benign prostatic hyperplasia)     Diabetes mellitus ([facility] [address] )     Disease of thyroid gland     Erectile dysfunction     GERD (gastroesophageal reflux disease)     Headache     Headache(784 0) 05/01/2018    Each Day    Hematuria     Hyperlipidemia     Hypertension     Infectious viral hepatitis 2007    Cured by Dr Shaista Ray Nils Cathryn Massa    Kidney stone     Liver disease     hepatitis C    Mediastinal adenopathy     Obesity 2007    PE [facility]pulmonary thromboembolism) ([facility] [address] [facility]     Sarcoidosis     Vitamin D deficiency      Past Surgical History:   Procedure Laterality Date    COLONOSCOPY      last assessed: 08/28/2012; fiberoptic screening     CYSTOSCOPY      onset: 05/06/2016; Diagnostic     DENTAL SURGERY      FL RETROGRADE PYELOGRAM  2/6/2020    FL RETROGRADE PYELOGRAM  3/3/2020    LIVER BIOPSY      LYMPH NODE BIOPSY  6/2019    NJ BRONCHOSCOPY NEEDLE BX TRACHEA MAIN STEM&/BRON N/A 6/6/2019    Procedure: ENDOBRONCHIAL ULTRASOUND (EBUS);   Surgeon: Bony Mckeon MD;  [facility];  Service: Thoracic    [place] [address] N/A 6/6/2019    Procedure: [facility];  Surgeon: Bony Mckeon MD;  [facility];  Service: Thoracic    NJ CYSTO/URETERO W/LITHOTRIPSY &INDWELL STENT INSRT Right 2/6/2020    Procedure: CYSTOSCOPY WITH LITHOTRIPSY RETROGRADE PYELOGRAM AND INSERTION STENT URETERAL;  Surgeon: Shaila Blake MD;  [facility];  Service: Urology    NJ CYSTO/URETERO W/LITHOTRIPSY &INDWELL STENT INSRT Right 3/3/2020    Procedure: CYSTOSCOPY URETEROSCOPY WITH LITHOTRIPSY HOLMIUM LASER; BASKET STONE RETRIEVAL; RETROGRADE PYELOGRAM AND INSERTION STENT URETERAL;  Surgeon: Jordi Bolden MD;  [facility];  Service: Urology    MN LAP, REPAIR PARAESOPHAGEAL HERNIA, INCL FUNDOPLASTY W/ MESH N/A 12/3/2021    Procedure: ROBOTIC PARAESOPHAGEAL HERNIA  REPAIR, with FUNDOPLICATION;  Surgeon: Drew Peguero MD;  [facility];  Service: General    MN TRANSURETHRAL ELEC-SURG PROSTATECTOM N/A 2017    Procedure: Junito Mirzadennis; TUR PROSTATE;  Surgeon: Devante Nobles MD;  [facility];  Service: Urology    PROSTATE SURGERY  2017    SHOULDER ARTHROSCOPY Right     bicep tendon repair, rotator cuff repair and acromuim shave      Social History   Social History     Substance and Sexual Activity   Alcohol Use Not Currently    Alcohol/week: 0 0 standard drinks    Comment: quit      Social History     Substance and Sexual Activity   Drug Use No     Social History     Tobacco Use   Smoking Status Former Smoker    Packs/day: 1 00    Years: 1 00    Pack years: 1 00    Types: Cigarettes    Start date: 1971   Mavis Cousin Quit date:     Years since quittin 0   Smokeless Tobacco Never Used   Tobacco Comment    Quit       Meds/Allergies   all medications and allergies reviewed  No Known Allergies    Review of Systems   Constitutional: Negative  HENT: Negative  Eyes: Negative  Respiratory: Negative  Cardiovascular: Negative  Gastrointestinal: Negative  Endocrine: Negative  Genitourinary: Negative  Musculoskeletal: Negative  Skin: Negative  Allergic/Immunologic: Negative  Neurological: Negative  Hematological: Negative  Psychiatric/Behavioral: Negative  All other systems reviewed and are negative          Objective:    /76 (BP Location: Left arm, Patient Position: Sitting, Cuff Size: Standard)   Pulse 88 Temp (!) 97 °F (36 1 °C) (Tympanic)   Ht 6' 2 2" (1 885 m)   Wt 118 kg (261 lb)   BMI 33 33 kg/m²     Physical Exam  Vitals and nursing note reviewed  Constitutional:       Appearance: Normal appearance  HENT:      Head: Normocephalic and atraumatic  Nose: Nose normal    Eyes:      Extraocular Movements: Extraocular movements intact  Conjunctiva/sclera: Conjunctivae normal       Pupils: Pupils are equal, round, and reactive to light  Cardiovascular:      Rate and Rhythm: Normal rate and regular rhythm  Pulses: Normal pulses  Heart sounds: Normal heart sounds  Pulmonary:      Effort: Pulmonary effort is normal       Breath sounds: Normal breath sounds  Abdominal:      General: Abdomen is flat  Bowel sounds are normal       Palpations: Abdomen is soft  Musculoskeletal:         General: Normal range of motion  Cervical back: Normal range of motion and neck supple  Skin:     General: Skin is warm and dry  Capillary Refill: Capillary refill takes less than 2 seconds  Neurological:      General: No focal deficit present  Mental Status: He is alert and oriented to person, place, and time  Psychiatric:         Mood and Affect: Mood normal          Behavior: Behavior normal          Thought Content: Thought content normal          Judgment: Judgment normal            Assessment/Plan:     Diagnoses and all orders for this visit:    Gastroesophageal reflux disease, unspecified whether esophagitis present    Paraesophageal hiatal hernia    Status post laparoscopic Nissen fundoplication         57 y o  male s/pstatus post robotic PEH repair with mesh and Nissen fundoplication performed by Dr Jose Colón on 12/3/21, overall doing Well  Patient's has lost 30% of EBL since their initial visit with us  · Continue to hold PPIs  · Advance to regular diet  See dietician to review diet  · Patient to follow up with dietician per protocols    · Continued/Maintain healthynutrition intakes  · Adequate hydration with at least 64oz  fluid intake  · Follow diet as discussed  · Exercise as tolerated  · Follow up in 1 year

## 2022-02-04 ENCOUNTER — OFFICE VISIT (OUTPATIENT)
Dept: BARIATRICS | Facility: CLINIC | Age: 62
End: 2022-02-04

## 2022-02-04 DIAGNOSIS — E66.01 MORBID (SEVERE) OBESITY DUE TO EXCESS CALORIES (HCC): Primary | ICD-10-CM

## 2022-02-04 PROCEDURE — RECHECK: Performed by: DIETITIAN, REGISTERED

## 2022-02-04 NOTE — PROGRESS NOTES
Weight Management Nutrition Virtual Visit  i  Name was verified by patient stating name  ii   verified by patient stating  iii  Verification of patient location  iv  Verified patient is in state in which I hold an active license  v  Verified the patient is alone  vi  I would like to verify that you were offered a live visit but are now consenting to this telephone visit  vii  This visit is free    Diagnosis: GERD, Hiatal Hernia    Bariatric Surgeon: Dr Maranda Campbell    Surgery: Robotic Paraesophageal Hernia Repair with Nissen Fundoplication    Class: Pt is two months post-op PEHR/NISSEN    Topics discussed today include:     fluid goals post op, chew food well, exercise, diet progression and vitamin/mineral supplements    Patient was able to verbalize basic diet (protein, fluid, vitamin and mineral) recommendations and possible nutrition-related complications   Yes

## 2022-02-23 ENCOUNTER — TELEPHONE (OUTPATIENT)
Dept: ENDOCRINOLOGY | Facility: CLINIC | Age: 62
End: 2022-02-23

## 2022-02-25 NOTE — TELEPHONE ENCOUNTER
I reviewed his blood sugar record from 01/31/2022 through 2/22/2022  Blood sugars all look good mostly in the low to mid 100s  Continue the same metformin, glimepiride, and Lantus insulin for now

## 2022-03-10 DIAGNOSIS — E11.65 UNCONTROLLED TYPE 2 DIABETES MELLITUS WITH HYPERGLYCEMIA (HCC): ICD-10-CM

## 2022-03-10 RX ORDER — BLOOD SUGAR DIAGNOSTIC
STRIP MISCELLANEOUS
Qty: 300 STRIP | Refills: 3 | Status: SHIPPED | OUTPATIENT
Start: 2022-03-10

## 2022-03-14 ENCOUNTER — TELEPHONE (OUTPATIENT)
Dept: ENDOCRINOLOGY | Facility: CLINIC | Age: 62
End: 2022-03-14

## 2022-03-14 NOTE — TELEPHONE ENCOUNTER
Reviewed blood sugar logs and focus more on the blood sugars from the past few weeks which look well controlled  No changes to suggest at this time  Discuss further at upcoming appointment

## 2022-03-28 ENCOUNTER — OFFICE VISIT (OUTPATIENT)
Dept: ENDOCRINOLOGY | Facility: CLINIC | Age: 62
End: 2022-03-28

## 2022-03-28 VITALS
WEIGHT: 261 LBS | SYSTOLIC BLOOD PRESSURE: 110 MMHG | HEART RATE: 68 BPM | DIASTOLIC BLOOD PRESSURE: 80 MMHG | HEIGHT: 74 IN | BODY MASS INDEX: 33.5 KG/M2

## 2022-03-28 DIAGNOSIS — Z79.4 TYPE 2 DIABETES MELLITUS WITHOUT COMPLICATION, WITH LONG-TERM CURRENT USE OF INSULIN (HCC): Primary | ICD-10-CM

## 2022-03-28 DIAGNOSIS — E03.9 ACQUIRED HYPOTHYROIDISM: ICD-10-CM

## 2022-03-28 DIAGNOSIS — E11.9 TYPE 2 DIABETES MELLITUS WITHOUT COMPLICATION, WITH LONG-TERM CURRENT USE OF INSULIN (HCC): Primary | ICD-10-CM

## 2022-03-28 NOTE — PROGRESS NOTES
Drea Kahn 60 y o  male MRN: [id number]    Encounter: [id number]        CC: Diabetes    History of Present Illness     HPI:  Drea Kahn is a 60 y o  male presents for a follow-up visit regarding diabetes management  Also has sarcoidosis, hypothyroidism, vitamin-D deficiency, hypertension, hep C, cirrhosis, DVT/PE, nephrolithiasis, gallstones    Last seen in 05/2021, Dr Zaki Waite exam: with in the last year - No DR     Current regimen:   Lantus 15 units subcutaneously daily   Glimepiride 2 mg orally daily  Metformin 1 g twice a day    History of side effects with S GLT 2 inhibitors    Has had diarrhea for a long time, worsened since 12/2021 when he had surgery for hiatal hernia     Statin:  Lipitor  ACE-I/ARB: --  Levothyroxine 50 mcg orally on 5 days of the week, on 2 days takes 2 tablets   Complaint, takes on an empty stomach     Home glucose monitoring:  Log scanned into chart    mg/dl   Symptoms of hypoglycemia :  No lows     I was discussing labs and the need for repeat A1c, lipid panel etc when patient decided to leave abruptly and so visit could not be completed          Review of Systems      Historical Information   Past Medical History:   Diagnosis Date    BPH (benign prostatic hyperplasia)     [facility] [address] [facility]     Disease of thyroid gland     Erectile dysfunction     GERD (gastroesophageal reflux disease)     Headache     Headache(784 0) 05/01/2018    Each Day    Hematuria     Hyperlipidemia     Hypertension     Infectious viral hepatitis 2007    Cured by Dr Tierney Chicas Po Kidney stone     Liver disease     hepatitis C    Mediastinal adenopathy     Obesity 2007    PE [facility]pulmonary thromboembolism) ([facility] [address] [facility]     Sarcoidosis     Vitamin D deficiency      Past Surgical History:   Procedure Laterality Date    COLONOSCOPY      last assessed: 08/28/2012; fiberoptic screening     CYSTOSCOPY      onset: 05/06/2016; Diagnostic     DENTAL SURGERY      FL RETROGRADE PYELOGRAM  2020    FL RETROGRADE PYELOGRAM  3/3/2020    LIVER BIOPSY      LYMPH NODE BIOPSY  2019    PA BRONCHOSCOPY NEEDLE BX TRACHEA MAIN STEM&/BRON N/A 2019    Procedure: ENDOBRONCHIAL ULTRASOUND (EBUS);   Surgeon: Tomás Tipton MD;  [facility];  Service: Thoracic    [place] [address] N/A 2019    Procedure: Issac Childers;  Surgeon: Tomás Tipton MD;  [facility];  Service: Thoracic    PA CYSTO/URETERO W/LITHOTRIPSY &INDWELL STENT INSRT Right 2020    Procedure: CYSTOSCOPY WITH LITHOTRIPSY RETROGRADE PYELOGRAM AND INSERTION STENT URETERAL;  Surgeon: Sadia Richardson MD;  [facility];  Service: Urology    PA CYSTO/URETERO W/LITHOTRIPSY &INDWELL STENT INSRT Right 3/3/2020    Procedure: CYSTOSCOPY URETEROSCOPY WITH LITHOTRIPSY HOLMIUM LASER; BASKET STONE RETRIEVAL; RETROGRADE PYELOGRAM AND INSERTION STENT URETERAL;  Surgeon: Sadia Richardson MD;  [facility];  Service: Urology    PA LAP, REPAIR PARAESOPHAGEAL HERNIA, INCL FUNDOPLASTY W/ MESH N/A 12/3/2021    Procedure: ROBOTIC PARAESOPHAGEAL HERNIA  REPAIR, with FUNDOPLICATION;  Surgeon: Asa Maurice MD;  [facility];  Service: General    PA TRANSURETHRAL ELEC-SURG PROSTATECTOM N/A 2017    Procedure: Charisma Overcast; TUR PROSTATE;  Surgeon: Polo Zabala MD;  [facility];  Service: Urology    PROSTATE SURGERY  2017    SHOULDER ARTHROSCOPY Right     bicep tendon repair, rotator cuff repair and acromuim shave      Social History   Social History     Substance and Sexual Activity   Alcohol Use Not Currently    Alcohol/week: 0 0 standard drinks    Comment: quit      Social History     Substance and Sexual Activity   Drug Use No     Social History     Tobacco Use   Smoking Status Former Smoker    Packs/day: 1 00    Years: 1 00    Pack years: 1 00    Types: Cigarettes    Start date: 1971   Kimmy Courser Quit date:     Years since quittin 2 Smokeless Tobacco Never Used   Tobacco Comment    Quit     Family History:   Family History   Problem Relation Age of Onset    Stroke Father         Accidental Death at 52    Cancer Maternal Grandmother     Valvular heart disease Mother     Stroke Mother        Meds/Allergies   Current Outpatient Medications   Medication Sig Dispense Refill    acetaminophen (TYLENOL) 500 mg tablet Take 500 mg by mouth every 6 (six) hours as needed for mild pain      aspirin (ECOTRIN LOW STRENGTH) 81 mg EC tablet Take 81 mg by mouth daily      atorvastatin (LIPITOR) 40 mg tablet TAKE 1 TABLET BY MOUTH EVERY DAY 30 tablet 11    Cholecalciferol (DIALYVITE VITAMIN D 5000 PO) Take by mouth      famotidine (PEPCID) 40 MG tablet TAKE 1 TABLET BY MOUTH EVERYDAY AT BEDTIME 30 tablet 3    glimepiride (AMARYL) 2 mg tablet TAKE 1 TABLET BY MOUTH EVERY DAY WITH BREAKFAST 30 tablet 5    glucose 4 g chewable tablet Chew 4 tablets (16 g total) as needed for low blood sugar 100 tablet 1    ibuprofen (MOTRIN) 200 mg tablet Take by mouth every 6 (six) hours as needed for mild pain        insulin glargine (Lantus SoloStar) 100 units/mL injection pen Inject 15 units under the skin daily 15 mL 4    Insulin Pen Needle (Pen Needles) 31G X 6 MM MISC Use 4 (four) times a day Use to inject insulin 4 times a day 200 each 4    Lancets (OneTouch Delica Plus FCURPN59N) MISC USE THREE TIMES DAILY 100 each 5    levothyroxine 50 mcg tablet TAKE 1 TAB DAILY MONDAY, TUESDAY, WEDNESDAY, THURSDAY, FRIDAY, SATURDAY AND (100 MCG TOTAL) SUNDAY 30 tablet 11    metFORMIN (GLUCOPHAGE) 500 mg tablet Take 2 tablets (1,000 mg total) by mouth 2 (two) times a day with meals 360 tablet 2    omeprazole (PriLOSEC) 20 mg delayed release capsule Take 1 capsule (20 mg total) by mouth daily 30 capsule 3    ondansetron (ZOFRAN) 4 mg tablet Take 1 tablet (4 mg total) by mouth every 8 (eight) hours as needed for nausea or vomiting (Patient not taking: Reported on 2/3/2022 ) 20 tablet 0    OneTouch Verio test strip USE TO TEST BLOOD SUGAR 4 TIMES A  strip 3    oxyCODONE (Roxicodone) 5 immediate release tablet Take 1 tablet (5 mg total) by mouth every 4 (four) hours as needed for moderate pain Max Daily Amount: 30 mg (Patient not taking: Reported on 2/3/2022 ) 10 tablet 0    pantoprazole (PROTONIX) 40 mg tablet Take 1 tablet (40 mg total) by mouth daily before breakfast 30 tablet 5     No current facility-administered medications for this visit  No Known Allergies    Objective   Vitals: Height 6' 2" (1 88 m), weight 118 kg (261 lb)  Physical Exam   Constitutional:Oriented to person, place, and time  Appears well-developed and well-nourished  Not in any acute distress  HENT:   Head: Normocephalic and atraumatic  Neck: Normal range of motion  Pulmonary/Chest: Effort normal/ breathing comfortably on room air   Musculoskeletal: Normal range of motion  Neurological: Alert and oriented to person, place, and time  Skin: Not diaphoretic  Psychiatric: Normal mood and affect  Behavior is normal      The history was obtained from the review of the chart, patient      Lab Results:   Lab Results   Component Value Date/Time    Hemoglobin A1C 6 8 (H) 06/02/2021 07:03 AM    WBC 5 89 12/04/2021 05:04 AM    WBC 6 62 10/26/2021 09:09 AM    Hemoglobin 12 9 12/04/2021 11:08 AM    Hemoglobin 12 9 12/04/2021 05:04 AM    Hemoglobin 15 6 10/26/2021 09:09 AM    Hematocrit 38 9 12/04/2021 11:08 AM    Hematocrit 39 2 12/04/2021 05:04 AM    Hematocrit 47 0 10/26/2021 09:09 AM    MCV 95 12/04/2021 05:04 AM    MCV 93 10/26/2021 09:09 AM    Platelets 495 (L) 57/59/4259 05:04 AM    Platelets 157 12/23/1568 09:09 AM    BUN 13 12/04/2021 05:04 AM    BUN 13 12/03/2021 11:04 AM    BUN 18 06/02/2021 07:03 AM    Potassium 3 7 12/04/2021 05:04 AM    Potassium 4 0 12/03/2021 11:04 AM    Potassium 4 2 06/02/2021 07:03 AM    Chloride 105 12/04/2021 05:04 AM    Chloride 103 12/03/2021 11:04 AM    Chloride 105 06/02/2021 07:03 AM    CO2 25 12/04/2021 05:04 AM    CO2 25 12/03/2021 11:04 AM    CO2 26 06/02/2021 07:03 AM    Creatinine 1 12 12/04/2021 05:04 AM    Creatinine 1 13 12/03/2021 11:04 AM    Creatinine 1 10 06/02/2021 07:03 AM    AST 24 06/02/2021 07:03 AM    ALT 37 06/02/2021 07:03 AM    Albumin 4 0 06/02/2021 07:03 AM    HDL, Direct 34 (L) 06/02/2021 07:03 AM    Triglycerides 143 06/02/2021 07:03 AM         Imaging Studies: I have personally reviewed pertinent reports  Portions of the record may have been created with voice recognition software  Occasional wrong word or "sound a like" substitutions may have occurred due to the inherent limitations of voice recognition software  Read the chart carefully and recognize, using context, where substitutions have occurred

## 2022-03-29 ENCOUNTER — PATIENT MESSAGE (OUTPATIENT)
Dept: FAMILY MEDICINE CLINIC | Facility: CLINIC | Age: 62
End: 2022-03-29

## 2022-03-29 DIAGNOSIS — E11.65 TYPE 2 DIABETES MELLITUS WITH HYPERGLYCEMIA, WITH LONG-TERM CURRENT USE OF INSULIN (HCC): ICD-10-CM

## 2022-03-29 DIAGNOSIS — Z79.4 TYPE 2 DIABETES MELLITUS WITH HYPERGLYCEMIA, WITH LONG-TERM CURRENT USE OF INSULIN (HCC): ICD-10-CM

## 2022-03-29 DIAGNOSIS — E11.9 TYPE 2 DIABETES MELLITUS WITHOUT COMPLICATION, WITHOUT LONG-TERM CURRENT USE OF INSULIN (HCC): Primary | ICD-10-CM

## 2022-03-31 NOTE — TELEPHONE ENCOUNTER
Medication refill requested: LANTUS   Last office visit: 10/29/21  Next office visit: 5/10/22  Last refilled: 12/2/21  Labs: No  Ordering Provider: Wilmer Tovar (select pharmacy send RX to):     [id number]- [place] Zhong, [address] [place] [address]    [place] [address]  Phone: [phone] Fax: [phone]

## 2022-04-01 DIAGNOSIS — E11.65 TYPE 2 DIABETES MELLITUS WITH HYPERGLYCEMIA, WITH LONG-TERM CURRENT USE OF INSULIN (HCC): ICD-10-CM

## 2022-04-01 DIAGNOSIS — Z79.4 TYPE 2 DIABETES MELLITUS WITH HYPERGLYCEMIA, WITH LONG-TERM CURRENT USE OF INSULIN (HCC): ICD-10-CM

## 2022-04-01 RX ORDER — INSULIN GLARGINE 100 [IU]/ML
INJECTION, SOLUTION SUBCUTANEOUS
Qty: 15 ML | Refills: 4
Start: 2022-04-01 | End: 2022-04-06

## 2022-04-02 ENCOUNTER — APPOINTMENT (OUTPATIENT)
Dept: LAB | Facility: CLINIC | Age: 62
End: 2022-04-02
Payer: COMMERCIAL

## 2022-04-02 DIAGNOSIS — E11.9 TYPE 2 DIABETES MELLITUS WITHOUT COMPLICATION, WITHOUT LONG-TERM CURRENT USE OF INSULIN (HCC): ICD-10-CM

## 2022-04-02 LAB
ALBUMIN SERPL BCP-MCNC: 3.8 G/DL (ref 3.5–5)
ALP SERPL-CCNC: 67 U/L (ref 46–116)
ALT SERPL W P-5'-P-CCNC: 38 U/L (ref 12–78)
ANION GAP SERPL CALCULATED.3IONS-SCNC: 8 MMOL/L (ref 4–13)
AST SERPL W P-5'-P-CCNC: 23 U/L (ref 5–45)
BILIRUB SERPL-MCNC: 0.63 MG/DL (ref 0.2–1)
BUN SERPL-MCNC: 15 MG/DL (ref 5–25)
CALCIUM SERPL-MCNC: 9.1 MG/DL (ref 8.3–10.1)
CHLORIDE SERPL-SCNC: 108 MMOL/L (ref 100–108)
CHOLEST SERPL-MCNC: 95 MG/DL
CO2 SERPL-SCNC: 26 MMOL/L (ref 21–32)
CREAT SERPL-MCNC: 1.05 MG/DL (ref 0.6–1.3)
EST. AVERAGE GLUCOSE BLD GHB EST-MCNC: 123 MG/DL
GFR SERPL CREATININE-BSD FRML MDRD: 75 ML/MIN/1.73SQ M
GLUCOSE P FAST SERPL-MCNC: 132 MG/DL (ref 65–99)
HBA1C MFR BLD: 5.9 %
HDLC SERPL-MCNC: 37 MG/DL
LDLC SERPL CALC-MCNC: 38 MG/DL (ref 0–100)
NONHDLC SERPL-MCNC: 58 MG/DL
POTASSIUM SERPL-SCNC: 4.8 MMOL/L (ref 3.5–5.3)
PROT SERPL-MCNC: 7.4 G/DL (ref 6.4–8.2)
SODIUM SERPL-SCNC: 142 MMOL/L (ref 136–145)
TRIGL SERPL-MCNC: 99 MG/DL

## 2022-04-02 PROCEDURE — 80053 COMPREHEN METABOLIC PANEL: CPT

## 2022-04-02 PROCEDURE — 83036 HEMOGLOBIN GLYCOSYLATED A1C: CPT

## 2022-04-02 PROCEDURE — 36415 COLL VENOUS BLD VENIPUNCTURE: CPT

## 2022-04-02 PROCEDURE — 80061 LIPID PANEL: CPT

## 2022-04-05 ENCOUNTER — PATIENT MESSAGE (OUTPATIENT)
Dept: FAMILY MEDICINE CLINIC | Facility: CLINIC | Age: 62
End: 2022-04-05

## 2022-04-06 DIAGNOSIS — E11.65 TYPE 2 DIABETES MELLITUS WITH HYPERGLYCEMIA, WITH LONG-TERM CURRENT USE OF INSULIN (HCC): ICD-10-CM

## 2022-04-06 DIAGNOSIS — Z79.4 TYPE 2 DIABETES MELLITUS WITH HYPERGLYCEMIA, WITH LONG-TERM CURRENT USE OF INSULIN (HCC): ICD-10-CM

## 2022-04-06 RX ORDER — INSULIN GLARGINE 100 [IU]/ML
INJECTION, SOLUTION SUBCUTANEOUS
Qty: 15 ML | Refills: 0
Start: 2022-04-06

## 2022-04-06 RX ORDER — INSULIN GLARGINE 100 [IU]/ML
INJECTION, SOLUTION SUBCUTANEOUS
Qty: 15 ML | Refills: 1 | Status: SHIPPED | OUTPATIENT
Start: 2022-04-06

## 2022-04-06 RX ORDER — INSULIN GLARGINE 100 [IU]/ML
INJECTION, SOLUTION SUBCUTANEOUS
Qty: 15 ML | Refills: 1 | Status: SHIPPED | OUTPATIENT
Start: 2022-04-06 | End: 2022-04-06 | Stop reason: SDUPTHER

## 2022-04-23 DIAGNOSIS — E03.9 HYPOTHYROIDISM, UNSPECIFIED TYPE: ICD-10-CM

## 2022-04-25 DIAGNOSIS — E11.9 TYPE 2 DIABETES MELLITUS WITHOUT COMPLICATION, WITHOUT LONG-TERM CURRENT USE OF INSULIN (HCC): Primary | ICD-10-CM

## 2022-05-03 ENCOUNTER — RA CDI HCC (OUTPATIENT)
Dept: OTHER | Facility: HOSPITAL | Age: 62
End: 2022-05-03

## 2022-05-03 RX ORDER — LEVOTHYROXINE SODIUM 0.05 MG/1
TABLET ORAL
Qty: 30 TABLET | Refills: 11 | Status: SHIPPED | OUTPATIENT
Start: 2022-05-03

## 2022-05-03 NOTE — PROGRESS NOTES
[facility]  [facility] opportunities       Chart reviewed, no opportunity found: CHART REVIEWED, NO OPPORTUNITY FOUND        Patients Insurance        Commercial Insurance: [address] [place] [address]

## 2022-05-04 NOTE — PROGRESS NOTES
ENDOCRINOLOGY  FOLLOW UP VISIT      Reason for Endocrine Consult/Chief Complaint: DM management   ? Medical Decision Making:     Impression  1  Systemic sarcoidosis now off steroids and cellcept  2  DM2    3  Acquired hypothyroidism- on levothyroxine  4  Vitamin D deficiency- on D3 replacemet  5  Mixed HLD- on statin therapy   7  Hx HCV s/p harvoni c/b liver cirrhosis   8  Gallstones pending cholecystectomy- to be monitored for now  9  PE now off AC  10  Kidney stones, s/p stent and UTI now resolved         Recommendations:  ? Now off steroids and cellcept with improved BGs  Will start to reduce insulin doses  Continue lantus 48 units qAM and reduce novolog to 8 units TID AC  I increased his metformin to 1000mg BID AC to improve fasting numbers  Instructed to send me BG log in 1 week for review, if BGs at goal will stop prandial insulin all together  If having post-prandial spikes off prandial insulin will add a sulfonylurea       Cannot use GLP-1 agonists due to known gallstones and cholecystectomy still pending  Had side effects from SGLT-2 inhibitors due to prostate       Acquired hypothyroidism- continue levothyroxine 50mcg/day, repeat thyroid labs in 3 months      Vitamin D level - continue 5000 IU D3 replacement,  repeat vitamin-D level in 3 months make sure replete      Mixed hyperlipidemia- cardiologist started atorvastatin, repeat lipids in 3 months to assess for improvement, LDL 65 Dec 2019        RTC  3 months     Jake WITT               History of Present Illness:   Mr Marcy Leach is a 61yr old male who presents for DM2 follow up after hospital stay for DKA  He has a hx of liver cirrhosis on prior biopsy and had pulmonary sarcoidosis with extra thoracic involvement including trigeminal neuralgia type symptoms with right sided headaches and drooping of the right face and peripheral paresthesias  He also has possible eye involvement of his sarcoid     ?  PMH-HCV s/p harvoni c/b liver cirrhosis, Patient daughter informed and rx sent to the pharmacy. She is eating very little.    DM2, hypothyroidism, systemic sarcoidosis, BPH, vitamin D deficiency, mixed HLD  PSH-shoulder surgery, liver biopsy, prostate surgery  FHx-no diabetes in family  SHx-neg x 2, works at [employer]     Type of DM: 2  Age of onset: 2007 diagnosed   Most recent A1C: 12 1% August 2019  Microvascular complications: neuropathy? Macrovascular complications: none known   Exercise: started going to gym         Events since last visit:     Off steroids and cellcept now     FBG-mid 100s  Premeal/qHS BG-low to mid 100s  Hypoglycemia-  one episode in 46s recently before dinner      On lantus 48 units qAM, novolog 10 units TID AC      Off Victoza,  still on metformin 500mg BID AC     For past 1-2 months has been dealing with kidney stone s/p lithotripsy and stent placement and UTI now s/p stent removal     Saw general surgeon in March 2020 and gallstones will be observed for now   ? Review of Systems:     Review of Systems   Constitutional: Negative for appetite change, chills, diaphoresis, fatigue, fever and unexpected weight change  HENT: Negative for congestion, ear pain, hearing loss, rhinorrhea, sinus pressure, sinus pain, sore throat, trouble swallowing and voice change  Eyes: Negative for photophobia, redness and visual disturbance  Respiratory: Negative for apnea, cough, chest tightness, shortness of breath, wheezing and stridor  Cardiovascular: Negative for chest pain, palpitations and leg swelling  Gastrointestinal: Negative for abdominal distention, abdominal pain, constipation, diarrhea, nausea and vomiting  Endocrine: Negative for cold intolerance, heat intolerance, polydipsia, polyphagia and polyuria  Genitourinary: Negative for difficulty urinating, dysuria, flank pain, frequency, hematuria and urgency  Musculoskeletal: Negative for arthralgias, back pain, gait problem, joint swelling and myalgias  Skin: Negative for color change, pallor, rash and wound     Allergic/Immunologic: Negative for immunocompromised state  Neurological: Negative for dizziness, tremors, syncope, weakness, light-headedness and headaches  Hematological: Negative for adenopathy  Does not bruise/bleed easily  Psychiatric/Behavioral: Negative for confusion and sleep disturbance  The patient is not nervous/anxious  ? Patient History:     Past Medical History:   Diagnosis Date    BPH (benign prostatic hyperplasia)     Diabetes mellitus ([facility] )     Disease of thyroid gland     Erectile dysfunction     GERD (gastroesophageal reflux disease)     Headache(784 0) 05/01/2018    Each Day    Hematuria     Hyperlipidemia     Hypertension     Infectious viral hepatitis 2007    Cured by Dr Rufus Salmon Pretty Liver disease     hepatitis C    Mediastinal adenopathy     Obesity 2007    PE (pulmonary thromboembolism) ([facility] [address] )     Sarcoidosis     Vitamin D deficiency      Past Surgical History:   Procedure Laterality Date    COLONOSCOPY      last assessed: 08/28/2012; fiberoptic screening     CYSTOSCOPY      onset: 05/06/2016; Diagnostic     DENTAL SURGERY      FL RETROGRADE PYELOGRAM  2/6/2020    FL RETROGRADE PYELOGRAM  3/3/2020    LIVER BIOPSY      LYMPH NODE BIOPSY  6/2019    MT BRONCHOSCOPY NEEDLE BX TRACHEA MAIN STEM&/BRON N/A 6/6/2019    Procedure: ENDOBRONCHIAL ULTRASOUND (EBUS);   Surgeon: Sugey Bazzi MD;  [facility];  Service: Thoracic    MT [address] N/A 6/6/2019    Procedure: Sobeida Welch;  Surgeon: Sugey Bazzi MD;  [facility];  Service: Thoracic    MT CYSTO/URETERO W/LITHOTRIPSY &INDWELL STENT INSRT Right 2/6/2020    Procedure: CYSTOSCOPY WITH LITHOTRIPSY RETROGRADE PYELOGRAM AND INSERTION STENT URETERAL;  Surgeon: Merry Mckeon MD;  [facility];  Service: Urology    MT CYSTO/URETERO W/LITHOTRIPSY &INDWELL STENT INSRT Right 3/3/2020    Procedure: CYSTOSCOPY URETEROSCOPY WITH LITHOTRIPSY HOLMIUM LASER; BASKET STONE RETRIEVAL; RETROGRADE PYELOGRAM AND INSERTION STENT URETERAL;  Surgeon: Lety Morrow MD;  [facility];  Service: Urology    AK TRANSURETHRAL ELEC-SURG PROSTATECTOM N/A 2017    Procedure: Alannaord Gallery; TUR PROSTATE;  Surgeon: Nathalie Hodgkins, MD;  [facility];  Service: Urology    SHOULDER ARTHROSCOPY Right     bicep tendon repair, rotator cuff repair and acromuim shave      Social History     Socioeconomic History    Marital status: /Civil Union     Spouse name: Not on file    Number of children: Not on file    Years of education: Not on file    Highest education level: Not on file   Occupational History    Occupation: manager   Social Needs    Financial resource strain: Not on file    Food insecurity:     Worry: Not on file     Inability: Not on file    Transportation needs:     Medical: Not on file     Non-medical: Not on file   Tobacco Use    Smoking status: Former Smoker     Packs/day: 1 00     Years: 1 00     Pack years: 1 00     Types: Cigarettes     Last attempt to quit:      Years since quittin 2    Smokeless tobacco: Never Used   Substance and Sexual Activity    Alcohol use: No     Comment: quit     Drug use: No    Sexual activity: Not Currently     Partners: Female     Birth control/protection: None   Lifestyle    Physical activity:     Days per week: Not on file     Minutes per session: Not on file    Stress: Not on file   Relationships    Social connections:     Talks on phone: Not on file     Gets together: Not on file     Attends [employer] service: Not on file     Active member of club or organization: Not on file     Attends meetings of clubs or organizations: Not on file     Relationship status: Not on file    Intimate partner violence:     Fear of current or ex partner: Not on file     Emotionally abused: Not on file     Physically abused: Not on file     Forced sexual activity: Not on file   Other Topics Concern    Not on file   Social History Narrative    Caffeine use    Daily coffee consumption     Denied daily cola consumption     Denied daily tea consumption     Former smoker (as per [employer])     Current everyday smoker (as per [employer])     Non-smoker (as per [employer])      Family History   Problem Relation Age of Onset    Stroke Father         Accidental Death at 52    Cancer Maternal Grandmother     Valvular heart disease Mother        Current Medications: At the time this note was written these were the medications the patient was on    Current Outpatient Medications   Medication Sig Dispense Refill    atorvastatin (LIPITOR) 40 mg tablet Take 1 tablet (40 mg total) by mouth daily (Patient taking differently: Take 40 mg by mouth daily in the early morning ) 90 tablet 3    docusate sodium (COLACE) 100 mg capsule Take 1 capsule (100 mg total) by mouth 3 (three) times a day for 7 days 21 capsule 0    famotidine (PEPCID) 40 MG tablet Take 1 tablet (40 mg total) by mouth daily (Patient taking differently: Take 40 mg by mouth daily in the early morning ) 30 tablet 3    glucose 4 g chewable tablet Chew 4 tablets (16 g total) as needed for low blood sugar 100 tablet 1    Insulin Aspart FlexPen (NovoLOG FlexPen) 100 UNIT/ML SOPN Inject 10 Units under the skin 3 (three) times a day before meals 5 pen 3    insulin glargine (LANTUS SOLOSTAR) 100 units/mL injection pen Inject 48 Units under the skin daily (Patient taking differently: Inject 48 Units under the skin daily in the early morning ) 5 pen 3    Insulin Pen Needle (PEN NEEDLES) 31G X 6 MM MISC by Does not apply route 4 (four) times a day 150 each 4    Lancets (ONETOUCH DELICA PLUS [id number]) MISC USE THREE TIMES DAILY 100 each 5    levothyroxine 50 mcg tablet Take 1 tablet (50 mcg total) by mouth daily 30 tablet 3    metFORMIN (GLUCOPHAGE) 500 mg tablet Take 1 tablet (500 mg total) by mouth 2 (two) times a day with meals 180 tablet 3    metoprolol succinate (TOPROL-XL) 25 mg 24 hr tablet Take 0 5 tablets (12 5 mg total) by mouth 2 (two) times a day 90 tablet 3    ondansetron (ZOFRAN-ODT) 4 mg disintegrating tablet Take 1 tablet (4 mg total) by mouth every 6 (six) hours as needed for nausea or vomiting (Patient not taking: Reported on 3/6/2020) 20 tablet 0    ONETOUCH VERIO test strip Use to test blood sugar 4 times a day 100 each 3    tamsulosin (FLOMAX) 0 4 mg Take 1 capsule (0 4 mg total) by mouth daily with dinner (Patient not taking: Reported on 3/6/2020) 30 capsule 1     No current facility-administered medications for this visit  Allergies: Patient has no known allergies  Physical Exam:   Vital Signs:   /66   Pulse 92   Ht 6' 2" (1 88 m)   Wt 134 kg (295 lb)   BMI 37 88 kg/m²     Physical Exam   Constitutional: He is oriented to person, place, and time  He appears well-developed and well-nourished  HENT:   Head: Normocephalic and atraumatic  Nose: Nose normal    Mouth/Throat: Oropharynx is clear and moist  No oropharyngeal exudate  Eyes: Pupils are equal, round, and reactive to light  Conjunctivae and EOM are normal  No scleral icterus  Neck: Normal range of motion  Neck supple  Cardiovascular: Normal rate, regular rhythm and normal heart sounds  Exam reveals no gallop and no friction rub  No murmur heard  Pulmonary/Chest: Effort normal and breath sounds normal  No stridor  No respiratory distress  He has no wheezes  He has no rales  Abdominal: Soft  Bowel sounds are normal  He exhibits no distension and no mass  There is no tenderness  There is no rebound and no guarding  Musculoskeletal: Normal range of motion  He exhibits no edema  Lymphadenopathy:     He has no cervical adenopathy  Neurological: He is alert and oriented to person, place, and time  Skin: Skin is warm and dry  No rash noted  No erythema  No pallor  Psychiatric: He has a normal mood and affect   His behavior is normal  Judgment and thought content normal         Labs and Imaging:    No recent labs

## 2022-05-10 ENCOUNTER — OFFICE VISIT (OUTPATIENT)
Dept: FAMILY MEDICINE CLINIC | Facility: CLINIC | Age: 62
End: 2022-05-10
Payer: COMMERCIAL

## 2022-05-10 VITALS
DIASTOLIC BLOOD PRESSURE: 72 MMHG | BODY MASS INDEX: 32.21 KG/M2 | OXYGEN SATURATION: 97 % | RESPIRATION RATE: 18 BRPM | HEART RATE: 110 BPM | HEIGHT: 74 IN | SYSTOLIC BLOOD PRESSURE: 118 MMHG | WEIGHT: 251 LBS | TEMPERATURE: 97.8 F

## 2022-05-10 DIAGNOSIS — K74.69 OTHER CIRRHOSIS OF LIVER (HCC): ICD-10-CM

## 2022-05-10 DIAGNOSIS — Z79.4 TYPE 2 DIABETES MELLITUS WITH OTHER SPECIFIED COMPLICATION, WITH LONG-TERM CURRENT USE OF INSULIN (HCC): Primary | ICD-10-CM

## 2022-05-10 DIAGNOSIS — D86.0 PULMONARY SARCOIDOSIS (HCC): ICD-10-CM

## 2022-05-10 DIAGNOSIS — E11.69 TYPE 2 DIABETES MELLITUS WITH OTHER SPECIFIED COMPLICATION, WITH LONG-TERM CURRENT USE OF INSULIN (HCC): Primary | ICD-10-CM

## 2022-05-10 DIAGNOSIS — E03.9 ACQUIRED HYPOTHYROIDISM: ICD-10-CM

## 2022-05-10 PROBLEM — I42.9 CARDIOMYOPATHY, UNSPECIFIED TYPE (HCC): Status: ACTIVE | Noted: 2022-05-10

## 2022-05-10 PROCEDURE — 3074F SYST BP LT 130 MM HG: CPT | Performed by: FAMILY MEDICINE

## 2022-05-10 PROCEDURE — 3078F DIAST BP <80 MM HG: CPT | Performed by: FAMILY MEDICINE

## 2022-05-10 PROCEDURE — 99214 OFFICE O/P EST MOD 30 MIN: CPT | Performed by: FAMILY MEDICINE

## 2022-05-10 PROCEDURE — 3725F SCREEN DEPRESSION PERFORMED: CPT | Performed by: FAMILY MEDICINE

## 2022-05-10 NOTE — PROGRESS NOTES
Assessment/Plan:    No problem-specific Assessment & Plan notes found for this encounter  Diagnoses and all orders for this visit:    Type 2 diabetes mellitus with other specified complication, with long-term current use of insulin ([facility] )  Comments:  last a1c is low  pulled back on insulin dosing and fasting numbers are improved  recheck in 3 months  f/u 6 mo    Acquired hypothyroidism  Comments:  tsh with next labs  Orders:  -     TSH, 3rd generation; Future    Other cirrhosis of liver ([facility] )  Comments:  make f/u with gi    Pulmonary sarcoidosis ([facility] )  Comments:  no symptoms  feeling well        Subjective:      Patient ID: Constance Nunes is a 60 y o  male  HPI  Pt presents in routine f/u  Doing okay  Having a lot of stress with his mom moving in as her health declines  He is struggling with her getting older and sicker  Doesn't want anything for sadness/depression at this time but will call if he does  Due for TSH with next labs    Feeling well from a breathing standpoint  Denies paresthesias  Due for GI f/u--was told he had cirrhosis by last doc, but current GI doc isn't sure that's true  Last mri looks okay  Lost 10 lbs  The following portions of the patient's history were reviewed and updated as appropriate: allergies, current medications, past family history, past medical history, past social history, past surgical history and problem list     Review of Systems   Constitutional: Negative for chills, fatigue, fever and unexpected weight change  HENT: Negative for congestion, ear pain, hearing loss, postnasal drip, rhinorrhea, sinus pressure, sinus pain, sore throat, trouble swallowing and voice change  Eyes: Negative for pain, redness and visual disturbance  Respiratory: Negative for cough and shortness of breath  Cardiovascular: Negative for chest pain, palpitations and leg swelling  Gastrointestinal: Negative for abdominal pain, constipation, diarrhea and nausea  Endocrine: Negative for cold intolerance, heat intolerance, polydipsia and polyuria  Genitourinary: Negative for dysuria, frequency and urgency  Musculoskeletal: Negative for arthralgias, joint swelling and myalgias  Skin: Negative for rash  No suspicious lesions   Neurological: Negative for weakness, numbness and headaches  Hematological: Negative for adenopathy  Objective:      /72   Pulse (!) 110   Temp 97 8 °F (36 6 °C)   Resp 18   Ht 6' 2" (1 88 m)   Wt 114 kg (251 lb)   SpO2 97%   BMI 32 23 kg/m²          Physical Exam  Constitutional:       General: He is not in acute distress  Appearance: He is well-developed  HENT:      Head: Normocephalic and atraumatic  Right Ear: Tympanic membrane, ear canal and external ear normal       Left Ear: Tympanic membrane, ear canal and external ear normal       Nose: Nose normal       Mouth/Throat:      Pharynx: No oropharyngeal exudate  Eyes:      Conjunctiva/sclera: Conjunctivae normal       Pupils: Pupils are equal, round, and reactive to light  Neck:      Thyroid: No thyromegaly  Vascular: No carotid bruit or JVD  Cardiovascular:      Rate and Rhythm: Regular rhythm  Heart sounds: S1 normal and S2 normal  No murmur heard  No friction rub  No gallop  No S3 or S4 sounds  Pulmonary:      Effort: Pulmonary effort is normal       Breath sounds: Normal breath sounds  No wheezing, rhonchi or rales  Abdominal:      General: Bowel sounds are normal  There is no distension  Palpations: Abdomen is soft  Tenderness: There is no abdominal tenderness  Lymphadenopathy:      Cervical: No cervical adenopathy  Neurological:      Mental Status: He is alert and oriented to person, place, and time  Cranial Nerves: No cranial nerve deficit  Sensory: No sensory deficit  Deep Tendon Reflexes: Reflexes are normal and symmetric

## 2022-05-10 NOTE — PROGRESS NOTES
Patient's shoes and socks removed  Right Foot/Ankle   Right Foot Inspection  Skin Exam: skin normal and skin intact  No dry skin, no warmth, no callus, no erythema, no maceration, no abnormal color, no pre-ulcer, no ulcer and no callus  Toe Exam: ROM and strength within normal limits  Sensory   Monofilament testing: intact    Vascular  Capillary refills: elevated  The right DP pulse is 2+  Left Foot/Ankle  Left Foot Inspection  Skin Exam: skin normal and skin intact  No dry skin, no warmth, no erythema, no maceration, normal color, no pre-ulcer, no ulcer and no callus  Toe Exam: ROM and strength within normal limits  Sensory   Monofilament testing: intact    Vascular  Capillary refills: elevated  The left DP pulse is 2+       Assign Risk Category  No deformity present  No loss of protective sensation  No weak pulses  Risk: 0

## 2022-05-19 ENCOUNTER — OFFICE VISIT (OUTPATIENT)
Dept: BARIATRICS | Facility: CLINIC | Age: 62
End: 2022-05-19

## 2022-05-19 VITALS
HEART RATE: 92 BPM | WEIGHT: 250.5 LBS | HEIGHT: 74 IN | TEMPERATURE: 97 F | BODY MASS INDEX: 32.15 KG/M2 | DIASTOLIC BLOOD PRESSURE: 74 MMHG | SYSTOLIC BLOOD PRESSURE: 112 MMHG

## 2022-05-19 DIAGNOSIS — K21.9 GASTROESOPHAGEAL REFLUX DISEASE, UNSPECIFIED WHETHER ESOPHAGITIS PRESENT: Primary | ICD-10-CM

## 2022-05-19 PROCEDURE — 99024 POSTOP FOLLOW-UP VISIT: CPT | Performed by: SURGERY

## 2022-05-19 PROCEDURE — 3008F BODY MASS INDEX DOCD: CPT | Performed by: FAMILY MEDICINE

## 2022-05-19 NOTE — PROGRESS NOTES
Patient ID: Tino Macias is a 60 y o  male  Subjective:      60 y o  male status post robotic PEH repair with mesh and Nissen fundoplication performed by Dr Kymberly Llamas on 12/3/21 returning to office for first post op visit since surgery      Patient has been tolerating a regular diet without N/V, dysphagia  Denies reflux symptoms and has stopped all anti-acid medication  Patient is having regular BMs and passing flatus  Historical Information   Past Medical History:   Diagnosis Date    BPH (benign prostatic hyperplasia)     Diabetes mellitus ([address] )     Disease of thyroid gland     Erectile dysfunction     GERD (gastroesophageal reflux disease)     Headache     Headache(784 0) 05/01/2018    Each Day    Hematuria     Hyperlipidemia     Hypertension     Infectious viral hepatitis 2007    Cured by Dr Chapo Davalos [facility]    Kidney stone     Liver disease     hepatitis C    Mediastinal adenopathy     Obesity 2007    PE [facility]pulmonary thromboembolism) [facility] [address] [facility]     Sarcoidosis     Vitamin D deficiency      Past Surgical History:   Procedure Laterality Date    COLONOSCOPY      last assessed: 08/28/2012; fiberoptic screening     CYSTOSCOPY      onset: 05/06/2016; Diagnostic     DENTAL SURGERY      FL RETROGRADE PYELOGRAM  2/6/2020    FL RETROGRADE PYELOGRAM  3/3/2020    LIVER BIOPSY      LYMPH NODE BIOPSY  6/2019    MI BRONCHOSCOPY NEEDLE BX TRACHEA MAIN STEM&/BRON N/A 6/6/2019    Procedure: ENDOBRONCHIAL ULTRASOUND (EBUS);   Surgeon: Papito Lee MD;  [facility];  Service: Thoracic    MI [address] N/A 6/6/2019    Procedure: Jacki Wong;  Surgeon: Papito Lee MD;  [facility];  Service: Thoracic    MI CYSTO/URETERO W/LITHOTRIPSY &INDWELL STENT INSRT Right 2/6/2020    Procedure: CYSTOSCOPY WITH LITHOTRIPSY RETROGRADE PYELOGRAM AND INSERTION STENT URETERAL;  Surgeon: Kiya Sanchez MD;  [facility];  Service: Urology    MI CYSTO/URETERO W/LITHOTRIPSY &INDWELL STENT INSRT Right 3/3/2020    Procedure: CYSTOSCOPY URETEROSCOPY WITH LITHOTRIPSY HOLMIUM LASER; BASKET STONE RETRIEVAL; RETROGRADE PYELOGRAM AND INSERTION STENT URETERAL;  Surgeon: Rogerio Armijo MD;  [facility];  Service: Urology    TN LAP, REPAIR PARAESOPHAGEAL HERNIA, INCL FUNDOPLASTY W/ MESH N/A 12/3/2021    Procedure: ROBOTIC PARAESOPHAGEAL HERNIA  REPAIR, with FUNDOPLICATION;  Surgeon: Leanna Welch MD;  [facility];  Service: General    TN TRANSURETHRAL ELEC-SURG PROSTATECTOM N/A 2017    Procedure: Suri Dimas; TUR PROSTATE;  Surgeon: Radha Johnson MD;  [facility];  Service: Urology    PROSTATE SURGERY  2017    SHOULDER ARTHROSCOPY Right     bicep tendon repair, rotator cuff repair and acromuim shave      Social History   Social History     Substance and Sexual Activity   Alcohol Use Not Currently    Alcohol/week: 0 0 standard drinks    Comment: quit      Social History     Substance and Sexual Activity   Drug Use No     Social History     Tobacco Use   Smoking Status Former Smoker    Packs/day: 1 00    Years: 1 00    Pack years: 1 00    Types: Cigarettes    Start date: 1971   Chelsea Ball Quit date:     Years since quittin 3   Smokeless Tobacco Never Used   Tobacco Comment    Quit       Meds/Allergies   all medications and allergies reviewed  No Known Allergies    Review of Systems   Constitutional: Negative  HENT: Negative  Eyes: Negative  Respiratory: Negative  Cardiovascular: Negative  Gastrointestinal: Negative  Endocrine: Negative  Genitourinary: Negative  Musculoskeletal: Negative  Skin: Negative  Allergic/Immunologic: Negative  Neurological: Negative  Hematological: Negative  Psychiatric/Behavioral: Negative  All other systems reviewed and are negative          Objective:    /74   Pulse 92   Temp (!) 97 °F (36 1 °C) (Tympanic)   Ht 6' 2 2" (1 885 m)   Wt 114 kg (250 lb 8 oz)   BMI 31 99 kg/m²     Physical Exam  Vitals and nursing note reviewed  Constitutional:       Appearance: Normal appearance  HENT:      Head: Normocephalic and atraumatic  Nose: Nose normal    Eyes:      Extraocular Movements: Extraocular movements intact  Conjunctiva/sclera: Conjunctivae normal       Pupils: Pupils are equal, round, and reactive to light  Cardiovascular:      Rate and Rhythm: Normal rate and regular rhythm  Pulses: Normal pulses  Heart sounds: Normal heart sounds  Pulmonary:      Effort: Pulmonary effort is normal       Breath sounds: Normal breath sounds  Abdominal:      General: Abdomen is flat  Bowel sounds are normal       Palpations: Abdomen is soft  Comments: Incisions are well healed    Musculoskeletal:         General: Normal range of motion  Cervical back: Normal range of motion and neck supple  Skin:     General: Skin is warm and dry  Capillary Refill: Capillary refill takes less than 2 seconds  Neurological:      General: No focal deficit present  Mental Status: He is alert and oriented to person, place, and time  Psychiatric:         Mood and Affect: Mood normal          Behavior: Behavior normal          Thought Content: Thought content normal          Judgment: Judgment normal            Assessment/Plan:     Diagnoses and all orders for this visit:    Gastroesophageal reflux disease, unspecified whether esophagitis present         60 y o  male s/p robotic PEH repair with mesh and Nissen fundoplication performed by Dr Jabier Gusman on 12/3/21, overall doing well  Patient's has lost 30% of EBL since their initial visit with us  · Tolerating regular diet  See dietician to review diet  · Patient to follow up with dietician per protocols  · Continued/Maintain healthy nutrition intakes  · Adequate hydration with at least 64oz  fluid intake    · Exercise as tolerated  · Follow up as needed     Kevin England MD  Bariatric Surgery

## 2022-06-25 DIAGNOSIS — E03.9 ACQUIRED HYPOTHYROIDISM: ICD-10-CM

## 2022-06-25 DIAGNOSIS — Z79.4 TYPE 2 DIABETES MELLITUS WITH HYPERGLYCEMIA, WITH LONG-TERM CURRENT USE OF INSULIN (HCC): ICD-10-CM

## 2022-06-25 DIAGNOSIS — E11.65 TYPE 2 DIABETES MELLITUS WITH HYPERGLYCEMIA, WITH LONG-TERM CURRENT USE OF INSULIN (HCC): ICD-10-CM

## 2022-06-25 DIAGNOSIS — E78.2 MIXED HYPERLIPIDEMIA: ICD-10-CM

## 2022-06-25 DIAGNOSIS — E55.9 VITAMIN D DEFICIENCY: ICD-10-CM

## 2022-06-27 RX ORDER — PEN NEEDLE, DIABETIC 32 GX 1/4"
NEEDLE, DISPOSABLE MISCELLANEOUS
Qty: 100 EACH | Refills: 9 | Status: SHIPPED | OUTPATIENT
Start: 2022-06-27

## 2022-07-16 NOTE — TELEPHONE ENCOUNTER
Patient called, his lantus was not sent in to his pharmacy  Reviewed chart, patients script was set to no print  Resent medications for patient and confirmation was received by pharmacy  I personally performed the service described in the documentation recorded by the scribe in my presence, and it accurately and completely records my words and actions.

## 2022-07-30 ENCOUNTER — APPOINTMENT (OUTPATIENT)
Dept: LAB | Facility: CLINIC | Age: 62
End: 2022-07-30
Payer: COMMERCIAL

## 2022-07-30 DIAGNOSIS — E11.9 TYPE 2 DIABETES MELLITUS WITHOUT COMPLICATION, WITHOUT LONG-TERM CURRENT USE OF INSULIN (HCC): ICD-10-CM

## 2022-07-30 DIAGNOSIS — E03.9 ACQUIRED HYPOTHYROIDISM: ICD-10-CM

## 2022-07-30 LAB
ALBUMIN SERPL BCP-MCNC: 4.2 G/DL (ref 3.5–5)
ALP SERPL-CCNC: 67 U/L (ref 34–104)
ALT SERPL W P-5'-P-CCNC: 22 U/L (ref 7–52)
ANION GAP SERPL CALCULATED.3IONS-SCNC: 6 MMOL/L (ref 4–13)
AST SERPL W P-5'-P-CCNC: 16 U/L (ref 13–39)
BILIRUB SERPL-MCNC: 0.65 MG/DL (ref 0.2–1)
BUN SERPL-MCNC: 19 MG/DL (ref 5–25)
CALCIUM SERPL-MCNC: 9.2 MG/DL (ref 8.4–10.2)
CHLORIDE SERPL-SCNC: 105 MMOL/L (ref 96–108)
CO2 SERPL-SCNC: 28 MMOL/L (ref 21–32)
CREAT SERPL-MCNC: 1.12 MG/DL (ref 0.6–1.3)
EST. AVERAGE GLUCOSE BLD GHB EST-MCNC: 134 MG/DL
GFR SERPL CREATININE-BSD FRML MDRD: 69 ML/MIN/1.73SQ M
GLUCOSE P FAST SERPL-MCNC: 126 MG/DL (ref 65–99)
HBA1C MFR BLD: 6.3 %
POTASSIUM SERPL-SCNC: 4.4 MMOL/L (ref 3.5–5.3)
PROT SERPL-MCNC: 7 G/DL (ref 6.4–8.4)
SODIUM SERPL-SCNC: 139 MMOL/L (ref 135–147)
TSH SERPL DL<=0.05 MIU/L-ACNC: 2.61 UIU/ML (ref 0.45–4.5)

## 2022-07-30 PROCEDURE — 80053 COMPREHEN METABOLIC PANEL: CPT

## 2022-07-30 PROCEDURE — 83036 HEMOGLOBIN GLYCOSYLATED A1C: CPT

## 2022-07-30 PROCEDURE — 36415 COLL VENOUS BLD VENIPUNCTURE: CPT

## 2022-07-30 PROCEDURE — 84443 ASSAY THYROID STIM HORMONE: CPT

## 2022-08-01 DIAGNOSIS — Z79.4 TYPE 2 DIABETES MELLITUS WITH OTHER SPECIFIED COMPLICATION, WITH LONG-TERM CURRENT USE OF INSULIN (HCC): Primary | ICD-10-CM

## 2022-08-01 DIAGNOSIS — E11.69 TYPE 2 DIABETES MELLITUS WITH OTHER SPECIFIED COMPLICATION, WITH LONG-TERM CURRENT USE OF INSULIN (HCC): Primary | ICD-10-CM

## 2022-08-08 DIAGNOSIS — E55.9 VITAMIN D DEFICIENCY: ICD-10-CM

## 2022-08-08 DIAGNOSIS — Z79.4 TYPE 2 DIABETES MELLITUS WITH HYPERGLYCEMIA, WITH LONG-TERM CURRENT USE OF INSULIN (HCC): ICD-10-CM

## 2022-08-08 DIAGNOSIS — E11.65 TYPE 2 DIABETES MELLITUS WITH HYPERGLYCEMIA, WITH LONG-TERM CURRENT USE OF INSULIN (HCC): ICD-10-CM

## 2022-08-08 DIAGNOSIS — E78.2 MIXED HYPERLIPIDEMIA: ICD-10-CM

## 2022-08-08 DIAGNOSIS — E11.9 TYPE 2 DIABETES MELLITUS WITHOUT COMPLICATION, WITHOUT LONG-TERM CURRENT USE OF INSULIN (HCC): ICD-10-CM

## 2022-08-08 DIAGNOSIS — E03.9 ACQUIRED HYPOTHYROIDISM: ICD-10-CM

## 2022-08-08 RX ORDER — GLIMEPIRIDE 2 MG/1
2 TABLET ORAL
Qty: 30 TABLET | Refills: 0 | Status: SHIPPED | OUTPATIENT
Start: 2022-08-08 | End: 2022-09-07

## 2022-08-08 RX ORDER — LANCETS 33 GAUGE
EACH MISCELLANEOUS
Qty: 100 EACH | Refills: 3 | Status: SHIPPED | OUTPATIENT
Start: 2022-08-08

## 2022-09-04 DIAGNOSIS — E11.65 TYPE 2 DIABETES MELLITUS WITH HYPERGLYCEMIA, WITH LONG-TERM CURRENT USE OF INSULIN (HCC): ICD-10-CM

## 2022-09-04 DIAGNOSIS — Z79.4 TYPE 2 DIABETES MELLITUS WITH HYPERGLYCEMIA, WITH LONG-TERM CURRENT USE OF INSULIN (HCC): ICD-10-CM

## 2022-09-07 RX ORDER — GLIMEPIRIDE 2 MG/1
TABLET ORAL
Qty: 30 TABLET | Refills: 0 | Status: SHIPPED | OUTPATIENT
Start: 2022-09-07 | End: 2022-10-17 | Stop reason: SDUPTHER

## 2022-09-12 LAB
LEFT EYE DIABETIC RETINOPATHY: NORMAL
RIGHT EYE DIABETIC RETINOPATHY: NORMAL

## 2022-10-10 DIAGNOSIS — Z79.4 TYPE 2 DIABETES MELLITUS WITH OTHER SPECIFIED COMPLICATION, WITH LONG-TERM CURRENT USE OF INSULIN (HCC): Primary | ICD-10-CM

## 2022-10-10 DIAGNOSIS — E11.69 TYPE 2 DIABETES MELLITUS WITH OTHER SPECIFIED COMPLICATION, WITH LONG-TERM CURRENT USE OF INSULIN (HCC): Primary | ICD-10-CM

## 2022-10-13 ENCOUNTER — TELEPHONE (OUTPATIENT)
Dept: FAMILY MEDICINE CLINIC | Facility: CLINIC | Age: 62
End: 2022-10-13

## 2022-10-17 DIAGNOSIS — E11.65 TYPE 2 DIABETES MELLITUS WITH HYPERGLYCEMIA, WITH LONG-TERM CURRENT USE OF INSULIN (HCC): ICD-10-CM

## 2022-10-17 DIAGNOSIS — Z79.4 TYPE 2 DIABETES MELLITUS WITH HYPERGLYCEMIA, WITH LONG-TERM CURRENT USE OF INSULIN (HCC): ICD-10-CM

## 2022-10-18 RX ORDER — GLIMEPIRIDE 2 MG/1
2 TABLET ORAL
Qty: 30 TABLET | Refills: 0 | Status: SHIPPED | OUTPATIENT
Start: 2022-10-18

## 2022-10-28 DIAGNOSIS — E11.65 TYPE 2 DIABETES MELLITUS WITH HYPERGLYCEMIA, WITH LONG-TERM CURRENT USE OF INSULIN (HCC): ICD-10-CM

## 2022-10-28 DIAGNOSIS — Z79.4 TYPE 2 DIABETES MELLITUS WITH HYPERGLYCEMIA, WITH LONG-TERM CURRENT USE OF INSULIN (HCC): ICD-10-CM

## 2022-10-28 RX ORDER — INSULIN GLARGINE 100 [IU]/ML
INJECTION, SOLUTION SUBCUTANEOUS
Qty: 15 ML | Refills: 0 | Status: SHIPPED | OUTPATIENT
Start: 2022-10-28

## 2022-10-29 ENCOUNTER — APPOINTMENT (OUTPATIENT)
Dept: LAB | Facility: CLINIC | Age: 62
End: 2022-10-29

## 2022-10-29 DIAGNOSIS — E11.69 TYPE 2 DIABETES MELLITUS WITH OTHER SPECIFIED COMPLICATION, WITH LONG-TERM CURRENT USE OF INSULIN (HCC): ICD-10-CM

## 2022-10-29 DIAGNOSIS — Z79.4 TYPE 2 DIABETES MELLITUS WITH OTHER SPECIFIED COMPLICATION, WITH LONG-TERM CURRENT USE OF INSULIN (HCC): ICD-10-CM

## 2022-10-29 LAB
ALBUMIN SERPL BCP-MCNC: 4.2 G/DL (ref 3.5–5)
ALP SERPL-CCNC: 64 U/L (ref 34–104)
ALT SERPL W P-5'-P-CCNC: 20 U/L (ref 7–52)
ANION GAP SERPL CALCULATED.3IONS-SCNC: 5 MMOL/L (ref 4–13)
AST SERPL W P-5'-P-CCNC: 16 U/L (ref 13–39)
BASOPHILS # BLD AUTO: 0.06 THOUSANDS/ÂΜL (ref 0–0.1)
BASOPHILS NFR BLD AUTO: 1 % (ref 0–1)
BILIRUB SERPL-MCNC: 0.64 MG/DL (ref 0.2–1)
BUN SERPL-MCNC: 15 MG/DL (ref 5–25)
CALCIUM SERPL-MCNC: 9.3 MG/DL (ref 8.4–10.2)
CHLORIDE SERPL-SCNC: 106 MMOL/L (ref 96–108)
CHOLEST SERPL-MCNC: 97 MG/DL
CO2 SERPL-SCNC: 29 MMOL/L (ref 21–32)
CREAT SERPL-MCNC: 1.01 MG/DL (ref 0.6–1.3)
CREAT UR-MCNC: 162 MG/DL
EOSINOPHIL # BLD AUTO: 0.25 THOUSAND/ÂΜL (ref 0–0.61)
EOSINOPHIL NFR BLD AUTO: 4 % (ref 0–6)
ERYTHROCYTE [DISTWIDTH] IN BLOOD BY AUTOMATED COUNT: 12.2 % (ref 11.6–15.1)
EST. AVERAGE GLUCOSE BLD GHB EST-MCNC: 134 MG/DL
GFR SERPL CREATININE-BSD FRML MDRD: 78 ML/MIN/1.73SQ M
GLUCOSE P FAST SERPL-MCNC: 124 MG/DL (ref 65–99)
HBA1C MFR BLD: 6.3 %
HCT VFR BLD AUTO: 44.2 % (ref 36.5–49.3)
HDLC SERPL-MCNC: 33 MG/DL
HGB BLD-MCNC: 14.8 G/DL (ref 12–17)
IMM GRANULOCYTES # BLD AUTO: 0.03 THOUSAND/UL (ref 0–0.2)
IMM GRANULOCYTES NFR BLD AUTO: 1 % (ref 0–2)
LDLC SERPL CALC-MCNC: 51 MG/DL (ref 0–100)
LYMPHOCYTES # BLD AUTO: 2.1 THOUSANDS/ÂΜL (ref 0.6–4.47)
LYMPHOCYTES NFR BLD AUTO: 36 % (ref 14–44)
MCH RBC QN AUTO: 30.8 PG (ref 26.8–34.3)
MCHC RBC AUTO-ENTMCNC: 33.5 G/DL (ref 31.4–37.4)
MCV RBC AUTO: 92 FL (ref 82–98)
MICROALBUMIN UR-MCNC: 7.8 MG/L (ref 0–20)
MICROALBUMIN/CREAT 24H UR: 5 MG/G CREATININE (ref 0–30)
MONOCYTES # BLD AUTO: 0.47 THOUSAND/ÂΜL (ref 0.17–1.22)
MONOCYTES NFR BLD AUTO: 8 % (ref 4–12)
NEUTROPHILS # BLD AUTO: 2.99 THOUSANDS/ÂΜL (ref 1.85–7.62)
NEUTS SEG NFR BLD AUTO: 50 % (ref 43–75)
NONHDLC SERPL-MCNC: 64 MG/DL
NRBC BLD AUTO-RTO: 0 /100 WBCS
PLATELET # BLD AUTO: 194 THOUSANDS/UL (ref 149–390)
PMV BLD AUTO: 9.1 FL (ref 8.9–12.7)
POTASSIUM SERPL-SCNC: 4.7 MMOL/L (ref 3.5–5.3)
PROT SERPL-MCNC: 6.9 G/DL (ref 6.4–8.4)
RBC # BLD AUTO: 4.8 MILLION/UL (ref 3.88–5.62)
SODIUM SERPL-SCNC: 140 MMOL/L (ref 135–147)
TRIGL SERPL-MCNC: 67 MG/DL
WBC # BLD AUTO: 5.9 THOUSAND/UL (ref 4.31–10.16)

## 2022-11-14 ENCOUNTER — PATIENT MESSAGE (OUTPATIENT)
Dept: FAMILY MEDICINE CLINIC | Facility: CLINIC | Age: 62
End: 2022-11-14

## 2022-11-14 DIAGNOSIS — Z79.4 TYPE 2 DIABETES MELLITUS WITH HYPERGLYCEMIA, WITH LONG-TERM CURRENT USE OF INSULIN (HCC): ICD-10-CM

## 2022-11-14 DIAGNOSIS — E78.2 MIXED HYPERLIPIDEMIA: ICD-10-CM

## 2022-11-14 DIAGNOSIS — E03.9 HYPOTHYROIDISM, UNSPECIFIED TYPE: ICD-10-CM

## 2022-11-14 DIAGNOSIS — E03.9 ACQUIRED HYPOTHYROIDISM: ICD-10-CM

## 2022-11-14 DIAGNOSIS — E11.65 TYPE 2 DIABETES MELLITUS WITH HYPERGLYCEMIA, WITH LONG-TERM CURRENT USE OF INSULIN (HCC): ICD-10-CM

## 2022-11-14 DIAGNOSIS — E55.9 VITAMIN D DEFICIENCY: ICD-10-CM

## 2022-11-14 RX ORDER — LEVOTHYROXINE SODIUM 0.05 MG/1
TABLET ORAL
Qty: 30 TABLET | Refills: 0 | Status: SHIPPED | OUTPATIENT
Start: 2022-11-14 | End: 2022-11-18 | Stop reason: SDUPTHER

## 2022-11-14 RX ORDER — GLIMEPIRIDE 2 MG/1
TABLET ORAL
Qty: 90 TABLET | Refills: 1 | OUTPATIENT
Start: 2022-11-14

## 2022-11-14 RX ORDER — GLIMEPIRIDE 2 MG/1
2 TABLET ORAL
Qty: 30 TABLET | Refills: 5 | Status: SHIPPED | OUTPATIENT
Start: 2022-11-14

## 2022-11-17 ENCOUNTER — PATIENT MESSAGE (OUTPATIENT)
Dept: FAMILY MEDICINE CLINIC | Facility: CLINIC | Age: 62
End: 2022-11-17

## 2022-11-17 DIAGNOSIS — Z79.4 TYPE 2 DIABETES MELLITUS WITH HYPERGLYCEMIA, WITH LONG-TERM CURRENT USE OF INSULIN (HCC): ICD-10-CM

## 2022-11-17 DIAGNOSIS — E55.9 VITAMIN D DEFICIENCY: ICD-10-CM

## 2022-11-17 DIAGNOSIS — E03.9 ACQUIRED HYPOTHYROIDISM: ICD-10-CM

## 2022-11-17 DIAGNOSIS — E78.2 MIXED HYPERLIPIDEMIA: ICD-10-CM

## 2022-11-17 DIAGNOSIS — E11.65 TYPE 2 DIABETES MELLITUS WITH HYPERGLYCEMIA, WITH LONG-TERM CURRENT USE OF INSULIN (HCC): ICD-10-CM

## 2022-11-17 NOTE — PATIENT COMMUNICATION
Multiple phone calls have been placed to [facility] with no answer  Will continue to try and reach [facility] for more information regarding orders for Metformin

## 2022-11-18 ENCOUNTER — PATIENT MESSAGE (OUTPATIENT)
Dept: FAMILY MEDICINE CLINIC | Facility: CLINIC | Age: 62
End: 2022-11-18

## 2022-11-18 DIAGNOSIS — E03.9 ACQUIRED HYPOTHYROIDISM: ICD-10-CM

## 2022-11-18 DIAGNOSIS — E11.65 TYPE 2 DIABETES MELLITUS WITH HYPERGLYCEMIA, WITH LONG-TERM CURRENT USE OF INSULIN (HCC): ICD-10-CM

## 2022-11-18 DIAGNOSIS — E78.2 MIXED HYPERLIPIDEMIA: ICD-10-CM

## 2022-11-18 DIAGNOSIS — Z79.4 TYPE 2 DIABETES MELLITUS WITH HYPERGLYCEMIA, WITH LONG-TERM CURRENT USE OF INSULIN (HCC): ICD-10-CM

## 2022-11-18 DIAGNOSIS — E55.9 VITAMIN D DEFICIENCY: ICD-10-CM

## 2022-11-18 DIAGNOSIS — E03.9 HYPOTHYROIDISM, UNSPECIFIED TYPE: ICD-10-CM

## 2022-11-18 RX ORDER — LEVOTHYROXINE SODIUM 0.05 MG/1
TABLET ORAL
Qty: 30 TABLET | Refills: 5 | Status: SHIPPED | OUTPATIENT
Start: 2022-11-18

## 2022-11-22 DIAGNOSIS — Z79.4 TYPE 2 DIABETES MELLITUS WITH HYPERGLYCEMIA, WITH LONG-TERM CURRENT USE OF INSULIN (HCC): ICD-10-CM

## 2022-11-22 DIAGNOSIS — E11.65 TYPE 2 DIABETES MELLITUS WITH HYPERGLYCEMIA, WITH LONG-TERM CURRENT USE OF INSULIN (HCC): ICD-10-CM

## 2022-11-22 RX ORDER — INSULIN GLARGINE 100 [IU]/ML
INJECTION, SOLUTION SUBCUTANEOUS
OUTPATIENT
Start: 2022-11-22

## 2022-11-28 DIAGNOSIS — E11.65 TYPE 2 DIABETES MELLITUS WITH HYPERGLYCEMIA, WITH LONG-TERM CURRENT USE OF INSULIN (HCC): ICD-10-CM

## 2022-11-28 DIAGNOSIS — E78.2 MIXED HYPERLIPIDEMIA: ICD-10-CM

## 2022-11-28 DIAGNOSIS — E03.9 ACQUIRED HYPOTHYROIDISM: ICD-10-CM

## 2022-11-28 DIAGNOSIS — E55.9 VITAMIN D DEFICIENCY: ICD-10-CM

## 2022-11-28 DIAGNOSIS — E11.9 TYPE 2 DIABETES MELLITUS WITHOUT COMPLICATION, WITHOUT LONG-TERM CURRENT USE OF INSULIN (HCC): ICD-10-CM

## 2022-11-28 DIAGNOSIS — Z79.4 TYPE 2 DIABETES MELLITUS WITH HYPERGLYCEMIA, WITH LONG-TERM CURRENT USE OF INSULIN (HCC): ICD-10-CM

## 2022-11-29 RX ORDER — LANCETS 33 GAUGE
EACH MISCELLANEOUS
Qty: 100 EACH | Refills: 0 | Status: SHIPPED | OUTPATIENT
Start: 2022-11-29

## 2022-11-30 RX ORDER — PEN NEEDLE, DIABETIC 32 GX 1/4"
NEEDLE, DISPOSABLE MISCELLANEOUS
Qty: 100 EACH | Refills: 0 | Status: SHIPPED | OUTPATIENT
Start: 2022-11-30

## 2022-12-09 DIAGNOSIS — E78.5 DYSLIPIDEMIA: ICD-10-CM

## 2022-12-09 RX ORDER — ATORVASTATIN CALCIUM 40 MG/1
TABLET, FILM COATED ORAL
Qty: 30 TABLET | Refills: 11 | Status: SHIPPED | OUTPATIENT
Start: 2022-12-09

## 2022-12-16 ENCOUNTER — OFFICE VISIT (OUTPATIENT)
Dept: CARDIOLOGY CLINIC | Facility: MEDICAL CENTER | Age: 62
End: 2022-12-16

## 2022-12-16 VITALS
HEIGHT: 74 IN | OXYGEN SATURATION: 97 % | DIASTOLIC BLOOD PRESSURE: 60 MMHG | HEART RATE: 99 BPM | WEIGHT: 260 LBS | BODY MASS INDEX: 33.37 KG/M2 | SYSTOLIC BLOOD PRESSURE: 122 MMHG

## 2022-12-16 DIAGNOSIS — I42.9 CARDIOMYOPATHY, UNSPECIFIED TYPE (HCC): Primary | ICD-10-CM

## 2022-12-16 DIAGNOSIS — I10 ESSENTIAL HYPERTENSION: ICD-10-CM

## 2022-12-16 NOTE — PROGRESS NOTES
Cardiology Follow Up    Ashely Door  1960  [id number]  [address] [facility] [address] [phone]    1  Cardiomyopathy, unspecified type ([place] [address] )        2  Essential hypertension            Discussion: He has been doing well  He denied chest discomfort or dyspnea  There is no history of syncope  He is active  Blood pressure is well controlled  I did not make any changes in his medical regimen  Because of his history of sarcoidosis  I ordered a repeat echo, to be performed in 1 year prior to his next visit  I also arranged for a lipid panel  Cardiovascular History:  Mr Bossman Flowers has risk factors but no history of heart disease  He is a diabetic, has a history of treated hypertension that is under good control, and dyslipidemia that is under good control on statin therapy  He is a former smoker  In 2019, an evaluation for dyspnea included an echocardiogram that disclosed normal LV systolic function with grade 1 diastolic dysfunction  Subsequent coronary angiography disclosed no obstructive coronary disease and normal LV systolic function with a normal LVEDP  There is a past history of 1 or 2 episodes of atrial flutter with no recurrence  There is no recent history of anginal symptoms are symptoms suggestive of congestive heart failure or of arrhythmia  There is a history of pulmonary sarcoid  He believes that this is now in remission  He is no longer on steroids  There has been no suggestion of cardiac involvement  Echocardiography in the past was unremarkable, and ECGs have disclosed no evidence of conduction abnormalities      Patient Active Problem List   Diagnosis   • Type 2 diabetes mellitus, with long-term current use of insulin ([place] [address] )   • Benign prostatic hyperplasia with urinary frequency   • Acquired hypothyroidism   • Unspecified cirrhosis of liver ([place] [address] )   • Mediastinal lymphadenopathy   • Pulmonary sarcoidosis (HCC)   • History of hepatitis C virus infection   • 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   • Sinus tachycardia   • Calculus of gallbladder without cholecystitis without obstruction   • Type 2 diabetes mellitus with other specified complication, with long-term current use of insulin ([place] [address] )   • Essential hypertension   • Nephrolithiasis   • Morbid (severe) obesity due to excess calories (HCC)   • Gastritis without bleeding   • Diarrhea   • GERD (gastroesophageal reflux disease)   • Cardiomyopathy, unspecified type ([place] [address] )     Past Medical History:   Diagnosis Date   • BPH (benign prostatic hyperplasia)    • Diabetes mellitus ([place] [address] )    • Disease of thyroid gland    • Erectile dysfunction    • GERD (gastroesophageal reflux disease)    • Headache    • Headache(784 0) 2018    Each Day   • Hematuria    • Hyperlipidemia    • Hypertension    • Infectious viral hepatitis     Cured by Dr Maria Elena Rock Matter   • Kidney stone    • Liver disease     hepatitis C   • Mediastinal adenopathy    • Obesity    • PE (pulmonary thromboembolism) ([place] [address] )    • Sarcoidosis    • Vitamin D deficiency      Social History     Socioeconomic History   • Marital status: /Civil Union     Spouse name: Not on file   • Number of children: 3   • Years of education: Not on file   • Highest education level: Not on file   Occupational History   • Occupation: manager   Tobacco Use   • Smoking status: Former     Packs/day: 1 00     Years: 1 00     Pack years: 1 00     Types: Cigarettes     Start date: 1971     Quit date:      Years since quittin 9   • Smokeless tobacco: Never   • Tobacco comments:     Quit   Vaping Use   • Vaping Use: Never used   Substance and Sexual Activity   • Alcohol use: Not Currently     Alcohol/week: 0 0 standard drinks     Comment: quit    • Drug use: No   • Sexual activity: Not Currently     Partners: Female     Birth control/protection: None   Other Topics Concern   • Not on file   Social History Narrative    Caffeine use    Daily coffee consumption     Denied daily cola consumption     Denied daily tea consumption     Former smoker (as per [employer])     Current everyday smoker (as per [employer])     Non-smoker (as per [employer])      Social Determinants of Health     Financial Resource Strain: Not on file   Food Insecurity: Not on file   Transportation Needs: Not on file   Physical Activity: Not on file   Stress: Not on file   Social Connections: Not on file   Intimate Partner Violence: Not on file   Housing Stability: Not on file      Family History   Problem Relation Age of Onset   • Stroke Father         Accidental Death at 52   • Cancer Maternal Grandmother    • Valvular heart disease Mother    • Stroke Mother      Past Surgical History:   Procedure Laterality Date   • COLONOSCOPY      last assessed: 08/28/2012; fiberoptic screening    • CYSTOSCOPY      onset: 05/06/2016; Diagnostic    • DENTAL SURGERY     • FL RETROGRADE PYELOGRAM  2/6/2020   • FL RETROGRADE PYELOGRAM  3/3/2020   • LIVER BIOPSY     • LYMPH NODE BIOPSY  6/2019   • WY BRONCHOSCOPY NEEDLE BX TRACHEA MAIN STEM&/BRON N/A 6/6/2019    Procedure: ENDOBRONCHIAL ULTRASOUND (EBUS);   Surgeon: Glori Fothergill, MD;  [facility];  Service: Thoracic   • WY BRONCHOSCOPY,DIAGNOSTIC N/A 6/6/2019    Procedure: Martina William;  Surgeon: Glori Fothergill, MD;  [facility];  Service: Thoracic   • WY CYSTO/URETERO W/LITHOTRIPSY &INDWELL STENT INSRT Right 2/6/2020    Procedure: CYSTOSCOPY WITH LITHOTRIPSY RETROGRADE PYELOGRAM AND INSERTION STENT URETERAL;  Surgeon: Elvira Urrutia MD;  Location[facility];  Service: Urology   • WY CYSTO/URETERO W/LITHOTRIPSY &INDWELL STENT INSRT Right 3/3/2020    Procedure: CYSTOSCOPY URETEROSCOPY WITH LITHOTRIPSY HOLMIUM LASER; BASKET STONE RETRIEVAL; RETROGRADE PYELOGRAM AND INSERTION STENT URETERAL;  Surgeon: Elvira Urrutia MD;  [facility];  Service: Urology   • AK LAP, REPAIR PARAESOPHAGEAL HERNIA, INCL FUNDOPLASTY W/ MESH N/A 12/3/2021    Procedure: ROBOTIC PARAESOPHAGEAL HERNIA  REPAIR, with FUNDOPLICATION;  Surgeon: Shannan Lei MD;  [facility];  Service: General   • AK TRANSURETHRAL ELEC-SURG PROSTATECTOM N/A 9/13/2017    Procedure: [place]; TUR PROSTATE;  Surgeon: Julee Peabody, MD;  [facility];  Service: Urology   • PROSTATE SURGERY  08/2017   • SHOULDER ARTHROSCOPY Right     bicep tendon repair, rotator cuff repair and acromuim shave        Current Outpatient Medications:   •  acetaminophen (TYLENOL) 500 mg tablet, Take 500 mg by mouth every 6 (six) hours as needed for mild pain, Disp: , Rfl:   •  aspirin (ECOTRIN LOW STRENGTH) 81 mg EC tablet, Take 81 mg by mouth daily, Disp: , Rfl:   •  atorvastatin (LIPITOR) 40 mg tablet, TAKE 1 TABLET BY MOUTH EVERY DAY, Disp: 30 tablet, Rfl: 11  •  Cholecalciferol (DIALYVITE VITAMIN D 5000 PO), Take by mouth, Disp: , Rfl:   •  glimepiride (AMARYL) 2 mg tablet, Take 1 tablet (2 mg total) by mouth daily with breakfast, Disp: 30 tablet, Rfl: 5  •  glucose 4 g chewable tablet, Chew 4 tablets (16 g total) as needed for low blood sugar, Disp: 100 tablet, Rfl: 1  •  ibuprofen (MOTRIN) 200 mg tablet, Take by mouth every 6 (six) hours as needed for mild pain  , Disp: , Rfl:   •  Insulin Glargine Solostar (Lantus SoloStar) 100 UNIT/ML SOPN, Inject 12 units under the skin daily, Disp: 15 mL, Rfl: 0  •  Insulin Pen Needle (BD Pen Needle Micro U/F) 32G X 6 MM MISC, Use once daily with insulin pens, Disp: 100 each, Rfl: 0  •  Lancets (OneTouch Delica Plus [id number]) MISC, Use to check blood sugars twice daily, Disp: 100 each, Rfl: 0  •  levothyroxine 50 mcg tablet, TAKE 1 TAB DAILY MONDAY, TUESDAY, WEDNESDAY, THURSDAY, FRIDAY,Y  And 2 tablets on Saturday and sunday, Disp: 30 tablet, Rfl: 5  •  metFORMIN (GLUCOPHAGE) 500 mg tablet, Take 2 tablets (1,000 mg total) by mouth 2 (two) times a day with meals, Disp: 360 tablet, Rfl: 1  •  ondansetron (ZOFRAN) 4 mg tablet, Take 1 tablet (4 mg total) by mouth every 8 (eight) hours as needed for nausea or vomiting, Disp: 20 tablet, Rfl: 0  •  OneTouch Verio test strip, USE TO TEST BLOOD SUGAR 4 TIMES A DAY, Disp: 300 strip, Rfl: 3  No Known Allergies    Labs:  Appointment on 10/29/2022   Component Date Value   • WBC 10/29/2022 5 90    • RBC 10/29/2022 4 80    • Hemoglobin 10/29/2022 14 8    • Hematocrit 10/29/2022 44 2    • MCV 10/29/2022 92    • MCH 10/29/2022 30 8    • MCHC 10/29/2022 33 5    • RDW 10/29/2022 12 2    • MPV 10/29/2022 9 1    • Platelets 02/15/0011 194    • nRBC 10/29/2022 0    • Neutrophils Relative 10/29/2022 50    • Immat GRANS % 10/29/2022 1    • Lymphocytes Relative 10/29/2022 36    • Monocytes Relative 10/29/2022 8    • Eosinophils Relative 10/29/2022 4    • Basophils Relative 10/29/2022 1    • Neutrophils Absolute 10/29/2022 2 99    • Immature Grans Absolute 10/29/2022 0 03    • Lymphocytes Absolute 10/29/2022 2 10    • Monocytes Absolute 10/29/2022 0 47    • Eosinophils Absolute 10/29/2022 0 25    • Basophils Absolute 10/29/2022 0 06    • Sodium 10/29/2022 140    • Potassium 10/29/2022 4 7    • Chloride 10/29/2022 106    • CO2 10/29/2022 29    • ANION GAP 10/29/2022 5    • BUN 10/29/2022 15    • Creatinine 10/29/2022 1 01    • Glucose, Fasting 10/29/2022 124 (H)    • Calcium 10/29/2022 9 3    • AST 10/29/2022 16    • ALT 10/29/2022 20    • Alkaline Phosphatase 10/29/2022 64    • Total Protein 10/29/2022 6 9    • Albumin 10/29/2022 4 2    • Total Bilirubin 10/29/2022 0 64    • eGFR 10/29/2022 78    • Creatinine, Ur 10/29/2022 162 0    • Microalbum  ,U,Random 10/29/2022 7 8    • Microalb Creat Ratio 10/29/2022 5    • Cholesterol 10/29/2022 97    • Triglycerides 10/29/2022 67    • HDL, Direct 10/29/2022 33 (L)    • LDL Calculated 10/29/2022 51    • Non-HDL-Chol (CHOL-HDL) 10/29/2022 64    • Hemoglobin A1C 10/29/2022 6 3 (H)    • EAG 10/29/2022 134    Orders Only on 09/12/2022   Component Date Value   • Right Eye Diabetic Retin* 09/12/2022 None    • Left Eye Diabetic Retino* 09/12/2022 None    Appointment on 07/30/2022   Component Date Value   • Sodium 07/30/2022 139    • Potassium 07/30/2022 4 4    • Chloride 07/30/2022 105    • CO2 07/30/2022 28    • ANION GAP 07/30/2022 6    • BUN 07/30/2022 19    • Creatinine 07/30/2022 1 12    • Glucose, Fasting 07/30/2022 126 (H)    • Calcium 07/30/2022 9 2    • AST 07/30/2022 16    • ALT 07/30/2022 22    • Alkaline Phosphatase 07/30/2022 67    • Total Protein 07/30/2022 7 0    • Albumin 07/30/2022 4 2    • Total Bilirubin 07/30/2022 0 65    • eGFR 07/30/2022 69    • Hemoglobin A1C 07/30/2022 6 3 (H)    • EAG 07/30/2022 134    • TSH 3RD GENERATON 07/30/2022 2 611      Imaging: No results found  Review of Systems:  Review of Systems   Constitutional: Negative  HENT: Negative  Eyes: Negative  Cardiovascular: Negative  Respiratory: Negative  Endocrine: Negative  Hematologic/Lymphatic: Negative  Skin: Negative  Musculoskeletal: Negative  Gastrointestinal: Negative  Genitourinary: Negative  Neurological: Negative  Psychiatric/Behavioral: Negative  Allergic/Immunologic: Negative  All other systems reviewed and are negative  Vitals:    12/16/22 1441   BP: 122/60   Pulse: 99   SpO2: 97%     Weight (last 2 days)     Date/Time Weight    12/16/22 1441 118 (260)          Physical Exam:  Physical Exam  Vitals reviewed  Constitutional:       General: He is not in acute distress  Appearance: He is well-developed  He is not diaphoretic  HENT:      Head: Normocephalic and atraumatic  Eyes:      General: No scleral icterus  Conjunctiva/sclera: Conjunctivae normal    Neck:      Vascular: No JVD  Trachea: No tracheal deviation  Cardiovascular:      Rate and Rhythm: Normal rate and regular rhythm  Pulses: Intact distal pulses        Heart sounds: Normal heart sounds  No murmur heard  No friction rub  No gallop  Pulmonary:      Effort: Pulmonary effort is normal  No respiratory distress  Breath sounds: Normal breath sounds  No stridor  No wheezing or rales  Chest:      Chest wall: No tenderness  Abdominal:      General: Bowel sounds are normal  There is no distension  Palpations: Abdomen is soft  Tenderness: There is no abdominal tenderness  Musculoskeletal:         General: No tenderness  Normal range of motion  Cervical back: Normal range of motion and neck supple  Skin:     General: Skin is warm and dry  Findings: No erythema  Neurological:      Mental Status: He is alert and oriented to person, place, and time  Cranial Nerves: No cranial nerve deficit  Coordination: Coordination normal    Psychiatric:         Behavior: Behavior normal          Thought Content:  Thought content normal          Judgment: Judgment normal          William Velásquez MD

## 2022-12-29 ENCOUNTER — VBI (OUTPATIENT)
Dept: ADMINISTRATIVE | Facility: OTHER | Age: 62
End: 2022-12-29

## 2022-12-29 DIAGNOSIS — E11.65 TYPE 2 DIABETES MELLITUS WITH HYPERGLYCEMIA, WITH LONG-TERM CURRENT USE OF INSULIN (HCC): ICD-10-CM

## 2022-12-29 DIAGNOSIS — Z79.4 TYPE 2 DIABETES MELLITUS WITH HYPERGLYCEMIA, WITH LONG-TERM CURRENT USE OF INSULIN (HCC): ICD-10-CM

## 2022-12-30 RX ORDER — INSULIN GLARGINE 100 [IU]/ML
INJECTION, SOLUTION SUBCUTANEOUS
Qty: 15 ML | Refills: 3 | Status: SHIPPED | OUTPATIENT
Start: 2022-12-30

## 2023-01-05 ENCOUNTER — RA CDI HCC (OUTPATIENT)
Dept: OTHER | Facility: HOSPITAL | Age: 63
End: 2023-01-05

## 2023-01-05 NOTE — PROGRESS NOTES
[facility]  coding opportunities       Chart reviewed, no opportunity found: CHART REVIEWED, NO OPPORTUNITY FOUND        Patients Insurance        Commercial Insurance: [address] [place] [address]

## 2023-01-06 ENCOUNTER — OFFICE VISIT (OUTPATIENT)
Dept: FAMILY MEDICINE CLINIC | Facility: CLINIC | Age: 63
End: 2023-01-06

## 2023-01-06 VITALS
RESPIRATION RATE: 14 BRPM | OXYGEN SATURATION: 98 % | BODY MASS INDEX: 33.47 KG/M2 | DIASTOLIC BLOOD PRESSURE: 70 MMHG | SYSTOLIC BLOOD PRESSURE: 122 MMHG | TEMPERATURE: 97 F | HEIGHT: 74 IN | HEART RATE: 82 BPM | WEIGHT: 260.8 LBS

## 2023-01-06 DIAGNOSIS — Z23 ENCOUNTER FOR IMMUNIZATION: ICD-10-CM

## 2023-01-06 DIAGNOSIS — Z79.4 TYPE 2 DIABETES MELLITUS WITH HYPERGLYCEMIA, WITH LONG-TERM CURRENT USE OF INSULIN (HCC): Primary | ICD-10-CM

## 2023-01-06 DIAGNOSIS — E11.65 TYPE 2 DIABETES MELLITUS WITH HYPERGLYCEMIA, WITH LONG-TERM CURRENT USE OF INSULIN (HCC): Primary | ICD-10-CM

## 2023-01-06 DIAGNOSIS — I10 ESSENTIAL HYPERTENSION: ICD-10-CM

## 2023-01-06 DIAGNOSIS — K74.69 OTHER CIRRHOSIS OF LIVER (HCC): ICD-10-CM

## 2023-01-06 DIAGNOSIS — E03.9 HYPOTHYROIDISM, UNSPECIFIED TYPE: ICD-10-CM

## 2023-01-06 DIAGNOSIS — I42.9 CARDIOMYOPATHY, UNSPECIFIED TYPE (HCC): ICD-10-CM

## 2023-01-06 RX ORDER — LEVOTHYROXINE SODIUM 0.05 MG/1
TABLET ORAL
Qty: 28 TABLET | Refills: 5 | Status: SHIPPED | OUTPATIENT
Start: 2023-01-06

## 2023-01-06 NOTE — PROGRESS NOTES
Name: Roel Garcia      : 1960      MRN: [id number]  Encounter Provider: Mayi Shah DO  Encounter Date: 2023   Encounter department: [address] [place] [address]  Type 2 diabetes mellitus with hyperglycemia, with long-term current use of insulin ([facility])  Comments:  doing well  start jardiance 10mg daily--will try and get him off lantus over time as his a1c's have been excellent  call for any problems  decrease lantus to 10  Orders:  -     Hemoglobin A1C; Future; Expected date: 2023  -     Comprehensive metabolic panel; Future; Expected date: 2023  -     Empagliflozin (Jardiance) 10 MG TABS tablet; Take 1 tablet (10 mg total) by mouth every morning    2  Hypothyroidism, unspecified type  Comments:  at goal  continue current meds    Orders:  -     levothyroxine 50 mcg tablet; TAKE 1 TAB DAILY  MONDAY, TUESDAY, WEDNESDAY, FRIDAY, SATURDAY  And 2 tablets on  and thursday    3  Cardiomyopathy, unspecified type [facility])  Comments:  following with cardiology    4  Other [facility] ([facility] )  Comments:  no signs of same per GI    5  Essential hypertension  Comments:  well-controlled on current meds  continue same    6  Encounter for immunization  -     influenza vaccine, quadrivalent, recombinant, PF, 0 5 mL, for patients 18 yr+ (FLUBLOK)  -     Pneumococcal Conjugate Vaccine 20-valent (Pcv20)      BMI Counseling: Body mass index is 33 48 kg/m²  The BMI is above normal  Nutrition recommendations include encouraging healthy choices of fruits and vegetables  Exercise recommendations include exercising 3-5 times per week  Rationale for BMI follow-up plan is due to patient being overweight or obese  Depression Screening and Follow-up Plan: Patient was screened for depression during today's encounter  They screened negative with a PHQ-2 score of 1  Subjective      HPI   Pt presents in routine f/u    Doing well in general   Recent labs show:    Lab Results   Component Value Date     07/12/2017    SODIUM 140 10/29/2022    K 4 7 10/29/2022     10/29/2022    CO2 29 10/29/2022    ANIONGAP 9 01/01/2016    AGAP 5 10/29/2022    BUN 15 10/29/2022    CREATININE 1 01 10/29/2022    GLUC 167 (H) 12/04/2021    GLUF 124 (H) 10/29/2022    CALCIUM 9 3 10/29/2022    AST 16 10/29/2022    ALT 20 10/29/2022    ALKPHOS 64 10/29/2022    PROT 7 8 01/21/2016    TP 6 9 10/29/2022    BILITOT 0 7 01/21/2016    TBILI 0 64 10/29/2022    EGFR 78 10/29/2022     Lab Results   Component Value Date    HGBA1C 6 3 (H) 10/29/2022     Lab Results   Component Value Date    LDLCALC 51 10/29/2022     Lab Results   Component Value Date    FMG8FXBLRPHM 2 611 07/30/2022     Pt notes sugars have been a bit higher the last few weeks  He is tolerating meds  No low sugars  Not hydrating enough  Recently seen at [facility]  Doing well  Echo next year  No med changes  No back/joint pain that is unusual   No shortness of breath  Review of Systems   Constitutional: Negative for chills, fatigue, fever and unexpected weight change  HENT: Negative for congestion, ear pain, hearing loss, postnasal drip, rhinorrhea, sinus pressure, sinus pain, sore throat, trouble swallowing and voice change  Eyes: Negative for pain, redness and visual disturbance  Respiratory: Negative for cough and shortness of breath  Cardiovascular: Negative for chest pain, palpitations and leg swelling  Gastrointestinal: Negative for abdominal pain, constipation, diarrhea and nausea  Endocrine: Negative for cold intolerance, heat intolerance, polydipsia and polyuria  Genitourinary: Negative for dysuria, frequency and urgency  Musculoskeletal: Negative for arthralgias, joint swelling and myalgias  Skin: Negative for rash  No suspicious lesions   Neurological: Negative for weakness, numbness and headaches  Hematological: Negative for adenopathy         Current Outpatient Medications on File Prior to Visit   Medication Sig   • acetaminophen (TYLENOL) 500 mg tablet Take 500 mg by mouth every 6 (six) hours as needed for mild pain   • aspirin (ECOTRIN LOW STRENGTH) 81 mg EC tablet Take 81 mg by mouth daily   • atorvastatin (LIPITOR) 40 mg tablet TAKE 1 TABLET BY MOUTH EVERY DAY   • Cholecalciferol (DIALYVITE VITAMIN D 5000 PO) Take by mouth   • glimepiride (AMARYL) 2 mg tablet Take 1 tablet (2 mg total) by mouth daily with breakfast   • glucose 4 g chewable tablet Chew 4 tablets (16 g total) as needed for low blood sugar   • ibuprofen (MOTRIN) 200 mg tablet Take by mouth every 6 (six) hours as needed for mild pain     • Insulin Glargine Solostar (Lantus SoloStar) 100 UNIT/ML SOPN INJECT 12 UNITS UNDER THE SKIN DAILY   • Insulin Pen Needle (BD Pen Needle Micro U/F) 32G X 6 MM MISC Use once daily with insulin pens   • Lancets (OneTouch Delica Plus [id number]) MISC Use to check blood sugars twice daily   • metFORMIN (GLUCOPHAGE) 500 mg tablet Take 2 tablets (1,000 mg total) by mouth 2 (two) times a day with meals   • OneTouch Verio test strip USE TO TEST BLOOD SUGAR 4 TIMES A DAY   • [DISCONTINUED] levothyroxine 50 mcg tablet TAKE 1 TAB DAILY MONDAY, TUESDAY, WEDNESDAY, THURSDAY, FRIDAY,Y  And 2 tablets on Saturday and sunday   • ondansetron (ZOFRAN) 4 mg tablet Take 1 tablet (4 mg total) by mouth every 8 (eight) hours as needed for nausea or vomiting       Objective     /70   Pulse 82   Temp (!) 97 °F (36 1 °C)   Resp 14   Ht 6' 2" (1 88 m)   Wt 118 kg (260 lb 12 8 oz)   SpO2 98%   BMI 33 48 kg/m²     Physical Exam  Constitutional:       General: He is not in acute distress  Appearance: He is well-developed  HENT:      Head: Normocephalic and atraumatic  Right Ear: Tympanic membrane, ear canal and external ear normal       Left Ear: Tympanic membrane, ear canal and external ear normal       Nose: Nose normal       Mouth/Throat:      Pharynx: No oropharyngeal exudate     Eyes: Conjunctiva/sclera: Conjunctivae normal       Pupils: Pupils are equal, round, and reactive to light  Neck:      Thyroid: No thyromegaly  Vascular: No carotid bruit or JVD  Cardiovascular:      Rate and Rhythm: Regular rhythm  Heart sounds: S1 normal and S2 normal  No murmur heard  No friction rub  No gallop  No S3 or S4 sounds  Pulmonary:      Effort: Pulmonary effort is normal       Breath sounds: Normal breath sounds  No wheezing, rhonchi or rales  Abdominal:      General: Bowel sounds are normal  There is no distension  Palpations: Abdomen is soft  Tenderness: There is no abdominal tenderness  Lymphadenopathy:      Cervical: No cervical adenopathy  Neurological:      Mental Status: He is alert and oriented to person, place, and time  Cranial Nerves: No cranial nerve deficit  Sensory: No sensory deficit  Deep Tendon Reflexes: Reflexes are normal and symmetric         Troy Edna, DO

## 2023-01-10 DIAGNOSIS — E11.9 TYPE 2 DIABETES MELLITUS WITHOUT COMPLICATION, WITHOUT LONG-TERM CURRENT USE OF INSULIN (HCC): ICD-10-CM

## 2023-01-10 RX ORDER — LANCETS 33 GAUGE
EACH MISCELLANEOUS
Qty: 100 EACH | Refills: 0 | Status: SHIPPED | OUTPATIENT
Start: 2023-01-10 | End: 2023-01-10

## 2023-01-10 RX ORDER — LANCETS 33 GAUGE
EACH MISCELLANEOUS
Qty: 200 EACH | Refills: 3 | Status: SHIPPED | OUTPATIENT
Start: 2023-01-10

## 2023-01-11 DIAGNOSIS — E78.5 DYSLIPIDEMIA: ICD-10-CM

## 2023-01-12 RX ORDER — ATORVASTATIN CALCIUM 40 MG/1
TABLET, FILM COATED ORAL
Qty: 90 TABLET | Refills: 4 | Status: SHIPPED | OUTPATIENT
Start: 2023-01-12

## 2023-02-13 DIAGNOSIS — E03.9 HYPOTHYROIDISM, UNSPECIFIED TYPE: ICD-10-CM

## 2023-02-13 RX ORDER — LEVOTHYROXINE SODIUM 0.05 MG/1
TABLET ORAL
Qty: 28 TABLET | Refills: 0 | Status: SHIPPED | OUTPATIENT
Start: 2023-02-13

## 2023-02-25 DIAGNOSIS — E11.65 TYPE 2 DIABETES MELLITUS WITH HYPERGLYCEMIA, WITH LONG-TERM CURRENT USE OF INSULIN (HCC): ICD-10-CM

## 2023-02-25 DIAGNOSIS — E03.9 ACQUIRED HYPOTHYROIDISM: ICD-10-CM

## 2023-02-25 DIAGNOSIS — Z79.4 TYPE 2 DIABETES MELLITUS WITH HYPERGLYCEMIA, WITH LONG-TERM CURRENT USE OF INSULIN (HCC): ICD-10-CM

## 2023-02-25 DIAGNOSIS — E78.2 MIXED HYPERLIPIDEMIA: ICD-10-CM

## 2023-02-25 DIAGNOSIS — E55.9 VITAMIN D DEFICIENCY: ICD-10-CM

## 2023-02-25 RX ORDER — PEN NEEDLE, DIABETIC 32 GX 1/4"
NEEDLE, DISPOSABLE MISCELLANEOUS
Qty: 100 EACH | Refills: 0 | Status: SHIPPED | OUTPATIENT
Start: 2023-02-25

## 2023-03-14 DIAGNOSIS — E03.9 HYPOTHYROIDISM, UNSPECIFIED TYPE: ICD-10-CM

## 2023-03-14 RX ORDER — LEVOTHYROXINE SODIUM 0.05 MG/1
TABLET ORAL
Qty: 28 TABLET | Refills: 0 | Status: SHIPPED | OUTPATIENT
Start: 2023-03-14

## 2023-03-16 ENCOUNTER — TELEPHONE (OUTPATIENT)
Dept: FAMILY MEDICINE CLINIC | Facility: CLINIC | Age: 63
End: 2023-03-16

## 2023-03-16 NOTE — TELEPHONE ENCOUNTER
----- Message from Danya Kemp sent at 3/15/2023  7:27 PM EDT -----  Regarding: Anxiety  Contact: [phone]  Hi Dr Jagruti Rain,   I am a bit embarrassed to admit I am not feeling so good  I am not suicidal  Just … depressed maybe  I don’t know  My life is changing so fast all of a sudden  I used to love when life was unpredictable but I can’t seem to shake these thoughts  My son just moved out of my house…  I know it’s normal but I feel really really sad  My Mom is not doing well, I expect to get a call from her care home home almost every time the phone rings that she   She is close  I’m always on edge  I retire in a year  My house is almost empty from being a bustling gathering spot  Hard to concentrate at work, hard to sleep, hard to laugh  I avoid people  I keep thinking about how soon I will be in a care home home unable to take care of myself  I am practicing yogic breathing techniques and try to exercise  I don’t know… I feel stupid writing this  All these things are normal things In peoples lives  Just seemed to land hard  I know I will regret writing this

## 2023-03-17 NOTE — TELEPHONE ENCOUNTER
Spoke to patient  Not suicidal and no harmful thoughts  He is overwhelmed with all the negative changes in his life recently  He states he thinks he will regret this message  I assured him mental health is very important and reassured patient  He said he might want to consider medication or other options but would first like Dr Merary Chambers input prior to making a decision

## 2023-04-05 DIAGNOSIS — E03.9 HYPOTHYROIDISM, UNSPECIFIED TYPE: ICD-10-CM

## 2023-04-05 RX ORDER — LEVOTHYROXINE SODIUM 0.05 MG/1
TABLET ORAL
Qty: 28 TABLET | Refills: 0 | Status: SHIPPED | OUTPATIENT
Start: 2023-04-05

## 2023-04-27 DIAGNOSIS — E03.9 HYPOTHYROIDISM, UNSPECIFIED TYPE: ICD-10-CM

## 2023-04-27 RX ORDER — LEVOTHYROXINE SODIUM 0.05 MG/1
TABLET ORAL
Qty: 28 TABLET | Refills: 0 | Status: SHIPPED | OUTPATIENT
Start: 2023-04-27

## 2023-04-29 ENCOUNTER — APPOINTMENT (OUTPATIENT)
Dept: LAB | Facility: CLINIC | Age: 63
End: 2023-04-29

## 2023-04-29 DIAGNOSIS — Z79.4 TYPE 2 DIABETES MELLITUS WITH HYPERGLYCEMIA, WITH LONG-TERM CURRENT USE OF INSULIN (HCC): ICD-10-CM

## 2023-04-29 DIAGNOSIS — E11.65 TYPE 2 DIABETES MELLITUS WITH HYPERGLYCEMIA, WITH LONG-TERM CURRENT USE OF INSULIN (HCC): ICD-10-CM

## 2023-04-29 LAB
ALBUMIN SERPL BCP-MCNC: 4.2 G/DL (ref 3.5–5)
ALP SERPL-CCNC: 55 U/L (ref 34–104)
ALT SERPL W P-5'-P-CCNC: 20 U/L (ref 7–52)
ANION GAP SERPL CALCULATED.3IONS-SCNC: 7 MMOL/L (ref 4–13)
AST SERPL W P-5'-P-CCNC: 16 U/L (ref 13–39)
BILIRUB SERPL-MCNC: 0.74 MG/DL (ref 0.2–1)
BUN SERPL-MCNC: 22 MG/DL (ref 5–25)
CALCIUM SERPL-MCNC: 9.2 MG/DL (ref 8.4–10.2)
CHLORIDE SERPL-SCNC: 107 MMOL/L (ref 96–108)
CO2 SERPL-SCNC: 27 MMOL/L (ref 21–32)
CREAT SERPL-MCNC: 1 MG/DL (ref 0.6–1.3)
GFR SERPL CREATININE-BSD FRML MDRD: 79 ML/MIN/1.73SQ M
GLUCOSE P FAST SERPL-MCNC: 103 MG/DL (ref 65–99)
POTASSIUM SERPL-SCNC: 4.1 MMOL/L (ref 3.5–5.3)
PROT SERPL-MCNC: 6.6 G/DL (ref 6.4–8.4)
SODIUM SERPL-SCNC: 141 MMOL/L (ref 135–147)

## 2023-05-02 LAB
EST. AVERAGE GLUCOSE BLD GHB EST-MCNC: 126 MG/DL
HBA1C MFR BLD: 6 %

## 2023-05-05 ENCOUNTER — OFFICE VISIT (OUTPATIENT)
Dept: FAMILY MEDICINE CLINIC | Facility: CLINIC | Age: 63
End: 2023-05-05

## 2023-05-05 VITALS
DIASTOLIC BLOOD PRESSURE: 68 MMHG | SYSTOLIC BLOOD PRESSURE: 118 MMHG | OXYGEN SATURATION: 98 % | RESPIRATION RATE: 18 BRPM | TEMPERATURE: 97.3 F | WEIGHT: 251.6 LBS | HEIGHT: 74 IN | BODY MASS INDEX: 32.29 KG/M2 | HEART RATE: 112 BPM

## 2023-05-05 DIAGNOSIS — Z79.4 TYPE 2 DIABETES MELLITUS WITH HYPERGLYCEMIA, WITH LONG-TERM CURRENT USE OF INSULIN (HCC): ICD-10-CM

## 2023-05-05 DIAGNOSIS — E11.65 TYPE 2 DIABETES MELLITUS WITH HYPERGLYCEMIA, WITH LONG-TERM CURRENT USE OF INSULIN (HCC): ICD-10-CM

## 2023-05-05 DIAGNOSIS — E11.9 TYPE 2 DIABETES MELLITUS WITHOUT COMPLICATION, WITH LONG-TERM CURRENT USE OF INSULIN (HCC): Primary | ICD-10-CM

## 2023-05-05 DIAGNOSIS — E78.2 MIXED HYPERLIPIDEMIA: ICD-10-CM

## 2023-05-05 DIAGNOSIS — Z79.4 TYPE 2 DIABETES MELLITUS WITHOUT COMPLICATION, WITH LONG-TERM CURRENT USE OF INSULIN (HCC): Primary | ICD-10-CM

## 2023-05-05 DIAGNOSIS — E55.9 VITAMIN D DEFICIENCY: ICD-10-CM

## 2023-05-05 DIAGNOSIS — E03.9 ACQUIRED HYPOTHYROIDISM: ICD-10-CM

## 2023-05-05 DIAGNOSIS — F43.21 ADJUSTMENT DISORDER WITH DEPRESSED MOOD: ICD-10-CM

## 2023-05-05 DIAGNOSIS — D86.0 PULMONARY SARCOIDOSIS (HCC): ICD-10-CM

## 2023-05-05 DIAGNOSIS — Z23 NEED FOR SHINGLES VACCINE: ICD-10-CM

## 2023-05-05 NOTE — PROGRESS NOTES
Name: Marylen Greek      : 1960      MRN: [id number]  Encounter Provider: Emmanuel Boas, DO  Encounter Date: 2023   Encounter department: [address] [place] [address]  Type 2 diabetes mellitus without complication, with long-term current use of insulin (HCC)  Comments:  a1c at goal  tolerating jardiance, so will increase to 25mg daily and stop lantus  recheck labs 4 mo  Orders:  -     Empagliflozin 25 MG TABS; Take 1 tablet (25 mg total) by mouth daily  -     Hemoglobin A1C; Future; Expected date: 2023  -     Comprehensive metabolic panel; Future; Expected date: 2023  -     CBC and differential; Future; Expected date: 2023  -     Microalbumin / creatinine urine ratio; Future; Expected date: 2023  -     TSH, 3rd generation with Free T4 reflex; Future; Expected date: 2023  -     Lipid Panel with Direct LDL reflex; Future; Expected date: 2023    2  Need for shingles vaccine  -     Zoster Vaccine Recombinant IM    3  Pulmonary sarcoidosis ([facility] [address] )  Comments:  no symptoms  doing well    4  Acquired hypothyroidism  Comments:  labs in 4 mo    5  Adjustment disorder with depressed mood  Comments:  discussed therapy (which pt does not want) and meds  advised to call if he wants to pursue tx           Subjective      HPI   Pt presents in routine f/u    Most recent labs show:    Lab Results   Component Value Date     2017    SODIUM 141 2023    K 4 1 2023     2023    CO2 27 2023    ANIONGAP 9 2016    AGAP 7 2023    BUN 22 2023    CREATININE 1 00 2023    GLUC 167 (H) 2021    GLUF 103 (H) 2023    CALCIUM 9 2 2023    AST 16 2023    ALT 20 2023    ALKPHOS 55 2023    PROT 7 8 2016    TP 6 6 2023    BILITOT 0 7 2016    TBILI 0 74 2023    EGFR 79 2023     Lab Results   Component Value Date    HGBA1C 6 0 (H) 2023 Tolerating jardiance  No frequency, urgency  No low sugars  No chest pain, shortness of breath, n/v, abd pain, visual changes, paresthesias, weakness    Pt's mom was on hospice and  yesterday  Her illness has been very difficult for him  Kids are grown, job is winding down  Feels low mood, low energy  Poor concentration  No si/hi  Doesn't want to do anything about this at present  Discussed both therapy and medication  Discussed pros and cons of medication      Review of Systems   Constitutional: Negative for chills, fatigue, fever and unexpected weight change  HENT: Negative for congestion, ear pain, hearing loss, postnasal drip, rhinorrhea, sinus pressure, sinus pain, sore throat, trouble swallowing and voice change  Eyes: Negative for pain, redness and visual disturbance  Respiratory: Negative for cough and shortness of breath  Cardiovascular: Negative for chest pain, palpitations and leg swelling  Gastrointestinal: Negative for abdominal pain, constipation, diarrhea and nausea  Endocrine: Negative for cold intolerance, heat intolerance, polydipsia and polyuria  Genitourinary: Negative for dysuria, frequency and urgency  Musculoskeletal: Negative for arthralgias, joint swelling and myalgias  Skin: Negative for rash  No suspicious lesions   Neurological: Negative for weakness, numbness and headaches  Hematological: Negative for adenopathy         Current Outpatient Medications on File Prior to Visit   Medication Sig    acetaminophen (TYLENOL) 500 mg tablet Take 500 mg by mouth every 6 (six) hours as needed for mild pain    aspirin (ECOTRIN LOW STRENGTH) 81 mg EC tablet Take 81 mg by mouth daily    atorvastatin (LIPITOR) 40 mg tablet TAKE 1 TABLET BY MOUTH EVERY DAY    BD Pen Needle Micro U/F 32G X 6 MM MISC USE ONCE DAILY WITH INSULIN PENS    Cholecalciferol (DIALYVITE VITAMIN D 5000 PO) Take by mouth    glimepiride (AMARYL) 2 mg tablet TAKE 1 TABLET BY MOUTH "DAILY WITH BREAKFAST    glucose 4 g chewable tablet Chew 4 tablets (16 g total) as needed for low blood sugar    ibuprofen (MOTRIN) 200 mg tablet Take by mouth every 6 (six) hours as needed for mild pain      Lancets (OneTouch Delica Plus [id number]) MISC USE TO CHECK BLOOD SUGARS TWICE DAILY    levothyroxine 50 mcg tablet TAKE 1 TAB DAILY MONDAY, TUESDAY, WEDNESDAY, FRIDAY, SATURDAY AND 2 TABLETS ON SUNDAY AND THURSDAY    OneTouch Verio test strip USE TO TEST BLOOD SUGAR 4 TIMES A DAY    [DISCONTINUED] Empagliflozin (Jardiance) 10 MG TABS tablet Take 1 tablet (10 mg total) by mouth every morning    [DISCONTINUED] Insulin Glargine Solostar (Lantus SoloStar) 100 UNIT/ML SOPN INJECT 12 UNITS UNDER THE SKIN DAILY    ondansetron (ZOFRAN) 4 mg tablet Take 1 tablet (4 mg total) by mouth every 8 (eight) hours as needed for nausea or vomiting    [DISCONTINUED] metFORMIN (GLUCOPHAGE) 500 mg tablet Take 2 tablets (1,000 mg total) by mouth 2 (two) times a day with meals       Objective     /68   Pulse (!) 112   Temp (!) 97 3 °F (36 3 °C)   Resp 18   Ht 6' 2\" (1 88 m)   Wt 114 kg (251 lb 9 6 oz)   SpO2 98%   BMI 32 30 kg/m²     Physical Exam  Constitutional:       General: He is not in acute distress  Appearance: He is well-developed  HENT:      Head: Normocephalic and atraumatic  Right Ear: Tympanic membrane, ear canal and external ear normal       Left Ear: Tympanic membrane, ear canal and external ear normal       Nose: Nose normal       Mouth/Throat:      Pharynx: No oropharyngeal exudate  Eyes:      Conjunctiva/sclera: Conjunctivae normal       Pupils: Pupils are equal, round, and reactive to light  Neck:      Thyroid: No thyromegaly  Vascular: No carotid bruit or JVD  Cardiovascular:      Rate and Rhythm: Regular rhythm  Heart sounds: S1 normal and S2 normal  No murmur heard  No friction rub  No gallop  No S3 or S4 sounds     Pulmonary:      Effort: Pulmonary effort is " normal       Breath sounds: Normal breath sounds  No wheezing, rhonchi or rales  Abdominal:      General: Bowel sounds are normal  There is no distension  Palpations: Abdomen is soft  Tenderness: There is no abdominal tenderness  Lymphadenopathy:      Cervical: No cervical adenopathy  Neurological:      Mental Status: He is alert and oriented to person, place, and time  Cranial Nerves: No cranial nerve deficit  Sensory: No sensory deficit  Deep Tendon Reflexes: Reflexes are normal and symmetric  Psychiatric:         Attention and Perception: Attention normal          Mood and Affect: Mood is depressed  Affect is not inappropriate  Speech: Speech normal          Behavior: Behavior normal          Thought Content:  Thought content normal          Cognition and Memory: Cognition normal          Judgment: Judgment normal        Maria T Sutton DO

## 2023-05-05 NOTE — PATIENT INSTRUCTIONS
Increase jardiance to 25mg daily  Stop insulin  Recheck labs in 4 mo  Call if you want to do anything about mood

## 2023-05-23 DIAGNOSIS — E03.9 HYPOTHYROIDISM, UNSPECIFIED TYPE: ICD-10-CM

## 2023-05-23 RX ORDER — LEVOTHYROXINE SODIUM 0.05 MG/1
TABLET ORAL
Qty: 28 TABLET | Refills: 0 | Status: SHIPPED | OUTPATIENT
Start: 2023-05-23

## 2023-05-27 DIAGNOSIS — Z79.4 TYPE 2 DIABETES MELLITUS WITH HYPERGLYCEMIA, WITH LONG-TERM CURRENT USE OF INSULIN (HCC): ICD-10-CM

## 2023-05-27 DIAGNOSIS — E78.2 MIXED HYPERLIPIDEMIA: ICD-10-CM

## 2023-05-27 DIAGNOSIS — E11.65 TYPE 2 DIABETES MELLITUS WITH HYPERGLYCEMIA, WITH LONG-TERM CURRENT USE OF INSULIN (HCC): ICD-10-CM

## 2023-05-27 DIAGNOSIS — E55.9 VITAMIN D DEFICIENCY: ICD-10-CM

## 2023-05-27 DIAGNOSIS — E03.9 ACQUIRED HYPOTHYROIDISM: ICD-10-CM

## 2023-05-28 DIAGNOSIS — Z79.4 TYPE 2 DIABETES MELLITUS WITH HYPERGLYCEMIA, WITH LONG-TERM CURRENT USE OF INSULIN (HCC): ICD-10-CM

## 2023-05-28 DIAGNOSIS — E11.65 TYPE 2 DIABETES MELLITUS WITH HYPERGLYCEMIA, WITH LONG-TERM CURRENT USE OF INSULIN (HCC): ICD-10-CM

## 2023-05-28 RX ORDER — GLIMEPIRIDE 2 MG/1
TABLET ORAL
Qty: 90 TABLET | Refills: 1 | Status: SHIPPED | OUTPATIENT
Start: 2023-05-28

## 2023-06-15 DIAGNOSIS — E03.9 HYPOTHYROIDISM, UNSPECIFIED TYPE: ICD-10-CM

## 2023-06-15 RX ORDER — LEVOTHYROXINE SODIUM 0.05 MG/1
TABLET ORAL
Qty: 28 TABLET | Refills: 0 | Status: SHIPPED | OUTPATIENT
Start: 2023-06-15

## 2023-06-27 DIAGNOSIS — E03.9 HYPOTHYROIDISM, UNSPECIFIED TYPE: ICD-10-CM

## 2023-06-27 RX ORDER — LEVOTHYROXINE SODIUM 0.05 MG/1
TABLET ORAL
Qty: 30 TABLET | Refills: 1 | OUTPATIENT
Start: 2023-06-27

## 2023-07-11 ENCOUNTER — PATIENT MESSAGE (OUTPATIENT)
Dept: FAMILY MEDICINE CLINIC | Facility: CLINIC | Age: 63
End: 2023-07-11

## 2023-07-11 DIAGNOSIS — E03.9 HYPOTHYROIDISM, UNSPECIFIED TYPE: ICD-10-CM

## 2023-07-11 RX ORDER — LEVOTHYROXINE SODIUM 0.05 MG/1
TABLET ORAL
Qty: 28 TABLET | Refills: 3 | Status: SHIPPED | OUTPATIENT
Start: 2023-07-11

## 2023-08-02 DIAGNOSIS — E11.9 TYPE 2 DIABETES MELLITUS WITHOUT COMPLICATION, WITH LONG-TERM CURRENT USE OF INSULIN (HCC): ICD-10-CM

## 2023-08-02 DIAGNOSIS — Z79.4 TYPE 2 DIABETES MELLITUS WITHOUT COMPLICATION, WITH LONG-TERM CURRENT USE OF INSULIN (HCC): ICD-10-CM

## 2023-08-02 DIAGNOSIS — E03.9 HYPOTHYROIDISM, UNSPECIFIED TYPE: ICD-10-CM

## 2023-08-02 RX ORDER — LEVOTHYROXINE SODIUM 0.05 MG/1
TABLET ORAL
Qty: 28 TABLET | Refills: 0 | Status: SHIPPED | OUTPATIENT
Start: 2023-08-02

## 2023-08-22 DIAGNOSIS — E03.9 HYPOTHYROIDISM, UNSPECIFIED TYPE: ICD-10-CM

## 2023-08-22 RX ORDER — LEVOTHYROXINE SODIUM 0.05 MG/1
TABLET ORAL
Qty: 28 TABLET | Refills: 0 | Status: SHIPPED | OUTPATIENT
Start: 2023-08-22

## 2023-08-30 ENCOUNTER — RA CDI HCC (OUTPATIENT)
Dept: OTHER | Facility: HOSPITAL | Age: 63
End: 2023-08-30

## 2023-08-30 NOTE — PROGRESS NOTES
[facility] [address] [facility] coding opportunities       Chart reviewed, no opportunity found: CHART REVIEWED, NO OPPORTUNITY FOUND     Patients Insurance      Commercial Insurance: Antony Gregg

## 2023-09-05 ENCOUNTER — APPOINTMENT (OUTPATIENT)
Dept: LAB | Facility: CLINIC | Age: 63
End: 2023-09-05
Payer: COMMERCIAL

## 2023-09-05 DIAGNOSIS — E11.9 TYPE 2 DIABETES MELLITUS WITHOUT COMPLICATION, WITH LONG-TERM CURRENT USE OF INSULIN (HCC): ICD-10-CM

## 2023-09-05 DIAGNOSIS — Z79.4 TYPE 2 DIABETES MELLITUS WITHOUT COMPLICATION, WITH LONG-TERM CURRENT USE OF INSULIN (HCC): ICD-10-CM

## 2023-09-05 DIAGNOSIS — I42.9 CARDIOMYOPATHY, UNSPECIFIED TYPE (HCC): ICD-10-CM

## 2023-09-05 LAB
ALBUMIN SERPL BCP-MCNC: 4.2 G/DL (ref 3.5–5)
ALP SERPL-CCNC: 68 U/L (ref 34–104)
ALT SERPL W P-5'-P-CCNC: 24 U/L (ref 7–52)
ANION GAP SERPL CALCULATED.3IONS-SCNC: 8 MMOL/L
AST SERPL W P-5'-P-CCNC: 20 U/L (ref 13–39)
BASOPHILS # BLD AUTO: 0.06 THOUSANDS/ÂΜL (ref 0–0.1)
BASOPHILS NFR BLD AUTO: 1 % (ref 0–1)
BILIRUB SERPL-MCNC: 0.68 MG/DL (ref 0.2–1)
BUN SERPL-MCNC: 23 MG/DL (ref 5–25)
CALCIUM SERPL-MCNC: 9.2 MG/DL (ref 8.4–10.2)
CHLORIDE SERPL-SCNC: 103 MMOL/L (ref 96–108)
CHOLEST SERPL-MCNC: 106 MG/DL
CO2 SERPL-SCNC: 28 MMOL/L (ref 21–32)
CREAT SERPL-MCNC: 1.14 MG/DL (ref 0.6–1.3)
CREAT UR-MCNC: 139.6 MG/DL
EOSINOPHIL # BLD AUTO: 0.8 THOUSAND/ÂΜL (ref 0–0.61)
EOSINOPHIL NFR BLD AUTO: 12 % (ref 0–6)
ERYTHROCYTE [DISTWIDTH] IN BLOOD BY AUTOMATED COUNT: 12.9 % (ref 11.6–15.1)
GFR SERPL CREATININE-BSD FRML MDRD: 67 ML/MIN/1.73SQ M
GLUCOSE P FAST SERPL-MCNC: 150 MG/DL (ref 65–99)
HCT VFR BLD AUTO: 48.7 % (ref 36.5–49.3)
HDLC SERPL-MCNC: 35 MG/DL
HGB BLD-MCNC: 15.7 G/DL (ref 12–17)
IMM GRANULOCYTES # BLD AUTO: 0.01 THOUSAND/UL (ref 0–0.2)
IMM GRANULOCYTES NFR BLD AUTO: 0 % (ref 0–2)
LDLC SERPL CALC-MCNC: 41 MG/DL (ref 0–100)
LYMPHOCYTES # BLD AUTO: 2.11 THOUSANDS/ÂΜL (ref 0.6–4.47)
LYMPHOCYTES NFR BLD AUTO: 32 % (ref 14–44)
MCH RBC QN AUTO: 30.9 PG (ref 26.8–34.3)
MCHC RBC AUTO-ENTMCNC: 32.2 G/DL (ref 31.4–37.4)
MCV RBC AUTO: 96 FL (ref 82–98)
MICROALBUMIN UR-MCNC: 11 MG/L
MICROALBUMIN/CREAT 24H UR: 8 MG/G CREATININE (ref 0–30)
MONOCYTES # BLD AUTO: 0.59 THOUSAND/ÂΜL (ref 0.17–1.22)
MONOCYTES NFR BLD AUTO: 9 % (ref 4–12)
NEUTROPHILS # BLD AUTO: 3.01 THOUSANDS/ÂΜL (ref 1.85–7.62)
NEUTS SEG NFR BLD AUTO: 46 % (ref 43–75)
NRBC BLD AUTO-RTO: 0 /100 WBCS
PLATELET # BLD AUTO: 207 THOUSANDS/UL (ref 149–390)
PMV BLD AUTO: 10 FL (ref 8.9–12.7)
POTASSIUM SERPL-SCNC: 4.5 MMOL/L (ref 3.5–5.3)
PROT SERPL-MCNC: 6.6 G/DL (ref 6.4–8.4)
RBC # BLD AUTO: 5.08 MILLION/UL (ref 3.88–5.62)
SODIUM SERPL-SCNC: 139 MMOL/L (ref 135–147)
TRIGL SERPL-MCNC: 150 MG/DL
TSH SERPL DL<=0.05 MIU/L-ACNC: 3.91 UIU/ML (ref 0.45–4.5)
WBC # BLD AUTO: 6.58 THOUSAND/UL (ref 4.31–10.16)

## 2023-09-05 PROCEDURE — 82570 ASSAY OF URINE CREATININE: CPT

## 2023-09-05 PROCEDURE — 84443 ASSAY THYROID STIM HORMONE: CPT

## 2023-09-05 PROCEDURE — 80053 COMPREHEN METABOLIC PANEL: CPT

## 2023-09-05 PROCEDURE — 85025 COMPLETE CBC W/AUTO DIFF WBC: CPT

## 2023-09-05 PROCEDURE — 80061 LIPID PANEL: CPT

## 2023-09-05 PROCEDURE — 36415 COLL VENOUS BLD VENIPUNCTURE: CPT

## 2023-09-05 PROCEDURE — 82043 UR ALBUMIN QUANTITATIVE: CPT

## 2023-09-05 PROCEDURE — 83036 HEMOGLOBIN GLYCOSYLATED A1C: CPT

## 2023-09-06 LAB
EST. AVERAGE GLUCOSE BLD GHB EST-MCNC: 148 MG/DL
HBA1C MFR BLD: 6.8 %

## 2023-09-08 ENCOUNTER — OFFICE VISIT (OUTPATIENT)
Dept: FAMILY MEDICINE CLINIC | Facility: CLINIC | Age: 63
End: 2023-09-08
Payer: COMMERCIAL

## 2023-09-08 VITALS
HEART RATE: 88 BPM | WEIGHT: 253.6 LBS | SYSTOLIC BLOOD PRESSURE: 134 MMHG | OXYGEN SATURATION: 98 % | RESPIRATION RATE: 16 BRPM | HEIGHT: 74 IN | DIASTOLIC BLOOD PRESSURE: 82 MMHG | BODY MASS INDEX: 32.55 KG/M2 | TEMPERATURE: 97.6 F

## 2023-09-08 DIAGNOSIS — E03.9 HYPOTHYROIDISM, UNSPECIFIED TYPE: ICD-10-CM

## 2023-09-08 DIAGNOSIS — E78.2 MIXED HYPERLIPIDEMIA: ICD-10-CM

## 2023-09-08 DIAGNOSIS — Z23 ENCOUNTER FOR IMMUNIZATION: ICD-10-CM

## 2023-09-08 DIAGNOSIS — K74.69 OTHER CIRRHOSIS OF LIVER (HCC): ICD-10-CM

## 2023-09-08 DIAGNOSIS — Z79.4 TYPE 2 DIABETES MELLITUS WITH OTHER SPECIFIED COMPLICATION, WITH LONG-TERM CURRENT USE OF INSULIN (HCC): Primary | ICD-10-CM

## 2023-09-08 DIAGNOSIS — E11.69 TYPE 2 DIABETES MELLITUS WITH OTHER SPECIFIED COMPLICATION, WITH LONG-TERM CURRENT USE OF INSULIN (HCC): Primary | ICD-10-CM

## 2023-09-08 DIAGNOSIS — R05.9 COUGH, UNSPECIFIED TYPE: ICD-10-CM

## 2023-09-08 PROCEDURE — 99214 OFFICE O/P EST MOD 30 MIN: CPT | Performed by: FAMILY MEDICINE

## 2023-09-08 PROCEDURE — 90750 HZV VACC RECOMBINANT IM: CPT

## 2023-09-08 PROCEDURE — 90471 IMMUNIZATION ADMIN: CPT

## 2023-09-08 NOTE — PROGRESS NOTES
Diabetic Foot Exam    Patient's shoes and socks removed. Right Foot/Ankle   Right Foot Inspection  Skin Exam: skin normal and skin intact. No dry skin, no warmth, no callus, no erythema, no maceration, no abnormal color, no pre-ulcer, no ulcer and no callus. Toe Exam: ROM and strength within normal limits. No swelling, no tenderness, erythema and  no right toe deformity    Sensory   Monofilament testing: intact    Vascular  Capillary refills: < 3 seconds  The right PT pulse is 2+. Left Foot/Ankle  Left Foot Inspection  Skin Exam: skin normal and skin intact. No dry skin, no warmth, no erythema, no maceration, normal color, no pre-ulcer, no ulcer and no callus. Toe Exam: ROM and strength within normal limits. No swelling, no tenderness, no erythema and no left toe deformity. Sensory   Monofilament testing: intact    Vascular  Capillary refills: < 3 seconds  The left PT pulse is 2+.      Assign Risk Category  No deformity present  No loss of protective sensation  No weak pulses  Risk: 0

## 2023-09-08 NOTE — PATIENT INSTRUCTIONS
Call GI for appt (dr. Rupert Abbott) and talk about choking feeling and the liver  Obtain us elastography  Chest xray  Continue rest of meds  F/u 6 mo

## 2023-09-08 NOTE — PROGRESS NOTES
Name: Manjit Tavarez      : 1960      MRN: [id number]  Encounter Provider: Kat Falcon DO  Encounter Date: 2023   Encounter department: [address]     1. Type 2 diabetes mellitus with other specified complication, with long-term current use of insulin (HCC)  Comments:  at goal  continue current meds  f/u 6 mo with labs prior  Orders:  -     Comprehensive metabolic panel; Future; Expected date: 2024  -     Hemoglobin A1C; Future; Expected date: 2024    2. Other cirrhosis of liver ([address])  -     US elastography; Future; Expected date: 2023    3. Hypothyroidism, unspecified type  Comments:  tsh at goal  continue current meds    Orders:  -     TSH, 3rd generation; Future; Expected date: 2024    4. Cough, unspecified type  Comments:  GERD vs stricture vs other  check cxr given sarcoid hx  refer to GI  Orders:  -     XR chest pa & lateral; Future; Expected date: 2023    5. Mixed hyperlipidemia  Comments:  continue lipitor at current dosing    6. Encounter for immunization  -     Zoster Vaccine Recombinant IM           Subjective     HPI   Pt presents in f/u for DM2.   Most recent labs show:  Lab Results   Component Value Date     2017    SODIUM 139 2023    K 4.5 2023     2023    CO2 28 2023    ANIONGAP 9 2016    AGAP 8 2023    BUN 23 2023    CREATININE 1.14 2023    GLUC 167 (H) 2021    GLUF 150 (H) 2023    CALCIUM 9.2 2023    AST 20 2023    ALT 24 2023    ALKPHOS 68 2023    PROT 7.8 2016    TP 6.6 2023    BILITOT 0.7 2016    TBILI 0.68 2023    EGFR 67 2023     Lab Results   Component Value Date    HGBA1C 6.8 (H) 2023     Lab Results   Component Value Date    SHJ5UKXAYCKX 3.907 2023     Lab Results   Component Value Date    WBC 6.58 2023    HGB 15.7 2023    HCT 48.7 2023    MCV 96 09/05/2023     09/05/2023     Lab Results   Component Value Date    CHOLESTEROL 106 09/05/2023    CHOLESTEROL 97 10/29/2022    CHOLESTEROL 95 04/02/2022     Lab Results   Component Value Date    HDL 35 (L) 09/05/2023    HDL 33 (L) 10/29/2022    HDL 37 (L) 04/02/2022     Lab Results   Component Value Date    TRIG 150 (H) 09/05/2023    TRIG 67 10/29/2022    TRIG 99 04/02/2022     Lab Results   Component Value Date    NONHDLC 64 10/29/2022    [address] 04/02/2022    [address] 06/02/2021     Lab Results   Component Value Date    LDLCALC 41 09/05/2023     Lab Results   Component Value Date    WBC 6.58 09/05/2023    HGB 15.7 09/05/2023    HCT 48.7 09/05/2023    MCV 96 09/05/2023     09/05/2023     Negative microalb    Otherwise, pt feels okay. Doesn't want mood medication. Pt due for US elastography. Hx of cirrhosis per pt, but no evidence of same when seeing GI here. Due for f/u    Pt notes a cough over the last few months. No shortness of breath. Happens in short fits a time or two during the day. Often around eating. Feels like he has to cough or choke, though he can breathe. Review of Systems   Constitutional: Negative for chills, fatigue, fever and unexpected weight change. HENT: Negative for congestion, ear pain, hearing loss, postnasal drip, rhinorrhea, sinus pressure, sinus pain, sore throat, trouble swallowing and voice change. Eyes: Negative for pain, redness and visual disturbance. Respiratory: Positive for cough. Negative for shortness of breath. Cardiovascular: Negative for chest pain, palpitations and leg swelling. Gastrointestinal: Negative for abdominal pain, constipation, diarrhea and nausea. Endocrine: Negative for cold intolerance, heat intolerance, polydipsia and polyuria. Genitourinary: Negative for dysuria, frequency and urgency. Musculoskeletal: Negative for arthralgias, joint swelling and myalgias. Skin: Negative for rash.         No suspicious lesions   Neurological: Negative for weakness, numbness and headaches. Hematological: Negative for adenopathy. Past Medical History:   Diagnosis Date   • BPH (benign prostatic hyperplasia)    • Diabetes mellitus ([facility])    • Disease of thyroid gland    • Erectile dysfunction    • GERD (gastroesophageal reflux disease)    • Headache    • Headache(784.0) 05/01/2018    Each Day   • Hematuria    • Hyperlipidemia    • Hypertension    • Infectious viral hepatitis 2007    Cured by Dr. Lori Castañeda   • Kidney stone    • Liver disease     hepatitis C   • Mediastinal adenopathy    • Obesity 2007   • PE (pulmonary thromboembolism) ([address] )    • Sarcoidosis    • Vitamin D deficiency      Past Surgical History:   Procedure Laterality Date   • COLONOSCOPY      last assessed: 08/28/2012; fiberoptic screening    • CYSTOSCOPY      onset: 05/06/2016; Diagnostic    • DENTAL SURGERY     • FL RETROGRADE PYELOGRAM  2/6/2020   • FL RETROGRADE PYELOGRAM  3/3/2020   • LIVER BIOPSY     • LYMPH NODE BIOPSY  6/2019   •  [address] INCL FLUOR GDNCE DX W/CELL WASHG [address] [facility] N/A 6/6/2019    Procedure: BRONCHOSCOPY FLEXIBLE;  Surgeon: Marisa Hernandez MD;  Location: [facility];  Service: Thoracic   • ND BRONCHOSCOPY NEEDLE BX TRACHEA MAIN STEM&/BRON N/A 6/6/2019    Procedure: ENDOBRONCHIAL ULTRASOUND (EBUS);   Surgeon: Marisa Hernandez MD;  Location: [facility];  Service: Thoracic   • ND CYSTO/URETERO W/LITHOTRIPSY &INDWELL STENT INSRT Right 2/6/2020    Procedure: CYSTOSCOPY WITH LITHOTRIPSY RETROGRADE PYELOGRAM AND INSERTION STENT URETERAL;  Surgeon: Mikey Moura MD;  Location: [facility];  Service: Urology   • ND CYSTO/URETERO W/LITHOTRIPSY &INDWELL STENT INSRT Right 3/3/2020    Procedure: CYSTOSCOPY URETEROSCOPY WITH LITHOTRIPSY HOLMIUM LASER; BASKET STONE RETRIEVAL; RETROGRADE PYELOGRAM AND INSERTION STENT URETERAL;  Surgeon: Mikey Moura MD;  Location: [facility];  Service: Urology   • ND LAPS RPR PARAESPHGL HRNA [address] W/MESH N/A 12/3/2021    Procedure: ROBOTIC PARAESOPHAGEAL HERNIA  REPAIR, with FUNDOPLICATION;  Surgeon: Chung Horn MD;  Location: [facility];  Service: General   • MN TRURL ELECTROSURG Aston Goel COMPLETE N/A 2017    Procedure: Elidia Mortimer; TUR PROSTATE;  Surgeon: Shelley Cid MD;  Location: [facility];  Service: Urology   • PROSTATE SURGERY  2017   • SHOULDER ARTHROSCOPY Right     bicep tendon repair, rotator cuff repair and acromuim shave      Family History   Problem Relation Age of Onset   • Valvular heart disease Mother    • Stroke Mother    • Stroke Father         Accidental Death at 52   • Cancer Maternal Grandmother      Social History     Socioeconomic History   • Marital status: /Civil Union     Spouse name: None   • Number of children: 3   • Years of education: None   • Highest education level: None   Occupational History   • Occupation: manager   Tobacco Use   • Smoking status: Former     Packs/day: 1.00     Years: 1.00     Total pack years: 1.00     Types: Cigarettes     Start date: 1971     Quit date: 2009     Years since quittin.6   • Smokeless tobacco: Never   Vaping Use   • Vaping Use: Never used   Substance and Sexual Activity   • Alcohol use: Not Currently     Comment: quit    • Drug use: Not Currently     Types: Marijuana   • Sexual activity: Not Currently     Partners: Female     Birth control/protection: None   Other Topics Concern   • None   Social History Narrative    Caffeine use    Daily coffee consumption     Denied daily cola consumption     Denied daily tea consumption     Former smoker (as per [employer])     Current everyday smoker (as per [employer])     Non-smoker (as per [employer])      Social Determinants of Health     Financial Resource Strain: Low Risk  (2023)    Overall Financial Resource Strain (CARDIA)    • Difficulty of Paying Living Expenses: Not hard at all   Food Insecurity: No Food Insecurity (2023)    Hunger Vital Sign    • Worried About Running Out of Food in the Last Year: Never true    • Ran Out of Food in the Last Year: Never true   Transportation Needs: No Transportation Needs (9/8/2023)    PRAPARE - Transportation    • Lack of Transportation (Medical): No    • Lack of Transportation (Non-Medical):  No   Physical Activity: Not on file   Stress: Not on file   Social Connections: Not on file   Intimate Partner Violence: Not on file   Housing Stability: Low Risk  (9/8/2023)    Housing Stability Vital Sign    • Unable to Pay for Housing in the Last Year: No    • Number of Places Lived in the Last Year: 1    • Unstable Housing in the Last Year: No     Current Outpatient Medications on File Prior to Visit   Medication Sig   • aspirin (ECOTRIN LOW STRENGTH) 81 mg EC tablet Take 81 mg by mouth daily   • atorvastatin (LIPITOR) 40 mg tablet TAKE 1 TABLET BY MOUTH EVERY DAY   • BD Pen Needle Micro U/F 32G X 6 MM MISC USE ONCE DAILY WITH INSULIN PENS   • Cholecalciferol (DIALYVITE VITAMIN D 5000 PO) Take by mouth   • Empagliflozin 25 MG TABS Take 1 tablet (25 mg total) by mouth daily   • glimepiride (AMARYL) 2 mg tablet TAKE 1 TABLET BY MOUTH EVERY DAY WITH BREAKFAST   • glucose 4 g chewable tablet Chew 4 tablets (16 g total) as needed for low blood sugar   • ibuprofen (MOTRIN) 200 mg tablet Take 400 mg by mouth every 6 (six) hours as needed for mild pain   • Lancets (OneTouch Delica Plus [id number]) MISC USE TO CHECK BLOOD SUGARS TWICE DAILY   • levothyroxine 50 mcg tablet TAKE 1 TAB DAILY MONDAY, TUESDAY, WEDNESDAY, FRIDAY, SATURDAY AND 2 TABLETS ON SUNDAY AND THURSDAY   • metFORMIN (GLUCOPHAGE) 500 mg tablet TAKE 2 TABLETS BY MOUTH TWICE A DAY WITH FOOD   • OneTouch Verio test strip USE TO TEST BLOOD SUGAR 4 TIMES A DAY   • acetaminophen (TYLENOL) 500 mg tablet Take 500 mg by mouth every 6 (six) hours as needed for mild pain (Patient not taking: Reported on 9/8/2023)   • [DISCONTINUED] ondansetron (ZOFRAN) 4 mg tablet Take 1 tablet (4 mg total) by mouth every 8 (eight) hours as needed for nausea or vomiting     No Known Allergies  Immunization History   Administered Date(s) Administered   • COVID-19 MODERNA VACC 0.5 ML IM 03/15/2021, 04/12/2021   • Hep A, adult 04/27/2021, 10/29/2021   • Hep B, adult 10/27/2020, 11/27/2020, 04/27/2021   • INFLUENZA 01/26/2016, 10/21/2018   • Influenza Quadrivalent, 6-35 Months IM 01/26/2016   • Influenza, recombinant, quadrivalent,injectable, preservative free 10/17/2019, 09/29/2020, 10/29/2021, 01/06/2023   • Pneumococcal Conjugate Vaccine 20-valent (Pcv20), Polysace 01/06/2023   • Pneumococcal Polysaccharide PPV23 01/26/2016   • Tdap 08/23/2019   • Zoster Vaccine Recombinant 05/05/2023, 09/08/2023       Objective     /82 (BP Location: Left arm, Patient Position: Sitting, Cuff Size: Large)   Pulse 88   Temp 97.6 °F (36.4 °C) (Temporal)   Resp 16   Ht 6' 2" (1.88 m)   Wt 115 kg (253 lb 9.6 oz)   SpO2 98%   BMI 32.56 kg/m²     Physical Exam  Constitutional:       General: He is not in acute distress. Appearance: He is well-developed. HENT:      Head: Normocephalic and atraumatic. Right Ear: Tympanic membrane, ear canal and external ear normal.      Left Ear: Tympanic membrane, ear canal and external ear normal.      Nose: Nose normal.      Mouth/Throat:      Pharynx: No oropharyngeal exudate. Eyes:      Conjunctiva/sclera: Conjunctivae normal.      Pupils: Pupils are equal, round, and reactive to light. Neck:      Thyroid: No thyromegaly. Vascular: No carotid bruit or JVD. Cardiovascular:      Rate and Rhythm: Regular rhythm. Heart sounds: S1 normal and S2 normal. No murmur heard. No friction rub. No gallop. No S3 or S4 sounds. Pulmonary:      Effort: Pulmonary effort is normal.      Breath sounds: Normal breath sounds. No wheezing, rhonchi or rales. Abdominal:      General: Bowel sounds are normal. There is no distension. Palpations: Abdomen is soft. Tenderness: There is no abdominal tenderness. Lymphadenopathy:      Cervical: No cervical adenopathy. Neurological:      Mental Status: He is alert and oriented to person, place, and time. Cranial Nerves: No cranial nerve deficit. Sensory: No sensory deficit. Deep Tendon Reflexes: Reflexes are normal and symmetric.        Zoila Evangelista DO

## 2023-09-11 ENCOUNTER — APPOINTMENT (OUTPATIENT)
Dept: RADIOLOGY | Facility: MEDICAL CENTER | Age: 63
End: 2023-09-11
Payer: COMMERCIAL

## 2023-09-11 DIAGNOSIS — R05.9 COUGH, UNSPECIFIED TYPE: ICD-10-CM

## 2023-09-11 PROCEDURE — 71046 X-RAY EXAM CHEST 2 VIEWS: CPT

## 2023-09-12 LAB
LEFT EYE DIABETIC RETINOPATHY: NORMAL
RIGHT EYE DIABETIC RETINOPATHY: NORMAL

## 2023-09-13 DIAGNOSIS — E03.9 HYPOTHYROIDISM, UNSPECIFIED TYPE: ICD-10-CM

## 2023-09-13 RX ORDER — LEVOTHYROXINE SODIUM 0.05 MG/1
TABLET ORAL
Qty: 28 TABLET | Refills: 0 | Status: SHIPPED | OUTPATIENT
Start: 2023-09-13

## 2023-09-15 ENCOUNTER — TELEPHONE (OUTPATIENT)
Dept: ADMINISTRATIVE | Facility: OTHER | Age: 63
End: 2023-09-15

## 2023-09-15 NOTE — TELEPHONE ENCOUNTER
Subjective:   Girish Herr is a 36 y.o. male who presents for Rash (Blisters, has been working outside around possible poison oak trees, x2 weeks, very itchy, has tried OTC medication )       HPI  Pt presents for evaluation of a new problem, reports 2-week history of presently worsening blisters involving the right side of his nose and lips.  Patient states he recently has been working outside, which could have precipitated the beginning of this rash.  Patient has tried Benadryl for his symptoms which has not been helpful.  Patient denies additional constitutional symptoms to include fever, chills, cough, etc.     Review of Systems   Constitutional: Negative.    HENT: Negative.    Eyes: Negative.    Respiratory: Negative.    Cardiovascular: Negative.    Gastrointestinal: Negative.    Genitourinary: Negative.    Musculoskeletal: Negative.    Skin: Positive for itching and rash.   Neurological: Negative.    Psychiatric/Behavioral: Negative.    All other systems reviewed and are negative.      MEDS:   Current Outpatient Medications:   •  meclizine (ANTIVERT) 25 MG Tab, Take 1 Tab by mouth 3 times a day as needed for Dizziness, Nausea/Vomiting or Vertigo., Disp: 30 Tab, Rfl: 0  •  acetaminophen (TYLENOL) 325 MG Tab, Take 2 Tabs by mouth every 6 hours. (Patient not taking: Reported on 2/23/2022), Disp: 30 Tab, Rfl: 0  •  amphetamine-dextroamphetamine (ADDERALL) 20 MG Tab, Take 20 mg by mouth 2 times a day. (Patient not taking: Reported on 2/23/2022), Disp: , Rfl:   ALLERGIES: No Known Allergies    Patient's PMH, SocHx, SurgHx, FamHx, Drug allergies and medications were reviewed.     Objective:   /64   Pulse (!) 106   Temp 36.6 °C (97.9 °F)   Resp 12   Ht 1.829 m (6')   Wt 106 kg (234 lb)   SpO2 97%   BMI 31.74 kg/m²     Physical Exam  Vitals and nursing note reviewed.   Constitutional:       General: He is awake.      Appearance: Normal appearance. He is well-developed.   HENT:      Head: Normocephalic and  Upon review of the In Basket request and the patient's chart, initial outreach has been made via fax to facility. Please see Contacts section for details.      Thank you  Kathleen Bolaños atraumatic.        Comments: Left side of face and nose have several small areas with honey colored crusting, minimal active drainage at this point in time.     Right Ear: External ear normal.      Left Ear: External ear normal.      Nose: Nose normal.      Mouth/Throat:      Lips: Pink.      Mouth: Mucous membranes are moist.      Pharynx: Oropharynx is clear.   Eyes:      General: Lids are normal.      Extraocular Movements: Extraocular movements intact.      Conjunctiva/sclera: Conjunctivae normal.   Cardiovascular:      Rate and Rhythm: Normal rate and regular rhythm.   Pulmonary:      Effort: Pulmonary effort is normal.   Musculoskeletal:         General: Normal range of motion.      Cervical back: Normal range of motion.   Skin:     General: Skin is warm and dry.   Neurological:      Mental Status: He is alert and oriented to person, place, and time.   Psychiatric:         Mood and Affect: Mood normal.         Behavior: Behavior normal. Behavior is cooperative.         Thought Content: Thought content normal.         Judgment: Judgment normal.         Assessment/Plan:   Assessment    1. Pruritic condition  - hydrOXYzine HCl (ATARAX) 25 MG Tab; Take 1 Tablet by mouth 3 times a day as needed for Itching.  Dispense: 30 Tablet; Refill: 0    2. Impetigo  - sulfamethoxazole-trimethoprim (BACTRIM DS) 800-160 MG tablet; Take 1 Tablet by mouth 2 times a day for 7 days.  Dispense: 14 Tablet; Refill: 0    Vital signs stable at today's acute urgent care visit. Begin medications as listed. Discussed management options    Advised the patient to follow-up with the primary care provider for recheck, reevaluation, and/or consideration of further management. Return to urgent care with any worsening/continued symptoms.  Red flags discussed and indications to immediately call 911 or present to the ED.  All questions were encouraged and answered to the patient's satisfaction and understanding, and they agree to the plan of care.      I personally reviewed prior external notes and test results pertinent to today's visit.  I have independently reviewed and interpreted all diagnostics ordered during this urgent care acute visit. Time spent evaluating this patient was a minimum of 25 minutes and includes preparing for visit, counseling/education, exam, evaluation, obtaining history, and ordering lab/test/procedures.      Please note that this dictation was created using voice recognition software. I have made a reasonable attempt to correct obvious errors, but I expect that there are errors of grammar and possibly content that I did not discover before finalizing the note.

## 2023-09-15 NOTE — TELEPHONE ENCOUNTER
----- Message from Aiden Candelarialy sent at 9/8/2023  2:23 PM EDT -----  09/08/23 2:23 PM    Hello, our patient Ester Forde has had Diabetic Eye Exam completed/performed. Please assist in updating the patient chart by making an External outreach to Dr. Erwin Lr facility located in [facility] . The date of service is 09/12/2023.     Thank you,  Claudia Wilburn  PG  Damion Del Real

## 2023-09-15 NOTE — LETTER
Diabetic Eye Exam Form    Date Requested: 09/15/23  Patient: Cristy Herrera  Patient : 1960   Referring Provider: June Oneill DO      DIABETIC Eye Exam Date _______________________________      Type of Exam MUST be documented for Diabetic Eye Exams. Please CHECK ONE. Retinal Exam       Dilated Retinal Exam       OCT       Optomap-Iris Exam      Fundus Photography       Left Eye - Please check Retinopathy or No Retinopathy        Exam did show retinopathy    Exam did not show retinopathy       Right Eye - Please check Retinopathy or No Retinopathy       Exam did show retinopathy    Exam did not show retinopathy       Comments __________________________________________________________    Practice Providing Exam ______________________________________________    Exam Performed By (print name) _______________________________________      Provider Signature ___________________________________________________      These reports are needed for  compliance. Please fax this completed form and a copy of the Diabetic Eye Exam report to our office located at [address] [place] [address] as soon as possible via Fax [phone] attention Wilfred Brand: Phone [phone]  We thank you for your assistance in treating our mutual patient.

## 2023-09-19 NOTE — TELEPHONE ENCOUNTER
As a follow-up, a second attempt has been made for outreach via fax to facility. Please see Contacts section for details.     Thank you  Kathleen Bolaños

## 2023-09-25 ENCOUNTER — HOSPITAL ENCOUNTER (OUTPATIENT)
Dept: RADIOLOGY | Facility: MEDICAL CENTER | Age: 63
Discharge: HOME | End: 2023-09-25
Payer: COMMERCIAL

## 2023-09-25 DIAGNOSIS — K74.69 OTHER CIRRHOSIS OF LIVER (HCC): ICD-10-CM

## 2023-09-25 PROCEDURE — 76981 USE PARENCHYMA: CPT

## 2023-09-28 DIAGNOSIS — Z79.4 TYPE 2 DIABETES MELLITUS WITHOUT COMPLICATION, WITH LONG-TERM CURRENT USE OF INSULIN (HCC): ICD-10-CM

## 2023-09-28 DIAGNOSIS — E11.9 TYPE 2 DIABETES MELLITUS WITHOUT COMPLICATION, WITH LONG-TERM CURRENT USE OF INSULIN (HCC): ICD-10-CM

## 2023-09-28 RX ORDER — EMPAGLIFLOZIN 25 MG/1
TABLET, FILM COATED ORAL
Qty: 30 TABLET | Refills: 1 | Status: SHIPPED | OUTPATIENT
Start: 2023-09-28

## 2023-10-11 DIAGNOSIS — E03.9 HYPOTHYROIDISM, UNSPECIFIED TYPE: ICD-10-CM

## 2023-10-11 RX ORDER — LEVOTHYROXINE SODIUM 0.05 MG/1
TABLET ORAL
Qty: 28 TABLET | Refills: 0 | Status: SHIPPED | OUTPATIENT
Start: 2023-10-11

## 2023-10-19 NOTE — TELEPHONE ENCOUNTER
As a final attempt, a third outreach has been made via telephone call to facility. Please see Contacts section for details. This encounter will be closed and completed by end of day. Should we receive the requested information because of previous outreach attempts, the requested patient's chart will be updated appropriately.      Thank you  Lynette File

## 2023-10-19 NOTE — TELEPHONE ENCOUNTER
Upon review of the In Basket request we were able to locate, review, and update the patient chart as requested for Diabetic Eye Exam.    Any additional questions or concerns should be emailed to the Practice Liaisons via the appropriate education email address, please do not reply via In Basket.     Thank you  Shantel Wallace

## 2023-10-31 DIAGNOSIS — E03.9 HYPOTHYROIDISM, UNSPECIFIED TYPE: ICD-10-CM

## 2023-10-31 RX ORDER — LEVOTHYROXINE SODIUM 0.05 MG/1
TABLET ORAL
Qty: 28 TABLET | Refills: 0 | Status: SHIPPED | OUTPATIENT
Start: 2023-10-31

## 2023-11-14 ENCOUNTER — TELEPHONE (OUTPATIENT)
Dept: GASTROENTEROLOGY | Facility: AMBULARY SURGERY CENTER | Age: 63
End: 2023-11-14

## 2023-11-14 ENCOUNTER — OFFICE VISIT (OUTPATIENT)
Dept: GASTROENTEROLOGY | Facility: AMBULARY SURGERY CENTER | Age: 63
End: 2023-11-14
Payer: COMMERCIAL

## 2023-11-14 VITALS
OXYGEN SATURATION: 98 % | WEIGHT: 252.2 LBS | BODY MASS INDEX: 32.37 KG/M2 | HEIGHT: 74 IN | SYSTOLIC BLOOD PRESSURE: 138 MMHG | HEART RATE: 92 BPM | DIASTOLIC BLOOD PRESSURE: 84 MMHG

## 2023-11-14 DIAGNOSIS — K21.9 GASTROESOPHAGEAL REFLUX DISEASE, UNSPECIFIED WHETHER ESOPHAGITIS PRESENT: ICD-10-CM

## 2023-11-14 DIAGNOSIS — Z12.11 COLON CANCER SCREENING: ICD-10-CM

## 2023-11-14 DIAGNOSIS — R13.19 ESOPHAGEAL DYSPHAGIA: Primary | ICD-10-CM

## 2023-11-14 PROCEDURE — 99214 OFFICE O/P EST MOD 30 MIN: CPT | Performed by: INTERNAL MEDICINE

## 2023-11-14 NOTE — TELEPHONE ENCOUNTER
Our mutual patient is scheduled for procedure: EGD    On: November 17 , 2023     With: Dr. Therese Cordero.  Jordon Rodríguez MD    He/She is taking the following Jardiance    Can this be stopped  4 days prior to the procedure    Physician Approving clearance: Lyudmila Major DO

## 2023-11-14 NOTE — PROGRESS NOTES
[facility]  Progress Note - Irene Gave 58 y.o. male MRN: [id number]    Unit/Bed#:  Encounter: [id number]    Assessment/Plan:    1. Intermittent dysphagia with new onset symptoms of reflux: Reports that reflux is new however dysphagia symptoms have been ongoing for 2 years since having had hiatal hernia repair. He reports choking sensation after first few bites which subsequently resolved. I suspect this may be due to the wrap being tight, educated the patient to chew food well and take smaller bites, however in setting of new symptoms of reflux, it may be reasonable to assess for any Callahan's esophagus or to assess for peptic stricture/esophagitis. -Patient will be retiring in 2 weeks therefore we will plan for EGD sooner than that.  -Would recommend to hold Jardiance 4 days prior to the procedure.  -Educated the patient on smaller meals and chew well. -Further recommendations pending the results of the EGD. -Follow antireflux measures. Avoid the use of NSAIDs.  -We will continue off of PPI for now. 2.  Colon cancer screening: Up to date, previous colonoscopy was in 2018, was recommended 10-year follow-up. No family history of colon cancer. No change in bowel habits. No anemia on labs. 3.  Labs were reviewed, thyroid function is within normal range, CBC shows normal hemoglobin, AST and ALT within normal range, bilirubin normal.    4.  Ultrasound elastography with absence of mild fibrosis and S0 steatosis. Subjective:     18-year-old male with history of systemic sarcoidosis, HCV treated with Harvoni by Dr. Elder White in 2017, hypothyroidism, diabetes, presents for follow-up. Patient has historically reported findings of cirrhosis however his most recent ultrasound elastography and previous MRI did not show any evidence of cirrhosis. His ultrasound elastography 2 months ago showed absent to mild fibrosis ( F0-F1) and S0 steatosis.   EGD in 2021 without any evidence of gastric or esophageal varices. He did have a medium size hiatal hernia, gastritis and duodenitis. He subsequently underwent repair of paraesophageal hernia with mesh and Nissen's fundoplication in 2162. Patient reports that since having had that surgery, he has been having episodes of choking on first few bites when eating. He reports that after he eats he often feels that the food gets stuck and takes a while for it to pass. Patient reports that he has been having more symptoms of reflux recently as well. He has not been taking any PPI or H2 blocker since his hernia repair. He denies any change in bowel habits, no hematochezia, no hematemesis, no melena. No coffee-ground emesis. He denies any unintentional weight loss. He denies any family history of colon cancer. Objective:     Vitals: There were no vitals taken for this visit. ,There is no height or weight on file to calculate BMI. [unfilled]    Physical Exam:    GEN: wn/wd, NAD  HEENT: MMM, no cervical or supraclavicular LAD, anciteric  CV: RRR, no m/r/g  CHEST: CTA b/l, no w/r/r  ABD: +BS, soft, NT/ND, no hepatosplenomegaly  EXT: no c/c/e  SKIN: no rashes  NEURO: aaox3      Invasive Devices       None                           Lab, Imaging and other studies:     No visits with results within 1 Day(s) from this visit. Latest known visit with results is:   Telephone on 09/15/2023   Component Date Value    Right Eye Diabetic Retin* 09/12/2023 None     Left Eye Diabetic Retino* 09/12/2023 None          I have personally reviewed pertinent films in PACS    No current facility-administered medications for this visit.              Answers submitted by the patient for this visit:  Abdominal Pain Questionnaire (Submitted on 11/7/2023)  Chief Complaint: Abdominal pain  Progression since onset: resolved  Pain - numeric: 0/10  Radiates to: does not radiate  anorexia: No  arthralgias: Yes  belching: Yes  constipation: Yes  diarrhea: Yes  dysuria: No  fever: No  flatus: Yes  frequency: No  headaches: Yes  hematochezia: No  hematuria: No  melena: No  myalgias: No  nausea:  No  weight loss: No  vomiting: No  Aggravated by: nothing  Diagnostic workup: ultrasound

## 2023-11-14 NOTE — H&P (VIEW-ONLY)
[facility]  Progress Note - Debbie Rios 58 y.o. male MRN: [id number]    Unit/Bed#:  Encounter: [id number]    Assessment/Plan:    1. Intermittent dysphagia with new onset symptoms of reflux: Reports that reflux is new however dysphagia symptoms have been ongoing for 2 years since having had hiatal hernia repair. He reports choking sensation after first few bites which subsequently resolved. I suspect this may be due to the wrap being tight, educated the patient to chew food well and take smaller bites, however in setting of new symptoms of reflux, it may be reasonable to assess for any Callahan's esophagus or to assess for peptic stricture/esophagitis. -Patient will be retiring in 2 weeks therefore we will plan for EGD sooner than that.  -Would recommend to hold Jardiance 4 days prior to the procedure.  -Educated the patient on smaller meals and chew well. -Further recommendations pending the results of the EGD. -Follow antireflux measures. Avoid the use of NSAIDs.  -We will continue off of PPI for now. 2.  Colon cancer screening: Up to date, previous colonoscopy was in 2018, was recommended 10-year follow-up. No family history of colon cancer. No change in bowel habits. No anemia on labs. 3.  Labs were reviewed, thyroid function is within normal range, CBC shows normal hemoglobin, AST and ALT within normal range, bilirubin normal.    4.  Ultrasound elastography with absence of mild fibrosis and S0 steatosis. Subjective:     78-year-old male with history of systemic sarcoidosis, HCV treated with Harvoni by Dr. Matias Dueñas in 2017, hypothyroidism, diabetes, presents for follow-up. Patient has historically reported findings of cirrhosis however his most recent ultrasound elastography and previous MRI did not show any evidence of cirrhosis. His ultrasound elastography 2 months ago showed absent to mild fibrosis ( F0-F1) and S0 steatosis.   EGD in 2021 without any evidence of gastric or esophageal varices. He did have a medium size hiatal hernia, gastritis and duodenitis. He subsequently underwent repair of paraesophageal hernia with mesh and Nissen's fundoplication in 8630. Patient reports that since having had that surgery, he has been having episodes of choking on first few bites when eating. He reports that after he eats he often feels that the food gets stuck and takes a while for it to pass. Patient reports that he has been having more symptoms of reflux recently as well. He has not been taking any PPI or H2 blocker since his hernia repair. He denies any change in bowel habits, no hematochezia, no hematemesis, no melena. No coffee-ground emesis. He denies any unintentional weight loss. He denies any family history of colon cancer. Objective:     Vitals: There were no vitals taken for this visit. ,There is no height or weight on file to calculate BMI. [unfilled]    Physical Exam:    GEN: wn/wd, NAD  HEENT: MMM, no cervical or supraclavicular LAD, anciteric  CV: RRR, no m/r/g  CHEST: CTA b/l, no w/r/r  ABD: +BS, soft, NT/ND, no hepatosplenomegaly  EXT: no c/c/e  SKIN: no rashes  NEURO: aaox3      Invasive Devices       None                           Lab, Imaging and other studies:     No visits with results within 1 Day(s) from this visit. Latest known visit with results is:   Telephone on 09/15/2023   Component Date Value    Right Eye Diabetic Retin* 09/12/2023 None     Left Eye Diabetic Retino* 09/12/2023 None          I have personally reviewed pertinent films in PACS    No current facility-administered medications for this visit.              Answers submitted by the patient for this visit:  Abdominal Pain Questionnaire (Submitted on 11/7/2023)  Chief Complaint: Abdominal pain  Progression since onset: resolved  Pain - numeric: 0/10  Radiates to: does not radiate  anorexia: No  arthralgias: Yes  belching: Yes  constipation: Yes  diarrhea: Yes  dysuria: No  fever: No  flatus: Yes  frequency: No  headaches: Yes  hematochezia: No  hematuria: No  melena: No  myalgias: No  nausea:  No  weight loss: No  vomiting: No  Aggravated by: nothing  Diagnostic workup: ultrasound

## 2023-11-14 NOTE — PATIENT INSTRUCTIONS
Scheduled date of EGD(as of today): 11/17/23  Physician performing EGD: Dr. Ophelia Sadler  Location of EGD:[facility] [address] [facility],[address]  Instructions reviewed with patient by: Venus Terry  Clearances: Bharathi Camarena

## 2023-11-15 ENCOUNTER — ANESTHESIA EVENT (OUTPATIENT)
Dept: ANESTHESIOLOGY | Facility: HOSPITAL | Age: 63
End: 2023-11-15

## 2023-11-15 ENCOUNTER — ANESTHESIA (OUTPATIENT)
Dept: ANESTHESIOLOGY | Facility: HOSPITAL | Age: 63
End: 2023-11-15

## 2023-11-16 ENCOUNTER — HOSPITAL ENCOUNTER (OUTPATIENT)
Dept: NON INVASIVE DIAGNOSTICS | Facility: MEDICAL CENTER | Age: 63
Discharge: HOME | End: 2023-11-16
Payer: COMMERCIAL

## 2023-11-16 VITALS
BODY MASS INDEX: 32.34 KG/M2 | SYSTOLIC BLOOD PRESSURE: 138 MMHG | DIASTOLIC BLOOD PRESSURE: 84 MMHG | WEIGHT: 252 LBS | HEART RATE: 75 BPM | HEIGHT: 74 IN

## 2023-11-16 DIAGNOSIS — I42.9 CARDIOMYOPATHY, UNSPECIFIED TYPE (HCC): ICD-10-CM

## 2023-11-16 LAB
AORTIC ROOT: 4.3 CM
AORTIC VALVE ANNULUS: 2.8 CM
APICAL FOUR CHAMBER EJECTION FRACTION: 60 %
ASCENDING AORTA: 4.1 CM
E WAVE DECELERATION TIME: 297 MS
E/A RATIO: 0.6
FRACTIONAL SHORTENING: 33 (ref 28–44)
INTERVENTRICULAR SEPTUM IN DIASTOLE (PARASTERNAL SHORT AXIS VIEW): 1.4 CM
INTERVENTRICULAR SEPTUM: 1.4 CM (ref 0.6–1.1)
LAAS-AP2: 18 CM2
LAAS-AP4: 18.9 CM2
LEFT ATRIUM SIZE: 3.8 CM
LEFT ATRIUM VOLUME (MOD BIPLANE): 47 ML
LEFT ATRIUM VOLUME INDEX (MOD BIPLANE): 19.6 ML/M2
LEFT INTERNAL DIMENSION IN SYSTOLE: 3.2 CM (ref 2.1–4)
LEFT VENTRICULAR INTERNAL DIMENSION IN DIASTOLE: 4.8 CM (ref 3.5–6)
LEFT VENTRICULAR POSTERIOR WALL IN END DIASTOLE: 1 CM
LEFT VENTRICULAR STROKE VOLUME: 68 ML
LVSV (TEICH): 68 ML
MV E'TISSUE VEL-LAT: 11 CM/S
MV E'TISSUE VEL-SEP: 8 CM/S
MV PEAK A VEL: 0.62 M/S
MV PEAK E VEL: 37 CM/S
MV STENOSIS PRESSURE HALF TIME: 86 MS
MV VALVE AREA P 1/2 METHOD: 2.56
RIGHT ATRIUM AREA SYSTOLE A4C: 21.6 CM2
RIGHT VENTRICLE ID DIMENSION: 4.2 CM
SINOTUBULAR JUNCTION: 3.6 CM
SL CV LEFT ATRIUM LENGTH A2C: 5.5 CM
SL CV LV EF: 60
SL CV PED ECHO LEFT VENTRICLE DIASTOLIC VOLUME (MOD BIPLANE) 2D: 107 ML
SL CV PED ECHO LEFT VENTRICLE SYSTOLIC VOLUME (MOD BIPLANE) 2D: 40 ML
SL CV SINUS OF VALSALVA 2D: 3.9 CM
STJ: 3.6 CM
TRICUSPID ANNULAR PLANE SYSTOLIC EXCURSION: 1.8 CM

## 2023-11-16 PROCEDURE — 93306 TTE W/DOPPLER COMPLETE: CPT | Performed by: INTERNAL MEDICINE

## 2023-11-16 PROCEDURE — 93306 TTE W/DOPPLER COMPLETE: CPT

## 2023-11-17 ENCOUNTER — ANESTHESIA (OUTPATIENT)
Dept: GASTROENTEROLOGY | Facility: AMBULARY SURGERY CENTER | Age: 63
End: 2023-11-17

## 2023-11-17 ENCOUNTER — ANESTHESIA EVENT (OUTPATIENT)
Dept: GASTROENTEROLOGY | Facility: AMBULARY SURGERY CENTER | Age: 63
End: 2023-11-17

## 2023-11-17 ENCOUNTER — HOSPITAL ENCOUNTER (OUTPATIENT)
Dept: GASTROENTEROLOGY | Facility: AMBULARY SURGERY CENTER | Age: 63
Setting detail: OUTPATIENT SURGERY
End: 2023-11-17
Attending: INTERNAL MEDICINE
Payer: COMMERCIAL

## 2023-11-17 VITALS
HEART RATE: 82 BPM | HEIGHT: 74 IN | RESPIRATION RATE: 18 BRPM | TEMPERATURE: 97.4 F | DIASTOLIC BLOOD PRESSURE: 61 MMHG | WEIGHT: 252 LBS | BODY MASS INDEX: 32.34 KG/M2 | SYSTOLIC BLOOD PRESSURE: 97 MMHG | OXYGEN SATURATION: 96 %

## 2023-11-17 DIAGNOSIS — K21.00 GASTROESOPHAGEAL REFLUX DISEASE WITH ESOPHAGITIS WITHOUT HEMORRHAGE: Primary | ICD-10-CM

## 2023-11-17 DIAGNOSIS — R13.19 ESOPHAGEAL DYSPHAGIA: ICD-10-CM

## 2023-11-17 LAB — GLUCOSE SERPL-MCNC: 145 MG/DL (ref 65–140)

## 2023-11-17 PROCEDURE — 88305 TISSUE EXAM BY PATHOLOGIST: CPT | Performed by: SPECIALIST

## 2023-11-17 PROCEDURE — 43239 EGD BIOPSY SINGLE/MULTIPLE: CPT | Performed by: INTERNAL MEDICINE

## 2023-11-17 PROCEDURE — 82948 REAGENT STRIP/BLOOD GLUCOSE: CPT

## 2023-11-17 RX ORDER — SODIUM CHLORIDE, SODIUM LACTATE, POTASSIUM CHLORIDE, CALCIUM CHLORIDE 600; 310; 30; 20 MG/100ML; MG/100ML; MG/100ML; MG/100ML
INJECTION, SOLUTION INTRAVENOUS CONTINUOUS PRN
Status: DISCONTINUED | OUTPATIENT
Start: 2023-11-17 | End: 2023-11-17

## 2023-11-17 RX ORDER — PROPOFOL 10 MG/ML
INJECTION, EMULSION INTRAVENOUS AS NEEDED
Status: DISCONTINUED | OUTPATIENT
Start: 2023-11-17 | End: 2023-11-17

## 2023-11-17 RX ORDER — LIDOCAINE HYDROCHLORIDE 20 MG/ML
INJECTION, SOLUTION EPIDURAL; INFILTRATION; INTRACAUDAL; PERINEURAL AS NEEDED
Status: DISCONTINUED | OUTPATIENT
Start: 2023-11-17 | End: 2023-11-17

## 2023-11-17 RX ORDER — PANTOPRAZOLE SODIUM 40 MG/1
40 TABLET, DELAYED RELEASE ORAL
Qty: 180 TABLET | Refills: 0 | Status: SHIPPED | OUTPATIENT
Start: 2023-11-17 | End: 2024-02-15

## 2023-11-17 RX ADMIN — PROPOFOL 30 MG: 10 INJECTION, EMULSION INTRAVENOUS at 08:25

## 2023-11-17 RX ADMIN — PROPOFOL 150 MG: 10 INJECTION, EMULSION INTRAVENOUS at 08:22

## 2023-11-17 RX ADMIN — SODIUM CHLORIDE, SODIUM LACTATE, POTASSIUM CHLORIDE, AND CALCIUM CHLORIDE: .6; .31; .03; .02 INJECTION, SOLUTION INTRAVENOUS at 08:14

## 2023-11-17 RX ADMIN — LIDOCAINE HYDROCHLORIDE 100 MG: 20 INJECTION, SOLUTION EPIDURAL; INFILTRATION; INTRACAUDAL; PERINEURAL at 08:22

## 2023-11-17 RX ADMIN — PROPOFOL 20 MG: 10 INJECTION, EMULSION INTRAVENOUS at 08:27

## 2023-11-17 NOTE — ANESTHESIA PROCEDURE NOTES
Anesthesia Notable Event    Date/Time: 11/17/2023 1:19 PM    Performed by: Jes Qureshi MD  Authorized by: Jes Qureshi MD

## 2023-11-17 NOTE — ANESTHESIA POSTPROCEDURE EVALUATION
Post-Op Assessment Note    CV Status:  Stable  Pain Score: 0    Pain management: adequate       Mental Status:  Alert and awake   Hydration Status:  Euvolemic   PONV Controlled:  Controlled   Airway Patency:  Patent     Post Op Vitals Reviewed: Yes    No anethesia notable event occurred.     Staff: CRNA               /58 (11/17/23 0831)    Temp 98.5 °F (36.9 °C) (11/17/23 0831)    Pulse 75 (11/17/23 0831)   Resp 18 (11/17/23 0831)    SpO2 95 % (11/17/23 0831)

## 2023-11-17 NOTE — ANESTHESIA PREPROCEDURE EVALUATION
Procedure:  EGD    Relevant Problems   CARDIO   (+) Essential hypertension      ENDO   (+) Acquired hypothyroidism   (+) Type 2 diabetes mellitus with other specified complication, with long-term current use of insulin (HCC)   (+) Type 2 diabetes mellitus, with long-term current use of insulin (HCC)      GI/HEPATIC   (+) Esophageal dysphagia   (+) GERD (gastroesophageal reflux disease)   (+) Unspecified cirrhosis of liver (HCC)      /RENAL   (+) Nephrolithiasis      Other   (+) Mediastinal lymphadenopathy        Physical Exam    Airway    Mallampati score: II  TM Distance: >3 FB  Neck ROM: full     Dental       Cardiovascular  No peripheral edema    Pulmonary   No stridor    Other Findings        Anesthesia Plan  ASA Score- 2     Anesthesia Type- IV sedation with anesthesia with ASA Monitors. Additional Monitors:     Airway Plan:            Plan Factors-    Chart reviewed. Existing labs reviewed. Patient summary reviewed. Induction- intravenous. Postoperative Plan-     Informed Consent- Anesthetic plan and risks discussed with patient. I personally reviewed this patient with the CRNA. Discussed and agreed on the Anesthesia Plan with the CRNA. Oleksandr Sanders

## 2023-11-17 NOTE — INTERVAL H&P NOTE
H&P reviewed. After examining the patient I find no changes in the patients condition since the H&P had been written.     Vitals:    11/17/23 0810   BP: 120/64   Pulse: 74   Resp: 16   Temp: (!) 96.9 °F (36.1 °C)   SpO2: 97%

## 2023-11-20 ENCOUNTER — NURSE TRIAGE (OUTPATIENT)
Age: 63
End: 2023-11-20

## 2023-11-20 NOTE — TELEPHONE ENCOUNTER
----- Message from Bettina Christianson sent at 11/20/2023 10:38 AM EST -----  Pt called saying that [facility] is wanting a prior auth on his med pantoprazole 40mg.

## 2023-11-20 NOTE — TELEPHONE ENCOUNTER
PA submitted for Pantoprazole BID via [employer]. Clinical questions answered, chart notes sent. Awaiting determination.

## 2023-11-20 NOTE — TELEPHONE ENCOUNTER
PA required for PPI>  Answer Assessment - Initial Assessment Questions  1. REASON FOR CALL or QUESTION: "What is your reason for calling today?" or "How can I best help you?" or "What question do you have that I can help answer?"      Medication requires prior auth    Protocols used:  Information Only Call - No Triage-ADULT-[place]

## 2023-11-22 PROCEDURE — 88305 TISSUE EXAM BY PATHOLOGIST: CPT | Performed by: SPECIALIST

## 2023-11-23 DIAGNOSIS — E03.9 HYPOTHYROIDISM, UNSPECIFIED TYPE: ICD-10-CM

## 2023-11-24 RX ORDER — LEVOTHYROXINE SODIUM 0.05 MG/1
TABLET ORAL
Qty: 30 TABLET | Refills: 0 | Status: SHIPPED | OUTPATIENT
Start: 2023-11-24

## 2023-11-24 NOTE — TELEPHONE ENCOUNTER
Just an FYI: Called and spoke with patient. He stated he will contact pharmacy and pay out of pocket for medication. Admission

## 2023-11-29 ENCOUNTER — TELEPHONE (OUTPATIENT)
Dept: GASTROENTEROLOGY | Facility: AMBULARY SURGERY CENTER | Age: 63
End: 2023-11-29

## 2023-11-29 NOTE — TELEPHONE ENCOUNTER
Our mutual patient is scheduled for procedure: EGD    On: April 08 , 2024     With: Dr. Carlyn Hernandez.  Morena Liriano MD    He/She is taking the following Jardiance    Can this be stopped  4  days prior to the procedure    Physician Approving clearance: Danyell Lopez DO

## 2023-12-15 DIAGNOSIS — E03.9 HYPOTHYROIDISM, UNSPECIFIED TYPE: ICD-10-CM

## 2023-12-15 RX ORDER — LEVOTHYROXINE SODIUM 0.05 MG/1
TABLET ORAL
Qty: 30 TABLET | Refills: 5 | Status: SHIPPED | OUTPATIENT
Start: 2023-12-15

## 2024-01-07 DIAGNOSIS — Z79.4 TYPE 2 DIABETES MELLITUS WITH HYPERGLYCEMIA, WITH LONG-TERM CURRENT USE OF INSULIN (HCC): ICD-10-CM

## 2024-01-07 DIAGNOSIS — E11.65 TYPE 2 DIABETES MELLITUS WITH HYPERGLYCEMIA, WITH LONG-TERM CURRENT USE OF INSULIN (HCC): ICD-10-CM

## 2024-01-08 RX ORDER — GLIMEPIRIDE 2 MG/1
TABLET ORAL
Qty: 90 TABLET | Refills: 1 | Status: SHIPPED | OUTPATIENT
Start: 2024-01-08

## 2024-02-22 ENCOUNTER — TELEPHONE (OUTPATIENT)
Age: 64
End: 2024-02-22

## 2024-02-22 DIAGNOSIS — K21.00 GASTROESOPHAGEAL REFLUX DISEASE WITH ESOPHAGITIS WITHOUT HEMORRHAGE: ICD-10-CM

## 2024-02-22 DIAGNOSIS — K21.00 GASTROESOPHAGEAL REFLUX DISEASE WITH ESOPHAGITIS WITHOUT HEMORRHAGE: Primary | ICD-10-CM

## 2024-02-22 RX ORDER — PANTOPRAZOLE SODIUM 40 MG/1
40 TABLET, DELAYED RELEASE ORAL DAILY
Qty: 180 TABLET | Refills: 0 | Status: SHIPPED | OUTPATIENT
Start: 2024-02-22 | End: 2024-02-22

## 2024-02-22 RX ORDER — PANTOPRAZOLE SODIUM 40 MG/1
40 TABLET, DELAYED RELEASE ORAL
Qty: 90 TABLET | Refills: 1 | Status: SHIPPED | OUTPATIENT
Start: 2024-02-22

## 2024-02-23 DIAGNOSIS — E11.65 TYPE 2 DIABETES MELLITUS WITH HYPERGLYCEMIA, WITH LONG-TERM CURRENT USE OF INSULIN (HCC): ICD-10-CM

## 2024-02-23 DIAGNOSIS — E78.2 MIXED HYPERLIPIDEMIA: ICD-10-CM

## 2024-02-23 DIAGNOSIS — Z79.4 TYPE 2 DIABETES MELLITUS WITH HYPERGLYCEMIA, WITH LONG-TERM CURRENT USE OF INSULIN (HCC): ICD-10-CM

## 2024-02-23 DIAGNOSIS — E03.9 ACQUIRED HYPOTHYROIDISM: ICD-10-CM

## 2024-02-23 DIAGNOSIS — E55.9 VITAMIN D DEFICIENCY: ICD-10-CM

## 2024-02-23 NOTE — TELEPHONE ENCOUNTER
Attempted PA through [employer] [id number] through [employer] and no coverage was found  Called pharmacy  $38 with discount card   Called pharmacy  BIN [id number]  PCN [id number]  GRP [id number]  ID [id number]  PHONE NUMBER OF INSURANCE   NAME OF INSURANCE [employer]     PHARMACY STATED PT GETS MED NORMALLY FROM DISCOUNT CARD.    PA for pantoprazole     Submitted via    [][employer]   [][employer]-Case ID #   [x]Faxed to [facility]   []Other website   []Phone call Case ID #     Office notes sent, clinical questions answered. Awaiting determination    Turnaround time for your insurance to make a decision on your Prior Authorization can take 7-21 business days.           
Forwarding to PA team for review.   Patient needs prior authorization for Protonix 40 mg   Dr. Garcia    This popped up when trying to sign orders for this medication. Not sure if it needs a prior auth or not. Was filled back in November.                    
Rcvd fax from [employer], PA assigned  AUTH [id number]  SCANNED IN MEDIA  
Rcvd fax from [facility]  PA for pantoprazole cancelled.  NO pa needed.  Scanned in media  Faxed to pharmacy  
done

## 2024-02-26 ENCOUNTER — RA CDI HCC (OUTPATIENT)
Dept: OTHER | Facility: HOSPITAL | Age: 64
End: 2024-02-26

## 2024-03-07 ENCOUNTER — APPOINTMENT (OUTPATIENT)
Dept: LAB | Facility: CLINIC | Age: 64
End: 2024-03-07
Payer: COMMERCIAL

## 2024-03-07 DIAGNOSIS — E03.9 HYPOTHYROIDISM, UNSPECIFIED TYPE: ICD-10-CM

## 2024-03-07 DIAGNOSIS — Z79.4 TYPE 2 DIABETES MELLITUS WITH OTHER SPECIFIED COMPLICATION, WITH LONG-TERM CURRENT USE OF INSULIN (HCC): ICD-10-CM

## 2024-03-07 DIAGNOSIS — E11.69 TYPE 2 DIABETES MELLITUS WITH OTHER SPECIFIED COMPLICATION, WITH LONG-TERM CURRENT USE OF INSULIN (HCC): ICD-10-CM

## 2024-03-07 LAB
ALBUMIN SERPL BCP-MCNC: 4.6 G/DL (ref 3.5–5)
ALP SERPL-CCNC: 63 U/L (ref 34–104)
ALT SERPL W P-5'-P-CCNC: 29 U/L (ref 7–52)
ANION GAP SERPL CALCULATED.3IONS-SCNC: 12 MMOL/L
AST SERPL W P-5'-P-CCNC: 25 U/L (ref 13–39)
BILIRUB SERPL-MCNC: 0.65 MG/DL (ref 0.2–1)
BUN SERPL-MCNC: 17 MG/DL (ref 5–25)
CALCIUM SERPL-MCNC: 10 MG/DL (ref 8.4–10.2)
CHLORIDE SERPL-SCNC: 104 MMOL/L (ref 96–108)
CO2 SERPL-SCNC: 25 MMOL/L (ref 21–32)
CREAT SERPL-MCNC: 1.21 MG/DL (ref 0.6–1.3)
EST. AVERAGE GLUCOSE BLD GHB EST-MCNC: 160 MG/DL
GFR SERPL CREATININE-BSD FRML MDRD: 62 ML/MIN/1.73SQ M
GLUCOSE P FAST SERPL-MCNC: 143 MG/DL (ref 65–99)
HBA1C MFR BLD: 7.2 %
POTASSIUM SERPL-SCNC: 4.3 MMOL/L (ref 3.5–5.3)
PROT SERPL-MCNC: 7.2 G/DL (ref 6.4–8.4)
SODIUM SERPL-SCNC: 141 MMOL/L (ref 135–147)
TSH SERPL DL<=0.05 MIU/L-ACNC: 3.52 UIU/ML (ref 0.45–4.5)

## 2024-03-07 PROCEDURE — 84443 ASSAY THYROID STIM HORMONE: CPT

## 2024-03-07 PROCEDURE — 36415 COLL VENOUS BLD VENIPUNCTURE: CPT

## 2024-03-07 PROCEDURE — 83036 HEMOGLOBIN GLYCOSYLATED A1C: CPT

## 2024-03-07 PROCEDURE — 80053 COMPREHEN METABOLIC PANEL: CPT

## 2024-03-12 ENCOUNTER — OFFICE VISIT (OUTPATIENT)
Dept: FAMILY MEDICINE CLINIC | Facility: CLINIC | Age: 64
End: 2024-03-12
Payer: COMMERCIAL

## 2024-03-12 VITALS
TEMPERATURE: 96.7 F | HEART RATE: 128 BPM | RESPIRATION RATE: 18 BRPM | BODY MASS INDEX: 32.03 KG/M2 | SYSTOLIC BLOOD PRESSURE: 124 MMHG | DIASTOLIC BLOOD PRESSURE: 84 MMHG | WEIGHT: 249.6 LBS | HEIGHT: 74 IN | OXYGEN SATURATION: 99 %

## 2024-03-12 DIAGNOSIS — D86.0 PULMONARY SARCOIDOSIS (HCC): ICD-10-CM

## 2024-03-12 DIAGNOSIS — I10 ESSENTIAL HYPERTENSION: ICD-10-CM

## 2024-03-12 DIAGNOSIS — E03.9 ACQUIRED HYPOTHYROIDISM: ICD-10-CM

## 2024-03-12 DIAGNOSIS — G43.709 CHRONIC MIGRAINE W/O AURA, NOT INTRACTABLE, W/O STAT MIGR: ICD-10-CM

## 2024-03-12 DIAGNOSIS — E11.65 TYPE 2 DIABETES MELLITUS WITH HYPERGLYCEMIA, WITHOUT LONG-TERM CURRENT USE OF INSULIN (HCC): ICD-10-CM

## 2024-03-12 DIAGNOSIS — Z12.5 SCREENING PSA (PROSTATE SPECIFIC ANTIGEN): ICD-10-CM

## 2024-03-12 DIAGNOSIS — Z00.00 PHYSICAL EXAM, ANNUAL: Primary | ICD-10-CM

## 2024-03-12 DIAGNOSIS — I42.9 CARDIOMYOPATHY, UNSPECIFIED TYPE (HCC): ICD-10-CM

## 2024-03-12 DIAGNOSIS — E78.2 MIXED HYPERLIPIDEMIA: ICD-10-CM

## 2024-03-12 PROCEDURE — 99214 OFFICE O/P EST MOD 30 MIN: CPT | Performed by: FAMILY MEDICINE

## 2024-03-12 PROCEDURE — 99396 PREV VISIT EST AGE 40-64: CPT | Performed by: FAMILY MEDICINE

## 2024-03-12 RX ORDER — PROPRANOLOL HCL 60 MG
60 CAPSULE, EXTENDED RELEASE 24HR ORAL DAILY
Qty: 30 CAPSULE | Refills: 3 | Status: SHIPPED | OUTPATIENT
Start: 2024-03-12

## 2024-03-12 NOTE — PROGRESS NOTES
Name: Cristian Mancini      : 1960      MRN: [id number]  Encounter Provider: Rachel Solano DO  Encounter Date: 3/12/2024   Encounter department: [facility]    Assessment & Plan     1. Physical exam, annual  Comments:  check psa  work on headaches as they are getting in the way of activity  otherwise up to date preventively    2. Screening PSA (prostate specific antigen)  -     PSA, Total Screen; Future    3. Type 2 diabetes mellitus with hyperglycemia, without long-term current use of insulin (HCC)  Comments:  a1c up due to jardiance pause  jardiance resumed  continue same meds  repeat labs in 6 mo  Orders:  -     Hemoglobin A1C; Future; Expected date: 2024  -     Comprehensive metabolic panel; Future; Expected date: 2024  -     CBC and differential; Future; Expected date: 2024  -     Albumin / creatinine urine ratio; Future; Expected date: 2024    4. Acquired hypothyroidism  Comments:  tsh at goal   continue current meds  Orders:  -     TSH, 3rd generation with Free T4 reflex; Future; Expected date: 2024    5. Mixed hyperlipidemia  Comments:  continue atorva  check lipids with next labs  Orders:  -     Lipid Panel with Direct LDL reflex; Future; Expected date: 2024    6. Chronic migraine w/o aura, not intractable, w/o stat migr  Comments:  check MRI  start propranolol  refer to PT  ubrevly prn--failed tylenol/ibuprofen.  tripans contraindicated due to DM2  f/u 1 mo  Orders:  -     MRI brain wo contrast; Future; Expected date: 2024  -     propranolol (INDERAL LA) 60 mg 24 hr capsule; Take 1 capsule (60 mg total) by mouth daily  -     Ubrogepant (UBRELVY) 100 MG tablet; Take 1 tablet (100 mg) one time as needed for migraine. May repeat one additional tablet (100 mg) at least two hours after the first dose. Do not use more than two doses per day, or for more than eight days per month.  -     Ambulatory Referral to Physical Therapy; Future    7.  Cardiomyopathy, unspecified type (HCC)  Comments:  make f/u appt with cardiology    8. Pulmonary sarcoidosis (HCC)  Comments:  no sob  stable sx without treatment currently    9. Essential hypertension  Comments:  at goal on current meds           Subjective      HPI  Pt presents for physical and f/u.  Preventively, due for psa.  Up to date on imm's.  Up to date with crc screening.  Seeing dental.      Pts A1c mildly elevated at 7.2.  he was without his jardiance for 3-4 weeks due to insurance change/issue.  No low sugars.  No chest pain, shortness of breath, n/v, abd pain, visual changes, paresthesias, weakness    Pt c/o daily headaches which he has had for years.  He has been using advil for years.  Stopped that months ago at behest of GI.  States he has pain in neck which radiates over head to eye.  Unilateral throbbing pain.  +light/sound sensitivity.  +nausea.  No vomiting.  No paresthesias.  Headache limits activity.    Blood pressure appropriate.  Due for f/u with cardiology    No shortness of breath or breathing changes.  Off meds for sarcoid.        Review of Systems   Constitutional:  Negative for chills, fatigue, fever and unexpected weight change.   HENT:  Negative for congestion, ear pain, hearing loss, postnasal drip, rhinorrhea, sinus pressure, sinus pain, sore throat, trouble swallowing and voice change.    Eyes:  Negative for pain, redness and visual disturbance.   Respiratory:  Negative for cough and shortness of breath.    Cardiovascular:  Negative for chest pain, palpitations and leg swelling.   Gastrointestinal:  Negative for abdominal pain, constipation, diarrhea and nausea.   Endocrine: Negative for cold intolerance, heat intolerance, polydipsia and polyuria.   Genitourinary:  Negative for dysuria, frequency and urgency.   Musculoskeletal:  Negative for arthralgias, joint swelling and myalgias.   Skin:  Negative for rash.        No suspicious lesions   Neurological:  Positive for headaches.  "Negative for weakness and numbness.   Hematological:  Negative for adenopathy.       Current Outpatient Medications on File Prior to Visit   Medication Sig    aspirin (ECOTRIN LOW STRENGTH) 81 mg EC tablet Take 81 mg by mouth daily    atorvastatin (LIPITOR) 40 mg tablet TAKE 1 TABLET BY MOUTH EVERY DAY    Cholecalciferol (DIALYVITE VITAMIN D 5000 PO) Take by mouth    glimepiride (AMARYL) 2 mg tablet TAKE 1 TABLET BY MOUTH EVERY DAY WITH BREAKFAST    Jardiance 25 MG TABS TAKE 1 TABLET (25 MG TOTAL) BY MOUTH DAILY.    Lancets (OneTouch Delica Plus [id number]) MISC USE TO CHECK BLOOD SUGARS TWICE DAILY    levothyroxine 50 mcg tablet TAKE 1 TAB DAILY MONDAY, TUESDAY, WEDNESDAY, FRIDAY, SATURDAY AND 2 TABLETS ON SUNDAY AND THURSDAY    metFORMIN (GLUCOPHAGE) 500 mg tablet TAKE 2 TABLETS BY MOUTH TWICE A DAY WITH FOOD    OneTouch Verio test strip USE TO TEST BLOOD SUGAR 4 TIMES A DAY    pantoprazole (PROTONIX) 40 mg tablet Take 1 tablet (40 mg total) by mouth daily before breakfast    [DISCONTINUED] acetaminophen (TYLENOL) 500 mg tablet Take 500 mg by mouth every 6 (six) hours as needed for mild pain    [DISCONTINUED] glucose 4 g chewable tablet Chew 4 tablets (16 g total) as needed for low blood sugar    [DISCONTINUED] BD Pen Needle Micro U/F 32G X 6 MM MISC USE ONCE DAILY WITH INSULIN PENS    [DISCONTINUED] ibuprofen (MOTRIN) 200 mg tablet Take 400 mg by mouth every 6 (six) hours as needed for mild pain       Objective     /84   Pulse (!) 128   Temp (!) 96.7 °F (35.9 °C) (Temporal)   Resp 18   Ht 6' 2\" (1.88 m)   Wt 113 kg (249 lb 9.6 oz)   SpO2 99%   BMI 32.05 kg/m²     Physical Exam  Constitutional:       General: He is not in acute distress.     Appearance: Normal appearance. He is well-developed.   HENT:      Head: Normocephalic and atraumatic.      Right Ear: Tympanic membrane, ear canal and external ear normal.      Left Ear: Tympanic membrane, ear canal and external ear normal.      Nose: Nose normal. "      Mouth/Throat:      Mouth: Mucous membranes are moist.      Pharynx: Oropharynx is clear. No posterior oropharyngeal erythema.   Eyes:      Extraocular Movements: Extraocular movements intact.      Conjunctiva/sclera: Conjunctivae normal.      Pupils: Pupils are equal, round, and reactive to light.   Neck:      Thyroid: No thyromegaly.      Vascular: No carotid bruit or JVD.   Cardiovascular:      Rate and Rhythm: Normal rate and regular rhythm.      Heart sounds: S1 normal and S2 normal. No murmur heard.     No friction rub. No gallop. No S3 or S4 sounds.   Pulmonary:      Effort: Pulmonary effort is normal.      Breath sounds: Normal breath sounds. No wheezing, rhonchi or rales.   Abdominal:      General: Bowel sounds are normal. There is no distension.      Palpations: Abdomen is soft.      Tenderness: There is no abdominal tenderness.   Musculoskeletal:      Cervical back: Neck supple.   Lymphadenopathy:      Cervical: No cervical adenopathy.   Neurological:      General: No focal deficit present.      Mental Status: He is alert and oriented to person, place, and time.      Cranial Nerves: No cranial nerve deficit.      Sensory: No sensory deficit.      Deep Tendon Reflexes: Reflexes are normal and symmetric. Reflexes normal.       Rachel Solano DO

## 2024-03-12 NOTE — PATIENT INSTRUCTIONS
Make f/u appt with cardiology  Start propranolol daily  Ulbrelvy as needed for headache  Refer to PT for headaches  MRI brain  F/u 1 mo

## 2024-03-18 ENCOUNTER — TELEPHONE (OUTPATIENT)
Dept: FAMILY MEDICINE CLINIC | Facility: CLINIC | Age: 64
End: 2024-03-18

## 2024-03-18 NOTE — TELEPHONE ENCOUNTER
Fax received requesting Prior Authorization for Ubrelvy 100 mg tablets. Patient instructions are Take 1 tablet (100 mg) one time as needed for migraine. May repeat one additional tablet (100 mg) at least two hours after the first dose. Do not use more than two doses per day, or for more than eight days per month.     Key [id number]    Please initiate prior authorization.

## 2024-03-19 ENCOUNTER — HOSPITAL ENCOUNTER (OUTPATIENT)
Dept: RADIOLOGY | Age: 64
Discharge: HOME | End: 2024-03-19
Payer: COMMERCIAL

## 2024-03-19 DIAGNOSIS — G43.709 CHRONIC MIGRAINE W/O AURA, NOT INTRACTABLE, W/O STAT MIGR: ICD-10-CM

## 2024-03-19 PROCEDURE — 70551 MRI BRAIN STEM W/O DYE: CPT

## 2024-03-20 NOTE — TELEPHONE ENCOUNTER
PA for Ubrelvy    Submitted via    [x]CMM-KEY [id number]  []Surescripts-Case ID #   []Faxed to plan   []Other website   []Phone call Case ID #     Office notes sent, clinical questions answered. Awaiting determination    Turnaround time for your insurance to make a decision on your Prior Authorization can take 7-21 business days.

## 2024-03-21 NOTE — TELEPHONE ENCOUNTER
PA for Ubrogepant (UBRELVY) 100 MG tablet Approved     Date(s) approved 1- - 9-        Patient advised by [x] Premiset Message                      [] Phone call       Pharmacy advised by [x]Fax                                     []Phone call    Approval letter scanned into Media Yes

## 2024-03-25 ENCOUNTER — ANESTHESIA EVENT (OUTPATIENT)
Dept: ANESTHESIOLOGY | Facility: HOSPITAL | Age: 64
End: 2024-03-25

## 2024-03-25 ENCOUNTER — ANESTHESIA (OUTPATIENT)
Dept: ANESTHESIOLOGY | Facility: HOSPITAL | Age: 64
End: 2024-03-25

## 2024-03-25 ENCOUNTER — EVALUATION (OUTPATIENT)
Dept: PHYSICAL THERAPY | Facility: CLINIC | Age: 64
End: 2024-03-25
Payer: COMMERCIAL

## 2024-03-25 DIAGNOSIS — G44.001 INTRACTABLE CLUSTER HEADACHE SYNDROME, UNSPECIFIED CHRONICITY PATTERN: ICD-10-CM

## 2024-03-25 DIAGNOSIS — M54.12 CERVICAL RADICULOPATHY: Primary | ICD-10-CM

## 2024-03-25 PROCEDURE — 97161 PT EVAL LOW COMPLEX 20 MIN: CPT | Performed by: PHYSICAL THERAPIST

## 2024-03-25 PROCEDURE — 97110 THERAPEUTIC EXERCISES: CPT | Performed by: PHYSICAL THERAPIST

## 2024-03-25 PROCEDURE — 97112 NEUROMUSCULAR REEDUCATION: CPT | Performed by: PHYSICAL THERAPIST

## 2024-04-02 DIAGNOSIS — Z79.4 TYPE 2 DIABETES MELLITUS WITHOUT COMPLICATION, WITH LONG-TERM CURRENT USE OF INSULIN (HCC): ICD-10-CM

## 2024-04-02 DIAGNOSIS — E11.9 TYPE 2 DIABETES MELLITUS WITHOUT COMPLICATION, WITH LONG-TERM CURRENT USE OF INSULIN (HCC): ICD-10-CM

## 2024-04-02 RX ORDER — EMPAGLIFLOZIN 25 MG/1
TABLET, FILM COATED ORAL
Qty: 30 TABLET | Refills: 5 | Status: SHIPPED | OUTPATIENT
Start: 2024-04-02

## 2024-04-03 ENCOUNTER — EVALUATION (OUTPATIENT)
Dept: PHYSICAL THERAPY | Facility: CLINIC | Age: 64
End: 2024-04-03
Payer: COMMERCIAL

## 2024-04-03 DIAGNOSIS — G44.001 INTRACTABLE CLUSTER HEADACHE SYNDROME, UNSPECIFIED CHRONICITY PATTERN: ICD-10-CM

## 2024-04-03 DIAGNOSIS — M54.12 CERVICAL RADICULOPATHY: Primary | ICD-10-CM

## 2024-04-03 PROCEDURE — 97112 NEUROMUSCULAR REEDUCATION: CPT | Performed by: PHYSICAL THERAPIST

## 2024-04-03 PROCEDURE — 97110 THERAPEUTIC EXERCISES: CPT | Performed by: PHYSICAL THERAPIST

## 2024-04-03 NOTE — PROGRESS NOTES
"Daily Note     Today's date: 4/3/2024  Patient name: Cristian Mancini  : 1960  MRN: [id number]  Referring provider: Rachel Solano DO  Dx:   Encounter Diagnosis     ICD-10-CM    1. Cervical radiculopathy  M54.12       2. Intractable cluster headache syndrome, unspecified chronicity pattern  G44.001                      Subjective: Pt presents today stating that he has found some relief with the performance of the HEP when he is to have an HA.  Pt reports that there is some irritation in his neck, but overall better.        Objective: See treatment diary below  Neck Pain, mild HA awareness.    Min Ext, Min Ret, Min L Rot/SB, all else WFL.      Assessment:  Improved CS range from IE.  Managing symptoms well independently.  Discussed about progressing forces with pt OP and pt was in accord.  DP today with improved CS range, Abolished HA symptoms were Ret + Pt OP.  Discussed HEP performance and avoiding bending forward or provocative presentation.  Follow up next week in regards of this.        Precautions: HTN, DM2, Hypothyroidism, Sarcoidosis.       Manuals 3/25 4/3                                                               Neuro Re-Ed             Education and progression  10 min 10 min           Obstruction education/Bank  Account/Avoid provocative movements  performed reviewed           Sleeping positions?                                                                 Ther Ex             CS Retraction B 6 x 10 4 x 10           CS Ret OP ?  4 x 5 D B           Progression?                                                    Scap Add  3\" x 10           Scaption  2 x 10           Ther Activity             HA symptoms B B           Neck Symptoms B B           CS Range L Rot and SB /CS Ret/Ext/ B B           Other concerns ?                           Modalities             HP/CP prn?                               "

## 2024-04-04 ENCOUNTER — TELEPHONE (OUTPATIENT)
Age: 64
End: 2024-04-04

## 2024-04-04 DIAGNOSIS — G43.709 CHRONIC MIGRAINE W/O AURA, NOT INTRACTABLE, W/O STAT MIGR: ICD-10-CM

## 2024-04-04 RX ORDER — PROPRANOLOL HCL 60 MG
60 CAPSULE, EXTENDED RELEASE 24HR ORAL DAILY
Qty: 90 CAPSULE | Refills: 0 | Status: SHIPPED | OUTPATIENT
Start: 2024-04-04

## 2024-04-04 NOTE — TELEPHONE ENCOUNTER
Patients GI provider:  Dr. Garcia    Number to return call: [phone]    Reason for call: Pt calling to check on arrival time for proced, received a text message stating if he does not already have his arrival time to reach out to office. Advised pt that we would typically give a call on Fri for Mon, double checked w/[facility]. Spoke w/Pooja who advised they will be doing the calls for Mon tomorrow. Relayed info to pt. Pt confirmed understanding.     Scheduled procedure/appointment date if applicable: procedure 04/08/2024

## 2024-04-05 DIAGNOSIS — E78.5 DYSLIPIDEMIA: ICD-10-CM

## 2024-04-05 RX ORDER — ATORVASTATIN CALCIUM 40 MG/1
TABLET, FILM COATED ORAL
Qty: 90 TABLET | Refills: 4 | Status: SHIPPED | OUTPATIENT
Start: 2024-04-05

## 2024-04-08 ENCOUNTER — ANESTHESIA (OUTPATIENT)
Dept: GASTROENTEROLOGY | Facility: AMBULARY SURGERY CENTER | Age: 64
End: 2024-04-08

## 2024-04-08 ENCOUNTER — HOSPITAL ENCOUNTER (OUTPATIENT)
Dept: GASTROENTEROLOGY | Facility: AMBULARY SURGERY CENTER | Age: 64
Setting detail: OUTPATIENT SURGERY
Discharge: HOME | End: 2024-04-08
Attending: INTERNAL MEDICINE
Payer: COMMERCIAL

## 2024-04-08 ENCOUNTER — ANESTHESIA EVENT (OUTPATIENT)
Dept: GASTROENTEROLOGY | Facility: AMBULARY SURGERY CENTER | Age: 64
End: 2024-04-08

## 2024-04-08 VITALS
HEART RATE: 64 BPM | DIASTOLIC BLOOD PRESSURE: 61 MMHG | TEMPERATURE: 97.3 F | OXYGEN SATURATION: 95 % | RESPIRATION RATE: 17 BRPM | SYSTOLIC BLOOD PRESSURE: 111 MMHG

## 2024-04-08 DIAGNOSIS — K29.70 GASTRITIS WITHOUT BLEEDING, UNSPECIFIED CHRONICITY, UNSPECIFIED GASTRITIS TYPE: ICD-10-CM

## 2024-04-08 LAB — GLUCOSE SERPL-MCNC: 162 MG/DL (ref 65–140)

## 2024-04-08 PROCEDURE — 88305 TISSUE EXAM BY PATHOLOGIST: CPT | Performed by: STUDENT IN AN ORGANIZED HEALTH CARE EDUCATION/TRAINING PROGRAM

## 2024-04-08 PROCEDURE — 82948 REAGENT STRIP/BLOOD GLUCOSE: CPT

## 2024-04-08 RX ORDER — LIDOCAINE HYDROCHLORIDE 20 MG/ML
INJECTION, SOLUTION EPIDURAL; INFILTRATION; INTRACAUDAL; PERINEURAL AS NEEDED
Status: DISCONTINUED | OUTPATIENT
Start: 2024-04-08 | End: 2024-04-08

## 2024-04-08 RX ORDER — SODIUM CHLORIDE, SODIUM LACTATE, POTASSIUM CHLORIDE, CALCIUM CHLORIDE 600; 310; 30; 20 MG/100ML; MG/100ML; MG/100ML; MG/100ML
INJECTION, SOLUTION INTRAVENOUS CONTINUOUS PRN
Status: DISCONTINUED | OUTPATIENT
Start: 2024-04-08 | End: 2024-04-08

## 2024-04-08 RX ORDER — PROPOFOL 10 MG/ML
INJECTION, EMULSION INTRAVENOUS AS NEEDED
Status: DISCONTINUED | OUTPATIENT
Start: 2024-04-08 | End: 2024-04-08

## 2024-04-08 RX ADMIN — PROPOFOL 200 MG: 10 INJECTION, EMULSION INTRAVENOUS at 08:08

## 2024-04-08 RX ADMIN — PROPOFOL 50 MG: 10 INJECTION, EMULSION INTRAVENOUS at 08:11

## 2024-04-08 RX ADMIN — PROPOFOL 50 MG: 10 INJECTION, EMULSION INTRAVENOUS at 08:09

## 2024-04-08 RX ADMIN — LIDOCAINE HYDROCHLORIDE 100 MG: 20 INJECTION, SOLUTION EPIDURAL; INFILTRATION; INTRACAUDAL; PERINEURAL at 08:08

## 2024-04-08 RX ADMIN — SODIUM CHLORIDE, SODIUM LACTATE, POTASSIUM CHLORIDE, AND CALCIUM CHLORIDE: .6; .31; .03; .02 INJECTION, SOLUTION INTRAVENOUS at 07:55

## 2024-04-08 NOTE — H&P
History and Physical - [facility]  Cristian Mancini 64 y.o. male MRN: [id number]    HPI: Critsian Mancini is a 64 y.o. year old male who presents for evaluation of esophagitis.      Review of Systems    Historical Information   Past Medical History:   Diagnosis Date    BPH (benign prostatic hyperplasia)     Diabetes mellitus (HCC)     Disease of thyroid gland     Erectile dysfunction     GERD (gastroesophageal reflux disease)     Headache     Headache(784.0) 05/01/2018    Each Day    Hematuria     Hyperlipidemia     Hypertension     Infectious viral hepatitis 2007    Cured by Dr. Kern    Kidney stone     Liver disease     hepatitis C    Mediastinal adenopathy     Obesity 2007    PE (pulmonary thromboembolism) (HCC)     Sarcoidosis     Vitamin D deficiency      Past Surgical History:   Procedure Laterality Date    COLONOSCOPY      last assessed: 08/28/2012; fiberoptic screening     CYSTOSCOPY      onset: 05/06/2016; Diagnostic     DENTAL SURGERY      FL RETROGRADE PYELOGRAM  02/06/2020    FL RETROGRADE PYELOGRAM  03/03/2020    HERNIA REPAIR      LIVER BIOPSY      LYMPH NODE BIOPSY  06/2019    NV BRNCHSC INCL FLUOR GDNCE DX W/CELL WASHG SPX N/A 06/06/2019    Procedure: BRONCHOSCOPY FLEXIBLE;  Surgeon: Umair Louis MD;  [facility];  Service: Thoracic    NV BRONCHOSCOPY NEEDLE BX TRACHEA MAIN STEM&/BRON N/A 06/06/2019    Procedure: ENDOBRONCHIAL ULTRASOUND (EBUS);  Surgeon: Umair Louis MD;  [facility];  Service: Thoracic    NV CYSTO/URETERO W/LITHOTRIPSY &INDWELL STENT INSRT Right 02/06/2020    Procedure: CYSTOSCOPY WITH LITHOTRIPSY RETROGRADE PYELOGRAM AND INSERTION STENT URETERAL;  Surgeon: Jose Reza MD;  [facility];  Service: Urology    NV CYSTO/URETERO W/LITHOTRIPSY &INDWELL STENT INSRT Right 03/03/2020    Procedure: CYSTOSCOPY URETEROSCOPY WITH LITHOTRIPSY HOLMIUM LASER; BASKET STONE RETRIEVAL; RETROGRADE PYELOGRAM AND INSERTION STENT  URETERAL;  Surgeon: Jose Reza MD;  [facility];  Service: Urology    MT LAPS RPR PARAESPHGL HRNA INCL FUNDPLSTY W/MESH N/A 12/03/2021    Procedure: ROBOTIC PARAESOPHAGEAL HERNIA  REPAIR, with FUNDOPLICATION;  Surgeon: Jim Coles MD;  [facility];  Service: General    MT TRURL ELECTROSURG RESCJ PROSTATE BLEED COMPLETE N/A 09/13/2017    Procedure: CYSTOSCOPY; TUR PROSTATE;  Surgeon: Fili Poe MD;  [facility];  Service: Urology    PROSTATE SURGERY  08/2017    SHOULDER ARTHROSCOPY Right     bicep tendon repair, rotator cuff repair and acromuim shave      Social History   Social History     Substance and Sexual Activity   Alcohol Use Not Currently    Comment: quit 2006     Social History     Substance and Sexual Activity   Drug Use Not Currently    Types: Marijuana     Social History     Tobacco Use   Smoking Status Former    Current packs/day: 0.00    Average packs/day: 1 pack/day for 37.7 years (37.7 ttl pk-yrs)    Types: Cigarettes    Start date: 4/27/1971    Quit date: 1/1/2009    Years since quitting: 15.2   Smokeless Tobacco Never     Family History   Problem Relation Age of Onset    Valvular heart disease Mother     Stroke Mother     Stroke Father         Accidental Death at 49    Cancer Maternal Grandmother        Meds/Allergies     (Not in a hospital admission)      No Known Allergies    Objective     /77   Pulse 66   Temp (!) 96.2 °F (35.7 °C) (Temporal)   Resp 18   SpO2 98%       PHYSICAL EXAM    Gen: NAD  CV: RRR  CHEST: Clear  ABD: soft, NT/ND  EXT: no edema  Neuro: AAO      ASSESSMENT/PLAN:  This is a 64 y.o. year old male here for evaluation of esophagitis.    PLAN:   Procedure: EGD.

## 2024-04-08 NOTE — ANESTHESIA PREPROCEDURE EVALUATION
Procedure:  EGD    ECHO (11/16/23):  Interpretation Summary    Left Ventricle: Left ventricular cavity size is normal. Wall thickness is mildly increased. There is mild concentric hypertrophy. The left ventricular ejection fraction is 60%. Systolic function is normal. Wall motion is normal. Diastolic function is mildly abnormal, consistent with grade I (abnormal) relaxation.    Aortic Valve: There is trace regurgitation.    Mitral Valve: There is trace regurgitation.    Aorta: The aortic root is mildly dilated. The ascending aorta is mildly dilated. The aortic root is 4.30 cm. The ascending aorta is 4.1 cm.    Relevant Problems   ANESTHESIA (within normal limits)      CARDIO   (+) Essential hypertension      ENDO   (+) Acquired hypothyroidism   (+) Type 2 diabetes mellitus with other specified complication, with long-term current use of insulin (HCC)   (+) Type 2 diabetes mellitus, with long-term current use of insulin (HCC)      GI/HEPATIC   (+) Esophageal dysphagia   (+) GERD (gastroesophageal reflux disease)   (+) Unspecified cirrhosis of liver (HCC)      /RENAL   (+) Nephrolithiasis      GYN (within normal limits)      HEMATOLOGY (within normal limits)      MUSCULOSKELETAL (within normal limits)      NEURO/PSYCH (within normal limits)      PULMONARY (within normal limits)      Respiratory/Allergy   (+) Pulmonary sarcoidosis (HCC)      Other   (+) Mediastinal lymphadenopathy      Lab Results   Component Value Date    WBC 6.58 09/05/2023    HGB 15.7 09/05/2023    HCT 48.7 09/05/2023    MCV 96 09/05/2023     09/05/2023     Lab Results   Component Value Date     07/12/2017    SODIUM 141 03/07/2024    K 4.3 03/07/2024     03/07/2024    CO2 25 03/07/2024    ANIONGAP 9 01/01/2016    AGAP 12 03/07/2024    BUN 17 03/07/2024    CREATININE 1.21 03/07/2024    GLUC 167 (H) 12/04/2021    GLUF 143 (H) 03/07/2024    CALCIUM 10.0 03/07/2024    AST 25 03/07/2024    ALT 29 03/07/2024    ALKPHOS 63 03/07/2024     PROT 7.8 01/21/2016    TP 7.2 03/07/2024    BILITOT 0.7 01/21/2016    TBILI 0.65 03/07/2024    EGFR 62 03/07/2024     Lab Results   Component Value Date    PTT 24 09/11/2019     Lab Results   Component Value Date    INR 1.04 01/16/2021    INR 1.06 12/07/2019    INR 1.00 09/11/2019    PROTIME 13.7 01/16/2021    PROTIME 13.2 12/07/2019    PROTIME 12.6 09/11/2019         Physical Exam    Airway    Mallampati score: II  TM Distance: >3 FB  Neck ROM: full     Dental   No notable dental hx     Cardiovascular  Rhythm: regular, Rate: normal    Pulmonary   Breath sounds clear to auscultation    Other Findings        Anesthesia Plan  ASA Score- 3     Anesthesia Type- IV sedation with anesthesia with ASA Monitors.         Additional Monitors:     Airway Plan:            Plan Factors-Exercise tolerance (METS): >4 METS.    Chart reviewed. EKG reviewed.  Existing labs reviewed. Patient summary reviewed.    Patient is not a current smoker.  Patient did not smoke on day of surgery.    Obstructive sleep apnea risk education given perioperatively.        Induction- intravenous.    Postoperative Plan-     Informed Consent- Anesthetic plan and risks discussed with patient.  I personally reviewed this patient with the CRNA. Discussed and agreed on the Anesthesia Plan with the CRNA..

## 2024-04-08 NOTE — ANESTHESIA POSTPROCEDURE EVALUATION
Post-Op Assessment Note    CV Status:  Stable  Pain Score: 0    Pain management: adequate       Mental Status:  Sleepy and arousable   PONV Controlled:  None   Airway Patency:  Patent     Post Op Vitals Reviewed: Yes    No anethesia notable event occurred.    Staff: CRNA               BP   1454/80   Temp   97   Pulse  68   Resp   12   SpO2   99

## 2024-04-10 ENCOUNTER — OFFICE VISIT (OUTPATIENT)
Dept: PHYSICAL THERAPY | Facility: CLINIC | Age: 64
End: 2024-04-10
Payer: COMMERCIAL

## 2024-04-10 DIAGNOSIS — G44.001 INTRACTABLE CLUSTER HEADACHE SYNDROME, UNSPECIFIED CHRONICITY PATTERN: ICD-10-CM

## 2024-04-10 DIAGNOSIS — M54.12 CERVICAL RADICULOPATHY: Primary | ICD-10-CM

## 2024-04-10 PROCEDURE — 97110 THERAPEUTIC EXERCISES: CPT

## 2024-04-10 PROCEDURE — 88305 TISSUE EXAM BY PATHOLOGIST: CPT | Performed by: STUDENT IN AN ORGANIZED HEALTH CARE EDUCATION/TRAINING PROGRAM

## 2024-04-10 PROCEDURE — 97112 NEUROMUSCULAR REEDUCATION: CPT

## 2024-04-10 NOTE — PROGRESS NOTES
"Daily Note     Today's date: 4/10/2024  Patient name: Cristian Mancini  : 1960  MRN: [id number]  Referring provider: Rachel Solano DO  Dx:   Encounter Diagnosis     ICD-10-CM    1. Cervical radiculopathy  M54.12       2. Intractable cluster headache syndrome, unspecified chronicity pattern  G44.001           Start Time: 0900  Stop Time: 09  Total time in clinic (min): 34 minutes    Subjective: Pt reports that he is unsure what is helping his HA but does notice an improvement in frequency. He did have a HA on .       Objective: See treatment diary below      Assessment: Tolerated treatment well. Mild symptom improvement with CS ret+ext+OP Patient would benefit from continued PT      Plan: Continue per plan of care.      Precautions: HTN, DM2, Hypothyroidism, Sarcoidosis.       Manuals 3/25 4/3 04/10                                                              Neuro Re-Ed             Education and progression  10 min 10 min           Obstruction education/Bank  Account/Avoid provocative movements  performed reviewed 5 min           Sleeping positions?                                                                 Ther Ex             CS Retraction B 6 x 10 4 x 10 2x10 NE          CS Ret OP ?  4 x 5 D B 2x10 NE          Progression?             CS ret+ext   2x10 NE          CS ret+ext +Op   5x5   D B                        Scap Add  3\" x 10 3\" x10           Scaption  2 x 10 2x10           Ther Activity             HA symptoms B B nil          Neck Symptoms B B NE          CS Range L Rot and SB /CS Ret/Ext/ B B NE          Other concerns ?                           Modalities             HP/CP prn?                                 "

## 2024-04-16 ENCOUNTER — OFFICE VISIT (OUTPATIENT)
Dept: FAMILY MEDICINE CLINIC | Facility: CLINIC | Age: 64
End: 2024-04-16
Payer: COMMERCIAL

## 2024-04-16 VITALS
BODY MASS INDEX: 32.11 KG/M2 | HEART RATE: 76 BPM | SYSTOLIC BLOOD PRESSURE: 122 MMHG | TEMPERATURE: 97.2 F | HEIGHT: 74 IN | RESPIRATION RATE: 16 BRPM | DIASTOLIC BLOOD PRESSURE: 78 MMHG | OXYGEN SATURATION: 97 % | WEIGHT: 250.2 LBS

## 2024-04-16 DIAGNOSIS — G43.709 CHRONIC MIGRAINE W/O AURA, NOT INTRACTABLE, W/O STAT MIGR: Primary | ICD-10-CM

## 2024-04-16 PROBLEM — K74.60 UNSPECIFIED CIRRHOSIS OF LIVER (HCC): Status: RESOLVED | Noted: 2018-12-13 | Resolved: 2024-04-16

## 2024-04-16 PROCEDURE — 99214 OFFICE O/P EST MOD 30 MIN: CPT | Performed by: FAMILY MEDICINE

## 2024-04-16 RX ORDER — NORTRIPTYLINE HYDROCHLORIDE 10 MG/1
10 CAPSULE ORAL
Qty: 30 CAPSULE | Refills: 3 | Status: SHIPPED | OUTPATIENT
Start: 2024-04-16

## 2024-04-16 NOTE — PROGRESS NOTES
Name: Cristian Mancini      : 1960      MRN: [id number]  Encounter Provider: Rachel Solano DO  Encounter Date: 2024   Encounter department: [facility]    Assessment & Plan     1. Chronic migraine w/o aura, not intractable, w/o stat migr  Comments:  improved on propranolol and with PT, but not tolerating propranolol  d/c propranolol and start nortriptyline nightly  if no improvement, will try nurtec qOD  For now, continue ubrelvy for acute headache  Orders:  -     nortriptyline (PAMELOR) 10 mg capsule; Take 1 capsule (10 mg total) by mouth daily at bedtime             Subjective      HPI  Pt presents in f/u.  At last appt, referred to PT and started propranolol/ubrelvy for chronic migraine.  Had been having headaches daily for years.  MRI showed FLAIR c/w migraines/microvascular dz.  Today, pt notes he is getting one or two headaches per week with the propranolol, and they are helped by the ubrelvy.  Unfortunately, he has noticed increased fatigue with the propranolol.  No shortness of breath.      Review of Systems    Current Outpatient Medications on File Prior to Visit   Medication Sig    aspirin (ECOTRIN LOW STRENGTH) 81 mg EC tablet Take 81 mg by mouth daily    atorvastatin (LIPITOR) 40 mg tablet TAKE 1 TABLET BY MOUTH EVERY DAY    Cholecalciferol (DIALYVITE VITAMIN D 5000 PO) Take by mouth in the morning    Empagliflozin (Jardiance) 25 MG TABS TAKE 1 TABLET (25 MG TOTAL) BY MOUTH DAILY.    glimepiride (AMARYL) 2 mg tablet TAKE 1 TABLET BY MOUTH EVERY DAY WITH BREAKFAST    Lancets (OneTouch Delica Plus [id number]) MISC USE TO CHECK BLOOD SUGARS TWICE DAILY    levothyroxine 50 mcg tablet TAKE 1 TAB DAILY MONDAY, TUESDAY, WEDNESDAY, FRIDAY, SATURDAY AND 2 TABLETS ON  AND THURSDAY    metFORMIN (GLUCOPHAGE) 500 mg tablet TAKE 2 TABLETS BY MOUTH TWICE A DAY WITH FOOD    OneTouch Verio test strip USE TO TEST BLOOD SUGAR 4 TIMES A DAY    pantoprazole (PROTONIX) 40 mg tablet  "Take 1 tablet (40 mg total) by mouth daily before breakfast    Ubrogepant (UBRELVY) 100 MG tablet Take 1 tablet (100 mg) one time as needed for migraine. May repeat one additional tablet (100 mg) at least two hours after the first dose. Do not use more than two doses per day, or for more than eight days per month.    [DISCONTINUED] propranolol (INDERAL LA) 60 mg 24 hr capsule TAKE 1 CAPSULE BY MOUTH EVERY DAY       Objective     /78   Pulse 76   Temp (!) 97.2 °F (36.2 °C) (Temporal)   Resp 16   Ht 6' 2\" (1.88 m)   Wt 113 kg (250 lb 3.2 oz)   SpO2 97%   BMI 32.12 kg/m²     Physical Exam  Constitutional:       Appearance: Normal appearance.   HENT:      Head: Normocephalic and atraumatic.   Eyes:      Extraocular Movements: Extraocular movements intact.      Conjunctiva/sclera: Conjunctivae normal.   Cardiovascular:      Rate and Rhythm: Normal rate and regular rhythm.      Heart sounds: No murmur heard.     No friction rub. No gallop.   Pulmonary:      Effort: Pulmonary effort is normal.      Breath sounds: Normal breath sounds. No wheezing, rhonchi or rales.   Neurological:      General: No focal deficit present.      Mental Status: He is alert and oriented to person, place, and time.       Rachel Solano, DO    "

## 2024-04-16 NOTE — PATIENT INSTRUCTIONS
Nortriptyline 10mg daily--let me know how this is going   Stop prorpanolol  Okay for ubrelvy for the migraines  Let me know in a week or two if you aren't tolerating the nortriptyline

## 2024-04-17 ENCOUNTER — APPOINTMENT (OUTPATIENT)
Dept: PHYSICAL THERAPY | Facility: CLINIC | Age: 64
End: 2024-04-17
Payer: COMMERCIAL

## 2024-04-22 DIAGNOSIS — G43.709 CHRONIC MIGRAINE W/O AURA, NOT INTRACTABLE, W/O STAT MIGR: Primary | ICD-10-CM

## 2024-04-22 RX ORDER — RIMEGEPANT SULFATE 75 MG/75MG
TABLET, ORALLY DISINTEGRATING ORAL
Qty: 15 TABLET | Refills: 3 | Status: SHIPPED | OUTPATIENT
Start: 2024-04-22

## 2024-04-24 ENCOUNTER — APPOINTMENT (OUTPATIENT)
Dept: PHYSICAL THERAPY | Facility: CLINIC | Age: 64
End: 2024-04-24
Payer: COMMERCIAL

## 2024-04-27 ENCOUNTER — TELEPHONE (OUTPATIENT)
Age: 64
End: 2024-04-27

## 2024-04-27 DIAGNOSIS — E03.9 HYPOTHYROIDISM, UNSPECIFIED TYPE: ICD-10-CM

## 2024-04-27 RX ORDER — LEVOTHYROXINE SODIUM 0.05 MG/1
TABLET ORAL
Qty: 30 TABLET | Refills: 5 | Status: SHIPPED | OUTPATIENT
Start: 2024-04-27

## 2024-04-27 NOTE — TELEPHONE ENCOUNTER
PA for rimegepant sulfate (Nurtec) 75 mg     Submitted via    [x]CMM-KEY [id number]  []Surescripts-Case ID #   []Faxed to plan   []Other website   []Phone call Case ID #     Office notes sent, clinical questions answered. Awaiting determination    Turnaround time for your insurance to make a decision on your Prior Authorization can take 7-21 business days.

## 2024-05-06 DIAGNOSIS — K21.00 GASTROESOPHAGEAL REFLUX DISEASE WITH ESOPHAGITIS WITHOUT HEMORRHAGE: ICD-10-CM

## 2024-05-07 RX ORDER — PANTOPRAZOLE SODIUM 40 MG/1
40 TABLET, DELAYED RELEASE ORAL
Qty: 90 TABLET | Refills: 0 | Status: SHIPPED | OUTPATIENT
Start: 2024-05-07

## 2024-05-08 NOTE — TELEPHONE ENCOUNTER
PA for NURTEC RESUBMITTED     Submitted via    []CMM-KEY   []Surescripts-Case ID #   []Faxed to plan   []Other website   [x]Phone call Case ID # [id number]    Office notes sent, clinical questions answered. Awaiting determination    Turnaround time for your insurance to make a decision on your Prior Authorization can take 7-21 business days.

## 2024-05-09 DIAGNOSIS — G43.709 CHRONIC MIGRAINE W/O AURA, NOT INTRACTABLE, W/O STAT MIGR: ICD-10-CM

## 2024-05-09 RX ORDER — NORTRIPTYLINE HYDROCHLORIDE 10 MG/1
10 CAPSULE ORAL
Qty: 90 CAPSULE | Refills: 1 | Status: SHIPPED | OUTPATIENT
Start: 2024-05-09

## 2024-05-09 NOTE — TELEPHONE ENCOUNTER
PA for NURTEC appealed via     []CMM  []SS  [x]Letter sent to insurance via fax [employer]  []Other site or means     All necessary records sent. Will await response from insurance company    Turnaround time for a decision to be made on an appeal could take up to 30 business days

## 2024-05-09 NOTE — TELEPHONE ENCOUNTER
PA for NURTEC Denied    Reason:(Screenshot if applicable)        Message sent to office clinical pool Yes    Denial letter scanned into Media Yes    Appeal started No ( Provider will need to decide if appeal is warranted and send clinical documentation to PA team for initiation.)

## 2024-05-09 NOTE — TELEPHONE ENCOUNTER
Not sure where to send it, but please do the appeal of this absurd denial.  It says this was denied because the # of headaches per month wasn't documented. As per my note in march which team sent in, he was having daily headache without prophylaxis.  So that is # 30.  It also says that there is not documentation that it isn't rebound.  Since he was getting these and not treating them for the most part, it is certainly not due to rebound.  Thanks!

## 2024-06-26 DIAGNOSIS — E03.9 HYPOTHYROIDISM, UNSPECIFIED TYPE: ICD-10-CM

## 2024-06-26 RX ORDER — LEVOTHYROXINE SODIUM 0.05 MG/1
TABLET ORAL
Qty: 90 TABLET | Refills: 1 | Status: SHIPPED | OUTPATIENT
Start: 2024-06-26

## 2024-07-04 DIAGNOSIS — K21.00 GASTROESOPHAGEAL REFLUX DISEASE WITH ESOPHAGITIS WITHOUT HEMORRHAGE: ICD-10-CM

## 2024-07-05 DIAGNOSIS — Z79.4 TYPE 2 DIABETES MELLITUS WITH HYPERGLYCEMIA, WITH LONG-TERM CURRENT USE OF INSULIN (HCC): ICD-10-CM

## 2024-07-05 DIAGNOSIS — E11.65 TYPE 2 DIABETES MELLITUS WITH HYPERGLYCEMIA, WITH LONG-TERM CURRENT USE OF INSULIN (HCC): ICD-10-CM

## 2024-07-05 DIAGNOSIS — K21.00 GASTROESOPHAGEAL REFLUX DISEASE WITH ESOPHAGITIS WITHOUT HEMORRHAGE: ICD-10-CM

## 2024-07-05 RX ORDER — PANTOPRAZOLE SODIUM 40 MG/1
40 TABLET, DELAYED RELEASE ORAL
Qty: 100 TABLET | Refills: 1 | Status: SHIPPED | OUTPATIENT
Start: 2024-07-05

## 2024-07-05 RX ORDER — GLIMEPIRIDE 2 MG/1
TABLET ORAL
Qty: 90 TABLET | Refills: 1 | Status: SHIPPED | OUTPATIENT
Start: 2024-07-05 | End: 2024-07-05 | Stop reason: SDUPTHER

## 2024-07-05 RX ORDER — PANTOPRAZOLE SODIUM 40 MG/1
40 TABLET, DELAYED RELEASE ORAL
Qty: 100 TABLET | Refills: 0 | Status: SHIPPED | OUTPATIENT
Start: 2024-07-05 | End: 2024-07-05 | Stop reason: SDUPTHER

## 2024-07-06 RX ORDER — GLIMEPIRIDE 2 MG/1
2 TABLET ORAL
Qty: 90 TABLET | Refills: 1 | Status: SHIPPED | OUTPATIENT
Start: 2024-07-06

## 2024-07-06 NOTE — TELEPHONE ENCOUNTER
Order placed yesterday 07.05.2024 was sent to [facility], patient requesting different pharmacy ([facility]).

## 2024-07-23 ENCOUNTER — OFFICE VISIT (OUTPATIENT)
Dept: CARDIOLOGY CLINIC | Facility: MEDICAL CENTER | Age: 64
End: 2024-07-23
Payer: MEDICARE

## 2024-07-23 VITALS
WEIGHT: 249 LBS | HEIGHT: 74 IN | SYSTOLIC BLOOD PRESSURE: 120 MMHG | BODY MASS INDEX: 31.95 KG/M2 | HEART RATE: 89 BPM | DIASTOLIC BLOOD PRESSURE: 78 MMHG | OXYGEN SATURATION: 96 %

## 2024-07-23 DIAGNOSIS — I10 ESSENTIAL HYPERTENSION: ICD-10-CM

## 2024-07-23 DIAGNOSIS — D86.0 PULMONARY SARCOIDOSIS (HCC): Primary | ICD-10-CM

## 2024-07-23 DIAGNOSIS — R00.0 SINUS TACHYCARDIA: ICD-10-CM

## 2024-07-23 DIAGNOSIS — I71.21 ANEURYSM OF ASCENDING AORTA WITHOUT RUPTURE (HCC): ICD-10-CM

## 2024-07-23 DIAGNOSIS — I77.810 AORTIC ROOT DILATION (HCC): ICD-10-CM

## 2024-07-23 DIAGNOSIS — N20.0 NEPHROLITHIASIS: ICD-10-CM

## 2024-07-23 DIAGNOSIS — Z79.4 TYPE 2 DIABETES MELLITUS WITH OTHER SPECIFIED COMPLICATION, WITH LONG-TERM CURRENT USE OF INSULIN (HCC): ICD-10-CM

## 2024-07-23 DIAGNOSIS — E11.69 TYPE 2 DIABETES MELLITUS WITH OTHER SPECIFIED COMPLICATION, WITH LONG-TERM CURRENT USE OF INSULIN (HCC): ICD-10-CM

## 2024-07-23 PROCEDURE — 93000 ELECTROCARDIOGRAM COMPLETE: CPT | Performed by: INTERNAL MEDICINE

## 2024-07-23 PROCEDURE — 99214 OFFICE O/P EST MOD 30 MIN: CPT | Performed by: INTERNAL MEDICINE

## 2024-07-23 NOTE — PROGRESS NOTES
Cardiology Follow Up    Cristian Mancini  1960  [id number]  [facility] [place]  [address]  [place] [address] [phone]    1. Pulmonary sarcoidosis (HCC)  2. Sinus tachycardia  3. Type 2 diabetes mellitus with other specified complication, with long-term current use of insulin (HCC)  4. Essential hypertension  5. Nephrolithiasis      Discussion: He has been doing well. He denied chest discomfort or dyspnea. Echo in 11/23 showed normal LV function with mild LVH. The aortic root was 4.3cm. A CT of the chest with contrast was ordered in 3 months. ECG today was normal. I will see him in follow-up in 1 year.     Cardiovascular History:   Mr. Mancini has risk factors but no history of heart disease. He is a diabetic, has a history of treated hypertension that is under good control, and dyslipidemia that is under good control on statin therapy. He is a former smoker. In 2019, an evaluation for dyspnea included an echocardiogram that disclosed normal LV systolic function with grade 1 diastolic dysfunction. Subsequent coronary angiography disclosed minor plaque but no obstructive coronary disease and normal LV systolic function with a normal LVEDP. There is a past history of 1 or 2 episodes of atrial flutter with no recurrence. There is no recent history of anginal symptoms are symptoms suggestive of congestive heart failure or of arrhythmia.  Because of his history of sarcoidosis, an echocardiogram was obtained in 11/23.  This disclosed mild concentric left ventricular hypertrophy with grade 1 diastolic dysfunction.  In addition, a 4.3 cm dilation of the aortic root was noted.  A CT scan of the chest obtained in 1/20 had not reported any evidence of aortic dilation.  At the time of his 7/24 visit, follow-up CT scan of the chest was ordered.  He has diabetes, treated with oral medication, and dyslipidemia.  A lipid panel in 9/23 on  statin therapy disclosed total cholesterol 106, LDL 41, HDL 35, and triglycerides 150.   There is a history of pulmonary sarcoid.  He believes that this is now in remission.  He is no longer on steroids.  There has been no suggestion of cardiac involvement.  Echocardiography in the past was unremarkable, and ECGs have disclosed no evidence of conduction abnormalities.            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    Sinus tachycardia    Calculus of gallbladder without cholecystitis without obstruction    Type 2 diabetes mellitus with other specified complication, with long-term current use of insulin (HCC)    Essential hypertension    Nephrolithiasis    Morbid (severe) obesity due to excess calories (HCC)    Gastritis without bleeding    Diarrhea    GERD (gastroesophageal reflux disease)    Cardiomyopathy, unspecified type (HCC)    Esophageal dysphagia     Past Medical History:   Diagnosis Date    BPH (benign prostatic hyperplasia)     Diabetes mellitus (HCC)     Disease of thyroid gland     Erectile dysfunction     GERD (gastroesophageal reflux disease)     Headache     Headache(784.0) 05/01/2018    Each Day    Hematuria     Hyperlipidemia     Hypertension     Infectious viral hepatitis 2007    Cured by Dr. Kern    Kidney stone     Liver disease     hepatitis C    Mediastinal adenopathy     Obesity 2007    PE (pulmonary thromboembolism) (HCC)     Sarcoidosis     Vitamin D deficiency      Social History     Socioeconomic History    Marital status: /Civil Union     Spouse name: Not on file    Number of children: 3    Years of education: Not on file    Highest education level: Not on file   Occupational  History    Occupation: manager   Tobacco Use    Smoking status: Former     Current packs/day: 0.00     Average packs/day: 1 pack/day for 37.7 years (37.7 ttl pk-yrs)     Types: Cigarettes     Start date: 4/27/1971     Quit date: 1/1/2009     Years since quitting: 15.5    Smokeless tobacco: Never   Vaping Use    Vaping status: Never Used   Substance and Sexual Activity    Alcohol use: Not Currently     Comment: quit 2006    Drug use: Not Currently     Types: Marijuana    Sexual activity: Not Currently     Partners: Female     Birth control/protection: None   Other Topics Concern    Not on file   Social History Narrative    Caffeine use    Daily coffee consumption     Denied daily cola consumption     Denied daily tea consumption     Former smoker (as per [employer])     Current everyday smoker (as per [employer])     Non-smoker (as per [employer])      Social Determinants of Health     Financial Resource Strain: Low Risk  (9/8/2023)    Overall Financial Resource Strain (CARDIA)     Difficulty of Paying Living Expenses: Not hard at all   Food Insecurity: No Food Insecurity (9/8/2023)    Hunger Vital Sign     Worried About Running Out of Food in the Last Year: Never true     Ran Out of Food in the Last Year: Never true   Transportation Needs: No Transportation Needs (9/8/2023)    PRAPARE - Transportation     Lack of Transportation (Medical): No     Lack of Transportation (Non-Medical): No   Physical Activity: Not on file   Stress: Not on file   Social Connections: Not on file   Intimate Partner Violence: Not on file   Housing Stability: Low Risk  (9/8/2023)    Housing Stability Vital Sign     Unable to Pay for Housing in the Last Year: No     Number of Times Moved in the Last Year: 1     Homeless in the Last Year: No      Family History   Problem Relation Age of Onset    Valvular heart disease Mother     Stroke Mother     Stroke Father         Accidental Death at 49    Cancer Maternal Grandmother      Past Surgical  History:   Procedure Laterality Date    COLONOSCOPY      last assessed: 08/28/2012; fiberoptic screening     CYSTOSCOPY      onset: 05/06/2016; Diagnostic     DENTAL SURGERY      FL RETROGRADE PYELOGRAM  02/06/2020    FL RETROGRADE PYELOGRAM  03/03/2020    HERNIA REPAIR      LIVER BIOPSY      LYMPH NODE BIOPSY  06/2019    MO BRNCHSC INCL FLUOR GDNCE DX W/CELL WASHG SPX N/A 06/06/2019    Procedure: BRONCHOSCOPY FLEXIBLE;  Surgeon: Umair Louis MD;  Location: [facility];  Service: Thoracic    MO BRONCHOSCOPY NEEDLE BX TRACHEA MAIN STEM&/BRON N/A 06/06/2019    Procedure: ENDOBRONCHIAL ULTRASOUND (EBUS);  Surgeon: Umair Louis MD;  [facility];  Service: Thoracic    MO CYSTO/URETERO W/LITHOTRIPSY &INDWELL STENT INSRT Right 02/06/2020    Procedure: CYSTOSCOPY WITH LITHOTRIPSY RETROGRADE PYELOGRAM AND INSERTION STENT URETERAL;  Surgeon: Jose Reza MD;  [facility];  Service: Urology    MO CYSTO/URETERO W/LITHOTRIPSY &INDWELL STENT INSRT Right 03/03/2020    Procedure: CYSTOSCOPY URETEROSCOPY WITH LITHOTRIPSY HOLMIUM LASER; BASKET STONE RETRIEVAL; RETROGRADE PYELOGRAM AND INSERTION STENT URETERAL;  Surgeon: Jose Reza MD;  [facility];  Service: Urology    MO LAPS RPR PARAESPHGL HRNA INCL FUNDPLSTY W/MESH N/A 12/03/2021    Procedure: ROBOTIC PARAESOPHAGEAL HERNIA  REPAIR, with FUNDOPLICATION;  Surgeon: Jim Coles MD;  Location[facility];  Service: General    MO TRURL ELECTROSURG RESCJ PROSTATE BLEED COMPLETE N/A 09/13/2017    Procedure: CYSTOSCOPY; TUR PROSTATE;  Surgeon: Fili Poe MD;  [facility];  Service: Urology    PROSTATE SURGERY  08/2017    SHOULDER ARTHROSCOPY Right     bicep tendon repair, rotator cuff repair and acromuim shave        Current Outpatient Medications:     aspirin (ECOTRIN LOW STRENGTH) 81 mg EC tablet, Take 81 mg by mouth daily, Disp: , Rfl:     atorvastatin (LIPITOR) 40 mg tablet, TAKE 1 TABLET BY MOUTH EVERY DAY, Disp: 90  tablet, Rfl: 4    Cholecalciferol (DIALYVITE VITAMIN D 5000 PO), Take by mouth in the morning, Disp: , Rfl:     Empagliflozin (Jardiance) 25 MG TABS, TAKE 1 TABLET (25 MG TOTAL) BY MOUTH DAILY., Disp: 30 tablet, Rfl: 5    glimepiride (AMARYL) 2 mg tablet, Take 1 tablet (2 mg total) by mouth daily with breakfast, Disp: 90 tablet, Rfl: 1    Lancets (OneTouch Delica Plus [id number]) MISC, USE TO CHECK BLOOD SUGARS TWICE DAILY, Disp: 200 each, Rfl: 3    levothyroxine 50 mcg tablet, TAKE 1 TAB DAILY MONDAY, TUESDAY, WEDNESDAY, FRIDAY, SATURDAY AND 2 TABLETS ON SUNDAY AND THURSDAY, Disp: 90 tablet, Rfl: 1    metFORMIN (GLUCOPHAGE) 500 mg tablet, TAKE 2 TABLETS BY MOUTH TWICE A DAY WITH FOOD, Disp: 360 tablet, Rfl: 1    OneTouch Verio test strip, USE TO TEST BLOOD SUGAR 4 TIMES A DAY, Disp: 300 strip, Rfl: 3    pantoprazole (PROTONIX) 40 mg tablet, Take 1 tablet (40 mg total) by mouth daily before breakfast, Disp: 100 tablet, Rfl: 1    rimegepant sulfate (Nurtec) 75 mg TBDP, 1 tab every other day for migraine prophylaxis., Disp: 15 tablet, Rfl: 3    nortriptyline (PAMELOR) 10 mg capsule, TAKE 1 CAPSULE DAILY AT BEDTIME, Disp: 90 capsule, Rfl: 1  No Known Allergies    Labs: personally reviewed all pertinent labs  Imaging:  personally reviewed all pertinent imaging  Cath:  ECHO:  Stress:  Holter:    Review of Systems:  Review of Systems   Constitutional: Negative.   HENT: Negative.     Eyes: Negative.    Cardiovascular: Negative.    Respiratory: Negative.     Endocrine: Negative.    Hematologic/Lymphatic: Negative.    Skin: Negative.    Musculoskeletal: Negative.    Gastrointestinal: Negative.    Genitourinary: Negative.    Neurological: Negative.    Psychiatric/Behavioral: Negative.     Allergic/Immunologic: Negative.    All other systems reviewed and are negative.      There were no vitals filed for this visit.  Weight (last 2 days)       None            Physical Exam:  Physical Exam  Vitals reviewed.   Constitutional:        General: He is not in acute distress.     Appearance: He is well-developed. He is not diaphoretic.   HENT:      Head: Normocephalic and atraumatic.   Eyes:      General: No scleral icterus.     Conjunctiva/sclera: Conjunctivae normal.   Neck:      Vascular: No JVD.      Trachea: No tracheal deviation.   Cardiovascular:      Rate and Rhythm: Normal rate and regular rhythm.      Pulses: Intact distal pulses.      Heart sounds: Normal heart sounds. No murmur heard.     No friction rub. No gallop.   Pulmonary:      Effort: Pulmonary effort is normal. No respiratory distress.      Breath sounds: Normal breath sounds. No stridor. No wheezing or rales.   Chest:      Chest wall: No tenderness.   Abdominal:      General: Bowel sounds are normal. There is no distension.      Palpations: Abdomen is soft.      Tenderness: There is no abdominal tenderness.   Musculoskeletal:         General: No tenderness. Normal range of motion.      Cervical back: Normal range of motion and neck supple.   Skin:     General: Skin is warm and dry.      Findings: No erythema.   Neurological:      Mental Status: He is alert and oriented to person, place, and time.      Cranial Nerves: No cranial nerve deficit.      Coordination: Coordination normal.   Psychiatric:         Behavior: Behavior normal.         Thought Content: Thought content normal.         Judgment: Judgment normal.         John Hagen MD

## 2024-07-31 NOTE — TELEPHONE ENCOUNTER
I'm pretty sure I've already provided this, but here you go again.     The patient has a low to moderate CV risk for a low to moderate risk surgery. Ok to hold blood thinners if patient taking them per operating physician preference.     EDWARD   Spoke to patient, BG log reviewed and provided dose adjustment for insulins

## 2024-08-22 DIAGNOSIS — E78.5 DYSLIPIDEMIA: ICD-10-CM

## 2024-08-22 RX ORDER — ATORVASTATIN CALCIUM 40 MG/1
40 TABLET, FILM COATED ORAL DAILY
Qty: 90 TABLET | Refills: 1 | Status: SHIPPED | OUTPATIENT
Start: 2024-08-22

## 2024-09-10 ENCOUNTER — TELEPHONE (OUTPATIENT)
Age: 64
End: 2024-09-10

## 2024-09-10 NOTE — TELEPHONE ENCOUNTER
Savannah from [facility] office ([facility]) called because they got pt medicare card but the last 2 digits were cut off and they reached out to the patient and he didn't know either. She supplied me with all demographic info and the whole number but last 2 looked like 31 but it was 51

## 2024-09-11 ENCOUNTER — APPOINTMENT (OUTPATIENT)
Dept: LAB | Facility: CLINIC | Age: 64
End: 2024-09-11
Payer: MEDICARE

## 2024-09-11 ENCOUNTER — RA CDI HCC (OUTPATIENT)
Dept: OTHER | Facility: HOSPITAL | Age: 64
End: 2024-09-11

## 2024-09-11 DIAGNOSIS — E11.65 TYPE 2 DIABETES MELLITUS WITH HYPERGLYCEMIA, WITHOUT LONG-TERM CURRENT USE OF INSULIN (HCC): ICD-10-CM

## 2024-09-11 DIAGNOSIS — E03.9 ACQUIRED HYPOTHYROIDISM: ICD-10-CM

## 2024-09-11 DIAGNOSIS — Z12.5 SCREENING PSA (PROSTATE SPECIFIC ANTIGEN): ICD-10-CM

## 2024-09-11 DIAGNOSIS — E78.2 MIXED HYPERLIPIDEMIA: ICD-10-CM

## 2024-09-11 DIAGNOSIS — I71.21 ANEURYSM OF ASCENDING AORTA WITHOUT RUPTURE (HCC): ICD-10-CM

## 2024-09-11 LAB
ALBUMIN SERPL BCG-MCNC: 4.3 G/DL (ref 3.5–5)
ALP SERPL-CCNC: 58 U/L (ref 34–104)
ALT SERPL W P-5'-P-CCNC: 18 U/L (ref 7–52)
ANION GAP SERPL CALCULATED.3IONS-SCNC: 10 MMOL/L (ref 4–13)
AST SERPL W P-5'-P-CCNC: 15 U/L (ref 13–39)
BASOPHILS # BLD AUTO: 0.07 THOUSANDS/ΜL (ref 0–0.1)
BASOPHILS NFR BLD AUTO: 1 % (ref 0–1)
BILIRUB SERPL-MCNC: 0.87 MG/DL (ref 0.2–1)
BUN SERPL-MCNC: 22 MG/DL (ref 5–25)
CALCIUM SERPL-MCNC: 9.5 MG/DL (ref 8.4–10.2)
CHLORIDE SERPL-SCNC: 104 MMOL/L (ref 96–108)
CHOLEST SERPL-MCNC: 109 MG/DL
CO2 SERPL-SCNC: 26 MMOL/L (ref 21–32)
CREAT SERPL-MCNC: 1.03 MG/DL (ref 0.6–1.3)
CREAT UR-MCNC: 257.8 MG/DL
EOSINOPHIL # BLD AUTO: 0.21 THOUSAND/ΜL (ref 0–0.61)
EOSINOPHIL NFR BLD AUTO: 3 % (ref 0–6)
ERYTHROCYTE [DISTWIDTH] IN BLOOD BY AUTOMATED COUNT: 13.3 % (ref 11.6–15.1)
EST. AVERAGE GLUCOSE BLD GHB EST-MCNC: 137 MG/DL
GFR SERPL CREATININE-BSD FRML MDRD: 75 ML/MIN/1.73SQ M
GLUCOSE P FAST SERPL-MCNC: 156 MG/DL (ref 65–99)
HBA1C MFR BLD: 6.4 %
HCT VFR BLD AUTO: 44.2 % (ref 36.5–49.3)
HDLC SERPL-MCNC: 33 MG/DL
HGB BLD-MCNC: 14.4 G/DL (ref 12–17)
IMM GRANULOCYTES # BLD AUTO: 0.03 THOUSAND/UL (ref 0–0.2)
IMM GRANULOCYTES NFR BLD AUTO: 1 % (ref 0–2)
LDLC SERPL CALC-MCNC: 45 MG/DL (ref 0–100)
LYMPHOCYTES # BLD AUTO: 2 THOUSANDS/ΜL (ref 0.6–4.47)
LYMPHOCYTES NFR BLD AUTO: 31 % (ref 14–44)
MCH RBC QN AUTO: 31.1 PG (ref 26.8–34.3)
MCHC RBC AUTO-ENTMCNC: 32.6 G/DL (ref 31.4–37.4)
MCV RBC AUTO: 96 FL (ref 82–98)
MICROALBUMIN UR-MCNC: 29.4 MG/L
MICROALBUMIN/CREAT 24H UR: 11 MG/G CREATININE (ref 0–30)
MONOCYTES # BLD AUTO: 0.63 THOUSAND/ΜL (ref 0.17–1.22)
MONOCYTES NFR BLD AUTO: 10 % (ref 4–12)
NEUTROPHILS # BLD AUTO: 3.51 THOUSANDS/ΜL (ref 1.85–7.62)
NEUTS SEG NFR BLD AUTO: 54 % (ref 43–75)
NRBC BLD AUTO-RTO: 0 /100 WBCS
PLATELET # BLD AUTO: 228 THOUSANDS/UL (ref 149–390)
PMV BLD AUTO: 9.5 FL (ref 8.9–12.7)
POTASSIUM SERPL-SCNC: 4.6 MMOL/L (ref 3.5–5.3)
PROT SERPL-MCNC: 7 G/DL (ref 6.4–8.4)
PSA SERPL-MCNC: 2.92 NG/ML (ref 0–4)
RBC # BLD AUTO: 4.63 MILLION/UL (ref 3.88–5.62)
SODIUM SERPL-SCNC: 140 MMOL/L (ref 135–147)
TRIGL SERPL-MCNC: 154 MG/DL
TSH SERPL DL<=0.05 MIU/L-ACNC: 2.1 UIU/ML (ref 0.45–4.5)
WBC # BLD AUTO: 6.45 THOUSAND/UL (ref 4.31–10.16)

## 2024-09-11 PROCEDURE — 85025 COMPLETE CBC W/AUTO DIFF WBC: CPT

## 2024-09-11 PROCEDURE — 80053 COMPREHEN METABOLIC PANEL: CPT

## 2024-09-11 PROCEDURE — 84443 ASSAY THYROID STIM HORMONE: CPT

## 2024-09-11 PROCEDURE — 82570 ASSAY OF URINE CREATININE: CPT

## 2024-09-11 PROCEDURE — G0103 PSA SCREENING: HCPCS

## 2024-09-11 PROCEDURE — 36415 COLL VENOUS BLD VENIPUNCTURE: CPT

## 2024-09-11 PROCEDURE — 83036 HEMOGLOBIN GLYCOSYLATED A1C: CPT

## 2024-09-11 PROCEDURE — 80061 LIPID PANEL: CPT

## 2024-09-11 PROCEDURE — 82043 UR ALBUMIN QUANTITATIVE: CPT

## 2024-09-14 DIAGNOSIS — E78.2 MIXED HYPERLIPIDEMIA: ICD-10-CM

## 2024-09-14 DIAGNOSIS — E03.9 HYPOTHYROIDISM, UNSPECIFIED TYPE: ICD-10-CM

## 2024-09-14 DIAGNOSIS — E03.9 ACQUIRED HYPOTHYROIDISM: ICD-10-CM

## 2024-09-14 DIAGNOSIS — E11.65 TYPE 2 DIABETES MELLITUS WITH HYPERGLYCEMIA, WITH LONG-TERM CURRENT USE OF INSULIN (HCC): ICD-10-CM

## 2024-09-14 DIAGNOSIS — E55.9 VITAMIN D DEFICIENCY: ICD-10-CM

## 2024-09-14 DIAGNOSIS — Z79.4 TYPE 2 DIABETES MELLITUS WITH HYPERGLYCEMIA, WITH LONG-TERM CURRENT USE OF INSULIN (HCC): ICD-10-CM

## 2024-09-15 RX ORDER — LEVOTHYROXINE SODIUM 50 UG/1
TABLET ORAL
Qty: 108 TABLET | Refills: 1 | Status: SHIPPED | OUTPATIENT
Start: 2024-09-15

## 2024-09-17 ENCOUNTER — OFFICE VISIT (OUTPATIENT)
Dept: FAMILY MEDICINE CLINIC | Facility: CLINIC | Age: 64
End: 2024-09-17
Payer: MEDICARE

## 2024-09-17 VITALS
WEIGHT: 250 LBS | TEMPERATURE: 97.2 F | HEIGHT: 73 IN | SYSTOLIC BLOOD PRESSURE: 110 MMHG | DIASTOLIC BLOOD PRESSURE: 70 MMHG | HEART RATE: 96 BPM | BODY MASS INDEX: 33.13 KG/M2 | RESPIRATION RATE: 16 BRPM | OXYGEN SATURATION: 98 %

## 2024-09-17 DIAGNOSIS — I10 ESSENTIAL HYPERTENSION: ICD-10-CM

## 2024-09-17 DIAGNOSIS — E11.9 TYPE 2 DIABETES MELLITUS WITHOUT COMPLICATION, WITHOUT LONG-TERM CURRENT USE OF INSULIN (HCC): ICD-10-CM

## 2024-09-17 DIAGNOSIS — K21.9 GASTROESOPHAGEAL REFLUX DISEASE, UNSPECIFIED WHETHER ESOPHAGITIS PRESENT: ICD-10-CM

## 2024-09-17 DIAGNOSIS — Z00.00 MEDICARE ANNUAL WELLNESS VISIT, SUBSEQUENT: Primary | ICD-10-CM

## 2024-09-17 DIAGNOSIS — D86.0 PULMONARY SARCOIDOSIS (HCC): ICD-10-CM

## 2024-09-17 DIAGNOSIS — G43.709 CHRONIC MIGRAINE W/O AURA, NOT INTRACTABLE, W/O STAT MIGR: ICD-10-CM

## 2024-09-17 DIAGNOSIS — R97.20 RISING PSA LEVEL: ICD-10-CM

## 2024-09-17 DIAGNOSIS — E03.9 ACQUIRED HYPOTHYROIDISM: ICD-10-CM

## 2024-09-17 DIAGNOSIS — I77.810 AORTIC ROOT DILATION (HCC): ICD-10-CM

## 2024-09-17 PROBLEM — R00.0 SINUS TACHYCARDIA: Status: RESOLVED | Noted: 2019-09-11 | Resolved: 2024-09-17

## 2024-09-17 PROBLEM — Z79.4 TYPE 2 DIABETES MELLITUS WITH OTHER SPECIFIED COMPLICATION, WITH LONG-TERM CURRENT USE OF INSULIN (HCC): Status: RESOLVED | Noted: 2020-02-18 | Resolved: 2024-09-17

## 2024-09-17 PROBLEM — E66.01 MORBID (SEVERE) OBESITY DUE TO EXCESS CALORIES (HCC): Status: RESOLVED | Noted: 2020-10-26 | Resolved: 2024-09-17

## 2024-09-17 PROBLEM — Z79.4 TYPE 2 DIABETES MELLITUS, WITH LONG-TERM CURRENT USE OF INSULIN (HCC): Status: RESOLVED | Noted: 2018-01-25 | Resolved: 2024-09-17

## 2024-09-17 PROBLEM — E11.69 TYPE 2 DIABETES MELLITUS WITH OTHER SPECIFIED COMPLICATION, WITH LONG-TERM CURRENT USE OF INSULIN (HCC): Status: RESOLVED | Noted: 2020-02-18 | Resolved: 2024-09-17

## 2024-09-17 PROCEDURE — 99214 OFFICE O/P EST MOD 30 MIN: CPT | Performed by: FAMILY MEDICINE

## 2024-09-17 PROCEDURE — G0439 PPPS, SUBSEQ VISIT: HCPCS | Performed by: FAMILY MEDICINE

## 2024-09-17 NOTE — PATIENT INSTRUCTIONS
Obtain flu shot, covid shot, rsv shot at pharmacy  Repeat labs in 6 month and f/u 6 mo  Consider living will  Let me know if you want to do anything about headaches or mood    Medicare Preventive Visit Patient Instructions  Thank you for completing your Welcome to Medicare Visit or Medicare Annual Wellness Visit today. Your next wellness visit will be due in one year (9/18/2025).  The screening/preventive services that you may require over the next 5-10 years are detailed below. Some tests may not apply to you based off risk factors and/or age. Screening tests ordered at today's visit but not completed yet may show as past due. Also, please note that scanned in results may not display below.  Preventive Screenings:  Service Recommendations Previous Testing/Comments   Colorectal Cancer Screening  Colonoscopy    Fecal Occult Blood Test (FOBT)/Fecal Immunochemical Test (FIT)  Fecal DNA/Cologuard Test  Flexible Sigmoidoscopy Age: 45-75 years old   Colonoscopy: every 10 years (May be performed more frequently if at higher risk)  OR  FOBT/FIT: every 1 year  OR  Cologuard: every 3 years  OR  Sigmoidoscopy: every 5 years  Screening may be recommended earlier than age 45 if at higher risk for colorectal cancer. Also, an individualized decision between you and your healthcare provider will decide whether screening between the ages of 76-85 would be appropriate. Colonoscopy: 08/20/2018  FOBT/FIT: Not on file  Cologuard: Not on file  Sigmoidoscopy: Not on file    Screening Current     Prostate Cancer Screening Individualized decision between patient and health care provider in men between ages of 55-69   Medicare will cover every 12 months beginning on the day after your 50th birthday PSA: 2.924 ng/mL     Screening Current     Hepatitis C Screening Once for adults born between 1945 and 1965  More frequently in patients at high risk for Hepatitis C Hep C Antibody: 01/16/2021    Screening Not Indicated  History Hepatitis C    Diabetes Screening 1-2 times per year if you're at risk for diabetes or have pre-diabetes Fasting glucose: 156 mg/dL (9/11/2024)  A1C: 6.4 % (9/11/2024)  Screening Not Indicated  History Diabetes   Cholesterol Screening Once every 5 years if you don't have a lipid disorder. May order more often based on risk factors. Lipid panel: 09/11/2024  Screening Not Indicated  History Lipid Disorder      Other Preventive Screenings Covered by Medicare:  Abdominal Aortic Aneurysm (AAA) Screening: covered once if your at risk. You're considered to be at risk if you have a family history of AAA or a male between the age of 65-75 who smoking at least 100 cigarettes in your lifetime.  Lung Cancer Screening: covers low dose CT scan once per year if you meet all of the following conditions: (1) Age 55-77; (2) No signs or symptoms of lung cancer; (3) Current smoker or have quit smoking within the last 15 years; (4) You have a tobacco smoking history of at least 20 pack years (packs per day x number of years you smoked); (5) You get a written order from a healthcare provider.  Glaucoma Screening: covered annually if you're considered high risk: (1) You have diabetes OR (2) Family history of glaucoma OR (3)  aged 50 and older OR (4)  American aged 65 and older  Osteoporosis Screening: covered every 2 years if you meet one of the following conditions: (1) Have a vertebral abnormality; (2) On glucocorticoid therapy for more than 3 months; (3) Have primary hyperparathyroidism; (4) On osteoporosis medications and need to assess response to drug therapy.  HIV Screening: covered annually if you're between the age of 15-65. Also covered annually if you are younger than 15 and older than 65 with risk factors for HIV infection. For pregnant patients, it is covered up to 3 times per pregnancy.    Immunizations:  Immunization Recommendations   Influenza Vaccine Annual influenza vaccination during flu season is recommended  for all persons aged >= 6 months who do not have contraindications   Pneumococcal Vaccine   * Pneumococcal conjugate vaccine = PCV13 (Prevnar 13), PCV15 (Vaxneuvance), PCV20 (Prevnar 20)  * Pneumococcal polysaccharide vaccine = PPSV23 (Pneumovax) Adults 19-63 yo with certain risk factors or if 65+ yo  If never received any pneumonia vaccine: recommend Prevnar 20 (PCV20)  Give PCV20 if previously received 1 dose of PCV13 or PPSV23   Hepatitis B Vaccine 3 dose series if at intermediate or high risk (ex: diabetes, end stage renal disease, liver disease)   Respiratory syncytial virus (RSV) Vaccine - COVERED BY MEDICARE PART D  * RSVPreF3 (Arexvy) CDC recommends that adults 60 years of age and older may receive a single dose of RSV vaccine using shared clinical decision-making (SCDM)   Tetanus (Td) Vaccine - COST NOT COVERED BY MEDICARE PART B Following completion of primary series, a booster dose should be given every 10 years to maintain immunity against tetanus. Td may also be given as tetanus wound prophylaxis.   Tdap Vaccine - COST NOT COVERED BY MEDICARE PART B Recommended at least once for all adults. For pregnant patients, recommended with each pregnancy.   Shingles Vaccine (Shingrix) - COST NOT COVERED BY MEDICARE PART B  2 shot series recommended in those 19 years and older who have or will have weakened immune systems or those 50 years and older     Health Maintenance Due:      Topic Date Due    Colorectal Cancer Screening  08/20/2028    HIV Screening  Completed    Hepatitis C Screening  Discontinued    Lung Cancer Screening  Discontinued     Immunizations Due:      Topic Date Due    COVID-19 Vaccine (4 - 2023-24 season) 09/01/2024    Influenza Vaccine (1) 09/01/2024     Advance Directives   What are advance directives?  Advance directives are legal documents that state your wishes and plans for medical care. These plans are made ahead of time in case you lose your ability to make decisions for yourself.  Advance directives can apply to any medical decision, such as the treatments you want, and if you want to donate organs.   What are the types of advance directives?  There are many types of advance directives, and each state has rules about how to use them. You may choose a combination of any of the following:  Living will:  This is a written record of the treatment you want. You can also choose which treatments you do not want, which to limit, and which to stop at a certain time. This includes surgery, medicine, IV fluid, and tube feedings.   Durable power of  for healthcare (DPAHC):  This is a written record that states who you want to make healthcare choices for you when you are unable to make them for yourself. This person, called a proxy, is usually a family member or a friend. You may choose more than 1 proxy.  Do not resuscitate (DNR) order:  A DNR order is used in case your heart stops beating or you stop breathing. It is a request not to have certain forms of treatment, such as CPR. A DNR order may be included in other types of advance directives.  Medical directive:  This covers the care that you want if you are in a coma, near death, or unable to make decisions for yourself. You can list the treatments you want for each condition. Treatment may include pain medicine, surgery, blood transfusions, dialysis, IV or tube feedings, and a ventilator (breathing machine).  Values history:  This document has questions about your views, beliefs, and how you feel and think about life. This information can help others choose the care that you would choose.  Why are advance directives important?  An advance directive helps you control your care. Although spoken wishes may be used, it is better to have your wishes written down. Spoken wishes can be misunderstood, or not followed. Treatments may be given even if you do not want them. An advance directive may make it easier for your family to make difficult  choices about your care.   Weight Management   Why it is important to manage your weight:  Being overweight increases your risk of health conditions such as heart disease, high blood pressure, type 2 diabetes, and certain types of cancer. It can also increase your risk for osteoarthritis, sleep apnea, and other respiratory problems. Aim for a slow, steady weight loss. Even a small amount of weight loss can lower your risk of health problems.  How to lose weight safely:  A safe and healthy way to lose weight is to eat fewer calories and get regular exercise. You can lose up about 1 pound a week by decreasing the number of calories you eat by 500 calories each day.   Healthy meal plan for weight management:  A healthy meal plan includes a variety of foods, contains fewer calories, and helps you stay healthy. A healthy meal plan includes the following:  Eat whole-grain foods more often.  A healthy meal plan should contain fiber. Fiber is the part of grains, fruits, and vegetables that is not broken down by your body. Whole-grain foods are healthy and provide extra fiber in your diet. Some examples of whole-grain foods are whole-wheat breads and pastas, oatmeal, brown rice, and bulgur.  Eat a variety of vegetables every day.  Include dark, leafy greens such as spinach, kale, galina greens, and mustard greens. Eat yellow and orange vegetables such as carrots, sweet potatoes, and winter squash.   Eat a variety of fruits every day.  Choose fresh or canned fruit (canned in its own juice or light syrup) instead of juice. Fruit juice has very little or no fiber.  Eat low-fat dairy foods.  Drink fat-free (skim) milk or 1% milk. Eat fat-free yogurt and low-fat cottage cheese. Try low-fat cheeses such as mozzarella and other reduced-fat cheeses.  Choose meat and other protein foods that are low in fat.  Choose beans or other legumes such as split peas or lentils. Choose fish, skinless poultry (chicken or turkey), or lean cuts  of red meat (beef or pork). Before you cook meat or poultry, cut off any visible fat.   Use less fat and oil.  Try baking foods instead of frying them. Add less fat, such as margarine, sour cream, regular salad dressing and mayonnaise to foods. Eat fewer high-fat foods. Some examples of high-fat foods include french fries, doughnuts, ice cream, and cakes.  Eat fewer sweets.  Limit foods and drinks that are high in sugar. This includes candy, cookies, regular soda, and sweetened drinks.  Exercise:  Exercise at least 30 minutes per day on most days of the week. Some examples of exercise include walking, biking, dancing, and swimming. You can also fit in more physical activity by taking the stairs instead of the elevator or parking farther away from stores. Ask your healthcare provider about the best exercise plan for you.      © Copyright [employer] 2018 Information is for End User's use only and may not be sold, redistributed or otherwise used for commercial purposes. All illustrations and images included in CareNotes® are the copyrighted property of [employer]M., Inc[employer] or [employer]

## 2024-09-17 NOTE — PROGRESS NOTES
Ambulatory Visit  Name: Cristian Mancini      : 1960      MRN: [id number]  Encounter Provider: Rachel Solano DO  Encounter Date: 2024   Encounter department: [facility]    Assessment & Plan  Medicare annual wellness visit, subsequent  Obtain rsv, covid, flu shots this fall here or at pharmacy  Discussed need for living will  Continue healthy diet and exercise  Otherwise up to date preventivley       Aortic root dilation (HCC)  Seeing cardiology  Obtain CTA as ordered       Essential hypertension  At goal on current meds  Continue same       Pulmonary sarcoidosis (HCC)  No symptoms  Doing well  Requiring no meds       Gastroesophageal reflux disease, unspecified whether esophagitis present  Continue protonix and GI f/u       Type 2 diabetes mellitus without complication, without long-term current use of insulin (HCC)  At goal on current meds  Continue same  Hold on jardiance due to cost  Lab Results   Component Value Date    HGBA1C 6.4 (H) 2024       Orders:    Hemoglobin A1C; Future    Comprehensive metabolic panel; Future    Acquired hypothyroidism  Tsh at goal  Continue synthroid  Recheck 6 mo  Orders:    TSH, 3rd generation; Future    Rising PSA level  Likely nml due to 3 yr gap, but recheck psa 6 mo  Orders:    PSA Total, Diagnostic; Future    Chronic migraine w/o aura, not intractable, w/o stat migr  Pt declines further meds  I have encouraged him to contact me if that changes  This is uncontrolled  T/c topamax if he wants to do anything         Depression Screening and Follow-up Plan: Patient was screened for depression during today's encounter. They screened negative with a PHQ-2 score of 0.      Preventive health issues were discussed with patient, and age appropriate screening tests were ordered as noted in patient's After Visit Summary. Personalized health advice and appropriate referrals for health education or preventive services given if needed, as noted in  patient's After Visit Summary.    History of Present Illness     Pt presents for MAW and f/u.     Preventively, due for flu shot, covid, rsv shots.  We do not have them here yet.  Advised getting at pharmacy.  Doesn't have living will.  Advised obtaining.  Otherwise, up to date preventively.  Sees dental.  Exercises    From a f/u standpoint, most recent labs show:    Lab Results   Component Value Date    HGBA1C 6.4 (H) 09/11/2024     Lab Results   Component Value Date     07/12/2017    SODIUM 140 09/11/2024    K 4.6 09/11/2024     09/11/2024    CO2 26 09/11/2024    ANIONGAP 9 01/01/2016    AGAP 10 09/11/2024    BUN 22 09/11/2024    CREATININE 1.03 09/11/2024    GLUC 167 (H) 12/04/2021    GLUF 156 (H) 09/11/2024    CALCIUM 9.5 09/11/2024    AST 15 09/11/2024    ALT 18 09/11/2024    ALKPHOS 58 09/11/2024    PROT 7.8 01/21/2016    TP 7.0 09/11/2024    BILITOT 0.7 01/21/2016    TBILI 0.87 09/11/2024    EGFR 75 09/11/2024     Lab Results   Component Value Date    WBC 6.45 09/11/2024    HGB 14.4 09/11/2024    HCT 44.2 09/11/2024    MCV 96 09/11/2024     09/11/2024     Lab Results   Component Value Date    ICH4AVWAMEAN 2.098 09/11/2024     Lab Results   Component Value Date    CHOLESTEROL 109 09/11/2024    CHOLESTEROL 106 09/05/2023    CHOLESTEROL 97 10/29/2022     Lab Results   Component Value Date    HDL 33 (L) 09/11/2024    HDL 35 (L) 09/05/2023    HDL 33 (L) 10/29/2022     Lab Results   Component Value Date    TRIG 154 (H) 09/11/2024    TRIG 150 (H) 09/05/2023    TRIG 67 10/29/2022     Lab Results   Component Value Date    NONHDLC 64 10/29/2022    NONHDLC 58 04/02/2022    NONHDLC 89 06/02/2021     Lab Results   Component Value Date    LDLCALC 45 09/11/2024     Lab Results   Component Value Date    PSA 2.924 09/11/2024    PSA 1.6 06/02/2021    PSA 1.2 03/15/2019      Pt can't afford jardiance, so he stopped.  A1c appropriate.  No low sugars.      Nortripytline didn't help headaches.  Can't afford  nurtec/ubrelvy.  Didn't tolerate propranolol though it was helpful.  Doesn't want any medications at this point.  Getting several migraines weekly    Tolerating meds.  No heat/cold intol.  No polyuria/polydipsia.    Known aortic ectasia.  Seeing cardiology.  Due for upcoming CTA    Patient Care Team:  Rachel Solano DO as PCP - General (Family Medicine)  MD Fili Herrera MD Ashish Shah V, MD (Endocrinology)  Dorothy Sanders MD (Ophthalmology)  Yan Nina DO (Endocrinology)    Review of Systems   Constitutional:  Negative for chills, fatigue, fever and unexpected weight change.   HENT:  Negative for congestion, ear pain, hearing loss, postnasal drip, rhinorrhea, sinus pressure, sinus pain, sore throat, trouble swallowing and voice change.    Eyes:  Negative for pain, redness and visual disturbance.   Respiratory:  Negative for cough and shortness of breath.    Cardiovascular:  Negative for chest pain, palpitations and leg swelling.   Gastrointestinal:  Negative for abdominal pain, constipation, diarrhea and nausea.   Endocrine: Negative for cold intolerance, heat intolerance, polydipsia and polyuria.   Genitourinary:  Negative for dysuria, frequency and urgency.   Musculoskeletal:  Negative for arthralgias, joint swelling and myalgias.   Skin:  Negative for rash.        No suspicious lesions   Neurological:  Negative for weakness, numbness and headaches.   Hematological:  Negative for adenopathy.     Medical History Reviewed by provider this encounter:       Annual Wellness Visit Questionnaire   Cristian is here for his Subsequent Wellness visit.     Health Risk Assessment:   Patient rates overall health as good. Patient feels that their physical health rating is slightly better. Patient is satisfied with their life. Eyesight was rated as slightly worse. Hearing was rated as same. Patient feels that their emotional and mental health rating is same. Patients states they are  sometimes angry. Patient states they are always unusually tired/fatigued. Pain experienced in the last 7 days has been some. Patient's pain rating has been 5/10. Patient states that he has experienced no weight loss or gain in last 6 months.     Depression Screening:   PHQ-2 Score: 0      Fall Risk Screening:   In the past year, patient has experienced: no history of falling in past year      Home Safety:  Patient does not have trouble with stairs inside or outside of their home. Patient has working smoke alarms and has working carbon monoxide detector. Home safety hazards include: none.     Nutrition:   Current diet is Regular.     Medications:   Patient is currently taking over-the-counter supplements. OTC medications include: see medication list. Patient is able to manage medications.     Activities of Daily Living (ADLs)/Instrumental Activities of Daily Living (IADLs):   Walk and transfer into and out of bed and chair?: Yes  Dress and groom yourself?: Yes    Bathe or shower yourself?: Yes    Feed yourself? Yes  Do your laundry/housekeeping?: Yes  Manage your money, pay your bills and track your expenses?: Yes  Make your own meals?: Yes    Do your own shopping?: Yes    Previous Hospitalizations:   Any hospitalizations or ED visits within the last 12 months?: No      Advance Care Planning:   Living will: No    Advanced directive counseling given: Yes      Cognitive Screening:   Provider or family/friend/caregiver concerned regarding cognition?: No    PREVENTIVE SCREENINGS      Cardiovascular Screening:    General: Screening Not Indicated and History Lipid Disorder      Diabetes Screening:     General: Screening Not Indicated and History Diabetes      Colorectal Cancer Screening:     General: Screening Current      Prostate Cancer Screening:    General: Screening Current      Osteoporosis Screening:    General: Screening Not Indicated      Abdominal Aortic Aneurysm (AAA) Screening:    Risk factors include: age  "between 65-74 yo and tobacco use        General: Screening Current      Lung Cancer Screening:     General: Screening Not Indicated      Hepatitis C Screening:    General: Screening Not Indicated and History Hepatitis C    Screening, Brief Intervention, and Referral to Treatment (SBIRT)    Screening  Typical number of drinks in a day: 0  Typical number of drinks in a week: 0  Interpretation: Low risk drinking behavior.    Single Item Drug Screening:  How often have you used an illegal drug (including marijuana) or a prescription medication for non-medical reasons in the past year? never    Single Item Drug Screen Score: 0  Interpretation: Negative screen for possible drug use disorder    Social Determinants of Health     Financial Resource Strain: Low Risk  (9/8/2023)    Overall Financial Resource Strain (CARDIA)     Difficulty of Paying Living Expenses: Not hard at all   Food Insecurity: No Food Insecurity (9/17/2024)    Hunger Vital Sign     Worried About Running Out of Food in the Last Year: Never true     Ran Out of Food in the Last Year: Never true   Transportation Needs: No Transportation Needs (9/17/2024)    PRAPARE - Transportation     Lack of Transportation (Medical): No     Lack of Transportation (Non-Medical): No   Housing Stability: Unknown (9/17/2024)    Housing Stability Vital Sign     Unable to Pay for Housing in the Last Year: No     Homeless in the Last Year: No   Utilities: Not At Risk (9/17/2024)    [facility] Utilities     Threatened with loss of utilities: No     No results found.    Objective     /70 (BP Location: Left arm, Patient Position: Sitting, Cuff Size: Standard)   Pulse 96   Temp (!) 97.2 °F (36.2 °C) (Temporal)   Resp 16   Ht 6' 1\" (1.854 m)   Wt 113 kg (250 lb)   SpO2 98%   BMI 32.98 kg/m²     Physical Exam  Constitutional:       Appearance: Normal appearance.   HENT:      Head: Normocephalic and atraumatic.      Right Ear: Tympanic membrane, ear canal and external ear normal. "      Left Ear: Tympanic membrane, ear canal and external ear normal.      Nose: Nose normal. No congestion.      Mouth/Throat:      Mouth: Mucous membranes are moist.      Pharynx: No oropharyngeal exudate or posterior oropharyngeal erythema.   Eyes:      Extraocular Movements: Extraocular movements intact.      Conjunctiva/sclera: Conjunctivae normal.      Pupils: Pupils are equal, round, and reactive to light.   Neck:      Vascular: No carotid bruit.   Cardiovascular:      Rate and Rhythm: Normal rate and regular rhythm.      Heart sounds: No murmur heard.     No friction rub. No gallop.   Pulmonary:      Effort: Pulmonary effort is normal.      Breath sounds: No wheezing, rhonchi or rales.   Abdominal:      General: Abdomen is flat. There is no distension.      Palpations: Abdomen is soft.      Tenderness: There is no abdominal tenderness.   Musculoskeletal:      Cervical back: Neck supple.   Lymphadenopathy:      Cervical: No cervical adenopathy.   Neurological:      General: No focal deficit present.      Mental Status: He is alert and oriented to person, place, and time.      Cranial Nerves: No cranial nerve deficit.      Motor: No weakness.      Deep Tendon Reflexes: Reflexes normal.

## 2024-09-17 NOTE — PROGRESS NOTES
Diabetic Foot Exam    Patient's shoes and socks removed.    Right Foot/Ankle   Right Foot Inspection  Skin Exam: skin normal and skin intact. No dry skin, no warmth, no callus, no erythema, no maceration, no abnormal color, no pre-ulcer, no ulcer and no callus.     Toe Exam: ROM and strength within normal limits.     Sensory   Vibration: intact  Proprioception: intact  Monofilament testing: intact    Vascular  The right DP pulse is 0. The right PT pulse is 0.     Left Foot/Ankle  Left Foot Inspection  Skin Exam: skin normal and skin intact. No dry skin, no warmth, no erythema, no maceration, normal color, no pre-ulcer, no ulcer and no callus.     Toe Exam: ROM and strength within normal limits.     Sensory   Vibration: intact  Proprioception: intact  Monofilament testing: intact    Vascular  The left DP pulse is 0. The left PT pulse is 0.     Assign Risk Category  No deformity present  No loss of protective sensation  No weak pulses  Risk: 0

## 2024-09-17 NOTE — ASSESSMENT & PLAN NOTE
At goal on current meds  Continue same  Hold on jardiance due to cost  Lab Results   Component Value Date    HGBA1C 6.4 (H) 09/11/2024       Orders:    Hemoglobin A1C; Future    Comprehensive metabolic panel; Future

## 2024-10-06 DIAGNOSIS — E11.65 TYPE 2 DIABETES MELLITUS WITH HYPERGLYCEMIA, WITH LONG-TERM CURRENT USE OF INSULIN (HCC): ICD-10-CM

## 2024-10-06 DIAGNOSIS — Z79.4 TYPE 2 DIABETES MELLITUS WITH HYPERGLYCEMIA, WITH LONG-TERM CURRENT USE OF INSULIN (HCC): ICD-10-CM

## 2024-10-07 RX ORDER — GLIMEPIRIDE 2 MG/1
TABLET ORAL
Qty: 90 TABLET | Refills: 1 | Status: SHIPPED | OUTPATIENT
Start: 2024-10-07

## 2024-11-22 DIAGNOSIS — E78.5 DYSLIPIDEMIA: ICD-10-CM

## 2024-11-22 RX ORDER — ATORVASTATIN CALCIUM 40 MG/1
40 TABLET, FILM COATED ORAL DAILY
Qty: 90 TABLET | Refills: 1 | Status: SHIPPED | OUTPATIENT
Start: 2024-11-22

## 2024-12-20 DIAGNOSIS — E03.9 ACQUIRED HYPOTHYROIDISM: ICD-10-CM

## 2024-12-20 DIAGNOSIS — Z79.4 TYPE 2 DIABETES MELLITUS WITH HYPERGLYCEMIA, WITH LONG-TERM CURRENT USE OF INSULIN (HCC): ICD-10-CM

## 2024-12-20 DIAGNOSIS — E78.2 MIXED HYPERLIPIDEMIA: ICD-10-CM

## 2024-12-20 DIAGNOSIS — E11.65 TYPE 2 DIABETES MELLITUS WITH HYPERGLYCEMIA, WITH LONG-TERM CURRENT USE OF INSULIN (HCC): ICD-10-CM

## 2024-12-20 DIAGNOSIS — E55.9 VITAMIN D DEFICIENCY: ICD-10-CM

## 2025-01-04 DIAGNOSIS — K21.00 GASTROESOPHAGEAL REFLUX DISEASE WITH ESOPHAGITIS WITHOUT HEMORRHAGE: ICD-10-CM

## 2025-01-06 RX ORDER — PANTOPRAZOLE SODIUM 40 MG/1
TABLET, DELAYED RELEASE ORAL
Qty: 100 TABLET | Refills: 1 | Status: SHIPPED | OUTPATIENT
Start: 2025-01-06

## 2025-01-29 ENCOUNTER — TELEMEDICINE (OUTPATIENT)
Dept: FAMILY MEDICINE CLINIC | Facility: CLINIC | Age: 65
End: 2025-01-29
Payer: MEDICARE

## 2025-01-29 VITALS — TEMPERATURE: 101 F | BODY MASS INDEX: 30.67 KG/M2 | HEIGHT: 74 IN | WEIGHT: 239 LBS

## 2025-01-29 DIAGNOSIS — J11.1 INFLUENZA: Primary | ICD-10-CM

## 2025-01-29 PROCEDURE — G2211 COMPLEX E/M VISIT ADD ON: HCPCS | Performed by: FAMILY MEDICINE

## 2025-01-29 PROCEDURE — 99213 OFFICE O/P EST LOW 20 MIN: CPT | Performed by: FAMILY MEDICINE

## 2025-01-29 RX ORDER — OSELTAMIVIR PHOSPHATE 75 MG/1
75 CAPSULE ORAL EVERY 12 HOURS SCHEDULED
Qty: 10 CAPSULE | Refills: 0 | Status: SHIPPED | OUTPATIENT
Start: 2025-01-29 | End: 2025-02-03

## 2025-01-29 NOTE — PROGRESS NOTES
"Virtual Regular Visit  Name: Cristian Mancini      : 1960      MRN: [id number]  Encounter Provider: Rachel Solano DO  Encounter Date: 2025   Encounter department: [facility]      Verification of patient location:  Patient is located at Home in the following state in which I hold an active license [place] :  Assessment & Plan  Influenza  Tamiflu BID x 5 days given wife's positive test  Call if sx worsen or for any questions    Orders:    oseltamivir (TAMIFLU) 75 mg capsule; Take 1 capsule (75 mg total) by mouth every 12 (twelve) hours for 5 days          Encounter provider Rachel Solano DO    The patient was identified by name and date of birth. Cristian Mancini was informed that this is a telemedicine visit and that the visit is being conducted through the Epic Embedded platform. He agrees to proceed..  My office door was closed. No one else was in the room.  He acknowledged consent and understanding of privacy and security of the video platform. The patient has agreed to participate and understands they can discontinue the visit at any time.    Patient is aware this is a billable service.     History of Present Illness     HPI  Pt's wife tested positive for flu and was given tamiflu over the weekend.  On Monday, pt started with feeling unwell.  +fever.  Mild congestion, cough.  +fatigue.  Body aches.  No shortness of breath.  Started wife's tamiflu last night.  Would like his own rx    Review of Systems  See hpi    Objective   Temp (!) 101 °F (38.3 °C) Comment: per pt, checked at 5am  Ht 6' 2\" (1.88 m) Comment: per pt.  Wt 108 kg (239 lb) Comment: per pt  BMI 30.69 kg/m²     Physical Exam  Constitutional:       General: He is not in acute distress.     Appearance: Normal appearance. He is ill-appearing. He is not toxic-appearing.   HENT:      Head: Normocephalic and atraumatic.   Eyes:      Extraocular Movements: Extraocular movements intact.      Conjunctiva/sclera: Conjunctivae " normal.   Pulmonary:      Effort: Pulmonary effort is normal. No respiratory distress.   Neurological:      General: No focal deficit present.      Mental Status: He is alert and oriented to person, place, and time.         Visit Time  Total Visit Duration: 5

## 2025-02-19 DIAGNOSIS — E03.9 HYPOTHYROIDISM, UNSPECIFIED TYPE: ICD-10-CM

## 2025-02-20 RX ORDER — LEVOTHYROXINE SODIUM 50 UG/1
TABLET ORAL
Qty: 115 TABLET | Refills: 1 | Status: SHIPPED | OUTPATIENT
Start: 2025-02-20

## 2025-03-08 DIAGNOSIS — E03.9 HYPOTHYROIDISM, UNSPECIFIED TYPE: ICD-10-CM

## 2025-03-09 RX ORDER — LEVOTHYROXINE SODIUM 50 UG/1
TABLET ORAL
Qty: 115 TABLET | Refills: 1 | Status: SHIPPED | OUTPATIENT
Start: 2025-03-09

## 2025-03-11 ENCOUNTER — APPOINTMENT (OUTPATIENT)
Dept: LAB | Facility: CLINIC | Age: 65
End: 2025-03-11
Payer: MEDICARE

## 2025-03-11 ENCOUNTER — RESULTS FOLLOW-UP (OUTPATIENT)
Dept: FAMILY MEDICINE CLINIC | Facility: CLINIC | Age: 65
End: 2025-03-11

## 2025-03-11 ENCOUNTER — RA CDI HCC (OUTPATIENT)
Dept: OTHER | Facility: HOSPITAL | Age: 65
End: 2025-03-11

## 2025-03-11 DIAGNOSIS — E11.9 TYPE 2 DIABETES MELLITUS WITHOUT COMPLICATION, WITHOUT LONG-TERM CURRENT USE OF INSULIN (HCC): ICD-10-CM

## 2025-03-11 DIAGNOSIS — R97.20 RISING PSA LEVEL: ICD-10-CM

## 2025-03-11 DIAGNOSIS — E03.9 ACQUIRED HYPOTHYROIDISM: ICD-10-CM

## 2025-03-11 LAB
ALBUMIN SERPL BCG-MCNC: 4.6 G/DL (ref 3.5–5)
ALP SERPL-CCNC: 59 U/L (ref 34–104)
ALT SERPL W P-5'-P-CCNC: 18 U/L (ref 7–52)
ANION GAP SERPL CALCULATED.3IONS-SCNC: 8 MMOL/L (ref 4–13)
AST SERPL W P-5'-P-CCNC: 20 U/L (ref 13–39)
BILIRUB SERPL-MCNC: 0.66 MG/DL (ref 0.2–1)
BUN SERPL-MCNC: 24 MG/DL (ref 5–25)
CALCIUM SERPL-MCNC: 9.7 MG/DL (ref 8.4–10.2)
CHLORIDE SERPL-SCNC: 104 MMOL/L (ref 96–108)
CO2 SERPL-SCNC: 26 MMOL/L (ref 21–32)
CREAT SERPL-MCNC: 1.14 MG/DL (ref 0.6–1.3)
EST. AVERAGE GLUCOSE BLD GHB EST-MCNC: 143 MG/DL
GFR SERPL CREATININE-BSD FRML MDRD: 67 ML/MIN/1.73SQ M
GLUCOSE P FAST SERPL-MCNC: 149 MG/DL (ref 65–99)
HBA1C MFR BLD: 6.6 %
POTASSIUM SERPL-SCNC: 5.5 MMOL/L (ref 3.5–5.3)
PROT SERPL-MCNC: 6.9 G/DL (ref 6.4–8.4)
PSA SERPL-MCNC: 2.64 NG/ML (ref 0–4)
SODIUM SERPL-SCNC: 138 MMOL/L (ref 135–147)
TSH SERPL DL<=0.05 MIU/L-ACNC: 1.19 UIU/ML (ref 0.45–4.5)

## 2025-03-11 PROCEDURE — 83036 HEMOGLOBIN GLYCOSYLATED A1C: CPT

## 2025-03-11 PROCEDURE — 80053 COMPREHEN METABOLIC PANEL: CPT

## 2025-03-11 PROCEDURE — 36415 COLL VENOUS BLD VENIPUNCTURE: CPT

## 2025-03-11 PROCEDURE — 84153 ASSAY OF PSA TOTAL: CPT

## 2025-03-11 PROCEDURE — 84443 ASSAY THYROID STIM HORMONE: CPT

## 2025-03-12 DIAGNOSIS — E03.9 HYPOTHYROIDISM, UNSPECIFIED TYPE: ICD-10-CM

## 2025-03-12 RX ORDER — LEVOTHYROXINE SODIUM 50 UG/1
TABLET ORAL
Qty: 115 TABLET | Refills: 0 | Status: CANCELLED | OUTPATIENT
Start: 2025-03-12

## 2025-03-14 DIAGNOSIS — E03.9 ACQUIRED HYPOTHYROIDISM: ICD-10-CM

## 2025-03-14 DIAGNOSIS — Z79.4 TYPE 2 DIABETES MELLITUS WITH HYPERGLYCEMIA, WITH LONG-TERM CURRENT USE OF INSULIN (HCC): ICD-10-CM

## 2025-03-14 DIAGNOSIS — E55.9 VITAMIN D DEFICIENCY: ICD-10-CM

## 2025-03-14 DIAGNOSIS — E11.65 TYPE 2 DIABETES MELLITUS WITH HYPERGLYCEMIA, WITH LONG-TERM CURRENT USE OF INSULIN (HCC): ICD-10-CM

## 2025-03-14 DIAGNOSIS — E78.2 MIXED HYPERLIPIDEMIA: ICD-10-CM

## 2025-03-18 ENCOUNTER — OFFICE VISIT (OUTPATIENT)
Dept: FAMILY MEDICINE CLINIC | Facility: CLINIC | Age: 65
End: 2025-03-18
Payer: MEDICARE

## 2025-03-18 VITALS
HEIGHT: 74 IN | OXYGEN SATURATION: 99 % | DIASTOLIC BLOOD PRESSURE: 62 MMHG | TEMPERATURE: 98.1 F | RESPIRATION RATE: 16 BRPM | BODY MASS INDEX: 29.9 KG/M2 | SYSTOLIC BLOOD PRESSURE: 118 MMHG | HEART RATE: 76 BPM | WEIGHT: 233 LBS

## 2025-03-18 DIAGNOSIS — R11.0 NAUSEA: ICD-10-CM

## 2025-03-18 DIAGNOSIS — Z79.4 TYPE 2 DIABETES MELLITUS WITHOUT COMPLICATION, WITH LONG-TERM CURRENT USE OF INSULIN (HCC): Primary | ICD-10-CM

## 2025-03-18 DIAGNOSIS — E11.9 TYPE 2 DIABETES MELLITUS WITHOUT COMPLICATION, WITH LONG-TERM CURRENT USE OF INSULIN (HCC): Primary | ICD-10-CM

## 2025-03-18 DIAGNOSIS — I10 ESSENTIAL HYPERTENSION: ICD-10-CM

## 2025-03-18 DIAGNOSIS — I42.9 CARDIOMYOPATHY, UNSPECIFIED TYPE (HCC): ICD-10-CM

## 2025-03-18 DIAGNOSIS — E87.5 HYPERKALEMIA: ICD-10-CM

## 2025-03-18 PROBLEM — E11.36 TYPE 2 DIABETES MELLITUS WITH DIABETIC CATARACT, WITH LONG-TERM CURRENT USE OF INSULIN (HCC): Status: RESOLVED | Noted: 2025-03-18 | Resolved: 2025-03-18

## 2025-03-18 PROBLEM — E11.36 TYPE 2 DIABETES MELLITUS WITH DIABETIC CATARACT, WITH LONG-TERM CURRENT USE OF INSULIN (HCC): Status: ACTIVE | Noted: 2025-03-18

## 2025-03-18 PROCEDURE — 99214 OFFICE O/P EST MOD 30 MIN: CPT | Performed by: FAMILY MEDICINE

## 2025-03-18 PROCEDURE — G2211 COMPLEX E/M VISIT ADD ON: HCPCS | Performed by: FAMILY MEDICINE

## 2025-03-18 NOTE — PATIENT INSTRUCTIONS
F/u with GI  Stop glimeperide--if your sugars are running >140 (fasting), let me know  Labs in 3 mo  F/u and labs in 6 mo

## 2025-03-18 NOTE — PROGRESS NOTES
Name: Cristian Mancini      : 1960      MRN: [id number]  Encounter Provider: Rachel Solano DO  Encounter Date: 3/18/2025   Encounter department: [facility]  :  Assessment & Plan  Type 2 diabetes mellitus without complication, with long-term current use of insulin (HCC)    Well-controlled on current meds  Continue same  Recheck labs in 6 mo  Lab Results   Component Value Date    HGBA1C 6.6 (H) 2025       Orders:    Hemoglobin A1C; Future    Comprehensive metabolic panel; Future    CBC and differential; Future    Albumin / creatinine urine ratio; Future    Lipid Panel with Direct LDL reflex; Future    TSH, 3rd generation with Free T4 reflex; Future    Essential hypertension  At goal on current meds  Continue same         Hyperkalemia  Recheck bmp       Cardiomyopathy, unspecified type (HCC)  Euvolemic on exam  F/u with cards as scheduled       Nausea  ?reflux vs his dysphagia  Advised f/u appt with GI             Depression Screening and Follow-up Plan: Patient was screened for depression during today's encounter. They screened negative with a PHQ-2 score of 0.        History of Present Illness   HPI  Pt presents in f/u.  Most recent labs show:    Lab Results   Component Value Date    HGBA1C 6.6 (H) 2025     Lab Results   Component Value Date     2017    SODIUM 138 2025    K 5.5 (H) 2025     2025    CO2 26 2025    ANIONGAP 9 2016    AGAP 8 2025    BUN 24 2025    CREATININE 1.14 2025    GLUC 167 (H) 2021    GLUF 149 (H) 2025    CALCIUM 9.7 2025    AST 20 2025    ALT 18 2025    ALKPHOS 59 2025    PROT 7.8 2016    TP 6.9 2025    BILITOT 0.7 2016    TBILI 0.66 2025    EGFR 67 2025     Lab Results   Component Value Date    EKO4SHDEBJPH 1.187 2025     Nml psa    Feels nausea and throws up a lot--dry heaves.  Drinks a lot of water which seems to  "help.      He feels otherwise well.  No chest pain, shortness of breath, abd pain, visual changes, paresthesias, weakness.      Review of Systems   Constitutional:  Negative for chills, fatigue, fever and unexpected weight change.   HENT:  Negative for congestion, ear pain, hearing loss, postnasal drip, rhinorrhea, sinus pressure, sinus pain, sore throat, trouble swallowing and voice change.    Eyes:  Negative for pain, redness and visual disturbance.   Respiratory:  Negative for cough and shortness of breath.    Cardiovascular:  Negative for chest pain, palpitations and leg swelling.   Gastrointestinal:  Positive for nausea. Negative for abdominal pain, constipation and diarrhea.   Endocrine: Negative for cold intolerance, heat intolerance, polydipsia and polyuria.   Genitourinary:  Negative for dysuria, frequency and urgency.   Musculoskeletal:  Negative for arthralgias, joint swelling and myalgias.   Skin:  Negative for rash.        No suspicious lesions   Neurological:  Negative for weakness, numbness and headaches.   Hematological:  Negative for adenopathy.       Objective   /62   Pulse 76   Temp 98.1 °F (36.7 °C) (Temporal)   Resp 16   Ht 6' 2\" (1.88 m)   Wt 106 kg (233 lb)   SpO2 99%   BMI 29.92 kg/m²      Physical Exam  Constitutional:       Appearance: Normal appearance.   HENT:      Head: Normocephalic and atraumatic.      Right Ear: Tympanic membrane, ear canal and external ear normal.      Left Ear: Tympanic membrane, ear canal and external ear normal.      Nose: Nose normal. No congestion.      Mouth/Throat:      Mouth: Mucous membranes are moist.      Pharynx: No oropharyngeal exudate or posterior oropharyngeal erythema.   Eyes:      Extraocular Movements: Extraocular movements intact.      Conjunctiva/sclera: Conjunctivae normal.      Pupils: Pupils are equal, round, and reactive to light.   Neck:      Vascular: No carotid bruit.   Cardiovascular:      Rate and Rhythm: Normal rate and " regular rhythm.      Heart sounds: No murmur heard.     No friction rub. No gallop.   Pulmonary:      Effort: Pulmonary effort is normal.      Breath sounds: No wheezing, rhonchi or rales.   Abdominal:      General: Abdomen is flat. There is no distension.      Palpations: Abdomen is soft.      Tenderness: There is no abdominal tenderness.   Musculoskeletal:      Cervical back: Neck supple.   Lymphadenopathy:      Cervical: No cervical adenopathy.   Neurological:      General: No focal deficit present.      Mental Status: He is alert and oriented to person, place, and time.      Cranial Nerves: No cranial nerve deficit.      Motor: No weakness.      Deep Tendon Reflexes: Reflexes normal.

## 2025-05-01 ENCOUNTER — PATIENT MESSAGE (OUTPATIENT)
Dept: FAMILY MEDICINE CLINIC | Facility: CLINIC | Age: 65
End: 2025-05-01

## 2025-05-01 DIAGNOSIS — E11.9 TYPE 2 DIABETES MELLITUS WITHOUT COMPLICATION, WITH LONG-TERM CURRENT USE OF INSULIN (HCC): Primary | ICD-10-CM

## 2025-05-01 DIAGNOSIS — Z79.4 TYPE 2 DIABETES MELLITUS WITHOUT COMPLICATION, WITH LONG-TERM CURRENT USE OF INSULIN (HCC): Primary | ICD-10-CM

## 2025-05-02 RX ORDER — GLIMEPIRIDE 2 MG/1
2 TABLET ORAL
Qty: 30 TABLET | Refills: 2 | Status: SHIPPED | OUTPATIENT
Start: 2025-05-02 | End: 2025-10-29

## 2025-05-28 DIAGNOSIS — E78.5 DYSLIPIDEMIA: ICD-10-CM

## 2025-05-29 RX ORDER — ATORVASTATIN CALCIUM 40 MG/1
40 TABLET, FILM COATED ORAL DAILY
Qty: 90 TABLET | Refills: 1 | Status: SHIPPED | OUTPATIENT
Start: 2025-05-29

## 2025-06-14 DIAGNOSIS — E03.9 HYPOTHYROIDISM, UNSPECIFIED TYPE: ICD-10-CM

## 2025-06-15 RX ORDER — LEVOTHYROXINE SODIUM 50 UG/1
TABLET ORAL
Qty: 115 TABLET | Refills: 1 | Status: SHIPPED | OUTPATIENT
Start: 2025-06-15

## 2025-06-19 DIAGNOSIS — E55.9 VITAMIN D DEFICIENCY: ICD-10-CM

## 2025-06-19 DIAGNOSIS — E78.2 MIXED HYPERLIPIDEMIA: ICD-10-CM

## 2025-06-19 DIAGNOSIS — E03.9 ACQUIRED HYPOTHYROIDISM: ICD-10-CM

## 2025-06-19 DIAGNOSIS — E11.65 TYPE 2 DIABETES MELLITUS WITH HYPERGLYCEMIA, WITH LONG-TERM CURRENT USE OF INSULIN (HCC): ICD-10-CM

## 2025-06-19 DIAGNOSIS — Z79.4 TYPE 2 DIABETES MELLITUS WITH HYPERGLYCEMIA, WITH LONG-TERM CURRENT USE OF INSULIN (HCC): ICD-10-CM

## 2025-07-02 DIAGNOSIS — Z79.4 TYPE 2 DIABETES MELLITUS WITHOUT COMPLICATION, WITH LONG-TERM CURRENT USE OF INSULIN (HCC): ICD-10-CM

## 2025-07-02 DIAGNOSIS — E11.9 TYPE 2 DIABETES MELLITUS WITHOUT COMPLICATION, WITH LONG-TERM CURRENT USE OF INSULIN (HCC): ICD-10-CM

## 2025-07-03 RX ORDER — GLIMEPIRIDE 2 MG/1
TABLET ORAL
Qty: 90 TABLET | Refills: 1 | Status: SHIPPED | OUTPATIENT
Start: 2025-07-03

## 2025-07-07 ENCOUNTER — APPOINTMENT (OUTPATIENT)
Dept: LAB | Facility: AMBULARY SURGERY CENTER | Age: 65
End: 2025-07-07
Payer: MEDICARE

## 2025-07-07 DIAGNOSIS — Z79.4 TYPE 2 DIABETES MELLITUS WITHOUT COMPLICATION, WITH LONG-TERM CURRENT USE OF INSULIN (HCC): ICD-10-CM

## 2025-07-07 DIAGNOSIS — E87.5 HYPERKALEMIA: ICD-10-CM

## 2025-07-07 DIAGNOSIS — E11.9 TYPE 2 DIABETES MELLITUS WITHOUT COMPLICATION, WITH LONG-TERM CURRENT USE OF INSULIN (HCC): ICD-10-CM

## 2025-07-07 LAB
ALBUMIN SERPL BCG-MCNC: 4.6 G/DL (ref 3.5–5)
ALP SERPL-CCNC: 63 U/L (ref 34–104)
ALT SERPL W P-5'-P-CCNC: 14 U/L (ref 7–52)
ANION GAP SERPL CALCULATED.3IONS-SCNC: 11 MMOL/L (ref 4–13)
AST SERPL W P-5'-P-CCNC: 15 U/L (ref 13–39)
BILIRUB SERPL-MCNC: 0.7 MG/DL (ref 0.2–1)
BUN SERPL-MCNC: 22 MG/DL (ref 5–25)
CALCIUM SERPL-MCNC: 9.5 MG/DL (ref 8.4–10.2)
CHLORIDE SERPL-SCNC: 100 MMOL/L (ref 96–108)
CO2 SERPL-SCNC: 24 MMOL/L (ref 21–32)
CREAT SERPL-MCNC: 1.11 MG/DL (ref 0.6–1.3)
EST. AVERAGE GLUCOSE BLD GHB EST-MCNC: 128 MG/DL
GFR SERPL CREATININE-BSD FRML MDRD: 68 ML/MIN/1.73SQ M
GLUCOSE P FAST SERPL-MCNC: 146 MG/DL (ref 65–99)
HBA1C MFR BLD: 6.1 %
POTASSIUM SERPL-SCNC: 4.2 MMOL/L (ref 3.5–5.3)
PROT SERPL-MCNC: 7.3 G/DL (ref 6.4–8.4)
SODIUM SERPL-SCNC: 135 MMOL/L (ref 135–147)

## 2025-07-07 PROCEDURE — 83036 HEMOGLOBIN GLYCOSYLATED A1C: CPT

## 2025-07-07 PROCEDURE — 36415 COLL VENOUS BLD VENIPUNCTURE: CPT

## 2025-07-07 PROCEDURE — 80053 COMPREHEN METABOLIC PANEL: CPT

## 2025-07-07 NOTE — TELEPHONE ENCOUNTER
Patient called for instructions on holding Eliquis prior to surgery. Per cardiology note in chart patient is to hold Plavix x 5 days and Eliquis x 2 days prior to procedure. Pt notified and voiced understanding. Notified will have surgery scheduler follow up with her tomorrow.   Please call pt - is he still using these strips?

## 2025-07-08 ENCOUNTER — RESULTS FOLLOW-UP (OUTPATIENT)
Dept: FAMILY MEDICINE CLINIC | Facility: CLINIC | Age: 65
End: 2025-07-08

## 2025-07-09 ENCOUNTER — HOSPITAL ENCOUNTER (EMERGENCY)
Facility: HOSPITAL | Age: 65
Discharge: HOME | End: 2025-07-09
Attending: EMERGENCY MEDICINE
Payer: MEDICARE

## 2025-07-09 ENCOUNTER — HOSPITAL ENCOUNTER (EMERGENCY)
Facility: HOSPITAL | Age: 65
Discharge: HOME | End: 2025-07-10
Payer: MEDICARE

## 2025-07-09 ENCOUNTER — APPOINTMENT (EMERGENCY)
Dept: CT IMAGING | Facility: HOSPITAL | Age: 65
End: 2025-07-09
Payer: MEDICARE

## 2025-07-09 VITALS
DIASTOLIC BLOOD PRESSURE: 73 MMHG | TEMPERATURE: 98.5 F | OXYGEN SATURATION: 97 % | SYSTOLIC BLOOD PRESSURE: 131 MMHG | HEART RATE: 72 BPM | RESPIRATION RATE: 18 BRPM

## 2025-07-09 DIAGNOSIS — R91.1 LUNG NODULE: ICD-10-CM

## 2025-07-09 DIAGNOSIS — R00.0 TACHYCARDIA: ICD-10-CM

## 2025-07-09 DIAGNOSIS — L05.01 PILONIDAL CYST WITH ABSCESS: Primary | ICD-10-CM

## 2025-07-09 DIAGNOSIS — N40.0 ENLARGED PROSTATE: ICD-10-CM

## 2025-07-09 DIAGNOSIS — R55 SYNCOPE: Primary | ICD-10-CM

## 2025-07-09 DIAGNOSIS — I49.3 PVC'S (PREMATURE VENTRICULAR CONTRACTIONS): ICD-10-CM

## 2025-07-09 LAB
ALBUMIN SERPL BCG-MCNC: 4.5 G/DL (ref 3.5–5)
ALP SERPL-CCNC: 65 U/L (ref 34–104)
ALT SERPL W P-5'-P-CCNC: 10 U/L (ref 7–52)
ANION GAP SERPL CALCULATED.3IONS-SCNC: 7 MMOL/L (ref 4–13)
AST SERPL W P-5'-P-CCNC: 13 U/L (ref 13–39)
BASOPHILS # BLD AUTO: 0.05 THOUSANDS/ÂΜL (ref 0–0.1)
BASOPHILS NFR BLD AUTO: 1 % (ref 0–1)
BILIRUB SERPL-MCNC: 0.81 MG/DL (ref 0.2–1)
BUN SERPL-MCNC: 18 MG/DL (ref 5–25)
CALCIUM SERPL-MCNC: 9.8 MG/DL (ref 8.4–10.2)
CHLORIDE SERPL-SCNC: 102 MMOL/L (ref 96–108)
CO2 SERPL-SCNC: 28 MMOL/L (ref 21–32)
CREAT SERPL-MCNC: 1.14 MG/DL (ref 0.6–1.3)
EOSINOPHIL # BLD AUTO: 0.19 THOUSAND/ÂΜL (ref 0–0.61)
EOSINOPHIL NFR BLD AUTO: 3 % (ref 0–6)
ERYTHROCYTE [DISTWIDTH] IN BLOOD BY AUTOMATED COUNT: 12.5 % (ref 11.6–15.1)
GFR SERPL CREATININE-BSD FRML MDRD: 66 ML/MIN/1.73SQ M
GLUCOSE SERPL-MCNC: 137 MG/DL (ref 65–140)
HCT VFR BLD AUTO: 42.2 % (ref 36.5–49.3)
HGB BLD-MCNC: 14.4 G/DL (ref 12–17)
IMM GRANULOCYTES # BLD AUTO: 0.03 THOUSAND/UL (ref 0–0.2)
IMM GRANULOCYTES NFR BLD AUTO: 0 % (ref 0–2)
LYMPHOCYTES # BLD AUTO: 1.89 THOUSANDS/ÂΜL (ref 0.6–4.47)
LYMPHOCYTES NFR BLD AUTO: 26 % (ref 14–44)
MCH RBC QN AUTO: 32.5 PG (ref 26.8–34.3)
MCHC RBC AUTO-ENTMCNC: 34.1 G/DL (ref 31.4–37.4)
MCV RBC AUTO: 95 FL (ref 82–98)
MONOCYTES # BLD AUTO: 0.78 THOUSAND/ÂΜL (ref 0.17–1.22)
MONOCYTES NFR BLD AUTO: 11 % (ref 4–12)
NEUTROPHILS # BLD AUTO: 4.48 THOUSANDS/ÂΜL (ref 1.85–7.62)
NEUTS SEG NFR BLD AUTO: 59 % (ref 43–75)
NRBC BLD AUTO-RTO: 0 /100 WBCS
PLATELET # BLD AUTO: 210 THOUSANDS/UL (ref 149–390)
PMV BLD AUTO: 9.4 FL (ref 8.9–12.7)
POTASSIUM SERPL-SCNC: 4.9 MMOL/L (ref 3.5–5.3)
PROT SERPL-MCNC: 7.4 G/DL (ref 6.4–8.4)
RBC # BLD AUTO: 4.43 MILLION/UL (ref 3.88–5.62)
SODIUM SERPL-SCNC: 137 MMOL/L (ref 135–147)
WBC # BLD AUTO: 7.42 THOUSAND/UL (ref 4.31–10.16)

## 2025-07-09 PROCEDURE — 99285 EMERGENCY DEPT VISIT HI MDM: CPT

## 2025-07-09 PROCEDURE — 71260 CT THORAX DX C+: CPT

## 2025-07-09 PROCEDURE — 99284 EMERGENCY DEPT VISIT MOD MDM: CPT

## 2025-07-09 PROCEDURE — 80053 COMPREHEN METABOLIC PANEL: CPT

## 2025-07-09 PROCEDURE — 36415 COLL VENOUS BLD VENIPUNCTURE: CPT

## 2025-07-09 PROCEDURE — 93005 ELECTROCARDIOGRAM TRACING: CPT

## 2025-07-09 PROCEDURE — 85025 COMPLETE CBC W/AUTO DIFF WBC: CPT

## 2025-07-09 PROCEDURE — 99283 EMERGENCY DEPT VISIT LOW MDM: CPT

## 2025-07-09 PROCEDURE — 74177 CT ABD & PELVIS W/CONTRAST: CPT

## 2025-07-09 RX ORDER — LIDOCAINE HYDROCHLORIDE 20 MG/ML
5 INJECTION, SOLUTION EPIDURAL; INFILTRATION; INTRACAUDAL; PERINEURAL ONCE
Status: COMPLETED | OUTPATIENT
Start: 2025-07-09 | End: 2025-07-09

## 2025-07-09 RX ORDER — CEFUROXIME AXETIL 500 MG/1
500 TABLET ORAL EVERY 12 HOURS SCHEDULED
Qty: 10 TABLET | Refills: 0 | Status: SHIPPED | OUTPATIENT
Start: 2025-07-09 | End: 2025-07-14

## 2025-07-09 RX ORDER — CEFUROXIME AXETIL 250 MG/1
500 TABLET ORAL EVERY 12 HOURS SCHEDULED
Status: DISCONTINUED | OUTPATIENT
Start: 2025-07-09 | End: 2025-07-09

## 2025-07-09 RX ORDER — CEFUROXIME AXETIL 250 MG/1
500 TABLET ORAL EVERY 12 HOURS SCHEDULED
Status: DISCONTINUED | OUTPATIENT
Start: 2025-07-09 | End: 2025-07-09 | Stop reason: HOSPADM

## 2025-07-09 RX ADMIN — IOHEXOL 100 ML: 350 INJECTION, SOLUTION INTRAVENOUS at 16:04

## 2025-07-09 RX ADMIN — LIDOCAINE HYDROCHLORIDE 5 ML: 20 INJECTION, SOLUTION EPIDURAL; INFILTRATION; INTRACAUDAL at 17:42

## 2025-07-09 RX ADMIN — CEFUROXIME AXETIL 500 MG: 250 TABLET ORAL at 18:41

## 2025-07-09 NOTE — DISCHARGE INSTRUCTIONS
Please see your primary care to follow-up on your radiology findings, recommendations include an abdominal MRI, and a chest CT within the next 12 months.

## 2025-07-09 NOTE — ED ATTENDING ATTESTATION
7/9/2025  I, Michelle Galarza MD, saw and evaluated the patient. I have discussed the patient with the resident/non-physician practitioner and agree with the resident's/non-physician practitioner's findings, Plan of Care, and MDM as documented in the resident's/non-physician practitioner's note, except where noted. All available labs and Radiology studies were reviewed.  I was present for key portions of any procedure(s) performed by the resident/non-physician practitioner and I was immediately available to provide assistance.       At this point I agree with the current assessment done in the Emergency Department.  I have conducted an independent evaluation of this patient a history and physical is as follows:    65-year-old male with history of DM 2, hypertension, cardiomyopathy.  Patient presents for evaluation of an area of painful swelling in his anal region that he first noticed 1 week ago.  States that the area felt more swollen a couple days ago and then spontaneously began to drain purulent material.  Denies pain with defecation.  No change in his stool.  Specifically denies blood in his stool or black tarry stools.  Denies abdominal pain, chest pain, or additional symptoms.  No fevers or chills.  No prior history of abscess.  Patient does report an approximately 40 to 50 pound unintended weight loss over the last 7 months.  Reports complete lack of appetite over this period of time with frequent dry heaving without vomiting after he eats.  He has follow-up with gastroenterology scheduled for 2 months from now.    Physical Exam  Vitals and nursing note reviewed.   Constitutional:       General: He is not in acute distress.     Appearance: He is well-developed. He is not ill-appearing, toxic-appearing or diaphoretic.   HENT:      Head: Normocephalic and atraumatic.      Right Ear: External ear normal.      Left Ear: External ear normal.      Nose: Nose normal.     Eyes:      Conjunctiva/sclera: Conjunctivae  normal.       Cardiovascular:      Rate and Rhythm: Normal rate and regular rhythm.      Pulses: Normal pulses.      Heart sounds: Normal heart sounds. No murmur heard.     No friction rub. No gallop.   Pulmonary:      Effort: Pulmonary effort is normal. No respiratory distress.      Breath sounds: Normal breath sounds. No wheezing or rales.   Abdominal:      General: Bowel sounds are normal. There is no distension.      Palpations: Abdomen is soft.      Tenderness: There is no abdominal tenderness (abdomen benign to deep palpation). There is no guarding.   Genitourinary:     Comments: Approximately 3 cm area of swelling with fluctuance at the 6 o'clock position about the anal verge.  Minimal overlying erythema.  No active drainage.    Musculoskeletal:         General: No deformity. Normal range of motion.      Cervical back: Normal range of motion and neck supple.      Right lower leg: No edema.      Left lower leg: No edema.      Comments: No calf swelling or tenderness     Skin:     General: Skin is warm and dry.     Neurological:      Mental Status: He is alert and oriented to person, place, and time.      Motor: No abnormal muscle tone.           ED Course  ED Course as of 07/09/25 1813 Wed Jul 09, 2025   1614 Patient with approximately 3 cm area of swelling with fluctuance at the 6 o'clock position about the anal verge with overlying erythema.  No active drainage.  No crepitus.  Patient is tachycardic but has no leukocytosis on labs, does not meet SIRS criteria.  Due to concern for possible rectal involvement will obtain CT scan of the abdomen pelvis for further evaluation.  Patient also reports a 40 pound weight loss over the last 7 months that is unexplained due to decreased appetite and dry heaving after eating.  Will extend CT scan through the chest in order to evaluate for possible underlying malignancy.   1813 CT reveals a 1 cm fluid collection, most likely abscess within the left gluteal cleft without  obvious fistula or connection to the anus.  Will perform I&D and prescribe antibiotics.  Incidental finding of ill-defined lesions within the liver likely reflect benign perfusional alterations as seen on comparison MRI in 2021 however per radiology as patient has a history of hepatitis C they would recommend that repeat postcontrast liver protocol MRI be considered on an outpatient basis.  Patient is also noted to have an enlarged prostate and a nonspecific 2 mm left upper lobe pulmonary nodule.  These will both require follow-up, with urology and for an optional repeat CT scan of the chest in 12 months, especially in light of patient's unexplained weight loss.         Critical Care Time  Procedures

## 2025-07-09 NOTE — INCIDENTAL FINDINGS
The following findings require follow up:  Radiographic finding   Finding: Two ill-defined regions of hyperenhancement of the right hepatic dome measure up to 1.2 cm., These likely reflect benign perfusional alterations, as was demonstrated on the comparison 2021 MRI., However considering history of hepatitis C, repeat postcontrast liver protocol MRI could be considered., Unremarkable appearance of the stomach/GE junction status post fundoplication., Enlarged prostate., Nonspecific 2 mm left upper lobe pulmonary nodule., Optional follow-up chest CT can be considered in 12 months    Follow up required: Abd MRI, chest ct   Follow up should be done within 12 month(s)    Please notify the following clinician to assist with the follow up:   PCP    Incidental finding results were discussed with the Patient. by Nadia Villarreal MD on 07/09/25.   They expressed understanding and all questions answered.

## 2025-07-09 NOTE — ED PROVIDER NOTES
"Time reflects when diagnosis was documented in both MDM as applicable and the Disposition within this note       Time User Action Codes Description Comment    7/9/2025  5:38 PM Nadia Villarreal Add [L05.01] Pilonidal cyst with abscess     7/9/2025  6:55 PM Michelle Galarza Add [N40.0] Enlarged prostate     7/9/2025  6:55 PM Michelle Galarza Add [R91.1] Lung nodule           ED Disposition       ED Disposition   Discharge    Condition   Stable    Date/Time   Wed Jul 9, 2025  5:38 PM    Comment   Cristian Mancini discharge to home/self care.                   Assessment & Plan       Medical Decision Making  Cristian is a 65-year-old male with a past medical history of pulmonary sarcoidosis,GERD, cardiomyopathy, hypothyroidism, HTN, DM 2, esophageal dysphagia, and BPH presenting to the emergency department due to purulent drainage from anal region.    DDx includes but is not limited to: Pilonidal abscess, intra-abdominal abscess, cancer, hemorrhoid, anal fissure.    Patient's lab workup unremarkable, CT read as \"1 cm fluid collection, most likely abscess, within the left gluteal cleft, no obvious fistulous connection to the anus.     Two ill-defined regions of hyperenhancement of the right hepatic dome measure up to 1.2 cm.  These likely reflect benign perfusional alterations, as was demonstrated on the comparison 2021 MRI.  However considering history of hepatitis C, repeat postcontrast liver protocol MRI could be considered.     Unremarkable appearance of the stomach/GE junction status post fundoplication.     Enlarged prostate.     Nonspecific 2 mm left upper lobe pulmonary nodule.  Optional follow-up chest CT can be considered in 12 months if patient has risk factors for malignancy including history of smoking.\"  Patient's pilonidal abscess drained, mostly sanguinous excretion, no wound culture able to be obtained.  Patient initiated on antibiotics here, rest of the prescription sent to pharmacy.  Ambulatory referral " to surgery given.    Results shared with patient. Return precautions given and patient expresses understanding and is comfortable with discharge.       Amount and/or Complexity of Data Reviewed  Labs: ordered.  Radiology: ordered.    Risk  Prescription drug management.             Medications   iohexol (OMNIPAQUE) 350 MG/ML injection (MULTI-DOSE) 100 mL (100 mL Intravenous Given 7/9/25 5869)   lidocaine (PF) (XYLOCAINE-MPF) 2 % injection 5 mL (5 mL Infiltration Given by Other 7/9/25 0081)       ED Risk Strat Scores                    No data recorded                            History of Present Illness       Chief Complaint   Patient presents with    Cyst     Pt has a pimple near rectum that has been growing for a few days that opened this am.        Past Medical History[1]   Past Surgical History[2]   Family History[3]   Social History[4]   E-Cigarette/Vaping    E-Cigarette Use Never User       E-Cigarette/Vaping Substances    Nicotine No     THC No     CBD No     Flavoring No     Other No     Unknown No       I have reviewed and agree with the history as documented.     Cristian is a 65-year-old male with a past medical history of pulmonary sarcoidosis,GERD, cardiomyopathy, hypothyroidism, HTN, DM 2, esophageal dysphagia, and BPH presenting to the emergency department due to pilonidal abscess drainage.   Patient reports for roughly the past week he has thought that he had an ingrown hair around his rectum.  He reports that he began experiencing pain, and swelling sensation as though there was a little pocket of fluid.  He reports that he when he woke up today he had significant drainage from the area.  He also reports that when he felt around the area it felt like there were multiple areas of infection and they did not think that all of them had drained.  Patient denies any fevers, chills, vomiting, current abdominal pain, dysuria, constipation, or pain with stooling.      Patient also mentions that for several  months he has been having difficulty eating.  He reports that he gets full very quickly, and within an hour of eating he goes to the bathroom and dry heaves.  He reports that he does not actually vomit but heaves to the point that he gets abdominal pain/soreness.  He reports that he has informed his GI of this, but was not able to get an appointment until December.  He also reports that he has had 40 pounds of weight loss since January.         Review of Systems   Constitutional:         As per HPI.   All other systems reviewed and are negative.          Objective       ED Triage Vitals [07/09/25 1329]   Temperature Pulse Blood Pressure Respirations SpO2 Patient Position - Orthostatic VS   98.5 °F (36.9 °C) 94 125/73 16 98 % --      Temp Source Heart Rate Source BP Location FiO2 (%) Pain Score    Oral Monitor Right arm -- No Pain      Vitals      Date and Time Temp Pulse SpO2 Resp BP Pain Score FACES Pain Rating User   07/09/25 1815 -- 72 97 % 18 131/73 -- -- KB   07/09/25 1329 98.5 °F (36.9 °C) 94 98 % 16 125/73 No Pain --             Physical Exam  Vitals and nursing note reviewed.   Constitutional:       Appearance: Normal appearance.   HENT:      Head: Normocephalic and atraumatic.      Right Ear: External ear normal.      Left Ear: External ear normal.      Nose: Nose normal.      Mouth/Throat:      Mouth: Mucous membranes are moist.      Pharynx: Oropharynx is clear.     Eyes:      Extraocular Movements: Extraocular movements intact.      Conjunctiva/sclera: Conjunctivae normal.       Cardiovascular:      Rate and Rhythm: Normal rate.   Pulmonary:      Effort: Pulmonary effort is normal.   Abdominal:      General: There is no distension.      Palpations: Abdomen is soft.      Tenderness: There is no abdominal tenderness. There is no guarding.   Genitourinary:     Comments: Patient has a red area located in between his rectum and scrotum to the left side of the patient's perineum.  It is not actively draining  at this time, and on palpation feels walled off.    Musculoskeletal:         General: Normal range of motion.      Cervical back: Normal range of motion and neck supple.     Skin:     General: Skin is warm and dry.     Neurological:      General: No focal deficit present.      Mental Status: He is alert and oriented to person, place, and time.     Psychiatric:         Mood and Affect: Mood normal.         Behavior: Behavior normal.         Results Reviewed       Procedure Component Value Units Date/Time    Comprehensive metabolic panel [[id number]] Collected: 07/09/25 1450    Lab Status: Final result Specimen: Blood from Arm, Left Updated: 07/09/25 1527     Sodium 137 mmol/L      Potassium 4.9 mmol/L      Chloride 102 mmol/L      CO2 28 mmol/L      ANION GAP 7 mmol/L      BUN 18 mg/dL      Creatinine 1.14 mg/dL      Glucose 137 mg/dL      Calcium 9.8 mg/dL      AST 13 U/L      ALT 10 U/L      Alkaline Phosphatase 65 U/L      Total Protein 7.4 g/dL      Albumin 4.5 g/dL      Total Bilirubin 0.81 mg/dL      eGFR 66 ml/min/1.73sq m     Narrative:      National Kid[employer] guidelines for Chronic Kidney Disease (CKD):     Stage 1 with normal or high GFR (GFR > 90 mL/min/1.73 square meters)    Stage 2 Mild CKD (GFR = 60-89 mL/min/1.73 square meters)    Stage 3A Moderate CKD (GFR = 45-59 mL/min/1.73 square meters)    Stage 3B Moderate CKD (GFR = 30-44 mL/min/1.73 square meters)    Stage 4 Severe CKD (GFR = 15-29 mL/min/1.73 square meters)    Stage 5 End Stage CKD (GFR <15 mL/min/1.73 square meters)  Note: GFR calculation is accurate only with a steady state creatinine    CBC and differential [[id number]] Collected: 07/09/25 1450    Lab Status: Final result Specimen: Blood from Arm, Left Updated: 07/09/25 1501     WBC 7.42 Thousand/uL      RBC 4.43 Million/uL      Hemoglobin 14.4 g/dL      Hematocrit 42.2 %      MCV 95 fL      MCH 32.5 pg      MCHC 34.1 g/dL      RDW 12.5 %      MPV 9.4 fL      Platelets 210  Thousands/uL      nRBC 0 /100 WBCs      Segmented % 59 %      Immature Grans % 0 %      Lymphocytes % 26 %      Monocytes % 11 %      Eosinophils Relative 3 %      Basophils Relative 1 %      Absolute Neutrophils 4.48 Thousands/µL      Absolute Immature Grans 0.03 Thousand/uL      Absolute Lymphocytes 1.89 Thousands/µL      Absolute Monocytes 0.78 Thousand/µL      Eosinophils Absolute 0.19 Thousand/µL      Basophils Absolute 0.05 Thousands/µL             CT chest abdomen pelvis w contrast   Final Interpretation by Gary Plunkett MD (07/09 1701)      1 cm fluid collection, most likely abscess, within the left gluteal cleft, no obvious fistulous connection to the anus.      Two ill-defined regions of hyperenhancement of the right hepatic dome measure up to 1.2 cm.   These likely reflect benign perfusional alterations, as was demonstrated on the comparison 2021 MRI.   However considering history of hepatitis C, repeat postcontrast liver protocol MRI could be considered.      Unremarkable appearance of the stomach/GE junction status post fundoplication.      Enlarged prostate.      Nonspecific 2 mm left upper lobe pulmonary nodule.   Optional follow-up chest CT can be considered in 12 months if patient has risk factors for malignancy including history of smoking.      The study was marked in EPIC for immediate notification.      Computerized Assisted Algorithm (CAA) may have aided analysis of applicable images.      Workstation performed: [id number]             Procedures    ED Medication and Procedure Management   Prior to Admission Medications   Prescriptions Last Dose Informant Patient Reported? Taking?   Cholecalciferol (DIALYVITE VITAMIN D 5000 PO)  Self Yes No   Sig: Take by mouth in the morning   Lancets (OneTouch Delica Plus [id number]) MISC  Self No No   Sig: USE TO CHECK BLOOD SUGARS TWICE DAILY   OneTouch Verio test strip  Self No No   Sig: USE TO TEST BLOOD SUGAR 4 TIMES A DAY   aspirin (ECOTRIN LOW  STRENGTH) 81 mg EC tablet  Self Yes No   Sig: Take 81 mg by mouth daily   atorvastatin (LIPITOR) 40 mg tablet   No No   Sig: TAKE 1 TABLET(40 MG) BY MOUTH DAILY   glimepiride (AMARYL) 2 mg tablet   No No   Sig: TAKE 1 TABLET(2 MG) BY MOUTH DAILY WITH BREAKFAST   levothyroxine 50 mcg tablet   No No   Sig: TAKE 1 TABLET BY MOUTH DAILY MONDAY, TUESDAY, WEDNESDAY, FRIDAY,& SATURDAY. AND 2 TABLETS ON SUNDAY AND THURSDAY   metFORMIN (GLUCOPHAGE) 500 mg tablet   No No   Sig: TAKE 2 TABLETS(1000 MG) BY MOUTH TWICE DAILY WITH MEALS   pantoprazole (PROTONIX) 40 mg tablet   No No   Sig: TAKE 1 TABLET(40 MG) BY MOUTH DAILY BEFORE BREAKFAST      Facility-Administered Medications: None     Discharge Medication List as of 7/9/2025  6:36 PM        START taking these medications    Details   cefuroxime (CEFTIN) 500 mg tablet Take 1 tablet (500 mg total) by mouth every 12 (twelve) hours for 5 days, Starting Wed 7/9/2025, Until Mon 7/14/2025, Normal           CONTINUE these medications which have NOT CHANGED    Details   aspirin (ECOTRIN LOW STRENGTH) 81 mg EC tablet Take 81 mg by mouth daily, Historical Med      atorvastatin (LIPITOR) 40 mg tablet TAKE 1 TABLET(40 MG) BY MOUTH DAILY, Starting Thu 5/29/2025, Normal      Cholecalciferol (DIALYVITE VITAMIN D 5000 PO) Take by mouth in the morning, Historical Med      glimepiride (AMARYL) 2 mg tablet TAKE 1 TABLET(2 MG) BY MOUTH DAILY WITH BREAKFAST, Normal      Lancets (OneTouch Delica Plus [id number]) MISC USE TO CHECK BLOOD SUGARS TWICE DAILY, Normal      levothyroxine 50 mcg tablet TAKE 1 TABLET BY MOUTH DAILY MONDAY, TUESDAY, WEDNESDAY, FRIDAY,& SATURDAY. AND 2 TABLETS ON SUNDAY AND THURSDAY, Normal      metFORMIN (GLUCOPHAGE) 500 mg tablet TAKE 2 TABLETS(1000 MG) BY MOUTH TWICE DAILY WITH MEALS, Starting Fri 6/20/2025, Normal      OneTouch Verio test strip USE TO TEST BLOOD SUGAR 4 TIMES A DAY, Normal      pantoprazole (PROTONIX) 40 mg tablet TAKE 1 TABLET(40 MG) BY MOUTH DAILY  BEFORE BREAKFAST, Normal             ED SEPSIS DOCUMENTATION   Time reflects when diagnosis was documented in both MDM as applicable and the Disposition within this note       Time User Action Codes Description Comment    7/9/2025  5:38 PM Nadia Villarreal Add [L05.01] Pilonidal cyst with abscess     7/9/2025  6:55 PM Michelle Galarza Add [N40.0] Enlarged prostate     7/9/2025  6:55 PM Michelle Galarza Add [R91.1] Lung nodule                    [1]   Past Medical History:  Diagnosis Date    BPH (benign prostatic hyperplasia)     Diabetes mellitus (HCC)     Disease of thyroid gland     Erectile dysfunction     GERD (gastroesophageal reflux disease)     Headache     Headache(784.0) 05/01/2018    Each Day    Hematuria     Hyperlipidemia     Hypertension     Infectious viral hepatitis 2007    Cured by Dr. Kern    Kidney stone     Liver disease     hepatitis C    Mediastinal adenopathy     Obesity 2007    PE (pulmonary thromboembolism) (HCC)     Sarcoidosis     Vitamin D deficiency    [2]   Past Surgical History:  Procedure Laterality Date    COLONOSCOPY      last assessed: 08/28/2012; fiberoptic screening     CYSTOSCOPY      onset: 05/06/2016; Diagnostic     DENTAL SURGERY      FL RETROGRADE PYELOGRAM  02/06/2020    FL RETROGRADE PYELOGRAM  03/03/2020    HERNIA REPAIR      LIVER BIOPSY      LYMPH NODE BIOPSY  06/2019    TN BRNCHSC INCL FLUOR GDNCE DX W/CELL WASHG SPX N/A 06/06/2019    Procedure: BRONCHOSCOPY FLEXIBLE;  Surgeon: Umair Louis MD;  [facility];  Service: Thoracic    TN BRONCHOSCOPY NEEDLE BX TRACHEA MAIN STEM&/BRON N/A 06/06/2019    Procedure: ENDOBRONCHIAL ULTRASOUND (EBUS);  Surgeon: Umair Louis MD;  [facility];  Service: Thoracic    TN CYSTO/URETERO W/LITHOTRIPSY &INDWELL STENT INSRT Right 02/06/2020    Procedure: CYSTOSCOPY WITH LITHOTRIPSY RETROGRADE PYELOGRAM AND INSERTION STENT URETERAL;  Surgeon: Jose Reza MD;  [facility];  Service: Urology    TN  CYSTO/URETERO W/LITHOTRIPSY &INDWELL STENT INSRT Right 2020    Procedure: CYSTOSCOPY URETEROSCOPY WITH LITHOTRIPSY HOLMIUM LASER; BASKET STONE RETRIEVAL; RETROGRADE PYELOGRAM AND INSERTION STENT URETERAL;  Surgeon: Jose Reza MD;  [facility];  Service: Urology    WV LAPS RPR PARAESPHGL HRNA INCL FUNDPLSTY W/MESH N/A 2021    Procedure: ROBOTIC PARAESOPHAGEAL HERNIA  REPAIR, with FUNDOPLICATION;  Surgeon: Jim Coles MD;  [facility];  Service: General    WV TRURL ELECTROSURG RESCJ PROSTATE BLEED COMPLETE N/A 2017    Procedure: CYSTOSCOPY; TUR PROSTATE;  Surgeon: Fili Poe MD;  [facility];  Service: Urology    PROSTATE SURGERY  2017    SHOULDER ARTHROSCOPY Right     bicep tendon repair, rotator cuff repair and acromuim shave    [3]   Family History  Problem Relation Name Age of Onset    Valvular heart disease Mother      Stroke Mother      Stroke Father Kyrie Mancini         Accidental Death at 49    Cancer Maternal Grandmother [place] Le Compte    [4]   Social History  Tobacco Use    Smoking status: Former     Current packs/day: 0.00     Average packs/day: 1 pack/day for 37.7 years (37.7 ttl pk-yrs)     Types: Cigarettes     Start date: 1971     Quit date: 2009     Years since quittin.5     Passive exposure: Never    Smokeless tobacco: Never    Tobacco comments:     Quit   Vaping Use    Vaping status: Never Used   Substance Use Topics    Alcohol use: Not Currently     Comment: quit     Drug use: Not Currently     Types: Marijuana        Nadai Villarreal MD  25 [id number]     PROSTATE BLEED COMPLETE N/A 2017    Procedure: CYSTOSCOPY; TUR PROSTATE;  Surgeon: Fili Poe MD;  [facility];  Service: Urology    PROSTATE SURGERY  2017    SHOULDER ARTHROSCOPY Right     bicep tendon repair, rotator cuff repair and acromuim shave     SHOULDER SURGERY  2012    TRANSURETHRAL RESECTION OF PROSTATE     [3]   Family History  Problem Relation Name Age of Onset    Valvular heart disease Mother Sydnee Li     Stroke Mother Sydnee Li         deseased    Stroke Father Kyrie Mancini         deseased    Cancer Maternal Grandmother [place] Le Compte    [4]   Social History  Tobacco Use    Smoking status: Former     Current packs/day: 0.00     Average packs/day: 1 pack/day for 37.7 years (37.7 ttl pk-yrs)     Types: Cigarettes     Start date: 1971     Quit date: 2009     Years since quittin.5     Passive exposure: Never    Smokeless tobacco: Never    Tobacco comments:     Quit   Vaping Use    Vaping status: Never Used   Substance Use Topics    Alcohol use: Not Currently     Comment: quit     Drug use: Not Currently     Types: Marijuana        Nadia Villarreal MD  25       Nadia Villarreal MD  25

## 2025-07-09 NOTE — TELEPHONE ENCOUNTER
Reviewed message with  since PCP is out of office. PCP and nurse agree that pt needs ER evaluation. Called pt and encouraged pt to proceed to the ER at this time due to his severe weight loss and the infection. Pt was reluctant to go to the ER. Explained the risk of worsening infection and need for evaluation and treatment of the abscess. Pt is hesitant, but agreed to proceed to the ER at this time. Sending to  as and SADIA

## 2025-07-10 ENCOUNTER — NURSE TRIAGE (OUTPATIENT)
Dept: OTHER | Facility: OTHER | Age: 65
End: 2025-07-10

## 2025-07-10 ENCOUNTER — TELEMEDICINE (OUTPATIENT)
Dept: OTHER | Facility: HOSPITAL | Age: 65
End: 2025-07-10
Payer: MEDICARE

## 2025-07-10 VITALS
TEMPERATURE: 98 F | OXYGEN SATURATION: 96 % | HEART RATE: 76 BPM | DIASTOLIC BLOOD PRESSURE: 62 MMHG | RESPIRATION RATE: 20 BRPM | SYSTOLIC BLOOD PRESSURE: 105 MMHG

## 2025-07-10 DIAGNOSIS — L30.9 DERMATITIS: Primary | ICD-10-CM

## 2025-07-10 LAB
ALBUMIN SERPL BCG-MCNC: 4 G/DL (ref 3.5–5)
ALP SERPL-CCNC: 57 U/L (ref 34–104)
ALT SERPL W P-5'-P-CCNC: 9 U/L (ref 7–52)
ANION GAP SERPL CALCULATED.3IONS-SCNC: 8 MMOL/L (ref 4–13)
AST SERPL W P-5'-P-CCNC: 13 U/L (ref 13–39)
ATRIAL RATE: 102 BPM
BASOPHILS # BLD AUTO: 0.04 THOUSANDS/ÂΜL (ref 0–0.1)
BASOPHILS NFR BLD AUTO: 1 % (ref 0–1)
BILIRUB SERPL-MCNC: 0.5 MG/DL (ref 0.2–1)
BUN SERPL-MCNC: 20 MG/DL (ref 5–25)
CALCIUM SERPL-MCNC: 9 MG/DL (ref 8.4–10.2)
CHLORIDE SERPL-SCNC: 106 MMOL/L (ref 96–108)
CO2 SERPL-SCNC: 23 MMOL/L (ref 21–32)
CREAT SERPL-MCNC: 1.1 MG/DL (ref 0.6–1.3)
EOSINOPHIL # BLD AUTO: 0.12 THOUSAND/ÂΜL (ref 0–0.61)
EOSINOPHIL NFR BLD AUTO: 2 % (ref 0–6)
ERYTHROCYTE [DISTWIDTH] IN BLOOD BY AUTOMATED COUNT: 12.6 % (ref 11.6–15.1)
GFR SERPL CREATININE-BSD FRML MDRD: 69 ML/MIN/1.73SQ M
GLUCOSE SERPL-MCNC: 117 MG/DL (ref 65–140)
HCT VFR BLD AUTO: 38.5 % (ref 36.5–49.3)
HGB BLD-MCNC: 12.7 G/DL (ref 12–17)
IMM GRANULOCYTES # BLD AUTO: 0.02 THOUSAND/UL (ref 0–0.2)
IMM GRANULOCYTES NFR BLD AUTO: 0 % (ref 0–2)
LYMPHOCYTES # BLD AUTO: 1.2 THOUSANDS/ÂΜL (ref 0.6–4.47)
LYMPHOCYTES NFR BLD AUTO: 19 % (ref 14–44)
MCH RBC QN AUTO: 31.8 PG (ref 26.8–34.3)
MCHC RBC AUTO-ENTMCNC: 33 G/DL (ref 31.4–37.4)
MCV RBC AUTO: 97 FL (ref 82–98)
MONOCYTES # BLD AUTO: 0.66 THOUSAND/ÂΜL (ref 0.17–1.22)
MONOCYTES NFR BLD AUTO: 11 % (ref 4–12)
NEUTROPHILS # BLD AUTO: 4.23 THOUSANDS/ÂΜL (ref 1.85–7.62)
NEUTS SEG NFR BLD AUTO: 67 % (ref 43–75)
NRBC BLD AUTO-RTO: 0 /100 WBCS
P AXIS: 60 DEGREES
PLATELET # BLD AUTO: 206 THOUSANDS/UL (ref 149–390)
PMV BLD AUTO: 9.5 FL (ref 8.9–12.7)
POTASSIUM SERPL-SCNC: 4.3 MMOL/L (ref 3.5–5.3)
PR INTERVAL: 162 MS
PROT SERPL-MCNC: 6.5 G/DL (ref 6.4–8.4)
QRS AXIS: 67 DEGREES
QRSD INTERVAL: 74 MS
QT INTERVAL: 336 MS
QTC INTERVAL: 437 MS
RBC # BLD AUTO: 3.99 MILLION/UL (ref 3.88–5.62)
SODIUM SERPL-SCNC: 137 MMOL/L (ref 135–147)
T WAVE AXIS: 71 DEGREES
VENTRICULAR RATE: 102 BPM
WBC # BLD AUTO: 6.27 THOUSAND/UL (ref 4.31–10.16)

## 2025-07-10 PROCEDURE — 99213 OFFICE O/P EST LOW 20 MIN: CPT | Performed by: NURSE PRACTITIONER

## 2025-07-10 PROCEDURE — 96360 HYDRATION IV INFUSION INIT: CPT

## 2025-07-10 PROCEDURE — 93010 ELECTROCARDIOGRAM REPORT: CPT | Performed by: INTERNAL MEDICINE

## 2025-07-10 RX ORDER — TRIAMCINOLONE ACETONIDE 1 MG/G
CREAM TOPICAL 2 TIMES DAILY
Qty: 45 G | Refills: 0 | Status: SHIPPED | OUTPATIENT
Start: 2025-07-10 | End: 2025-07-14 | Stop reason: SDUPTHER

## 2025-07-10 RX ADMIN — SODIUM CHLORIDE 1000 ML: 0.9 INJECTION, SOLUTION INTRAVENOUS at 00:04

## 2025-07-10 NOTE — TELEPHONE ENCOUNTER
"Regarding: antibiotics concern / rash  ----- Message from Reny DWYER sent at 7/10/2025  7:20 PM EDT -----  \"My  has a cyst on his back side and he was seen in the ER and was given antibiotics, he has developed a rash on his legs and upper arms and we wanted some advice on what we should be doing, he is due for an other dose tonight\"    "

## 2025-07-10 NOTE — ED PROVIDER NOTES
Time reflects when diagnosis was documented in both MDM as applicable and the Disposition within this note       Time User Action Codes Description Comment    7/10/2025 12:55 AM Yokubaitis, Andrew Add [R55] Syncope     7/10/2025 12:55 AM Yokubaitis, Andrew Add [I49.3] PVC's (premature ventricular contractions)     7/10/2025 12:55 AM Yokubaitis, Andrew Add [R00.0] Tachycardia           ED Disposition       ED Disposition   Discharge    Condition   Stable    Date/Time   Thu Jul 10, 2025 12:55 AM    Comment   Cristian Mancini discharge to home/self care.                   Assessment & Plan       Medical Decision Making  65-year-old male presents after an episode of syncope  At risk for cardiac syncope, arrhythmia, anemia, electrolyte abnormality, vasovagal, dehydration, hypotension, etc.  CBC shows no leukocytosis, no anemia  CMP shows no electrolyte abnormalities with kidney and hepatic function at baseline  EKG shows no borderline sinus tachycardia which is normal compared to previous EKG except for slight increase in rate  Patient given 1 L normal saline bolus  Patient admits to prodrome between syncopal event which makes cardiac syncope unlikely however cannot completely rule out  Discussed with patient, recommended following up with cardiologist as soon as possible  Patient states that he feels back to his baseline and is no longer lightheaded  Neurologic exam is reassuring  Patient likely had vasovagal or hypotensive episode  Patient does have occasional PVCs according to cardiac monitor  Return precautions given, follow-up with cardiologist given  Patient is stable condition and cleared for discharge at this time with no further questions    Amount and/or Complexity of Data Reviewed  Labs: ordered.             Medications   sodium chloride 0.9 % bolus 1,000 mL (0 mL Intravenous Stopped 7/10/25 0125)       ED Risk Strat Scores                    No data recorded                            History of Present Illness        Chief Complaint   Patient presents with    Syncope     Pt presents via EMS after a syncopal episode witnessed by his wife. Pt fell back into a chair, -HS, -thinners. Was discharged today after having a pilonidal cyst lanced, denies bleeding       Past Medical History[1]   Past Surgical History[2]   Family History[3]   Social History[4]   E-Cigarette/Vaping    E-Cigarette Use Never User       E-Cigarette/Vaping Substances    Nicotine No     THC No     CBD No     Flavoring No     Other No     Unknown No       I have reviewed and agree with the history as documented.     65-year-old male presents with episode of syncope witnessed by his wife.  According to his wife, patient was getting up from bed and walking to the kitchen when he began to look pale and sweaty and he lowered himself to the ground.  No head strike and no blood thinners.  Patient was seen earlier today for drainage of a pilonidal cyst.  Patient states that he had a prodrome of lightheadedness with darkening vision before passing out.  Wife states that he was out for no longer than 30 seconds.  Patient returned to his baseline mental status quickly.  Patient denies chest pain, shortness of breath, headache, or other injury.      Syncope  Associated symptoms: no chest pain, no dizziness, no fever, no headaches, no palpitations, no seizures, no shortness of breath, no vomiting and no weakness        Review of Systems   Constitutional:  Negative for chills and fever.   HENT:  Negative for ear pain and sore throat.    Eyes:  Positive for visual disturbance. Negative for pain.   Respiratory:  Negative for cough and shortness of breath.    Cardiovascular:  Positive for syncope. Negative for chest pain and palpitations.   Gastrointestinal:  Negative for abdominal pain and vomiting.   Genitourinary:  Negative for dysuria and hematuria.   Musculoskeletal:  Negative for arthralgias and back pain.   Skin:  Negative for color change and rash.   Neurological:   Positive for syncope and light-headedness. Negative for dizziness, seizures, weakness and headaches.   All other systems reviewed and are negative.          Objective       ED Triage Vitals [07/09/25 2304]   Temperature Pulse Blood Pressure Respirations SpO2 Patient Position - Orthostatic VS   98 °F (36.7 °C) 97 118/59 18 96 % Sitting      Temp Source Heart Rate Source BP Location FiO2 (%) Pain Score    Oral Monitor Right arm -- No Pain      Vitals      Date and Time Temp Pulse SpO2 Resp BP Pain Score FACES Pain Rating User   07/10/25 0136 -- -- -- -- -- No Pain -- ROSANNE   07/10/25 0100 -- 76 96 % 20 105/62 -- -- [facility]   07/09/25 2304 98 °F (36.7 °C) 97 96 % 18 118/59 No Pain -- JY            Physical Exam  Vitals and nursing note reviewed.   Constitutional:       General: He is not in acute distress.     Appearance: Normal appearance. He is well-developed. He is not ill-appearing or toxic-appearing.   HENT:      Head: Normocephalic and atraumatic.      Mouth/Throat:      Mouth: Mucous membranes are moist.     Eyes:      General: No scleral icterus.        Right eye: No discharge.         Left eye: No discharge.      Conjunctiva/sclera: Conjunctivae normal.       Cardiovascular:      Rate and Rhythm: Normal rate and regular rhythm.      Heart sounds: No murmur heard.  Pulmonary:      Effort: Pulmonary effort is normal. No respiratory distress.      Breath sounds: Normal breath sounds.   Abdominal:      General: There is no distension.      Palpations: Abdomen is soft.      Tenderness: There is no abdominal tenderness. There is no guarding.   Genitourinary:     Comments: Patient denies examination of drained pilonidal abscess.  Discussed risk of not examining this however patient continued to decline and states that it is healing normally and not bleeding.    Musculoskeletal:         General: No swelling or tenderness.      Cervical back: Neck supple.      Right lower leg: No edema.      Left lower leg: No edema.      Skin:     General: Skin is warm and dry.      Capillary Refill: Capillary refill takes less than 2 seconds.     Neurological:      General: No focal deficit present.      Mental Status: He is alert and oriented to person, place, and time. Mental status is at baseline.      Comments: Face symmetric, tongue midline, 5/5 strength in the proximal and distal upper and lower extremities bilaterally with intact sensation to light touch throughout.  CN II-XII intact.  Normal speech, normal gait.    Psychiatric:         Mood and Affect: Mood normal.         Behavior: Behavior normal.         Thought Content: Thought content normal.         Judgment: Judgment normal.         Results Reviewed       Procedure Component Value Units Date/Time    Comprehensive metabolic panel [[id number]] Collected: 07/09/25 2359    Lab Status: Final result Specimen: Blood from Arm, Left Updated: 07/10/25 0031     Sodium 137 mmol/L      Potassium 4.3 mmol/L      Chloride 106 mmol/L      CO2 23 mmol/L      ANION GAP 8 mmol/L      BUN 20 mg/dL      Creatinine 1.10 mg/dL      Glucose 117 mg/dL      Calcium 9.0 mg/dL      AST 13 U/L      ALT 9 U/L      Alkaline Phosphatase 57 U/L      Total Protein 6.5 g/dL      Albumin 4.0 g/dL      Total Bilirubin 0.50 mg/dL      eGFR 69 ml/min/1.73sq m     Narrative:      National Kid[employer] Foundation guidelines for Chronic Kidney Disease (CKD):     Stage 1 with normal or high GFR (GFR > 90 mL/min/1.73 square meters)    Stage 2 Mild CKD (GFR = 60-89 mL/min/1.73 square meters)    Stage 3A Moderate CKD (GFR = 45-59 mL/min/1.73 square meters)    Stage 3B Moderate CKD (GFR = 30-44 mL/min/1.73 square meters)    Stage 4 Severe CKD (GFR = 15-29 mL/min/1.73 square meters)    Stage 5 End Stage CKD (GFR <15 mL/min/1.73 square meters)  Note: GFR calculation is accurate only with a steady state creatinine    CBC and differential [[id number]] Collected: 07/09/25 2359    Lab Status: Final result Specimen: Blood from Arm,  Left Updated: 07/10/25 0019     WBC 6.27 Thousand/uL      RBC 3.99 Million/uL      Hemoglobin 12.7 g/dL      Hematocrit 38.5 %      MCV 97 fL      MCH 31.8 pg      MCHC 33.0 g/dL      RDW 12.6 %      MPV 9.5 fL      Platelets 206 Thousands/uL      nRBC 0 /100 WBCs      Segmented % 67 %      Immature Grans % 0 %      Lymphocytes % 19 %      Monocytes % 11 %      Eosinophils Relative 2 %      Basophils Relative 1 %      Absolute Neutrophils 4.23 Thousands/µL      Absolute Immature Grans 0.02 Thousand/uL      Absolute Lymphocytes 1.20 Thousands/µL      Absolute Monocytes 0.66 Thousand/µL      Eosinophils Absolute 0.12 Thousand/µL      Basophils Absolute 0.04 Thousands/µL             No orders to display       ECG 12 Lead Documentation Only    Date/Time: 7/9/2025 10:01 PM    Performed by: Morgan Zepeda DO  Authorized by: Morgan Zepeda DO    Indications / Diagnosis:  Syncope  ECG reviewed by me, the ED Provider: yes    Patient location:  ED  Previous ECG:     Previous ECG:  Compared to current    Comparison ECG info:  12/13/2022    Similarity:  No change    Comparison to cardiac monitor: Yes    Interpretation:     Interpretation: normal    Rate:     ECG rate:  102    ECG rate assessment: tachycardic    Rhythm:     Rhythm: sinus rhythm    Ectopy:     Ectopy: none    QRS:     QRS axis:  Normal    QRS intervals:  Normal  Conduction:     Conduction: normal    ST segments:     ST segments:  Normal  T waves:     T waves: normal    Comments:      This rhythm with rate of 102.  No ST segment or T wave changes indicative of ischemia.  Normal EKG when compared to previous.  All intervals within normal limits.      ED Medication and Procedure Management   Prior to Admission Medications   Prescriptions Last Dose Informant Patient Reported? Taking?   Cholecalciferol (DIALYVITE VITAMIN D 5000 PO)  Self Yes No   Sig: Take by mouth in the morning   Lancets (OneTouch Delica Plus [id number]) MISC  Self No No   Sig: USE TO CHECK BLOOD SUGARS  TWICE DAILY   OneTouch Verio test strip  Self No No   Sig: USE TO TEST BLOOD SUGAR 4 TIMES A DAY   aspirin (ECOTRIN LOW STRENGTH) 81 mg EC tablet  Self Yes No   Sig: Take 81 mg by mouth daily   atorvastatin (LIPITOR) 40 mg tablet   No No   Sig: TAKE 1 TABLET(40 MG) BY MOUTH DAILY   cefuroxime (CEFTIN) 500 mg tablet   No No   Sig: Take 1 tablet (500 mg total) by mouth every 12 (twelve) hours for 5 days   glimepiride (AMARYL) 2 mg tablet   No No   Sig: TAKE 1 TABLET(2 MG) BY MOUTH DAILY WITH BREAKFAST   levothyroxine 50 mcg tablet   No No   Sig: TAKE 1 TABLET BY MOUTH DAILY MONDAY, TUESDAY, WEDNESDAY, FRIDAY,& SATURDAY. AND 2 TABLETS ON SUNDAY AND THURSDAY   metFORMIN (GLUCOPHAGE) 500 mg tablet   No No   Sig: TAKE 2 TABLETS(1000 MG) BY MOUTH TWICE DAILY WITH MEALS   pantoprazole (PROTONIX) 40 mg tablet   No No   Sig: TAKE 1 TABLET(40 MG) BY MOUTH DAILY BEFORE BREAKFAST      Facility-Administered Medications: None     Discharge Medication List as of 7/10/2025  1:11 AM        CONTINUE these medications which have NOT CHANGED    Details   aspirin (ECOTRIN LOW STRENGTH) 81 mg EC tablet Take 81 mg by mouth daily, Historical Med      atorvastatin (LIPITOR) 40 mg tablet TAKE 1 TABLET(40 MG) BY MOUTH DAILY, Starting Thu 5/29/2025, Normal      cefuroxime (CEFTIN) 500 mg tablet Take 1 tablet (500 mg total) by mouth every 12 (twelve) hours for 5 days, Starting Wed 7/9/2025, Until Mon 7/14/2025, Normal      Cholecalciferol (DIALYVITE VITAMIN D 5000 PO) Take by mouth in the morning, Historical Med      glimepiride (AMARYL) 2 mg tablet TAKE 1 TABLET(2 MG) BY MOUTH DAILY WITH BREAKFAST, Normal      Lancets (OneTouch Delica Plus [id number]) MISC USE TO CHECK BLOOD SUGARS TWICE DAILY, Normal      levothyroxine 50 mcg tablet TAKE 1 TABLET BY MOUTH DAILY MONDAY, TUESDAY, WEDNESDAY, FRIDAY,& SATURDAY. AND 2 TABLETS ON SUNDAY AND THURSDAY, Normal      metFORMIN (GLUCOPHAGE) 500 mg tablet TAKE 2 TABLETS(1000 MG) BY MOUTH TWICE DAILY WITH  MEALS, Starting Fri 6/20/2025, Normal      OneTouch Verio test strip USE TO TEST BLOOD SUGAR 4 TIMES A DAY, Normal      pantoprazole (PROTONIX) 40 mg tablet TAKE 1 TABLET(40 MG) BY MOUTH DAILY BEFORE BREAKFAST, Normal           No discharge procedures on file.  ED SEPSIS DOCUMENTATION   Time reflects when diagnosis was documented in both MDM as applicable and the Disposition within this note       Time User Action Codes Description Comment    7/10/2025 12:55 AM Yokubaitis, Andrew Add [R55] Syncope     7/10/2025 12:55 AM Yokubaitis, Andrew Add [I49.3] PVC's (premature ventricular contractions)     7/10/2025 12:55 AM Yokubaitis, Andrew Add [R00.0] Tachycardia                      [1]   Past Medical History:  Diagnosis Date    BPH (benign prostatic hyperplasia)     Diabetes mellitus (HCC)     Disease of thyroid gland     Erectile dysfunction     GERD (gastroesophageal reflux disease)     Headache     Headache(784.0) 05/01/2018    Each Day    Hematuria     Hyperlipidemia     Hypertension     Infectious viral hepatitis 2007    Cured by Dr. Kern    Kidney stone     Liver disease     hepatitis C    Mediastinal adenopathy     Obesity 2007    PE (pulmonary thromboembolism) (HCC)     Sarcoidosis     Vitamin D deficiency    [2]   Past Surgical History:  Procedure Laterality Date    COLONOSCOPY      last assessed: 08/28/2012; fiberoptic screening     CYSTOSCOPY      onset: 05/06/2016; Diagnostic     DENTAL SURGERY      FL RETROGRADE PYELOGRAM  02/06/2020    FL RETROGRADE PYELOGRAM  03/03/2020    HERNIA REPAIR      LIVER BIOPSY      LYMPH NODE BIOPSY  06/2019    FL [facility] INCL FLUOR GDNCE DX W/CELL WASHG SPX N/A 06/06/2019    Procedure: BRONCHOSCOPY FLEXIBLE;  Surgeon: Umair Louis MD;  [facility];  Service: Thoracic    FL BRONCHOSCOPY NEEDLE BX TRACHEA MAIN STEM&/BRON N/A 06/06/2019    Procedure: ENDOBRONCHIAL ULTRASOUND (EBUS);  Surgeon: Umair Louis MD;  [facility];  Service: Thoracic    FL  CYSTO/URETERO W/LITHOTRIPSY &INDWELL STENT INSRT Right 2020    Procedure: CYSTOSCOPY WITH LITHOTRIPSY RETROGRADE PYELOGRAM AND INSERTION STENT URETERAL;  Surgeon: Jsoe Reza MD;  [facility];  Service: Urology    MA CYSTO/URETERO W/LITHOTRIPSY &INDWELL STENT INSRT Right 2020    Procedure: CYSTOSCOPY URETEROSCOPY WITH LITHOTRIPSY HOLMIUM LASER; BASKET STONE RETRIEVAL; RETROGRADE PYELOGRAM AND INSERTION STENT URETERAL;  Surgeon: Jose Reaz MD;  [facility];  Service: Urology    MA LAPS RPR PARAESPHGL HRNA INCL FUNDPLSTY W/MESH N/A 2021    Procedure: ROBOTIC PARAESOPHAGEAL HERNIA  REPAIR, with FUNDOPLICATION;  Surgeon: Jim Coles MD;  [facility];  Service: General    MA TRURL ELECTROSURG RESCJ PROSTATE BLEED COMPLETE N/A 2017    Procedure: CYSTOSCOPY; TUR PROSTATE;  Surgeon: Fili Poe MD;  [facility];  Service: Urology    PROSTATE SURGERY  2017    SHOULDER ARTHROSCOPY Right     bicep tendon repair, rotator cuff repair and acromuim shave    [3]   Family History  Problem Relation Name Age of Onset    Valvular heart disease Mother      Stroke Mother      Stroke Father Kyrie Mancini         Accidental Death at 49    Cancer Maternal Grandmother Independence Le Compte    [4]   Social History  Tobacco Use    Smoking status: Former     Current packs/day: 0.00     Average packs/day: 1 pack/day for 37.7 years (37.7 ttl pk-yrs)     Types: Cigarettes     Start date: 1971     Quit date: 2009     Years since quittin.5     Passive exposure: Never    Smokeless tobacco: Never    Tobacco comments:     Quit   Vaping Use    Vaping status: Never Used   Substance Use Topics    Alcohol use: Not Currently     Comment: quit     Drug use: Not Currently     Types: Marijuana        Morgan Zepeda DO  [phone]

## 2025-07-10 NOTE — ED ATTENDING ATTESTATION
7/9/2025  I, Andrew Hancock MD, saw and evaluated the patient. I have discussed the patient with the resident/non-physician practitioner and agree with the resident's/non-physician practitioner's findings, Plan of Care, and MDM as documented in the resident's/non-physician practitioner's note, except where noted. All available labs and Radiology studies were reviewed.  I was present for key portions of any procedure(s) performed by the resident/non-physician practitioner and I was immediately available to provide assistance.       At this point I agree with the current assessment done in the Emergency Department.  I have conducted an independent evaluation of this patient a history and physical is as follows:    65-year-old male presenting emergency department after standing up and syncopizing.  Patient notes that he has a history of feeling lightheaded when transitioning from sitting to standing.  [place] felt like he was about to pass out.  Has baseline low normal blood pressures.  Today, was evaluated in the emergency department earlier in the day for a pilonidal cyst.  Notes that he may not have drink enough fluids since being discharged from the ED.  Was laying on the couch, stood up, took a few steps, and then passed out.  No postictal state.  No tongue biting.  No incontinence.  No other complaints at this time.    Physical Exam  Vitals and nursing note reviewed.   Constitutional:       General: He is not in acute distress.     Appearance: He is well-developed.   HENT:      Head: Normocephalic and atraumatic.     Eyes:      Conjunctiva/sclera: Conjunctivae normal.       Cardiovascular:      Rate and Rhythm: Normal rate and regular rhythm.      Heart sounds: No murmur heard.  Pulmonary:      Effort: Pulmonary effort is normal. No respiratory distress.      Breath sounds: Normal breath sounds.   Abdominal:      Palpations: Abdomen is  soft.      Tenderness: There is no abdominal tenderness.     Musculoskeletal:         General: No swelling.      Cervical back: Neck supple.     Skin:     General: Skin is warm and dry.      Capillary Refill: Capillary refill takes less than 2 seconds.     Neurological:      Mental Status: He is alert.     Psychiatric:         Mood and Affect: Mood normal.           ED Course         Critical Care Time  ECG 12 Lead Documentation Only    Date/Time: 7/9/2025 11:15 PM    Performed by: Andrew Hancock MD  Authorized by: Andrew Hancock MD    ECG reviewed by me, the ED Provider: yes    Patient location:  ED  Interpretation:     Interpretation: abnormal    Rate:     ECG rate:  102    ECG rate assessment: tachycardic    Rhythm:     Rhythm: sinus rhythm    Ectopy:     Ectopy: none    QRS:     QRS axis:  Normal    QRS intervals:  Normal  Conduction:     Conduction: normal    ST segments:     ST segments:  Normal  T waves:     T waves: normal        A/P: syncope. EKG w/ sinus tachycardia.  Likely orthostatic versus vasovagal versus other benign etiology.  Labs without leukocytosis, reviewed CT scan from earlier today that showed abscess was superficial, unlikely to be related to systemic infection.  EKG without any findings to suggest arrhythmia.  Does have some PVCs on cardiac monitor while I was evaluating patient in the room.  Recommend patient have close follow-up for reevaluation.

## 2025-07-10 NOTE — ED NOTES
Discharge instructions reviewed with patient/family by ED Resident. Pt ambulated to waiting room with family. Slow steady gait noted     Ciara Mason RN  07/10/25 0130

## 2025-07-10 NOTE — DISCHARGE INSTRUCTIONS
Please call your cardiologist and schedule an appointment within the next week for further evaluation    If you feel lightheaded at any point in time, please sit down immediately to prevent injury    Continue to drink plenty of water and get plenty of rest

## 2025-07-10 NOTE — TELEPHONE ENCOUNTER
"REASON FOR CONVERSATION: Rash    SYMPTOMS: Rash     OTHER HEALTH INFORMATION: On cefuroxime (CEFTIN) 500 mg     PROTOCOL DISPOSITION: See [facility] Within 4 Hours (Or PCP Triage)    CARE ADVICE PROVIDED: Virtual visit scheduled for today 7/10/25 at 8 PM. Advised to call 911 or present to ED with new/worsening symptoms. Patient verbalized understanding.     PRACTICE FOLLOW-UP: None at this time      Reason for Disposition   [1] Taking new prescription medication AND [2] rash within 4 hours of 1st dose    Answer Assessment - Initial Assessment Questions  1. APPEARANCE of RASH: \"Describe the rash.\" (e.g., spots, blisters, raised areas, skin peeling, scaly)        Looks like pimples, legs has pimples, redness, elevated off skin    2. SIZE: \"How big are the spots?\" (e.g., tip of pen, eraser, coin; inches, centimeters)        Smaller than a dime, probably like 15 on right leg    3. LOCATION: \"Where is the rash located?\"        Legs and upper arms   Feels like it is spreading since yesterday     4. COLOR: \"What color is the rash?\" (Note: It is difficult to assess rash color in people with darker-colored skin. When this situation occurs, simply ask the caller to describe what they see.)        Red     5. ONSET: \"When did the rash begin?\"        Rash began before ED visit yesterday but worse since     6. FEVER: \"Do you have a fever?\" If Yes, ask: \"What is your temperature, how was it measured, and when did it start?\"        Denies     7. ITCHING: \"Does the rash itch?\" If Yes, ask: \"How bad is the itch?\" (Scale 1-10; or mild, moderate, severe)        Yes, 8/10    8. CAUSE: \"What do you think is causing the rash?\"        Reaction to medication or possible poison ivy from yard work about 1 week ago     9. NEW MEDICINES: \"What new medicines are you taking?\" (e.g., name of antibiotic) \"When did you start taking this medication?\".        Started antibiotic 5PM yesterday, 1 dose this AM  cefuroxime (CEFTIN) 500 mg BID    10. OTHER " "SYMPTOMS: \"Do you have any other symptoms?\" (e.g., sore throat, fever, joint pain)          10 PM last night had an episode of LOC on the couch and went back to ED    Dentist today    Protocols used: Rash - Widespread On Drugs-Adult-AH    "

## 2025-07-11 ENCOUNTER — NURSE TRIAGE (OUTPATIENT)
Age: 65
End: 2025-07-11

## 2025-07-11 ENCOUNTER — TELEPHONE (OUTPATIENT)
Age: 65
End: 2025-07-11

## 2025-07-11 DIAGNOSIS — R63.4 WEIGHT LOSS, ABNORMAL: ICD-10-CM

## 2025-07-11 DIAGNOSIS — K76.9 LIVER LESION: Primary | ICD-10-CM

## 2025-07-11 NOTE — TELEPHONE ENCOUNTER
Please call and inform patient that I ordered an MRI of abdomen with and without contrast due to CT scan showing liver lesions which need to be further evaluated  and abnormal weight loss. Tell him to call [phone] to schedule MRI. CT scan did show 1 cm fluid collection in left gluteal cleft would recommend he follow-up with general surgeon concerning this or his PCP this may need to be drained or he may need to be started on antibiotics for this.  Stomach and GE junction was unremarkable it does show his previous fundoplication.  Also showed 2 mm left upper lung lobe which he should follow-up with his PCP concerning. Further recommendations such concerning edoscopic evaluation for symptoms (colonoscopy and EGD) will be done at time of office visit.

## 2025-07-11 NOTE — PATIENT INSTRUCTIONS
I do not think this is related to antibiotic as rash started prior.  I think this could be a dermatitis possible poison ivy deidra since you had been doing some outside work.  Localized to exposed areas on arms and legs.  Offered prednisone but you declined.  Will start cream.  Zyrtec for itching.  Follow up with PCP tomorrow.  Go to ER with any worsening symptoms or fevers.

## 2025-07-11 NOTE — TELEPHONE ENCOUNTER
"REASON FOR CONVERSATION: Weight Loss    SYMPTOMS: Loss of appetite, weight loss March 2025 250 lbs(baseline weight for 30 yrs) Today weight is 206 lb. Is eating smaller portions due to loss of appetite. Vomiting once daily, no specific triggers.    OTHER HEALTH INFORMATION: Pt has been referred to ER for dizziness by Cardiologist    PROTOCOL DISPOSITION: See PCP Within 2 Weeks    CARE ADVICE PROVIDED: none    PRACTICE FOLLOW-UP: n/a  Appt 7/17/25        Reason for Disposition   [1] Continued weight loss AND [2] after medical evaluation by doctor (or NP/PA)    Answer Assessment - Initial Assessment Questions  1. MAIN CONCERN: \"What is your main concern today?\"      Weight loss and vomiting  2. WEIGHT LOSS: \"How much weight have you lost?\"  (e.g., lbs., kgs.)  \"Over what period of time have you lost this weight?\"  (e.g., number of days, weeks, months, years)      Started 250 lb March now 206 lb   3. BASELINE WEIGHT: \"What is your baseline or normal weight?\" (e.g., \"How much do you usually weigh?\")      250 lb for thirty years  4. CAUSE: \"What do you think is causing the weight loss?\" (e.g., depression, anxiety, medicine side effect, pain, trouble swallowing, substance or alcohol use problem, eating disorder)      Endorses loss of appetite  5. PRIOR EVALUATION: \"Have you been evaluated by a doctor for your weight loss?\" If Yes, ask \"When was your last visit?\" \"What did your doctor (or NP/PA) tell you about the possible cause?\"      PCP told to call GI  6. HEART FAILURE TREATMENT: \"Do you have heart failure?\" If Yes, ask: \"Have you taken new or extra water pills (diuretics) recently?\" (e.g., furosemide; bumetanide). \"What is your target weight?\"      denies  7. OTHER SYMPTOMS: \"Do you have any other symptoms?\" (e.g., anxiety or depression, blood in stool, breathing difficulty, diarrhea, fever, trouble swallowing)      SOB-cardiologist    Protocols used: Weight Loss - Unintended-Adult-[place]    "

## 2025-07-11 NOTE — TELEPHONE ENCOUNTER
"REASON FOR CONVERSATION: Shortness of Breath    SYMPTOMS: Pt called in stating that he went to the ED twice this week once for a pilonidal cyst and then again for syncope episode.    Pt states he continues with dizziness when changing positions, especially when bending forward. He states that he has increased SOB today and he can feel PVCs. He states that he does feel faint at times but feels fine when lying down. Advised pt he needs go back to ED for eval however pt states he will go if he becomes worse. Advised again to go to ED.     Pt requested a sooner appointment scheduled pt for an appointment on 7/16 with PA however advised again to go to ED.    OTHER HEALTH INFORMATION: recent ed visit     PROTOCOL DISPOSITION: Go to ED Now    CARE ADVICE PROVIDED: Advised to go to ED, scheduled for an appointment as requested    PRACTICE FOLLOW-UP: Pt would like to know if he would like him to have any testing prior to coming to appointment. He is asking about holter monitor or zio monitor       Reason for Disposition   Extra heartbeats, irregular heart beating, or heart is beating very fast (i.e., 'palpitations')    Answer Assessment - Initial Assessment Questions  1. RESPIRATORY STATUS: \"Describe your breathing?\" (e.g., wheezing, shortness of breath, unable to speak, severe coughing)       SOB, PVCs, dizziness   2. ONSET: \"When did this breathing problem begin?\"       Possibly PVCs   3. PATTERN \"Does the difficult breathing come and go, or has it been constant since it started?\"       Comes and goes   4. SEVERITY: \"How bad is your breathing?\" (e.g., mild, moderate, severe)       Moderate   5. RECURRENT SYMPTOM: \"Have you had difficulty breathing before?\" If Yes, ask: \"When was the last time?\" and \"What happened that time?\"       Yes but increased   6. CARDIAC HISTORY: \"Do you have any history of heart disease?\" (e.g., heart attack, angina, bypass surgery, angioplasty)       Cardiomyopathy   7. LUNG HISTORY: \"Do you have " "any history of lung disease?\"  (e.g., pulmonary embolus, asthma, emphysema)      Sarcodosis   8. CAUSE: \"What do you think is causing the breathing problem?\"       Pvcs   9. OTHER SYMPTOMS: \"Do you have any other symptoms?\" (e.g., chest pain, cough, dizziness, fever, runny nose)      Dizziness, pvcs  10. O2 SATURATION MONITOR:  \"Do you use an oxygen saturation monitor (pulse oximeter) at home?\" If Yes, ask: \"What is your reading (oxygen level) today?\" \"What is your usual oxygen saturation reading?\" (e.g., 95%)        Does not have pulse ox  12. TRAVEL: \"Have you traveled out of the country in the last month?\" (e.g., travel history, exposures)        Denies    Protocols used: Breathing Difficulty-Adult-[place]    "

## 2025-07-11 NOTE — PROGRESS NOTES
Virtual Regular Visit  Name: Cristian Mancini      : 1960      MRN: [id number]  Encounter Provider: ROSA MARIA Nayak  Encounter Date: 7/10/2025   Encounter department: VIRTUAL CARE       Verification of patient location:  Patient is located at Home in the following state in which I hold an active license [place] :  Assessment & Plan  Dermatitis    Orders:    triamcinolone (KENALOG) 0.1 % cream; Apply topically 2 (two) times a day for 7 days        Encounter provider ROSA MARIA Nayak    The patient was identified by name and date of birth. Cristian Mancini was informed that this is a telemedicine visit and that the visit is being conducted through the Epic Embedded platform. He agrees to proceed..  My office door was closed. No one else was in the room. He acknowledged consent and understanding of privacy and security of the video platform. The patient has agreed to participate and understands they can discontinue the visit at any time.    Patient is aware this is a billable service.     History obtained from: patient  History of Present Illness     This is a 65 year old male here today for video visit.  He has a itchy rash.  This rash started yesterday before he started the antibiotic.  He had 2 small dots on his legs.  He states now the rash is worse. He does not last week he was cutting down and tree and also fixing a fence out side.  He was seen yesterday in ER x 2,  he had a pylodonial cyst drained and then had a syncope episode.  Rash is only on exposed areas of extremities. No rash on torso or face.   He states rash is very itchy.   He declined prednisone.       Review of Systems   Constitutional:  Negative for activity change, chills, fatigue and fever.   Cardiovascular: Negative.    Skin:  Positive for rash.   Psychiatric/Behavioral: Negative.         Objective   There were no vitals taken for this visit.    Physical Exam  Constitutional:       General: He is not in acute distress.      Appearance: Normal appearance. He is not ill-appearing or toxic-appearing.     Skin:     Comments: Erythemic vesicular papular lesions on legs and lower arms.  See image.       Neurological:      Mental Status: He is alert and oriented to person, place, and time.     Psychiatric:         Mood and Affect: Mood normal.         Behavior: Behavior normal.         Thought Content: Thought content normal.         Judgment: Judgment normal.         Visit Time  Total Visit Duration: 6 minutes not including the time spent for establishing the audio/video connection.

## 2025-07-11 NOTE — TELEPHONE ENCOUNTER
Patient would like to be scheduled sooner:    Reason for needing a sooner appointment:     Symptoms: The patient was in the Emergency Room 2 times     Duration of Symptoms:     Current Appointment date:09/23/2025    PCP ONLY     Is the patient Currently on the wait list?  No    Please review with provider if able to accommodate a sooner appointment.

## 2025-07-12 DIAGNOSIS — L30.9 DERMATITIS: ICD-10-CM

## 2025-07-14 ENCOUNTER — OFFICE VISIT (OUTPATIENT)
Dept: SURGERY | Facility: CLINIC | Age: 65
End: 2025-07-14
Attending: EMERGENCY MEDICINE
Payer: MEDICARE

## 2025-07-14 VITALS
SYSTOLIC BLOOD PRESSURE: 134 MMHG | DIASTOLIC BLOOD PRESSURE: 68 MMHG | OXYGEN SATURATION: 99 % | WEIGHT: 212 LBS | BODY MASS INDEX: 27.21 KG/M2 | HEIGHT: 74 IN | HEART RATE: 79 BPM | TEMPERATURE: 96.9 F

## 2025-07-14 DIAGNOSIS — K61.0 PERIANAL ABSCESS: Primary | ICD-10-CM

## 2025-07-14 PROCEDURE — 99203 OFFICE O/P NEW LOW 30 MIN: CPT | Performed by: SURGERY

## 2025-07-14 RX ORDER — TRIAMCINOLONE ACETONIDE 1 MG/G
CREAM TOPICAL 2 TIMES DAILY
Qty: 45 G | Refills: 0 | OUTPATIENT
Start: 2025-07-14 | End: 2025-07-21

## 2025-07-14 RX ORDER — TRIAMCINOLONE ACETONIDE 1 MG/G
CREAM TOPICAL 2 TIMES DAILY
Qty: 45 G | Refills: 0 | Status: SHIPPED | OUTPATIENT
Start: 2025-07-14 | End: 2025-07-29

## 2025-07-14 NOTE — PROGRESS NOTES
Name: Cristian Mancini      : 1960      MRN: [id number]  Encounter Provider: Des Anderson MD  Encounter Date: 2025   Encounter department: [facility]  :  Assessment & Plan  Perianal abscess    Orders:    Ambulatory Referral to General Surgery      I told the patient that he should do sitz bath twice daily.  Take stool softeners.  Complete the course of oral antibiotics.  Daily dry dressing with gauze and pad.  Follow-up with me as needed.  If he has increased pain or increased drainage he should give me a call.  History of Present Illness   65-year-old male patient came to my office after having a perianal abscess drained in the ED 5 days ago.  He says he has pain at the drain site.  He still has the same packing which was done in the ED.  He is on oral antibiotics.  No fever.  Patient has been receiving topical treatment for poison ivy.  No other complaints.      Cristian Mancini is a 65 y.o. male who presents   History obtained from: patient    Review of Systems   All other systems reviewed and are negative.    Medical History Reviewed by provider this encounter:  Tobacco  Allergies  Meds  Problems  Med Hx  Surg Hx  Fam Hx     .  Past Medical History   Past Medical History[1]  Past Surgical History[2]  Family History[3]   reports that he quit smoking about 16 years ago. His smoking use included cigarettes. He started smoking about 54 years ago. He has a 37.7 pack-year smoking history. He has never been exposed to tobacco smoke. He has never used smokeless tobacco. He reports that he does not currently use alcohol. He reports that he does not currently use drugs after having used the following drugs: Marijuana.  Current Outpatient Medications   Medication Instructions    aspirin (ECOTRIN LOW STRENGTH) 81 mg, Daily    atorvastatin (LIPITOR) 40 mg, Oral, Daily    cefuroxime (CEFTIN) 500 mg, Oral, Every 12 hours scheduled    Cholecalciferol (DIALYVITE VITAMIN D 5000 PO)  "Daily    glimepiride (AMARYL) 2 mg tablet TAKE 1 TABLET(2 MG) BY MOUTH DAILY WITH BREAKFAST    Lancets (OneTouch Delica Plus [id number]) MISC USE TO CHECK BLOOD SUGARS TWICE DAILY    levothyroxine 50 mcg tablet TAKE 1 TABLET BY MOUTH DAILY MONDAY, TUESDAY, WEDNESDAY, FRIDAY,& SATURDAY. AND 2 TABLETS ON SUNDAY AND THURSDAY    metFORMIN (GLUCOPHAGE) 1,000 mg, Oral, 2 times daily with meals    OneTouch Verio test strip USE TO TEST BLOOD SUGAR 4 TIMES A DAY    pantoprazole (PROTONIX) 40 mg tablet TAKE 1 TABLET(40 MG) BY MOUTH DAILY BEFORE BREAKFAST    triamcinolone (KENALOG) 0.1 % cream Topical, 2 times daily   Allergies[4]   Medications Ordered Prior to Encounter[5]   Social History[6]     Objective   /68 (BP Location: Left arm, Patient Position: Sitting, Cuff Size: Standard)   Pulse 79   Temp (!) 96.9 °F (36.1 °C) (Tympanic)   Ht 6' 2\" (1.88 m)   Wt 96.2 kg (212 lb)   SpO2 99%   BMI 27.22 kg/m²      Physical Exam  Vitals reviewed.   Constitutional:       Appearance: Normal appearance.   HENT:      Head: Normocephalic.      Nose: Nose normal.     Eyes:      Pupils: Pupils are equal, round, and reactive to light.       Cardiovascular:      Rate and Rhythm: Normal rate and regular rhythm.   Pulmonary:      Effort: Pulmonary effort is normal.   Genitourinary:     Comments: In the left perianal area there was the incision and drainage site.  I remove the packing.  No cellulitis in the surrounding region.  No purulent drainage.  There is some granulation tissue.    Musculoskeletal:      Cervical back: Normal range of motion.     Neurological:      Mental Status: He is alert.                [1]   Past Medical History:  Diagnosis Date    BPH (benign prostatic hyperplasia)     Diabetes mellitus (HCC)     Disease of thyroid gland     Erectile dysfunction     GERD (gastroesophageal reflux disease)     Headache     Headache(784.0) 05/01/2018    Each Day    Hematuria     Hyperlipidemia     Hypertension     Infectious " viral hepatitis 2007    Cured by Dr. Kern    Kidney stone     Liver disease     hepatitis C    Mediastinal adenopathy     Obesity 2007    PE (pulmonary thromboembolism) (HCC)     Sarcoidosis     Vitamin D deficiency    [2]   Past Surgical History:  Procedure Laterality Date    COLONOSCOPY      last assessed: 08/28/2012; fiberoptic screening     CYSTOSCOPY      onset: 05/06/2016; Diagnostic     DENTAL SURGERY      FL RETROGRADE PYELOGRAM  02/06/2020    FL RETROGRADE PYELOGRAM  03/03/2020    HERNIA REPAIR      LIVER BIOPSY      LYMPH NODE BIOPSY  06/2019    WV BRNCHSC INCL FLUOR GDNCE DX W/CELL WASHG SPX N/A 06/06/2019    Procedure: BRONCHOSCOPY FLEXIBLE;  Surgeon: Umair Louis MD;  Location: [facility];  Service: Thoracic    WV BRONCHOSCOPY NEEDLE BX TRACHEA MAIN STEM&/BRON N/A 06/06/2019    Procedure: ENDOBRONCHIAL ULTRASOUND (EBUS);  Surgeon: Umair Louis MD;  Location: [facility];  Service: Thoracic    WV CYSTO/URETERO W/LITHOTRIPSY &INDWELL STENT INSRT Right 02/06/2020    Procedure: CYSTOSCOPY WITH LITHOTRIPSY RETROGRADE PYELOGRAM AND INSERTION STENT URETERAL;  Surgeon: Jose Reza MD;  Location: [facility];  Service: Urology    WV CYSTO/URETERO W/LITHOTRIPSY &INDWELL STENT INSRT Right 03/03/2020    Procedure: CYSTOSCOPY URETEROSCOPY WITH LITHOTRIPSY HOLMIUM LASER; BASKET STONE RETRIEVAL; RETROGRADE PYELOGRAM AND INSERTION STENT URETERAL;  Surgeon: Jose Reza MD;  [facility];  Service: Urology    WV LAPS RPR PARAESPHGL HRNA INCL FUNDPLSTY W/MESH N/A 12/03/2021    Procedure: ROBOTIC PARAESOPHAGEAL HERNIA  REPAIR, with FUNDOPLICATION;  Surgeon: Jim Coles MD;  Location: [facility];  Service: General    WV TRURL ELECTROSURG RESCJ PROSTATE BLEED COMPLETE N/A 09/13/2017    Procedure: CYSTOSCOPY; TUR PROSTATE;  Surgeon: Fili Poe MD;  Location: [facility];  Service: Urology    PROSTATE SURGERY  08/2017    SHOULDER ARTHROSCOPY Right     bicep tendon repair, rotator  cuff repair and acromuim shave    [3]   Family History  Problem Relation Name Age of Onset    Valvular heart disease Mother      Stroke Mother      Stroke Father Kyrie Mancini         Accidental Death at 49    Cancer Maternal Grandmother Latesha Sandoval    [4] No Known Allergies  [5]   Current Outpatient Medications on File Prior to Visit   Medication Sig Dispense Refill    aspirin (ECOTRIN LOW STRENGTH) 81 mg EC tablet Take 81 mg by mouth in the morning.      atorvastatin (LIPITOR) 40 mg tablet TAKE 1 TABLET(40 MG) BY MOUTH DAILY 90 tablet 1    cefuroxime (CEFTIN) 500 mg tablet Take 1 tablet (500 mg total) by mouth every 12 (twelve) hours for 5 days 10 tablet 0    Cholecalciferol (DIALYVITE VITAMIN D 5000 PO) Take by mouth in the morning      glimepiride (AMARYL) 2 mg tablet TAKE 1 TABLET(2 MG) BY MOUTH DAILY WITH BREAKFAST 90 tablet 1    Lancets (OneTouch Delica Plus [id number]) MISC USE TO CHECK BLOOD SUGARS TWICE DAILY 200 each 3    levothyroxine 50 mcg tablet TAKE 1 TABLET BY MOUTH DAILY MONDAY, TUESDAY, WEDNESDAY, FRIDAY,& SATURDAY. AND 2 TABLETS ON  AND THURSDAY 115 tablet 1    metFORMIN (GLUCOPHAGE) 500 mg tablet TAKE 2 TABLETS(1000 MG) BY MOUTH TWICE DAILY WITH MEALS 360 tablet 1    OneTouch Verio test strip USE TO TEST BLOOD SUGAR 4 TIMES A  strip 3    pantoprazole (PROTONIX) 40 mg tablet TAKE 1 TABLET(40 MG) BY MOUTH DAILY BEFORE BREAKFAST 100 tablet 1    triamcinolone (KENALOG) 0.1 % cream Apply topically 2 (two) times a day for 7 days 45 g 0    [DISCONTINUED] triamcinolone (KENALOG) 0.1 % cream Apply topically 2 (two) times a day for 7 days 45 g 0     No current facility-administered medications on file prior to visit.   [6]   Social History  Tobacco Use    Smoking status: Former     Current packs/day: 0.00     Average packs/day: 1 pack/day for 37.7 years (37.7 ttl pk-yrs)     Types: Cigarettes     Start date: 1971     Quit date: 2009     Years since quittin.5     Passive  exposure: Never    Smokeless tobacco: Never    Tobacco comments:     Quit   Vaping Use    Vaping status: Never Used   Substance and Sexual Activity    Alcohol use: Not Currently     Comment: quit 2006    Drug use: Not Currently     Types: Marijuana    Sexual activity: Not Currently     Partners: Female     Birth control/protection: None

## 2025-07-16 ENCOUNTER — OFFICE VISIT (OUTPATIENT)
Dept: CARDIOLOGY CLINIC | Facility: CLINIC | Age: 65
End: 2025-07-16
Payer: MEDICARE

## 2025-07-16 VITALS
HEIGHT: 74 IN | SYSTOLIC BLOOD PRESSURE: 112 MMHG | OXYGEN SATURATION: 98 % | DIASTOLIC BLOOD PRESSURE: 80 MMHG | HEART RATE: 102 BPM | WEIGHT: 213 LBS | BODY MASS INDEX: 27.34 KG/M2

## 2025-07-16 DIAGNOSIS — Z09 HOSPITAL DISCHARGE FOLLOW-UP: Primary | ICD-10-CM

## 2025-07-16 DIAGNOSIS — Z87.898 HISTORY OF SYNCOPE: ICD-10-CM

## 2025-07-16 DIAGNOSIS — R00.0 SINUS TACHYCARDIA: ICD-10-CM

## 2025-07-16 PROBLEM — N20.0 NEPHROLITHIASIS: Status: RESOLVED | Noted: 2020-03-02 | Resolved: 2025-07-16

## 2025-07-16 PROBLEM — R19.7 DIARRHEA: Status: RESOLVED | Noted: 2021-01-06 | Resolved: 2025-07-16

## 2025-07-16 PROBLEM — I10 ESSENTIAL HYPERTENSION: Chronic | Status: ACTIVE | Noted: 2020-02-19

## 2025-07-16 PROBLEM — I42.9 CARDIOMYOPATHY, UNSPECIFIED TYPE (HCC): Status: RESOLVED | Noted: 2022-05-10 | Resolved: 2025-07-16

## 2025-07-16 PROCEDURE — 99214 OFFICE O/P EST MOD 30 MIN: CPT | Performed by: PHYSICIAN ASSISTANT

## 2025-07-16 NOTE — PROGRESS NOTES
General Cardiology Acute Visit  Admission Dx: syncope     Cristian Mancini 65 y.o. 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 Tachycardic in office today. Will check ZioPatch (will schedule to be completed between upcoming CT and MRI).   Will consider addition of BB pending Zio results.   RTC in 4-6 weeks with general cardiology.    Plan:  Recent syncopal event   Was seen in ED - felt to be likely vasovagal/hypotensive in etiology s/p lancing of pilonidal cyst.    Coronary artery disease, nonobstructive  Sinus tachycardia   Hypertension  Hyperlipidemia  Pulmonary sarcoidosis  Diabetes mellitus, type II  History of PE  History of hepatitis C  History of tobacco abuse    HPI:   Cristian Mancini is a 65-year-old man with a PMH as above who presents to the office for acute visit (no general cardiology AP availability). Follows with Dr. Hagen.     Presented to [facility] ED on 07/09 via EMS s/p syncopal event. Of note, patient had pilonidal cyst lanced/drained earlier that day. No head strike with LOC less than 30 seconds. Received IV fluids. Sinus tach in ED. Syncope felt to be likely vasovagal/hypotensive in etiology.  "However, close cardiology follow-up was recommended also.    2025: Patient presents with his wife for ED follow-up. Feeling okay today. Reviewed recent ED visits and events preceding. Reports ongoing, almost chronic, palpitations which he reports makes him feel \"energized and motivated.\" Also has noted some REYNA with heavier exertion such as climbing stairs.  Denies any further episodes of syncope.  Also denies chest pain, SOB at rest, LE swelling, abdominal bloating, and orthopnea.    Past Medical History[1]    Review of Systems   Constitutional:  Negative for activity change, appetite change, fatigue and unexpected weight change.   Respiratory:  Positive for shortness of breath. Negative for cough and chest tightness.    Cardiovascular:  Positive for palpitations. Negative for chest pain and leg swelling.   Gastrointestinal:  Negative for abdominal distention and abdominal pain.   Neurological:  Negative for dizziness, syncope, weakness and light-headedness.   Psychiatric/Behavioral:  Negative for confusion and sleep disturbance. The patient is not nervous/anxious.        Allergies[2]    Current Medications[3]    Social History     Socioeconomic History    Marital status: /Civil Union     Spouse name: Not on file    Number of children: 3    Years of education: Not on file    Highest education level: Not on file   Occupational History    Occupation: manager   Tobacco Use    Smoking status: Former     Current packs/day: 0.00     Average packs/day: 1 pack/day for 37.7 years (37.7 ttl pk-yrs)     Types: Cigarettes     Start date: 1971     Quit date: 2009     Years since quittin.5     Passive exposure: Never    Smokeless tobacco: Never    Tobacco comments:     Quit   Vaping Use    Vaping status: Never Used   Substance and Sexual Activity    Alcohol use: Not Currently     Comment: quit 2006    Drug use: Not Currently     Types: Marijuana    Sexual activity: Not Currently     Partners: Female    " " Birth control/protection: None   Other Topics Concern    Not on file   Social History Narrative    Caffeine use    Daily coffee consumption     Denied daily cola consumption     Denied daily tea consumption     Former smoker (as per [employer])     Current everyday smoker (as per [employer])     Non-smoker (as per [employer])      Social Drivers of Health     Financial Resource Strain: Low Risk  (9/8/2023)    Overall Financial Resource Strain (CARDIA)     Difficulty of Paying Living Expenses: Not hard at all   Food Insecurity: No Food Insecurity (9/17/2024)    Nursing - Inadequate Food Risk Classification     Worried About Running Out of Food in the Last Year: Never true     Ran Out of Food in the Last Year: Never true     Ran Out of Food in the Last Year: Not on file   Transportation Needs: No Transportation Needs (9/17/2024)    PRAPARE - Transportation     Lack of Transportation (Medical): No     Lack of Transportation (Non-Medical): No   Physical Activity: Not on file   Stress: Not on file   Social Connections: Not on file   Intimate Partner Violence: Not on file   Housing Stability: Unknown (9/17/2024)    Housing Stability Vital Sign     Unable to Pay for Housing in the Last Year: No     Number of Times Moved in the Last Year: Not on file     Homeless in the Last Year: No     Family History[4]    Vitals:   Blood pressure 112/80, pulse 102, height 6' 2\" (1.88 m), weight 96.6 kg (213 lb), SpO2 98%.    Wt Readings from Last 10 Encounters:   07/16/25 96.6 kg (213 lb)   07/14/25 96.2 kg (212 lb)   03/18/25 106 kg (233 lb)   01/29/25 108 kg (239 lb)   09/17/24 113 kg (250 lb)   07/23/24 113 kg (249 lb)   04/16/24 113 kg (250 lb 3.2 oz)   03/12/24 113 kg (249 lb 9.6 oz)   11/17/23 114 kg (252 lb)   11/16/23 114 kg (252 lb)     Vitals:    07/16/25 1042   BP: 112/80   BP Location: Left arm   Patient Position: Sitting   Cuff Size: Standard   Pulse: 102   SpO2: 98%   Weight: 96.6 kg (213 lb)   Height: 6' 2\" (1.88 m) "       Physical Exam  Vitals reviewed.   Constitutional:       General: He is awake. He is not in acute distress.     Appearance: Normal appearance. He is well-developed. He is not toxic-appearing or diaphoretic.   HENT:      Head: Normocephalic.      Nose: Nose normal.   Neck:      Vascular: No JVD.     Cardiovascular:      Rate and Rhythm: Regular rhythm. Tachycardia present. Occasional Extrasystoles are present.  Pulmonary:      Effort: Pulmonary effort is normal. No tachypnea, bradypnea or respiratory distress.      Breath sounds: Normal air entry. No decreased air movement. No wheezing.   Abdominal:      General: There is no distension.     Musculoskeletal:      Cervical back: Neck supple.      Right lower leg: No edema.      Left lower leg: No edema.     Skin:     General: Skin is warm and dry.      Coloration: Skin is not jaundiced or pale.     Neurological:      Mental Status: He is alert and oriented to person, place, and time.     Psychiatric:         Attention and Perception: Attention normal.         Mood and Affect: Mood and affect normal.         Speech: Speech normal.         Behavior: Behavior normal. Behavior is cooperative.         Thought Content: Thought content normal.       Labs & Results:  Lab Results   Component Value Date    WBC 6.27 07/09/2025    HGB 12.7 07/09/2025    HCT 38.5 07/09/2025    MCV 97 07/09/2025     07/09/2025     Lab Results   Component Value Date    SODIUM 137 07/09/2025    K 4.3 07/09/2025     07/09/2025    CO2 23 07/09/2025    BUN 20 07/09/2025    CREATININE 1.10 07/09/2025    GLUC 117 07/09/2025    CALCIUM 9.0 07/09/2025     Lab Results   Component Value Date    INR 1.04 01/16/2021    INR 1.06 12/07/2019    INR 1.00 09/11/2019    PROTIME 13.7 01/16/2021    PROTIME 13.2 12/07/2019    PROTIME 12.6 09/11/2019     Lab Results   Component Value Date    BNP 9 01/01/2016      Blanca Galicia PA-C       [1]   Past Medical History:  Diagnosis Date    BPH (benign  prostatic hyperplasia)     Diabetes mellitus (HCC)     Disease of thyroid gland     Erectile dysfunction     GERD (gastroesophageal reflux disease)     Headache     Headache(784.0) 05/01/2018    Each Day    Hematuria     Hyperlipidemia     Hypertension     Infectious viral hepatitis 2007    Cured by Dr. Kern    Kidney stone     Liver disease     hepatitis C    Mediastinal adenopathy     Nephrolithiasis 03/02/2020    Added automatically from request for surgery [id number]      Obesity 2007    PE (pulmonary thromboembolism) (HCC)     Sarcoidosis     Vitamin D deficiency    [2] No Known Allergies  [3]   Current Outpatient Medications:     aspirin (ECOTRIN LOW STRENGTH) 81 mg EC tablet, Take 81 mg by mouth in the morning., Disp: , Rfl:     atorvastatin (LIPITOR) 40 mg tablet, TAKE 1 TABLET(40 MG) BY MOUTH DAILY, Disp: 90 tablet, Rfl: 1    Cholecalciferol (DIALYVITE VITAMIN D 5000 PO), Take by mouth in the morning, Disp: , Rfl:     glimepiride (AMARYL) 2 mg tablet, TAKE 1 TABLET(2 MG) BY MOUTH DAILY WITH BREAKFAST, Disp: 90 tablet, Rfl: 1    Lancets (OneTouch Delica Plus [id number]) MISC, USE TO CHECK BLOOD SUGARS TWICE DAILY, Disp: 200 each, Rfl: 3    levothyroxine 50 mcg tablet, TAKE 1 TABLET BY MOUTH DAILY MONDAY, TUESDAY, WEDNESDAY, FRIDAY,& SATURDAY. AND 2 TABLETS ON SUNDAY AND THURSDAY, Disp: 115 tablet, Rfl: 1    metFORMIN (GLUCOPHAGE) 500 mg tablet, TAKE 2 TABLETS(1000 MG) BY MOUTH TWICE DAILY WITH MEALS, Disp: 360 tablet, Rfl: 1    OneTouch Verio test strip, USE TO TEST BLOOD SUGAR 4 TIMES A DAY, Disp: 300 strip, Rfl: 3    pantoprazole (PROTONIX) 40 mg tablet, TAKE 1 TABLET(40 MG) BY MOUTH DAILY BEFORE BREAKFAST, Disp: 100 tablet, Rfl: 1    triamcinolone (KENALOG) 0.1 % cream, Apply topically 2 (two) times a day for 7 days, Disp: 45 g, Rfl: 0  [4]   Family History  Problem Relation Name Age of Onset    Valvular heart disease Mother      Stroke Mother      Stroke Father Kyrie Mancini         Accidental Death  at 49    Cancer Maternal Grandmother [place] Le [place]

## 2025-07-17 ENCOUNTER — OFFICE VISIT (OUTPATIENT)
Age: 65
End: 2025-07-17
Payer: MEDICARE

## 2025-07-17 VITALS
DIASTOLIC BLOOD PRESSURE: 74 MMHG | HEART RATE: 93 BPM | BODY MASS INDEX: 27.34 KG/M2 | SYSTOLIC BLOOD PRESSURE: 111 MMHG | HEIGHT: 74 IN | WEIGHT: 213 LBS

## 2025-07-17 DIAGNOSIS — Z86.19 HISTORY OF HEPATITIS C VIRUS INFECTION: ICD-10-CM

## 2025-07-17 DIAGNOSIS — Z12.11 SCREENING FOR COLON CANCER: ICD-10-CM

## 2025-07-17 DIAGNOSIS — R13.19 ESOPHAGEAL DYSPHAGIA: ICD-10-CM

## 2025-07-17 DIAGNOSIS — R63.4 UNINTENTIONAL WEIGHT LOSS: Primary | ICD-10-CM

## 2025-07-17 DIAGNOSIS — K21.9 GASTROESOPHAGEAL REFLUX DISEASE, UNSPECIFIED WHETHER ESOPHAGITIS PRESENT: ICD-10-CM

## 2025-07-17 DIAGNOSIS — K76.9 LIVER LESION: ICD-10-CM

## 2025-07-17 PROCEDURE — 99214 OFFICE O/P EST MOD 30 MIN: CPT | Performed by: NURSE PRACTITIONER

## 2025-07-17 PROCEDURE — G2211 COMPLEX E/M VISIT ADD ON: HCPCS | Performed by: NURSE PRACTITIONER

## 2025-07-17 RX ORDER — SODIUM CHLORIDE, SODIUM LACTATE, POTASSIUM CHLORIDE, CALCIUM CHLORIDE 600; 310; 30; 20 MG/100ML; MG/100ML; MG/100ML; MG/100ML
125 INJECTION, SOLUTION INTRAVENOUS CONTINUOUS
OUTPATIENT
Start: 2025-07-17

## 2025-07-17 NOTE — ASSESSMENT & PLAN NOTE
Patient has history of GERD and fundoplication.  Patient reports that GERD acid reflux and heartburn are well-controlled but he does experience intermittent episodes of nausea, vomiting, and dry heaves.  Patient reports that nausea vomiting and dry heaves which has come on suddenly and resolved just this quickly.  Avoid NSAIDs  Continue pantoprazole 40 Mg daily  Follow antireflux diet and measures  Patient deferred medication for nausea and vomiting.  Inform patient if he changes his mind please reach out to office and we can order Zofran as needed for nausea and vomiting.

## 2025-07-17 NOTE — ASSESSMENT & PLAN NOTE
Patient has history of esophageal dysphagia.  Patient reports at times he will feel like the food gets stuck in his esophagus and he will need to drink extra fluids to get it down.  Patient reports that he does eat and drink slow.  Orders:    EGD; Future    Eat in upright position, eat slow, chew well, alternate solids and liquids, and avoid dry foods.  Continue pantoprazole 40 Mg daily

## 2025-07-17 NOTE — ASSESSMENT & PLAN NOTE
Patient has history of hepatitis C.  Patient was previously treated with Harvoni by Dr. Marley in 2017 with eradication of disease.  Orders:    Hepatitis C RNA, Quantitative PCR; Future

## 2025-07-17 NOTE — PATIENT INSTRUCTIONS
Scheduled date of EGD/colonoscopy (as of today):08/07/25  Physician performing EGD/colonoscopy:Dr. Garcia  Location of EGD/colonoscopy:[facility]  Desired bowel prep reviewed with patient:Eda  Instructions reviewed with patient by:ti  Clearances: na    Sit in upright position when eating eat slow, chew well, alternate solids and liquids and avoid dry foods.  Continue antireflux diet and measures avoid the caffeine, carbonated beverages, spicy fatty foods and red sauce.  Do not eat 2 to 3 hours before you go to bed and sleep with your head of bed elevated or multiple pillows.  Take Tums or Gaviscon as needed for breakthrough symptoms of acid reflux or heartburn.  Start nutritional supplement with each meal such as Ensure or boost or protein shakes

## 2025-07-17 NOTE — PROGRESS NOTES
Name: Cristian Mancini      : 1960      MRN: [id number]  Encounter Provider: ROSA MARIA Love  Encounter Date: 2025   Encounter department: [facility]  :  Assessment & Plan  Unintentional weight loss  Patient reports on intention in January patient went from 250 pounds down to 213 pounds. Patient reports loss of appetite and intermittent episodes of nausea, vomiting, with dry heaves.  Differential diagnosis include H pylori, ulceration, gastritis, esophagitis, or malignancy.  Orders:    Colonoscopy; Future    EGD; Future    polyethylene glycol (GOLYTELY) 4000 mL solution; Take 4,000 mL by mouth once for 1 dose Take as directed by office prior to procedure  Recommend nutritional supplements with each meal  Gastroesophageal reflux disease, unspecified whether esophagitis present  Patient has history of GERD and fundoplication.  Patient reports that GERD acid reflux and heartburn are well-controlled but he does experience intermittent episodes of nausea, vomiting, and dry heaves.  Patient reports that nausea vomiting and dry heaves which has come on suddenly and resolved just this quickly.  Avoid NSAIDs  Continue pantoprazole 40 Mg daily  Follow antireflux diet and measures  Patient deferred medication for nausea and vomiting.  Inform patient if he changes his mind please reach out to office and we can order Zofran as needed for nausea and vomiting.    Esophageal dysphagia  Patient has history of esophageal dysphagia.  Patient reports at times he will feel like the food gets stuck in his esophagus and he will need to drink extra fluids to get it down.  Patient reports that he does eat and drink slow.  Orders:    EGD; Future    Eat in upright position, eat slow, chew well, alternate solids and liquids, and avoid dry foods.  Continue pantoprazole 40 Mg daily  History of hepatitis C virus infection  Patient has history of hepatitis C.  Patient was previously  treated with Harvoni by Dr. Marley in 2017 with eradication of disease.  Orders:    Hepatitis C RNA, Quantitative PCR; Future    Liver lesion  CT chest abdomen and pelvis with contrast on 7/9/2025 showed 2 ill-defined enhancing regions measuring up to 1.2 cm in the right hepatic dome.  MRI of abdomen with and without contrast MRCP for further evaluation.       Screening for colon cancer  Colonoscopy done 8/20/2018 showed non-bleeding hemorrhoids and diverticulosis.  No polyps seen.  Recommendations were repeat colonoscopy in 10 years.       Follow up after procedures    History of Present Illness   Nausea  Associated symptoms include nausea and vomiting. Pertinent negatives include no abdominal pain, arthralgias, chest pain, chills, coughing, fever, rash or sore throat.     Cristian Mancini is a 65 y.o. male who presents to office for follow-up.  Patient reports GERD symptoms of acid reflux and heartburn are well-controlled.  Patient does experience intermittent episodes of nausea, vomiting, and dry heaves.  Patient reports that episodes of nausea, vomiting and dry heaves come on suddenly and resolve very quickly.  Patient does have a history of fundoplication and esophageal dysphagia.  Patient reports that he eats slow and chews well.  Patient does continue to have intermittent episodes where he feels like the food gets stuck in his chest.  Patient reports bowel patterns are regular.  He denies blood in stool, blood from rectal area, or black tarry stool.  Abdomen exam benign no abdominal tenderness or guarding.    Patient reports on intention in January patient went from 250 pounds down to 213 pounds. Patient reports loss of appetite and intermittent episodes of nausea, vomiting, with dry heaves.     CT chest abdomen and pelvis with contrast on 7/9/2025 showed 1 cm fluid collection, most likely abscess, within the left gluteal cleft, no obvious fistulous connection to the anus.Two ill-defined regions of  hyperenhancement of the right hepatic dome measure up to 1.2 cm.  Enlarged prostate.  Nonspecific 2 mm left upper lobe pulmonary nodule.  Unremarkable appearance of stomach/GE junction status post fundoplication.  Lab work done 7/9 showed CMP, and CBC within normal limits.    EGD done/8/2024 showed irregular Z-line.  Mild erythematous mucosa in cardia.  Duodenal bulb and 1st and 2nd part of duodenum appeared normal.  Colonoscopy done 8/20/2018 showed non-bleeding hemorrhoids and diverticulosis.  No polyps seen.  Recommendations were repeat colonoscopy in 10 years.    Review of Systems   Constitutional:  Positive for unexpected weight change. Negative for chills and fever.   HENT:  Positive for trouble swallowing. Negative for ear pain and sore throat.    Eyes:  Negative for pain and visual disturbance.   Respiratory:  Negative for cough and shortness of breath.    Cardiovascular:  Negative for chest pain and palpitations.   Gastrointestinal:  Positive for nausea and vomiting. Negative for abdominal pain.   Genitourinary:  Negative for dysuria and hematuria.   Musculoskeletal:  Negative for arthralgias and back pain.   Skin:  Negative for color change and rash.   Neurological:  Negative for seizures and syncope.   All other systems reviewed and are negative.    Medical History Reviewed by provider this encounter:  Tobacco  Allergies  Meds  Problems  Med Hx  Surg Hx  Fam Hx     .  Past Medical History   Past Medical History[1]  Past Surgical History[2]  Family History[3]   reports that he quit smoking about 16 years ago. His smoking use included cigarettes. He started smoking about 54 years ago. He has a 37.7 pack-year smoking history. He has never been exposed to tobacco smoke. He has never used smokeless tobacco. He reports that he does not currently use alcohol. He reports that he does not currently use drugs after having used the following drugs: Marijuana.  Current Outpatient Medications   Medication  "Instructions    aspirin (ECOTRIN LOW STRENGTH) 81 mg, Daily    atorvastatin (LIPITOR) 40 mg, Oral, Daily    Cholecalciferol (DIALYVITE VITAMIN D 5000 PO) Daily    glimepiride (AMARYL) 2 mg tablet TAKE 1 TABLET(2 MG) BY MOUTH DAILY WITH BREAKFAST    Lancets (OneTouch Delica Plus [id number]) MISC USE TO CHECK BLOOD SUGARS TWICE DAILY    levothyroxine 50 mcg tablet TAKE 1 TABLET BY MOUTH DAILY MONDAY, TUESDAY, WEDNESDAY, FRIDAY,& SATURDAY. AND 2 TABLETS ON SUNDAY AND THURSDAY    metFORMIN (GLUCOPHAGE) 1,000 mg, Oral, 2 times daily with meals    OneTouch Verio test strip USE TO TEST BLOOD SUGAR 4 TIMES A DAY    pantoprazole (PROTONIX) 40 mg tablet TAKE 1 TABLET(40 MG) BY MOUTH DAILY BEFORE BREAKFAST    polyethylene glycol (GOLYTELY) 4000 mL solution 4,000 mL, Oral, Once, Take as directed by office prior to procedure    triamcinolone (KENALOG) 0.1 % cream Topical, 2 times daily   Allergies[4]   Medications Ordered Prior to Encounter[5]   Social History[6]     Objective   /74 (BP Location: Left arm, Patient Position: Sitting, Cuff Size: Standard)   Pulse 93   Ht 6' 2\" (1.88 m)   Wt 96.6 kg (213 lb)   BMI 27.35 kg/m²      Physical Exam  Vitals and nursing note reviewed.   Constitutional:       General: He is not in acute distress.     Appearance: He is well-developed.   HENT:      Head: Normocephalic and atraumatic.     Eyes:      Conjunctiva/sclera: Conjunctivae normal.       Cardiovascular:      Rate and Rhythm: Normal rate and regular rhythm.      Pulses: Normal pulses.      Heart sounds: Normal heart sounds. No murmur heard.  Pulmonary:      Effort: Pulmonary effort is normal. No respiratory distress.      Breath sounds: Normal breath sounds. No stridor. No wheezing, rhonchi or rales.   Abdominal:      General: Bowel sounds are normal. There is no distension.      Palpations: Abdomen is soft. There is no mass.      Tenderness: There is no abdominal tenderness. There is no guarding or rebound. "     Musculoskeletal:         General: No swelling.      Cervical back: Neck supple.      Right lower leg: No edema.      Left lower leg: No edema.     Skin:     General: Skin is warm and dry.      Capillary Refill: Capillary refill takes less than 2 seconds.      Coloration: Skin is not jaundiced or pale.     Neurological:      Mental Status: He is alert and oriented to person, place, and time.     Psychiatric:         Mood and Affect: Mood normal.                [1]   Past Medical History:  Diagnosis Date    BPH (benign prostatic hyperplasia)     Diabetes mellitus (HCC)     Disease of thyroid gland     Erectile dysfunction     GERD (gastroesophageal reflux disease)     Headache     Headache(784.0) 05/01/2018    Each Day    Hematuria     Hyperlipidemia     Hypertension     Infectious viral hepatitis 2007    Cured by Dr. Kern    Kidney stone     Liver disease     hepatitis C    Mediastinal adenopathy     Nephrolithiasis 03/02/2020    Added automatically from request for surgery [id number]      Obesity 2007    PE (pulmonary thromboembolism) (HCC)     Sarcoidosis     Vitamin D deficiency    [2]   Past Surgical History:  Procedure Laterality Date    COLONOSCOPY      last assessed: 08/28/2012; fiberoptic screening     CYSTOSCOPY      onset: 05/06/2016; Diagnostic     DENTAL SURGERY      FL RETROGRADE PYELOGRAM  02/06/2020    FL RETROGRADE PYELOGRAM  03/03/2020    HERNIA REPAIR      LIVER BIOPSY      LYMPH NODE BIOPSY  06/2019    WA BRNCHSC INCL FLUOR GDNCE DX W/CELL WASHG SPX N/A 06/06/2019    Procedure: BRONCHOSCOPY FLEXIBLE;  Surgeon: Umair Louis MD;  [facility]: [facility];  Service: Thoracic    WA BRONCHOSCOPY NEEDLE BX TRACHEA MAIN STEM&/BRON N/A 06/06/2019    Procedure: ENDOBRONCHIAL ULTRASOUND (EBUS);  Surgeon: Umair Louis MD;  Location: [facility];  Service: Thoracic    WA CYSTO/URETERO W/LITHOTRIPSY &INDWELL STENT INSRT Right 02/06/2020    Procedure: CYSTOSCOPY WITH LITHOTRIPSY RETROGRADE  PYELOGRAM AND INSERTION STENT URETERAL;  Surgeon: Jose Reza MD;  Location[facility];  Service: Urology    CA CYSTO/URETERO W/LITHOTRIPSY &INDWELL STENT INSRT Right 03/03/2020    Procedure: CYSTOSCOPY URETEROSCOPY WITH LITHOTRIPSY HOLMIUM LASER; BASKET STONE RETRIEVAL; RETROGRADE PYELOGRAM AND INSERTION STENT URETERAL;  Surgeon: Jose Reza MD;  [facility];  Service: Urology    CA LAPS RPR PARAESPHGL HRNA INCL FUNDPLSTY W/MESH N/A 12/03/2021    Procedure: ROBOTIC PARAESOPHAGEAL HERNIA  REPAIR, with FUNDOPLICATION;  Surgeon: Jim Coles MD;  Location[facility];  Service: General    CA TRURL ELECTROSURG RESCJ PROSTATE BLEED COMPLETE N/A 09/13/2017    Procedure: CYSTOSCOPY; TUR PROSTATE;  Surgeon: Fili Poe MD;  [facility];  Service: Urology    PROSTATE SURGERY  08/2017    SHOULDER ARTHROSCOPY Right     bicep tendon repair, rotator cuff repair and acromuim shave    [3]   Family History  Problem Relation Name Age of Onset    Valvular heart disease Mother      Stroke Mother      Stroke Father Kyrie Mancini         Accidental Death at 49    Cancer Maternal Grandmother Marshall Le Compte    [4] No Known Allergies  [5]   Current Outpatient Medications on File Prior to Visit   Medication Sig Dispense Refill    aspirin (ECOTRIN LOW STRENGTH) 81 mg EC tablet Take 81 mg by mouth in the morning.      atorvastatin (LIPITOR) 40 mg tablet TAKE 1 TABLET(40 MG) BY MOUTH DAILY 90 tablet 1    Cholecalciferol (DIALYVITE VITAMIN D 5000 PO) Take by mouth in the morning      glimepiride (AMARYL) 2 mg tablet TAKE 1 TABLET(2 MG) BY MOUTH DAILY WITH BREAKFAST 90 tablet 1    Lancets (OneTouch Delica Plus Ohyfqq40G) MISC USE TO CHECK BLOOD SUGARS TWICE DAILY 200 each 3    levothyroxine 50 mcg tablet TAKE 1 TABLET BY MOUTH DAILY MONDAY, TUESDAY, WEDNESDAY, FRIDAY,& SATURDAY. AND 2 TABLETS ON SUNDAY AND THURSDAY 115 tablet 1    metFORMIN (GLUCOPHAGE) 500 mg tablet TAKE 2 TABLETS(1000 MG) BY MOUTH  TWICE DAILY WITH MEALS 360 tablet 1    OneTouch Verio test strip USE TO TEST BLOOD SUGAR 4 TIMES A  strip 3    pantoprazole (PROTONIX) 40 mg tablet TAKE 1 TABLET(40 MG) BY MOUTH DAILY BEFORE BREAKFAST 100 tablet 1    triamcinolone (KENALOG) 0.1 % cream Apply topically 2 (two) times a day for 7 days 45 g 0     No current facility-administered medications on file prior to visit.   [6]   Social History  Tobacco Use    Smoking status: Former     Current packs/day: 0.00     Average packs/day: 1 pack/day for 37.7 years (37.7 ttl pk-yrs)     Types: Cigarettes     Start date: 1971     Quit date: 2009     Years since quittin.5     Passive exposure: Never    Smokeless tobacco: Never    Tobacco comments:     Quit   Vaping Use    Vaping status: Never Used   Substance and Sexual Activity    Alcohol use: Not Currently     Comment: quit     Drug use: Not Currently     Types: Marijuana    Sexual activity: Not Currently     Partners: Female     Birth control/protection: None

## 2025-07-17 NOTE — ASSESSMENT & PLAN NOTE
CT chest abdomen and pelvis with contrast on 7/9/2025 showed 2 ill-defined enhancing regions measuring up to 1.2 cm in the right hepatic dome.  MRI of abdomen with and without contrast MRCP for further evaluation.

## 2025-07-18 ENCOUNTER — APPOINTMENT (OUTPATIENT)
Dept: LAB | Age: 65
End: 2025-07-18
Payer: MEDICARE

## 2025-07-18 DIAGNOSIS — Z86.19 HISTORY OF HEPATITIS C VIRUS INFECTION: ICD-10-CM

## 2025-07-18 PROCEDURE — 36415 COLL VENOUS BLD VENIPUNCTURE: CPT

## 2025-07-18 PROCEDURE — 87522 HEPATITIS C REVRS TRNSCRPJ: CPT

## 2025-07-21 ENCOUNTER — TELEPHONE (OUTPATIENT)
Dept: GASTROENTEROLOGY | Facility: CLINIC | Age: 65
End: 2025-07-21

## 2025-07-21 ENCOUNTER — HOSPITAL ENCOUNTER (OUTPATIENT)
Dept: RADIOLOGY | Facility: MEDICAL CENTER | Age: 65
Discharge: HOME | End: 2025-07-21
Attending: INTERNAL MEDICINE
Payer: MEDICARE

## 2025-07-21 DIAGNOSIS — K21.00 GASTROESOPHAGEAL REFLUX DISEASE WITH ESOPHAGITIS WITHOUT HEMORRHAGE: ICD-10-CM

## 2025-07-21 DIAGNOSIS — I71.21 ANEURYSM OF ASCENDING AORTA WITHOUT RUPTURE (HCC): ICD-10-CM

## 2025-07-21 LAB — HCV RNA SERPL NAA+PROBE-ACNC: NOT DETECTED K[IU]/ML

## 2025-07-21 PROCEDURE — 71275 CT ANGIOGRAPHY CHEST: CPT

## 2025-07-21 RX ORDER — PANTOPRAZOLE SODIUM 40 MG/1
40 TABLET, DELAYED RELEASE ORAL DAILY
Qty: 100 TABLET | Refills: 1 | Status: SHIPPED | OUTPATIENT
Start: 2025-07-21

## 2025-07-21 RX ADMIN — IOHEXOL 100 ML: 350 INJECTION, SOLUTION INTRAVENOUS at 10:19

## 2025-07-23 ENCOUNTER — ANESTHESIA EVENT (OUTPATIENT)
Dept: ANESTHESIOLOGY | Facility: HOSPITAL | Age: 65
End: 2025-07-23

## 2025-07-23 ENCOUNTER — OFFICE VISIT (OUTPATIENT)
Dept: FAMILY MEDICINE CLINIC | Facility: CLINIC | Age: 65
End: 2025-07-23
Payer: MEDICARE

## 2025-07-23 ENCOUNTER — ANESTHESIA (OUTPATIENT)
Dept: ANESTHESIOLOGY | Facility: HOSPITAL | Age: 65
End: 2025-07-23

## 2025-07-23 VITALS
TEMPERATURE: 98 F | DIASTOLIC BLOOD PRESSURE: 72 MMHG | SYSTOLIC BLOOD PRESSURE: 124 MMHG | BODY MASS INDEX: 27.46 KG/M2 | HEART RATE: 80 BPM | OXYGEN SATURATION: 99 % | WEIGHT: 214 LBS | HEIGHT: 74 IN

## 2025-07-23 DIAGNOSIS — K61.0 PERIANAL ABSCESS: ICD-10-CM

## 2025-07-23 DIAGNOSIS — R11.0 NAUSEA: Primary | ICD-10-CM

## 2025-07-23 DIAGNOSIS — K76.9 LIVER LESION: ICD-10-CM

## 2025-07-23 DIAGNOSIS — R55 SYNCOPE, UNSPECIFIED SYNCOPE TYPE: ICD-10-CM

## 2025-07-23 DIAGNOSIS — I77.810 AORTIC ROOT DILATION (HCC): ICD-10-CM

## 2025-07-23 PROCEDURE — 99214 OFFICE O/P EST MOD 30 MIN: CPT | Performed by: FAMILY MEDICINE

## 2025-07-23 PROCEDURE — G2211 COMPLEX E/M VISIT ADD ON: HCPCS | Performed by: FAMILY MEDICINE

## 2025-07-23 NOTE — PROGRESS NOTES
Diabetic Foot Exam    Patient's shoes and socks removed.    Right Foot/Ankle   Right Foot Inspection  Skin Exam: skin normal and skin intact. No dry skin, no warmth, no callus, no erythema, no maceration, no abnormal color, no pre-ulcer, no ulcer and no callus.     Toe Exam: ROM and strength within normal limits. No swelling, no tenderness, erythema and  no right toe deformity    Sensory   Monofilament testing: intact    Vascular  Capillary refills: < 3 seconds  The right DP pulse is 1+.     Left Foot/Ankle  Left Foot Inspection  Skin Exam: skin normal and skin intact. No dry skin, no warmth, no erythema, no maceration, normal color, no pre-ulcer, no ulcer and no callus.     Toe Exam: ROM and strength within normal limits. No swelling, no tenderness, no erythema and no left toe deformity.     Sensory   Monofilament testing: intact    Vascular  Capillary refills: < 3 seconds  The left DP pulse is 1+.     Assign Risk Category  No deformity present  No loss of protective sensation  Weak pulses  Risk: 0

## 2025-07-23 NOTE — PROGRESS NOTES
Name: Cristian Mancini      : 1960      MRN: [id number]  Encounter Provider: Rachel Solano DO  Encounter Date: 2025   Encounter department: [facility]  :  Assessment & Plan  Nausea  Causing decreased intake which is causing weight loss  Agree with GI rec's  CT unremarkable         Syncope, unspecified syncope type  Vasovagal  Agree with cardiology rec's and zio patch       Perianal abscess  Resolved per pt       Aortic root dilation (HCC)  Await CTA       Liver lesion  Likely benign  Proceed with MRI              History of Present Illness   HPI  Pt presents in f/u for ER visit.  Went for perianal abscess, and then syncopized at home and went back.  Has followed with general surgery and finished abx.  Denies pain, d/c, fevers.  States this is resolved    Cards getting event monitor.  Pt reports lightheadedness with position change which is not unusual for him.  Does keep well-hydrated.  Zio patch on today    Saw GI and is getting egd/colonoscopy for weight loss/nausea/early satiety.  Liver lesion on ct chest/abd/pelvis which is likely benign.  Further imaging (MRI with mrcp) pending.  Review of Systems   Constitutional:  Negative for chills, fatigue, fever and unexpected weight change.   HENT:  Negative for congestion, ear pain, hearing loss, postnasal drip, rhinorrhea, sinus pressure, sinus pain, sore throat, trouble swallowing and voice change.    Eyes:  Negative for pain, redness and visual disturbance.   Respiratory:  Negative for cough and shortness of breath.    Cardiovascular:  Negative for chest pain, palpitations and leg swelling.   Gastrointestinal:  Positive for nausea. Negative for abdominal pain, constipation and diarrhea.   Endocrine: Negative for cold intolerance, heat intolerance, polydipsia and polyuria.   Genitourinary:  Negative for dysuria, frequency and urgency.   Musculoskeletal:  Negative for arthralgias, joint swelling and myalgias.   Skin:  Negative for  "rash.        No suspicious lesions   Neurological:  Positive for light-headedness. Negative for weakness, numbness and headaches.   Hematological:  Negative for adenopathy.       Objective   /72 (Patient Position: Sitting, Cuff Size: Standard)   Pulse 80   Temp 98 °F (36.7 °C) (Temporal)   Ht 6' 2\" (1.88 m)   Wt 97.1 kg (214 lb)   SpO2 99%   BMI 27.48 kg/m²      Physical Exam  Constitutional:       Appearance: Normal appearance.   HENT:      Head: Normocephalic and atraumatic.      Right Ear: Tympanic membrane, ear canal and external ear normal.      Left Ear: Tympanic membrane, ear canal and external ear normal.      Nose: Nose normal. No congestion.      Mouth/Throat:      Mouth: Mucous membranes are moist.      Pharynx: No oropharyngeal exudate or posterior oropharyngeal erythema.     Eyes:      Extraocular Movements: Extraocular movements intact.      Conjunctiva/sclera: Conjunctivae normal.      Pupils: Pupils are equal, round, and reactive to light.     Neck:      Vascular: No carotid bruit.     Cardiovascular:      Rate and Rhythm: Normal rate and regular rhythm.      Heart sounds: No murmur heard.     No friction rub. No gallop.   Pulmonary:      Effort: Pulmonary effort is normal.      Breath sounds: No wheezing, rhonchi or rales.   Abdominal:      General: Abdomen is flat. There is no distension.      Palpations: Abdomen is soft.      Tenderness: There is no abdominal tenderness.     Musculoskeletal:      Cervical back: Neck supple.   Lymphadenopathy:      Cervical: No cervical adenopathy.     Neurological:      General: No focal deficit present.      Mental Status: He is alert and oriented to person, place, and time.      Cranial Nerves: No cranial nerve deficit.      Motor: No weakness.      Deep Tendon Reflexes: Reflexes normal.         "

## 2025-08-03 ENCOUNTER — HOSPITAL ENCOUNTER (OUTPATIENT)
Dept: MRI IMAGING | Facility: HOSPITAL | Age: 65
Discharge: HOME | End: 2025-08-03
Attending: NURSE PRACTITIONER
Payer: MEDICARE

## 2025-08-03 DIAGNOSIS — K76.9 LIVER LESION: ICD-10-CM

## 2025-08-03 DIAGNOSIS — R63.4 WEIGHT LOSS, ABNORMAL: ICD-10-CM

## 2025-08-03 PROCEDURE — 74183 MRI ABD W/O CNTR FLWD CNTR: CPT

## 2025-08-03 PROCEDURE — A9585 GADOBUTROL INJECTION: HCPCS | Performed by: NURSE PRACTITIONER

## 2025-08-03 RX ORDER — GADOBUTROL 604.72 MG/ML
9 INJECTION INTRAVENOUS
Status: COMPLETED | OUTPATIENT
Start: 2025-08-03 | End: 2025-08-03

## 2025-08-03 RX ADMIN — GADOBUTROL 9 ML: 604.72 INJECTION INTRAVENOUS at 14:48

## 2025-08-06 DIAGNOSIS — E78.5 DYSLIPIDEMIA: ICD-10-CM

## 2025-08-06 DIAGNOSIS — E11.9 TYPE 2 DIABETES MELLITUS WITHOUT COMPLICATION, WITH LONG-TERM CURRENT USE OF INSULIN (HCC): ICD-10-CM

## 2025-08-06 DIAGNOSIS — Z79.4 TYPE 2 DIABETES MELLITUS WITHOUT COMPLICATION, WITH LONG-TERM CURRENT USE OF INSULIN (HCC): ICD-10-CM

## 2025-08-06 DIAGNOSIS — E03.9 HYPOTHYROIDISM, UNSPECIFIED TYPE: ICD-10-CM

## 2025-08-07 ENCOUNTER — HOSPITAL ENCOUNTER (OUTPATIENT)
Dept: GASTROENTEROLOGY | Facility: AMBULARY SURGERY CENTER | Age: 65
Setting detail: OUTPATIENT SURGERY
End: 2025-08-07
Attending: NURSE PRACTITIONER
Payer: MEDICARE

## 2025-08-07 ENCOUNTER — ANESTHESIA (OUTPATIENT)
Dept: GASTROENTEROLOGY | Facility: AMBULARY SURGERY CENTER | Age: 65
End: 2025-08-07
Payer: MEDICARE

## 2025-08-07 LAB
CV ZIO BASELINE AVG BPM: 88 BPM
CV ZIO BASELINE BPM HIGH: 164 BPM
CV ZIO BASELINE BPM LOW: 58 BPM
CV ZIO DEVICE ANALYSIS TIME: NORMAL
CV ZIO ECT SVE COUNT: 2362 EPISODES
CV ZIO ECT SVE CPLT COUNT: 31 EPISODES
CV ZIO ECT SVE CPLT FREQ: NORMAL
CV ZIO ECT SVE FREQ: NORMAL
CV ZIO ECT SVE TPLT COUNT: 0 EPISODES
CV ZIO ECT SVE TPLT FREQ: 0
CV ZIO ECT VE COUNT: 4551 EPISODES
CV ZIO ECT VE CPLT COUNT: 357 EPISODES
CV ZIO ECT VE CPLT FREQ: NORMAL
CV ZIO ECT VE FREQ: NORMAL
CV ZIO ECT VE TPLT COUNT: 0 EPISODES
CV ZIO ECT VE TPLT FREQ: 0
CV ZIO ECTOPIC SVE COUPLET RAW PERCENT: 0.01 %
CV ZIO ECTOPIC SVE ISOLATED PERCENT: 0.31 %
CV ZIO ECTOPIC SVE TRIPLET RAW PERCENT: 0 %
CV ZIO ECTOPIC VE COUPLET RAW PERCENT: 0.09 %
CV ZIO ECTOPIC VE ISOLATED PERCENT: 0.6 %
CV ZIO ECTOPIC VE TRIPLET RAW PERCENT: 0 %
CV ZIO ENROLLMENT END: NORMAL
CV ZIO ENROLLMENT START: NORMAL
CV ZIO L BIGEMINY DUR: 4.8 SEC
CV ZIO L BIGEMINY END: NORMAL
CV ZIO L BIGEMINY START: NORMAL
CV ZIO L TRIGEMINY DUR: 12.2 SEC
CV ZIO L TRIGEMINY END: NORMAL
CV ZIO L TRIGEMINY START: NORMAL
CV ZIO PATIENT EVENTS DIARIES: 9
CV ZIO PATIENT EVENTS TRIGGERS: 1
CV ZIO PAUSE COUNT: 0
CV ZIO PRESCRIPTION STATUS: NORMAL
CV ZIO SVT AVG BPM: 129 BPM
CV ZIO SVT BPM HIGH: 164 BPM
CV ZIO SVT BPM LOW: 96 BPM
CV ZIO SVT COUNT: 6
CV ZIO SVT F EPI AVG BPM: 152 BPM
CV ZIO SVT F EPI BEATS: 5 BEATS
CV ZIO SVT F EPI BPM HIGH: 164 BPM
CV ZIO SVT F EPI BPM LOW: 141 BPM
CV ZIO SVT F EPI DUR: 2.1 SEC
CV ZIO SVT F EPI END: NORMAL
CV ZIO SVT F EPI START: NORMAL
CV ZIO SVT L EPI AVG BPM: 131 BPM
CV ZIO SVT L EPI BEATS: 6 BEATS
CV ZIO SVT L EPI BPM HIGH: 162 BPM
CV ZIO SVT L EPI BPM LOW: 110 BPM
CV ZIO SVT L EPI DUR: 3.1 SEC
CV ZIO SVT L EPI END: NORMAL
CV ZIO SVT L EPI START: NORMAL
CV ZIO TOTAL  ENROLLMENT PERIOD: NORMAL
CV ZIO VT COUNT: 0

## 2025-08-07 RX ORDER — LIDOCAINE HYDROCHLORIDE 20 MG/ML
INJECTION, SOLUTION EPIDURAL; INFILTRATION; INTRACAUDAL; PERINEURAL AS NEEDED
Status: DISCONTINUED | OUTPATIENT
Start: 2025-08-07 | End: 2025-08-07

## 2025-08-07 RX ORDER — PROPOFOL 10 MG/ML
INJECTION, EMULSION INTRAVENOUS CONTINUOUS PRN
Status: DISCONTINUED | OUTPATIENT
Start: 2025-08-07 | End: 2025-08-07

## 2025-08-07 RX ORDER — LEVOTHYROXINE SODIUM 50 UG/1
TABLET ORAL
Qty: 115 TABLET | Refills: 1 | Status: SHIPPED | OUTPATIENT
Start: 2025-08-07 | End: 2025-08-08 | Stop reason: SDUPTHER

## 2025-08-07 RX ORDER — SODIUM CHLORIDE, SODIUM LACTATE, POTASSIUM CHLORIDE, CALCIUM CHLORIDE 600; 310; 30; 20 MG/100ML; MG/100ML; MG/100ML; MG/100ML
INJECTION, SOLUTION INTRAVENOUS CONTINUOUS PRN
Status: DISCONTINUED | OUTPATIENT
Start: 2025-08-07 | End: 2025-08-07

## 2025-08-07 RX ORDER — GLIMEPIRIDE 2 MG/1
2 TABLET ORAL
Qty: 90 TABLET | Refills: 1 | Status: SHIPPED | OUTPATIENT
Start: 2025-08-07

## 2025-08-07 RX ORDER — PROPOFOL 10 MG/ML
INJECTION, EMULSION INTRAVENOUS AS NEEDED
Status: DISCONTINUED | OUTPATIENT
Start: 2025-08-07 | End: 2025-08-07

## 2025-08-07 RX ADMIN — PROPOFOL 200 MG: 10 INJECTION, EMULSION INTRAVENOUS at 08:05

## 2025-08-07 RX ADMIN — LIDOCAINE HYDROCHLORIDE 100 MG: 20 INJECTION, SOLUTION EPIDURAL; INFILTRATION; INTRACAUDAL; PERINEURAL at 08:05

## 2025-08-07 RX ADMIN — SODIUM CHLORIDE, SODIUM LACTATE, POTASSIUM CHLORIDE, AND CALCIUM CHLORIDE: .6; .31; .03; .02 INJECTION, SOLUTION INTRAVENOUS at 07:49

## 2025-08-07 RX ADMIN — PROPOFOL 50 MG: 10 INJECTION, EMULSION INTRAVENOUS at 08:11

## 2025-08-07 RX ADMIN — PROPOFOL 120 MCG/KG/MIN: 10 INJECTION, EMULSION INTRAVENOUS at 08:14

## 2025-08-08 DIAGNOSIS — K21.00 GASTROESOPHAGEAL REFLUX DISEASE WITH ESOPHAGITIS WITHOUT HEMORRHAGE: ICD-10-CM

## 2025-08-08 RX ORDER — ATORVASTATIN CALCIUM 40 MG/1
40 TABLET, FILM COATED ORAL DAILY
Qty: 90 TABLET | Refills: 3 | Status: SHIPPED | OUTPATIENT
Start: 2025-08-08

## 2025-08-08 RX ORDER — PANTOPRAZOLE SODIUM 40 MG/1
40 TABLET, DELAYED RELEASE ORAL DAILY
Qty: 100 TABLET | Refills: 0 | OUTPATIENT
Start: 2025-08-08

## 2025-08-13 ENCOUNTER — OFFICE VISIT (OUTPATIENT)
Dept: CARDIOLOGY CLINIC | Facility: CLINIC | Age: 65
End: 2025-08-13
Payer: MEDICARE

## 2025-08-13 PROBLEM — R55 VASOVAGAL SYNCOPE: Status: ACTIVE | Noted: 2025-08-13

## (undated) DEVICE — 3M™ TEGADERM™ TRANSPARENT FILM DRESSING FRAME STYLE, 1626W, 4 IN X 4-3/4 IN (10 CM X 12 CM), 50/CT 4CT/CASE: Brand: 3M™ TEGADERM™

## (undated) DEVICE — GLOVE SRG BIOGEL 8

## (undated) DEVICE — WEBRIL 6 IN UNSTERILE

## (undated) DEVICE — STOPCOCK 3-WAY

## (undated) DEVICE — SEALANT FIBRIN VISTASEAL 10ML

## (undated) DEVICE — Device: Brand: OLYMPUS

## (undated) DEVICE — PACK PBDS LAP CHOLE RF

## (undated) DEVICE — CHLORAPREP HI-LITE 26ML ORANGE

## (undated) DEVICE — SYRINGE 20ML LL

## (undated) DEVICE — ENDOPATH XCEL BLADELESS TROCARS WITH STABILITY SLEEVES: Brand: ENDOPATH XCEL

## (undated) DEVICE — BASIC SINGLE BASIN 2-LF: Brand: MEDLINE INDUSTRIES, INC.

## (undated) DEVICE — IV EXTENSION TUBING 33 IN

## (undated) DEVICE — GLOVE SRG BIOGEL ORTHOPEDIC 7.5

## (undated) DEVICE — SPECIMEN CONTAINER STERILE PEEL PACK

## (undated) DEVICE — SURGICAL GOWN, XL SMARTSLEEVE: Brand: CONVERTORS

## (undated) DEVICE — CADIERE FORCEPS: Brand: ENDOWRIST

## (undated) DEVICE — CYSTO TUBING TUR Y IRRIGATION

## (undated) DEVICE — PACK TUR

## (undated) DEVICE — INVIEW CLEAR LEGGINGS: Brand: CONVERTORS

## (undated) DEVICE — FIRST STEP BEDSIDE KIT - STAND-UP POUCH, ENDOSCOPIC CLEANING PAD - 1 POUCH: Brand: FIRST STEP BEDSIDE KIT - STAND-UP POUCH, ENDOSCOPIC CLEANING PAD

## (undated) DEVICE — SINGLE USE ASPIRATION NEEDLE: Brand: SINGLE USE ASPIRATION NEEDLE

## (undated) DEVICE — BASKET SPECIMEN RETRIVAL 1.9FR 120CM

## (undated) DEVICE — TROCAR: Brand: KII FIOS FIRST ENTRY

## (undated) DEVICE — TRAVELKIT CONTAINS FIRST STEP KIT (200ML EP-4 KIT) AND SOILED SCOPE BAG - 1 KIT: Brand: TRAVELKIT CONTAINS FIRST STEP KIT AND SOILED SCOPE BAG

## (undated) DEVICE — UROCATCH BAG

## (undated) DEVICE — GLOVE INDICATOR PI UNDERGLOVE SZ 8 BLUE

## (undated) DEVICE — PLUMEPEN PRO 10FT

## (undated) DEVICE — SCD SEQUENTIAL COMPRESSION COMFORT SLEEVE MEDIUM KNEE LENGTH: Brand: KENDALL SCD

## (undated) DEVICE — VIOLET BRAIDED (POLYGLACTIN 910), SYNTHETIC ABSORBABLE SUTURE: Brand: COATED VICRYL

## (undated) DEVICE — BAG DECANTER

## (undated) DEVICE — SYRINGE 30ML LL

## (undated) DEVICE — GAUZE SPONGES,16 PLY: Brand: CURITY

## (undated) DEVICE — REM POLYHESIVE ADULT PATIENT RETURN ELECTRODE: Brand: VALLEYLAB

## (undated) DEVICE — 3000CC GUARDIAN II: Brand: GUARDIAN

## (undated) DEVICE — LAPAROSCOPIC DUAL RIGID APPLICATOR: Brand: ETHICON

## (undated) DEVICE — IRRIG ENDO FLO TUBING

## (undated) DEVICE — SYRINGE 10ML LL

## (undated) DEVICE — STRL PENROSE DRAIN 18" X 1/2": Brand: CARDINAL HEALTH

## (undated) DEVICE — STERILE SURGICAL LUBRICANT,  TUBE: Brand: SURGILUBE

## (undated) DEVICE — SINGLE USE BIOPSY VALVE MAJ-210: Brand: SINGLE USE BIOPSY VALVE (STERILE)

## (undated) DEVICE — Device: Brand: BALLOON

## (undated) DEVICE — BLADELESS OBTURATOR: Brand: WECK VISTA

## (undated) DEVICE — GLOVE SRG BIOGEL 7.5

## (undated) DEVICE — DRAPE EQUIPMENT RF WAND

## (undated) DEVICE — 2000CC GUARDIAN II: Brand: GUARDIAN

## (undated) DEVICE — ENDOPATH PNEUMONEEDLE INSUFFLATION NEEDLES WITH LUER LOCK CONNECTORS 120MM: Brand: ENDOPATH

## (undated) DEVICE — SYRINGE 10ML SLIP TIP LF

## (undated) DEVICE — TUBING SMOKE EVAC W/FILTRATION DEVICE PLUMEPORT ACTIV

## (undated) DEVICE — GUIDEWIRE STRGHT TIP 0.035 IN  SOLO PLUS

## (undated) DEVICE — UTILITY MARKER,BLACK WITH LABELS: Brand: DEVON

## (undated) DEVICE — INTENDED FOR TISSUE SEPARATION, AND OTHER PROCEDURES THAT REQUIRE A SHARP SURGICAL BLADE TO PUNCTURE OR CUT.: Brand: BARD-PARKER SAFETY BLADES SIZE 15, STERILE

## (undated) DEVICE — ADHESIVE SKIN CLSR DERMABOND NX

## (undated) DEVICE — TIBURON LAPAROSCOPIC ABDOMINAL DRAPE: Brand: CONVERTORS

## (undated) DEVICE — 3M™ IOBAN™ 2 ANTIMICROBIAL INCISE DRAPE 6640EZ: Brand: IOBAN™ 2

## (undated) DEVICE — MONOPOLAR CURVED SCISSORS: Brand: ENDOWRIST

## (undated) DEVICE — ADAPTOR TRACH SWIVEL

## (undated) DEVICE — CHLORHEXIDINE 4PCT 4 OZ

## (undated) DEVICE — CATH URETERAL 5FR X 70 CM FLEX TIP POLYUR BARD

## (undated) DEVICE — CANNULA SEAL

## (undated) DEVICE — VESSEL SEALER EXTEND: Brand: ENDOWRIST

## (undated) DEVICE — BAG URINE DRAINAGE 4000ML CONTINUOUS IRR

## (undated) DEVICE — HEAVY DUTY TABLE COVER: Brand: CONVERTORS

## (undated) DEVICE — STRL PENROSE DRAIN 18" X 1/4": Brand: CARDINAL HEALTH

## (undated) DEVICE — NEEDLE SPINAL18G X 3.5 IN QUINCKE

## (undated) DEVICE — URETERAL CATHETER ADAPTOR TIP

## (undated) DEVICE — PMI DISPOSABLE PUNCTURE CLOSURE DEVICE / SUTURE GRASPER: Brand: PMI

## (undated) DEVICE — COLUMN DRAPE

## (undated) DEVICE — COTTON TIP APPLICTOR 2 PK

## (undated) DEVICE — SUT MONOCRYL 4-0 PS-2 27 IN Y426H

## (undated) DEVICE — [HIGH FLOW INSUFFLATOR,  DO NOT USE IF PACKAGE IS DAMAGED,  KEEP DRY,  KEEP AWAY FROM SUNLIGHT,  PROTECT FROM HEAT AND RADIOACTIVE SOURCES.]: Brand: PNEUMOSURE

## (undated) DEVICE — 200 MICRON SINGLE-USE HOLMIUM FIBER ASSEMBLY WITH FLAT TIP: Brand: OPTILITE

## (undated) DEVICE — KIT, ROBOTIC BARIATRIC: Brand: CARDINAL HEALTH

## (undated) DEVICE — TUBING SUCTION 5MM X 12 FT

## (undated) DEVICE — GLOVE SRG BIOGEL ECLIPSE 7.5

## (undated) DEVICE — DRAPE TOWEL: Brand: CONVERTORS

## (undated) DEVICE — SUT ETHIBOND 0 CT-1 30 IN X424H

## (undated) DEVICE — PREMIUM DRY TRAY LF: Brand: MEDLINE INDUSTRIES, INC.

## (undated) DEVICE — ARM DRAPE

## (undated) DEVICE — SINGLE USE SUCTION VALVE MAJ-209: Brand: SINGLE USE SUCTION VALVE (STERILE)

## (undated) DEVICE — Device

## (undated) DEVICE — TIP-UP FENESTRATED GRASPER: Brand: ENDOWRIST